# Patient Record
Sex: MALE | Race: WHITE | NOT HISPANIC OR LATINO | Employment: OTHER | URBAN - METROPOLITAN AREA
[De-identification: names, ages, dates, MRNs, and addresses within clinical notes are randomized per-mention and may not be internally consistent; named-entity substitution may affect disease eponyms.]

---

## 2022-12-27 ENCOUNTER — HOSPITAL ENCOUNTER (INPATIENT)
Facility: HOSPITAL | Age: 72
LOS: 4 days | Discharge: HOME HEALTH | DRG: 420 | End: 2022-12-31
Attending: EMERGENCY MEDICINE | Admitting: STUDENT IN AN ORGANIZED HEALTH CARE EDUCATION/TRAINING PROGRAM
Payer: MEDICARE

## 2022-12-27 DIAGNOSIS — K85.20 ALCOHOL-INDUCED ACUTE PANCREATITIS, UNSPECIFIED COMPLICATION STATUS: Primary | ICD-10-CM

## 2022-12-27 DIAGNOSIS — I10 PRIMARY HYPERTENSION: ICD-10-CM

## 2022-12-27 DIAGNOSIS — R07.9 CHEST PAIN: ICD-10-CM

## 2022-12-27 DIAGNOSIS — Z00.8 MEDICAL CLEARANCE FOR PSYCHIATRIC ADMISSION: ICD-10-CM

## 2022-12-27 DIAGNOSIS — R79.89 ELEVATED LFTS: ICD-10-CM

## 2022-12-27 PROBLEM — F10.10 ALCOHOL ABUSE: Status: ACTIVE | Noted: 2022-12-27

## 2022-12-27 PROBLEM — E78.5 HYPERLIPIDEMIA: Status: ACTIVE | Noted: 2022-12-27

## 2022-12-27 PROBLEM — A41.9 SEPSIS: Status: ACTIVE | Noted: 2022-12-27

## 2022-12-27 PROBLEM — K52.9 COLITIS: Status: ACTIVE | Noted: 2022-12-27

## 2022-12-27 LAB
ALBUMIN SERPL BCP-MCNC: 3 G/DL (ref 3.5–5.2)
ALP SERPL-CCNC: 175 U/L (ref 55–135)
ALT SERPL W/O P-5'-P-CCNC: 93 U/L (ref 10–44)
AMPHET+METHAMPHET UR QL: NEGATIVE
ANION GAP SERPL CALC-SCNC: 12 MMOL/L (ref 8–16)
APAP SERPL-MCNC: <3 UG/ML (ref 10–20)
AST SERPL-CCNC: 140 U/L (ref 10–40)
BARBITURATES UR QL SCN>200 NG/ML: NEGATIVE
BASOPHILS # BLD AUTO: 0.17 K/UL (ref 0–0.2)
BASOPHILS NFR BLD: 1.2 % (ref 0–1.9)
BENZODIAZ UR QL SCN>200 NG/ML: NEGATIVE
BILIRUB SERPL-MCNC: 1.6 MG/DL (ref 0.1–1)
BILIRUB UR QL STRIP: NEGATIVE
BUN SERPL-MCNC: 10 MG/DL (ref 8–23)
BZE UR QL SCN: NEGATIVE
CALCIUM SERPL-MCNC: 8.7 MG/DL (ref 8.7–10.5)
CANNABINOIDS UR QL SCN: NEGATIVE
CHLORIDE SERPL-SCNC: 100 MMOL/L (ref 95–110)
CLARITY UR: CLEAR
CO2 SERPL-SCNC: 25 MMOL/L (ref 23–29)
COLOR UR: YELLOW
CREAT SERPL-MCNC: 0.8 MG/DL (ref 0.5–1.4)
CREAT UR-MCNC: 31.7 MG/DL (ref 23–375)
CTP QC/QA: YES
DIFFERENTIAL METHOD: ABNORMAL
EOSINOPHIL # BLD AUTO: 0.4 K/UL (ref 0–0.5)
EOSINOPHIL NFR BLD: 2.5 % (ref 0–8)
ERYTHROCYTE [DISTWIDTH] IN BLOOD BY AUTOMATED COUNT: 13.8 % (ref 11.5–14.5)
EST. GFR  (NO RACE VARIABLE): >60 ML/MIN/1.73 M^2
ETHANOL SERPL-MCNC: <10 MG/DL
GLUCOSE SERPL-MCNC: 132 MG/DL (ref 70–110)
GLUCOSE UR QL STRIP: NEGATIVE
HCT VFR BLD AUTO: 41.5 % (ref 40–54)
HGB BLD-MCNC: 14.8 G/DL (ref 14–18)
HGB UR QL STRIP: NEGATIVE
IMM GRANULOCYTES # BLD AUTO: 0.07 K/UL (ref 0–0.04)
IMM GRANULOCYTES NFR BLD AUTO: 0.5 % (ref 0–0.5)
INR PPP: 1.1 (ref 0.8–1.2)
KETONES UR QL STRIP: NEGATIVE
LEUKOCYTE ESTERASE UR QL STRIP: NEGATIVE
LIPASE SERPL-CCNC: >1000 U/L (ref 4–60)
LYMPHOCYTES # BLD AUTO: 1.5 K/UL (ref 1–4.8)
LYMPHOCYTES NFR BLD: 10.5 % (ref 18–48)
MCH RBC QN AUTO: 33.8 PG (ref 27–31)
MCHC RBC AUTO-ENTMCNC: 35.7 G/DL (ref 32–36)
MCV RBC AUTO: 95 FL (ref 82–98)
METHADONE UR QL SCN>300 NG/ML: NEGATIVE
MONOCYTES # BLD AUTO: 1.3 K/UL (ref 0.3–1)
MONOCYTES NFR BLD: 8.7 % (ref 4–15)
NEUTROPHILS # BLD AUTO: 11.1 K/UL (ref 1.8–7.7)
NEUTROPHILS NFR BLD: 76.6 % (ref 38–73)
NITRITE UR QL STRIP: NEGATIVE
NRBC BLD-RTO: 0 /100 WBC
OPIATES UR QL SCN: NEGATIVE
PCP UR QL SCN>25 NG/ML: NEGATIVE
PH UR STRIP: 7 [PH] (ref 5–8)
PLATELET # BLD AUTO: 375 K/UL (ref 150–450)
PMV BLD AUTO: 9.3 FL (ref 9.2–12.9)
POTASSIUM SERPL-SCNC: 3.3 MMOL/L (ref 3.5–5.1)
PROT SERPL-MCNC: 7.3 G/DL (ref 6–8.4)
PROT UR QL STRIP: NEGATIVE
PROTHROMBIN TIME: 11.6 SEC (ref 9–12.5)
RBC # BLD AUTO: 4.38 M/UL (ref 4.6–6.2)
SARS-COV-2 RDRP RESP QL NAA+PROBE: NEGATIVE
SODIUM SERPL-SCNC: 137 MMOL/L (ref 136–145)
SP GR UR STRIP: 1.03 (ref 1–1.03)
TOXICOLOGY INFORMATION: NORMAL
TSH SERPL DL<=0.005 MIU/L-ACNC: 1.87 UIU/ML (ref 0.4–4)
URN SPEC COLLECT METH UR: ABNORMAL
UROBILINOGEN UR STRIP-ACNC: ABNORMAL EU/DL
WBC # BLD AUTO: 14.53 K/UL (ref 3.9–12.7)

## 2022-12-27 PROCEDURE — 99285 EMERGENCY DEPT VISIT HI MDM: CPT | Mod: 25

## 2022-12-27 PROCEDURE — 80307 DRUG TEST PRSMV CHEM ANLYZR: CPT | Performed by: EMERGENCY MEDICINE

## 2022-12-27 PROCEDURE — 63600175 PHARM REV CODE 636 W HCPCS: Performed by: EMERGENCY MEDICINE

## 2022-12-27 PROCEDURE — 93010 EKG 12-LEAD: ICD-10-PCS | Mod: ,,, | Performed by: INTERNAL MEDICINE

## 2022-12-27 PROCEDURE — 87635 SARS-COV-2 COVID-19 AMP PRB: CPT | Performed by: EMERGENCY MEDICINE

## 2022-12-27 PROCEDURE — 25000003 PHARM REV CODE 250: Performed by: STUDENT IN AN ORGANIZED HEALTH CARE EDUCATION/TRAINING PROGRAM

## 2022-12-27 PROCEDURE — 93005 ELECTROCARDIOGRAM TRACING: CPT

## 2022-12-27 PROCEDURE — 80053 COMPREHEN METABOLIC PANEL: CPT | Performed by: EMERGENCY MEDICINE

## 2022-12-27 PROCEDURE — 25000003 PHARM REV CODE 250: Performed by: EMERGENCY MEDICINE

## 2022-12-27 PROCEDURE — 85025 COMPLETE CBC W/AUTO DIFF WBC: CPT | Performed by: EMERGENCY MEDICINE

## 2022-12-27 PROCEDURE — 81003 URINALYSIS AUTO W/O SCOPE: CPT | Performed by: EMERGENCY MEDICINE

## 2022-12-27 PROCEDURE — 84443 ASSAY THYROID STIM HORMONE: CPT | Performed by: EMERGENCY MEDICINE

## 2022-12-27 PROCEDURE — 27000207 HC ISOLATION

## 2022-12-27 PROCEDURE — 80143 DRUG ASSAY ACETAMINOPHEN: CPT | Performed by: EMERGENCY MEDICINE

## 2022-12-27 PROCEDURE — 93010 ELECTROCARDIOGRAM REPORT: CPT | Mod: ,,, | Performed by: INTERNAL MEDICINE

## 2022-12-27 PROCEDURE — 85610 PROTHROMBIN TIME: CPT | Performed by: EMERGENCY MEDICINE

## 2022-12-27 PROCEDURE — 25500020 PHARM REV CODE 255: Performed by: HOSPITALIST

## 2022-12-27 PROCEDURE — 83690 ASSAY OF LIPASE: CPT | Performed by: EMERGENCY MEDICINE

## 2022-12-27 PROCEDURE — 11000001 HC ACUTE MED/SURG PRIVATE ROOM

## 2022-12-27 PROCEDURE — 82077 ASSAY SPEC XCP UR&BREATH IA: CPT | Performed by: EMERGENCY MEDICINE

## 2022-12-27 RX ORDER — BISACODYL 10 MG
10 SUPPOSITORY, RECTAL RECTAL DAILY PRN
Status: DISCONTINUED | OUTPATIENT
Start: 2022-12-27 | End: 2022-12-31 | Stop reason: HOSPADM

## 2022-12-27 RX ORDER — ACETAMINOPHEN 325 MG/1
650 TABLET ORAL EVERY 4 HOURS PRN
Status: DISCONTINUED | OUTPATIENT
Start: 2022-12-27 | End: 2022-12-31 | Stop reason: HOSPADM

## 2022-12-27 RX ORDER — FOLIC ACID 1 MG/1
1 TABLET ORAL DAILY
Status: DISCONTINUED | OUTPATIENT
Start: 2022-12-28 | End: 2022-12-31 | Stop reason: HOSPADM

## 2022-12-27 RX ORDER — LORAZEPAM 0.5 MG/1
2 TABLET ORAL EVERY 4 HOURS PRN
Status: DISCONTINUED | OUTPATIENT
Start: 2022-12-27 | End: 2022-12-31 | Stop reason: HOSPADM

## 2022-12-27 RX ORDER — SODIUM CHLORIDE, SODIUM LACTATE, POTASSIUM CHLORIDE, CALCIUM CHLORIDE 600; 310; 30; 20 MG/100ML; MG/100ML; MG/100ML; MG/100ML
INJECTION, SOLUTION INTRAVENOUS CONTINUOUS
Status: ACTIVE | OUTPATIENT
Start: 2022-12-28 | End: 2022-12-28

## 2022-12-27 RX ORDER — SODIUM CHLORIDE 0.9 % (FLUSH) 0.9 %
10 SYRINGE (ML) INJECTION EVERY 12 HOURS PRN
Status: DISCONTINUED | OUTPATIENT
Start: 2022-12-27 | End: 2022-12-31 | Stop reason: HOSPADM

## 2022-12-27 RX ORDER — PROCHLORPERAZINE EDISYLATE 5 MG/ML
5 INJECTION INTRAMUSCULAR; INTRAVENOUS EVERY 6 HOURS PRN
Status: DISCONTINUED | OUTPATIENT
Start: 2022-12-27 | End: 2022-12-31 | Stop reason: HOSPADM

## 2022-12-27 RX ORDER — SODIUM,POTASSIUM PHOSPHATES 280-250MG
2 POWDER IN PACKET (EA) ORAL
Status: DISCONTINUED | OUTPATIENT
Start: 2022-12-27 | End: 2022-12-28

## 2022-12-27 RX ORDER — TALC
6 POWDER (GRAM) TOPICAL NIGHTLY PRN
Status: DISCONTINUED | OUTPATIENT
Start: 2022-12-27 | End: 2022-12-31 | Stop reason: HOSPADM

## 2022-12-27 RX ORDER — LANOLIN ALCOHOL/MO/W.PET/CERES
800 CREAM (GRAM) TOPICAL
Status: DISCONTINUED | OUTPATIENT
Start: 2022-12-27 | End: 2022-12-31 | Stop reason: HOSPADM

## 2022-12-27 RX ORDER — GLUCAGON 1 MG
1 KIT INJECTION
Status: DISCONTINUED | OUTPATIENT
Start: 2022-12-27 | End: 2022-12-31 | Stop reason: HOSPADM

## 2022-12-27 RX ORDER — LANOLIN ALCOHOL/MO/W.PET/CERES
100 CREAM (GRAM) TOPICAL DAILY
Status: DISCONTINUED | OUTPATIENT
Start: 2022-12-28 | End: 2022-12-31 | Stop reason: HOSPADM

## 2022-12-27 RX ORDER — SIMETHICONE 80 MG
1 TABLET,CHEWABLE ORAL 4 TIMES DAILY PRN
Status: DISCONTINUED | OUTPATIENT
Start: 2022-12-27 | End: 2022-12-31 | Stop reason: HOSPADM

## 2022-12-27 RX ORDER — ACETAMINOPHEN 325 MG/1
650 TABLET ORAL EVERY 8 HOURS PRN
Status: DISCONTINUED | OUTPATIENT
Start: 2022-12-27 | End: 2022-12-31 | Stop reason: HOSPADM

## 2022-12-27 RX ORDER — IBUPROFEN 200 MG
16 TABLET ORAL
Status: DISCONTINUED | OUTPATIENT
Start: 2022-12-27 | End: 2022-12-31 | Stop reason: HOSPADM

## 2022-12-27 RX ORDER — IPRATROPIUM BROMIDE AND ALBUTEROL SULFATE 2.5; .5 MG/3ML; MG/3ML
3 SOLUTION RESPIRATORY (INHALATION) EVERY 4 HOURS PRN
Status: DISCONTINUED | OUTPATIENT
Start: 2022-12-27 | End: 2022-12-31 | Stop reason: HOSPADM

## 2022-12-27 RX ORDER — NALOXONE HCL 0.4 MG/ML
0.02 VIAL (ML) INJECTION
Status: DISCONTINUED | OUTPATIENT
Start: 2022-12-27 | End: 2022-12-31 | Stop reason: HOSPADM

## 2022-12-27 RX ORDER — POTASSIUM CHLORIDE 750 MG/1
50 TABLET, EXTENDED RELEASE ORAL ONCE
Status: COMPLETED | OUTPATIENT
Start: 2022-12-27 | End: 2022-12-28

## 2022-12-27 RX ORDER — ONDANSETRON 2 MG/ML
4 INJECTION INTRAMUSCULAR; INTRAVENOUS EVERY 8 HOURS PRN
Status: DISCONTINUED | OUTPATIENT
Start: 2022-12-27 | End: 2022-12-31 | Stop reason: HOSPADM

## 2022-12-27 RX ORDER — HEPARIN SODIUM 5000 [USP'U]/ML
5000 INJECTION, SOLUTION INTRAVENOUS; SUBCUTANEOUS EVERY 8 HOURS
Status: DISCONTINUED | OUTPATIENT
Start: 2022-12-27 | End: 2022-12-31 | Stop reason: HOSPADM

## 2022-12-27 RX ORDER — MAG HYDROX/ALUMINUM HYD/SIMETH 200-200-20
30 SUSPENSION, ORAL (FINAL DOSE FORM) ORAL 4 TIMES DAILY PRN
Status: DISCONTINUED | OUTPATIENT
Start: 2022-12-27 | End: 2022-12-31 | Stop reason: HOSPADM

## 2022-12-27 RX ORDER — HYDROCODONE BITARTRATE AND ACETAMINOPHEN 5; 325 MG/1; MG/1
1 TABLET ORAL EVERY 6 HOURS PRN
Status: DISCONTINUED | OUTPATIENT
Start: 2022-12-27 | End: 2022-12-31 | Stop reason: HOSPADM

## 2022-12-27 RX ORDER — IBUPROFEN 200 MG
24 TABLET ORAL
Status: DISCONTINUED | OUTPATIENT
Start: 2022-12-27 | End: 2022-12-31 | Stop reason: HOSPADM

## 2022-12-27 RX ORDER — POLYETHYLENE GLYCOL 3350 17 G/17G
17 POWDER, FOR SOLUTION ORAL DAILY
Status: DISCONTINUED | OUTPATIENT
Start: 2022-12-28 | End: 2022-12-31 | Stop reason: HOSPADM

## 2022-12-27 RX ADMIN — SODIUM CHLORIDE 1000 ML: 0.9 INJECTION, SOLUTION INTRAVENOUS at 09:12

## 2022-12-27 RX ADMIN — PIPERACILLIN AND TAZOBACTAM 4.5 G: 4; .5 INJECTION, POWDER, LYOPHILIZED, FOR SOLUTION INTRAVENOUS; PARENTERAL at 10:12

## 2022-12-27 RX ADMIN — IOHEXOL 85 ML: 350 INJECTION, SOLUTION INTRAVENOUS at 09:12

## 2022-12-28 LAB
ALBUMIN SERPL BCP-MCNC: 2.7 G/DL (ref 3.5–5.2)
ALP SERPL-CCNC: 152 U/L (ref 55–135)
ALT SERPL W/O P-5'-P-CCNC: 78 U/L (ref 10–44)
ANION GAP SERPL CALC-SCNC: 13 MMOL/L (ref 8–16)
AST SERPL-CCNC: 93 U/L (ref 10–40)
BASOPHILS # BLD AUTO: 0.17 K/UL (ref 0–0.2)
BASOPHILS NFR BLD: 1.3 % (ref 0–1.9)
BILIRUB DIRECT SERPL-MCNC: 1 MG/DL (ref 0.1–0.3)
BILIRUB SERPL-MCNC: 1.4 MG/DL (ref 0.1–1)
BUN SERPL-MCNC: 7 MG/DL (ref 8–23)
C DIFF GDH STL QL: NEGATIVE
C DIFF TOX A+B STL QL IA: NEGATIVE
CALCIUM SERPL-MCNC: 7.9 MG/DL (ref 8.7–10.5)
CHLORIDE SERPL-SCNC: 104 MMOL/L (ref 95–110)
CO2 SERPL-SCNC: 21 MMOL/L (ref 23–29)
CREAT SERPL-MCNC: 0.8 MG/DL (ref 0.5–1.4)
DIFFERENTIAL METHOD: ABNORMAL
EOSINOPHIL # BLD AUTO: 0.4 K/UL (ref 0–0.5)
EOSINOPHIL NFR BLD: 3.5 % (ref 0–8)
ERYTHROCYTE [DISTWIDTH] IN BLOOD BY AUTOMATED COUNT: 13.8 % (ref 11.5–14.5)
EST. GFR  (NO RACE VARIABLE): >60 ML/MIN/1.73 M^2
GLUCOSE SERPL-MCNC: 117 MG/DL (ref 70–110)
HAV IGM SERPL QL IA: NORMAL
HBV CORE IGM SERPL QL IA: NORMAL
HBV SURFACE AG SERPL QL IA: NORMAL
HCT VFR BLD AUTO: 39.8 % (ref 40–54)
HCV AB SERPL QL IA: NORMAL
HGB BLD-MCNC: 14.2 G/DL (ref 14–18)
IMM GRANULOCYTES # BLD AUTO: 0.08 K/UL (ref 0–0.04)
IMM GRANULOCYTES NFR BLD AUTO: 0.6 % (ref 0–0.5)
LYMPHOCYTES # BLD AUTO: 1.5 K/UL (ref 1–4.8)
LYMPHOCYTES NFR BLD: 12.1 % (ref 18–48)
MAGNESIUM SERPL-MCNC: 1.3 MG/DL (ref 1.6–2.6)
MCH RBC QN AUTO: 34.5 PG (ref 27–31)
MCHC RBC AUTO-ENTMCNC: 35.7 G/DL (ref 32–36)
MCV RBC AUTO: 97 FL (ref 82–98)
MONOCYTES # BLD AUTO: 1.2 K/UL (ref 0.3–1)
MONOCYTES NFR BLD: 9.1 % (ref 4–15)
NEUTROPHILS # BLD AUTO: 9.2 K/UL (ref 1.8–7.7)
NEUTROPHILS NFR BLD: 73.4 % (ref 38–73)
NRBC BLD-RTO: 0 /100 WBC
OB PNL STL: NEGATIVE
PHOSPHATE SERPL-MCNC: 2.2 MG/DL (ref 2.7–4.5)
PLATELET # BLD AUTO: 382 K/UL (ref 150–450)
PMV BLD AUTO: 9.2 FL (ref 9.2–12.9)
POTASSIUM SERPL-SCNC: 3.2 MMOL/L (ref 3.5–5.1)
PROT SERPL-MCNC: 6.4 G/DL (ref 6–8.4)
RBC # BLD AUTO: 4.12 M/UL (ref 4.6–6.2)
SODIUM SERPL-SCNC: 138 MMOL/L (ref 136–145)
WBC # BLD AUTO: 12.6 K/UL (ref 3.9–12.7)

## 2022-12-28 PROCEDURE — 87177 OVA AND PARASITES SMEARS: CPT | Performed by: EMERGENCY MEDICINE

## 2022-12-28 PROCEDURE — 87209 SMEAR COMPLEX STAIN: CPT | Performed by: EMERGENCY MEDICINE

## 2022-12-28 PROCEDURE — 82653 EL-1 FECAL QUANTITATIVE: CPT | Performed by: EMERGENCY MEDICINE

## 2022-12-28 PROCEDURE — 87045 FECES CULTURE AEROBIC BACT: CPT | Performed by: EMERGENCY MEDICINE

## 2022-12-28 PROCEDURE — 36415 COLL VENOUS BLD VENIPUNCTURE: CPT | Performed by: STUDENT IN AN ORGANIZED HEALTH CARE EDUCATION/TRAINING PROGRAM

## 2022-12-28 PROCEDURE — 80076 HEPATIC FUNCTION PANEL: CPT | Performed by: STUDENT IN AN ORGANIZED HEALTH CARE EDUCATION/TRAINING PROGRAM

## 2022-12-28 PROCEDURE — 87324 CLOSTRIDIUM AG IA: CPT | Performed by: EMERGENCY MEDICINE

## 2022-12-28 PROCEDURE — 85025 COMPLETE CBC W/AUTO DIFF WBC: CPT | Performed by: STUDENT IN AN ORGANIZED HEALTH CARE EDUCATION/TRAINING PROGRAM

## 2022-12-28 PROCEDURE — 11000001 HC ACUTE MED/SURG PRIVATE ROOM

## 2022-12-28 PROCEDURE — 25000003 PHARM REV CODE 250: Performed by: EMERGENCY MEDICINE

## 2022-12-28 PROCEDURE — 25000003 PHARM REV CODE 250: Performed by: STUDENT IN AN ORGANIZED HEALTH CARE EDUCATION/TRAINING PROGRAM

## 2022-12-28 PROCEDURE — 63600175 PHARM REV CODE 636 W HCPCS: Performed by: STUDENT IN AN ORGANIZED HEALTH CARE EDUCATION/TRAINING PROGRAM

## 2022-12-28 PROCEDURE — 84100 ASSAY OF PHOSPHORUS: CPT | Performed by: STUDENT IN AN ORGANIZED HEALTH CARE EDUCATION/TRAINING PROGRAM

## 2022-12-28 PROCEDURE — 80048 BASIC METABOLIC PNL TOTAL CA: CPT | Performed by: STUDENT IN AN ORGANIZED HEALTH CARE EDUCATION/TRAINING PROGRAM

## 2022-12-28 PROCEDURE — 87046 STOOL CULTR AEROBIC BACT EA: CPT | Performed by: EMERGENCY MEDICINE

## 2022-12-28 PROCEDURE — 63600175 PHARM REV CODE 636 W HCPCS: Performed by: EMERGENCY MEDICINE

## 2022-12-28 PROCEDURE — 25000003 PHARM REV CODE 250: Performed by: INTERNAL MEDICINE

## 2022-12-28 PROCEDURE — 80074 ACUTE HEPATITIS PANEL: CPT | Performed by: STUDENT IN AN ORGANIZED HEALTH CARE EDUCATION/TRAINING PROGRAM

## 2022-12-28 PROCEDURE — 82272 OCCULT BLD FECES 1-3 TESTS: CPT | Performed by: EMERGENCY MEDICINE

## 2022-12-28 PROCEDURE — 87427 SHIGA-LIKE TOXIN AG IA: CPT | Mod: 59 | Performed by: EMERGENCY MEDICINE

## 2022-12-28 PROCEDURE — 63600175 PHARM REV CODE 636 W HCPCS: Performed by: INTERNAL MEDICINE

## 2022-12-28 PROCEDURE — 83735 ASSAY OF MAGNESIUM: CPT | Performed by: STUDENT IN AN ORGANIZED HEALTH CARE EDUCATION/TRAINING PROGRAM

## 2022-12-28 RX ORDER — LOPERAMIDE HYDROCHLORIDE 2 MG/1
2 CAPSULE ORAL 4 TIMES DAILY PRN
Status: DISCONTINUED | OUTPATIENT
Start: 2022-12-28 | End: 2022-12-31 | Stop reason: HOSPADM

## 2022-12-28 RX ORDER — POTASSIUM CHLORIDE 7.45 MG/ML
10 INJECTION INTRAVENOUS
Status: COMPLETED | OUTPATIENT
Start: 2022-12-28 | End: 2022-12-28

## 2022-12-28 RX ORDER — MAGNESIUM SULFATE HEPTAHYDRATE 40 MG/ML
2 INJECTION, SOLUTION INTRAVENOUS ONCE
Status: COMPLETED | OUTPATIENT
Start: 2022-12-28 | End: 2022-12-28

## 2022-12-28 RX ADMIN — PIPERACILLIN AND TAZOBACTAM 4.5 G: 4; .5 INJECTION, POWDER, LYOPHILIZED, FOR SOLUTION INTRAVENOUS; PARENTERAL at 06:12

## 2022-12-28 RX ADMIN — POTASSIUM PHOSPHATE, MONOBASIC AND POTASSIUM PHOSPHATE, DIBASIC 20 MMOL: 224; 236 INJECTION, SOLUTION, CONCENTRATE INTRAVENOUS at 11:12

## 2022-12-28 RX ADMIN — SODIUM CHLORIDE, SODIUM LACTATE, POTASSIUM CHLORIDE, AND CALCIUM CHLORIDE: .6; .31; .03; .02 INJECTION, SOLUTION INTRAVENOUS at 12:12

## 2022-12-28 RX ADMIN — MAGNESIUM SULFATE HEPTAHYDRATE 2 G: 40 INJECTION, SOLUTION INTRAVENOUS at 09:12

## 2022-12-28 RX ADMIN — HEPARIN SODIUM 5000 UNITS: 5000 INJECTION INTRAVENOUS; SUBCUTANEOUS at 02:12

## 2022-12-28 RX ADMIN — POTASSIUM CHLORIDE 10 MEQ: 7.46 INJECTION, SOLUTION INTRAVENOUS at 09:12

## 2022-12-28 RX ADMIN — POTASSIUM CHLORIDE 50 MEQ: 750 TABLET, EXTENDED RELEASE ORAL at 12:12

## 2022-12-28 RX ADMIN — POTASSIUM CHLORIDE 10 MEQ: 7.46 INJECTION, SOLUTION INTRAVENOUS at 01:12

## 2022-12-28 RX ADMIN — PIPERACILLIN AND TAZOBACTAM 4.5 G: 4; .5 INJECTION, POWDER, LYOPHILIZED, FOR SOLUTION INTRAVENOUS; PARENTERAL at 11:12

## 2022-12-28 RX ADMIN — THIAMINE HCL TAB 100 MG 100 MG: 100 TAB at 09:12

## 2022-12-28 RX ADMIN — LOPERAMIDE HYDROCHLORIDE 2 MG: 2 CAPSULE ORAL at 08:12

## 2022-12-28 RX ADMIN — FOLIC ACID 1 MG: 1 TABLET ORAL at 09:12

## 2022-12-28 RX ADMIN — POTASSIUM CHLORIDE 10 MEQ: 7.46 INJECTION, SOLUTION INTRAVENOUS at 12:12

## 2022-12-28 RX ADMIN — POTASSIUM CHLORIDE 10 MEQ: 7.46 INJECTION, SOLUTION INTRAVENOUS at 10:12

## 2022-12-28 RX ADMIN — HEPARIN SODIUM 5000 UNITS: 5000 INJECTION INTRAVENOUS; SUBCUTANEOUS at 12:12

## 2022-12-28 RX ADMIN — PIPERACILLIN AND TAZOBACTAM 4.5 G: 4; .5 INJECTION, POWDER, LYOPHILIZED, FOR SOLUTION INTRAVENOUS; PARENTERAL at 02:12

## 2022-12-28 RX ADMIN — POTASSIUM CHLORIDE 10 MEQ: 7.46 INJECTION, SOLUTION INTRAVENOUS at 11:12

## 2022-12-28 RX ADMIN — THERA TABS 1 TABLET: TAB at 09:12

## 2022-12-28 RX ADMIN — HEPARIN SODIUM 5000 UNITS: 5000 INJECTION INTRAVENOUS; SUBCUTANEOUS at 09:12

## 2022-12-28 RX ADMIN — HEPARIN SODIUM 5000 UNITS: 5000 INJECTION INTRAVENOUS; SUBCUTANEOUS at 06:12

## 2022-12-28 NOTE — HOSPITAL COURSE
Patient admitted with pancreatitis likely secondary to ETOH abuse. GI was consulted. C-diff negative. Advanced diet. PT/OT were consulted. GI with plans for EUS and ERCP on 12/30- suspicious for pancreatic CA- stent placed. Patient clinically improved. Lipase now normal. Will follow up with oncology as out patient. Activity as tolerated. Diet- low NA.

## 2022-12-28 NOTE — FIRST PROVIDER EVALUATION
"Medical screening examination initiated.  I have conducted a focused provider triage encounter, findings are as follows:    Brief history of present illness:  Diarrhea x 2 weeks, nausea, vomiting and weakness x few days. Patient also reports depression and SI though excessive drinking. Denies HI or attempted self harm PTA.       Vitals:    12/27/22 1833   BP: (!) 150/86   Pulse: (!) 111   Resp: 18   Temp: 98 °F (36.7 °C)   TempSrc: Oral   SpO2: 98%   Weight: 65.8 kg (145 lb)   Height: 5' 8" (1.727 m)        Pertinent physical exam:  We appearing and in no acute distress     Brief workup plan:  Labs, IVF, antiemetics     Preliminary workup initiated; this workup will be continued and followed by the physician or advanced practice provider that is assigned to the patient when roomed  "

## 2022-12-28 NOTE — H&P
"[facility]  [facility] Medicine  History & Physical    Patient Name: Manuel Tyler  MRN: [id number]  Patient Class: IP- Inpatient  Admission Date: 12/27/2022  Attending Physician: Cholo Vallejo MD   Primary Care Provider: Victor M Stokes MD         Patient information was obtained from patient and ER records.     Subjective:     Principal Problem:Alcohol-induced acute pancreatitis    Chief Complaint:   Chief Complaint   Patient presents with    Suicidal     Pt to ED c/o diarrhea x 2 weeks and vomiting starting today. During triage, pt states "I have been depressed and unmotivated" Pt reports SI and plans to use alcohol to harm himself. Denies HI. Pt reports lack of appetite and weakness. MM pink and moist. VSS         HPI: This is a 72 year old male with a PMHx of HTN, HLD, alcohol use, tobacco use who presents with diarrhea.     Patient presents with diarrhea for 2 weeks duration. He has multiple episodes of diarrhea daily, every 2 hours, mostly nocturnal, without any blood. Associated symptoms include left sided abdominal pain, with occasional radiation to his back when he sits up, one episode of vomiting and decreased po intake. He reports being depressed and screened positive on initial suicide screening, but later reported that he does not have any suicidal behaviors or plan, but reports that he thinks his drinking is killing him. He drinks 2-4 beers daily with occasional liquor as well. He smokes 1 pack daily. No illicit substances. In the ED, he was hypertensive (150/86), tachycardic. Labs showed elevated lipase (>1000), leukocytosis (14.5), elevated LFTs (AST: 140, ALT: 93, Tbili: 1.6, ALP: 175), hypokalemia (3.3). CT A/P showed mild wall thickening involving the sigmoid colon & distended gallbladder. The patient was admitted for further management.         Past Medical History:   Diagnosis Date    Hyperlipidemia     Hypertension        Past Surgical History:   Procedure Laterality Date "    APPENDECTOMY         Review of patient's allergies indicates:  No Known Allergies    No current facility-administered medications on file prior to encounter.     Current Outpatient Medications on File Prior to Encounter   Medication Sig    citalopram (CELEXA) 20 MG tablet Take 20 mg by mouth once daily.    diltiazem (CARDIZEM LA) 180 mg 24 hr tablet Take 240 mg by mouth once daily.    pravastatin (PRAVACHOL) 20 MG tablet Take 20 mg by mouth nightly.    venlafaxine (EFFEXOR-XR) 37.5 MG 24 hr capsule Take 37.5 mg by mouth once daily.     Family History    None       Tobacco Use    Smoking status: Every Day    Smokeless tobacco: Not on file   Substance and Sexual Activity    Alcohol use: Yes    Drug use: Not on file    Sexual activity: Not on file     Review of Systems   Constitutional:  Positive for appetite change.   HENT: Negative.     Eyes: Negative.    Respiratory: Negative.     Cardiovascular: Negative.    Gastrointestinal:  Positive for abdominal pain and diarrhea.   Endocrine: Negative.    Genitourinary: Negative.    Musculoskeletal: Negative.    Skin: Negative.    Allergic/Immunologic: Negative.    Neurological: Negative.    Psychiatric/Behavioral: Negative.     Objective:     Vital Signs (Most Recent):  Temp: 98 °F (36.7 °C) (12/27/22 1833)  Pulse: 87 (12/27/22 2112)  Resp: 18 (12/27/22 2112)  BP: (!) 152/86 (12/27/22 2102)  SpO2: (!) 92 % (12/27/22 2112)   Vital Signs (24h Range):  Temp:  [98 °F (36.7 °C)] 98 °F (36.7 °C)  Pulse:  [] 87  Resp:  [18-22] 18  SpO2:  [91 %-98 %] 92 %  BP: (143-152)/(79-86) 152/86     Weight: 65.8 kg (145 lb)  Body mass index is 22.05 kg/m².    Physical Exam  Vitals and nursing note reviewed.   Constitutional:       General: He is not in acute distress.     Appearance: Normal appearance. He is not ill-appearing.   HENT:      Head: Normocephalic and atraumatic.      Nose: Nose normal.      Mouth/Throat:      Mouth: Mucous membranes are moist.   Eyes:       Extraocular Movements: Extraocular movements intact.   Cardiovascular:      Rate and Rhythm: Normal rate.      Pulses: Normal pulses.      Heart sounds: No murmur heard.  Pulmonary:      Effort: Pulmonary effort is normal. No respiratory distress.   Abdominal:      General: Abdomen is flat.      Palpations: Abdomen is soft.      Tenderness: There is abdominal tenderness.   Musculoskeletal:      Right lower leg: No edema.      Left lower leg: No edema.   Skin:     General: Skin is warm.      Capillary Refill: Capillary refill takes less than 2 seconds.   Neurological:      General: No focal deficit present.      Mental Status: He is alert.   Psychiatric:         Mood and Affect: Mood normal.           Significant Labs: All pertinent labs within the past 24 hours have been reviewed.    Significant Imaging: I have reviewed all pertinent imaging results/findings within the past 24 hours.    Assessment/Plan:     * Alcohol-induced acute pancreatitis  Presented with abdominal pain, diarrhea   Labs noted for leukocytosis, elevated lipase   CT A/P showed mild wall thickening involving the sigmoid colon with abnormal appearance seen within the left lower pelvis along the left lateral aspect of the sigmoid colon, distended gallbladder. Pancreas unremarkable.  Meets 2/3 criteria, although his abdominal pain is related to other etiologies (possibly colitis)     Plan:   - IVF  - Pain control   - NPO   - Consult to GI  - US RUQ    Elevated LFTs  Possibly related to gallstones, biliary sludge or alcoholic hepatitis   RUQ US  Check acute hepatitis panel       Alcohol abuse  Maintain on CIWA      Sepsis  This patient does have evidence of infective focus  My overall impression is sepsis. Vital signs were reviewed and noted in progress note.  Antibiotics given-   Antibiotics (From admission, onward)    Start     Stop Route Frequency Ordered    12/27/22 2300  piperacillin-tazobactam 4.5 g in dextrose 5 % 100 mL IVPB (ready to  mix system)         -- IV Every 8 hours (non-standard times) 12/27/22 2149        Cultures were taken-   Microbiology Results (last 7 days)     Procedure Component Value Units Date/Time    Stool culture [[id number]]     Order Status: No result Specimen: Stool     Clostridium difficile EIA [[id number]]     Order Status: No result Specimen: Stool         Latest lactate reviewed, they are-  No results for input(s): LACTATE in the last 72 hours.    Organ dysfunction indicated by Acute liver injury  Source- Intra-abdominal     Source control Achieved by-   Antibiotics     Colitis  CT A/P showed mild wall thickening involving the sigmoid colon with abnormal appearance seen within the left lower pelvis along the left lateral aspect of the sigmoid colon, distended gallbladder. Pancreas unremarkable.    Plan:   - Continue zosyn     Hyperlipidemia  Outpatient follow up     Primary hypertension  Monitor BP      VTE Risk Mitigation (From admission, onward)         Ordered     heparin (porcine) injection 5,000 Units  Every 8 hours         12/27/22 2220     IP VTE HIGH RISK PATIENT  Once         12/27/22 2220     Place sequential compression device  Until discontinued         12/27/22 2220                   Hilton Griffin MD  Department of Hospital Medicine   [facility] - Emergency Dept

## 2022-12-28 NOTE — ASSESSMENT & PLAN NOTE
Possibly related to gallstones, biliary sludge or alcoholic hepatitis   RUQ US  Check acute hepatitis panel

## 2022-12-28 NOTE — PLAN OF CARE
[facility] [place] Emergency Dept  Initial Discharge Assessment       Primary Care Provider: Victor M Stokes MD    Admission Diagnosis: Alcohol-induced acute pancreatitis, unspecified complication status [K85.20]    Admission Date: 12/27/2022  Expected Discharge Date:     SW completed initial assessment and discussed discharge planning with patient and his wife Vesta at his bedside. Patient lives with his wife who is his main support. Patient's wife will provide transportation for him to get home when discharge from the hospital.    Discharge Barriers Identified: None    Payor: MEDICARE / Plan: MEDICARE PART A & B / Product Type: Government /     Extended Emergency Contact Information  Primary Emergency Contact: Vesta Tyler   [facility]  Home Phone: [phone]  Relation: Spouse    Discharge Plan A: Home with family  Discharge Plan B: Home with family    No Pharmacies Listed    Initial Assessment (most recent)       Adult Discharge Assessment - 12/27/22 2221          Discharge Assessment    Assessment Type Discharge Planning Assessment     Confirmed/corrected address, phone number and insurance Yes     Confirmed Demographics Correct on Facesheet     Source of Information patient     When was your last doctors appointment? 12/21/22     Communicated MIKO with patient/caregiver Date not available/Unable to determine     Reason For Admission Alcohol induced acute pancreatitis     People in Home spouse     Do you expect to return to your current living situation? Yes     Do you have help at home or someone to help you manage your care at home? Yes     Who are your caregiver(s) and their phone number(s)? Vesta Tyler (Spouse)   [phone] (Home Phone)     Prior to hospitilization cognitive status: Alert/Oriented     Current cognitive status: Alert/Oriented     Equipment Currently Used at Home none     Readmission within 30 days? No     Patient currently being followed by outpatient case management? No     Do  you currently have service(s) that help you manage your care at home? No     Do you take prescription medications? No     Do you have prescription coverage? Yes     Coverage Medicare     Do you have any problems affording any of your prescribed medications? No     Is the patient taking medications as prescribed? yes     Who is going to help you get home at discharge? Vesta Tyler (Spouse)   [phone] (Home Phone)     How do you get to doctors appointments? car, drives self     Are you on dialysis? No     Do you take coumadin? No     Discharge Plan A Home with family     Discharge Plan B Home with family     DME Needed Upon Discharge  none     Discharge Plan discussed with: Patient     Discharge Barriers Identified None

## 2022-12-28 NOTE — ED NOTES
Pt reports losing 35lbs over the past 6 months. He has been  from his wife who is at bedside for a year and reports having increased depression about 9 months ago (which he says he has been treating with alcohol). Pt reports decreased alcohol intake recently with last drink Thursday. Reports drinking about 2 beers/day when he does drink but has hx of alcoholism. Pt reports that he has had thoughts of suicide but no immediate plan, as he has been using alcohol to kill himself. Pt has had diarrhea for the past 4 weeks and n/v that developed today. According to wife, pt had elevated white count at his doctor's office.

## 2022-12-28 NOTE — PLAN OF CARE
Problem: Infection  Goal: Absence of Infection Signs and Symptoms  Intervention: Prevent or Manage Infection  Flowsheets (Taken 12/28/2022 7822)  Infection Management: (iv zosyn Q4) --

## 2022-12-28 NOTE — ASSESSMENT & PLAN NOTE
CT A/P showed mild wall thickening involving the sigmoid colon with abnormal appearance seen within the left lower pelvis along the left lateral aspect of the sigmoid colon, distended gallbladder. Pancreas unremarkable.    Plan:   - Continue zosyn

## 2022-12-28 NOTE — SUBJECTIVE & OBJECTIVE
Interval History:  No new issues     Review of Systems   Constitutional:  Negative for activity change and appetite change.   HENT:  Negative for congestion and dental problem.    Eyes:  Negative for discharge and itching.   Respiratory:  Negative for shortness of breath.    Cardiovascular:  Negative for palpitations.   Gastrointestinal:  Positive for abdominal pain.   Genitourinary:  Negative for difficulty urinating and dysuria.   Musculoskeletal:  Negative for arthralgias.   Neurological:  Negative for dizziness.   Psychiatric/Behavioral:  Negative for agitation and behavioral problems.    Objective:     Vital Signs (Most Recent):  Temp: 97.5 °F (36.4 °C) (12/28/22 0510)  Pulse: 77 (12/28/22 0510)  Resp: 18 (12/28/22 0510)  BP: (!) 152/72 (12/28/22 0510)  SpO2: 95 % (12/28/22 0510)   Vital Signs (24h Range):  Temp:  [97.5 °F (36.4 °C)-98 °F (36.7 °C)] 97.5 °F (36.4 °C)  Pulse:  [] 77  Resp:  [14-22] 18  SpO2:  [91 %-98 %] 95 %  BP: (137-158)/(72-86) 152/72     Weight: 67.3 kg (148 lb 5.9 oz)  Body mass index is 22.56 kg/m².    Intake/Output Summary (Last 24 hours) at 12/28/2022 0724  Last data filed at 12/27/2022 2240  Gross per 24 hour   Intake 1000 ml   Output --   Net 1000 ml      Physical Exam  Vitals and nursing note reviewed.   Constitutional:       Appearance: Normal appearance. He is ill-appearing.   HENT:      Head: Normocephalic and atraumatic.      Mouth/Throat:      Pharynx: Oropharynx is clear.   Eyes:      Conjunctiva/sclera: Conjunctivae normal.   Cardiovascular:      Rate and Rhythm: Normal rate and regular rhythm.   Pulmonary:      Effort: Pulmonary effort is normal. No respiratory distress.      Breath sounds: No wheezing.   Neurological:      Mental Status: He is alert and oriented to person, place, and time.   Psychiatric:         Behavior: Behavior normal.       Significant Labs: All pertinent labs within the past 24 hours have been reviewed.  BMP:   Recent Labs   Lab 12/28/22 0516    *      K 3.2*      CO2 21*   BUN 7*   CREATININE 0.8   CALCIUM 7.9*   MG 1.3*     CBC:   Recent Labs   Lab 12/27/22 1915 12/28/22  0516   WBC 14.53* 12.60   HGB 14.8 14.2   HCT 41.5 39.8*    382       Significant Imaging:

## 2022-12-28 NOTE — ED PROVIDER NOTES
"Encounter Date: 12/27/2022    SCRIBE #1 NOTE: I, Carmita Tong, am scribing for, and in the presence of,  Jarod Renee MD. I have scribed the following portions of the note - Other sections scribed: HPI, ROS, PE.     History     Chief Complaint   Patient presents with    Suicidal     Pt to ED c/o diarrhea x 2 weeks and vomiting starting today. During triage, pt states "I have been depressed and unmotivated" Pt reports SI and plans to use alcohol to harm himself. Denies HI. Pt reports lack of appetite and weakness. MM pink and moist. VSS      Manuel Tyler is a 72 y.o. male with a PMHx of HTN, HLD, diverticulitis, who presents to the ED c/o sudden onset of constant diarrhea that began 2 weeks ago. Pt describes the stools as light brown and loose. Pt notes approximately 6 episodes of diarrhea per day. Pt has the associated symptoms of non-radiating abdominal pain, and decreased appetite. Pt also complains of suicidal ideation. Pt endorses alcohol use of 2-3 beer/day, every 4 days. Pt states that he thinks the drinking is killing him. Denies having a suicide plan. Pt is not currently being treated for depression and states that he would like resources to help with depression. Pt's last drink was 5 days ago. Per wife, pt's has not exhibited any suicidal behavior or tendencies. Pt's wife notes that the have been  for almost a year due to the pt's alcoholism. No other exacerbating or alleviating factors. Denies any nausea, fever, urinary problems, blood in stools, or other associated symptoms. Denies any hx of pancreas problems, IBS, or abdominal surgeries.    The history is provided by the patient and the spouse (wife). No  was used.   Review of patient's allergies indicates:  No Known Allergies  Past Medical History:   Diagnosis Date    Hyperlipidemia     Hypertension      Past Surgical History:   Procedure Laterality Date    APPENDECTOMY       No family history on file.  Social " History     Tobacco Use    Smoking status: Every Day   Substance Use Topics    Alcohol use: Yes     Review of Systems   Constitutional:  Positive for appetite change (decreased). Negative for chills and fever.   HENT:  Negative for congestion, rhinorrhea and sore throat.    Eyes:  Negative for visual disturbance.   Respiratory:  Negative for cough and shortness of breath.    Cardiovascular:  Negative for chest pain.   Gastrointestinal:  Positive for abdominal pain (non-radiating) and diarrhea (constant, light brown, loose stools). Negative for blood in stool, nausea and vomiting.   Genitourinary:  Negative for dysuria, frequency and hematuria.   Musculoskeletal:  Negative for back pain.   Skin:  Negative for rash.   Neurological:  Negative for dizziness, weakness and headaches.   Psychiatric/Behavioral:  Positive for suicidal ideas.      Physical Exam     Initial Vitals [12/27/22 1833]   BP Pulse Resp Temp SpO2   (!) 150/86 (!) 111 18 98 °F (36.7 °C) 98 %      MAP       --         Physical Exam    Nursing note and vitals reviewed.  Constitutional: He appears well-developed and well-nourished.   HENT:   Head: Normocephalic and atraumatic.   Eyes: EOM are normal. Pupils are equal, round, and reactive to light.   Neck: Neck supple. No thyromegaly present. No JVD present.   Normal range of motion.  Cardiovascular:  Normal rate and regular rhythm.     Exam reveals no gallop and no friction rub.       No murmur heard.  Pulmonary/Chest: Breath sounds normal. No respiratory distress.   Abdominal: Abdomen is soft. Bowel sounds are normal. There is abdominal tenderness (epigastric and upper abdominal). There is guarding (mild, voluntary). There is no rebound.   Musculoskeletal:         General: No tenderness or edema. Normal range of motion.      Cervical back: Normal range of motion and neck supple.     Neurological: He is alert and oriented to person, place, and time. He has normal strength. GCS score is 15. GCS eye  subscore is 4. GCS verbal subscore is 5. GCS motor subscore is 6.   Skin: Skin is warm. Capillary refill takes less than 2 seconds.   Psychiatric: He has a normal mood and affect. His behavior is normal. Thought content normal.       ED Course   Procedures  Labs Reviewed   CBC W/ AUTO DIFFERENTIAL - Abnormal; Notable for the following components:       Result Value    WBC 14.53 (*)     RBC 4.38 (*)     MCH 33.8 (*)     Gran # (ANC) 11.1 (*)     Immature Grans (Abs) 0.07 (*)     Mono # 1.3 (*)     Gran % 76.6 (*)     Lymph % 10.5 (*)     All other components within normal limits   COMPREHENSIVE METABOLIC PANEL - Abnormal; Notable for the following components:    Potassium 3.3 (*)     Glucose 132 (*)     Albumin 3.0 (*)     Total Bilirubin 1.6 (*)     Alkaline Phosphatase 175 (*)      (*)     ALT 93 (*)     All other components within normal limits   ACETAMINOPHEN LEVEL - Abnormal; Notable for the following components:    Acetaminophen (Tylenol), Serum <3.0 (*)     All other components within normal limits   LIPASE - Abnormal; Notable for the following components:    Lipase >1000 (*)     All other components within normal limits   CLOSTRIDIUM DIFFICILE   CULTURE, STOOL   TSH   ALCOHOL,MEDICAL (ETHANOL)   PROTIME-INR   LIPASE   URINALYSIS, REFLEX TO URINE CULTURE   DRUG SCREEN PANEL, URINE EMERGENCY   PANCREATIC ELASTASE, FECAL   OCCULT BLOOD X 1, STOOL   STOOL EXAM-OVA,CYSTS,PARASITES   SARS-COV-2 RDRP GENE          Imaging Results              CT Abdomen Pelvis With Contrast (In process)                      Medications - No data to display           Scribe Attestation:   Scribe #1: I performed the above scribed service and the documentation accurately describes the services I performed. I attest to the accuracy of the note.                   Clinical Impression:   Final diagnoses:  [Z00.8] Medical clearance for psychiatric admission  [K85.20] Alcohol-induced acute pancreatitis, unspecified complication status  (Primary)        ED Disposition Condition    Admit Stable             I, Jarod Renee, personally performed the services described in this documentation. All medical record entries made by the scribe were at my direction and in my presence. I have reviewed the chart and agree that the record reflects my personal performance and is accurate and complete.       Jarod Renee MD  12/27/22 2129

## 2022-12-28 NOTE — ASSESSMENT & PLAN NOTE
Presented with abdominal pain, diarrhea   Labs noted for leukocytosis, elevated lipase   CT A/P showed mild wall thickening involving the sigmoid colon with abnormal appearance seen within the left lower pelvis along the left lateral aspect of the sigmoid colon, distended gallbladder. Pancreas unremarkable.  Meets 2/3 criteria, although his abdominal pain is related to other etiologies (possibly colitis)     Plan:   - IVF  - Pain control   - NPO   - Consult to GI  - US RUQ

## 2022-12-28 NOTE — ASSESSMENT & PLAN NOTE
This patient does have evidence of infective focus  My overall impression is sepsis. Vital signs were reviewed and noted in progress note.  Antibiotics given-   Antibiotics (From admission, onward)    Start     Stop Route Frequency Ordered    12/27/22 2300  piperacillin-tazobactam 4.5 g in dextrose 5 % 100 mL IVPB (ready to mix system)         -- IV Every 8 hours (non-standard times) 12/27/22 2149        Cultures were taken-   Microbiology Results (last 7 days)     Procedure Component Value Units Date/Time    Stool culture [[id number]]     Order Status: No result Specimen: Stool     Clostridium difficile EIA [[id number]]     Order Status: No result Specimen: Stool         Latest lactate reviewed, they are-  No results for input(s): LACTATE in the last 72 hours.    Organ dysfunction indicated by Acute liver injury  Source- Intra-abdominal     Source control Achieved by-   Antibiotics

## 2022-12-28 NOTE — PROGRESS NOTES
[facility] Medicine  Progress Note    Patient Name: Manuel Tyler  MRN: [id number]  Patient Class: IP- Inpatient   Admission Date: 12/27/2022  Length of Stay: 1 days  Attending Physician: Sanchez Araya MD  Primary Care Provider: Victor M Stokes MD        Subjective:     Principal Problem:Alcohol-induced acute pancreatitis        HPI:  This is a 72 year old male with a PMHx of HTN, HLD, alcohol use, tobacco use who presents with diarrhea.     Patient presents with diarrhea for 2 weeks duration. He has multiple episodes of diarrhea daily, every 2 hours, mostly nocturnal, without any blood. Associated symptoms include left sided abdominal pain, with occasional radiation to his back when he sits up, one episode of vomiting and decreased po intake. He reports being depressed and screened positive on initial suicide screening, but later reported that he does not have any suicidal behaviors or plan, but reports that he thinks his drinking is killing him. He drinks 2-4 beers daily with occasional liquor as well. He smokes 1 pack daily. No illicit substances. In the ED, he was hypertensive (150/86), tachycardic. Labs showed elevated lipase (>1000), leukocytosis (14.5), elevated LFTs (AST: 140, ALT: 93, Tbili: 1.6, ALP: 175), hypokalemia (3.3). CT A/P showed mild wall thickening involving the sigmoid colon & distended gallbladder. The patient was admitted for further management.         Overview/Hospital Course:  Patient admitted with pancreatitis likely secondary to ETOH abuse. GI was consulted.      Interval History:  No new issues     Review of Systems   Constitutional:  Negative for activity change and appetite change.   HENT:  Negative for congestion and dental problem.    Eyes:  Negative for discharge and itching.   Respiratory:  Negative for shortness of breath.    Cardiovascular:  Negative for palpitations.   Gastrointestinal:  Positive for abdominal pain.   Genitourinary:  Negative for  difficulty urinating and dysuria.   Musculoskeletal:  Negative for arthralgias.   Neurological:  Negative for dizziness.   Psychiatric/Behavioral:  Negative for agitation and behavioral problems.    Objective:     Vital Signs (Most Recent):  Temp: 97.5 °F (36.4 °C) (12/28/22 0510)  Pulse: 77 (12/28/22 0510)  Resp: 18 (12/28/22 0510)  BP: (!) 152/72 (12/28/22 0510)  SpO2: 95 % (12/28/22 0510)   Vital Signs (24h Range):  Temp:  [97.5 °F (36.4 °C)-98 °F (36.7 °C)] 97.5 °F (36.4 °C)  Pulse:  [] 77  Resp:  [14-22] 18  SpO2:  [91 %-98 %] 95 %  BP: (137-158)/(72-86) 152/72     Weight: 67.3 kg (148 lb 5.9 oz)  Body mass index is 22.56 kg/m².    Intake/Output Summary (Last 24 hours) at 12/28/2022 0724  Last data filed at 12/27/2022 2240  Gross per 24 hour   Intake 1000 ml   Output --   Net 1000 ml      Physical Exam  Vitals and nursing note reviewed.   Constitutional:       Appearance: Normal appearance. He is ill-appearing.   HENT:      Head: Normocephalic and atraumatic.      Mouth/Throat:      Pharynx: Oropharynx is clear.   Eyes:      Conjunctiva/sclera: Conjunctivae normal.   Cardiovascular:      Rate and Rhythm: Normal rate and regular rhythm.   Pulmonary:      Effort: Pulmonary effort is normal. No respiratory distress.      Breath sounds: No wheezing.   Neurological:      Mental Status: He is alert and oriented to person, place, and time.   Psychiatric:         Behavior: Behavior normal.       Significant Labs: All pertinent labs within the past 24 hours have been reviewed.  BMP:   Recent Labs   Lab 12/28/22  0516   *      K 3.2*      CO2 21*   BUN 7*   CREATININE 0.8   CALCIUM 7.9*   MG 1.3*     CBC:   Recent Labs   Lab 12/27/22  1915 12/28/22  0516   WBC 14.53* 12.60   HGB 14.8 14.2   HCT 41.5 39.8*    382       Significant Imaging:       Assessment/Plan:      * Alcohol-induced acute pancreatitis  Presented with abdominal pain, diarrhea   Labs noted for leukocytosis, elevated lipase    CT A/P showed mild wall thickening involving the sigmoid colon with abnormal appearance seen within the left lower pelvis along the left lateral aspect of the sigmoid colon, distended gallbladder. Pancreas unremarkable.  Meets 2/3 criteria, although his abdominal pain is related to other etiologies (possibly colitis)     Plan:   - IVF  - Pain control   - NPO   - Consult to GI  - US RUQ    Elevated LFTs  Possibly related to gallstones, biliary sludge or alcoholic hepatitis   RUQ US  Check acute hepatitis panel       Alcohol abuse  Maintain on CIWA      Sepsis  This patient does have evidence of infective focus  My overall impression is sepsis. Vital signs were reviewed and noted in progress note.  Antibiotics given-   Antibiotics (From admission, onward)    Start     Stop Route Frequency Ordered    12/27/22 2300  piperacillin-tazobactam 4.5 g in dextrose 5 % 100 mL IVPB (ready to mix system)         -- IV Every 8 hours (non-standard times) 12/27/22 2149        Cultures were taken-   Microbiology Results (last 7 days)     Procedure Component Value Units Date/Time    Stool culture [[id number]]     Order Status: No result Specimen: Stool     Clostridium difficile EIA [[id number]]     Order Status: No result Specimen: Stool         Latest lactate reviewed, they are-  No results for input(s): LACTATE in the last 72 hours.    Organ dysfunction indicated by Acute liver injury  Source- Intra-abdominal     Source control Achieved by-   Antibiotics     Colitis  CT A/P showed mild wall thickening involving the sigmoid colon with abnormal appearance seen within the left lower pelvis along the left lateral aspect of the sigmoid colon, distended gallbladder. Pancreas unremarkable.    Plan:   - Continue zosyn     Hyperlipidemia  Outpatient follow up     Primary hypertension  Monitor BP      Hypokalemia- will replace.     Hypophosphatemia- will replace    Hypomagnesemia- will replace.    VTE Risk Mitigation (From admission,  onward)         Ordered     heparin (porcine) injection 5,000 Units  Every 8 hours         12/27/22 2220     IP VTE HIGH RISK PATIENT  Once         12/27/22 2220     Place sequential compression device  Until discontinued         12/27/22 2220                Discharge Planning   MIKO:      Code Status: Full Code   Is the patient medically ready for discharge?:     Reason for patient still in hospital (select all that apply): Patient unstable  Discharge Plan A: Home with family                  Sanchez Mckeon MD  Department of Hospital Medicine   [facility]

## 2022-12-28 NOTE — PLAN OF CARE
Patient denies suicidal ideations or plan overnight. States last suicidal thoughts was about greater than 6 months to a year ago. Patient very calm and cooperative. Patient having continued diarrhea with stools occurring about 2 hours apart. Stool sample collected overnight and sent to lab. Results pending. Minimal to no pain reported overnight and patient seems comfortable. No signs or reported symptoms of alcohol withdrawal.    Problem: Violence Risk or Actual  Goal: Anger and Impulse Control  Outcome: Ongoing, Progressing     Problem: Adult Inpatient Plan of Care  Goal: Plan of Care Review  Outcome: Ongoing, Progressing  Goal: Patient-Specific Goal (Individualized)  Outcome: Ongoing, Progressing  Goal: Absence of Hospital-Acquired Illness or Injury  Outcome: Ongoing, Progressing  Goal: Optimal Comfort and Wellbeing  Outcome: Ongoing, Progressing  Goal: Readiness for Transition of Care  Outcome: Ongoing, Progressing     Problem: Adjustment to Illness (Sepsis/Septic Shock)  Goal: Optimal Coping  Outcome: Ongoing, Progressing     Problem: Bleeding (Sepsis/Septic Shock)  Goal: Absence of Bleeding  Outcome: Ongoing, Progressing     Problem: Glycemic Control Impaired (Sepsis/Septic Shock)  Goal: Blood Glucose Level Within Desired Range  Outcome: Ongoing, Progressing     Problem: Infection Progression (Sepsis/Septic Shock)  Goal: Absence of Infection Signs and Symptoms  Outcome: Ongoing, Progressing     Problem: Nutrition Impaired (Sepsis/Septic Shock)  Goal: Optimal Nutrition Intake  Outcome: Ongoing, Progressing     Problem: Infection  Goal: Absence of Infection Signs and Symptoms  Outcome: Ongoing, Progressing

## 2022-12-28 NOTE — HPI
This is a 72 year old male with a PMHx of HTN, HLD, alcohol use, tobacco use who presents with diarrhea.     Patient presents with diarrhea for 2 weeks duration. He has multiple episodes of diarrhea daily, every 2 hours, mostly nocturnal, without any blood. Associated symptoms include left sided abdominal pain, with occasional radiation to his back when he sits up, one episode of vomiting and decreased po intake. He reports being depressed and screened positive on initial suicide screening, but later reported that he does not have any suicidal behaviors or plan, but reports that he thinks his drinking is killing him. He drinks 2-4 beers daily with occasional liquor as well. He smokes 1 pack daily. No illicit substances. In the ED, he was hypertensive (150/86), tachycardic. Labs showed elevated lipase (>1000), leukocytosis (14.5), elevated LFTs (AST: 140, ALT: 93, Tbili: 1.6, ALP: 175), hypokalemia (3.3). CT A/P showed mild wall thickening involving the sigmoid colon & distended gallbladder. The patient was admitted for further management.

## 2022-12-28 NOTE — SUBJECTIVE & OBJECTIVE
Past Medical History:   Diagnosis Date    Hyperlipidemia     Hypertension        Past Surgical History:   Procedure Laterality Date    APPENDECTOMY         Review of patient's allergies indicates:  No Known Allergies    No current facility-administered medications on file prior to encounter.     Current Outpatient Medications on File Prior to Encounter   Medication Sig    citalopram (CELEXA) 20 MG tablet Take 20 mg by mouth once daily.    diltiazem (CARDIZEM LA) 180 mg 24 hr tablet Take 240 mg by mouth once daily.    pravastatin (PRAVACHOL) 20 MG tablet Take 20 mg by mouth nightly.    venlafaxine (EFFEXOR-XR) 37.5 MG 24 hr capsule Take 37.5 mg by mouth once daily.     Family History    None       Tobacco Use    Smoking status: Every Day    Smokeless tobacco: Not on file   Substance and Sexual Activity    Alcohol use: Yes    Drug use: Not on file    Sexual activity: Not on file     Review of Systems   Constitutional:  Positive for appetite change.   HENT: Negative.     Eyes: Negative.    Respiratory: Negative.     Cardiovascular: Negative.    Gastrointestinal:  Positive for abdominal pain and diarrhea.   Endocrine: Negative.    Genitourinary: Negative.    Musculoskeletal: Negative.    Skin: Negative.    Allergic/Immunologic: Negative.    Neurological: Negative.    Psychiatric/Behavioral: Negative.     Objective:     Vital Signs (Most Recent):  Temp: 98 °F (36.7 °C) (12/27/22 1833)  Pulse: 87 (12/27/22 2112)  Resp: 18 (12/27/22 2112)  BP: (!) 152/86 (12/27/22 2102)  SpO2: (!) 92 % (12/27/22 2112)   Vital Signs (24h Range):  Temp:  [98 °F (36.7 °C)] 98 °F (36.7 °C)  Pulse:  [] 87  Resp:  [18-22] 18  SpO2:  [91 %-98 %] 92 %  BP: (143-152)/(79-86) 152/86     Weight: 65.8 kg (145 lb)  Body mass index is 22.05 kg/m².    Physical Exam  Vitals and nursing note reviewed.   Constitutional:       General: He is not in acute distress.     Appearance: Normal appearance. He is not ill-appearing.   HENT:      Head:  Normocephalic and atraumatic.      Nose: Nose normal.      Mouth/Throat:      Mouth: Mucous membranes are moist.   Eyes:      Extraocular Movements: Extraocular movements intact.   Cardiovascular:      Rate and Rhythm: Normal rate.      Pulses: Normal pulses.      Heart sounds: No murmur heard.  Pulmonary:      Effort: Pulmonary effort is normal. No respiratory distress.   Abdominal:      General: Abdomen is flat.      Palpations: Abdomen is soft.      Tenderness: There is abdominal tenderness.   Musculoskeletal:      Right lower leg: No edema.      Left lower leg: No edema.   Skin:     General: Skin is warm.      Capillary Refill: Capillary refill takes less than 2 seconds.   Neurological:      General: No focal deficit present.      Mental Status: He is alert.   Psychiatric:         Mood and Affect: Mood normal.           Significant Labs: All pertinent labs within the past 24 hours have been reviewed.    Significant Imaging: I have reviewed all pertinent imaging results/findings within the past 24 hours.

## 2022-12-29 LAB
E COLI SXT1 STL QL IA: NEGATIVE
E COLI SXT2 STL QL IA: NEGATIVE
LIPASE SERPL-CCNC: 907 U/L (ref 4–60)
MAGNESIUM SERPL-MCNC: 1.5 MG/DL (ref 1.6–2.6)
PHOSPHATE SERPL-MCNC: 2.6 MG/DL (ref 2.7–4.5)
POTASSIUM SERPL-SCNC: 3.3 MMOL/L (ref 3.5–5.1)

## 2022-12-29 PROCEDURE — 99223 PR INITIAL HOSPITAL CARE,LEVL III: ICD-10-PCS | Mod: ,,, | Performed by: INTERNAL MEDICINE

## 2022-12-29 PROCEDURE — 84132 ASSAY OF SERUM POTASSIUM: CPT | Performed by: INTERNAL MEDICINE

## 2022-12-29 PROCEDURE — 36415 COLL VENOUS BLD VENIPUNCTURE: CPT | Performed by: INTERNAL MEDICINE

## 2022-12-29 PROCEDURE — 99222 1ST HOSP IP/OBS MODERATE 55: CPT | Mod: ,,, | Performed by: SURGERY

## 2022-12-29 PROCEDURE — 25000003 PHARM REV CODE 250: Performed by: EMERGENCY MEDICINE

## 2022-12-29 PROCEDURE — 63600175 PHARM REV CODE 636 W HCPCS: Performed by: EMERGENCY MEDICINE

## 2022-12-29 PROCEDURE — 99222 PR INITIAL HOSPITAL CARE,LEVL II: ICD-10-PCS | Mod: ,,, | Performed by: SURGERY

## 2022-12-29 PROCEDURE — 11000001 HC ACUTE MED/SURG PRIVATE ROOM

## 2022-12-29 PROCEDURE — 25000003 PHARM REV CODE 250: Performed by: INTERNAL MEDICINE

## 2022-12-29 PROCEDURE — 83735 ASSAY OF MAGNESIUM: CPT | Performed by: INTERNAL MEDICINE

## 2022-12-29 PROCEDURE — 63600175 PHARM REV CODE 636 W HCPCS: Performed by: INTERNAL MEDICINE

## 2022-12-29 PROCEDURE — 63600175 PHARM REV CODE 636 W HCPCS: Performed by: STUDENT IN AN ORGANIZED HEALTH CARE EDUCATION/TRAINING PROGRAM

## 2022-12-29 PROCEDURE — 83690 ASSAY OF LIPASE: CPT | Performed by: INTERNAL MEDICINE

## 2022-12-29 PROCEDURE — 84100 ASSAY OF PHOSPHORUS: CPT | Performed by: INTERNAL MEDICINE

## 2022-12-29 PROCEDURE — 99223 1ST HOSP IP/OBS HIGH 75: CPT | Mod: ,,, | Performed by: INTERNAL MEDICINE

## 2022-12-29 PROCEDURE — 25000003 PHARM REV CODE 250: Performed by: STUDENT IN AN ORGANIZED HEALTH CARE EDUCATION/TRAINING PROGRAM

## 2022-12-29 RX ORDER — POTASSIUM CHLORIDE 20 MEQ/1
60 TABLET, EXTENDED RELEASE ORAL ONCE
Status: COMPLETED | OUTPATIENT
Start: 2022-12-29 | End: 2022-12-29

## 2022-12-29 RX ORDER — MAGNESIUM SULFATE HEPTAHYDRATE 40 MG/ML
2 INJECTION, SOLUTION INTRAVENOUS ONCE
Status: COMPLETED | OUTPATIENT
Start: 2022-12-29 | End: 2022-12-29

## 2022-12-29 RX ORDER — SODIUM,POTASSIUM PHOSPHATES 280-250MG
2 POWDER IN PACKET (EA) ORAL ONCE
Status: COMPLETED | OUTPATIENT
Start: 2022-12-29 | End: 2022-12-29

## 2022-12-29 RX ORDER — SODIUM CHLORIDE, SODIUM LACTATE, POTASSIUM CHLORIDE, CALCIUM CHLORIDE 600; 310; 30; 20 MG/100ML; MG/100ML; MG/100ML; MG/100ML
INJECTION, SOLUTION INTRAVENOUS CONTINUOUS
Status: DISCONTINUED | OUTPATIENT
Start: 2022-12-29 | End: 2022-12-31 | Stop reason: HOSPADM

## 2022-12-29 RX ADMIN — POTASSIUM CHLORIDE 60 MEQ: 1500 TABLET, FILM COATED, EXTENDED RELEASE ORAL at 01:12

## 2022-12-29 RX ADMIN — PIPERACILLIN AND TAZOBACTAM 4.5 G: 4; .5 INJECTION, POWDER, LYOPHILIZED, FOR SOLUTION INTRAVENOUS; PARENTERAL at 04:12

## 2022-12-29 RX ADMIN — THIAMINE HCL TAB 100 MG 100 MG: 100 TAB at 08:12

## 2022-12-29 RX ADMIN — FOLIC ACID 1 MG: 1 TABLET ORAL at 08:12

## 2022-12-29 RX ADMIN — Medication 2 PACKET: at 01:12

## 2022-12-29 RX ADMIN — HEPARIN SODIUM 5000 UNITS: 5000 INJECTION INTRAVENOUS; SUBCUTANEOUS at 10:12

## 2022-12-29 RX ADMIN — THERA TABS 1 TABLET: TAB at 08:12

## 2022-12-29 RX ADMIN — SODIUM CHLORIDE, SODIUM LACTATE, POTASSIUM CHLORIDE, AND CALCIUM CHLORIDE: .6; .31; .03; .02 INJECTION, SOLUTION INTRAVENOUS at 10:12

## 2022-12-29 RX ADMIN — MAGNESIUM SULFATE HEPTAHYDRATE 2 G: 40 INJECTION, SOLUTION INTRAVENOUS at 05:12

## 2022-12-29 RX ADMIN — SODIUM CHLORIDE, SODIUM LACTATE, POTASSIUM CHLORIDE, AND CALCIUM CHLORIDE: .6; .31; .03; .02 INJECTION, SOLUTION INTRAVENOUS at 05:12

## 2022-12-29 RX ADMIN — PIPERACILLIN AND TAZOBACTAM 4.5 G: 4; .5 INJECTION, POWDER, LYOPHILIZED, FOR SOLUTION INTRAVENOUS; PARENTERAL at 10:12

## 2022-12-29 RX ADMIN — HEPARIN SODIUM 5000 UNITS: 5000 INJECTION INTRAVENOUS; SUBCUTANEOUS at 05:12

## 2022-12-29 RX ADMIN — PIPERACILLIN AND TAZOBACTAM 4.5 G: 4; .5 INJECTION, POWDER, LYOPHILIZED, FOR SOLUTION INTRAVENOUS; PARENTERAL at 06:12

## 2022-12-29 RX ADMIN — HEPARIN SODIUM 5000 UNITS: 5000 INJECTION INTRAVENOUS; SUBCUTANEOUS at 06:12

## 2022-12-29 NOTE — ASSESSMENT & PLAN NOTE
Presented with abdominal pain, diarrhea   Labs noted for leukocytosis, elevated lipase   CT A/P showed mild wall thickening involving the sigmoid colon with abnormal appearance seen within the left lower pelvis along the left lateral aspect of the sigmoid colon, distended gallbladder. Pancreas unremarkable.  Meets 2/3 criteria, although his abdominal pain is related to other etiologies (possibly colitis)     Plan:   - IVF  - Pain control   - NPO   - Consult to GI  - US RUQ    Improved. Lipase ordered.  Tolerating diet

## 2022-12-29 NOTE — H&P (VIEW-ONLY)
[facility]  Department of Gastroenterology   Inpatient Consult  Note              Patient Name: Manuel Tyler  Age: 72 y.o.  Sex: male  MRN: [id number]  Admission Date: 12/27/2022  TODAY'S DATE:  12/29/2022    Consult For: Pancreatitis    HPI:  Manuel Tyler is a 72 y.o. male with a history of HTN, HLD, alcohol use, tobacco use admitted with pancreatitis.    Patient presented to the emergency department with epigastric abdominal pain radiating to his back with associated nausea/vomiting.  Serologies notable for WBC 14.5, bilirubin 1.6, AST//90, alk-phos 75, and lipase greater than 1000.  Ultrasound showed biliary sludge with evidence of acute cholecystitis, as well as CBD dilation up to 13 mm and an ill-defined 2.6 cm lesion in the region of the pancreatic head.  Of note, CT done the day prior commented on normal pancreas.    Today, patient confirms the above, noting 2 weeks of abdominal pain, possibly 1 night of chills prior to presenting to the emergency department.  Drinks 2-316 oz beers a day, and smokes a pack of cigarettes a day.  He has never had an episode of pancreatitis before, to his knowledge has no history of pancreatic cancer, pancreatic disease, or colon cancer.  He denies the use of over-the-counter medications, herbal supplements, or recent antibiotics.  No prior abdominal surgeries.    ROS: Negative other than above      Review of patient's allergies indicates:  No Known Allergies    No outpatient medications have been marked as taking for the 12/27/22 encounter (Hospital Encounter).       Past Medical History:   Diagnosis Date    Hyperlipidemia     Hypertension        Past Surgical History:   Procedure Laterality Date    APPENDECTOMY         No family history on file.    Social History     Socioeconomic History    Marital status:    Tobacco Use    Smoking status: Every Day   Substance and Sexual Activity    Alcohol use: Yes       Vital Signs:  /84 (BP Location: Left arm,  "Patient Position: Lying)   Pulse 76   Temp 98.4 °F (36.9 °C) (Oral)   Resp 18   Ht 5' 8" (1.727 m)   Wt 67.3 kg (148 lb 5.9 oz)   SpO2 (!) 93%   BMI 22.56 kg/m²      General: Awoke and orientedx3, in no acute distress  HEENT: Normocephalic, atruamatic, Moist mucous membranes  CV: Regular rate and rhythm, no JVD  Pulm: Normal inspiratory effort, no audible wheezing  Abdomen: Soft, nontender, nondistended abdomen without rebound or guarding  Extremities: No clubbing, cyanosis or edema  Psych: Normal affect. Good eye contact     Labs:   No results found for: CRP, CALPROTECTIN  Lab Results   Component Value Date    HEPBSAG Non-reactive 12/28/2022     No results found for: TBGOLDPLUS  No results found for: RWIBSEEV77NB, EAXCSPOX47  Lab Results   Component Value Date    WBC 12.60 12/28/2022    HGB 14.2 12/28/2022    HCT 39.8 (L) 12/28/2022    MCV 97 12/28/2022     12/28/2022     Lab Results   Component Value Date    CREATININE 0.8 12/28/2022    ALBUMIN 2.7 (L) 12/28/2022    BILITOT 1.4 (H) 12/28/2022    ALKPHOS 152 (H) 12/28/2022    AST 93 (H) 12/28/2022    ALT 78 (H) 12/28/2022       Assessment/Plan:  Manuel Tyler is a 72 y.o. male with a history of HTN, HLD, alcohol use, tobacco use admitted with pancreatitis.     Characteristic abdominal pain as well as lipase greater than 1000.  Ultrasound showed ductal dilation to 13 mm with concern for either head of pancreas mass or choledocholithiasis.  Given our MR machine is broken at the [facility], we will plan for travel case tomorrow for EUS for further evaluation, plus or minus ERCP if obstruction is found.    -- Continue antiemetics/analgesics per primary team  -- NPO midnight for EUS +/- ERCP tomorrow      Thank you for involving us in the care of this patient.        Pablito Morales MD  Department of Gastroenterology   "

## 2022-12-29 NOTE — CONSULTS
[facility]  Department of Gastroenterology   Inpatient Consult  Note              Patient Name: Manuel Tyler  Age: 72 y.o.  Sex: male  MRN: [id number]  Admission Date: 12/27/2022  TODAY'S DATE:  12/29/2022    Consult For: Pancreatitis    HPI:  Manuel Tyler is a 72 y.o. male with a history of HTN, HLD, alcohol use, tobacco use admitted with pancreatitis.    Patient presented to the emergency department with epigastric abdominal pain radiating to his back with associated nausea/vomiting.  Serologies notable for WBC 14.5, bilirubin 1.6, AST//90, alk-phos 75, and lipase greater than 1000.  Ultrasound showed biliary sludge with evidence of acute cholecystitis, as well as CBD dilation up to 13 mm and an ill-defined 2.6 cm lesion in the region of the pancreatic head.  Of note, CT done the day prior commented on normal pancreas.    Today, patient confirms the above, noting 2 weeks of abdominal pain, possibly 1 night of chills prior to presenting to the emergency department.  Drinks 2-316 oz beers a day, and smokes a pack of cigarettes a day.  He has never had an episode of pancreatitis before, to his knowledge has no history of pancreatic cancer, pancreatic disease, or colon cancer.  He denies the use of over-the-counter medications, herbal supplements, or recent antibiotics.  No prior abdominal surgeries.    ROS: Negative other than above      Review of patient's allergies indicates:  No Known Allergies    No outpatient medications have been marked as taking for the 12/27/22 encounter (Hospital Encounter).       Past Medical History:   Diagnosis Date    Hyperlipidemia     Hypertension        Past Surgical History:   Procedure Laterality Date    APPENDECTOMY         No family history on file.    Social History     Socioeconomic History    Marital status:    Tobacco Use    Smoking status: Every Day   Substance and Sexual Activity    Alcohol use: Yes       Vital Signs:  /84 (BP Location: Left arm,  "Patient Position: Lying)   Pulse 76   Temp 98.4 °F (36.9 °C) (Oral)   Resp 18   Ht 5' 8" (1.727 m)   Wt 67.3 kg (148 lb 5.9 oz)   SpO2 (!) 93%   BMI 22.56 kg/m²      General: Awoke and orientedx3, in no acute distress  HEENT: Normocephalic, atruamatic, Moist mucous membranes  CV: Regular rate and rhythm, no JVD  Pulm: Normal inspiratory effort, no audible wheezing  Abdomen: Soft, nontender, nondistended abdomen without rebound or guarding  Extremities: No clubbing, cyanosis or edema  Psych: Normal affect. Good eye contact     Labs:   No results found for: CRP, CALPROTECTIN  Lab Results   Component Value Date    HEPBSAG Non-reactive 12/28/2022     No results found for: TBGOLDPLUS  No results found for: LNAMPZCZ98CV, FCVIVQCW86  Lab Results   Component Value Date    WBC 12.60 12/28/2022    HGB 14.2 12/28/2022    HCT 39.8 (L) 12/28/2022    MCV 97 12/28/2022     12/28/2022     Lab Results   Component Value Date    CREATININE 0.8 12/28/2022    ALBUMIN 2.7 (L) 12/28/2022    BILITOT 1.4 (H) 12/28/2022    ALKPHOS 152 (H) 12/28/2022    AST 93 (H) 12/28/2022    ALT 78 (H) 12/28/2022       Assessment/Plan:  Manuel Tyler is a 72 y.o. male with a history of HTN, HLD, alcohol use, tobacco use admitted with pancreatitis.     Characteristic abdominal pain as well as lipase greater than 1000.  Ultrasound showed ductal dilation to 13 mm with concern for either head of pancreas mass or choledocholithiasis.  Given our MR machine is broken at the [facility], we will plan for travel case tomorrow for EUS for further evaluation, plus or minus ERCP if obstruction is found.    -- Continue antiemetics/analgesics per primary team  -- NPO midnight for EUS +/- ERCP tomorrow      Thank you for involving us in the care of this patient.        Pablito Morales MD  Department of Gastroenterology   "

## 2022-12-29 NOTE — PROGRESS NOTES
[facility] Medicine  Progress Note    Patient Name: Manuel Tyler  MRN: [id number]  Patient Class: IP- Inpatient   Admission Date: 12/27/2022  Length of Stay: 2 days  Attending Physician: Sanchez Araya MD  Primary Care Provider: Victor M Stokes MD        Subjective:     Principal Problem:Alcohol-induced acute pancreatitis        HPI:  This is a 72 year old male with a PMHx of HTN, HLD, alcohol use, tobacco use who presents with diarrhea.     Patient presents with diarrhea for 2 weeks duration. He has multiple episodes of diarrhea daily, every 2 hours, mostly nocturnal, without any blood. Associated symptoms include left sided abdominal pain, with occasional radiation to his back when he sits up, one episode of vomiting and decreased po intake. He reports being depressed and screened positive on initial suicide screening, but later reported that he does not have any suicidal behaviors or plan, but reports that he thinks his drinking is killing him. He drinks 2-4 beers daily with occasional liquor as well. He smokes 1 pack daily. No illicit substances. In the ED, he was hypertensive (150/86), tachycardic. Labs showed elevated lipase (>1000), leukocytosis (14.5), elevated LFTs (AST: 140, ALT: 93, Tbili: 1.6, ALP: 175), hypokalemia (3.3). CT A/P showed mild wall thickening involving the sigmoid colon & distended gallbladder. The patient was admitted for further management.         Overview/Hospital Course:  Patient admitted with pancreatitis likely secondary to ETOH abuse. GI was consulted. C-diff negative. Advanced diet. PT/OT were consulted      Interval History:  Diarrhea continues     Review of Systems   Constitutional:  Negative for activity change and appetite change.   HENT:  Negative for congestion and dental problem.    Eyes:  Negative for discharge and itching.   Respiratory:  Negative for shortness of breath.    Cardiovascular:  Negative for palpitations.   Gastrointestinal:   Positive for abdominal pain.   Genitourinary:  Negative for difficulty urinating and dysuria.   Musculoskeletal:  Negative for arthralgias.   Neurological:  Negative for dizziness.   Psychiatric/Behavioral:  Negative for agitation and behavioral problems.    Objective:     Vital Signs (Most Recent):  Temp: 97.7 °F (36.5 °C) (12/29/22 0711)  Pulse: 89 (12/29/22 0711)  Resp: 16 (12/29/22 0711)  BP: (!) 148/76 (12/29/22 0711)  SpO2: 95 % (12/29/22 0711)   Vital Signs (24h Range):  Temp:  [97.7 °F (36.5 °C)-98.7 °F (37.1 °C)] 97.7 °F (36.5 °C)  Pulse:  [69-92] 89  Resp:  [14-20] 16  SpO2:  [92 %-95 %] 95 %  BP: (129-156)/(73-86) 148/76     Weight: 67.3 kg (148 lb 5.9 oz)  Body mass index is 22.56 kg/m².    Intake/Output Summary (Last 24 hours) at 12/29/2022 0945  Last data filed at 12/29/2022 0830  Gross per 24 hour   Intake 600 ml   Output --   Net 600 ml      Physical Exam  Vitals and nursing note reviewed.   Constitutional:       Appearance: Normal appearance. He is ill-appearing.   HENT:      Head: Normocephalic and atraumatic.      Mouth/Throat:      Pharynx: Oropharynx is clear.   Eyes:      Conjunctiva/sclera: Conjunctivae normal.   Cardiovascular:      Rate and Rhythm: Normal rate and regular rhythm.   Pulmonary:      Effort: Pulmonary effort is normal. No respiratory distress.      Breath sounds: No wheezing.   Neurological:      Mental Status: He is alert and oriented to person, place, and time.   Psychiatric:         Behavior: Behavior normal.       Significant Labs: All pertinent labs within the past 24 hours have been reviewed.  BMP:   Recent Labs   Lab 12/28/22  0516   *      K 3.2*      CO2 21*   BUN 7*   CREATININE 0.8   CALCIUM 7.9*   MG 1.3*     CBC:   Recent Labs   Lab 12/27/22  1915 12/28/22  0516   WBC 14.53* 12.60   HGB 14.8 14.2   HCT 41.5 39.8*    382       Significant Imaging:       Assessment/Plan:      * Alcohol-induced acute pancreatitis  Presented with abdominal  pain, diarrhea   Labs noted for leukocytosis, elevated lipase   CT A/P showed mild wall thickening involving the sigmoid colon with abnormal appearance seen within the left lower pelvis along the left lateral aspect of the sigmoid colon, distended gallbladder. Pancreas unremarkable.  Meets 2/3 criteria, although his abdominal pain is related to other etiologies (possibly colitis)     Plan:   - IVF  - Pain control   - NPO   - Consult to GI  - US RUQ    Improved. Lipase ordered.  Tolerating diet    Elevated LFTs  Possibly related to gallstones, biliary sludge or alcoholic hepatitis   RUQ US  Check acute hepatitis panel       Alcohol abuse  Maintain on CIWA      Sepsis  This patient does have evidence of infective focus  My overall impression is sepsis. Vital signs were reviewed and noted in progress note.  Antibiotics given-   Antibiotics (From admission, onward)    Start     Stop Route Frequency Ordered    12/27/22 2300  piperacillin-tazobactam 4.5 g in dextrose 5 % 100 mL IVPB (ready to mix system)         -- IV Every 8 hours (non-standard times) 12/27/22 2149        Cultures were taken-   Microbiology Results (last 7 days)     Procedure Component Value Units Date/Time    Stool culture [[id number]]     Order Status: No result Specimen: Stool     Clostridium difficile EIA [[id number]]     Order Status: No result Specimen: Stool         Latest lactate reviewed, they are-  No results for input(s): LACTATE in the last 72 hours.    Organ dysfunction indicated by Acute liver injury  Source- Intra-abdominal     Source control Achieved by-   Antibiotics     Colitis  CT A/P showed mild wall thickening involving the sigmoid colon with abnormal appearance seen within the left lower pelvis along the left lateral aspect of the sigmoid colon, distended gallbladder. Pancreas unremarkable.    Plan:   - Continue zosyn     Hyperlipidemia  Outpatient follow up     Primary hypertension  Monitor BP      Hypomagnesemia-  replacing    Hypokalemia- replacing    Hypophosphatemia- replacing    Debility PT/OT consult.       VTE Risk Mitigation (From admission, onward)         Ordered     heparin (porcine) injection 5,000 Units  Every 8 hours         12/27/22 2220     IP VTE HIGH RISK PATIENT  Once         12/27/22 2220     Place sequential compression device  Until discontinued         12/27/22 2220                Discharge Planning   MIKO:      Code Status: Full Code   Is the patient medically ready for discharge?:     Reason for patient still in hospital (select all that apply):  Discharge Plan A: Home with family                  Sanchez Mckeon MD  Department of Hospital Medicine   [facility]

## 2022-12-30 ENCOUNTER — TELEPHONE (OUTPATIENT)
Dept: ENDOSCOPY | Facility: HOSPITAL | Age: 72
End: 2022-12-30
Payer: MEDICARE

## 2022-12-30 ENCOUNTER — ANESTHESIA EVENT (OUTPATIENT)
Dept: ENDOSCOPY | Facility: HOSPITAL | Age: 72
DRG: 420 | End: 2022-12-30
Payer: MEDICARE

## 2022-12-30 ENCOUNTER — ANESTHESIA (OUTPATIENT)
Dept: ENDOSCOPY | Facility: HOSPITAL | Age: 72
DRG: 420 | End: 2022-12-30
Payer: MEDICARE

## 2022-12-30 DIAGNOSIS — R93.89 ABNORMAL FINDING ON IMAGING: Primary | ICD-10-CM

## 2022-12-30 LAB — O+P STL MICRO: NORMAL

## 2022-12-30 PROCEDURE — 27202074 HC NEEDLE KNIFE, BILIARY: Performed by: INTERNAL MEDICINE

## 2022-12-30 PROCEDURE — 27201674 HC SPHINCTERTOME: Performed by: INTERNAL MEDICINE

## 2022-12-30 PROCEDURE — 25000003 PHARM REV CODE 250: Performed by: EMERGENCY MEDICINE

## 2022-12-30 PROCEDURE — C1769 GUIDE WIRE: HCPCS | Performed by: INTERNAL MEDICINE

## 2022-12-30 PROCEDURE — 27202059 HC NEEDLE, FNA (ANY): Performed by: INTERNAL MEDICINE

## 2022-12-30 PROCEDURE — D9220A PRA ANESTHESIA: ICD-10-PCS | Mod: CRNA,,, | Performed by: NURSE ANESTHETIST, CERTIFIED REGISTERED

## 2022-12-30 PROCEDURE — 25000003 PHARM REV CODE 250: Performed by: STUDENT IN AN ORGANIZED HEALTH CARE EDUCATION/TRAINING PROGRAM

## 2022-12-30 PROCEDURE — 74329 X-RAY FOR PANCREAS ENDOSCOPY: CPT | Mod: TC | Performed by: INTERNAL MEDICINE

## 2022-12-30 PROCEDURE — 88342 IMHCHEM/IMCYTCHM 1ST ANTB: CPT | Mod: 26,,, | Performed by: PATHOLOGY

## 2022-12-30 PROCEDURE — 63600175 PHARM REV CODE 636 W HCPCS: Performed by: NURSE ANESTHETIST, CERTIFIED REGISTERED

## 2022-12-30 PROCEDURE — 88342 IMHCHEM/IMCYTCHM 1ST ANTB: CPT | Performed by: PATHOLOGY

## 2022-12-30 PROCEDURE — 27202131 HC NEEDLE, FNB SINGLE (ANY): Performed by: INTERNAL MEDICINE

## 2022-12-30 PROCEDURE — 88342 CHG IMMUNOCYTOCHEMISTRY: ICD-10-PCS | Mod: 26,,, | Performed by: PATHOLOGY

## 2022-12-30 PROCEDURE — 43274 ERCP DUCT STENT PLACEMENT: CPT | Mod: ,,, | Performed by: INTERNAL MEDICINE

## 2022-12-30 PROCEDURE — 25000003 PHARM REV CODE 250: Performed by: NURSE ANESTHETIST, CERTIFIED REGISTERED

## 2022-12-30 PROCEDURE — 88172 PR  EVALUATION OF FNA SMEAR TO DETERMINE ADEQUACY, FIRST EVAL: ICD-10-PCS | Mod: 26,,, | Performed by: PATHOLOGY

## 2022-12-30 PROCEDURE — 25500020 PHARM REV CODE 255: Performed by: INTERNAL MEDICINE

## 2022-12-30 PROCEDURE — D9220A PRA ANESTHESIA: Mod: CRNA,,, | Performed by: NURSE ANESTHETIST, CERTIFIED REGISTERED

## 2022-12-30 PROCEDURE — 88177 CYTP FNA EVAL EA ADDL: CPT | Performed by: PATHOLOGY

## 2022-12-30 PROCEDURE — 88177 CYTP FNA EVAL EA ADDL: CPT | Mod: 26,,, | Performed by: PATHOLOGY

## 2022-12-30 PROCEDURE — 37000009 HC ANESTHESIA EA ADD 15 MINS: Performed by: INTERNAL MEDICINE

## 2022-12-30 PROCEDURE — 37000008 HC ANESTHESIA 1ST 15 MINUTES: Performed by: INTERNAL MEDICINE

## 2022-12-30 PROCEDURE — 63600175 PHARM REV CODE 636 W HCPCS: Performed by: STUDENT IN AN ORGANIZED HEALTH CARE EDUCATION/TRAINING PROGRAM

## 2022-12-30 PROCEDURE — 25000003 PHARM REV CODE 250: Performed by: INTERNAL MEDICINE

## 2022-12-30 PROCEDURE — 43242 EGD US FINE NEEDLE BX/ASPIR: CPT | Performed by: INTERNAL MEDICINE

## 2022-12-30 PROCEDURE — 11000001 HC ACUTE MED/SURG PRIVATE ROOM

## 2022-12-30 PROCEDURE — 88341 IMHCHEM/IMCYTCHM EA ADD ANTB: CPT | Mod: 26,,, | Performed by: PATHOLOGY

## 2022-12-30 PROCEDURE — C1874 STENT, COATED/COV W/DEL SYS: HCPCS | Performed by: INTERNAL MEDICINE

## 2022-12-30 PROCEDURE — D9220A PRA ANESTHESIA: ICD-10-PCS | Mod: ANES,,, | Performed by: ANESTHESIOLOGY

## 2022-12-30 PROCEDURE — 74329 PR  X-RAY FOR PANCREAS ENDOSCOPY: ICD-10-PCS | Mod: 26,,, | Performed by: INTERNAL MEDICINE

## 2022-12-30 PROCEDURE — 43242 EGD US FINE NEEDLE BX/ASPIR: CPT | Mod: ,,, | Performed by: INTERNAL MEDICINE

## 2022-12-30 PROCEDURE — 74329 X-RAY FOR PANCREAS ENDOSCOPY: CPT | Mod: 26,,, | Performed by: INTERNAL MEDICINE

## 2022-12-30 PROCEDURE — 88172 CYTP DX EVAL FNA 1ST EA SITE: CPT | Mod: 26,,, | Performed by: PATHOLOGY

## 2022-12-30 PROCEDURE — 43242 PR UPGI ENDOSCOPY,FN NEEDLE BX,GUIDED: ICD-10-PCS | Mod: ,,, | Performed by: INTERNAL MEDICINE

## 2022-12-30 PROCEDURE — 88173 PR  INTERPRETATION OF FNA SMEAR: ICD-10-PCS | Mod: 26,,, | Performed by: PATHOLOGY

## 2022-12-30 PROCEDURE — C2617 STENT, NON-COR, TEM W/O DEL: HCPCS | Performed by: INTERNAL MEDICINE

## 2022-12-30 PROCEDURE — 63600175 PHARM REV CODE 636 W HCPCS: Performed by: INTERNAL MEDICINE

## 2022-12-30 PROCEDURE — 88341 PR IHC OR ICC EACH ADD'L SINGLE ANTIBODY  STAINPR: ICD-10-PCS | Mod: 26,,, | Performed by: PATHOLOGY

## 2022-12-30 PROCEDURE — 88173 CYTOPATH EVAL FNA REPORT: CPT | Mod: 26,,, | Performed by: PATHOLOGY

## 2022-12-30 PROCEDURE — 88177 PR  EVALUATION OF FNA SMEAR TO DETERMINE ADEQUACY, EA ADD EVAL: ICD-10-PCS | Mod: 26,,, | Performed by: PATHOLOGY

## 2022-12-30 PROCEDURE — 43274 ERCP DUCT STENT PLACEMENT: CPT | Performed by: INTERNAL MEDICINE

## 2022-12-30 PROCEDURE — 43274 PR ERCP W/STENT PLCMNT BILIARY/PANCREATIC DUCT: ICD-10-PCS | Mod: 59,,, | Performed by: INTERNAL MEDICINE

## 2022-12-30 PROCEDURE — 88305 TISSUE EXAM BY PATHOLOGIST: CPT | Performed by: PATHOLOGY

## 2022-12-30 PROCEDURE — 88341 IMHCHEM/IMCYTCHM EA ADD ANTB: CPT | Mod: 59 | Performed by: PATHOLOGY

## 2022-12-30 PROCEDURE — 27202304 HC CANNULA, ERCP: Performed by: INTERNAL MEDICINE

## 2022-12-30 PROCEDURE — 63600175 PHARM REV CODE 636 W HCPCS: Performed by: EMERGENCY MEDICINE

## 2022-12-30 PROCEDURE — 88305 TISSUE EXAM BY PATHOLOGIST: ICD-10-PCS | Mod: 26,,, | Performed by: PATHOLOGY

## 2022-12-30 PROCEDURE — 88172 CYTP DX EVAL FNA 1ST EA SITE: CPT | Mod: 59 | Performed by: PATHOLOGY

## 2022-12-30 PROCEDURE — 88173 CYTOPATH EVAL FNA REPORT: CPT | Mod: 59 | Performed by: PATHOLOGY

## 2022-12-30 PROCEDURE — 88305 TISSUE EXAM BY PATHOLOGIST: CPT | Mod: 26,,, | Performed by: PATHOLOGY

## 2022-12-30 PROCEDURE — D9220A PRA ANESTHESIA: Mod: ANES,,, | Performed by: ANESTHESIOLOGY

## 2022-12-30 RX ORDER — ONDANSETRON 2 MG/ML
4 INJECTION INTRAMUSCULAR; INTRAVENOUS ONCE AS NEEDED
Status: CANCELLED | OUTPATIENT
Start: 2022-12-30 | End: 2034-05-28

## 2022-12-30 RX ORDER — LIDOCAINE HYDROCHLORIDE 20 MG/ML
INJECTION INTRAVENOUS
Status: DISCONTINUED | OUTPATIENT
Start: 2022-12-30 | End: 2022-12-30

## 2022-12-30 RX ORDER — PROPOFOL 10 MG/ML
VIAL (ML) INTRAVENOUS
Status: DISCONTINUED | OUTPATIENT
Start: 2022-12-30 | End: 2022-12-30

## 2022-12-30 RX ORDER — CIPROFLOXACIN 2 MG/ML
INJECTION, SOLUTION INTRAVENOUS
Status: DISCONTINUED | OUTPATIENT
Start: 2022-12-30 | End: 2022-12-30

## 2022-12-30 RX ORDER — PROPOFOL 10 MG/ML
VIAL (ML) INTRAVENOUS CONTINUOUS PRN
Status: DISCONTINUED | OUTPATIENT
Start: 2022-12-30 | End: 2022-12-30

## 2022-12-30 RX ORDER — HYDRALAZINE HYDROCHLORIDE 25 MG/1
50 TABLET, FILM COATED ORAL EVERY 8 HOURS
Status: DISCONTINUED | OUTPATIENT
Start: 2022-12-30 | End: 2022-12-31

## 2022-12-30 RX ORDER — PHENYLEPHRINE HYDROCHLORIDE 10 MG/ML
INJECTION INTRAVENOUS
Status: DISCONTINUED | OUTPATIENT
Start: 2022-12-30 | End: 2022-12-30

## 2022-12-30 RX ORDER — INDOMETHACIN 50 MG/1
SUPPOSITORY RECTAL
Status: DISCONTINUED | OUTPATIENT
Start: 2022-12-30 | End: 2022-12-30 | Stop reason: HOSPADM

## 2022-12-30 RX ADMIN — SODIUM CHLORIDE: 9 INJECTION, SOLUTION INTRAVENOUS at 12:12

## 2022-12-30 RX ADMIN — PROPOFOL 60 MG: 10 INJECTION, EMULSION INTRAVENOUS at 01:12

## 2022-12-30 RX ADMIN — PHENYLEPHRINE HYDROCHLORIDE 50 MCG: 10 INJECTION INTRAVENOUS at 01:12

## 2022-12-30 RX ADMIN — LIDOCAINE HYDROCHLORIDE 100 MG: 20 INJECTION INTRAVENOUS at 01:12

## 2022-12-30 RX ADMIN — SODIUM CHLORIDE, SODIUM LACTATE, POTASSIUM CHLORIDE, AND CALCIUM CHLORIDE: .6; .31; .03; .02 INJECTION, SOLUTION INTRAVENOUS at 09:12

## 2022-12-30 RX ADMIN — PHENYLEPHRINE HYDROCHLORIDE 100 MCG: 10 INJECTION INTRAVENOUS at 01:12

## 2022-12-30 RX ADMIN — HEPARIN SODIUM 5000 UNITS: 5000 INJECTION INTRAVENOUS; SUBCUTANEOUS at 09:12

## 2022-12-30 RX ADMIN — HYDRALAZINE HYDROCHLORIDE 50 MG: 25 TABLET, FILM COATED ORAL at 09:12

## 2022-12-30 RX ADMIN — SODIUM CHLORIDE, SODIUM LACTATE, POTASSIUM CHLORIDE, AND CALCIUM CHLORIDE: .6; .31; .03; .02 INJECTION, SOLUTION INTRAVENOUS at 04:12

## 2022-12-30 RX ADMIN — Medication 175 MCG/KG/MIN: at 01:12

## 2022-12-30 RX ADMIN — SODIUM CHLORIDE, SODIUM LACTATE, POTASSIUM CHLORIDE, AND CALCIUM CHLORIDE: .6; .31; .03; .02 INJECTION, SOLUTION INTRAVENOUS at 05:12

## 2022-12-30 RX ADMIN — PIPERACILLIN AND TAZOBACTAM 4.5 G: 4; .5 INJECTION, POWDER, LYOPHILIZED, FOR SOLUTION INTRAVENOUS; PARENTERAL at 05:12

## 2022-12-30 RX ADMIN — HYDRALAZINE HYDROCHLORIDE 50 MG: 25 TABLET, FILM COATED ORAL at 05:12

## 2022-12-30 RX ADMIN — Medication 6 MG: at 08:12

## 2022-12-30 RX ADMIN — PIPERACILLIN AND TAZOBACTAM 4.5 G: 4; .5 INJECTION, POWDER, LYOPHILIZED, FOR SOLUTION INTRAVENOUS; PARENTERAL at 08:12

## 2022-12-30 RX ADMIN — HEPARIN SODIUM 5000 UNITS: 5000 INJECTION INTRAVENOUS; SUBCUTANEOUS at 06:12

## 2022-12-30 RX ADMIN — LOPERAMIDE HYDROCHLORIDE 2 MG: 2 CAPSULE ORAL at 08:12

## 2022-12-30 NOTE — CONSULTS
General Surgery Consult Note    Patient seen and case discussed with primary team and attending on call. Patient currently admitted to medicine service with acute alcoholic pancreatitis, lipase remains >1000 since admission. RUQ ultrasound showing bilary sludge with gallbladder distention, almost certainly secondary to his ongoing pancreatitis. Unlikely that his pancreatitis is secondary to biliary etiology as patient with noted CBD and PD dilation, in the setting of an ill defined pancreatic head mass/abnormality.  GI was consulted and planning for EUS with possible ERCP tomorrow. Will follow up on there findings, however patient has no current indications for cholecystectomy at this time - especially considering his active pancreatitis.     Please call with questions or concerns.      Pooja Bailey MD  General Surgery, PGY-IV  Pager: [phone]  12/29/2022 6:34 PM

## 2022-12-30 NOTE — PROGRESS NOTES
[facility] Medicine  Progress Note    Patient Name: Manuel Tyler  MRN: [id number]  Patient Class: IP- Inpatient   Admission Date: 12/27/2022  Length of Stay: 3 days  Attending Physician: Sanchez Araya MD  Primary Care Provider: Victor M Stokes MD        Subjective:     Principal Problem:Alcohol-induced acute pancreatitis        HPI:  This is a 72 year old male with a PMHx of HTN, HLD, alcohol use, tobacco use who presents with diarrhea.     Patient presents with diarrhea for 2 weeks duration. He has multiple episodes of diarrhea daily, every 2 hours, mostly nocturnal, without any blood. Associated symptoms include left sided abdominal pain, with occasional radiation to his back when he sits up, one episode of vomiting and decreased po intake. He reports being depressed and screened positive on initial suicide screening, but later reported that he does not have any suicidal behaviors or plan, but reports that he thinks his drinking is killing him. He drinks 2-4 beers daily with occasional liquor as well. He smokes 1 pack daily. No illicit substances. In the ED, he was hypertensive (150/86), tachycardic. Labs showed elevated lipase (>1000), leukocytosis (14.5), elevated LFTs (AST: 140, ALT: 93, Tbili: 1.6, ALP: 175), hypokalemia (3.3). CT A/P showed mild wall thickening involving the sigmoid colon & distended gallbladder. The patient was admitted for further management.         Overview/Hospital Course:  Patient admitted with pancreatitis likely secondary to ETOH abuse. GI was consulted. C-diff negative. Advanced diet. PT/OT were consulted. GI with plans for EUS and ERCP on 12/30.      Interval History:  No new issues     Review of Systems   Constitutional:  Negative for activity change and appetite change.   HENT:  Negative for congestion and dental problem.    Eyes:  Negative for discharge and itching.   Respiratory:  Negative for shortness of breath.    Cardiovascular:  Negative for  palpitations.   Gastrointestinal:  Positive for abdominal pain.   Genitourinary:  Negative for difficulty urinating and dysuria.   Musculoskeletal:  Negative for arthralgias.   Neurological:  Negative for dizziness.   Psychiatric/Behavioral:  Negative for agitation and behavioral problems.    Objective:     Vital Signs (Most Recent):  Temp: 97.9 °F (36.6 °C) (12/30/22 0746)  Pulse: 63 (12/30/22 0746)  Resp: 20 (12/30/22 0746)  BP: (!) 170/81 (12/30/22 0746)  SpO2: 95 % (12/30/22 0746)   Vital Signs (24h Range):  Temp:  [97.8 °F (36.6 °C)-98.4 °F (36.9 °C)] 97.9 °F (36.6 °C)  Pulse:  [63-83] 63  Resp:  [17-20] 20  SpO2:  [91 %-95 %] 95 %  BP: (139-173)/(80-91) 170/81     Weight: 67.3 kg (148 lb 5.9 oz)  Body mass index is 22.56 kg/m².    Intake/Output Summary (Last 24 hours) at 12/30/2022 0952  Last data filed at 12/29/2022 1230  Gross per 24 hour   Intake 240 ml   Output --   Net 240 ml      Physical Exam  Vitals and nursing note reviewed.   Constitutional:       Appearance: Normal appearance. He is ill-appearing.   HENT:      Head: Normocephalic and atraumatic.      Mouth/Throat:      Pharynx: Oropharynx is clear.   Eyes:      Conjunctiva/sclera: Conjunctivae normal.   Cardiovascular:      Rate and Rhythm: Normal rate and regular rhythm.   Pulmonary:      Effort: Pulmonary effort is normal. No respiratory distress.      Breath sounds: No wheezing.   Neurological:      Mental Status: He is alert and oriented to person, place, and time.   Psychiatric:         Behavior: Behavior normal.       Significant Labs: All pertinent labs within the past 24 hours have been reviewed.  BMP:   Recent Labs   Lab 12/29/22  1046   K 3.3*   MG 1.5*     CBC: No results for input(s): WBC, HGB, HCT, PLT in the last 48 hours.    Significant Imaging:       Assessment/Plan:      * Alcohol-induced acute pancreatitis  Presented with abdominal pain, diarrhea   Labs noted for leukocytosis, elevated lipase   CT A/P showed mild wall thickening  involving the sigmoid colon with abnormal appearance seen within the left lower pelvis along the left lateral aspect of the sigmoid colon, distended gallbladder. Pancreas unremarkable.  Meets 2/3 criteria, although his abdominal pain is related to other etiologies (possibly colitis)     Plan:   - IVF  - Pain control   - NPO   - Consult to GI  - US RUQ    Improved. Lipase ordered.  Tolerating diet    Elevated LFTs  Possibly related to gallstones, biliary sludge or alcoholic hepatitis   RUQ US  Check acute hepatitis panel       Alcohol abuse  Maintain on CIWA      Sepsis  This patient does have evidence of infective focus  My overall impression is sepsis. Vital signs were reviewed and noted in progress note.  Antibiotics given-   Antibiotics (From admission, onward)    Start     Stop Route Frequency Ordered    12/27/22 2300  piperacillin-tazobactam 4.5 g in dextrose 5 % 100 mL IVPB (ready to mix system)         -- IV Every 8 hours (non-standard times) 12/27/22 2149        Cultures were taken-   Microbiology Results (last 7 days)     Procedure Component Value Units Date/Time    Stool culture [[id number]]     Order Status: No result Specimen: Stool     Clostridium difficile EIA [[id number]]     Order Status: No result Specimen: Stool         Latest lactate reviewed, they are-  No results for input(s): LACTATE in the last 72 hours.    Organ dysfunction indicated by Acute liver injury  Source- Intra-abdominal     Source control Achieved by-   Antibiotics     Colitis  CT A/P showed mild wall thickening involving the sigmoid colon with abnormal appearance seen within the left lower pelvis along the left lateral aspect of the sigmoid colon, distended gallbladder. Pancreas unremarkable.    Plan:   - Continue zosyn     Hyperlipidemia  Outpatient follow up     Primary hypertension  Monitor BP      Hypomagnesemia- replace    Hypokalemia- replace    Hypophosphatemia- will replace     VTE Risk Mitigation (From admission,  onward)         Ordered     heparin (porcine) injection 5,000 Units  Every 8 hours         12/27/22 2220     IP VTE HIGH RISK PATIENT  Once         12/27/22 2220     Place sequential compression device  Until discontinued         12/27/22 2220                Discharge Planning   MIKO: 1/1/2023     Code Status: Full Code   Is the patient medically ready for discharge?:     Reason for patient still in hospital (select all that apply): Patient unstable  Discharge Plan A: Home with family                  Sanchez Mckeon MD  Department of Hospital Medicine   [facility]

## 2022-12-30 NOTE — PT/OT/SLP PROGRESS
Occupational Therapy      Patient Name:  Manuel Tyler   MRN:  [id number]    Patient not seen today secondary to pt transferred to [facility] this date for medical procedures. Will follow-up as able.    12/30/2022

## 2022-12-30 NOTE — PLAN OF CARE
Patient from home and lives with spouse, who will be his help at home. Patient out of room for procedure at [facility]. PT/OT consulted, pending recommendations. TN to continue to follow for discharge needs.    12/30/22 2628   Discharge Reassessment   Assessment Type Discharge Planning Reassessment   Did the patient's condition or plan change since previous assessment? Yes   Communicated MIKO with patient/caregiver Date not available/Unable to determine   Discharge Plan A Home with family   Discharge Plan B Other  (TBD)   DME Needed Upon Discharge  other (see comments)  (TBD)   Discharge Barriers Identified None   Why the patient remains in the hospital Requires continued medical care   Post-Acute Status   Coverage Medicare   Discharge Delays None known at this time

## 2022-12-30 NOTE — PT/OT/SLP PROGRESS
Physical Therapy      Patient Name:  Manuel Tyler   MRN:  [id number]    Patient not seen at this time for PT eval secondary to (Pt transferred to [facility] for medical procedures.). Will follow-up.

## 2022-12-30 NOTE — NURSING
Pt arrived back to unit following procedure. VSS. Pt resting comfortably in bed. All needs met at this time. Call bell in reach. Will continue with POC.

## 2022-12-30 NOTE — PLAN OF CARE
Patient picked up by [facility]. Patient stable. Vital signs stable. IV intact. No complications. Patient in route to [facility].

## 2022-12-30 NOTE — ANESTHESIA PREPROCEDURE EVALUATION
[facility]  Anesthesia Pre-Operative Evaluation       Patient Name: Manuel Tyler  YOB: 1950  MRN: [id number]  [facility]: [id number]      Code Status: Full Code   Date of Procedure: 12/30/2022  Anesthesia: General/MAC Procedure: Procedure(s) (LRB):  ULTRASOUND, UPPER GI TRACT, ENDOSCOPIC (Left)  ERCP (ENDOSCOPIC RETROGRADE CHOLANGIOPANCREATOGRAPHY) (Left)  Pre-Operative Diagnosis: Elevated LFTs [R79.89]  Proceduralist: Surgeon(s) and Role:  Panel 1:     * Gregory Cristina MD - Primary  Panel 2:     * Gregory Cristina MD - Primary        SUBJECTIVE:   Manuel Tyler is a 72 y.o. male who  has a past medical history of Hyperlipidemia, Hypertension, Other specified noninfective gastroenteritis and colitis, Pancreatitis, and Personal history of colonic polyps. This is a 72 year old male with a PMHx of HTN, HLD, alcohol use, tobacco use who presents with diarrhea.     Patient presents with diarrhea for 2 weeks duration. He has multiple episodes of diarrhea daily, every 2 hours, mostly nocturnal, without any blood. Associated symptoms include left sided abdominal pain, with occasional radiation to his back when he sits up, one episode of vomiting and decreased po intake. He reports being depressed and screened positive on initial suicide screening, but later reported that he does not have any suicidal behaviors or plan, but reports that he thinks his drinking is killing him. He drinks 2-4 beers daily with occasional liquor as well. He smokes 1 pack daily. No illicit substances. In the ED, he was hypertensive (150/86), tachycardic. Labs showed elevated lipase (>1000), leukocytosis (14.5), elevated LFTs (AST: 140, ALT: 93, Tbili: 1.6, ALP: 175), hypokalemia (3.3). CT A/P showed mild wall thickening involving the sigmoid colon & distended gallbladder. The patient was admitted for further management.         Anticoagulants   Medication Route Frequency    heparin (porcine) injection 5,000 Units  Subcutaneous Q8H       he has a current medication list which includes the following long-term medication(s): citalopram, pravastatin, and venlafaxine.   ALLERGIES:   Review of patient's allergies indicates:  No Known Allergies  LDA:      Lines/Drains/Airways     Peripheral Intravenous Line  Duration                Peripheral IV - Single Lumen 12/27/22 1914 20 G Posterior;Right Hand 2 days              MEDICATIONS:     Antibiotics (From admission, onward)    Start     Stop Route Frequency Ordered    12/27/22 2300  piperacillin-tazobactam 4.5 g in dextrose 5 % 100 mL IVPB (ready to mix system)         -- IV Every 8 hours (non-standard times) 12/27/22 2149        VTE Risk Mitigation (From admission, onward)         Ordered     heparin (porcine) injection 5,000 Units  Every 8 hours         12/27/22 2220     IP VTE HIGH RISK PATIENT  Once         12/27/22 2220     Place sequential compression device  Until discontinued         12/27/22 2220              Current Facility-Administered Medications   Medication Dose Route Frequency Provider Last Rate Last Admin    acetaminophen tablet 650 mg  650 mg Oral Q8H PRN Hilton Griffin MD        acetaminophen tablet 650 mg  650 mg Oral Q4H PRN Hilton Griffin MD        albuterol-ipratropium 2.5 mg-0.5 mg/3 mL nebulizer solution 3 mL  3 mL Nebulization Q4H PRN Hilton Griffin MD        aluminum-magnesium hydroxide-simethicone 200-200-20 mg/5 mL suspension 30 mL  30 mL Oral QID PRN Hilton Griffin MD        bisacodyL suppository 10 mg  10 mg Rectal Daily PRN Hilton Griffin MD        dextrose 10% bolus 125 mL 125 mL  12.5 g Intravenous PRN Hilton Griffin MD        dextrose 10% bolus 250 mL 250 mL  25 g Intravenous PRN Hilton Griffin MD        folic acid tablet 1 mg  1 mg Oral Daily Hilton Griffin MD   1 mg at 12/29/22 0813    glucagon (human recombinant) injection 1 mg  1 mg Intramuscular PRN Hilton Griffin MD        glucose chewable tablet 16 g  16 g Oral PRN Hilton Griffin MD        glucose  chewable tablet 24 g  24 g Oral PRN Hilton Griffin MD        heparin (porcine) injection 5,000 Units  5,000 Units Subcutaneous Q8H Hilton Griffin MD   5,000 Units at 12/30/22 0631    hydrALAZINE tablet 50 mg  50 mg Oral Q8H Sanchez Araya MD   50 mg at 12/30/22 0932    HYDROcodone-acetaminophen 5-325 mg per tablet 1 tablet  1 tablet Oral Q6H PRN Hilton Griffin MD        influenza 65up-adj (QUADRIVALENT ADJUVANTED PF) vaccine 0.5 mL  0.5 mL Intramuscular vaccine x 1 dose Cholo Vallejo MD        lactated ringers infusion   Intravenous Continuous Sanchez Araya  mL/hr at 12/30/22 0935 New Bag at 12/30/22 0935    loperamide capsule 2 mg  2 mg Oral QID PRN Sanchez Araya MD   2 mg at 12/28/22 0849    LORazepam tablet 2 mg  2 mg Oral Q4H PRN Hilton Griffin MD        magnesium oxide tablet 800 mg  800 mg Oral PRN Hilton Griffin MD        magnesium oxide tablet 800 mg  800 mg Oral PRN Hilton Griffin MD        melatonin tablet 6 mg  6 mg Oral Nightly PRN Hilton Griffin MD        multivitamin tablet  1 tablet Oral Daily Hilton Griffin MD   1 tablet at 12/29/22 0813    naloxone 0.4 mg/mL injection 0.02 mg  0.02 mg Intravenous PRN Hilton Griffin MD        ondansetron injection 4 mg  4 mg Intravenous Q8H PRN Hilton Griffin MD        piperacillin-tazobactam 4.5 g in dextrose 5 % 100 mL IVPB (ready to mix system)  4.5 g Intravenous Q8H Jarod Renee MD 25 mL/hr at 12/30/22 0836 4.5 g at 12/30/22 0836    polyethylene glycol packet 17 g  17 g Oral Daily Hilton Griffin MD        prochlorperazine injection Soln 5 mg  5 mg Intravenous Q6H PRN Hilton Griffin MD        simethicone chewable tablet 80 mg  1 tablet Oral QID PRN Hilton Griffin MD        sodium chloride 0.9% flush 10 mL  10 mL Intravenous Q12H PRN Hilton Griffin MD        thiamine tablet 100 mg  100 mg Oral Daily Hilton Griffin MD   100 mg at 12/29/22 0811          History:     Active Hospital Problems    Diagnosis  POA    *Alcohol-induced acute  pancreatitis [K85.20]  Yes    Primary hypertension [I10]  Yes    Hyperlipidemia [E78.5]  Yes    Colitis [K52.9]  Yes    Sepsis [A41.9]  Yes    Alcohol abuse [F10.10]  Yes    Elevated LFTs [R79.89]  Yes      Resolved Hospital Problems   No resolved problems to display.     Surgical History:    has a past surgical history that includes Colonoscopy; Foot surgery (Left); and Tonsillectomy.   Social History:    has no history on file for sexual activity.  reports that he has been smoking cigarettes. He has been smoking an average of 1 pack per day. He does not have any smokeless tobacco history on file. He reports current alcohol use. He reports that he does not use drugs.     OBJECTIVE:     Vital Signs (Most Recent):  Temp: 36.8 °C (98.2 °F) (12/30/22 1054)  Pulse: 94 (12/30/22 1054)  Resp: 18 (12/30/22 1054)  BP: (!) 159/83 (12/30/22 1058)  SpO2: (!) 94 % (12/30/22 1054) Vital Signs Range (Last 24H):  Temp:  [36.6 °C (97.8 °F)-36.8 °C (98.2 °F)]   Pulse:  [63-94]   Resp:  [17-20]   BP: (156-173)/(80-91)   SpO2:  [91 %-95 %]        Body mass index is 22.05 kg/m².   Wt Readings from Last 4 Encounters:   12/30/22 65.8 kg (145 lb)   08/19/14 81.9 kg (180 lb 9.6 oz)     Significant Labs:  Lab Results   Component Value Date    WBC 12.60 12/28/2022    HGB 14.2 12/28/2022    HCT 39.8 (L) 12/28/2022     12/28/2022     12/28/2022    K 3.3 (L) 12/29/2022     12/28/2022    CREATININE 0.8 12/28/2022    BUN 7 (L) 12/28/2022    CO2 21 (L) 12/28/2022     (H) 12/28/2022    CALCIUM 7.9 (L) 12/28/2022    MG 1.5 (L) 12/29/2022    PHOS 2.6 (L) 12/29/2022    ALKPHOS 152 (H) 12/28/2022    ALT 78 (H) 12/28/2022    AST 93 (H) 12/28/2022    ALBUMIN 2.7 (L) 12/28/2022    INR 1.1 12/27/2022     No LMP for male patient.  Recent Results (from the past 72 hour(s))   CBC auto differential    Collection Time: 12/27/22  7:15 PM   Result Value Ref Range    WBC 14.53 (H) 3.90 - 12.70 K/uL    RBC 4.38 (L) 4.60 - 6.20 M/uL     Hemoglobin 14.8 14.0 - 18.0 g/dL    Hematocrit 41.5 40.0 - 54.0 %    MCV 95 82 - 98 fL    MCH 33.8 (H) 27.0 - 31.0 pg    MCHC 35.7 32.0 - 36.0 g/dL    RDW 13.8 11.5 - 14.5 %    Platelets 375 150 - 450 K/uL    MPV 9.3 9.2 - 12.9 fL    Immature Granulocytes 0.5 0.0 - 0.5 %    Gran # (ANC) 11.1 (H) 1.8 - 7.7 K/uL    Immature Grans (Abs) 0.07 (H) 0.00 - 0.04 K/uL    Lymph # 1.5 1.0 - 4.8 K/uL    Mono # 1.3 (H) 0.3 - 1.0 K/uL    Eos # 0.4 0.0 - 0.5 K/uL    Baso # 0.17 0.00 - 0.20 K/uL    nRBC 0 0 /100 WBC    Gran % 76.6 (H) 38.0 - 73.0 %    Lymph % 10.5 (L) 18.0 - 48.0 %    Mono % 8.7 4.0 - 15.0 %    Eosinophil % 2.5 0.0 - 8.0 %    Basophil % 1.2 0.0 - 1.9 %    Differential Method Automated    Comprehensive metabolic panel    Collection Time: 12/27/22  7:15 PM   Result Value Ref Range    Sodium 137 136 - 145 mmol/L    Potassium 3.3 (L) 3.5 - 5.1 mmol/L    Chloride 100 95 - 110 mmol/L    CO2 25 23 - 29 mmol/L    Glucose 132 (H) 70 - 110 mg/dL    BUN 10 8 - 23 mg/dL    Creatinine 0.8 0.5 - 1.4 mg/dL    Calcium 8.7 8.7 - 10.5 mg/dL    Total Protein 7.3 6.0 - 8.4 g/dL    Albumin 3.0 (L) 3.5 - 5.2 g/dL    Total Bilirubin 1.6 (H) 0.1 - 1.0 mg/dL    Alkaline Phosphatase 175 (H) 55 - 135 U/L     (H) 10 - 40 U/L    ALT 93 (H) 10 - 44 U/L    Anion Gap 12 8 - 16 mmol/L    eGFR >60 >60 mL/min/1.73 m^2   TSH    Collection Time: 12/27/22  7:15 PM   Result Value Ref Range    TSH 1.868 0.400 - 4.000 uIU/mL   Ethanol    Collection Time: 12/27/22  7:15 PM   Result Value Ref Range    Alcohol, Serum <10 <10 mg/dL   Acetaminophen level    Collection Time: 12/27/22  7:15 PM   Result Value Ref Range    Acetaminophen (Tylenol), Serum <3.0 (L) 10.0 - 20.0 ug/mL   Lipase    Collection Time: 12/27/22  7:15 PM   Result Value Ref Range    Lipase >1000 (H) 4 - 60 U/L   POCT COVID-19 Rapid Screening    Collection Time: 12/27/22  7:34 PM   Result Value Ref Range    POC Rapid COVID Negative Negative     Acceptable Yes     Protime-INR    Collection Time: 12/27/22  7:48 PM   Result Value Ref Range    Prothrombin Time 11.6 9.0 - 12.5 sec    INR 1.1 0.8 - 1.2   Urinalysis, Reflex to Urine Culture Urine, Clean Catch    Collection Time: 12/27/22 10:16 PM    Specimen: Urine   Result Value Ref Range    Specimen UA Urine, Clean Catch     Color, UA Yellow Yellow, Straw, Lennie    Appearance, UA Clear Clear    pH, UA 7.0 5.0 - 8.0    Specific Gravity, UA 1.030 1.005 - 1.030    Protein, UA Negative Negative    Glucose, UA Negative Negative    Ketones, UA Negative Negative    Bilirubin (UA) Negative Negative    Occult Blood UA Negative Negative    Nitrite, UA Negative Negative    Urobilinogen, UA 2.0-3.0 (A) <2.0 EU/dL    Leukocytes, UA Negative Negative   Drug screen panel, emergency    Collection Time: 12/27/22 10:16 PM   Result Value Ref Range    Benzodiazepines Negative Negative    Methadone metabolites Negative Negative    Cocaine (Metab.) Negative Negative    Opiate Scrn, Ur Negative Negative    Barbiturate Screen, Ur Negative Negative    Amphetamine Screen, Ur Negative Negative    THC Negative Negative    Phencyclidine Negative Negative    Creatinine, Urine 31.7 23.0 - 375.0 mg/dL    Toxicology Information SEE COMMENT    Occult blood x 1, stool    Collection Time: 12/28/22 12:26 AM    Specimen: Stool   Result Value Ref Range    Occult Blood Negative Negative   Clostridium difficile EIA    Collection Time: 12/28/22 12:26 AM    Specimen: Stool   Result Value Ref Range    C. diff Antigen Negative Negative    C difficile Toxins A+B, EIA Negative Negative   Stool culture    Collection Time: 12/28/22 12:26 AM    Specimen: Stool   Result Value Ref Range    Stool Culture Nothing significant to date    E. coli 0157 antigen    Collection Time: 12/28/22 12:26 AM    Specimen: Stool   Result Value Ref Range    Shiga Toxin 1 E.coli Negative     Shiga Toxin 2 E.coli Negative    Phosphorus    Collection Time: 12/28/22  5:16 AM   Result Value Ref Range     Phosphorus 2.2 (L) 2.7 - 4.5 mg/dL   Magnesium    Collection Time: 12/28/22  5:16 AM   Result Value Ref Range    Magnesium 1.3 (L) 1.6 - 2.6 mg/dL   Basic Metabolic Panel    Collection Time: 12/28/22  5:16 AM   Result Value Ref Range    Sodium 138 136 - 145 mmol/L    Potassium 3.2 (L) 3.5 - 5.1 mmol/L    Chloride 104 95 - 110 mmol/L    CO2 21 (L) 23 - 29 mmol/L    Glucose 117 (H) 70 - 110 mg/dL    BUN 7 (L) 8 - 23 mg/dL    Creatinine 0.8 0.5 - 1.4 mg/dL    Calcium 7.9 (L) 8.7 - 10.5 mg/dL    Anion Gap 13 8 - 16 mmol/L    eGFR >60 >60 mL/min/1.73 m^2   CBC Auto Differential    Collection Time: 12/28/22  5:16 AM   Result Value Ref Range    WBC 12.60 3.90 - 12.70 K/uL    RBC 4.12 (L) 4.60 - 6.20 M/uL    Hemoglobin 14.2 14.0 - 18.0 g/dL    Hematocrit 39.8 (L) 40.0 - 54.0 %    MCV 97 82 - 98 fL    MCH 34.5 (H) 27.0 - 31.0 pg    MCHC 35.7 32.0 - 36.0 g/dL    RDW 13.8 11.5 - 14.5 %    Platelets 382 150 - 450 K/uL    MPV 9.2 9.2 - 12.9 fL    Immature Granulocytes 0.6 (H) 0.0 - 0.5 %    Gran # (ANC) 9.2 (H) 1.8 - 7.7 K/uL    Immature Grans (Abs) 0.08 (H) 0.00 - 0.04 K/uL    Lymph # 1.5 1.0 - 4.8 K/uL    Mono # 1.2 (H) 0.3 - 1.0 K/uL    Eos # 0.4 0.0 - 0.5 K/uL    Baso # 0.17 0.00 - 0.20 K/uL    nRBC 0 0 /100 WBC    Gran % 73.4 (H) 38.0 - 73.0 %    Lymph % 12.1 (L) 18.0 - 48.0 %    Mono % 9.1 4.0 - 15.0 %    Eosinophil % 3.5 0.0 - 8.0 %    Basophil % 1.3 0.0 - 1.9 %    Differential Method Automated    Hepatic function panel    Collection Time: 12/28/22  5:16 AM   Result Value Ref Range    Total Protein 6.4 6.0 - 8.4 g/dL    Albumin 2.7 (L) 3.5 - 5.2 g/dL    Total Bilirubin 1.4 (H) 0.1 - 1.0 mg/dL    Bilirubin, Direct 1.0 (H) 0.1 - 0.3 mg/dL    AST 93 (H) 10 - 40 U/L    ALT 78 (H) 10 - 44 U/L    Alkaline Phosphatase 152 (H) 55 - 135 U/L   Hepatitis panel, acute    Collection Time: 12/28/22  5:16 AM   Result Value Ref Range    Hepatitis B Surface Ag Non-reactive Non-reactive    Hep B C IgM Non-reactive Non-reactive    Hep A IgM  Non-reactive Non-reactive    Hepatitis C Ab Non-reactive Non-reactive   Magnesium    Collection Time: 12/29/22 10:46 AM   Result Value Ref Range    Magnesium 1.5 (L) 1.6 - 2.6 mg/dL   Phosphorus    Collection Time: 12/29/22 10:46 AM   Result Value Ref Range    Phosphorus 2.6 (L) 2.7 - 4.5 mg/dL   Potassium    Collection Time: 12/29/22 10:46 AM   Result Value Ref Range    Potassium 3.3 (L) 3.5 - 5.1 mmol/L   Lipase    Collection Time: 12/29/22 10:46 AM   Result Value Ref Range    Lipase 907 (H) 4 - 60 U/L       EKG:   Results for orders placed or performed during the hospital encounter of 12/27/22   EKG 12-lead    Collection Time: 12/27/22  7:07 PM    Narrative    Test Reason : Z00.8,    Vent. Rate : 095 BPM     Atrial Rate : 095 BPM     P-R Int : 158 ms          QRS Dur : 068 ms      QT Int : 384 ms       P-R-T Axes : 058 -24 069 degrees     QTc Int : 482 ms    Normal sinus rhythm  Nonspecific ST abnormality  Prolonged QT  Abnormal ECG  When compared with ECG of 19-AUG-2014 09:30,  Significant changes have occurred  Confirmed by Subahsh Cervantes MD ([id number]) on 12/28/2022 4:44:13 PM    Referred By: [email or web address]   SELF           Confirmed By:Subhash Cervantes MD       TTE:  No results found for this or any previous visit.  No results found for: EF   No results found for this or any previous visit.  JUAN:  No results found for this or any previous visit.  Stress Test:  No results found for this or any previous visit.     LHC:  No results found for this or any previous visit.     PFT:  No results found for: FEV1, FVC, GYN4EHR, TLC, DLCO   ASSESSMENT/PLAN:         Pre-op Assessment    I have reviewed the Patient Summary Reports.     I have reviewed the Nursing Notes.    I have reviewed the Medications.     Review of Systems  Anesthesia Hx:  No problems with previous Anesthesia    Hematology/Oncology:  Hematology Normal   Oncology Normal     EENT/Dental:EENT/Dental Normal   Cardiovascular:   Exercise tolerance: good Hypertension     Pulmonary:  Pulmonary Normal    Renal/:  Renal/ Normal     Hepatic/GI:  Hepatic/GI Normal    Musculoskeletal:  Musculoskeletal Normal    Neurological:  Neurology Normal    Endocrine:  Endocrine Normal    Dermatological:  Skin Normal    Psych:  Psychiatric Normal           Physical Exam  General: Well nourished    Airway:  Mallampati: III / II  Mouth Opening: Normal  TM Distance: Normal  Tongue: Normal  Neck ROM: Normal ROM    Dental:  Intact        Anesthesia Plan  Type of Anesthesia, risks & benefits discussed:    Anesthesia Type: Gen ETT, Gen Natural Airway  Intra-op Monitoring Plan: Standard ASA Monitors  Post Op Pain Control Plan: multimodal analgesia and IV/PO Opioids PRN  Induction:  IV  Airway Plan: Direct and Video, Post-Induction  Informed Consent: Informed consent signed with the Patient and all parties understand the risks and agree with anesthesia plan.  All questions answered.   ASA Score: 3  Day of Surgery Review of History & Physical: H&P completed by Anesthesiologist.  Anesthesia Plan Notes: Chart reviewed, patient interviewed and examined.  The anesthetic plan was explained.  Risks, benefits, and alternatives were discussed. Questions were answered and the consent was signed.        DOLORES Hutchinson M.D.         Ready For Surgery From Anesthesia Perspective.     .

## 2022-12-30 NOTE — SUBJECTIVE & OBJECTIVE
Interval History:  No new issues     Review of Systems   Constitutional:  Negative for activity change and appetite change.   HENT:  Negative for congestion and dental problem.    Eyes:  Negative for discharge and itching.   Respiratory:  Negative for shortness of breath.    Cardiovascular:  Negative for palpitations.   Gastrointestinal:  Positive for abdominal pain.   Genitourinary:  Negative for difficulty urinating and dysuria.   Musculoskeletal:  Negative for arthralgias.   Neurological:  Negative for dizziness.   Psychiatric/Behavioral:  Negative for agitation and behavioral problems.    Objective:     Vital Signs (Most Recent):  Temp: 97.9 °F (36.6 °C) (12/30/22 0746)  Pulse: 63 (12/30/22 0746)  Resp: 20 (12/30/22 0746)  BP: (!) 170/81 (12/30/22 0746)  SpO2: 95 % (12/30/22 0746)   Vital Signs (24h Range):  Temp:  [97.8 °F (36.6 °C)-98.4 °F (36.9 °C)] 97.9 °F (36.6 °C)  Pulse:  [63-83] 63  Resp:  [17-20] 20  SpO2:  [91 %-95 %] 95 %  BP: (139-173)/(80-91) 170/81     Weight: 67.3 kg (148 lb 5.9 oz)  Body mass index is 22.56 kg/m².    Intake/Output Summary (Last 24 hours) at 12/30/2022 0952  Last data filed at 12/29/2022 1230  Gross per 24 hour   Intake 240 ml   Output --   Net 240 ml      Physical Exam  Vitals and nursing note reviewed.   Constitutional:       Appearance: Normal appearance. He is ill-appearing.   HENT:      Head: Normocephalic and atraumatic.      Mouth/Throat:      Pharynx: Oropharynx is clear.   Eyes:      Conjunctiva/sclera: Conjunctivae normal.   Cardiovascular:      Rate and Rhythm: Normal rate and regular rhythm.   Pulmonary:      Effort: Pulmonary effort is normal. No respiratory distress.      Breath sounds: No wheezing.   Neurological:      Mental Status: He is alert and oriented to person, place, and time.   Psychiatric:         Behavior: Behavior normal.       Significant Labs: All pertinent labs within the past 24 hours have been reviewed.  BMP:   Recent Labs   Lab 12/29/22  1046   K  3.3*   MG 1.5*     CBC: No results for input(s): WBC, HGB, HCT, PLT in the last 48 hours.    Significant Imaging:

## 2022-12-30 NOTE — ANESTHESIA POSTPROCEDURE EVALUATION
Anesthesia Post Evaluation    Patient: Manuel Tyler    Procedure(s) Performed: Procedure(s) (LRB):  ULTRASOUND, UPPER GI TRACT, ENDOSCOPIC (Left)  ERCP (ENDOSCOPIC RETROGRADE CHOLANGIOPANCREATOGRAPHY) (Left)    Final Anesthesia Type: general      Patient location during evaluation: PACU  Patient participation: Yes- Able to Participate  Level of consciousness: awake and alert  Post-procedure vital signs: reviewed and stable  Pain management: adequate  Airway patency: patent    PONV status at discharge: No PONV  Anesthetic complications: no      Cardiovascular status: blood pressure returned to baseline  Respiratory status: spontaneous ventilation  Hydration status: hypovolemic  Follow-up not needed.          Vitals Value Taken Time   /95 12/30/22 1533   Temp 36.4 °C (97.5 °F) 12/30/22 1455   Pulse 70 12/30/22 1535   Resp 14 12/30/22 1500   SpO2 92 % 12/30/22 1535   Vitals shown include unvalidated device data.      No case tracking events are documented in the log.      Pain/Prem Score: Prem Score: 10 (12/30/2022  3:29 PM)

## 2022-12-30 NOTE — PLAN OF CARE
Patient stable. Vital signs stable. Spoke with Suyapa regarding patient transfer via ambulance back to [facility]. Continuing to monitor.

## 2022-12-30 NOTE — PLAN OF CARE
Problem: Adjustment to Illness (Sepsis/Septic Shock)  Goal: Optimal Coping  Intervention: Optimize Psychosocial Adjustment to Illness  Flowsheets (Taken 12/29/2022 1819)  Supportive Measures:   active listening utilized   counseling provided     Problem: Infection Progression (Sepsis/Septic Shock)  Goal: Absence of Infection Signs and Symptoms  Intervention: Promote Stabilization  Flowsheets (Taken 12/29/2022 1819)  Fluid/Electrolyte Management: electrolyte-binding therapy initiated  Intervention: Promote Recovery  Flowsheets (Taken 12/29/2022 1819)  Activity Management: Ambulated -L4

## 2022-12-30 NOTE — PROVATION PATIENT INSTRUCTIONS
Discharge Summary/Instructions after an Endoscopic Procedure  Patient Name: Manuel Tyler  Patient MRN: [id number]  Patient YOB: 1950 Friday, December 30, 2022  Gregory Cristina MD  Dear patient,  As a result of recent federal legislation (The Federal Cures Act), you may   receive lab or pathology results from your procedure in your [facility]   account before your physician is able to contact you. Your physician or   their representative will relay the results to you with their   recommendations at their soonest availability.  Thank you,  RESTRICTIONS:  During your procedure today, you received medications for sedation.  These   medications may affect your judgment, balance and coordination.  Therefore,   for 24 hours, you have the following restrictions:   - DO NOT drive a car, operate machinery, make legal/financial decisions,   sign important papers or drink alcohol.    ACTIVITY:  Today: no heavy lifting, straining or running due to procedural   sedation/anesthesia.  The following day: return to full activity including work.  DIET:  Eat and drink normally unless instructed otherwise.     TREATMENT FOR COMMON SIDE EFFECTS:  - Mild abdominal pain, nausea, belching, bloating or excessive gas:  rest,   eat lightly and use a heating pad.  - Sore Throat: treat with throat lozenges and/or gargle with warm salt   water.  - Because air was used during the procedure, expelling large amounts of air   from your rectum or belching is normal.  - If a bowel prep was taken, you may not have a bowel movement for 1-3 days.    This is normal.  SYMPTOMS TO WATCH FOR AND REPORT TO YOUR PHYSICIAN:  1. Abdominal pain or bloating, other than gas cramps.  2. Chest pain.  3. Back pain.  4. Signs of infection such as: chills or fever occurring within 24 hours   after the procedure.  5. Rectal bleeding, which would show as bright red, maroon, or black stools.   (A tablespoon of blood from the rectum is not serious, especially if    hemorrhoids are present.)  6. Vomiting.  7. Weakness or dizziness.  GO DIRECTLY TO THE NEAREST EMERGENCY ROOM IF YOU HAVE ANY OF THE FOLLOWING:      Difficulty breathing              Chills and/or fever over 101 F   Persistent vomiting and/or vomiting blood   Severe abdominal pain   Severe chest pain   Black, tarry stools   Bleeding- more than one tablespoon   Any other symptom or condition that you feel may need urgent attention  Your doctor recommends these additional instructions:  If any biopsies were taken, your doctors clinic will contact you in 1 to 2   weeks with any results.  - Return patient to referring hospital for ongoing care.   - Perform an ERCP today.   - Await cytology results.  For questions, problems or results please call your physician - Gregory Cristina MD at Work:  [phone].  [facility], EMERGENCY ROOM PHONE NUMBER: [phone]  IF A COMPLICATION OR EMERGENCY SITUATION ARISES AND YOU ARE UNABLE TO REACH   YOUR PHYSICIAN - GO DIRECTLY TO THE EMERGENCY ROOM.  Gregory Cristina MD  12/30/2022 2:54:57 PM  This report has been verified and signed electronically.  Dear patient,  As a result of recent federal legislation (The Federal Cures Act), you may   receive lab or pathology results from your procedure in your [facility]   account before your physician is able to contact you. Your physician or   their representative will relay the results to you with their   recommendations at their soonest availability.  Thank you,  PROVATION

## 2022-12-30 NOTE — INTERVAL H&P NOTE
The patient has been examined and the H&P has been reviewed:    I concur with the findings and no changes have occurred since H&P was written.    Surgery risks, benefits and alternative options discussed and understood by patient/family.          Active Hospital Problems    Diagnosis  POA    *Alcohol-induced acute pancreatitis [K85.20]  Yes    Primary hypertension [I10]  Yes    Hyperlipidemia [E78.5]  Yes    Colitis [K52.9]  Yes    Sepsis [A41.9]  Yes    Alcohol abuse [F10.10]  Yes    Elevated LFTs [R79.89]  Yes      Resolved Hospital Problems   No resolved problems to display.

## 2022-12-30 NOTE — TRANSFER OF CARE
"Anesthesia Transfer of Care Note    Patient: Manuel Tyler    Procedure(s) Performed: Procedure(s) (LRB):  ULTRASOUND, UPPER GI TRACT, ENDOSCOPIC (Left)  ERCP (ENDOSCOPIC RETROGRADE CHOLANGIOPANCREATOGRAPHY) (Left)    Patient location: PACU    Anesthesia Type: general    Transport from OR: Transported from OR on room air with adequate spontaneous ventilation    Post pain: adequate analgesia    Post assessment: no apparent anesthetic complications and tolerated procedure well    Post vital signs: stable    Level of consciousness: sedated    Nausea/Vomiting: no nausea/vomiting    Complications: none    Transfer of care protocol was followed      Last vitals:   Visit Vitals  BP (!) 133/70   Pulse 85   Temp 98   Resp 18   Ht 5' 8" (1.727 m)   Wt 65.8 kg (145 lb)   SpO2 (!) 94%   BMI 22.05 kg/m²     "

## 2022-12-31 VITALS
SYSTOLIC BLOOD PRESSURE: 174 MMHG | HEART RATE: 89 BPM | BODY MASS INDEX: 21.98 KG/M2 | HEIGHT: 68 IN | WEIGHT: 145 LBS | TEMPERATURE: 98 F | DIASTOLIC BLOOD PRESSURE: 90 MMHG | RESPIRATION RATE: 17 BRPM | OXYGEN SATURATION: 93 %

## 2022-12-31 PROBLEM — A41.9 SEPSIS: Status: RESOLVED | Noted: 2022-12-27 | Resolved: 2022-12-31

## 2022-12-31 PROBLEM — K85.20 ALCOHOL-INDUCED ACUTE PANCREATITIS: Status: RESOLVED | Noted: 2022-12-27 | Resolved: 2022-12-31

## 2022-12-31 PROBLEM — K52.9 COLITIS: Status: RESOLVED | Noted: 2022-12-27 | Resolved: 2022-12-31

## 2022-12-31 PROBLEM — R79.89 ELEVATED LFTS: Status: RESOLVED | Noted: 2022-12-27 | Resolved: 2022-12-31

## 2022-12-31 LAB
BACTERIA STL CULT: NORMAL
ELASTASE 1, FECAL: <40 MCG/G
LIPASE SERPL-CCNC: 57 U/L (ref 4–60)

## 2022-12-31 PROCEDURE — 97165 OT EVAL LOW COMPLEX 30 MIN: CPT

## 2022-12-31 PROCEDURE — 63600175 PHARM REV CODE 636 W HCPCS: Performed by: STUDENT IN AN ORGANIZED HEALTH CARE EDUCATION/TRAINING PROGRAM

## 2022-12-31 PROCEDURE — 97110 THERAPEUTIC EXERCISES: CPT | Performed by: PHYSICAL THERAPIST

## 2022-12-31 PROCEDURE — 25000003 PHARM REV CODE 250: Performed by: STUDENT IN AN ORGANIZED HEALTH CARE EDUCATION/TRAINING PROGRAM

## 2022-12-31 PROCEDURE — 63600175 PHARM REV CODE 636 W HCPCS: Performed by: EMERGENCY MEDICINE

## 2022-12-31 PROCEDURE — 97162 PT EVAL MOD COMPLEX 30 MIN: CPT | Performed by: PHYSICAL THERAPIST

## 2022-12-31 PROCEDURE — 83690 ASSAY OF LIPASE: CPT | Performed by: INTERNAL MEDICINE

## 2022-12-31 PROCEDURE — 97535 SELF CARE MNGMENT TRAINING: CPT

## 2022-12-31 PROCEDURE — 36415 COLL VENOUS BLD VENIPUNCTURE: CPT | Performed by: INTERNAL MEDICINE

## 2022-12-31 PROCEDURE — 25000003 PHARM REV CODE 250: Performed by: EMERGENCY MEDICINE

## 2022-12-31 PROCEDURE — 63600175 PHARM REV CODE 636 W HCPCS: Performed by: INTERNAL MEDICINE

## 2022-12-31 PROCEDURE — 25000003 PHARM REV CODE 250: Performed by: INTERNAL MEDICINE

## 2022-12-31 RX ORDER — HYDRALAZINE HYDROCHLORIDE 25 MG/1
75 TABLET, FILM COATED ORAL EVERY 8 HOURS
Qty: 90 TABLET | Refills: 5 | Status: SHIPPED | OUTPATIENT
Start: 2022-12-31 | End: 2023-06-08

## 2022-12-31 RX ORDER — HYDRALAZINE HYDROCHLORIDE 25 MG/1
75 TABLET, FILM COATED ORAL EVERY 8 HOURS
Status: DISCONTINUED | OUTPATIENT
Start: 2022-12-31 | End: 2022-12-31 | Stop reason: HOSPADM

## 2022-12-31 RX ADMIN — THERA TABS 1 TABLET: TAB at 09:12

## 2022-12-31 RX ADMIN — FOLIC ACID 1 MG: 1 TABLET ORAL at 09:12

## 2022-12-31 RX ADMIN — THIAMINE HCL TAB 100 MG 100 MG: 100 TAB at 09:12

## 2022-12-31 RX ADMIN — LORAZEPAM 2 MG: 0.5 TABLET ORAL at 01:12

## 2022-12-31 RX ADMIN — HEPARIN SODIUM 5000 UNITS: 5000 INJECTION INTRAVENOUS; SUBCUTANEOUS at 05:12

## 2022-12-31 RX ADMIN — PIPERACILLIN AND TAZOBACTAM 4.5 G: 4; .5 INJECTION, POWDER, LYOPHILIZED, FOR SOLUTION INTRAVENOUS; PARENTERAL at 01:12

## 2022-12-31 RX ADMIN — HYDRALAZINE HYDROCHLORIDE 50 MG: 25 TABLET, FILM COATED ORAL at 05:12

## 2022-12-31 RX ADMIN — SODIUM CHLORIDE, SODIUM LACTATE, POTASSIUM CHLORIDE, AND CALCIUM CHLORIDE: .6; .31; .03; .02 INJECTION, SOLUTION INTRAVENOUS at 10:12

## 2022-12-31 NOTE — PT/OT/SLP EVAL
"Occupational Therapy   Evaluation and Discharge Note    Name: Manuel Tyler  MRN: [id number]  Admitting Diagnosis: Alcohol-induced acute pancreatitis  Recent Surgery: Procedure(s) (LRB):  ULTRASOUND, UPPER GI TRACT, ENDOSCOPIC (Left)  ERCP (ENDOSCOPIC RETROGRADE CHOLANGIOPANCREATOGRAPHY) (Left) 1 Day Post-Op    Recommendations:     Discharge Recommendations: home  Discharge Equipment Recommendations: none  Barriers to discharge:  None    Assessment:     Manuel Tyler is a 72 y.o. male with a medical diagnosis of Alcohol-induced acute pancreatitis. At this time, patient is functioning at their prior level of function and does not require further acute OT services.     Plan:     During this hospitalization, patient does not require further acute OT services.  Please re-consult if situation changes.    Plan of Care Reviewed with: patient    Subjective     Chief Complaint: "Ready to go home."  Patient/Family Comments/goals: To go home.     Occupational Profile:  Living Environment: Patient lives with his wife in a [facility] with 1 step to enter.   Previous level of function: independent for ADL's and functional mobility   Roles and Routines:   Equipment Used at home: shower chair  Assistance upon Discharge: wife    Pain/Comfort:  Pain Rating 1: 0/10    Patients cultural, spiritual, Roman Catholic conflicts given the current situation:  n/a    Objective:     Communicated with: RN prior to session.  Patient found  in bathroom with RN and wife present in room with peripheral IV upon OT entry to room. RN and wife stepped out of room and OT took over care of patient.     General Precautions: Standard, fall  Orthopedic Precautions: N/A  Braces: N/A  Respiratory Status: Room air     Occupational Performance:    Bed Mobility:    Patient completed Scooting/Bridging with independence  Patient completed Sit to Supine with independence    Functional Mobility/Transfers:  Patient completed Sit <> Stand Transfer with supervision with no " assistive device x1 from bed   Patient completed Toilet Transfer with modified independence and no assistive device   Functional Mobility: Patient ambulated from toilet to bed and after a seated rest break from bed to sink and back with supervision and no assistive device. He was able to manage his own IV pole throughout ambulation.     Activities of Daily Living:  Grooming: supervision while standing at the sink  Lower Body Dressing: modified independence for socks while seated on edge of bed  Toileting: modified independence to manage brief and perform pericare    Cognitive/Visual Perceptual:  Cognitive/Psychosocial Skills:     -       Oriented to: Person, Place, Time, and Situation   -       Follows Commands/attention:Follows one-step commands  -       Communication: clear/fluent  -       Memory: No Deficits noted  -       Safety awareness/insight to disability: intact   -       Mood/Affect/Coping skills/emotional control: Appropriate to situation    Physical Exam:  Upper Extremity Range of Motion:     -       Right Upper Extremity: WFL  -       Left Upper Extremity: WFL  Upper Extremity Strength:    -       Right Upper Extremity: WFL  -       Left Upper Extremity: WFL   Strength:    -       Right Upper Extremity: WFL  -       Left Upper Extremity: WFL    AMPAC 6 Click ADL:  AMPAC Total Score: 23    Treatment & Education:  OT role, plan of care, and recommendations at discharge. Patient educated to continue to call the nurse for supervision with ambulation to the bathroom.     Patient left supine with all lines intact, call button in reach, and RN notified    GOALS:   Multidisciplinary Problems       Occupational Therapy Goals       Not on file              Multidisciplinary Problems (Resolved)          Problem: Occupational Therapy    Goal Priority Disciplines Outcome Interventions   Occupational Therapy Goal   (Resolved)     OT, PT/OT Met                        History:     Past Medical History:   Diagnosis  Date    Hyperlipidemia     Hypertension     Other specified noninfective gastroenteritis and colitis     Pancreatitis     Personal history of colonic polyps          Past Surgical History:   Procedure Laterality Date    COLONOSCOPY      ENDOSCOPIC ULTRASOUND OF UPPER GASTROINTESTINAL TRACT Left 12/30/2022    Procedure: ULTRASOUND, UPPER GI TRACT, ENDOSCOPIC;  Surgeon: Gregory Cristina MD;  Location: [address]);  Service: Endoscopy;  Laterality: Left;    ERCP Left 12/30/2022    Procedure: ERCP (ENDOSCOPIC RETROGRADE CHOLANGIOPANCREATOGRAPHY);  Surgeon: Gregory Cristina MD;  Location: [address]);  Service: Endoscopy;  Laterality: Left;    FOOT SURGERY Left     TONSILLECTOMY         Time Tracking:     OT Date of Treatment: 12/31/22  OT Start Time: 1000  OT Stop Time: 1016  OT Total Time (min): 16 min    Billable Minutes:Evaluation 8  Self Care/Home Management 8    12/31/2022

## 2022-12-31 NOTE — PLAN OF CARE
OT evaluation completed. He has no further needs for OT while in the hospital.     Problem: Occupational Therapy  Goal: Occupational Therapy Goal  Outcome: Met

## 2022-12-31 NOTE — PT/OT/SLP EVAL
Physical Therapy Evaluation    Patient Name:  Manuel Tyler   MRN:  [id number]    Recommendations:     Discharge Recommendations: home health PT   Discharge Equipment Recommendations: walker, rolling   Barriers to discharge: None    Assessment:     Manuel Tyler is a 72 y.o. male admitted with a medical diagnosis of Alcohol-induced acute pancreatitis.  He presents with the following impairments/functional limitations: impaired endurance, impaired functional mobility, gait instability, impaired balance, decreased coordination, weakness, decreased lower extremity function, decreased safety awareness.    Rehab Prognosis: Good; patient would benefit from acute skilled PT services to address these deficits and reach maximum level of function.    Recent Surgery: Procedure(s) (LRB):  ULTRASOUND, UPPER GI TRACT, ENDOSCOPIC (Left)  ERCP (ENDOSCOPIC RETROGRADE CHOLANGIOPANCREATOGRAPHY) (Left) 1 Day Post-Op    Plan:     During this hospitalization, patient to be seen 5 x/week to address the identified rehab impairments via gait training, therapeutic activities, therapeutic exercises and progress toward the following goals:    Plan of Care Expires:  01/13/23    Subjective     Chief Complaint: I needed to use the restroom  Patient/Family Comments/goals: to return to PLOF  Pain/Comfort:  Pain Rating 1: 0/10    Patients cultural, spiritual, Judaism conflicts given the current situation:      Living Environment:  Pt lives with spouse in a [facility] with 1 RIKI.   Prior to admission, patients level of function was Independent.  Equipment used at home: shower chair.  DME owned (not currently used): none.  Upon discharge, patient will have assistance from Spouse and Self.    Objective:     Communicated with Nurse Peña prior to session.  Patient found  standing @ toilet  with peripheral IV, telemetry  upon PT entry to room.    General Precautions: Standard, fall  Orthopedic Precautions:Full weight bearing   Braces: N/A  Respiratory Status:  Room air    Exams:  Cognitive Exam:  Patient is oriented to Person, Place, and Situation  Gross Motor Coordination:  WFL  Postural Exam:  Patient presented with the following abnormalities:    -       Rounded shoulders  -       Forward head  -       Abnormal trunk flexion  -       Kyphosis  Skin Integrity/Edema:      -       Skin integrity: Visible skin intact  -       Edema: None noted BLE  RLE ROM: WFL  RLE Strength: Deficits: Quad = 4/5  LLE ROM: WFL  LLE Strength: Deficits: Quad  4/5    Functional Mobility:  Bed Mobility:     Supine to Sit: contact guard assistance  Sit to Supine: contact guard assistance  Transfers:     Sit to Stand:  minimum assistance with rolling walker  Gait: 90' with RW and Min A for directing RW.  Pt ambulates with decrease step length and navarro.       AM-PAC 6 CLICK MOBILITY  Total Score:20       Treatment & Education:  Lower Extremity Exercises.   Patient educated on the purpose of therapeutic exercise.    Patient verbalized acceptance/understanding of instructions, expectations, and limitations(for safety).  Patient performed: 2 sets of 10 reps (each) of B LE There Ex: AP, LAQ, Hip abd/add, Hip flexion while sitting up on EOB.       Patient required still requires verbal cues/tactile cues to ensure correct sequence, to maintain proper form, and to allow for self-correction.  Pt reported no complaints or problems with exercise activity.      Patient left up in chair with all lines intact, call button in reach, and Nurse Mariana notified.    GOALS:   Multidisciplinary Problems       Physical Therapy Goals          Problem: Physical Therapy    Goal Priority Disciplines Outcome Goal Variances Interventions   Physical Therapy Goal     PT, PT/OT Ongoing, Progressing     Description: Goals to be met by:  2023    Patient will increase functional independence with mobility by performin. Supine to sit with Modified [place]  2. Sit to supine with Modified [place]  3. Sit to  stand transfer with Modified [place]  4. Gait  x 300 feet with Modified [place] using Rolling Walker.    Recommend: HHPT and Rolling Walker at time of discharge to home with family.                              History:     Past Medical History:   Diagnosis Date    Hyperlipidemia     Hypertension     Other specified noninfective gastroenteritis and colitis     Pancreatitis     Personal history of colonic polyps        Past Surgical History:   Procedure Laterality Date    COLONOSCOPY      ENDOSCOPIC ULTRASOUND OF UPPER GASTROINTESTINAL TRACT Left 12/30/2022    Procedure: ULTRASOUND, UPPER GI TRACT, ENDOSCOPIC;  Surgeon: Gregory Cristina MD;  Location: [address]);  Service: Endoscopy;  Laterality: Left;    ERCP Left 12/30/2022    Procedure: ERCP (ENDOSCOPIC RETROGRADE CHOLANGIOPANCREATOGRAPHY);  Surgeon: Gregory Cristina MD;  Location: [address]);  Service: Endoscopy;  Laterality: Left;    FOOT SURGERY Left     TONSILLECTOMY         Time Tracking:     PT Received On: 12/31/22  PT Start Time: 0900     PT Stop Time: 0930  PT Total Time (min): 30 min     Billable Minutes: Evaluation 15 min and Therapeutic Exercise 15 min      12/31/2022

## 2022-12-31 NOTE — DISCHARGE SUMMARY
[facility] Medicine  Discharge Summary      Patient Name: Manuel Tyler  MRN: [id number]  [facility]: [id number]  Patient Class: IP- Inpatient  Admission Date: 12/27/2022  Hospital Length of Stay: 4 days  Discharge Date and Time:  12/31/2022 10:13 AM  Attending Physician: Sanchez Araya MD   Discharging Provider: Sanchez Araya MD  Primary Care Provider: Victor M Stokes MD    Primary Care Team: Networked reference to record PCT     HPI:   This is a 72 year old male with a PMHx of HTN, HLD, alcohol use, tobacco use who presents with diarrhea.     Patient presents with diarrhea for 2 weeks duration. He has multiple episodes of diarrhea daily, every 2 hours, mostly nocturnal, without any blood. Associated symptoms include left sided abdominal pain, with occasional radiation to his back when he sits up, one episode of vomiting and decreased po intake. He reports being depressed and screened positive on initial suicide screening, but later reported that he does not have any suicidal behaviors or plan, but reports that he thinks his drinking is killing him. He drinks 2-4 beers daily with occasional liquor as well. He smokes 1 pack daily. No illicit substances. In the ED, he was hypertensive (150/86), tachycardic. Labs showed elevated lipase (>1000), leukocytosis (14.5), elevated LFTs (AST: 140, ALT: 93, Tbili: 1.6, ALP: 175), hypokalemia (3.3). CT A/P showed mild wall thickening involving the sigmoid colon & distended gallbladder. The patient was admitted for further management.         Procedure(s) (LRB):  ULTRASOUND, UPPER GI TRACT, ENDOSCOPIC (Left)  ERCP (ENDOSCOPIC RETROGRADE CHOLANGIOPANCREATOGRAPHY) (Left)      Hospital Course:   Patient admitted with pancreatitis likely secondary to ETOH abuse. GI was consulted. C-diff negative. Advanced diet. PT/OT were consulted. GI with plans for EUS and ERCP on 12/30- suspicious for pancreatic CA- stent placed. Patient clinically improved. Lipase now  normal. Will follow up with oncology as out patient. Activity as tolerated. Diet- low NA.       Goals of Care Treatment Preferences:  Code Status: Full Code      Consults:   Consults (From admission, onward)        Status Ordering Provider     Inpatient consult to General Surgery  Once        Provider:  Pooja Bailey MD    Completed MAJO LOGAN     Inpatient consult to Gastroenterology  Once        Provider:  Pablito Morales MD    Completed MAJO LOGAN          No new Assessment & Plan notes have been filed under this hospital service since the last note was generated.  Service: Hospital Medicine    Final Active Diagnoses:    Diagnosis Date Noted POA    Primary hypertension [I10] 12/27/2022 Yes    Hyperlipidemia [E78.5] 12/27/2022 Yes    Alcohol abuse [F10.10] 12/27/2022 Yes      Problems Resolved During this Admission:    Diagnosis Date Noted Date Resolved POA    PRINCIPAL PROBLEM:  Alcohol-induced acute pancreatitis [K85.20] 12/27/2022 12/31/2022 Yes    Colitis [K52.9] 12/27/2022 12/31/2022 Yes    Sepsis [A41.9] 12/27/2022 12/31/2022 Yes    Elevated LFTs [R79.89] 12/27/2022 12/31/2022 Yes       Discharged Condition: good    Disposition: Home or Self Care    Follow Up:   Follow-up Information     Victor M Stokes MD. Schedule an appointment as soon as possible for a visit in 1 week(s).    Specialty: Family Medicine  Contact information:  [address] [facility]  [address]  [place] [id number]  [phone]             Latesha Carver MD Follow up in 1 week(s).    Specialties: Hematology and Oncology, Hematology  Contact information:  [address]  [address]  [place] [id number]  [phone]                       Patient Instructions:   No discharge procedures on file.    Significant Diagnostic Studies:     Pending Diagnostic Studies:     Procedure Component Value Units Date/Time    Cytology- FNA Radiology Guided, Bronch/EBUS, EUS/GI [[id number]] Collected: 12/30/22 1346    Order Status:  Sent Lab Status: In process Updated: 12/30/22 1555    Cytology- FNA Radiology Guided, Bronch/EBUS, EUS/GI [[id number]] Collected: 12/30/22 1342    Order Status: Sent Lab Status: In process Updated: 12/30/22 1343    Pancreatic elastase, fecal [[id number]] Collected: 12/28/22 0026    Order Status: Sent Lab Status: In process Updated: 12/28/22 0104    Specimen: Stool          Medications:  Reconciled Home Medications:      Medication List      START taking these medications    hydrALAZINE 25 MG tablet  Commonly known as: APRESOLINE  Take 3 tablets (75 mg total) by mouth every 8 (eight) hours.        CONTINUE taking these medications    citalopram 20 MG tablet  Commonly known as: CeleXA  Take 20 mg by mouth once daily.     diltiaZEM 180 mg 24 hr tablet  Commonly known as: CARDIZEM LA  Take 240 mg by mouth once daily.     pravastatin 20 MG tablet  Commonly known as: PRAVACHOL  Take 20 mg by mouth nightly.     venlafaxine 37.5 MG 24 hr capsule  Commonly known as: EFFEXOR-XR  Take 37.5 mg by mouth once daily.            Indwelling Lines/Drains at time of discharge:   Lines/Drains/Airways     None                 Time spent on the discharge of patient: > minutes         Sanchez Mckeon MD  Department of Hospital Medicine  [facility]

## 2022-12-31 NOTE — PROGRESS NOTES
[facility] Medicine  Progress Note    Patient Name: Manuel Tyler  MRN: [id number]  Patient Class: IP- Inpatient   Admission Date: 12/27/2022  Length of Stay: 4 days  Attending Physician: Sanchez Araya MD  Primary Care Provider: Victor M Stokes MD        Subjective:     Principal Problem:Alcohol-induced acute pancreatitis        HPI:  This is a 72 year old male with a PMHx of HTN, HLD, alcohol use, tobacco use who presents with diarrhea.     Patient presents with diarrhea for 2 weeks duration. He has multiple episodes of diarrhea daily, every 2 hours, mostly nocturnal, without any blood. Associated symptoms include left sided abdominal pain, with occasional radiation to his back when he sits up, one episode of vomiting and decreased po intake. He reports being depressed and screened positive on initial suicide screening, but later reported that he does not have any suicidal behaviors or plan, but reports that he thinks his drinking is killing him. He drinks 2-4 beers daily with occasional liquor as well. He smokes 1 pack daily. No illicit substances. In the ED, he was hypertensive (150/86), tachycardic. Labs showed elevated lipase (>1000), leukocytosis (14.5), elevated LFTs (AST: 140, ALT: 93, Tbili: 1.6, ALP: 175), hypokalemia (3.3). CT A/P showed mild wall thickening involving the sigmoid colon & distended gallbladder. The patient was admitted for further management.         Overview/Hospital Course:  Patient admitted with pancreatitis likely secondary to ETOH abuse. GI was consulted. C-diff negative. Advanced diet. PT/OT were consulted. GI with plans for EUS and ERCP on 12/30- suspicious for pancreatic CA- stent placed. Patient clinically improved. Lipase now normal. Will follow up with oncology as out patient. Activity as tolerated. Diet- low NA.      Interval History: Pain resolved     Review of Systems   Constitutional:  Negative for activity change and appetite change.   HENT:   Negative for congestion and dental problem.    Eyes:  Negative for discharge and itching.   Respiratory:  Negative for shortness of breath.    Cardiovascular:  Negative for palpitations.   Gastrointestinal:  Negative for abdominal pain.   Genitourinary:  Negative for difficulty urinating and dysuria.   Musculoskeletal:  Negative for arthralgias.   Neurological:  Negative for dizziness.   Psychiatric/Behavioral:  Negative for agitation and behavioral problems.    Objective:     Vital Signs (Most Recent):  Temp: 97.8 °F (36.6 °C) (12/31/22 0739)  Pulse: 89 (12/31/22 0739)  Resp: 17 (12/31/22 0739)  BP: (!) 174/90 (12/31/22 0739)  SpO2: (!) 93 % (12/31/22 0739)   Vital Signs (24h Range):  Temp:  [97.1 °F (36.2 °C)-98.2 °F (36.8 °C)] 97.8 °F (36.6 °C)  Pulse:  [57-94] 89  Resp:  [14-19] 17  SpO2:  [92 %-95 %] 93 %  BP: (133-188)/(70-98) 174/90     Weight: 65.8 kg (145 lb)  Body mass index is 22.05 kg/m².    Intake/Output Summary (Last 24 hours) at 12/31/2022 1010  Last data filed at 12/31/2022 0615  Gross per 24 hour   Intake 3929.48 ml   Output --   Net 3929.48 ml      Physical Exam  Vitals and nursing note reviewed.   Constitutional:       Appearance: Normal appearance. He is not ill-appearing.   HENT:      Head: Normocephalic and atraumatic.      Mouth/Throat:      Pharynx: Oropharynx is clear.   Eyes:      Conjunctiva/sclera: Conjunctivae normal.   Cardiovascular:      Rate and Rhythm: Normal rate and regular rhythm.   Pulmonary:      Effort: Pulmonary effort is normal. No respiratory distress.      Breath sounds: No wheezing.   Neurological:      Mental Status: He is alert and oriented to person, place, and time.   Psychiatric:         Behavior: Behavior normal.       Significant Labs: All pertinent labs within the past 24 hours have been reviewed.  BMP:   Recent Labs   Lab 12/29/22  1046   K 3.3*   MG 1.5*     CBC: No results for input(s): WBC, HGB, HCT, PLT in the last 48 hours.    Significant Imaging:        Assessment/Plan:      * Alcohol-induced acute pancreatitis  Presented with abdominal pain, diarrhea   Labs noted for leukocytosis, elevated lipase   CT A/P showed mild wall thickening involving the sigmoid colon with abnormal appearance seen within the left lower pelvis along the left lateral aspect of the sigmoid colon, distended gallbladder. Pancreas unremarkable.  Meets 2/3 criteria, although his abdominal pain is related to other etiologies (possibly colitis)     Plan:   - IVF  - Pain control   - NPO   - Consult to GI  - US RUQ    Improved. Lipase ordered.  Tolerating diet    Pancreatitis likely from pancreatic CA by EUS.  Will follow up with heme/onc. Lipase normal. No pain    Elevated LFTs  Possibly related to gallstones, biliary sludge or alcoholic hepatitis   RUQ US  Check acute hepatitis panel       Alcohol abuse  Maintain on CIWA      Sepsis  This patient does have evidence of infective focus  My overall impression is sepsis. Vital signs were reviewed and noted in progress note.  Antibiotics given-   Antibiotics (From admission, onward)    Start     Stop Route Frequency Ordered    12/27/22 2300  piperacillin-tazobactam 4.5 g in dextrose 5 % 100 mL IVPB (ready to mix system)         -- IV Every 8 hours (non-standard times) 12/27/22 2149        Cultures were taken-   Microbiology Results (last 7 days)     Procedure Component Value Units Date/Time    Stool culture [[id number]]     Order Status: No result Specimen: Stool     Clostridium difficile EIA [[id number]]     Order Status: No result Specimen: Stool         Latest lactate reviewed, they are-  No results for input(s): LACTATE in the last 72 hours.    Organ dysfunction indicated by Acute liver injury  Source- Intra-abdominal     Source control Achieved by-   Antibiotics     Colitis  CT A/P showed mild wall thickening involving the sigmoid colon with abnormal appearance seen within the left lower pelvis along the left lateral aspect of the  sigmoid colon, distended gallbladder. Pancreas unremarkable.    Plan:   - Continue zosyn     Hyperlipidemia  Outpatient follow up     Primary hypertension  Monitor BP      Likely pancreatic CA- found on EUS. Follow up with oncology  May have liver met.  VTE Risk Mitigation (From admission, onward)         Ordered     heparin (porcine) injection 5,000 Units  Every 8 hours         12/27/22 2220     IP VTE HIGH RISK PATIENT  Once         12/27/22 2220     Place sequential compression device  Until discontinued         12/27/22 2220                Discharge Planning   MIKO: 12/31/2022     Code Status: Full Code   Is the patient medically ready for discharge?:     Reason for patient still in hospital (select all that apply):   Discharge Plan A: Home with family   Discharge Delays: None known at this time              Sanchez Mckeon MD  Department of Hospital Medicine   [facility]

## 2022-12-31 NOTE — ASSESSMENT & PLAN NOTE
Presented with abdominal pain, diarrhea   Labs noted for leukocytosis, elevated lipase   CT A/P showed mild wall thickening involving the sigmoid colon with abnormal appearance seen within the left lower pelvis along the left lateral aspect of the sigmoid colon, distended gallbladder. Pancreas unremarkable.  Meets 2/3 criteria, although his abdominal pain is related to other etiologies (possibly colitis)     Plan:   - IVF  - Pain control   - NPO   - Consult to GI  - US RUQ    Improved. Lipase ordered.  Tolerating diet    Pancreatitis likely from pancreatic CA by EUS.  Will follow up with heme/onc. Lipase normal. No pain

## 2022-12-31 NOTE — PLAN OF CARE
Home health referral sent to [facility] via care port, pt accepted awaiting home health plan of care, physician notified.     Per Susan with [facility], clear to pull rw to be delivered to pt bedside.     Spoke with patient's spouse, insturcted to schedule pcp follow up within 1 week, verbalized understanding. In basket message sent for hem/onc clinic to contact patient to scheduled follow up appointment, listed on avs.

## 2022-12-31 NOTE — PLAN OF CARE
12/31/22 1100   Medicare Message   Important Message from Medicare regarding Discharge Appeal Rights Given to patient/caregiver;Explained to patient/caregiver;Other (comments)  (TN spoke with patient's spouse Vesta Tyler via phone [phone] verbalized understanding. copy mailed certified to address in chart. [phone])   Date IMM was signed 12/31/22   Time IMM was signed 1100

## 2022-12-31 NOTE — PLAN OF CARE
[facility] [place] [facility]      HOME HEALTH ORDERS  FACE TO FACE ENCOUNTER    Patient Name: Manuel Tyler  YOB: 1950    PCP: Victor M Stokes MD   PCP Address: [address] / [place] [address]  PCP Phone Number: [phone]  PCP Fax: [phone]    Encounter Date: 12/27/22    Admit to Home Health    Diagnoses: Weakness    Active Hospital Problems    Diagnosis  POA    Primary hypertension [I10]  Yes    Hyperlipidemia [E78.5]  Yes    Alcohol abuse [F10.10]  Yes      Resolved Hospital Problems    Diagnosis Date Resolved POA    *Alcohol-induced acute pancreatitis [K85.20] 12/31/2022 Yes     Priority: 1 - High    Colitis [K52.9] 12/31/2022 Yes    Sepsis [A41.9] 12/31/2022 Yes    Elevated LFTs [R79.89] 12/31/2022 Yes       Follow Up Appointments:  No future appointments.    Allergies:Review of patient's allergies indicates:  No Known Allergies    Medications: Review discharge medications with patient and family and provide education.    Current Facility-Administered Medications   Medication Dose Route Frequency Provider Last Rate Last Admin    acetaminophen tablet 650 mg  650 mg Oral Q8H PRN Hilton Griffin MD        acetaminophen tablet 650 mg  650 mg Oral Q4H PRN Hilton Griffin MD        albuterol-ipratropium 2.5 mg-0.5 mg/3 mL nebulizer solution 3 mL  3 mL Nebulization Q4H PRN Hilton Griffin MD        aluminum-magnesium hydroxide-simethicone 200-200-20 mg/5 mL suspension 30 mL  30 mL Oral QID PRN Hilton Griffin MD        bisacodyL suppository 10 mg  10 mg Rectal Daily PRN Hilton Griffin MD        dextrose 10% bolus 125 mL 125 mL  12.5 g Intravenous PRN Hilton Griffin MD        dextrose 10% bolus 250 mL 250 mL  25 g Intravenous PRN Hilton Griffin MD        folic acid tablet 1 mg  1 mg Oral Daily Hilton Griffin MD   1 mg at 12/31/22 0925    glucagon (human recombinant) injection 1 mg  1 mg Intramuscular PRN Hilton Griffin MD        glucose chewable tablet 16 g  16 g Oral PRN Hilton Griffin MD        glucose  chewable tablet 24 g  24 g Oral PRN Hilton Griffin MD        heparin (porcine) injection 5,000 Units  5,000 Units Subcutaneous Q8H Hilton Griffin MD   5,000 Units at 12/31/22 0508    hydrALAZINE tablet 75 mg  75 mg Oral Q8H Sanchez Araya MD        HYDROcodone-acetaminophen 5-325 mg per tablet 1 tablet  1 tablet Oral Q6H PRN Hilton Griffin MD        influenza 65up-adj (QUADRIVALENT ADJUVANTED PF) vaccine 0.5 mL  0.5 mL Intramuscular vaccine x 1 dose Cholo Vallejo MD        lactated ringers infusion   Intravenous Continuous Sanchez Araya  mL/hr at 12/31/22 1039 New Bag at 12/31/22 1039    loperamide capsule 2 mg  2 mg Oral QID PRN Sanchez Araya MD   2 mg at 12/30/22 2053    LORazepam tablet 2 mg  2 mg Oral Q4H PRN Hilton Griffin MD   2 mg at 12/31/22 0115    magnesium oxide tablet 800 mg  800 mg Oral PRN Hilton Griffin MD        magnesium oxide tablet 800 mg  800 mg Oral PRN Hilton Griffin MD        melatonin tablet 6 mg  6 mg Oral Nightly PRN Hilton Griffin MD   6 mg at 12/30/22 2053    multivitamin tablet  1 tablet Oral Daily Hilton Griffin MD   1 tablet at 12/31/22 0925    naloxone 0.4 mg/mL injection 0.02 mg  0.02 mg Intravenous PRN Hilton Griffin MD        ondansetron injection 4 mg  4 mg Intravenous Q8H PRN Hilton Griffin MD        piperacillin-tazobactam 4.5 g in dextrose 5 % 100 mL IVPB (ready to mix system)  4.5 g Intravenous Q8H Jarod Renee MD   Stopped at 12/31/22 0515    polyethylene glycol packet 17 g  17 g Oral Daily Hilton Griffin MD        prochlorperazine injection Soln 5 mg  5 mg Intravenous Q6H PRN Hilton Griffin MD        simethicone chewable tablet 80 mg  1 tablet Oral QID PRN Hilton Griffin MD        sodium chloride 0.9% flush 10 mL  10 mL Intravenous Q12H PRN Hilton Griffin MD        thiamine tablet 100 mg  100 mg Oral Daily Hilton Griffin MD   100 mg at 12/31/22 0925     Current Discharge Medication List        START taking these medications    Details   hydrALAZINE (APRESOLINE) 25 MG  tablet Take 3 tablets (75 mg total) by mouth every 8 (eight) hours.  Qty: 90 tablet, Refills: 5    Comments: .           CONTINUE these medications which have NOT CHANGED    Details   citalopram (CELEXA) 20 MG tablet Take 20 mg by mouth once daily.      diltiazem (CARDIZEM LA) 180 mg 24 hr tablet Take 240 mg by mouth once daily.      pravastatin (PRAVACHOL) 20 MG tablet Take 20 mg by mouth nightly.      venlafaxine (EFFEXOR-XR) 37.5 MG 24 hr capsule Take 37.5 mg by mouth once daily.               I have seen and examined this patient within the last 30 days. My clinical findings that support the need for the home health skilled services and home bound status are the following:no   Weakness/numbness causing balance and gait disturbance due to Weakness/Debility making it taxing to leave home.     Diet:   2 gram sodium diet          Referrals/ Consults  Physical Therapy to evaluate and treat. Evaluate for home safety and equipment needs; Establish/upgrade home exercise program. Perform / instruct on therapeutic exercises, gait training, transfer training, and Range of Motion.  Occupational Therapy to evaluate and treat. Evaluate home environment for safety and equipment needs. Perform/Instruct on transfers, ADL training, ROM, and therapeutic exercises.  Aide to provide assistance with personal care, ADLs, and vital signs.    Activities:   activity as tolerated    Nursing:   Agency to admit patient within 24 hours of hospital discharge unless specified on physician order or at patient request    SN to complete comprehensive assessment including routine vital signs. Instruct on disease process and s/s of complications to report to MD. Review/verify medication list sent home with the patient at time of discharge  and instruct patient/caregiver as needed. Frequency may be adjusted depending on start of care date.     Skilled nurse to perform up to 3 visits PRN for symptoms related to diagnosis    Notify MD if SBP > 160 or  < 90; DBP > 90 or < 50; HR > 120 or < 50; Temp > 101; O2 < 88%; Other:    Ok to schedule additional visits based on staff availability and patient request on consecutive days within the home health episode.    When multiple disciplines ordered:    Start of Care occurs on Sunday - Wednesday schedule remaining discipline evaluations as ordered on separate consecutive days following the start of care.    Thursday SOC -schedule subsequent evaluations Friday and Monday the following week.     Friday - Saturday SOC - schedule subsequent discipline evaluations on consecutive days starting Monday of the following week.    For all post-discharge communication and subsequent orders please contact patient's primary care physician.   I certify that this patient is confined to his home and needs intermittent skilled nursing care, physical therapy, and occupational therapy.

## 2022-12-31 NOTE — PLAN OF CARE
[facility] - Med Surg  Discharge Final Note    TN attempted to deliver RW to patient at bedside, pt not in room and had left the hospital, pt's nurse notified. Pt's nurse placed call to pt's spouse, left detailed voice message informing that rw will be at the nurse station. However, if pt does not return for , rw to be returned to depot.     [facility] willing to accept patient, notified plan of care orders placed per physician.     Primary Care Provider: Victor M Stokes MD    Expected Discharge Date: 12/31/2022    Final Discharge Note (most recent)       Final Note - 12/31/22 1311          Final Note    Assessment Type Final Discharge Note     Anticipated Discharge Disposition Home-Health Care Svc [facility]    What phone number can be called within the next 1-3 days to see how you are doing after discharge? [phone]     Hospital Resources/Appts/Education Provided Provided patient/caregiver with written discharge plan information;Appointments scheduled and added to AVS        Post-Acute Status    Post-Acute Authorization Home Health     Home Health Status Set-up Complete/Auth obtained     Coverage Medicare     Discharge Delays None known at this time                     Important Message from Medicare             Contact Info       Victor M Stokes MD   Specialty: Family Medicine   Relationship: PCP - General    [address]  [address]  [place] [address]   Phone: [phone]       Next Steps: Schedule an appointment as soon as possible for a visit in 1 week(s)    Latesha Carver MD   Specialty: Hematology and Oncology, Hematology    [address]  [address]  [place] [address]   Phone: [phone]       Next Steps: Follow up in 1 week(s)

## 2022-12-31 NOTE — SUBJECTIVE & OBJECTIVE
Interval History: Pain resolved     Review of Systems   Constitutional:  Negative for activity change and appetite change.   HENT:  Negative for congestion and dental problem.    Eyes:  Negative for discharge and itching.   Respiratory:  Negative for shortness of breath.    Cardiovascular:  Negative for palpitations.   Gastrointestinal:  Negative for abdominal pain.   Genitourinary:  Negative for difficulty urinating and dysuria.   Musculoskeletal:  Negative for arthralgias.   Neurological:  Negative for dizziness.   Psychiatric/Behavioral:  Negative for agitation and behavioral problems.    Objective:     Vital Signs (Most Recent):  Temp: 97.8 °F (36.6 °C) (12/31/22 0739)  Pulse: 89 (12/31/22 0739)  Resp: 17 (12/31/22 0739)  BP: (!) 174/90 (12/31/22 0739)  SpO2: (!) 93 % (12/31/22 0739)   Vital Signs (24h Range):  Temp:  [97.1 °F (36.2 °C)-98.2 °F (36.8 °C)] 97.8 °F (36.6 °C)  Pulse:  [57-94] 89  Resp:  [14-19] 17  SpO2:  [92 %-95 %] 93 %  BP: (133-188)/(70-98) 174/90     Weight: 65.8 kg (145 lb)  Body mass index is 22.05 kg/m².    Intake/Output Summary (Last 24 hours) at 12/31/2022 1010  Last data filed at 12/31/2022 0615  Gross per 24 hour   Intake 3929.48 ml   Output --   Net 3929.48 ml      Physical Exam  Vitals and nursing note reviewed.   Constitutional:       Appearance: Normal appearance. He is not ill-appearing.   HENT:      Head: Normocephalic and atraumatic.      Mouth/Throat:      Pharynx: Oropharynx is clear.   Eyes:      Conjunctiva/sclera: Conjunctivae normal.   Cardiovascular:      Rate and Rhythm: Normal rate and regular rhythm.   Pulmonary:      Effort: Pulmonary effort is normal. No respiratory distress.      Breath sounds: No wheezing.   Neurological:      Mental Status: He is alert and oriented to person, place, and time.   Psychiatric:         Behavior: Behavior normal.       Significant Labs: All pertinent labs within the past 24 hours have been reviewed.  BMP:   Recent Labs   Lab  12/29/22  1046   K 3.3*   MG 1.5*     CBC: No results for input(s): WBC, HGB, HCT, PLT in the last 48 hours.    Significant Imaging:

## 2022-12-31 NOTE — PLAN OF CARE
Problem: Physical Therapy  Goal: Physical Therapy Goal    Description: Goals to be met by:  2023    Patient will increase functional independence with mobility by performin. Supine to sit with Modified [place]  2. Sit to supine with Modified [place]  3. Sit to stand transfer with Modified [place]  4. Gait  x 300 feet with Modified [place] using Rolling Walker.    Recommend: HHPT and Rolling Walker at time of discharge to home with family.         Outcome: Ongoing, Progressing

## 2023-01-01 ENCOUNTER — NURSE TRIAGE (OUTPATIENT)
Dept: ADMINISTRATIVE | Facility: CLINIC | Age: 73
End: 2023-01-01
Payer: MEDICARE

## 2023-01-01 NOTE — TELEPHONE ENCOUNTER
Called re P/D day 1.     D/C yesterday and was not given an RX for his statin, verified with patient that the statin came from his non [facility] PCP and was listed as not being taken by RN. States that is correct, advised to call Dr. Stokes on Tuesday to obtain refill. He would also like to establish care with a PCP at [facility]. Number given to call for make appointment and advised I would send message to HEM/ONC re need to call for follow up appointment. Verb understanding, states he is doing well.   Reason for Disposition   [1] Prescription refill request for NON-ESSENTIAL medicine (i.e., no harm to patient if med not taken) AND [2] triager unable to refill per department policy    Protocols used: Medication Refill and Renewal Call-A-

## 2023-01-02 ENCOUNTER — TELEPHONE (OUTPATIENT)
Dept: ADMINISTRATIVE | Facility: CLINIC | Age: 73
End: 2023-01-02
Payer: MEDICARE

## 2023-01-02 NOTE — PROGRESS NOTES
Patient reached out to tracker line , had questions about his cholesterol medication. He need a refill and was advised to contact his non [facility] pcp for refills and hospital follow up.

## 2023-01-03 ENCOUNTER — TELEPHONE (OUTPATIENT)
Dept: HEMATOLOGY/ONCOLOGY | Facility: CLINIC | Age: 73
End: 2023-01-03
Payer: MEDICARE

## 2023-01-03 NOTE — TELEPHONE ENCOUNTER
To Dania beltran RN  I received your message but there is no referral placed and we need a diagnosis  What we reviewed in chart was pancreatitis  If that is the diagnosis we do not evaluate patient's for that in the Hem/Onc clinic  If there is another hematological or oncological diagnosis please advise  Thank you Ernesto NARAYANAN  ===View-only below this line===  ----- Message -----  From: Angelika Magallon MA  Sent: 1/3/2023  10:55 AM CST  To: Lee Lee RN  Subject: RE: Follow up appointment                        There is no referral in the system.   ----- Message -----  From: Dania Beltran RN  Sent: 12/31/2022  12:20 PM CST  To: Mehul Hilton Staff  Subject: Follow up appointment                            Please contact patient to schedule Hem/onc follow up appointment. Patient discharge today.     Thank you

## 2023-01-03 NOTE — PROVATION PATIENT INSTRUCTIONS
Discharge Summary/Instructions after an Endoscopic Procedure  Patient Name: Manuel Tyler  Patient MRN: [id number]  Patient YOB: 1950 Friday, December 30, 2022  Gregory Cristina MD  Dear patient,  As a result of recent federal legislation (The Federal Cures Act), you may   receive lab or pathology results from your procedure in your [facility]   account before your physician is able to contact you. Your physician or   their representative will relay the results to you with their   recommendations at their soonest availability.  Thank you,  RESTRICTIONS:  During your procedure today, you received medications for sedation.  These   medications may affect your judgment, balance and coordination.  Therefore,   for 24 hours, you have the following restrictions:   - DO NOT drive a car, operate machinery, make legal/financial decisions,   sign important papers or drink alcohol.    ACTIVITY:  Today: no heavy lifting, straining or running due to procedural   sedation/anesthesia.  The following day: return to full activity including work.  DIET:  Eat and drink normally unless instructed otherwise.     TREATMENT FOR COMMON SIDE EFFECTS:  - Mild abdominal pain, nausea, belching, bloating or excessive gas:  rest,   eat lightly and use a heating pad.  - Sore Throat: treat with throat lozenges and/or gargle with warm salt   water.  - Because air was used during the procedure, expelling large amounts of air   from your rectum or belching is normal.  - If a bowel prep was taken, you may not have a bowel movement for 1-3 days.    This is normal.  SYMPTOMS TO WATCH FOR AND REPORT TO YOUR PHYSICIAN:  1. Abdominal pain or bloating, other than gas cramps.  2. Chest pain.  3. Back pain.  4. Signs of infection such as: chills or fever occurring within 24 hours   after the procedure.  5. Rectal bleeding, which would show as bright red, maroon, or black stools.   (A tablespoon of blood from the rectum is not serious, especially if    hemorrhoids are present.)  6. Vomiting.  7. Weakness or dizziness.  GO DIRECTLY TO THE NEAREST EMERGENCY ROOM IF YOU HAVE ANY OF THE FOLLOWING:      Difficulty breathing              Chills and/or fever over 101 F   Persistent vomiting and/or vomiting blood   Severe abdominal pain   Severe chest pain   Black, tarry stools   Bleeding- more than one tablespoon   Any other symptom or condition that you feel may need urgent attention  Your doctor recommends these additional instructions:  If any biopsies were taken, your doctors clinic will contact you in 1 to 2   weeks with any results.  - [facility] patient to [facility] for ongoing care.  - EUS findings concerning for metastatic pancreatic cancer (see separate EUS   report).  - Recommend oncology consult (inpatient vs. outpatient OK).  - OK to advance diet as tolerated.  - My office will arrange for AXR in 3-4 weeks to assess whether PD stent has   spontaneously passed. If this has not passed on AXR, will then arrange for   EGD and PD stent removal.  For questions, problems or results please call your physician - Gregory Cristina MD at Work:  [phone].  [facility], EMERGENCY ROOM PHONE NUMBER: [phone]  IF A COMPLICATION OR EMERGENCY SITUATION ARISES AND YOU ARE UNABLE TO REACH   YOUR PHYSICIAN - GO DIRECTLY TO THE EMERGENCY ROOM.  Gregory Cristina MD  12/30/2022 3:03:45 PM  This report has been verified and signed electronically.  Dear patient,  As a result of recent federal legislation (The Federal Cures Act), you may   receive lab or pathology results from your procedure in your [facility]   account before your physician is able to contact you. Your physician or   their representative will relay the results to you with their   recommendations at their soonest availability.  Thank you,  PROVATION

## 2023-01-04 ENCOUNTER — TELEPHONE (OUTPATIENT)
Dept: HEMATOLOGY/ONCOLOGY | Facility: CLINIC | Age: 73
End: 2023-01-04
Payer: MEDICARE

## 2023-01-04 PROCEDURE — G0180 MD CERTIFICATION HHA PATIENT: HCPCS | Mod: ,,, | Performed by: INTERNAL MEDICINE

## 2023-01-04 PROCEDURE — G0180 PR HOME HEALTH MD CERTIFICATION: ICD-10-PCS | Mod: ,,, | Performed by: INTERNAL MEDICINE

## 2023-01-04 NOTE — TELEPHONE ENCOUNTER
To Dania Beltran  RN  The above referenced patient contacted our office re: an appointment . I received a notice form you on this patient. I have to have a referral placed in order to schedule him . If it is for pancreatitis then that is seen by GI   If he has a positive confirmed diagnosis for a malignancy then I can schedule him once I receive the referral . The patient just contacted our office and states he is not clear on his discharge instructions  Perhaps you can assist with clarifying them for him . Once the above is completed and our services are indicated I will schedule him asap . Thank you  Jimmy

## 2023-01-05 ENCOUNTER — TELEPHONE (OUTPATIENT)
Dept: HEMATOLOGY/ONCOLOGY | Facility: CLINIC | Age: 73
End: 2023-01-05

## 2023-01-10 ENCOUNTER — TELEPHONE (OUTPATIENT)
Dept: HEMATOLOGY/ONCOLOGY | Facility: CLINIC | Age: 73
End: 2023-01-10
Payer: MEDICARE

## 2023-01-10 LAB
ADEQUACY: ABNORMAL
FINAL PATHOLOGIC DIAGNOSIS: ABNORMAL
Lab: ABNORMAL

## 2023-01-10 NOTE — TELEPHONE ENCOUNTER
Patient son called to talk to lee, he wanted to ask about the biopsy result for his father.told him that Lee is busy at the moment and I will send a message to her regarding his concern. He would like to hear back from Lee.

## 2023-01-11 ENCOUNTER — LAB VISIT (OUTPATIENT)
Dept: LAB | Facility: HOSPITAL | Age: 73
End: 2023-01-11
Attending: INTERNAL MEDICINE
Payer: MEDICARE

## 2023-01-11 ENCOUNTER — OFFICE VISIT (OUTPATIENT)
Dept: HEMATOLOGY/ONCOLOGY | Facility: CLINIC | Age: 73
End: 2023-01-11
Payer: MEDICARE

## 2023-01-11 VITALS
BODY MASS INDEX: 21.55 KG/M2 | DIASTOLIC BLOOD PRESSURE: 72 MMHG | WEIGHT: 142.19 LBS | OXYGEN SATURATION: 96 % | SYSTOLIC BLOOD PRESSURE: 118 MMHG | HEIGHT: 68 IN | HEART RATE: 113 BPM

## 2023-01-11 DIAGNOSIS — C78.7 LIVER METASTASES: ICD-10-CM

## 2023-01-11 DIAGNOSIS — G89.3 NEOPLASM RELATED PAIN: ICD-10-CM

## 2023-01-11 DIAGNOSIS — C25.0 MALIGNANT NEOPLASM OF HEAD OF PANCREAS: ICD-10-CM

## 2023-01-11 DIAGNOSIS — R06.09 DOE (DYSPNEA ON EXERTION): ICD-10-CM

## 2023-01-11 DIAGNOSIS — C25.0 MALIGNANT NEOPLASM OF HEAD OF PANCREAS: Primary | ICD-10-CM

## 2023-01-11 LAB
ALBUMIN SERPL BCP-MCNC: 3.4 G/DL (ref 3.5–5.2)
ALP SERPL-CCNC: 134 U/L (ref 55–135)
ALT SERPL W/O P-5'-P-CCNC: 58 U/L (ref 10–44)
ANION GAP SERPL CALC-SCNC: 9 MMOL/L (ref 8–16)
AST SERPL-CCNC: 96 U/L (ref 10–40)
BASOPHILS # BLD AUTO: 0.17 K/UL (ref 0–0.2)
BASOPHILS NFR BLD: 1.1 % (ref 0–1.9)
BILIRUB SERPL-MCNC: 0.6 MG/DL (ref 0.1–1)
BUN SERPL-MCNC: 13 MG/DL (ref 8–23)
CALCIUM SERPL-MCNC: 9.8 MG/DL (ref 8.7–10.5)
CHLORIDE SERPL-SCNC: 97 MMOL/L (ref 95–110)
CO2 SERPL-SCNC: 24 MMOL/L (ref 23–29)
CREAT SERPL-MCNC: 0.8 MG/DL (ref 0.5–1.4)
DIFFERENTIAL METHOD: ABNORMAL
EOSINOPHIL # BLD AUTO: 0.4 K/UL (ref 0–0.5)
EOSINOPHIL NFR BLD: 2.7 % (ref 0–8)
ERYTHROCYTE [DISTWIDTH] IN BLOOD BY AUTOMATED COUNT: 14.2 % (ref 11.5–14.5)
EST. GFR  (NO RACE VARIABLE): >60 ML/MIN/1.73 M^2
GLUCOSE SERPL-MCNC: 83 MG/DL (ref 70–110)
HCT VFR BLD AUTO: 45.7 % (ref 40–54)
HGB BLD-MCNC: 15.7 G/DL (ref 14–18)
IMM GRANULOCYTES # BLD AUTO: 0.08 K/UL (ref 0–0.04)
IMM GRANULOCYTES NFR BLD AUTO: 0.5 % (ref 0–0.5)
LYMPHOCYTES # BLD AUTO: 1.8 K/UL (ref 1–4.8)
LYMPHOCYTES NFR BLD: 11.5 % (ref 18–48)
MAGNESIUM SERPL-MCNC: 1.9 MG/DL (ref 1.6–2.6)
MCH RBC QN AUTO: 33.6 PG (ref 27–31)
MCHC RBC AUTO-ENTMCNC: 34.4 G/DL (ref 32–36)
MCV RBC AUTO: 98 FL (ref 82–98)
MONOCYTES # BLD AUTO: 1.4 K/UL (ref 0.3–1)
MONOCYTES NFR BLD: 9.4 % (ref 4–15)
NEUTROPHILS # BLD AUTO: 11.5 K/UL (ref 1.8–7.7)
NEUTROPHILS NFR BLD: 74.8 % (ref 38–73)
NRBC BLD-RTO: 0 /100 WBC
PLATELET # BLD AUTO: 614 K/UL (ref 150–450)
PMV BLD AUTO: 8.8 FL (ref 9.2–12.9)
POTASSIUM SERPL-SCNC: 4 MMOL/L (ref 3.5–5.1)
PROT SERPL-MCNC: 7.9 G/DL (ref 6–8.4)
RBC # BLD AUTO: 4.67 M/UL (ref 4.6–6.2)
SODIUM SERPL-SCNC: 130 MMOL/L (ref 136–145)
WBC # BLD AUTO: 15.34 K/UL (ref 3.9–12.7)

## 2023-01-11 PROCEDURE — 99205 OFFICE O/P NEW HI 60 MIN: CPT | Mod: S$PBB,,, | Performed by: INTERNAL MEDICINE

## 2023-01-11 PROCEDURE — 83735 ASSAY OF MAGNESIUM: CPT | Performed by: INTERNAL MEDICINE

## 2023-01-11 PROCEDURE — 80053 COMPREHEN METABOLIC PANEL: CPT | Performed by: INTERNAL MEDICINE

## 2023-01-11 PROCEDURE — 36415 COLL VENOUS BLD VENIPUNCTURE: CPT | Performed by: INTERNAL MEDICINE

## 2023-01-11 PROCEDURE — 99205 PR OFFICE/OUTPT VISIT, NEW, LEVL V, 60-74 MIN: ICD-10-PCS | Mod: S$PBB,,, | Performed by: INTERNAL MEDICINE

## 2023-01-11 PROCEDURE — 86301 IMMUNOASSAY TUMOR CA 19-9: CPT | Performed by: INTERNAL MEDICINE

## 2023-01-11 PROCEDURE — 99999 PR PBB SHADOW E&M-EST. PATIENT-LVL IV: ICD-10-PCS | Mod: PBBFAC,,, | Performed by: INTERNAL MEDICINE

## 2023-01-11 PROCEDURE — 99999 PR PBB SHADOW E&M-EST. PATIENT-LVL IV: CPT | Mod: PBBFAC,,, | Performed by: INTERNAL MEDICINE

## 2023-01-11 PROCEDURE — 99214 OFFICE O/P EST MOD 30 MIN: CPT | Mod: PBBFAC | Performed by: INTERNAL MEDICINE

## 2023-01-11 PROCEDURE — 85025 COMPLETE CBC W/AUTO DIFF WBC: CPT | Performed by: INTERNAL MEDICINE

## 2023-01-11 RX ORDER — ONDANSETRON 4 MG/1
4 TABLET, FILM COATED ORAL EVERY 8 HOURS PRN
Qty: 30 TABLET | Refills: 1 | Status: SHIPPED | OUTPATIENT
Start: 2023-01-11 | End: 2023-10-13

## 2023-01-11 RX ORDER — OXYCODONE HYDROCHLORIDE 5 MG/1
5 TABLET ORAL EVERY 6 HOURS PRN
Qty: 60 TABLET | Refills: 0 | Status: ON HOLD | OUTPATIENT
Start: 2023-01-11 | End: 2023-01-30 | Stop reason: HOSPADM

## 2023-01-11 NOTE — PROGRESS NOTES
Subjective:       Patient ID: Manuel Tyler is a 72 y.o. male.    Chief Complaint: No chief complaint on file.  Reason For Consultation: Stage IV pancreatic CA  HPI 72 y.o male with medical diagnoses listed seen today in consultation for Stage IV pancreatic adenocarcinoma with liver metastases.He was  admitted with pancreatitis likely secondary to ETOH abuse. C-diff negative. CT a/p w/contrast 12/27/22 shows Mild wall thickening involving the sigmoid colon with abnormal appearance seen within the left lower pelvis along the left lateral aspect of the sigmoid colon.  Uncertain if findings may in part relate to sigmoid tortuosity, however potential colocolic fistula not excluded given appearance.  No acute inflammatory changes are seen.  No prior studies are available for comparison.  Colonoscopy follow-up may be warranted if not previously/recently obtained.   Distended gallbladder. US abd limited 12/28/22 shows Biliary sludge with evidence of acute cholecystitis including gallbladder distension and a positive sonographic Hahn sign.CBD dilatation up to 13 mm, which could be secondary to cholecystitis, choledocholithiasis, or other obstruction.Ill-defined 2.6 cm lesion in the region of the pancreatic head, which could represent a lymph node or pancreatic neoplasm.  GI performed EUS and ERCP on 12/30/22 - suspicious for pancreatic CA- stent placed. Upper EUS 12/30/22 shows A single metastatic lesion was found in the left lobe of the liver.   Fine needle aspiration performed. A mass was identified in the pancreatic head. Fine needle biopsy performed  Pancreas, head, mass, EUS guided fine-needle aspiration: Positive for malignancy, adenocarcinoma, see comment.   Liver, EUS guided fine-needle aspiration: - Positive for malignancy, adenocarcinoma,He is taking OTC acetaminophen for abd pain which intermittently radiates to back.  He rates pain 3/10 in intensity. He has intermittent nausea. He is reports changes in bowel  habits He needs assistance with some daily activities.No falls. He reports mild SKAGGS. No CP/hemoptysis. He is accompanied by family members.           Past Medical History:   Diagnosis Date    Hyperlipidemia     Hypertension     Other specified noninfective gastroenteritis and colitis     Pancreatitis     Personal history of colonic polyps        Past Surgical History:   Procedure Laterality Date    COLONOSCOPY      ENDOSCOPIC ULTRASOUND OF UPPER GASTROINTESTINAL TRACT Left 12/30/2022    Procedure: ULTRASOUND, UPPER GI TRACT, ENDOSCOPIC;  Surgeon: Gregory Cristina MD;  Location: [facility] ([address] [place] [address]);  Service: Endoscopy;  Laterality: Left;    ERCP Left 12/30/2022    Procedure: ERCP (ENDOSCOPIC RETROGRADE CHOLANGIOPANCREATOGRAPHY);  Surgeon: Gregory Cristina MD;  [facility]  Service: Endoscopy;  Laterality: Left;    FOOT SURGERY Left     INSERTION OF TUNNELED CENTRAL VENOUS CATHETER (CVC) WITH SUBCUTANEOUS PORT Bilateral 1/19/2023    Procedure: [id number];  Surgeon: Amador Castellon MD;  Location: [facility];  Service: General;  Laterality: Bilateral;  Right v Left PORTACATH. needs ultrasound and fluoro  RN PREOP 01/18/2023    TONSILLECTOMY          Review of Systems   Constitutional:  Positive for appetite change, fatigue and unexpected weight change. Negative for fever.   HENT:  Negative for mouth sores.    Eyes:  Negative for visual disturbance.   Respiratory:  Negative for cough and shortness of breath.    Cardiovascular:  Negative for chest pain.   Gastrointestinal:  Positive for abdominal pain and nausea. Negative for diarrhea.   Genitourinary:  Negative for frequency.   Musculoskeletal:  Positive for back pain.   Integumentary:  Negative for rash.   Neurological:  Negative for headaches.   Hematological:  Negative for adenopathy.   Psychiatric/Behavioral:  The patient is not nervous/anxious.        Objective:       Vitals:    01/11/23 1307   BP: 118/72   BP Location: Right arm   Patient  "Position: Sitting   BP Method: Large (Automatic)   Pulse: (!) 113   SpO2: 96%   Weight: 64.5 kg (142 lb 3.2 oz)   Height: 5' 8" (1.727 m)       Physical Exam  Constitutional:       Appearance: He is well-developed.   HENT:      Head: Normocephalic.      Mouth/Throat:      Pharynx: No oropharyngeal exudate.   Eyes:      General: No scleral icterus.        Right eye: No discharge.         Left eye: No discharge.      Conjunctiva/sclera: Conjunctivae normal.   Neck:      Thyroid: No thyromegaly.   Cardiovascular:      Rate and Rhythm: Normal rate and regular rhythm.      Heart sounds: Normal heart sounds. No murmur heard.  Pulmonary:      Effort: Pulmonary effort is normal.      Breath sounds: Normal breath sounds. No wheezing or rales.   Abdominal:      General: Bowel sounds are normal.      Palpations: Abdomen is soft.      Tenderness: There is no abdominal tenderness. There is no guarding or rebound.   Musculoskeletal:         General: No swelling. Normal range of motion.      Cervical back: Normal range of motion and neck supple.   Lymphadenopathy:      Cervical: No cervical adenopathy.      Upper Body:      Right upper body: No supraclavicular adenopathy.      Left upper body: No supraclavicular adenopathy.   Skin:     Findings: No erythema or rash.   Neurological:      Mental Status: He is alert and oriented to person, place, and time.      Cranial Nerves: No cranial nerve deficit.   Psychiatric:         Mood and Affect: Mood normal.         Behavior: Behavior normal.           CT a/p w/contrast 12/27/22  Impression:     1. Mild wall thickening involving the sigmoid colon with abnormal appearance seen within the left lower pelvis along the left lateral aspect of the sigmoid colon.  Uncertain if findings may in part relate to sigmoid tortuosity, however potential colocolic fistula not excluded given appearance.  No acute inflammatory changes are seen.  No prior studies are available for comparison.  Colonoscopy " follow-up may be warranted if not previously/recently obtained.  Otherwise consider future fluoroscopic Gastrografin enema for further evaluation.  2. Distended gallbladder.  Consider gallbladder ultrasound follow-up if clinically indicated.  3. Additional findings as detailed above.     US abd limited 12/28/22  Impression:     Biliary sludge with evidence of acute cholecystitis including gallbladder distension and a positive sonographic Hahn sign.     CBD dilatation up to 13 mm, which could be secondary to cholecystitis, choledocholithiasis, or other obstruction.     Ill-defined 2.6 cm lesion in the region of the pancreatic head, which could represent a lymph node or pancreatic neoplasm.     RECOMMENDATIONS:  Contrast enhanced MRI/MRCP for further evaluation of the above findings    ERCP 12/30/22  - The major papilla appeared congested.                          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                          - A pancreatic sphincterotomy was performed.                          - One plastic stent was placed into the ventral                          pancreatic duct.                          - A single severe biliary stricture was found in                          the lower third of the main bile duct. The                          stricture was malignant appearing.                          - A biliary sphincterotomy was performed.                          - One covered metal stent was placed into the                          common bile duct.   Recommendation:        - Return patient to [facility] for ongoing care.                          - EUS findings concerning for metastatic                          pancreatic cancer (see separate EUS report  Upper EUS 12/30/22  Impression:            - A single metastatic lesion was found in the left                          lobe of the liver. Cytology results are pending.                          However, the endosonographic appearance is                           suggestive of metastatic pancreatic                          adenocarcinoma. Fine needle aspiration performed.                          - A mass was identified in the pancreatic head.                          Fine needle biopsy performed      1. Pancreas, head, mass, EUS guided fine-needle aspiration:   - Positive for malignancy, adenocarcinoma, see comment.   2. Liver, EUS guided fine-needle aspiration:   - Positive for malignancy, adenocarcinoma, see comment.   COMMENT:  Immunostain for cytokeratin performed on specimen 1 shows an   infiltrative epithelial process.  Parts 1 and 2 of this case are reviewed by   Dr. XIAO Gan who agrees with the above diagnoses.  Appropriate positive   controls are examined.   Immunohistochemistry (IHC) Testing for Mismatch Repair (MMR) Proteins   performed on specimen 1:   MLH1 - Intact nuclear expression   MSH2 - Intact nuclear expression   MSH6 - Intact nuclear expression   PMS2 - Intact nuclear expression   Background nonneoplastic tissue/internal control with intact nuclear   expression   IHC Interpretation   No loss of nuclear expression of MMR proteins: low probability of   microsatellite instability   There are exceptions to the above IHC interpretations. These results should   not be considered in isolation, and clinical correlation with genetic   counseling is recommended to assess the need for germline testing.      Comment: Interp By Colleen Olmos MD, Signed on 01/10/2023 at 09:30         Assessment:       Problem List Items Addressed This Visit          Oncology    Malignant neoplasm of head of pancreas - Primary    Relevant Medications    oxyCODONE (ROXICODONE) 5 MG immediate release tablet    ondansetron (ZOFRAN) 4 MG tablet    Other Relevant Orders    Ambulatory referral/consult to General Surgery    CBC Auto Differential (Completed)    Comprehensive Metabolic Panel (Completed)    Cancer Antigen 19-9 (Completed)    Magnesium (Completed)    [facility] w/  Audie    PHARMACOGENOMICS PANEL (Completed)    Consult to Pharmacogenomics (Completed)    [facility] 360    CT Chest Without Contrast    CBC Auto Differential    Comprehensive Metabolic Panel    CBC Auto Differential    Comprehensive Metabolic Panel    Magnesium    Magnesium    Liver metastases     Other Visit Diagnoses       Neoplasm related pain        Relevant Medications    oxyCODONE (ROXICODONE) 5 MG immediate release tablet    SKAGGS (dyspnea on exertion)        Relevant Orders    CT Chest Without Contrast            Plan:     OP PANC NAB-PACLITAXEL + GEMCITABINE Q4W  Chemotherapy:   gemcitabine (GEMZAR) 1,760 mg in sodium chloride 0.9% SolP 250 mL chemo infusion, 1,000 mg/m2, Intravenous, Clinic/HOD 1 time, 0 of 12 cycles   .    [facility]  [facility]  Labs today   Pharmacogenomics  Referral to surg pac placement   Rx  Scan CONSENT  Zyra- formal chemo teaching for Gemzar and abraxane  Print AVS  Chemo in 2 weeks-cbc,cmp, mag prior in 2wks and prior to f/u  F/u in 4 weeks

## 2023-01-11 NOTE — Clinical Note
[facility] Labs today  Pharmacogenomics Referral to surg pac placement  Rx Scan CONSENT Zyra- formal chemo teaching for Gemzar and abraxane Print AVS Chemo in 2 weeks-cbc,cmp, mag prior in 2wks and prior to f/u F/u in 4 weeks

## 2023-01-12 ENCOUNTER — OFFICE VISIT (OUTPATIENT)
Dept: SURGERY | Facility: CLINIC | Age: 73
End: 2023-01-12
Payer: MEDICARE

## 2023-01-12 VITALS
HEART RATE: 72 BPM | SYSTOLIC BLOOD PRESSURE: 123 MMHG | OXYGEN SATURATION: 96 % | HEIGHT: 68 IN | BODY MASS INDEX: 21.48 KG/M2 | WEIGHT: 141.75 LBS | DIASTOLIC BLOOD PRESSURE: 75 MMHG

## 2023-01-12 DIAGNOSIS — C25.0 MALIGNANT NEOPLASM OF HEAD OF PANCREAS: Primary | ICD-10-CM

## 2023-01-12 LAB — CANCER AG19-9 SERPL-ACNC: 5348.6 U/ML (ref 0–40)

## 2023-01-12 PROCEDURE — 99999 PR PBB SHADOW E&M-EST. PATIENT-LVL V: CPT | Mod: PBBFAC,,, | Performed by: SURGERY

## 2023-01-12 PROCEDURE — 99999 PR PBB SHADOW E&M-EST. PATIENT-LVL V: ICD-10-PCS | Mod: PBBFAC,,, | Performed by: SURGERY

## 2023-01-12 PROCEDURE — 99203 PR OFFICE/OUTPT VISIT, NEW, LEVL III, 30-44 MIN: ICD-10-PCS | Mod: S$PBB,,, | Performed by: SURGERY

## 2023-01-12 PROCEDURE — 99215 OFFICE O/P EST HI 40 MIN: CPT | Mod: PBBFAC | Performed by: SURGERY

## 2023-01-12 PROCEDURE — 99203 OFFICE O/P NEW LOW 30 MIN: CPT | Mod: S$PBB,,, | Performed by: SURGERY

## 2023-01-12 RX ORDER — SODIUM CHLORIDE 9 MG/ML
INJECTION, SOLUTION INTRAVENOUS CONTINUOUS
Status: CANCELLED | OUTPATIENT
Start: 2023-01-12

## 2023-01-12 NOTE — H&P
History & Physical    SUBJECTIVE:     History of Present Illness:  Patient is a 72 y.o. male presents with pancreatic cancer w hepatic involvement, stage 4. Had ERCP recently for stenting, indicated to have chemotherapy.   Previously healthy w only HTN and HLD.  No f/cn/v/sob/cp.  No hx of chest trauma or central lines.    Chief Complaint   Patient presents with    port placement        Review of patient's allergies indicates:  No Known Allergies    Current Outpatient Medications   Medication Sig Dispense Refill    citalopram (CELEXA) 20 MG tablet Take 20 mg by mouth once daily.      diltiazem (CARDIZEM LA) 180 mg 24 hr tablet Take 240 mg by mouth once daily.      hydrALAZINE (APRESOLINE) 25 MG tablet Take 3 tablets (75 mg total) by mouth every 8 (eight) hours. 90 tablet 5    ondansetron (ZOFRAN) 4 MG tablet Take 1 tablet (4 mg total) by mouth every 8 (eight) hours as needed for Nausea. 30 tablet 1    oxyCODONE (ROXICODONE) 5 MG immediate release tablet Take 1 tablet (5 mg total) by mouth every 6 (six) hours as needed for Pain. 60 tablet 0    pravastatin (PRAVACHOL) 20 MG tablet Take 20 mg by mouth nightly.      venlafaxine (EFFEXOR-XR) 37.5 MG 24 hr capsule Take 37.5 mg by mouth once daily.       No current facility-administered medications for this visit.       Past Medical History:   Diagnosis Date    Hyperlipidemia     Hypertension     Other specified noninfective gastroenteritis and colitis     Pancreatitis     Personal history of colonic polyps      Past Surgical History:   Procedure Laterality Date    COLONOSCOPY      ENDOSCOPIC ULTRASOUND OF UPPER GASTROINTESTINAL TRACT Left 12/30/2022    Procedure: ULTRASOUND, UPPER GI TRACT, ENDOSCOPIC;  Surgeon: Gregory Cristina MD;  Location: [facility] ([address] [place] [address]);  Service: Endoscopy;  Laterality: Left;    ERCP Left 12/30/2022    Procedure: ERCP (ENDOSCOPIC RETROGRADE CHOLANGIOPANCREATOGRAPHY);  Surgeon: Gregory Cristina MD;  Location: [facility] ([address] [place] [address]);  Service:  "Endoscopy;  Laterality: Left;    FOOT SURGERY Left     TONSILLECTOMY       Family History   Problem Relation Age of Onset    Cancer Father 69        pancreatic cancer     Social History     Tobacco Use    Smoking status: Every Day     Packs/day: 1.00     Types: Cigarettes   Substance Use Topics    Alcohol use: Yes     Comment: last drink 12/22/22    Drug use: Never        Review of Systems:  Review of Systems   Constitutional:  Negative for chills and fever.   HENT: Negative.     Eyes: Negative.    Respiratory:  Negative for cough, chest tightness and shortness of breath.    Cardiovascular: Negative.    Gastrointestinal:  Positive for abdominal pain. Negative for blood in stool, constipation, diarrhea, nausea and vomiting.   Endocrine: Negative for cold intolerance and heat intolerance.   Genitourinary: Negative.    Musculoskeletal: Negative.    Skin: Negative.  Negative for rash.   Neurological:  Negative for dizziness, syncope and light-headedness.   Psychiatric/Behavioral:  Negative for agitation, confusion and hallucinations.      OBJECTIVE:     Vital Signs (Most Recent)  Pulse: 72 (01/12/23 1034)  BP: 123/75 (01/12/23 1034)  SpO2: 96 % (01/12/23 1034)  5' 8" (1.727 m)  64.3 kg (141 lb 12.1 oz)     Physical Exam:  Physical Exam  Constitutional:       General: He is not in acute distress.     Appearance: He is well-developed. He is not diaphoretic.   HENT:      Head: Normocephalic and atraumatic.   Eyes:      Conjunctiva/sclera: Conjunctivae normal.      Pupils: Pupils are equal, round, and reactive to light.   Cardiovascular:      Rate and Rhythm: Normal rate and regular rhythm.      Pulses: Normal pulses.      Heart sounds: Normal heart sounds.   Pulmonary:      Effort: Pulmonary effort is normal. No respiratory distress.      Breath sounds: Normal breath sounds. No stridor. No wheezing.   Abdominal:      General: Bowel sounds are normal. There is no distension.      Palpations: Abdomen is soft.      " Tenderness: There is no abdominal tenderness.   Musculoskeletal:         General: Normal range of motion.      Cervical back: Normal range of motion and neck supple.   Skin:     General: Skin is warm and dry.      Findings: No rash.   Neurological:      Mental Status: He is alert and oriented to person, place, and time.      Cranial Nerves: No cranial nerve deficit.   Psychiatric:         Behavior: Behavior normal.       Laboratory  CMP: Reviewed  Lft abnormalities.    Diagnostic Results:  US: Reviewed  impression  Impression:     Biliary sludge with evidence of acute cholecystitis including gallbladder distension and a positive sonographic Hahn sign.     CBD dilatation up to 13 mm, which could be secondary to cholecystitis, choledocholithiasis, or other obstruction.     Ill-defined 2.6 cm lesion in the region of the pancreatic head, which could represent a lymph node or pancreatic neoplasm.    CT abd   Impression:     1. Mild wall thickening involving the sigmoid colon with abnormal appearance seen within the left lower pelvis along the left lateral aspect of the sigmoid colon.  Uncertain if findings may in part relate to sigmoid tortuosity, however potential colocolic fistula not excluded given appearance.  No acute inflammatory changes are seen.  No prior studies are available for comparison.  Colonoscopy follow-up may be warranted if not previously/recently obtained.  Otherwise consider future fluoroscopic Gastrografin enema for further evaluation.  2. Distended gallbladder.  Consider gallbladder ultrasound follow-up if clinically indicated.    ERCP EUS:  Impression:  Impression:            - The major papilla appeared congested.                          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                          - A pancreatic sphincterotomy was performed.                          - One plastic stent was placed into the ventral                          pancreatic duct.                          - A  single severe biliary stricture was found in                          the lower third of the main bile duct. The                          stricture was malignant appearing.                          - A biliary sphincterotomy was performed.                          - One covered metal stent was placed into the                          common bile duct.    PATHOLOGY:  1. Pancreas, head, mass, EUS guided fine-needle aspiration:   - Positive for malignancy, adenocarcinoma, see comment.   2. Liver, EUS guided fine-needle aspiration:   - Positive for malignancy, adenocarcinoma, see comment.   COMMENT:  Immunostain for cytokeratin performed on specimen 1 shows an   infiltrative epithelial process.  Parts 1 and 2 of this case are reviewed by   Dr. XIAO Gan who agrees with the above diagnoses.  Appropriate positive   controls are examined.   Immunohistochemistry (IHC) Testing for Mismatch Repair (MMR) Proteins   performed on specimen 1:   MLH1 - Intact nuclear expression   MSH2 - Intact nuclear expression   MSH6 - Intact nuclear expression   PMS2 - Intact nuclear expression   Background nonneoplastic tissue/internal control with intact nuclear   expression   IHC Interpretation   No loss of nuclear expression of MMR proteins: low probability of   microsatellite instability   There are exceptions to the above IHC interpretations. These results should   not be considered in isolation, and clinical correlation with genetic   counseling is recommended to assess the need for germline testing.    ASSESSMENT/PLAN:     Past Medical History:   Diagnosis Date    Hyperlipidemia     Hypertension     Other specified noninfective gastroenteritis and colitis     Pancreatitis     Personal history of colonic polyps        72 yr old WM w HTN HLD w stage 4 pancreatic cancer    PLAN:Plan     To OR for PORTACATH, R v L, 1/20/23  Risks and side effects discussed with the patient. Alterative therapies discussed. Patient agreeable to procedure and  has signed consent which was discussed in detail.

## 2023-01-12 NOTE — H&P (VIEW-ONLY)
History & Physical    SUBJECTIVE:     History of Present Illness:  Patient is a 72 y.o. male presents with pancreatic cancer w hepatic involvement, stage 4. Had ERCP recently for stenting, indicated to have chemotherapy.   Previously healthy w only HTN and HLD.  No f/cn/v/sob/cp.  No hx of chest trauma or central lines.    Chief Complaint   Patient presents with    port placement        Review of patient's allergies indicates:  No Known Allergies    Current Outpatient Medications   Medication Sig Dispense Refill    citalopram (CELEXA) 20 MG tablet Take 20 mg by mouth once daily.      diltiazem (CARDIZEM LA) 180 mg 24 hr tablet Take 240 mg by mouth once daily.      hydrALAZINE (APRESOLINE) 25 MG tablet Take 3 tablets (75 mg total) by mouth every 8 (eight) hours. 90 tablet 5    ondansetron (ZOFRAN) 4 MG tablet Take 1 tablet (4 mg total) by mouth every 8 (eight) hours as needed for Nausea. 30 tablet 1    oxyCODONE (ROXICODONE) 5 MG immediate release tablet Take 1 tablet (5 mg total) by mouth every 6 (six) hours as needed for Pain. 60 tablet 0    pravastatin (PRAVACHOL) 20 MG tablet Take 20 mg by mouth nightly.      venlafaxine (EFFEXOR-XR) 37.5 MG 24 hr capsule Take 37.5 mg by mouth once daily.       No current facility-administered medications for this visit.       Past Medical History:   Diagnosis Date    Hyperlipidemia     Hypertension     Other specified noninfective gastroenteritis and colitis     Pancreatitis     Personal history of colonic polyps      Past Surgical History:   Procedure Laterality Date    COLONOSCOPY      ENDOSCOPIC ULTRASOUND OF UPPER GASTROINTESTINAL TRACT Left 12/30/2022    Procedure: ULTRASOUND, UPPER GI TRACT, ENDOSCOPIC;  Surgeon: Gregory Cristina MD;  Location: [facility] ([address] [place] [address]);  Service: Endoscopy;  Laterality: Left;    ERCP Left 12/30/2022    Procedure: ERCP (ENDOSCOPIC RETROGRADE CHOLANGIOPANCREATOGRAPHY);  Surgeon: Gregory Cristina MD;  Location: [facility] ([address] [place] [address]);  Service:  "Endoscopy;  Laterality: Left;    FOOT SURGERY Left     TONSILLECTOMY       Family History   Problem Relation Age of Onset    Cancer Father 69        pancreatic cancer     Social History     Tobacco Use    Smoking status: Every Day     Packs/day: 1.00     Types: Cigarettes   Substance Use Topics    Alcohol use: Yes     Comment: last drink 12/22/22    Drug use: Never        Review of Systems:  Review of Systems   Constitutional:  Negative for chills and fever.   HENT: Negative.     Eyes: Negative.    Respiratory:  Negative for cough, chest tightness and shortness of breath.    Cardiovascular: Negative.    Gastrointestinal:  Positive for abdominal pain. Negative for blood in stool, constipation, diarrhea, nausea and vomiting.   Endocrine: Negative for cold intolerance and heat intolerance.   Genitourinary: Negative.    Musculoskeletal: Negative.    Skin: Negative.  Negative for rash.   Neurological:  Negative for dizziness, syncope and light-headedness.   Psychiatric/Behavioral:  Negative for agitation, confusion and hallucinations.      OBJECTIVE:     Vital Signs (Most Recent)  Pulse: 72 (01/12/23 1034)  BP: 123/75 (01/12/23 1034)  SpO2: 96 % (01/12/23 1034)  5' 8" (1.727 m)  64.3 kg (141 lb 12.1 oz)     Physical Exam:  Physical Exam  Constitutional:       General: He is not in acute distress.     Appearance: He is well-developed. He is not diaphoretic.   HENT:      Head: Normocephalic and atraumatic.   Eyes:      Conjunctiva/sclera: Conjunctivae normal.      Pupils: Pupils are equal, round, and reactive to light.   Cardiovascular:      Rate and Rhythm: Normal rate and regular rhythm.      Pulses: Normal pulses.      Heart sounds: Normal heart sounds.   Pulmonary:      Effort: Pulmonary effort is normal. No respiratory distress.      Breath sounds: Normal breath sounds. No stridor. No wheezing.   Abdominal:      General: Bowel sounds are normal. There is no distension.      Palpations: Abdomen is soft.      " Tenderness: There is no abdominal tenderness.   Musculoskeletal:         General: Normal range of motion.      Cervical back: Normal range of motion and neck supple.   Skin:     General: Skin is warm and dry.      Findings: No rash.   Neurological:      Mental Status: He is alert and oriented to person, place, and time.      Cranial Nerves: No cranial nerve deficit.   Psychiatric:         Behavior: Behavior normal.       Laboratory  CMP: Reviewed  Lft abnormalities.    Diagnostic Results:  US: Reviewed  impression  Impression:     Biliary sludge with evidence of acute cholecystitis including gallbladder distension and a positive sonographic Hahn sign.     CBD dilatation up to 13 mm, which could be secondary to cholecystitis, choledocholithiasis, or other obstruction.     Ill-defined 2.6 cm lesion in the region of the pancreatic head, which could represent a lymph node or pancreatic neoplasm.    CT abd   Impression:     1. Mild wall thickening involving the sigmoid colon with abnormal appearance seen within the left lower pelvis along the left lateral aspect of the sigmoid colon.  Uncertain if findings may in part relate to sigmoid tortuosity, however potential colocolic fistula not excluded given appearance.  No acute inflammatory changes are seen.  No prior studies are available for comparison.  Colonoscopy follow-up may be warranted if not previously/recently obtained.  Otherwise consider future fluoroscopic Gastrografin enema for further evaluation.  2. Distended gallbladder.  Consider gallbladder ultrasound follow-up if clinically indicated.    ERCP EUS:  Impression:  Impression:            - The major papilla appeared congested.                          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                          - A pancreatic sphincterotomy was performed.                          - One plastic stent was placed into the ventral                          pancreatic duct.                          - A  single severe biliary stricture was found in                          the lower third of the main bile duct. The                          stricture was malignant appearing.                          - A biliary sphincterotomy was performed.                          - One covered metal stent was placed into the                          common bile duct.    PATHOLOGY:  1. Pancreas, head, mass, EUS guided fine-needle aspiration:   - Positive for malignancy, adenocarcinoma, see comment.   2. Liver, EUS guided fine-needle aspiration:   - Positive for malignancy, adenocarcinoma, see comment.   COMMENT:  Immunostain for cytokeratin performed on specimen 1 shows an   infiltrative epithelial process.  Parts 1 and 2 of this case are reviewed by   Dr. XIAO Gan who agrees with the above diagnoses.  Appropriate positive   controls are examined.   Immunohistochemistry (IHC) Testing for Mismatch Repair (MMR) Proteins   performed on specimen 1:   MLH1 - Intact nuclear expression   MSH2 - Intact nuclear expression   MSH6 - Intact nuclear expression   PMS2 - Intact nuclear expression   Background nonneoplastic tissue/internal control with intact nuclear   expression   IHC Interpretation   No loss of nuclear expression of MMR proteins: low probability of   microsatellite instability   There are exceptions to the above IHC interpretations. These results should   not be considered in isolation, and clinical correlation with genetic   counseling is recommended to assess the need for germline testing.    ASSESSMENT/PLAN:     Past Medical History:   Diagnosis Date    Hyperlipidemia     Hypertension     Other specified noninfective gastroenteritis and colitis     Pancreatitis     Personal history of colonic polyps        72 yr old WM w HTN HLD w stage 4 pancreatic cancer    PLAN:Plan     To OR for PORTACATH, R v L, 1/20/23  Risks and side effects discussed with the patient. Alterative therapies discussed. Patient agreeable to procedure and  has signed consent which was discussed in detail.

## 2023-01-13 ENCOUNTER — ANESTHESIA EVENT (OUTPATIENT)
Dept: SURGERY | Facility: HOSPITAL | Age: 73
End: 2023-01-13
Payer: MEDICARE

## 2023-01-13 ENCOUNTER — TELEPHONE (OUTPATIENT)
Dept: SURGERY | Facility: CLINIC | Age: 73
End: 2023-01-13
Payer: MEDICARE

## 2023-01-13 DIAGNOSIS — C25.0 MALIGNANT NEOPLASM OF HEAD OF PANCREAS: Primary | ICD-10-CM

## 2023-01-13 NOTE — TELEPHONE ENCOUNTER
Spoke with  regarding msg and informed him that his surgery is scheduled for 1/20 and that the pre op department will contact him the day before with arrival time. Pt. Verbalized understanding.        ----- Message from Jael Duong sent at 1/13/2023  9:14 AM CST -----  Type: Patient Call Back    Who called:pt's wife igor [phone] (home)       What is the request in detail:calling to speak to nurse in regards to surgery date and time. Call igor    Can the clinic reply by ROHINICHSJESIKA?    Would the patient rather a call back or a response via [facility]? call    Best call back number:[phone] (home)       Additional Information:

## 2023-01-18 ENCOUNTER — HOSPITAL ENCOUNTER (OUTPATIENT)
Dept: PREADMISSION TESTING | Facility: HOSPITAL | Age: 73
Discharge: HOME | End: 2023-01-18
Attending: SURGERY
Payer: MEDICARE

## 2023-01-18 ENCOUNTER — TELEPHONE (OUTPATIENT)
Dept: HEMATOLOGY/ONCOLOGY | Facility: CLINIC | Age: 73
End: 2023-01-18
Payer: MEDICARE

## 2023-01-18 VITALS
DIASTOLIC BLOOD PRESSURE: 76 MMHG | HEART RATE: 122 BPM | OXYGEN SATURATION: 96 % | RESPIRATION RATE: 18 BRPM | SYSTOLIC BLOOD PRESSURE: 120 MMHG | WEIGHT: 139.75 LBS | HEIGHT: 68 IN | BODY MASS INDEX: 21.18 KG/M2 | TEMPERATURE: 97 F

## 2023-01-18 DIAGNOSIS — C25.0 MALIGNANT NEOPLASM OF HEAD OF PANCREAS: ICD-10-CM

## 2023-01-18 PROBLEM — C78.7 LIVER METASTASES: Status: ACTIVE | Noted: 2023-01-18

## 2023-01-18 LAB
ANION GAP SERPL CALC-SCNC: 11 MMOL/L (ref 8–16)
BASOPHILS # BLD AUTO: 0.12 K/UL (ref 0–0.2)
BASOPHILS NFR BLD: 0.9 % (ref 0–1.9)
BUN SERPL-MCNC: 12 MG/DL (ref 8–23)
CALCIUM SERPL-MCNC: 9.6 MG/DL (ref 8.7–10.5)
CHLORIDE SERPL-SCNC: 99 MMOL/L (ref 95–110)
CO2 SERPL-SCNC: 23 MMOL/L (ref 23–29)
CREAT SERPL-MCNC: 0.8 MG/DL (ref 0.5–1.4)
DIFFERENTIAL METHOD: ABNORMAL
EOSINOPHIL # BLD AUTO: 0.9 K/UL (ref 0–0.5)
EOSINOPHIL NFR BLD: 6.6 % (ref 0–8)
ERYTHROCYTE [DISTWIDTH] IN BLOOD BY AUTOMATED COUNT: 13.6 % (ref 11.5–14.5)
EST. GFR  (NO RACE VARIABLE): >60 ML/MIN/1.73 M^2
GLUCOSE SERPL-MCNC: 121 MG/DL (ref 70–110)
HCT VFR BLD AUTO: 45.9 % (ref 40–54)
HGB BLD-MCNC: 16.1 G/DL (ref 14–18)
IMM GRANULOCYTES # BLD AUTO: 0.05 K/UL (ref 0–0.04)
IMM GRANULOCYTES NFR BLD AUTO: 0.4 % (ref 0–0.5)
LYMPHOCYTES # BLD AUTO: 1 K/UL (ref 1–4.8)
LYMPHOCYTES NFR BLD: 6.8 % (ref 18–48)
MCH RBC QN AUTO: 33.6 PG (ref 27–31)
MCHC RBC AUTO-ENTMCNC: 35.1 G/DL (ref 32–36)
MCV RBC AUTO: 96 FL (ref 82–98)
MONOCYTES # BLD AUTO: 1.6 K/UL (ref 0.3–1)
MONOCYTES NFR BLD: 11.4 % (ref 4–15)
NEUTROPHILS # BLD AUTO: 10.3 K/UL (ref 1.8–7.7)
NEUTROPHILS NFR BLD: 73.9 % (ref 38–73)
NRBC BLD-RTO: 0 /100 WBC
PLATELET # BLD AUTO: 548 K/UL (ref 150–450)
PMV BLD AUTO: 9.5 FL (ref 9.2–12.9)
POTASSIUM SERPL-SCNC: 4.2 MMOL/L (ref 3.5–5.1)
RBC # BLD AUTO: 4.79 M/UL (ref 4.6–6.2)
SODIUM SERPL-SCNC: 133 MMOL/L (ref 136–145)
WBC # BLD AUTO: 13.87 K/UL (ref 3.9–12.7)

## 2023-01-18 PROCEDURE — 85025 COMPLETE CBC W/AUTO DIFF WBC: CPT | Performed by: SURGERY

## 2023-01-18 PROCEDURE — 80048 BASIC METABOLIC PNL TOTAL CA: CPT | Performed by: SURGERY

## 2023-01-18 RX ORDER — ERGOCALCIFEROL 1.25 MG/1
50000 CAPSULE ORAL
COMMUNITY
Start: 2023-01-03 | End: 2023-06-08

## 2023-01-18 RX ORDER — VENLAFAXINE HYDROCHLORIDE 75 MG/1
CAPSULE, EXTENDED RELEASE ORAL
Status: ON HOLD | COMMUNITY
Start: 2022-03-04 | End: 2023-01-30 | Stop reason: HOSPADM

## 2023-01-18 NOTE — PLAN OF CARE
START ON PATHWAY REGIMEN - Pancreatic Adenocarcinoma    PANOS51        Nab-paclitaxel (protein bound) (Abraxane)       Gemcitabine     **Always confirm dose/schedule in your pharmacy ordering system**    Patient Characteristics:  Metastatic Disease, First Line, PS  ?  2, BRCA1/2 and PALB2 Mutation   Absent/Unknown  Therapeutic Status: Metastatic Disease  Line of Therapy: First Line  ECOG Performance Status: 2  BRCA1/2 Mutation Status: Awaiting Test Results  PALB2 Mutation Status: Awaiting Test Results  Intent of Therapy:  Curative Intent, Discussed with Patient

## 2023-01-18 NOTE — TELEPHONE ENCOUNTER
Patient ,spouse and dgt in law Mai presented for chemo class.. Nurse reviewed in detail the following: benefits and side effects of chemotherapy.  The following was discussed : the necessity of limiting exposure to anyone with an active illness, such as flu,virus,temperature or infectious disease such as pneumonia,meningitis ,TBC, hepatitis  & Covidetc.   Explained that their immune system is compromised and it is important they avoid  contact with infectious illnesses due to the potential harm to themselves .    Proper hand washing hygiene reviewed with patient and requested they review this procedure with all coming in contact with them especially in immediate household. Reviewed neutropenic precautions with patient informing patient that their white count may decrease due to his chemotherapy and this will put them at a higher risk of infection .   Instructed patient to contact physician if they develop a temperature, uncontrolled N,V,or D  that last 24 hours or greater. Nausea medication instructions reviewed in detail with pt and family All questions answered    If a new pain develops or existing pain is not controlled by current  prescribed medication to contact physician for recommendation. Advised patient that all fruits and vegetables are to be washed thoroughly prior to eating. It is recommended that they not eat at buffet bars ,eat sushi,or consume oysters in any form cooked or not due to potential bacteria. Pt informed they  are allowed to eat salad at home after it is washed thoroughly.   Explained that it is important to use sunscreen as chemo causes individuals to be sun sensitive and  we want to avoid sunburns which has the potential for infection.   Instructed patient to use gloves if working in a garden due to bacteria in potting soil and dirt. Advised when participating in outdoor activities such as hunting and fishing to use precautions not to be exposed to marine bacteria such as let someone  else bait fishing hook,clean fish and take fish off line.  During hunting let someone else deal with cleaning animals.    Sexual activity information sheets given to  and reviewed with patient.   Explained that prior to any dental care physician should be notified as to what is to be performed so the physician may make recommendations .   Instructed  pt to contact  physician if mouth sores develop for advise. Recipe for baking soda and saline mouthwash reviewed with patient   Alopecia discussed with pt.  Advised patient that if their newly diagnosed disease is creating undue stress,anxiety or fears, the  nurse can facilitate locating a psychiatrist or psychologist for them to speak with.   Importance of keeping all scheduled appointments with physician and ancillary testing.    Patient presented opportunity to ask questions . All questions answered in detail and pt acknowledged  understanding of information given during presentation.  Nurse instructed pt how to contact her with any concerns, questions or needs that may arise.     Chemo schedule reviewed w/ pt  Total time 1 hr 15 minutes

## 2023-01-18 NOTE — ANESTHESIA PREPROCEDURE EVALUATION
01/18/2023  Manuel Tyler is a 72 y.o., male scheduled for [id number] (Bilateral: Chest) - on 1/19/2023.    Past Medical History:   Diagnosis Date    Hyperlipidemia     Hypertension     Other specified noninfective gastroenteritis and colitis     Pancreatitis     Personal history of colonic polyps        Past Surgical History:   Procedure Laterality Date    COLONOSCOPY      ENDOSCOPIC ULTRASOUND OF UPPER GASTROINTESTINAL TRACT Left 12/30/2022    Procedure: ULTRASOUND, UPPER GI TRACT, ENDOSCOPIC;  Surgeon: Gregory Cristina MD;  Location: [address]);  Service: Endoscopy;  Laterality: Left;    ERCP Left 12/30/2022    Procedure: ERCP (ENDOSCOPIC RETROGRADE CHOLANGIOPANCREATOGRAPHY);  Surgeon: Gregory Cristina MD;  Location: [address]);  Service: Endoscopy;  Laterality: Left;    FOOT SURGERY Left     TONSILLECTOMY             Pre-op Assessment    I have reviewed the Patient Summary Reports.     I have reviewed the Nursing Notes. I have reviewed the NPO Status.   I have reviewed the Medications.     Review of Systems  Anesthesia Hx:  No problems with previous Anesthesia  Denies Family Hx of Anesthesia complications.   Denies Personal Hx of Anesthesia complications.   Social:  No Alcohol Use, Smoker    Hematology/Oncology:  Hematology Normal      Current/Recent Cancer. Oncology Comments: pancreatic cancer w hepatic involvement, stage 4    EENT/Dental:EENT/Dental Normal   Cardiovascular:   Exercise tolerance: good Hypertension Denies MI.    Denies CHF. hyperlipidemia    Pulmonary:  Pulmonary Normal    Renal/:  Renal/ Normal     Hepatic/GI:  Hepatic/GI Normal    Musculoskeletal:  Musculoskeletal Normal    Neurological:  Neurology Normal    Endocrine:  Endocrine Normal    Dermatological:  Skin Normal    Psych:  Psychiatric Normal           Physical Exam  General: Well  nourished    Airway:  Mallampati: II   Mouth Opening: Normal  Tongue: Normal  Neck ROM: Normal ROM    Dental:  Intact    Chest/Lungs:  Clear to auscultation    Heart:  Rate: Normal  Rhythm: Regular Rhythm  Sounds: Normal        Anesthesia Plan  Type of Anesthesia, risks & benefits discussed:    Anesthesia Type: MAC  Intra-op Monitoring Plan: Standard ASA Monitors  Informed Consent: Informed consent signed with the Patient and all parties understand the risks and agree with anesthesia plan.  All questions answered.   ASA Score: 2    Ready For Surgery From Anesthesia Perspective.     .

## 2023-01-18 NOTE — DISCHARGE INSTRUCTIONS
Before 7 AM, enter through the Emergency Entrance..   After 7 AM enter through the Main Entrance.      Your procedure  is scheduled for ___01/19/2023_______./    Call [phone] between 2pm and 5pm on __01/18/2023_____to find out your arrival time for the day of surgery.    You may use the main entrance to the hospital on the [facility] side, or the entrance that is next to the [facility].    You may have one visitor.  No children allowed.     You will be going to the Same Day Surgery Unit on the 2nd floor of the hospital.    Important instructions:  Do not eat anything after midnight.  You may have plain water, non carbonated.  You may also have Gatorade or Powerade after midnight.    Stop all fluids 2 hours before your surgery.    It is okay to brush your teeth.  Do not have gum, candy or mints.    SEE MEDICATION SHEET.   TAKE MEDICATIONS AS DIRECTED WITH SIPS OF WATER.      Do not take any diabetic medication on the morning of surgery unless instructed to do so by your doctor or pre op nurse.    STOP taking Aspirin, Ibuprofen,  Advil, Motrin, Mobic(meloxicam), Aleve (naproxen), Fish oil, and Vitamin E for at least 7 days before your surgery.     You may take Tylenol if needed which is not a blood thinner.    Please shower the night before and the morning of your surgery.      Follow any Prep Instructions given by your surgeon.    Use Chlorhexidine soap as instructed by your pre op nurse.   Please place clean linens on your bed the night before surgery. Please wear fresh clean clothing after each shower.    No shaving of procedural area at least 4-5 days before surgery due to increased risk of skin irritation and/or possible infection.    Female patients may be asked for a urine specimen on the morning of the surgery.  Please check with your nurse before using the restroom.    Contact lenses and removable denture work may not be worn during your procedure.    You may wear deodorant only. If you are  having breast surgery, do not wear deodorant on the operative side.    Do not wear powder, body lotion, perfume/cologne or make-up.    Do not wear any jewelry or have any metal on your body.    You will be asked to remove any dentures or partials for the procedure.    If you are going home on the same day of surgery, you must arrange for a family member or a friend to drive you home.  Public transportation is prohibited.  You will not be able to drive home if you were given anesthesia or sedation.    Patients who want to have their Post-op prescriptions filled from our in-house [facility], bring a Credit/Debit Card or cash with you. A co-pay may be required.  The pharmacy closes at 5:30 pm.    Wear loose fitting clothes allowing for bandages.    Please leave money and valuables home.      You may bring your cell phone.    Call the doctor if fever or illness should occur before your surgery.    Call [phone] to contact us here if needed.                            CLOTHES ON DAY OF SURGERY    SHOULDER surgery:  you must have a very oversized shirt.  Very, Very large.  You will probably have a large sling on with your arm strapped to your chest.  You will not be able to put the arm of the operated shoulder into a sleeve.  You can put the arm of the un-operated shoulder into the sleeve, but the shirt will need to be draped over the operated shoulder.       ARM or HAND surgery:  make sure that your sleeves are large and loose enough to pass over large dressings or cast.      BREAST or UNDERARM surgery:  wear a loose, button down shirt so that you can dress without raising your arms over your head.    ABDOMINAL surgery:  wear loose, comfortable clothing.  Nothing tight around the abdomen.  NO JEANS    PENIS or SCROTAL surgery:  loose comfortable clothing.  Large sweat pants, pajama pants or a robe.  ABSOLUTELY NO JEANS      LEG or FOOT surgery:  wear large loose pants that are able to pass over any large dressings  or casts.  You could also wear loose shorts or a skirt.

## 2023-01-19 ENCOUNTER — HOSPITAL ENCOUNTER (OUTPATIENT)
Facility: HOSPITAL | Age: 73
Discharge: HOME | End: 2023-01-19
Attending: SURGERY | Admitting: SURGERY
Payer: MEDICARE

## 2023-01-19 ENCOUNTER — DOCUMENTATION ONLY (OUTPATIENT)
Dept: HEMATOLOGY/ONCOLOGY | Facility: CLINIC | Age: 73
End: 2023-01-19
Payer: MEDICARE

## 2023-01-19 VITALS
OXYGEN SATURATION: 95 % | TEMPERATURE: 99 F | SYSTOLIC BLOOD PRESSURE: 117 MMHG | RESPIRATION RATE: 16 BRPM | DIASTOLIC BLOOD PRESSURE: 61 MMHG | BODY MASS INDEX: 21.25 KG/M2 | HEART RATE: 90 BPM | WEIGHT: 139.75 LBS

## 2023-01-19 DIAGNOSIS — C25.0 MALIGNANT NEOPLASM OF HEAD OF PANCREAS: Primary | ICD-10-CM

## 2023-01-19 LAB
ONEOME COMMENT: NORMAL
ONEOME METHOD: NORMAL

## 2023-01-19 PROCEDURE — 36000706: Performed by: SURGERY

## 2023-01-19 PROCEDURE — 36561 PR INSERT TUNNELED CV CATH WITH PORT: ICD-10-PCS | Mod: RT,,, | Performed by: SURGERY

## 2023-01-19 PROCEDURE — 37000009 HC ANESTHESIA EA ADD 15 MINS: Performed by: SURGERY

## 2023-01-19 PROCEDURE — 71000016 HC POSTOP RECOV ADDL HR: Performed by: SURGERY

## 2023-01-19 PROCEDURE — 63600175 PHARM REV CODE 636 W HCPCS: Performed by: NURSE ANESTHETIST, CERTIFIED REGISTERED

## 2023-01-19 PROCEDURE — D9220A PRA ANESTHESIA: Mod: ANES,,, | Performed by: ANESTHESIOLOGY

## 2023-01-19 PROCEDURE — D9220A PRA ANESTHESIA: Mod: CRNA,,, | Performed by: NURSE ANESTHETIST, CERTIFIED REGISTERED

## 2023-01-19 PROCEDURE — 63600175 PHARM REV CODE 636 W HCPCS: Performed by: SURGERY

## 2023-01-19 PROCEDURE — 36000707: Performed by: SURGERY

## 2023-01-19 PROCEDURE — C1788 PORT, INDWELLING, IMP: HCPCS | Performed by: SURGERY

## 2023-01-19 PROCEDURE — D9220A PRA ANESTHESIA: ICD-10-PCS | Mod: CRNA,,, | Performed by: NURSE ANESTHETIST, CERTIFIED REGISTERED

## 2023-01-19 PROCEDURE — 77001 CHG FLUOROGUIDE CNTRL VEN ACCESS,PLACE,REPLACE,REMOVE: ICD-10-PCS | Mod: 26,,, | Performed by: SURGERY

## 2023-01-19 PROCEDURE — 25000003 PHARM REV CODE 250: Performed by: SURGERY

## 2023-01-19 PROCEDURE — 37000008 HC ANESTHESIA 1ST 15 MINUTES: Performed by: SURGERY

## 2023-01-19 PROCEDURE — 36561 INSERT TUNNELED CV CATH: CPT | Mod: RT,,, | Performed by: SURGERY

## 2023-01-19 PROCEDURE — 77001 FLUOROGUIDE FOR VEIN DEVICE: CPT | Mod: 26,,, | Performed by: SURGERY

## 2023-01-19 PROCEDURE — 71000015 HC POSTOP RECOV 1ST HR: Performed by: SURGERY

## 2023-01-19 PROCEDURE — 25000003 PHARM REV CODE 250: Performed by: NURSE ANESTHETIST, CERTIFIED REGISTERED

## 2023-01-19 PROCEDURE — 71000033 HC RECOVERY, INTIAL HOUR: Performed by: SURGERY

## 2023-01-19 PROCEDURE — D9220A PRA ANESTHESIA: ICD-10-PCS | Mod: ANES,,, | Performed by: ANESTHESIOLOGY

## 2023-01-19 DEVICE — PORT POWER 8FR NO SUT PLUG: Type: IMPLANTABLE DEVICE | Site: CHEST  WALL | Status: FUNCTIONAL

## 2023-01-19 RX ORDER — CEFAZOLIN SODIUM 2 G/50ML
2 SOLUTION INTRAVENOUS
Status: COMPLETED | OUTPATIENT
Start: 2023-01-19 | End: 2023-01-19

## 2023-01-19 RX ORDER — SODIUM CHLORIDE 0.9 % (FLUSH) 0.9 %
10 SYRINGE (ML) INJECTION
Status: DISCONTINUED | OUTPATIENT
Start: 2023-01-19 | End: 2023-01-19 | Stop reason: HOSPADM

## 2023-01-19 RX ORDER — SODIUM CHLORIDE 9 MG/ML
INJECTION, SOLUTION INTRAVENOUS CONTINUOUS
Status: DISCONTINUED | OUTPATIENT
Start: 2023-01-19 | End: 2023-01-19 | Stop reason: HOSPADM

## 2023-01-19 RX ORDER — PHENYLEPHRINE HYDROCHLORIDE 10 MG/ML
INJECTION INTRAVENOUS
Status: DISCONTINUED | OUTPATIENT
Start: 2023-01-19 | End: 2023-01-19

## 2023-01-19 RX ORDER — MIDAZOLAM HYDROCHLORIDE 1 MG/ML
INJECTION, SOLUTION INTRAMUSCULAR; INTRAVENOUS
Status: DISCONTINUED | OUTPATIENT
Start: 2023-01-19 | End: 2023-01-19

## 2023-01-19 RX ORDER — FENTANYL CITRATE 50 UG/ML
INJECTION, SOLUTION INTRAMUSCULAR; INTRAVENOUS
Status: DISCONTINUED | OUTPATIENT
Start: 2023-01-19 | End: 2023-01-19

## 2023-01-19 RX ORDER — ONDANSETRON 2 MG/ML
INJECTION INTRAMUSCULAR; INTRAVENOUS
Status: DISCONTINUED | OUTPATIENT
Start: 2023-01-19 | End: 2023-01-19

## 2023-01-19 RX ORDER — LIDOCAINE HYDROCHLORIDE 20 MG/ML
INJECTION INTRAVENOUS
Status: DISCONTINUED | OUTPATIENT
Start: 2023-01-19 | End: 2023-01-19

## 2023-01-19 RX ORDER — HEPARIN SODIUM 1000 [USP'U]/ML
INJECTION, SOLUTION INTRAVENOUS; SUBCUTANEOUS
Status: DISCONTINUED | OUTPATIENT
Start: 2023-01-19 | End: 2023-01-19 | Stop reason: HOSPADM

## 2023-01-19 RX ORDER — HYDROCODONE BITARTRATE AND ACETAMINOPHEN 5; 325 MG/1; MG/1
1 TABLET ORAL EVERY 6 HOURS PRN
Status: DISCONTINUED | OUTPATIENT
Start: 2023-01-19 | End: 2023-01-19 | Stop reason: HOSPADM

## 2023-01-19 RX ORDER — HYDROCODONE BITARTRATE AND ACETAMINOPHEN 5; 325 MG/1; MG/1
1 TABLET ORAL EVERY 6 HOURS PRN
Qty: 15 TABLET | Refills: 0 | Status: SHIPPED | OUTPATIENT
Start: 2023-01-19 | End: 2023-08-09

## 2023-01-19 RX ORDER — PROPOFOL 10 MG/ML
VIAL (ML) INTRAVENOUS CONTINUOUS PRN
Status: DISCONTINUED | OUTPATIENT
Start: 2023-01-19 | End: 2023-01-19

## 2023-01-19 RX ORDER — BUPIVACAINE HYDROCHLORIDE 2.5 MG/ML
INJECTION, SOLUTION INFILTRATION; PERINEURAL
Status: DISCONTINUED | OUTPATIENT
Start: 2023-01-19 | End: 2023-01-19 | Stop reason: HOSPADM

## 2023-01-19 RX ORDER — ONDANSETRON 2 MG/ML
4 INJECTION INTRAMUSCULAR; INTRAVENOUS DAILY PRN
Status: DISCONTINUED | OUTPATIENT
Start: 2023-01-19 | End: 2023-01-19 | Stop reason: HOSPADM

## 2023-01-19 RX ADMIN — FENTANYL CITRATE 50 MCG: 50 INJECTION, SOLUTION INTRAMUSCULAR; INTRAVENOUS at 07:01

## 2023-01-19 RX ADMIN — LIDOCAINE HYDROCHLORIDE 75 MG: 20 INJECTION, SOLUTION INTRAVENOUS at 07:01

## 2023-01-19 RX ADMIN — SODIUM CHLORIDE, SODIUM LACTATE, POTASSIUM CHLORIDE, AND CALCIUM CHLORIDE: .6; .31; .03; .02 INJECTION, SOLUTION INTRAVENOUS at 07:01

## 2023-01-19 RX ADMIN — PHENYLEPHRINE HYDROCHLORIDE 200 MCG: 10 INJECTION INTRAVENOUS at 08:01

## 2023-01-19 RX ADMIN — CEFAZOLIN SODIUM 2 G: 2 SOLUTION INTRAVENOUS at 07:01

## 2023-01-19 RX ADMIN — HYDROCODONE BITARTRATE AND ACETAMINOPHEN 1 TABLET: 5; 325 TABLET ORAL at 09:01

## 2023-01-19 RX ADMIN — MIDAZOLAM HYDROCHLORIDE 1 MG: 1 INJECTION, SOLUTION INTRAMUSCULAR; INTRAVENOUS at 07:01

## 2023-01-19 RX ADMIN — ONDANSETRON 4 MG: 2 INJECTION, SOLUTION INTRAMUSCULAR; INTRAVENOUS at 07:01

## 2023-01-19 RX ADMIN — PHENYLEPHRINE HYDROCHLORIDE 100 MCG: 10 INJECTION INTRAVENOUS at 07:01

## 2023-01-19 RX ADMIN — PROPOFOL 125 MCG/KG/MIN: 10 INJECTION, EMULSION INTRAVENOUS at 07:01

## 2023-01-19 NOTE — BRIEF OP NOTE
[facility] [place] - Surgery  Brief Operative Note    Surgery Date: 1/19/2023     Surgeon(s) and Role:     * Amador Castellon MD - Primary    Assisting Surgeon: None    Pre-op Diagnosis:  Malignant neoplasm of head of pancreas [C25.0]    Post-op Diagnosis:  Post-Op Diagnosis Codes:     * Malignant neoplasm of head of pancreas [C25.0]    Procedure(s) (LRB):  [id number] (Bilateral)    Anesthesia: Local MAC    Operative Findings: Right chest portacath placed via right IJ access site. Catheter tip confirmed with intraoperative fluoroscopy.    Estimated Blood Loss: 3 mL         Specimens:   Specimen (24h ago, onward)      None              Discharge Note    OUTCOME: Patient tolerated treatment/procedure well without complication and is now ready for discharge.    DISPOSITION: Home or Self Care    FINAL DIAGNOSIS:  <principal problem not specified>    FOLLOWUP: In clinic    DISCHARGE INSTRUCTIONS:    Discharge Procedure Orders   Diet Adult Regular     Notify your health care provider if you experience any of the following:  temperature >100.4     Notify your health care provider if you experience any of the following:  persistent nausea and vomiting or diarrhea     Notify your health care provider if you experience any of the following:  severe uncontrolled pain     Notify your health care provider if you experience any of the following:  redness, tenderness, or signs of infection (pain, swelling, redness, odor or green/yellow discharge around incision site)     Notify your health care provider if you experience any of the following:  difficulty breathing or increased cough     No dressing needed     Activity as tolerated

## 2023-01-19 NOTE — PROGRESS NOTES
PGx Consult note   REPORT DATE: 1/19/2023 This 72 y.o. patient has a recorded medication list that includes 1 medication that is potentially influenced by the patients genetics.  Specific recommendations are noted below along with future medications to avoid, use with caution, or use with dose modification.   PATIENT NAME: Manuel Tyler    MRN: [id number]    YOB: 1950    SEX: male    MEDICATION ALLERGIES: Patient has no known allergies.    These results should always be considered relative to other clinical indicators and the patients full medication profile. These results comprise only one aspect of the many components used in making clinical treatment decisions. Non-genetic factors can influence patient response to medication and dosage needs. Drug-drug and drug-gene interactions that lead to enzyme inhibition or induction may lead to altered metabolism.  Patients should not make changes to their medications without discussing test results with their healthcare provider.     This patient has 3 pharmacogenes that are considered actionable depending on certain medication use. Based on the patient's recorded medication list, 1 current medication(s) are potentially influenced by assessed patient genetics.     Pharmacogenomics Considerations Based on Current Medication Use    Current medication: Pravastatin 20 mg nightly  Related result: [id number] Decreased Function  Recommendation: Prescribe desired starting dose and adjust doses of pravastatin based on disease-specific guidelines. Prescriber should be aware of possible increased risk for myopathy with pravastatin especially with doses >40 mg.        Pertinent Findings    Mr. Tyler is a 73 yo male with recent PGx results. He only has 3 actionable genes for future dose adjustment, and no current medications need to be altered. Pravastatin is affected by the JLEO0S7, although he is under the recommended dose for increased myopathy risk. Please see below  for more statin recommendations if he is changing.    MTHFR Decreased Activity status affects serotonin levels via dietary folate. Although we have no current guidelines for MTHFR and replacement, L-methylfolate (active folate) may be given as a medical food to help supplement these levels. The data is currently conflicting, and we are not able to make a medication recommendation based on this at the moment.    Medications to Use With Caution    CYP2C9 Intermediate (AS = 1.0)    Phenytoin: For first dose, use typical initial loading dose; for subsequent doses, use 25% less than normal due to increased probability of toxicities.    Meloxicam: Initiate therapy with 50% of the lowest recommended starting dose. Titrate dose upward to clinical effect or 50% of the maximum recommended dose with caution.    Celecoxib/Ibuprofen: Initiate therapy with lowest recommended starting dose. Titrate dose upward to clinical effect or maximum recommended dose with caution.      OIUH5O3 Decreased Function    Atorvastatin: Prescribe starting dose less than or equal to 40 mg; increased risk of myopathy for 40 mg due to increased exposure. If dose >40 mg needed for desired efficacy, consider combination therapy.    Rosuvastatin: Prescribe desired starting dose, but providers should be aware of possible increased risk for myopathy especially for doses >20mg. Typical risk for doses less than or equal to 20 mg.    Medications to Avoid    ELNR7F8 Decreased Function    Simvastatin/Lovastatin: Prescribe an alternative statin due to increased exposure and high risk for myalgias.        Genomic Indicators        COMT Intermediate Activity [id number] GA YASIR/MET  Updated 1/19/2023 by Security, Standard Interface User      COMT is an enzyme that degrades dopamine and norepinephrine, primarily in the prefrontal cortex. 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 The G allele is associated with higher enzyme  activity, compared with the A allele. Thus, patients with GA genotype are predicted to have intermediate COMT activity, ie, intermediate dopamine degradation and concentrations, compared to patients with GG genotype. Patients with GA genotype are more likely to respond to psychotropic medications than those with GG genotype.      Reference Links    Very Important Pharmacogene: COMT                      CY Rapid Metabolizer  Updated 2023 by Security, Standard Interface User      Genomic results predict that patient may metabolize CY substrates at a rate that exceeds average metabolic capacity. Patient may be more susceptible to the effects of enzyme inducers (such as tobacco smoke). The clinical significance of this phenotype is not fully known, and current guidelines provide no clinical recommendations related to CY variation. Commonly used medications metabolized by CY include clozapine and olanzapine. For questions, call [phone] or order PGx Consult [[id number]].               CYP2B6 Normal Metabolizer  Updated 2023 by Security, Standard Interface User      Genomic results predict that this patient may metabolize CYP2B6 substrates at a rate consistent with average metabolic capacity. Thus, there is no clear indication for selective dose adjustment for most medications metabolized by CYP2B6. A commonly used medication metabolized by CYP2B6 is efavirenz. For questions, call [phone] Physician PartnersGENE ([phone]) or order PGx Consult [[id number]].      Reference Links    CPIC® Guideline for Efavirenz based on CYP2B6 genotype                      [id number] Normal Metabolizer  Updated 2023 by Security, Standard Interface User      Genomic results predict that this patient may metabolize ZWR8Q15 substrates at a rate consistent with average metabolic capacity. Thus, there is no clear indication for selective dose adjustment for most medications metabolized by JML5N04. For questions, call [phone]  or order PGx Consult [[id number]].      Reference Links    CPIC® Guideline for Clopidogrel and [id number]    CPIC® Guideline for Proton Pump Inhibitors and YYF1U38    CPIC® Guideline for Voriconazole and YYR2Q30    CPIC® Guideline for Selective Serotonin Reuptake Inhibitors and CYP2D6 and TXP7W87    CPIC® Guideline for Tricyclic Antidepressants and CYP2D6 and MPF4F23                      CYP2C9 Intermediate Metabolizer  Updated 1/19/2023 by Security, Standard Interface User      Genomic results predict that this patient may metabolize CYP2C9 substrates at a rate that falls below average metabolic capacity. Thus, this patient may have an increased risk of adverse response to medications metabolized by CYP2C9. To avoid this type of response, dose adjustments or alternative therapeutic agents may be necessary when medications metabolized by CYP2C9 are being considered. Commonly used medications metabolized by CYP2C9 include the following: celecoxib, flurbiprofen, fosphenytoin, ibuprofen, lornoxicam, meloxicam, phenytoin, piroxicam, and warfarin. When considering warfarin initiation, see also VKORC1 genetic results. For questions, call [phone] ([phone] or order PGx Consult [[id number]].      Reference Links    CPIC® Guideline for NSAIDs based on CYP2C9 genotype    CPIC® Guideline for Phenytoin and CYP2C9 and HLA-B    CPIC® Guideline for Pharmacogenetics-Guided Warfarin Dosing                      CYP2D6 Normal Metabolizer  Updated 1/19/2023 by Security, Standard Interface User      Genomic results predict that this patient may metabolize CYP2D6 substrates at a rate consistent with average metabolic capacity. Thus, there is no clear indication for selective dose adjustment for most medications metabolized by CYP2D6. For questions, call [phone] ([phone]) or order PGx Consult [[id number]].      Reference Links    CPIC® Guideline for Ondansetron and Tropisetron based on CYP2D6 genotype    CPIC® Guideline for Atomoxetine based  on CYP2D6 genotype    CPIC® Guideline for Selective Serotonin Reuptake Inhibitors and CYP2D6 and EXD4Q00    CPIC® Guideline for Tricyclic Antidepressants and CYP2D6 and FYW0V39    CPIC® Guideline for Opioids and CYP2D6, OPRM1, and COMT    CPIC® Guideline for Tamoxifen based on CYP2D6 genotype                      CY Normal Metabolizer  Updated 2023 by Security, Standard Interface User      CY is involved in the metabolism of nearly 50% of all prescribed medications. Genomic results predict that patient may metabolize CY substrates at rates consistent with average metabolic capacity. Current guidelines provide no clinical recommendations related to CY variations or reasons to selectively adjust dose of medications metabolized (inactivated or activated) by CY. Related literature is evolving. Commonly used medication metabolized by CY include atorvastatin, lovastatin, simvastatin and tacrolimus. For questions, call [phone] ([phone]) or order PGx Consult [[id number]].      Reference Links    Very Important Pharmacogene: CY                      CY Poor Metabolizer  Updated 2023 by Security, Standard Interface User      Genomic results predict that this patient may metabolize CY substrates at a rate that falls greatly below metabolic capacity or approaches zero. For some populations, absence of functional CY is the normal state. Patients with this genotype are likely to require standard doses of certain medications metabolized by CY, including tacrolimus. For questions, call [phone] PointsGENE ([phone]) or order PGx Consult [[id number]].      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 This information may be used in  assessing patient sensitivity to warfarin; however, warfarin effect is also influenced by decreases in CYP2C9 function and VKORC1 status. To avoid risk of adverse or poor response to warfarin, genomic results for CYP2C9, VKORC1 function, and CYP2C cluster should be considered before initial dose determination or adjustment. Warfarin dosing algorithms are now available that incorporate CYP2C9, VKORC1, and CYP4F2 function ([email or web address]). For questions, call [phone] ([phone] or order PGx Consult [[id number]].      Reference Links    CPIC® Guideline for Pharmacogenetics-Guided Warfarin Dosing                      DPYD Normal Metabolizer  Updated 1/19/2023 by Security, Standard Interface User      Genomic results predict that this patient may metabolize DPYD substrates at a rate consistent with average metabolic capacity (activity score = 2). Thus, there is no clear indication for selective dose adjustment for most medications metabolized by DPYD. Commonly used medications metabolized by DPYD include 5-fluorouracil and capecitabine. For questions, call [phone] ([phone]) or order PGx Consult [[id number]].      Reference Links    CPIC® Guideline for Fluoropyrimidines and DPYD                      DRD2 Reduced Expression [id number] GA  Updated 1/19/2023 by Security, Standard Interface User      Genomic results indicate that patient is heterozygous for the G allele (GA) and may have reduced expression of the dopamine receptor D2. DRD2 polymorphisms are only one of many factors that contribute to a patient's clinical picture and response to medication. The utility of DRD2 genotyping is currently limited in terms of risk prediction and medication selection. For questions, call [phone] ([phone] or order PGx Consult [[id number]].      Reference Links    SNPedia: rs [id number]                      F2 Normal Thrombosis Risk (GG)  Updated 1/19/2023 by Security, Standard Interface User      F2 genotype may be useful in identifying  increased risk of thrombosis due to prothrombin thrombophilia. Genomic results indicate that patient does not have variant c.*97G>A (formerly known as [id number]). This finding suggests no increase in genetic risk for thromboembolic events in patients taking oral contraceptives. For questions, call [phone] ([phone]) or order PGx Consult [[id number]].               F5 Normal Thrombosis Risk (GG)  Updated 1/19/2023 by Security, Standard Interface User      F5 genotype may be useful in identifying increased risk of thrombosis due to Factor 5 Leiden thrombophilia. Genomic results indicate that patient does not have variant c.1601G>A (formerly known as Leiden, A6220L or R506Q). This finding suggests no increase in genetic risk for thromboembolic events in patients taking oral contraceptives. For questions, call [phone] ([phone]) or order PGx Consult [[id number]].               GRIK4 Altered Receptor Function [id number] TT  Updated 1/19/2023 by Security, Standard Interface User      GRIK4 is a protein involved in the transmission of certain signals in the brain. Genomic results indicate that patient is homozygous for the T allele (TT). GRIK4 polymorphisms are only one of many factors that contribute to a patient's clinical picture and response to medication; thus, the utility of GRIK4 genotyping is currently limited in terms of risk prediction and medication selection. Some evidence suggests that patients with genotype TT have a decreased likelihood of responding to certain antidepressants, compared to patients with genotype CC or CT. For questions, call [phone] ([phone] or order PGx Consult [[id number]].      Reference Links    SNPedia: [id number]    [employer] Clinical Annotation for [id number] (GRIK4)                      HLA-A*31:01 Not Detected  Updated 1/19/2023 by Security, Standard Interface User      Genomic results indicate that this patient is negative for HLA-A*31:01. When present, the HLA-A*31:01 allele is associated  with severe cutaneous adverse reactions among patients taking certain aromatic anticonvulsants, including carbamazepine. No change in therapy is warranted based on these results. Note, however, that a negative HLA-A*31:01 result does not absolutely rule out the possibility of an adverse drug reaction. Results for HLA-B*15:02 should be considered. For questions, call [phone] FuturesGENE ([phone]) or order PGx Consult [[id number]].      Reference Links    CPIC® Guideline for HLA genotype and Use of Carbamazepine and Oxcarbazepine                      HLA-B*15:02 Not Detected  Updated 1/19/2023 by Security, Standard Interface User      Genomic results indicate that this patient is negative for HLA-B*15:02. The HLA-B*15:02 allele is associated with severe cutaneous adverse reactions among patients taking certain aromatic anticonvulsants, including carbamazepine, oxcarbazepine, and phenytoin or fosphenytoin. No change in therapy is warranted based on these results. Note, however, that a negative HLA-B*15:02 result does not absolutely rule out the possibility of an adverse drug reaction. Results for HLA-A*31:01 should also be considered. For questions, call [phone] FuturesGENE ([phone]) or order PGx Consult [[id number]].      Reference Links    CPIC® Guideline for Phenytoin and CYP2C9 and HLA-B    CPIC® Guideline for HLA genotype and Use of Carbamazepine and Oxcarbazepine    CPIC® Guideline for Abacavir and HLA-B    CPIC® Guideline for Allopurinol and HLA-B                      HLA-B*57:01 Not Detected  Updated 1/19/2023 by Security, Standard Interface User      Genomic results indicate that this patient is negative for HLA-B*57:01. Based on this result, abacavir may be prescribed. Refer to formulary dosing guidelines. A negative HLA-B*57:01 result does not absolutely rule out the possibility of some form of abacavir hypersensitivity. Administration of abacavir requires close observation including immediate discontinuation of therapy should any  signs or symptoms of hypersensitivity develop. For questions, call [phone] ([phone] or order PGx Consult [[id number]].      Reference Links    CPIC® Guideline for Abacavir and HLA-B                      HLA-B*58:01 Not Detected  Updated 1/19/2023 by Security, Standard Interface User      Genomic results indicate that this patient is negative for HLA-B*58:01. Based on this result, allopurinol may be prescribed or continued. A negative HLA-B*58:01 result does not rule out the possibility of some form of allopurinol hypersensitivity. For questions, call [phone] ([phone] or order PGx Consult [[id number]].      Reference Links    CPIC® Guideline for Allopurinol and HLA-B                      HTR2A Intron 2 Genotype GA ([id number])  Updated 1/19/2023 by Security, Standard Interface User      Genomic results indicate that patient is heterozygous for the G allele (GA). This genotype may predict normal HTR2A (serotonin or 5-hydroxytryptamine receptor 2A) receptor function. HTR2A polymorphisms are only one of many factors that contribute to a patient's clinical picture and response to medication. The usefulness of this result is currently limited in terms of predicting risk and guiding medication selection; however, some evidence suggests that GA and GG variations may be associated with increase response to certain antidepressants, compared with AA genotype. For questions, call [phone] ([phone]) or order PGx Consult [[id number]].               HTR2C Normal Influence [id number] CC  Updated 1/19/2023 by Security, Standard Interface User      HTR2C (serotonin or 5-hydroxytryptamine receptor 2C) plays a role in the satiety pathway. 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 The usefulness of this result is currently limited in terms of predicting risk and guiding medication selection. Some evidence suggests that CC genotype is associated with risk of weight gain in patients taking certain  antipsychotics, but contradictory evidence exists. Other genetic and clinical factors may also influence risk of weight gain. For questions, call [phone] ([phone]) or order PGx Consult [[id number]].               IFNL3 (IL28B) [id number] C/T  Updated 1/19/2023 by Security, Standard Interface User      Genomic results predict that this patient has a decreased likelihood of response (lower sustained virologic response [SVR] rate) to PEG-IFN-a and ribavirin (RBV) combination therapy when used to treat hepatitis C virus (HCV) genotype 1 infections (compared to patients with favorable-response genotype). IFNL3 genotype is only one factor that can influence response rates to PEG-IFN-a and RBV therapy in HCV genotype 1 infection and should be interpreted in the context of other clinical and genetic factors. PEG-IFN-a, pegylated interferon-a 2a or 2b. For questions, call [phone] ([phone] or order PGx Consult [[id number]].      Reference Links    CPIC® Guideline for PEG Interferon-Alpha-Based Regimens and IFNL3                      MTHFR Decreased Activity  Updated 1/19/2023 by Security, Standard Interface User      Genomic results predict that patient has decreased MTHFR activity; thus, reduced conversion of folic acid to methylated folate (the active form of folate) is expected. Other genetic and/or clinical factors may influence the folate cycle. The usefulness of this result is currently limited in terms of predicting risk and guiding medication selection. For questions, call [phone] ([phone]) or order PGx Consult [[id number]].      Reference Links    Very Important Pharmacogene: MTHFR                      NUDT15 Normal Metabolizer  Updated 1/19/2023 by Security, Standard Interface User      Genomic results predict that this patient may metabolize NUDT15 substrates, including thiopurines, at a rate consistent with average metabolic capacity. A normal risk of drug-related leukopenia, neutropenia, and/or myelosuppression is  predicted. While there is no clear indication for thiopurine dose adjustment based on NUDT15 function, dose adjustment may be recommended based on TPMT function. A TPMT metabolizer status may be available for this patient. For questions, call [phone] ([phone] or order PGx Consult [[id number]].      Reference Links    CPIC® Guideline for Thiopurines and TPMT and NUDT15                      OPRM1 Normal Opioid Responder ([id number]) AA  Updated 1/19/2023 by Security, Standard Interface User      The opioid receptor mu 1 (OPRM1) gene has more than 200 known variations; the [id number] variant has been studied for its role in opioid response. 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 This genotype is consistent with a need for average doses of opioids to achieve the desired therapeutic effect at opioid initiation; however, results for CYP2D6 should also be considered. For questions, call [phone] ([phone]) or order PGx Consult [[id number]].      Reference Links    Clinical Pharmacogenetics Implementation Consortium (CPIC) guideline for CYP2D6, OPRM1, and COMT genotype and select opioid therapy (December 2020)                      SLC6A4 Normal Responder  Updated 1/19/2023 by Security, Standard Interface User      SLC6A4 plays a role in the synaptic reuptake of serotonin. Genomic results indicate that patient has 2 copies of the long-form (LA or LG) promoter polymorphism. Patients with this genotype are more likely to respond to selective serotonin reuptake inhibitor (SSRI) therapy within the first 4 weeks of treatment. Contradictory evidence exists and other genetic and clinical factors may also influence medication response. For questions, call [phone] ([phone]) or order PGx Consult [[id number]].      Reference Links    [employer] SLC6A4 Clinical Annotations                      OAAA3C1 Decreased Function  Updated 1/19/2023 by Security, Standard Interface User       Genomic results predict that this patient likely has decreased RRLT7S6 function. Thus, the patient may be at increased risk for adverse response to medications that are transported by GWOI0H5. To avoid this type of response, dose adjustments or alternative therapeutic agents may be necessary for medications affected by ACQR8W6, including simvastatin. If simvastatin is prescribed for a patient with decreased LSCW6Z9 function, there is an increased risk of simvastatin-associated myopathy; such patients may need a lower starting dose or an alternative statin agent. For questions, call [phone] ([phone]) or order PGx Consult [[id number]].      Reference Links    CPIC® Guideline for Simvastatin and RQVD2R5                      TPMT Normal Metabolizer  Updated 1/19/2023 by Security, Standard Interface User      Genomic results predict that this patient may metabolize TPMT substrates, including thiopurines, at a rate consistent with average metabolic capacity. A normal risk of drug-related leukopenia, neutropenia, and/or myelosuppression is predicted. While there is no clear indication for thiopurine dose adjustment based on TPMT function, dose adjustment may be recommended based on NUDT15 function. A NUDT15 metabolizer status may be available for this patient. For questions, call [phone] ([phone] or order PGx Consult [[id number]].      Reference Links    CPIC® Guideline for Thiopurines and TPMT and NUDT15                      UGT1A1 Intermediate Metabolizer  Updated 1/19/2023 by Security, Standard Interface User      Genomic results predict that this patient may metabolize UGT1A1 substrates at a rate that falls below average metabolic capacity. Current guidance provides no recommendations to selectively adjust the dose of specific medications metabolized by UGT1A1. UGT1A1 intermediate metabolizers may experience increased bilirubin levels and related jaundice when taking medications that inhibit UGT1A1, including atazanavir  and nilotinib. For questions, call [phone] ([phone] or order PGx Consult [[id number]].      Reference Links    CPIC® Guideline for Atazanavir and UGT1A1                      VKORC1 [id number] G/G  Updated 1/19/2023 by Security, Standard Interface User      Genomic results predict that this patient may have normal sensitivity to warfarin based on VKORC1 results. However, warfarin effect is also influenced by decreases in CYP2C9 function; thus, an increased risk of adverse or poor response upon initiation of therapy is not ruled out with this result. To avoid risk of adverse or poor response to warfarin, CYP2C9 functionality should be considered before initial dose determination or adjustment. Note: Some dosing algorithms for warfarin initiation also incorporate the CYP4F2 gene and CYP2C cluster variant. For questions, call [phone] ([phone]) or order PGx Consult [[id number]].      Reference Links    CPIC® Guideline for Pharmacogenetics-Guided Warfarin Dosing                           This summary is based on genetic results provided by [employer] and its affiliated laboratories. For questions related to testing methods, please request a PGx consult or email your questions to [email or web address].

## 2023-01-19 NOTE — ANESTHESIA POSTPROCEDURE EVALUATION
Anesthesia Post Evaluation    Patient: Manuel Tyler    Procedure(s) Performed: Procedure(s) (LRB):  [id number]-N-CATH (Bilateral)    Final Anesthesia Type: MAC      Patient location during evaluation: PACU  Patient participation: Yes- Able to Participate  Level of consciousness: awake and alert, oriented and awake  Post-procedure vital signs: reviewed and stable  Airway patency: patent    PONV status at discharge: No PONV  Anesthetic complications: no      Cardiovascular status: blood pressure returned to baseline  Respiratory status: unassisted, spontaneous ventilation and room air  Hydration status: euvolemic  Follow-up not needed.          Vitals Value Taken Time   /61 01/19/23 1017   Temp 37 °C (98.6 °F) 01/19/23 1017   Pulse 90 01/19/23 1017   Resp 16 01/19/23 1017   SpO2 95 % 01/19/23 1017         Event Time   Out of Recovery 09:20:34         Pain/Prem Score: Pain Rating Prior to Med Admin: 1 (1/19/2023  9:15 AM)  Prem Score: 10 (1/19/2023  9:24 AM)  Modified Prem Score: 20 (1/19/2023 10:23 AM)

## 2023-01-19 NOTE — DISCHARGE SUMMARY
[facility] - Surgery  Discharge Note  Short Stay    Procedure(s) (LRB):  [id number]-Y-CATH (Bilateral)      OUTCOME: Patient tolerated treatment/procedure well without complication and is now ready for discharge.    DISPOSITION: Home or Self Care    FINAL DIAGNOSIS: right internal jugular vein Portacath placement    FOLLOWUP: In clinic    DISCHARGE INSTRUCTIONS:    Discharge Procedure Orders   Diet Adult Regular     Notify your health care provider if you experience any of the following:  temperature >100.4     Notify your health care provider if you experience any of the following:  persistent nausea and vomiting or diarrhea     Notify your health care provider if you experience any of the following:  severe uncontrolled pain     Notify your health care provider if you experience any of the following:  redness, tenderness, or signs of infection (pain, swelling, redness, odor or green/yellow discharge around incision site)     Notify your health care provider if you experience any of the following:  difficulty breathing or increased cough     No dressing needed     Activity as tolerated        TIME SPENT ON DISCHARGE: 12 minutes

## 2023-01-19 NOTE — DISCHARGE INSTRUCTIONS
[facility]  Office # [phone]    Discharge Instructions for Same Day Surgery     Call the office for and appointment if one has not already been made.     Diet: Drink plenty of fluids the first 48 hours and you may resume your   usual diet.     Activity: No heavy lifting (over 10 pounds), pushing or pulling until your   post op visit. Your doctor's office may have told you to limit your lifting to less weight, or even no weight.  Be sure to follow those instructions.    Note: You may ride in a car and you may drive when comfortable.     Do not drive, drink alcohol, or sign legal documents for 24 hours, or if taking narcotic pain medication.    You may shower 24 hours after surgery and you may wash your hair.     Dermabond / Prineotape    or a material like it was used on your incision.   It is like a liquid glue.   Do not peel or try to remove it it will start to fall off in 7-10 days on its' own.   It is OK to shower, pat dry, do not apply any creams or lotions.    No tub baths, swimming pools, hot tubs or submersion of the incision until your surgeon says it's ok.        Medical: Call the doctor for any of the following problems: fever above 101,   severe pain, bleeding, or abdominal distention (swelling).   If constipated you may take any stool softener you choose.     Occasionally small areas of skin numbness or an unpleasant skin sensation can result. Also, you may find that your incision is swollen and tender for a few days.  Some redness around sutures and staples is a normal reaction, but if the discomfort persists or worsens, call you doctor.             Fall Prevention  Millions of people fall every year and injure themselves. You may have had anesthesia or sedation which may increase your risk of falling. You may have health issues that put you at an increased risk of falling.     Here are ways to reduce your risk of falling.    Make your home safe by keeping walkways clear of  objects you may trip over.  Use non-slip pads under rugs. Do not use area rugs or small throw rugs.  Use non-slip mats in bathtubs and showers.  Install handrails and lights on staircases.  Do not walk in poorly lit areas.  Do not stand on chairs or wobbly ladders.  Use caution when reaching overhead or looking upward. This position can cause a loss of balance.  Be sure your shoes fit properly, have non-slip bottoms and are in good condition.   Wear shoes both inside and out. Avoid going barefoot or wearing slippers.  Be cautious when going up and down stairs, curbs, and when walking on uneven sidewalks.  If your balance is poor, consider using a cane or walker.  If your fall was related to alcohol use, stop or limit alcohol intake.   If your fall was related to use of sleeping medicines, talk to your doctor about this. You may need to reduce your dosage at bedtime if you awaken during the night to go to the bathroom.    To reduce the need for nighttime bathroom trips:  Avoid drinking fluids for several hours before going to bed  Empty your bladder before going to bed  Men can keep a urinal at the bedside  Stay as active as you can. Balance, flexibility, strength, and endurance all come from exercise. They all play a role in preventing falls. Ask your healthcare provider which types of activity are right for you.  Get your vision checked on a regular basis.  If you have pets, know where they are before you stand up or walk so you don't trip over them.  Use night lights.

## 2023-01-19 NOTE — OP NOTE
[facility] Surgery  Surgery Department  Operative Note    SUMMARY     Date of Procedure: 1/19/2023     Procedure: Procedure(s) (LRB):  [id number]-X-CATH (Bilateral)  Right Internal Jugular vein    Surgeon(s) and Role:     * Amador Castellon MD - Primary    Assisting Surgeon: Chucky Manning MD    Pre-Operative Diagnosis: Malignant neoplasm of head of pancreas [C25.0]    Post-Operative Diagnosis: Post-Op Diagnosis Codes:     * Malignant neoplasm of head of pancreas [C25.0]    Anesthesia: Local MAC    Operative Findings (including complications, if any): Right chest portacath placed via right IJ access site. Catheter tip confirmed with intraoperative fluoroscopy.    Description of Technical Procedures: Our attention was first turned to the right chest. Ultrasound was used to ensure the right internal jugular vein was patent. Ultrasound was used for guidance and an 18-gauge hollow needle was used to access the vessel. Satisfactory blood return was noted. A guide wire was inserted through the access needle and fluoroscopy and ultrasound were used to confirm the wire was in the correct position. Following this, the anterior right chest was inspected for an adequate port site. Local anesthetic was injected into the chosen site. A 3cm incision was made with a 15-blade scalpel and the port pocket was created using both electrocautery and blunt dissection. A skin nick was made along the wire in the neck using an 11-blade scalpel. The catheter was then connected to the port and tunneled from the port pocket to the left neck incision. The port was then sewn into place using two interrupted 3-0 Vicryl sutures. The catheter was measured and cut to length using an over the chest method and fluoroscopy. A dilator and sheath were inserted into the vein over the existing guidewire under direct fluoroscopy. The catheter was passed into the vessel through the introducer sheath. The catheter was confirmed to be in proper position  in the SVC using fluoroscopy. The introducer sheath was then peeled away and the catheter insured to be in good position. The catheter was aspirated and blood return was good. Injectable saline was used to flush the catheter, followed by 5cc of heparinized saline. The port incision was closed using interrupted 3-0 vicryl sutures followed by a running 4-0 monocryl subcuticular suture. The neck incision was closed with an interrupted 4-0 monocryl. Dermabond glue was applied to both incisions. All counts were correct at the end of the procedure. Patient tolerated the procedure well. An upright chest xray was performed in the operating room which confirmed adequate catheter tip placement and no evidence of pneumothorax.     Significant Surgical Tasks Conducted by the Assistant(s), if Applicable: None    Estimated Blood Loss (EBL): 3 mL           Implants:   Implant Name Type Inv. Item Serial No.  Lot No. LRB No. Used Action   PORT POWER 8FR NO SUT PLUG - [id number]  PORT POWER 8FR NO SUT PLUG  [employer] [id number] Right 1 Implanted       Specimens:   Specimen (24h ago, onward)      None                    Condition: Good    Disposition: PACU - hemodynamically stable.

## 2023-01-19 NOTE — TRANSFER OF CARE
Anesthesia Transfer of Care Note    Patient: Manuel Tyler    Procedure(s) Performed: Procedure(s) (LRB):  [id number]-[id number]-L-CATH (Bilateral)    Patient location: PACU    Anesthesia Type: MAC    Transport from OR: Transported from OR on room air with adequate spontaneous ventilation    Post pain: adequate analgesia    Post assessment: no apparent anesthetic complications and tolerated procedure well    Post vital signs: stable    Level of consciousness: awake and alert    Nausea/Vomiting: no nausea/vomiting    Complications: none    Transfer of care protocol was followed      Last vitals:   Visit Vitals  /69 (BP Location: Left arm, Patient Position: Lying)   Pulse 76   Temp 36.4 °C (97.5 °F) (Temporal)   Resp (!) 21   Wt 63.4 kg (139 lb 12 oz)   SpO2 100%   BMI 21.25 kg/m²

## 2023-01-19 NOTE — PROGRESS NOTES
Recovery care complete 0845.   AAOx4. VSS per flow sheet. Denies pain.  Incision to right upper chest wall closed with dermabond; no drainage/swelling present.   Family updated. Patient will transfer to [facility] when room available.

## 2023-01-19 NOTE — PHYSICIAN QUERY
PT Name: Manuel Tyler  MR #: [id number]     DOCUMENTATION CLARIFICATION      CDS/: Miesha Quintero RN, CDI            Contact information:[email or web address]   This form is a permanent document in the medical record.     Query Date: January 19, 2023    By submitting this query, we are merely seeking further clarification of documentation.  Please utilize your independent clinical judgment when addressing the question(s) below.     The Medical Record contains the following:    Clinical Information Location in Medical Record   72 year old male with a PMHx of HTN, HLD, alcohol use, tobacco use who presents with diarrhea.  He drinks 2-4 beers daily with occasional liquor as well.    Hospital Course:   Patient admitted with pancreatitis likely secondary to ETOH abuse. GI was consulted...GI with plans for EUS and ERCP on 12/30- suspicious for pancreatic CA- stent placed. Patient clinically improved. Lipase now normal. Will follow up with oncology as out patient     Discharge summary 12/21   Pancreatitis likely from pancreatic CA by EUS.  Will follow up with heme/onc HM PN 12/31   1. Pancreas, head, mass, EUS guided fine-needle aspiration:   - Positive for malignancy, adenocarcinoma, see comment.   2. Liver, EUS guided fine-needle aspiration:   - Positive for malignancy, adenocarcinoma, see comment.  Path 12/30     Please document your best medical opinion regarding the etiology of _pancreatitis__?       [ x  ] Likely from pancreatic cancer   [   ] Likely from alcohol abuse   [   ] Other etiology (please specify):___________________   [  ] Clinically Undetermined             Please document in your progress notes daily for the duration of treatment, until resolved, and include in your discharge summary.

## 2023-01-20 ENCOUNTER — LAB VISIT (OUTPATIENT)
Dept: LAB | Facility: HOSPITAL | Age: 73
End: 2023-01-20
Payer: MEDICARE

## 2023-01-20 ENCOUNTER — ANESTHESIA (OUTPATIENT)
Dept: SURGERY | Facility: HOSPITAL | Age: 73
End: 2023-01-20
Payer: MEDICARE

## 2023-01-20 DIAGNOSIS — C25.0 MALIGNANT NEOPLASM OF HEAD OF PANCREAS: ICD-10-CM

## 2023-01-20 LAB
ALBUMIN SERPL BCP-MCNC: 3.1 G/DL (ref 3.5–5.2)
ALP SERPL-CCNC: 131 U/L (ref 55–135)
ALT SERPL W/O P-5'-P-CCNC: 41 U/L (ref 10–44)
ANION GAP SERPL CALC-SCNC: 11 MMOL/L (ref 8–16)
AST SERPL-CCNC: 39 U/L (ref 10–40)
BASOPHILS # BLD AUTO: 0.14 K/UL (ref 0–0.2)
BASOPHILS NFR BLD: 0.9 % (ref 0–1.9)
BILIRUB SERPL-MCNC: 0.7 MG/DL (ref 0.1–1)
BUN SERPL-MCNC: 10 MG/DL (ref 8–23)
CALCIUM SERPL-MCNC: 9.5 MG/DL (ref 8.7–10.5)
CHLORIDE SERPL-SCNC: 98 MMOL/L (ref 95–110)
CO2 SERPL-SCNC: 24 MMOL/L (ref 23–29)
CREAT SERPL-MCNC: 0.8 MG/DL (ref 0.5–1.4)
DIFFERENTIAL METHOD: ABNORMAL
EOSINOPHIL # BLD AUTO: 0.8 K/UL (ref 0–0.5)
EOSINOPHIL NFR BLD: 5.2 % (ref 0–8)
ERYTHROCYTE [DISTWIDTH] IN BLOOD BY AUTOMATED COUNT: 13.5 % (ref 11.5–14.5)
EST. GFR  (NO RACE VARIABLE): >60 ML/MIN/1.73 M^2
GLUCOSE SERPL-MCNC: 116 MG/DL (ref 70–110)
HCT VFR BLD AUTO: 46.6 % (ref 40–54)
HGB BLD-MCNC: 15.6 G/DL (ref 14–18)
IMM GRANULOCYTES # BLD AUTO: 0.1 K/UL (ref 0–0.04)
IMM GRANULOCYTES NFR BLD AUTO: 0.7 % (ref 0–0.5)
LYMPHOCYTES # BLD AUTO: 1.3 K/UL (ref 1–4.8)
LYMPHOCYTES NFR BLD: 8.8 % (ref 18–48)
MAGNESIUM SERPL-MCNC: 1.8 MG/DL (ref 1.6–2.6)
MCH RBC QN AUTO: 33.1 PG (ref 27–31)
MCHC RBC AUTO-ENTMCNC: 33.5 G/DL (ref 32–36)
MCV RBC AUTO: 99 FL (ref 82–98)
MONOCYTES # BLD AUTO: 1.5 K/UL (ref 0.3–1)
MONOCYTES NFR BLD: 9.6 % (ref 4–15)
NEUTROPHILS # BLD AUTO: 11.3 K/UL (ref 1.8–7.7)
NEUTROPHILS NFR BLD: 74.8 % (ref 38–73)
NRBC BLD-RTO: 0 /100 WBC
PLATELET # BLD AUTO: 464 K/UL (ref 150–450)
PMV BLD AUTO: 9.7 FL (ref 9.2–12.9)
POTASSIUM SERPL-SCNC: 4.4 MMOL/L (ref 3.5–5.1)
PROT SERPL-MCNC: 7.3 G/DL (ref 6–8.4)
RBC # BLD AUTO: 4.72 M/UL (ref 4.6–6.2)
SODIUM SERPL-SCNC: 133 MMOL/L (ref 136–145)
WBC # BLD AUTO: 15.12 K/UL (ref 3.9–12.7)

## 2023-01-20 PROCEDURE — 83735 ASSAY OF MAGNESIUM: CPT | Performed by: INTERNAL MEDICINE

## 2023-01-20 PROCEDURE — 36415 COLL VENOUS BLD VENIPUNCTURE: CPT | Mod: PO | Performed by: INTERNAL MEDICINE

## 2023-01-20 PROCEDURE — 80053 COMPREHEN METABOLIC PANEL: CPT | Performed by: INTERNAL MEDICINE

## 2023-01-20 PROCEDURE — 85025 COMPLETE CBC W/AUTO DIFF WBC: CPT | Performed by: INTERNAL MEDICINE

## 2023-01-23 ENCOUNTER — TELEPHONE (OUTPATIENT)
Dept: HEMATOLOGY/ONCOLOGY | Facility: CLINIC | Age: 73
End: 2023-01-23
Payer: MEDICARE

## 2023-01-23 ENCOUNTER — HOSPITAL ENCOUNTER (OUTPATIENT)
Dept: RADIOLOGY | Facility: HOSPITAL | Age: 73
Discharge: HOME | DRG: 871 | End: 2023-01-23
Attending: INTERNAL MEDICINE
Payer: MEDICARE

## 2023-01-23 DIAGNOSIS — C25.0 MALIGNANT NEOPLASM OF HEAD OF PANCREAS: ICD-10-CM

## 2023-01-23 DIAGNOSIS — R06.09 DOE (DYSPNEA ON EXERTION): ICD-10-CM

## 2023-01-23 PROCEDURE — 71250 CT CHEST WITHOUT CONTRAST: ICD-10-PCS | Mod: 26,,, | Performed by: RADIOLOGY

## 2023-01-23 PROCEDURE — 71250 CT THORAX DX C-: CPT | Mod: 26,,, | Performed by: RADIOLOGY

## 2023-01-23 PROCEDURE — 71250 CT THORAX DX C-: CPT | Mod: TC

## 2023-01-24 ENCOUNTER — INFUSION (OUTPATIENT)
Dept: INFUSION THERAPY | Facility: HOSPITAL | Age: 73
DRG: 871 | End: 2023-01-24
Attending: INTERNAL MEDICINE
Payer: MEDICARE

## 2023-01-24 VITALS
BODY MASS INDEX: 21.04 KG/M2 | SYSTOLIC BLOOD PRESSURE: 123 MMHG | HEART RATE: 85 BPM | RESPIRATION RATE: 18 BRPM | DIASTOLIC BLOOD PRESSURE: 59 MMHG | TEMPERATURE: 98 F | WEIGHT: 138.81 LBS | OXYGEN SATURATION: 94 % | HEIGHT: 68 IN

## 2023-01-24 DIAGNOSIS — C78.7 LIVER METASTASES: ICD-10-CM

## 2023-01-24 DIAGNOSIS — C25.0 MALIGNANT NEOPLASM OF HEAD OF PANCREAS: Primary | ICD-10-CM

## 2023-01-24 PROCEDURE — A4216 STERILE WATER/SALINE, 10 ML: HCPCS | Performed by: INTERNAL MEDICINE

## 2023-01-24 PROCEDURE — 96417 CHEMO IV INFUS EACH ADDL SEQ: CPT

## 2023-01-24 PROCEDURE — 25000003 PHARM REV CODE 250: Performed by: INTERNAL MEDICINE

## 2023-01-24 PROCEDURE — 96375 TX/PRO/DX INJ NEW DRUG ADDON: CPT

## 2023-01-24 PROCEDURE — 63600175 PHARM REV CODE 636 W HCPCS: Mod: JG | Performed by: INTERNAL MEDICINE

## 2023-01-24 PROCEDURE — 96413 CHEMO IV INFUSION 1 HR: CPT

## 2023-01-24 RX ORDER — HEPARIN 100 UNIT/ML
500 SYRINGE INTRAVENOUS
Status: CANCELLED | OUTPATIENT
Start: 2023-02-07

## 2023-01-24 RX ORDER — ONDANSETRON 2 MG/ML
8 INJECTION INTRAMUSCULAR; INTRAVENOUS
Status: COMPLETED | OUTPATIENT
Start: 2023-01-24 | End: 2023-01-24

## 2023-01-24 RX ORDER — SODIUM CHLORIDE 0.9 % (FLUSH) 0.9 %
10 SYRINGE (ML) INJECTION
Status: CANCELLED | OUTPATIENT
Start: 2023-02-14

## 2023-01-24 RX ORDER — HEPARIN 100 UNIT/ML
500 SYRINGE INTRAVENOUS
Status: CANCELLED | OUTPATIENT
Start: 2023-02-14

## 2023-01-24 RX ORDER — SODIUM CHLORIDE 0.9 % (FLUSH) 0.9 %
10 SYRINGE (ML) INJECTION
Status: DISCONTINUED | OUTPATIENT
Start: 2023-01-24 | End: 2023-01-26 | Stop reason: HOSPADM

## 2023-01-24 RX ORDER — SODIUM CHLORIDE 0.9 % (FLUSH) 0.9 %
10 SYRINGE (ML) INJECTION
Status: CANCELLED | OUTPATIENT
Start: 2023-02-07

## 2023-01-24 RX ORDER — SODIUM CHLORIDE 0.9 % (FLUSH) 0.9 %
10 SYRINGE (ML) INJECTION
Status: CANCELLED | OUTPATIENT
Start: 2023-01-24

## 2023-01-24 RX ORDER — ONDANSETRON 2 MG/ML
8 INJECTION INTRAMUSCULAR; INTRAVENOUS
Status: CANCELLED | OUTPATIENT
Start: 2023-02-07

## 2023-01-24 RX ORDER — ONDANSETRON 2 MG/ML
8 INJECTION INTRAMUSCULAR; INTRAVENOUS
Status: CANCELLED | OUTPATIENT
Start: 2023-02-14

## 2023-01-24 RX ORDER — HEPARIN 100 UNIT/ML
500 SYRINGE INTRAVENOUS
Status: CANCELLED | OUTPATIENT
Start: 2023-01-24

## 2023-01-24 RX ORDER — ONDANSETRON 2 MG/ML
8 INJECTION INTRAMUSCULAR; INTRAVENOUS
Status: CANCELLED | OUTPATIENT
Start: 2023-01-24

## 2023-01-24 RX ORDER — HEPARIN 100 UNIT/ML
500 SYRINGE INTRAVENOUS
Status: DISCONTINUED | OUTPATIENT
Start: 2023-01-24 | End: 2023-01-26 | Stop reason: HOSPADM

## 2023-01-24 RX ADMIN — PACLITAXEL 200 MG: 100 INJECTION, POWDER, LYOPHILIZED, FOR SUSPENSION INTRAVENOUS at 02:01

## 2023-01-24 RX ADMIN — ONDANSETRON 8 MG: 2 INJECTION INTRAMUSCULAR; INTRAVENOUS at 01:01

## 2023-01-24 RX ADMIN — HEPARIN 500 UNITS: 100 SYRINGE at 03:01

## 2023-01-24 RX ADMIN — Medication 10 ML: at 03:01

## 2023-01-24 RX ADMIN — GEMCITABINE 1585 MG: 38 INJECTION, SOLUTION INTRAVENOUS at 02:01

## 2023-01-25 ENCOUNTER — HOSPITAL ENCOUNTER (INPATIENT)
Facility: HOSPITAL | Age: 73
LOS: 5 days | Discharge: HOME HEALTH | DRG: 871 | End: 2023-01-30
Attending: STUDENT IN AN ORGANIZED HEALTH CARE EDUCATION/TRAINING PROGRAM | Admitting: STUDENT IN AN ORGANIZED HEALTH CARE EDUCATION/TRAINING PROGRAM
Payer: MEDICARE

## 2023-01-25 ENCOUNTER — EXTERNAL HOME HEALTH (OUTPATIENT)
Dept: HOME HEALTH SERVICES | Facility: HOSPITAL | Age: 73
End: 2023-01-25
Payer: MEDICARE

## 2023-01-25 ENCOUNTER — NURSE TRIAGE (OUTPATIENT)
Dept: ADMINISTRATIVE | Facility: CLINIC | Age: 73
End: 2023-01-25
Payer: MEDICARE

## 2023-01-25 DIAGNOSIS — G92.9 ENCEPHALOPATHY, TOXIC: Primary | ICD-10-CM

## 2023-01-25 DIAGNOSIS — R09.02 HYPOXIA: ICD-10-CM

## 2023-01-25 DIAGNOSIS — Z46.89 ENCOUNTER FOR REMOVAL OF PANCREATIC STENT: Primary | ICD-10-CM

## 2023-01-25 DIAGNOSIS — A41.9 SEPSIS: ICD-10-CM

## 2023-01-25 DIAGNOSIS — R07.9 CHEST PAIN: ICD-10-CM

## 2023-01-25 DIAGNOSIS — R79.89 ELEVATED BRAIN NATRIURETIC PEPTIDE (BNP) LEVEL: ICD-10-CM

## 2023-01-25 PROBLEM — I50.30 (HFPEF) HEART FAILURE WITH PRESERVED EJECTION FRACTION: Status: ACTIVE | Noted: 2023-01-25

## 2023-01-25 PROBLEM — R65.20 SEVERE SEPSIS: Status: ACTIVE | Noted: 2023-01-25

## 2023-01-25 PROBLEM — R41.82 AMS (ALTERED MENTAL STATUS): Status: ACTIVE | Noted: 2023-01-25

## 2023-01-25 LAB
ALBUMIN SERPL BCP-MCNC: 2.9 G/DL (ref 3.5–5.2)
ALLENS TEST: ABNORMAL
ALLENS TEST: NORMAL
ALP SERPL-CCNC: 149 U/L (ref 55–135)
ALT SERPL W/O P-5'-P-CCNC: 34 U/L (ref 10–44)
AMPHET+METHAMPHET UR QL: NEGATIVE
ANION GAP SERPL CALC-SCNC: 12 MMOL/L (ref 8–16)
APAP SERPL-MCNC: <3 UG/ML (ref 10–20)
ASCENDING AORTA: 3.36 CM
AST SERPL-CCNC: 51 U/L (ref 10–40)
AV INDEX (PROSTH): 0.95
AV MEAN GRADIENT: 5 MMHG
AV PEAK GRADIENT: 8 MMHG
AV VALVE AREA: 3.9 CM2
AV VELOCITY RATIO: 0.82
BARBITURATES UR QL SCN>200 NG/ML: NEGATIVE
BASOPHILS # BLD AUTO: 0.1 K/UL (ref 0–0.2)
BASOPHILS NFR BLD: 0.4 % (ref 0–1.9)
BENZODIAZ UR QL SCN>200 NG/ML: NEGATIVE
BILIRUB SERPL-MCNC: 0.8 MG/DL (ref 0.1–1)
BILIRUB UR QL STRIP: NEGATIVE
BNP SERPL-MCNC: 642 PG/ML (ref 0–99)
BSA FOR ECHO PROCEDURE: 1.74 M2
BUN SERPL-MCNC: 18 MG/DL (ref 8–23)
BZE UR QL SCN: NEGATIVE
CALCIUM SERPL-MCNC: 9.6 MG/DL (ref 8.7–10.5)
CANNABINOIDS UR QL SCN: NEGATIVE
CHLORIDE SERPL-SCNC: 100 MMOL/L (ref 95–110)
CLARITY UR: CLEAR
CO2 SERPL-SCNC: 20 MMOL/L (ref 23–29)
COLOR UR: YELLOW
CREAT SERPL-MCNC: 0.9 MG/DL (ref 0.5–1.4)
CREAT UR-MCNC: 29.2 MG/DL (ref 23–375)
CTP QC/QA: YES
CTP QC/QA: YES
CV ECHO LV RWT: 0.57 CM
D DIMER PPP IA.FEU-MCNC: 5.39 MG/L FEU
DELSYS: ABNORMAL
DELSYS: NORMAL
DIFFERENTIAL METHOD: ABNORMAL
DOP CALC AO PEAK VEL: 1.42 M/S
DOP CALC AO VTI: 25.1 CM
DOP CALC LVOT AREA: 4.1 CM2
DOP CALC LVOT DIAMETER: 2.29 CM
DOP CALC LVOT PEAK VEL: 1.17 M/S
DOP CALC LVOT STROKE VOLUME: 97.98 CM3
DOP CALCLVOT PEAK VEL VTI: 23.8 CM
E WAVE DECELERATION TIME: 178.44 MSEC
E/A RATIO: 0.97
E/E' RATIO: 12.25 M/S
ECHO LV POSTERIOR WALL: 1.22 CM (ref 0.6–1.1)
EJECTION FRACTION: 65 %
EOSINOPHIL # BLD AUTO: 0.2 K/UL (ref 0–0.5)
EOSINOPHIL NFR BLD: 0.6 % (ref 0–8)
ERYTHROCYTE [DISTWIDTH] IN BLOOD BY AUTOMATED COUNT: 13.4 % (ref 11.5–14.5)
EST. GFR  (NO RACE VARIABLE): >60 ML/MIN/1.73 M^2
ETHANOL SERPL-MCNC: <10 MG/DL
FLOW: 2
FLOW: 2
FRACTIONAL SHORTENING: 27 % (ref 28–44)
GLUCOSE SERPL-MCNC: 109 MG/DL (ref 70–110)
GLUCOSE UR QL STRIP: NEGATIVE
HCO3 UR-SCNC: 23.3 MMOL/L (ref 24–28)
HCT VFR BLD AUTO: 42.2 % (ref 40–54)
HGB BLD-MCNC: 14.8 G/DL (ref 14–18)
HGB UR QL STRIP: NEGATIVE
IMM GRANULOCYTES # BLD AUTO: 0.18 K/UL (ref 0–0.04)
IMM GRANULOCYTES NFR BLD AUTO: 0.6 % (ref 0–0.5)
INTERVENTRICULAR SEPTUM: 1.17 CM (ref 0.6–1.1)
IVC DIAMETER: 1.71 CM
IVRT: 74.22 MSEC
KETONES UR QL STRIP: NEGATIVE
LA MAJOR: 5.27 CM
LA MINOR: 5.5 CM
LA WIDTH: 4.7 CM
LACTATE SERPL-SCNC: 2 MMOL/L (ref 0.5–2.2)
LACTATE SERPL-SCNC: 2.2 MMOL/L (ref 0.5–2.2)
LDH SERPL L TO P-CCNC: 1.46 MMOL/L (ref 0.5–2.2)
LEFT ATRIUM SIZE: 3.93 CM
LEFT ATRIUM VOLUME INDEX: 48.3 ML/M2
LEFT ATRIUM VOLUME: 84.51 CM3
LEFT INTERNAL DIMENSION IN SYSTOLE: 3.11 CM (ref 2.1–4)
LEFT VENTRICLE DIASTOLIC VOLUME INDEX: 46.18 ML/M2
LEFT VENTRICLE DIASTOLIC VOLUME: 80.81 ML
LEFT VENTRICLE MASS INDEX: 103 G/M2
LEFT VENTRICLE SYSTOLIC VOLUME INDEX: 21.9 ML/M2
LEFT VENTRICLE SYSTOLIC VOLUME: 38.34 ML
LEFT VENTRICULAR INTERNAL DIMENSION IN DIASTOLE: 4.25 CM (ref 3.5–6)
LEFT VENTRICULAR MASS: 180.3 G
LEUKOCYTE ESTERASE UR QL STRIP: NEGATIVE
LIPASE SERPL-CCNC: 29 U/L (ref 4–60)
LV LATERAL E/E' RATIO: 9.8 M/S
LV SEPTAL E/E' RATIO: 16.33 M/S
LVOT MG: 3.31 MMHG
LVOT MV: 0.88 CM/S
LYMPHOCYTES # BLD AUTO: 0.6 K/UL (ref 1–4.8)
LYMPHOCYTES NFR BLD: 2.2 % (ref 18–48)
MAGNESIUM SERPL-MCNC: 1.7 MG/DL (ref 1.6–2.6)
MCH RBC QN AUTO: 32.7 PG (ref 27–31)
MCHC RBC AUTO-ENTMCNC: 35.1 G/DL (ref 32–36)
MCV RBC AUTO: 93 FL (ref 82–98)
METHADONE UR QL SCN>300 NG/ML: NEGATIVE
MODE: ABNORMAL
MODE: NORMAL
MONOCYTES # BLD AUTO: 1.6 K/UL (ref 0.3–1)
MONOCYTES NFR BLD: 5.6 % (ref 4–15)
MV PEAK A VEL: 1.01 M/S
MV PEAK E VEL: 0.98 M/S
MV STENOSIS PRESSURE HALF TIME: 51.75 MS
MV VALVE AREA P 1/2 METHOD: 4.25 CM2
NEUTROPHILS # BLD AUTO: 25.2 K/UL (ref 1.8–7.7)
NEUTROPHILS NFR BLD: 90.6 % (ref 38–73)
NITRITE UR QL STRIP: NEGATIVE
NRBC BLD-RTO: 0 /100 WBC
OPIATES UR QL SCN: NEGATIVE
PCO2 BLDA: 36.2 MMHG (ref 35–45)
PCP UR QL SCN>25 NG/ML: NEGATIVE
PH SMN: 7.42 [PH] (ref 7.35–7.45)
PH UR STRIP: 6 [PH] (ref 5–8)
PHOSPHATE SERPL-MCNC: 3 MG/DL (ref 2.7–4.5)
PISA TR MAX VEL: 2.75 M/S
PLATELET # BLD AUTO: 385 K/UL (ref 150–450)
PMV BLD AUTO: 9.8 FL (ref 9.2–12.9)
PO2 BLDA: 51 MMHG (ref 40–60)
POC BE: -1 MMOL/L
POC MOLECULAR INFLUENZA A AGN: NEGATIVE
POC MOLECULAR INFLUENZA B AGN: NEGATIVE
POC SATURATED O2: 87 % (ref 95–100)
POC TCO2: 24 MMOL/L (ref 24–29)
POTASSIUM SERPL-SCNC: 4.5 MMOL/L (ref 3.5–5.1)
PROCALCITONIN SERPL IA-MCNC: 1.26 NG/ML
PROT SERPL-MCNC: 6.9 G/DL (ref 6–8.4)
PROT UR QL STRIP: NEGATIVE
PV PEAK VELOCITY: 0.74 CM/S
RA MAJOR: 5.14 CM
RA PRESSURE: 3 MMHG
RA WIDTH: 3.5 CM
RBC # BLD AUTO: 4.53 M/UL (ref 4.6–6.2)
RIGHT VENTRICULAR END-DIASTOLIC DIMENSION: 3.46 CM
RV TISSUE DOPPLER FREE WALL SYSTOLIC VELOCITY 1 (APICAL 4 CHAMBER VIEW): 0.01 CM/S
SALICYLATES SERPL-MCNC: <5 MG/DL (ref 15–30)
SAMPLE: ABNORMAL
SAMPLE: NORMAL
SARS-COV-2 RDRP RESP QL NAA+PROBE: NEGATIVE
SINUS: 3.86 CM
SITE: ABNORMAL
SITE: NORMAL
SODIUM SERPL-SCNC: 132 MMOL/L (ref 136–145)
SP GR UR STRIP: >1.03 (ref 1–1.03)
STJ: 2.73 CM
TDI LATERAL: 0.1 M/S
TDI SEPTAL: 0.06 M/S
TDI: 0.08 M/S
TOXICOLOGY INFORMATION: NORMAL
TR MAX PG: 30 MMHG
TRICUSPID ANNULAR PLANE SYSTOLIC EXCURSION: 1.62 CM
TROPONIN I SERPL DL<=0.01 NG/ML-MCNC: 0.02 NG/ML (ref 0–0.03)
TV REST PULMONARY ARTERY PRESSURE: 33 MMHG
URN SPEC COLLECT METH UR: ABNORMAL
UROBILINOGEN UR STRIP-ACNC: NEGATIVE EU/DL
WBC # BLD AUTO: 27.81 K/UL (ref 3.9–12.7)

## 2023-01-25 PROCEDURE — 94761 N-INVAS EAR/PLS OXIMETRY MLT: CPT

## 2023-01-25 PROCEDURE — 82077 ASSAY SPEC XCP UR&BREATH IA: CPT | Performed by: STUDENT IN AN ORGANIZED HEALTH CARE EDUCATION/TRAINING PROGRAM

## 2023-01-25 PROCEDURE — 25000003 PHARM REV CODE 250: Performed by: STUDENT IN AN ORGANIZED HEALTH CARE EDUCATION/TRAINING PROGRAM

## 2023-01-25 PROCEDURE — 93010 ELECTROCARDIOGRAM REPORT: CPT | Mod: ,,, | Performed by: INTERNAL MEDICINE

## 2023-01-25 PROCEDURE — 83605 ASSAY OF LACTIC ACID: CPT

## 2023-01-25 PROCEDURE — 99900035 HC TECH TIME PER 15 MIN (STAT)

## 2023-01-25 PROCEDURE — 99223 1ST HOSP IP/OBS HIGH 75: CPT | Mod: ,,, | Performed by: STUDENT IN AN ORGANIZED HEALTH CARE EDUCATION/TRAINING PROGRAM

## 2023-01-25 PROCEDURE — 84484 ASSAY OF TROPONIN QUANT: CPT | Performed by: STUDENT IN AN ORGANIZED HEALTH CARE EDUCATION/TRAINING PROGRAM

## 2023-01-25 PROCEDURE — 25500020 PHARM REV CODE 255: Performed by: STUDENT IN AN ORGANIZED HEALTH CARE EDUCATION/TRAINING PROGRAM

## 2023-01-25 PROCEDURE — 80307 DRUG TEST PRSMV CHEM ANLYZR: CPT | Performed by: STUDENT IN AN ORGANIZED HEALTH CARE EDUCATION/TRAINING PROGRAM

## 2023-01-25 PROCEDURE — 93005 ELECTROCARDIOGRAM TRACING: CPT

## 2023-01-25 PROCEDURE — 85379 FIBRIN DEGRADATION QUANT: CPT | Performed by: STUDENT IN AN ORGANIZED HEALTH CARE EDUCATION/TRAINING PROGRAM

## 2023-01-25 PROCEDURE — 63600175 PHARM REV CODE 636 W HCPCS: Performed by: INTERNAL MEDICINE

## 2023-01-25 PROCEDURE — 99285 EMERGENCY DEPT VISIT HI MDM: CPT | Mod: 25

## 2023-01-25 PROCEDURE — 80053 COMPREHEN METABOLIC PANEL: CPT | Performed by: STUDENT IN AN ORGANIZED HEALTH CARE EDUCATION/TRAINING PROGRAM

## 2023-01-25 PROCEDURE — 80143 DRUG ASSAY ACETAMINOPHEN: CPT | Performed by: STUDENT IN AN ORGANIZED HEALTH CARE EDUCATION/TRAINING PROGRAM

## 2023-01-25 PROCEDURE — 96367 TX/PROPH/DG ADDL SEQ IV INF: CPT

## 2023-01-25 PROCEDURE — 87502 INFLUENZA DNA AMP PROBE: CPT

## 2023-01-25 PROCEDURE — 83690 ASSAY OF LIPASE: CPT | Performed by: STUDENT IN AN ORGANIZED HEALTH CARE EDUCATION/TRAINING PROGRAM

## 2023-01-25 PROCEDURE — 82800 BLOOD PH: CPT

## 2023-01-25 PROCEDURE — 96361 HYDRATE IV INFUSION ADD-ON: CPT

## 2023-01-25 PROCEDURE — 96365 THER/PROPH/DIAG IV INF INIT: CPT

## 2023-01-25 PROCEDURE — 84100 ASSAY OF PHOSPHORUS: CPT | Performed by: STUDENT IN AN ORGANIZED HEALTH CARE EDUCATION/TRAINING PROGRAM

## 2023-01-25 PROCEDURE — 85025 COMPLETE CBC W/AUTO DIFF WBC: CPT | Performed by: STUDENT IN AN ORGANIZED HEALTH CARE EDUCATION/TRAINING PROGRAM

## 2023-01-25 PROCEDURE — 83735 ASSAY OF MAGNESIUM: CPT | Performed by: STUDENT IN AN ORGANIZED HEALTH CARE EDUCATION/TRAINING PROGRAM

## 2023-01-25 PROCEDURE — 87635 SARS-COV-2 COVID-19 AMP PRB: CPT | Performed by: STUDENT IN AN ORGANIZED HEALTH CARE EDUCATION/TRAINING PROGRAM

## 2023-01-25 PROCEDURE — 99223 PR INITIAL HOSPITAL CARE,LEVL III: ICD-10-PCS | Mod: ,,, | Performed by: STUDENT IN AN ORGANIZED HEALTH CARE EDUCATION/TRAINING PROGRAM

## 2023-01-25 PROCEDURE — 63600175 PHARM REV CODE 636 W HCPCS: Performed by: STUDENT IN AN ORGANIZED HEALTH CARE EDUCATION/TRAINING PROGRAM

## 2023-01-25 PROCEDURE — 87040 BLOOD CULTURE FOR BACTERIA: CPT | Mod: 59 | Performed by: STUDENT IN AN ORGANIZED HEALTH CARE EDUCATION/TRAINING PROGRAM

## 2023-01-25 PROCEDURE — 25000003 PHARM REV CODE 250: Performed by: INTERNAL MEDICINE

## 2023-01-25 PROCEDURE — S4991 NICOTINE PATCH NONLEGEND: HCPCS | Performed by: STUDENT IN AN ORGANIZED HEALTH CARE EDUCATION/TRAINING PROGRAM

## 2023-01-25 PROCEDURE — 80179 DRUG ASSAY SALICYLATE: CPT | Performed by: STUDENT IN AN ORGANIZED HEALTH CARE EDUCATION/TRAINING PROGRAM

## 2023-01-25 PROCEDURE — 83880 ASSAY OF NATRIURETIC PEPTIDE: CPT | Performed by: STUDENT IN AN ORGANIZED HEALTH CARE EDUCATION/TRAINING PROGRAM

## 2023-01-25 PROCEDURE — 93010 EKG 12-LEAD: ICD-10-PCS | Mod: ,,, | Performed by: INTERNAL MEDICINE

## 2023-01-25 PROCEDURE — 20000000 HC ICU ROOM

## 2023-01-25 PROCEDURE — 83605 ASSAY OF LACTIC ACID: CPT | Performed by: STUDENT IN AN ORGANIZED HEALTH CARE EDUCATION/TRAINING PROGRAM

## 2023-01-25 PROCEDURE — 81003 URINALYSIS AUTO W/O SCOPE: CPT | Mod: 59 | Performed by: STUDENT IN AN ORGANIZED HEALTH CARE EDUCATION/TRAINING PROGRAM

## 2023-01-25 PROCEDURE — 84145 PROCALCITONIN (PCT): CPT | Performed by: STUDENT IN AN ORGANIZED HEALTH CARE EDUCATION/TRAINING PROGRAM

## 2023-01-25 RX ORDER — GLUCAGON 1 MG
1 KIT INJECTION
Status: DISCONTINUED | OUTPATIENT
Start: 2023-01-25 | End: 2023-01-30 | Stop reason: HOSPADM

## 2023-01-25 RX ORDER — MORPHINE SULFATE 4 MG/ML
2 INJECTION, SOLUTION INTRAMUSCULAR; INTRAVENOUS EVERY 4 HOURS PRN
Status: DISCONTINUED | OUTPATIENT
Start: 2023-01-25 | End: 2023-01-30 | Stop reason: HOSPADM

## 2023-01-25 RX ORDER — SODIUM CHLORIDE 9 MG/ML
INJECTION, SOLUTION INTRAVENOUS CONTINUOUS
Status: DISCONTINUED | OUTPATIENT
Start: 2023-01-25 | End: 2023-01-25

## 2023-01-25 RX ORDER — IBUPROFEN 200 MG
16 TABLET ORAL
Status: DISCONTINUED | OUTPATIENT
Start: 2023-01-25 | End: 2023-01-30 | Stop reason: HOSPADM

## 2023-01-25 RX ORDER — ENOXAPARIN SODIUM 100 MG/ML
40 INJECTION SUBCUTANEOUS EVERY 24 HOURS
Status: DISCONTINUED | OUTPATIENT
Start: 2023-01-25 | End: 2023-01-30 | Stop reason: HOSPADM

## 2023-01-25 RX ORDER — FUROSEMIDE 20 MG/1
20 TABLET ORAL ONCE
Status: COMPLETED | OUTPATIENT
Start: 2023-01-25 | End: 2023-01-25

## 2023-01-25 RX ORDER — AMOXICILLIN 250 MG
1 CAPSULE ORAL 2 TIMES DAILY PRN
Status: DISCONTINUED | OUTPATIENT
Start: 2023-01-25 | End: 2023-01-30 | Stop reason: HOSPADM

## 2023-01-25 RX ORDER — MORPHINE SULFATE 4 MG/ML
4 INJECTION, SOLUTION INTRAMUSCULAR; INTRAVENOUS EVERY 4 HOURS PRN
Status: DISCONTINUED | OUTPATIENT
Start: 2023-01-25 | End: 2023-01-30 | Stop reason: HOSPADM

## 2023-01-25 RX ORDER — ACETAMINOPHEN 325 MG/1
650 TABLET ORAL EVERY 4 HOURS PRN
Status: DISCONTINUED | OUTPATIENT
Start: 2023-01-25 | End: 2023-01-30 | Stop reason: HOSPADM

## 2023-01-25 RX ORDER — LOPERAMIDE HYDROCHLORIDE 2 MG/1
2 CAPSULE ORAL 4 TIMES DAILY PRN
Status: DISCONTINUED | OUTPATIENT
Start: 2023-01-25 | End: 2023-01-30 | Stop reason: HOSPADM

## 2023-01-25 RX ORDER — NALOXONE HCL 0.4 MG/ML
0.02 VIAL (ML) INJECTION
Status: DISCONTINUED | OUTPATIENT
Start: 2023-01-25 | End: 2023-01-30 | Stop reason: HOSPADM

## 2023-01-25 RX ORDER — ERGOCALCIFEROL 1.25 MG/1
50000 CAPSULE ORAL
Status: DISCONTINUED | OUTPATIENT
Start: 2023-01-25 | End: 2023-01-25

## 2023-01-25 RX ORDER — MUPIROCIN 20 MG/G
OINTMENT TOPICAL 2 TIMES DAILY
Status: DISPENSED | OUTPATIENT
Start: 2023-01-25 | End: 2023-01-30

## 2023-01-25 RX ORDER — SODIUM CHLORIDE 0.9 % (FLUSH) 0.9 %
10 SYRINGE (ML) INJECTION EVERY 12 HOURS PRN
Status: DISCONTINUED | OUTPATIENT
Start: 2023-01-25 | End: 2023-01-30 | Stop reason: HOSPADM

## 2023-01-25 RX ORDER — SIMETHICONE 80 MG
1 TABLET,CHEWABLE ORAL 4 TIMES DAILY PRN
Status: DISCONTINUED | OUTPATIENT
Start: 2023-01-25 | End: 2023-01-30 | Stop reason: HOSPADM

## 2023-01-25 RX ORDER — IBUPROFEN 200 MG
1 TABLET ORAL DAILY
Status: DISCONTINUED | OUTPATIENT
Start: 2023-01-25 | End: 2023-01-30 | Stop reason: HOSPADM

## 2023-01-25 RX ORDER — MAG HYDROX/ALUMINUM HYD/SIMETH 200-200-20
30 SUSPENSION, ORAL (FINAL DOSE FORM) ORAL 4 TIMES DAILY PRN
Status: DISCONTINUED | OUTPATIENT
Start: 2023-01-25 | End: 2023-01-30 | Stop reason: HOSPADM

## 2023-01-25 RX ORDER — IBUPROFEN 200 MG
24 TABLET ORAL
Status: DISCONTINUED | OUTPATIENT
Start: 2023-01-25 | End: 2023-01-30 | Stop reason: HOSPADM

## 2023-01-25 RX ORDER — ONDANSETRON 2 MG/ML
4 INJECTION INTRAMUSCULAR; INTRAVENOUS EVERY 6 HOURS PRN
Status: DISCONTINUED | OUTPATIENT
Start: 2023-01-25 | End: 2023-01-30 | Stop reason: HOSPADM

## 2023-01-25 RX ADMIN — ONDANSETRON 4 MG: 2 INJECTION INTRAMUSCULAR; INTRAVENOUS at 09:01

## 2023-01-25 RX ADMIN — PIPERACILLIN AND TAZOBACTAM 4.5 G: 4; .5 INJECTION, POWDER, LYOPHILIZED, FOR SOLUTION INTRAVENOUS; PARENTERAL at 11:01

## 2023-01-25 RX ADMIN — LOPERAMIDE HYDROCHLORIDE 2 MG: 2 CAPSULE ORAL at 09:01

## 2023-01-25 RX ADMIN — SODIUM CHLORIDE, POTASSIUM CHLORIDE, SODIUM LACTATE AND CALCIUM CHLORIDE 500 ML: 600; 310; 30; 20 INJECTION, SOLUTION INTRAVENOUS at 03:01

## 2023-01-25 RX ADMIN — PIPERACILLIN AND TAZOBACTAM 4.5 G: 4; .5 INJECTION, POWDER, LYOPHILIZED, FOR SOLUTION INTRAVENOUS; PARENTERAL at 06:01

## 2023-01-25 RX ADMIN — SODIUM CHLORIDE: 9 INJECTION, SOLUTION INTRAVENOUS at 07:01

## 2023-01-25 RX ADMIN — FUROSEMIDE 20 MG: 20 TABLET ORAL at 04:01

## 2023-01-25 RX ADMIN — IOHEXOL 75 ML: 350 INJECTION, SOLUTION INTRAVENOUS at 03:01

## 2023-01-25 RX ADMIN — MUPIROCIN: 20 OINTMENT TOPICAL at 09:01

## 2023-01-25 RX ADMIN — MUPIROCIN: 20 OINTMENT TOPICAL at 08:01

## 2023-01-25 RX ADMIN — LOPERAMIDE HYDROCHLORIDE 2 MG: 2 CAPSULE ORAL at 11:01

## 2023-01-25 RX ADMIN — Medication 1 PATCH: at 02:01

## 2023-01-25 RX ADMIN — ENOXAPARIN SODIUM 40 MG: 40 INJECTION SUBCUTANEOUS at 04:01

## 2023-01-25 RX ADMIN — VANCOMYCIN HYDROCHLORIDE 1500 MG: 1.5 INJECTION, POWDER, LYOPHILIZED, FOR SOLUTION INTRAVENOUS at 03:01

## 2023-01-25 RX ADMIN — PIPERACILLIN AND TAZOBACTAM 4.5 G: 4; .5 INJECTION, POWDER, LYOPHILIZED, FOR SOLUTION INTRAVENOUS; PARENTERAL at 02:01

## 2023-01-25 RX ADMIN — SODIUM CHLORIDE 1878 ML: 9 INJECTION, SOLUTION INTRAVENOUS at 01:01

## 2023-01-25 NOTE — CONSULTS
[facility]  Gastroenterology  Consult Note    Patient Name: Manuel Tyler  MRN: [id number]  Admission Date: 1/25/2023  Hospital Length of Stay: 0 days  Code Status: Full Code   Attending Provider: Vini Hayes MD   Consulting Provider: Brit Melendez MD  Primary Care Physician: Victor M Stokes MD  Principal Problem:<principal problem not specified>    Inpatient consult to Gastroenterology  Consult performed by: Brit Melendez MD  Consult ordered by: FRED Jones MD      Subjective:     HPI: Manuel Tyler is a 72 y.o. male with history of Pancreatic cancer (stage 4), HTN who is admitted with AMS and concern for sepsis. He had his first round of chemotherapy yesterday as well. Vitals on admission showed tachycardia and BP in 80-90s systolic. Afebrile. Lipase and lactate wnl. CMP showed T bili of 0.8 and AST/ALT 51/34, . He was noted to have Na of 130. WBC 27K. CXR, CTH and CTA unremarkable. GI was consulted due to concern for biliary source of sepsis. He is improved today from mentation standpoint. He is no longer confused. Has some lower abdominal pain but no pain in epigastric or RUQ areas. No subjective fever or chills. He had ERCP in 12/30 with covered metal stent placed in CBD and plastic PD stent. Follow up plan was for KUB in 3-4 weeks to see if PD stent spontaneously passed or if follow up EGD with PD stent removal was needed.       Past Medical History:   Diagnosis Date    Hyperlipidemia     Hypertension     Other specified noninfective gastroenteritis and colitis     Pancreatitis     Personal history of colonic polyps        Past Surgical History:   Procedure Laterality Date    COLONOSCOPY      ENDOSCOPIC ULTRASOUND OF UPPER GASTROINTESTINAL TRACT Left 12/30/2022    Procedure: ULTRASOUND, UPPER GI TRACT, ENDOSCOPIC;  Surgeon: Gregory Cristina MD;  Location: [facility] ([address] [place] [address]);  Service: Endoscopy;  Laterality: Left;    ERCP Left 12/30/2022    Procedure: ERCP (ENDOSCOPIC  RETROGRADE CHOLANGIOPANCREATOGRAPHY);  Surgeon: Gregory Cristina MD;  Location: [facility] ENDO (2ND FLR);  Service: Endoscopy;  Laterality: Left;    FOOT SURGERY Left     INSERTION OF TUNNELED CENTRAL VENOUS CATHETER (CVC) WITH SUBCUTANEOUS PORT Bilateral 1/19/2023    Procedure: PQNSMEGSA-OIBW-C-CATH;  Surgeon: Amador Castellon MD;  Location: [facility] OR;  Service: General;  Laterality: Bilateral;  Right v Left PORTACATH. needs ultrasound and fluoro  RN PREOP 01/18/2023    TONSILLECTOMY         Family History   Problem Relation Age of Onset    Cancer Father 69        pancreatic cancer       Social History     Socioeconomic History    Marital status:    Tobacco Use    Smoking status: Every Day     Packs/day: 1.00     Types: Cigarettes   Substance and Sexual Activity    Alcohol use: Yes     Comment: last drink 12/22/22    Drug use: Never       Current Facility-Administered Medications on File Prior to Encounter   Medication Dose Route Frequency Provider Last Rate Last Admin    [COMPLETED] gemcitabine 1,585 mg in sodium chloride 0.9% SolP 326.6855 mL chemo infusion  900 mg/m2 (Treatment Plan Recorded) Intravenous 1 time in Clinic/HOD Latesha Carver MD   Stopped at 01/24/23 1518    heparin, porcine (PF) 100 unit/mL injection flush 500 Units  500 Units Intravenous PRN Latesha Carver MD   500 Units at 01/24/23 1520    [COMPLETED] ondansetron injection 8 mg  8 mg Intravenous 1 time in Clinic/HOD Latesha Carver MD   8 mg at 01/24/23 1334    [COMPLETED] PACLitaxeL protein-bound (ABRAXANE) 200 mg in 40 mL infusion  200 mg Intravenous 1 time in Clinic/HOD Latesha Carver MD   Stopped at 01/24/23 1436    sodium chloride 0.9% flush 10 mL  10 mL Intravenous PRN Latesha Carver MD   10 mL at 01/24/23 1520     Current Outpatient Medications on File Prior to Encounter   Medication Sig Dispense Refill    ergocalciferol (ERGOCALCIFEROL) 50,000 unit Cap Take 50,000 Units by mouth every 7 days.      hydrALAZINE (APRESOLINE)  25 MG tablet Take 3 tablets (75 mg total) by mouth every 8 (eight) hours. 90 tablet 5    citalopram (CELEXA) 20 MG tablet Take 20 mg by mouth once daily.      diltiazem (CARDIZEM LA) 180 mg 24 hr tablet Take 240 mg by mouth once daily.      HYDROcodone-acetaminophen (NORCO) 5-325 mg per tablet Take 1 tablet by mouth every 6 (six) hours as needed for Pain. 15 tablet 0    ondansetron (ZOFRAN) 4 MG tablet Take 1 tablet (4 mg total) by mouth every 8 (eight) hours as needed for Nausea. 30 tablet 1    oxyCODONE (ROXICODONE) 5 MG immediate release tablet Take 1 tablet (5 mg total) by mouth every 6 (six) hours as needed for Pain. 60 tablet 0    pravastatin (PRAVACHOL) 20 MG tablet Take 20 mg by mouth nightly.      venlafaxine (EFFEXOR-XR) 37.5 MG 24 hr capsule Take 37.5 mg by mouth once daily.      venlafaxine (EFFEXOR-XR) 75 MG 24 hr capsule          Review of patient's allergies indicates:  No Known Allergies    ROS     Objective:     Vitals:    01/25/23 0615   BP: 111/61   Pulse: 93   Resp: (!) 30   Temp: 98.1 °F (36.7 °C)         Constitutional:  not in acute distress and well developed  HENT: Head: Normal, normocephalic, atraumatic.  Eyes: conjunctiva clear and sclera nonicteric  Cardiovascular: regular rate and rhythm and no murmur  Respiratory: normal chest expansion & respiratory effort   and no accessory muscle use  GI: soft, non-tender, without masses or organomegaly  Musculoskeletal: no muscular tenderness noted  Skin: normal color  Neurological: alert, oriented x3  Psychiatric: mood and affect are within normal limits       Significant Labs:  Recent Labs   Lab 01/18/23  1000 01/20/23  0848 01/25/23  0129   HGB 16.1 15.6 14.8       Lab Results   Component Value Date    WBC 27.81 (H) 01/25/2023    HGB 14.8 01/25/2023    HCT 42.2 01/25/2023    MCV 93 01/25/2023     01/25/2023       Lab Results   Component Value Date     (L) 01/25/2023    K 4.5 01/25/2023     01/25/2023    CO2 20 (L) 01/25/2023     BUN 18 01/25/2023    CREATININE 0.9 01/25/2023    CALCIUM 9.6 01/25/2023    ANIONGAP 12 01/25/2023    ESTGFRAFRICA >60 08/19/2014    EGFRNONAA >60 08/19/2014       Lab Results   Component Value Date    ALT 34 01/25/2023    AST 51 (H) 01/25/2023    ALKPHOS 149 (H) 01/25/2023    BILITOT 0.8 01/25/2023       Lab Results   Component Value Date    INR 1.1 12/27/2022       Significant Imaging:  Reviewed pertinent radiology findings.     ERCP 12/30/22  Impression:            - The major papilla appeared congested.                          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                          - A pancreatic sphincterotomy was performed.                          - One plastic stent was placed into the ventral                          pancreatic duct.                          - A single severe biliary stricture was found in                          the lower third of the main bile duct. The                          stricture was malignant appearing.                          - A biliary sphincterotomy was performed.                          - One covered metal stent was placed into the                          common bile duct.     Assessment/Plan:     Manuel Tyler is a 72 y.o. male with history of pancreatic cancer who is admitted with AMS and concern for sepsis. Patient improved overnight significantly and is not altered this AM. He has lower abdominal pain but not RUQ pain. Bilirubin is normal. Low concern for biliary source of sepsis.     Recommendations:    - Low concern for biliary source of sepsis at this time   - KUB showed PD stent still in place, will coordinate outpatient EGD for stent removal   - Diet as tolerated   - No other GI recs at this time, if bilirubin and enzymes increase would consider MRCP      Thank you for involving us in the care of Manuel Tyler. Please call with any additional questions, concerns or changes in the patient's clinical status.      Brit Melendez   Gastroenterology      [facility]

## 2023-01-25 NOTE — ASSESSMENT & PLAN NOTE
· Initiated Gemcitabine and Paclitaxel on 1/23  · Consider Oncology consult, may need 2nd tx pushed back

## 2023-01-25 NOTE — PROGRESS NOTES
Pharmacokinetic Initial Assessment: IV Vancomycin    Assessment/Plan:    Initiate intravenous vancomycin with loading dose of 1500 mg once followed by a maintenance dose of vancomycin 1750 mg IV every 24 hours  Desired empiric serum trough concentration is 10 to 20 mcg/mL  Draw vancomycin trough level 60 min prior to third dose on 1/27/2023 at approximately 02:00  Pharmacy will continue to follow and monitor vancomycin.      Please contact pharmacy at extension [phone] with any questions regarding this assessment.     Thank you for the consult,   Lida Tarango       Patient brief summary:  Manuel Tyler is a 72 y.o. male initiated on antimicrobial therapy with IV Vancomycin for treatment of suspected bacteremia    Drug Allergies:   Review of patient's allergies indicates:  No Known Allergies    Actual Body Weight:   62.6 kg    Renal Function:   Estimated Creatinine Clearance: 66.7 mL/min (based on SCr of 0.9 mg/dL).,     Dialysis Method (if applicable):  N/A    CBC (last 72 hours):  Recent Labs   Lab Result Units 01/25/23  0129   WBC K/uL 27.81*   Hemoglobin g/dL 14.8   Hematocrit % 42.2   Platelets K/uL 385   Gran % % 90.6*   Lymph % % 2.2*   Mono % % 5.6   Eosinophil % % 0.6   Basophil % % 0.4   Differential Method  Automated       Metabolic Panel (last 72 hours):  Recent Labs   Lab Result Units 01/25/23  0129   Sodium mmol/L 132*   Potassium mmol/L 4.5   Chloride mmol/L 100   CO2 mmol/L 20*   Glucose mg/dL 109   BUN mg/dL 18   Creatinine mg/dL 0.9   Albumin g/dL 2.9*   Total Bilirubin mg/dL 0.8   Alkaline Phosphatase U/L 149*   AST U/L 51*   ALT U/L 34   Magnesium mg/dL 1.7   Phosphorus mg/dL 3.0       Drug levels (last 3 results):  No results for input(s): VANCOMYCINRA, VANCORANDOM, VANCOMYCINPE, VANCOPEAK, VANCOMYCINTR, VANCOTROUGH in the last 72 hours.    Microbiologic Results:  Microbiology Results (last 7 days)       Procedure Component Value Units Date/Time    Culture, Respiratory with Gram Stain [[id number]]      Order Status: No result Specimen: Sputum, Expectorated     Blood culture x two cultures. Draw prior to antibiotics. [[id number]] Collected: 01/25/23 0150    Order Status: Sent Specimen: Blood from Peripheral, Upper Arm, Right Updated: 01/25/23 0201    Blood culture x two cultures. Draw prior to antibiotics. [[id number]] Collected: 01/25/23 0129    Order Status: Sent Specimen: Blood from Peripheral, Antecubital, Left Updated: 01/25/23 0137

## 2023-01-25 NOTE — ASSESSMENT & PLAN NOTE
· No previous ECHO to compare  · Indeterminant diastolic dysfunction with elevated BNP of 642  · Will give one dose of IV lasix after sepsis fliuds, single dose as needed

## 2023-01-25 NOTE — SUBJECTIVE & OBJECTIVE
Past Medical History:   Diagnosis Date    Hyperlipidemia     Hypertension     Other specified noninfective gastroenteritis and colitis     Pancreatitis     Personal history of colonic polyps        Past Surgical History:   Procedure Laterality Date    COLONOSCOPY      ENDOSCOPIC ULTRASOUND OF UPPER GASTROINTESTINAL TRACT Left 12/30/2022    Procedure: ULTRASOUND, UPPER GI TRACT, ENDOSCOPIC;  Surgeon: Gregory Cristina MD;  Location: [facility] (2ND FLR);  Service: Endoscopy;  Laterality: Left;    ERCP Left 12/30/2022    Procedure: ERCP (ENDOSCOPIC RETROGRADE CHOLANGIOPANCREATOGRAPHY);  Surgeon: Gregory Cristina MD;  Location: [facility] (2ND FLR);  Service: Endoscopy;  Laterality: Left;    FOOT SURGERY Left     INSERTION OF TUNNELED CENTRAL VENOUS CATHETER (CVC) WITH SUBCUTANEOUS PORT Bilateral 1/19/2023    Procedure: XVSOHOVEW-RASE-I-CATH;  Surgeon: Amador Castellon MD;  Location: [facility];  Service: General;  Laterality: Bilateral;  Right v Left PORTACATH. needs ultrasound and fluoro  RN PREOP 01/18/2023    TONSILLECTOMY         Review of patient's allergies indicates:  No Known Allergies    Current Facility-Administered Medications on File Prior to Encounter   Medication    [COMPLETED] gemcitabine 1,585 mg in sodium chloride 0.9% SolP 326.6855 mL chemo infusion    heparin, porcine (PF) 100 unit/mL injection flush 500 Units    [COMPLETED] PACLitaxeL protein-bound (ABRAXANE) 200 mg in 40 mL infusion    sodium chloride 0.9% flush 10 mL     Current Outpatient Medications on File Prior to Encounter   Medication Sig    ergocalciferol (ERGOCALCIFEROL) 50,000 unit Cap Take 50,000 Units by mouth every 7 days.    hydrALAZINE (APRESOLINE) 25 MG tablet Take 3 tablets (75 mg total) by mouth every 8 (eight) hours.    citalopram (CELEXA) 20 MG tablet Take 20 mg by mouth once daily.    diltiazem (CARDIZEM LA) 180 mg 24 hr tablet Take 240 mg by mouth once daily.    HYDROcodone-acetaminophen (NORCO) 5-325 mg per tablet Take 1 tablet by  mouth every 6 (six) hours as needed for Pain.    ondansetron (ZOFRAN) 4 MG tablet Take 1 tablet (4 mg total) by mouth every 8 (eight) hours as needed for Nausea.    oxyCODONE (ROXICODONE) 5 MG immediate release tablet Take 1 tablet (5 mg total) by mouth every 6 (six) hours as needed for Pain.    pravastatin (PRAVACHOL) 20 MG tablet Take 20 mg by mouth nightly.    venlafaxine (EFFEXOR-XR) 37.5 MG 24 hr capsule Take 37.5 mg by mouth once daily.    venlafaxine (EFFEXOR-XR) 75 MG 24 hr capsule      Family History       Problem Relation (Age of Onset)    Cancer Father (69)          Tobacco Use    Smoking status: Every Day     Packs/day: 1.00     Types: Cigarettes    Smokeless tobacco: Not on file   Substance and Sexual Activity    Alcohol use: Yes     Comment: last drink 12/22/22    Drug use: Never    Sexual activity: Not on file     Review of Systems   Constitutional:  Positive for activity change and fatigue. Negative for fever and unexpected weight change.   HENT:  Negative for trouble swallowing and voice change.    Eyes:  Negative for photophobia and visual disturbance.   Respiratory:  Negative for cough and shortness of breath.    Cardiovascular:  Negative for chest pain, palpitations and leg swelling.   Gastrointestinal:  Negative for abdominal distention, abdominal pain, blood in stool, constipation, diarrhea, nausea and vomiting.   Endocrine: Negative for polydipsia and polyuria.   Genitourinary:  Negative for difficulty urinating, dysuria and hematuria.   Musculoskeletal:  Negative for neck pain and neck stiffness.   Skin:  Negative for pallor and rash.   Allergic/Immunologic: Positive for immunocompromised state.   Neurological:  Positive for weakness. Negative for seizures, syncope and facial asymmetry.   Psychiatric/Behavioral:  Positive for confusion. Negative for agitation and behavioral problems.    Objective:     Vital Signs (Most Recent):  Temp: 98.3 °F (36.8 °C) (01/25/23 1200)  Pulse: 98 (01/25/23  1300)  Resp: 20 (01/25/23 1300)  BP: 123/65 (01/25/23 1300)  SpO2: (!) 94 % (01/25/23 1300)   Vital Signs (24h Range):  Temp:  [98 °F (36.7 °C)-98.4 °F (36.9 °C)] 98.3 °F (36.8 °C)  Pulse:  [] 98  Resp:  [16-30] 20  SpO2:  [89 %-95 %] 94 %  BP: ()/(50-72) 123/65     Weight: 63.3 kg (139 lb 8.8 oz)  Body mass index is 21.22 kg/m².    Physical Exam  Vitals reviewed.   Constitutional:       General: He is not in acute distress.     Appearance: He is well-developed and normal weight. He is ill-appearing. He is not toxic-appearing or diaphoretic.   HENT:      Head: Normocephalic and atraumatic.   Eyes:      General: No scleral icterus.     Pupils: Pupils are equal, round, and reactive to light.   Neck:      Thyroid: No thyromegaly.   Cardiovascular:      Rate and Rhythm: Normal rate and regular rhythm.      Heart sounds: No murmur heard.    No gallop.   Pulmonary:      Effort: Pulmonary effort is normal.      Breath sounds: Normal breath sounds. No stridor. No wheezing or rales.   Abdominal:      General: There is no distension.      Palpations: Abdomen is soft.      Tenderness: There is no abdominal tenderness.   Musculoskeletal:         General: No swelling or deformity. Normal range of motion.      Cervical back: Normal range of motion. No rigidity.      Right lower leg: No edema.      Left lower leg: No edema.   Lymphadenopathy:      Cervical: No cervical adenopathy.   Skin:     General: Skin is warm.      Capillary Refill: Capillary refill takes less than 2 seconds.      Coloration: Skin is not jaundiced.      Findings: No bruising.   Neurological:      Mental Status: He is alert and oriented to person, place, and time.      Motor: Weakness present.      Gait: Gait abnormal.   Psychiatric:         Mood and Affect: Mood normal.         Behavior: Behavior normal.         CRANIAL NERVES     CN III, IV, VI   Pupils are equal, round, and reactive to light.         Recent Results (from the past 24 hour(s))    Blood culture x two cultures. Draw prior to antibiotics.    Collection Time: 01/25/23  1:29 AM    Specimen: Peripheral, Antecubital, Left; Blood   Result Value Ref Range    Blood Culture, Routine No Growth to date    CBC auto differential    Collection Time: 01/25/23  1:29 AM   Result Value Ref Range    WBC 27.81 (H) 3.90 - 12.70 K/uL    RBC 4.53 (L) 4.60 - 6.20 M/uL    Hemoglobin 14.8 14.0 - 18.0 g/dL    Hematocrit 42.2 40.0 - 54.0 %    MCV 93 82 - 98 fL    MCH 32.7 (H) 27.0 - 31.0 pg    MCHC 35.1 32.0 - 36.0 g/dL    RDW 13.4 11.5 - 14.5 %    Platelets 385 150 - 450 K/uL    MPV 9.8 9.2 - 12.9 fL    Immature Granulocytes 0.6 (H) 0.0 - 0.5 %    Gran # (ANC) 25.2 (H) 1.8 - 7.7 K/uL    Immature Grans (Abs) 0.18 (H) 0.00 - 0.04 K/uL    Lymph # 0.6 (L) 1.0 - 4.8 K/uL    Mono # 1.6 (H) 0.3 - 1.0 K/uL    Eos # 0.2 0.0 - 0.5 K/uL    Baso # 0.10 0.00 - 0.20 K/uL    nRBC 0 0 /100 WBC    Gran % 90.6 (H) 38.0 - 73.0 %    Lymph % 2.2 (L) 18.0 - 48.0 %    Mono % 5.6 4.0 - 15.0 %    Eosinophil % 0.6 0.0 - 8.0 %    Basophil % 0.4 0.0 - 1.9 %    Differential Method Automated    Comprehensive metabolic panel    Collection Time: 01/25/23  1:29 AM   Result Value Ref Range    Sodium 132 (L) 136 - 145 mmol/L    Potassium 4.5 3.5 - 5.1 mmol/L    Chloride 100 95 - 110 mmol/L    CO2 20 (L) 23 - 29 mmol/L    Glucose 109 70 - 110 mg/dL    BUN 18 8 - 23 mg/dL    Creatinine 0.9 0.5 - 1.4 mg/dL    Calcium 9.6 8.7 - 10.5 mg/dL    Total Protein 6.9 6.0 - 8.4 g/dL    Albumin 2.9 (L) 3.5 - 5.2 g/dL    Total Bilirubin 0.8 0.1 - 1.0 mg/dL    Alkaline Phosphatase 149 (H) 55 - 135 U/L    AST 51 (H) 10 - 40 U/L    ALT 34 10 - 44 U/L    Anion Gap 12 8 - 16 mmol/L    eGFR >60 >60 mL/min/1.73 m^2   Lactic acid, plasma #1    Collection Time: 01/25/23  1:29 AM   Result Value Ref Range    Lactate (Lactic Acid) 2.0 0.5 - 2.2 mmol/L   Magnesium    Collection Time: 01/25/23  1:29 AM   Result Value Ref Range    Magnesium 1.7 1.6 - 2.6 mg/dL   Phosphorus     Collection Time: 01/25/23  1:29 AM   Result Value Ref Range    Phosphorus 3.0 2.7 - 4.5 mg/dL   Brain natriuretic peptide    Collection Time: 01/25/23  1:29 AM   Result Value Ref Range     (H) 0 - 99 pg/mL   Troponin I    Collection Time: 01/25/23  1:29 AM   Result Value Ref Range    Troponin I 0.018 0.000 - 0.026 ng/mL   Lipase    Collection Time: 01/25/23  1:29 AM   Result Value Ref Range    Lipase 29 4 - 60 U/L   Procalcitonin    Collection Time: 01/25/23  1:29 AM   Result Value Ref Range    Procalcitonin 1.26 (H) <0.25 ng/mL   Acetaminophen level    Collection Time: 01/25/23  1:29 AM   Result Value Ref Range    Acetaminophen (Tylenol), Serum <3.0 (L) 10.0 - 20.0 ug/mL   Ethanol    Collection Time: 01/25/23  1:29 AM   Result Value Ref Range    Alcohol, Serum <10 <10 mg/dL   Salicylate Level    Collection Time: 01/25/23  1:29 AM   Result Value Ref Range    Salicylate Lvl <5.0 (L) 15.0 - 30.0 mg/dL   Blood culture x two cultures. Draw prior to antibiotics.    Collection Time: 01/25/23  1:50 AM    Specimen: Peripheral, Upper Arm, Right; Blood   Result Value Ref Range    Blood Culture, Routine No Growth to date    D dimer, quantitative    Collection Time: 01/25/23  1:50 AM   Result Value Ref Range    D-Dimer 5.39 (H) <0.50 mg/L FEU   ISTAT PROCEDURE    Collection Time: 01/25/23  1:53 AM   Result Value Ref Range    POC PH 7.417 7.35 - 7.45    POC PCO2 36.2 35 - 45 mmHg    POC PO2 51 40 - 60 mmHg    POC HCO3 23.3 (L) 24 - 28 mmol/L    POC BE -1 -2 to 2 mmol/L    POC SATURATED O2 87 (L) 95 - 100 %    POC TCO2 24 24 - 29 mmol/L    Sample VENOUS     Site Other     Allens Test N/A     DelSys Nasal Can     Mode SPONT     Flow 2    ISTAT Lactate    Collection Time: 01/25/23  1:54 AM   Result Value Ref Range    POC Lactate 1.46 0.5 - 2.2 mmol/L    Sample VENOUS     Site Other     Allens Test N/A     DelSys Nasal Can     Mode SPONT     Flow 2    POCT COVID-19 Rapid Screening    Collection Time: 01/25/23  2:29 AM    Result Value Ref Range    POC Rapid COVID Negative Negative     Acceptable Yes    POCT Influenza A/B Molecular    Collection Time: 01/25/23  2:29 AM   Result Value Ref Range    POC Molecular Influenza A Ag Negative Negative, Not Reported    POC Molecular Influenza B Ag Negative Negative, Not Reported     Acceptable Yes    Lactic acid, plasma #2    Collection Time: 01/25/23  5:08 AM   Result Value Ref Range    Lactate (Lactic Acid) 2.2 0.5 - 2.2 mmol/L   Urinalysis, Reflex to Urine Culture Urine, Clean Catch    Collection Time: 01/25/23  5:08 AM    Specimen: Urine   Result Value Ref Range    Specimen UA Urine, Clean Catch     Color, UA Yellow Yellow, Straw, Lennie    Appearance, UA Clear Clear    pH, UA 6.0 5.0 - 8.0    Specific Gravity, UA >1.030 (A) 1.005 - 1.030    Protein, UA Negative Negative    Glucose, UA Negative Negative    Ketones, UA Negative Negative    Bilirubin (UA) Negative Negative    Occult Blood UA Negative Negative    Nitrite, UA Negative Negative    Urobilinogen, UA Negative <2.0 EU/dL    Leukocytes, UA Negative Negative   Drug screen panel, emergency    Collection Time: 01/25/23  5:08 AM   Result Value Ref Range    Benzodiazepines Negative Negative    Methadone metabolites Negative Negative    Cocaine (Metab.) Negative Negative    Opiate Scrn, Ur Negative Negative    Barbiturate Screen, Ur Negative Negative    Amphetamine Screen, Ur Negative Negative    THC Negative Negative    Phencyclidine Negative Negative    Creatinine, Urine 29.2 23.0 - 375.0 mg/dL    Toxicology Information SEE COMMENT    Echo Saline Bubble? No    Collection Time: 01/25/23  1:12 PM   Result Value Ref Range    BSA 1.74 m2    TDI SEPTAL 0.06 m/s    LV LATERAL E/E' RATIO 9.80 m/s    LV SEPTAL E/E' RATIO 16.33 m/s    IVC diameter 1.71 cm    Left Ventricular Outflow Tract Mean Velocity 0.88 cm/s    Left Ventricular Outflow Tract Mean Gradient 3.31 mmHg    TDI LATERAL 0.10 m/s    PV PEAK VELOCITY  0.74 cm/s    LVIDd 4.25 3.5 - 6.0 cm    IVS 1.17 (A) 0.6 - 1.1 cm    Posterior Wall 1.22 (A) 0.6 - 1.1 cm    LVIDs 3.11 2.1 - 4.0 cm    FS 27 28 - 44 %    Sinus 3.86 cm    STJ 2.73 cm    Ascending aorta 3.36 cm    LV mass 180.30 g    LA size 3.93 cm    RVDD 3.46 cm    TAPSE 1.62 cm    RV S' 0.01 cm/s    Left Ventricle Relative Wall Thickness 0.57 cm    AV mean gradient 5 mmHg    AV valve area 3.90 cm2    AV Velocity Ratio 0.82     AV index (prosthetic) 0.95     MV valve area p 1/2 method 4.25 cm2    E/A ratio 0.97     Mean e' 0.08 m/s    E wave deceleration time 178.44 msec    IVRT 74.22 msec    LVOT diameter 2.29 cm    LVOT area 4.1 cm2    LVOT peak jose de jesus 1.17 m/s    LVOT peak VTI 23.80 cm    Ao peak jose de jesus 1.42 m/s    Ao VTI 25.1 cm    LVOT stroke volume 97.98 cm3    AV peak gradient 8 mmHg    E/E' ratio 12.25 m/s    MV Peak E Jose De Jesus 0.98 m/s    TR Max Jose De Jesus 2.75 m/s    MV stenosis pressure 1/2 time 51.75 ms    MV Peak A Jose De Jesus 1.01 m/s    LV Systolic Volume 38.34 mL    LV Systolic Volume Index 21.9 mL/m2    LV Diastolic Volume 80.81 mL    LV Diastolic Volume Index 46.18 mL/m2    LV Mass Index 103 g/m2    RA Major Axis 5.14 cm    Left Atrium Minor Axis 5.50 cm    Left Atrium Major Axis 5.27 cm    Triscuspid Valve Regurgitation Peak Gradient 30 mmHg    LA WIDTH 4.70 cm    LA volume 84.51 cm3    LA Volume Index 48.3 mL/m2    RA Width 3.50 cm       Microbiology Results (last 7 days)       Procedure Component Value Units Date/Time    Blood culture x two cultures. Draw prior to antibiotics. [[id number]] Collected: 01/25/23 0150    Order Status: Completed Specimen: Blood from Peripheral, Upper Arm, Right Updated: 01/25/23 0912     Blood Culture, Routine No Growth to date    Narrative:      Aerobic and anaerobic    Blood culture x two cultures. Draw prior to antibiotics. [[id number]] Collected: 01/25/23 0129    Order Status: Completed Specimen: Blood from Peripheral, Antecubital, Left Updated: 01/25/23 0912     Blood Culture, Routine  No Growth to date    Narrative:      Aerobic and anaerobic    Culture, Respiratory with Gram Stain [[id number]]     Order Status: No result Specimen: Sputum, Expectorated              Imaging Results              CTA Chest Non-Coronary (PE Studies) (Final result)  Result time 01/25/23 04:05:46      Final result by Servando Christianson MD (01/25/23 04:05:46)                   Impression:      There is no large central saddle type pulmonary embolus, there is no additional evidence for pulmonary embolus on this exam.    Pulmonary nodules are noted.  For multiple solid nodules with any 6 mm or greater, Fleischner Society guidelines recommend follow up with non-contrast chest CT at 3-6 months and 18-24 months after discovery.    Additional findings as above.      Electronically signed by: Servando Christianson  Date:    01/25/2023  Time:    04:05               Narrative:    EXAMINATION:  CTA CHEST NON CORONARY (PE STUDIES)    CLINICAL HISTORY:  Pulmonary embolism (PE) suspected, positive D-dimer;    TECHNIQUE:  Low dose axial images, sagittal and coronal reformations were obtained from the thoracic inlet to the lung bases following the IV administration of 75 mL of Omnipaque 350.  Contrast timing was optimized to evaluate the pulmonary arteries.  MIP images were performed.    COMPARISON:  Chest radiograph January 25, 2023, CT examination of the chest January 23, 2023    FINDINGS:  There is no large central saddle type pulmonary embolus.  The pulmonary arterial vascular demonstrate opacification without evidence for abnormal pattern of pulmonary arterial filling defects specific for pulmonary emboli.    There is a right-sided central venous catheter again noted.  There is a configuration of the trachea that may relate to saber sheath configuration, stable appearance.  The thoracic aorta demonstrates atherosclerotic change.  Coronary arterial atherosclerotic change noted as well.  Minimal pericardial thickening or fluid noted.   There are multiple small mediastinal lymph nodes.    Emphysematous change of the lungs again noted.  There is a bandlike area of opacity involving the left upper lobe and extending to the lingular segment likely relating to component of scar and atelectasis.  Additional dependent atelectatic changes are noted.  There are pulmonary nodules again noted.  The most prominent is seen at the right lung base, right middle lobe anteriorly, measuring approximately 6.9 mm in size.  There is no evidence for pneumothorax.  There is no evidence for pleural effusion.    Limited imaging of the upper abdomen demonstrates appearance of pneumobilia.  There is nonspecific perinephric stranding, left greater than right for which clinical and historical correlation is needed.    The osseous structures demonstrate chronic change, vertebral body height loss with superior endplate concavity at T12 again noted.                                       CT Head Without Contrast (Final result)  Result time 01/25/23 08:09:37      Final result by RADIOLOGIST, VIRTUAL (01/25/23 08:09:37)                   Impression:      No acute intracranial abnormality.      Electronically signed by: Ramon Radiologist  Date:    01/25/2023  Time:    08:09               Narrative:    EXAMINATION:  CT Head Without Contrast    CLINICAL HISTORY:  Pain; Headache    TECHNIQUE:  Imaging protocol: Computed tomography of the head without contrast. Radiation optimization: All CT scans at this facility use at least one of these dose optimization techniques: automated exposure control; mA and/or kV adjustment per patient size (includes targeted exams where dose is matched to clinical indication); or iterative reconstruction. Other protocol: This patient has received 0 known CTs and 0 known cardiac nuclear medicine studies in the 12 months prior to the current study.    COMPARISON:  No relevant prior studies available.    FINDINGS:  Brain: Anteromedial left frontal lobe  chronic infarction is present. Mild atrophy and mild white matter chronic microvascular changes are noted. No hemorrhage or evidence of acute infarction. Cerebral ventricles: No ventriculomegaly. Paranasal sinuses: Visualized sinuses are unremarkable. No fluid levels. Mastoid air cells: Visualized mastoid air cells are well aerated. Bones/joints: Unremarkable. No acute fracture. Soft tissues: Unremarkable.                                       X-Ray Chest AP Portable (Final result)  Result time 01/25/23 01:54:53      Final result by Chaim Blum MD (01/25/23 01:54:53)                   Impression:      Mild plate atelectasis or scarring within the lingula, otherwise no acute cardiopulmonary process identified.  No significant change compared to recent CT chest from 01/23/2023.      Electronically signed by: Chaim Blum MD  Date:    01/25/2023  Time:    01:54               Narrative:    EXAMINATION:  XR CHEST AP PORTABLE    CLINICAL HISTORY:  Sepsis;    TECHNIQUE:  Single frontal view of the chest was performed.    COMPARISON:  Recent CT chest 01/23/2023.    FINDINGS:  Cardiac silhouette is normal in size.  Right-sided chest port is visualized with distal tip over the SVC.  Lungs are symmetrically expanded.  Mild coarse interstitial lung changes are seen.  Mild plate atelectasis or scarring is seen within the left mid lung zone which corresponded with the lingula on recent CT.  Otherwise no evidence of new focal consolidative process, pneumothorax, or large pleural effusion.  No acute osseous abnormality identified.

## 2023-01-25 NOTE — H&P
[facility] Medicine  History & Physical    Patient Name: Manuel Tyler  MRN: [id number]  Patient Class: IP- Inpatient  Admission Date: 1/25/2023  Attending Physician: Vini Hayes MD   Primary Care Provider: Victor M Stokes MD         Patient information was obtained from patient, relative(s), past medical records and ER records.     Subjective:     Principal Problem:Severe sepsis    Chief Complaint:   Chief Complaint   Patient presents with    Altered Mental Status        HPI:   Manuel Tyler is a 72 y.o. 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  Patient received his 1st round of chemotherapy two days ago on 01/23, the next day the patient became altered around 10 30 at nighttime.  Patient was walking with his walker and dragging his feet, unable to answer basic questions; he was alert but he was confused.  He reports 1 episode of blood in his stool, hemoglobin 14.8 on arrival.  WBC count elevated at 28 K, with mildly elevated procalcitonin of 1.26 with normal renal function.  Patient had dips of SBP to 84 while in the ED, so was placed in the ICU for close monitoring with concerns of sepsis.  Workup unremarkable.  His kidneys had nonspecific perinephritic stranding, however his urine was pristine with no sign of infection.  Renal ultrasound unremarkable.  He was afebrile upon arrival, with no subjective fevers or chills either.  Denies chest pain or shortness of breath. follow up EGD with PD stent removal was needed.       Past Medical History:   Diagnosis Date    Hyperlipidemia     Hypertension     Other specified noninfective gastroenteritis and colitis     Pancreatitis     Personal history of colonic polyps        Past Surgical History:   Procedure Laterality Date    COLONOSCOPY      ENDOSCOPIC ULTRASOUND OF  UPPER GASTROINTESTINAL TRACT Left 12/30/2022    Procedure: ULTRASOUND, UPPER GI TRACT, ENDOSCOPIC;  Surgeon: Gregory Cristina MD;  Location: [facility] ENDO (2ND FLR);  Service: Endoscopy;  Laterality: Left;    ERCP Left 12/30/2022    Procedure: ERCP (ENDOSCOPIC RETROGRADE CHOLANGIOPANCREATOGRAPHY);  Surgeon: Gregory Cristina MD;  Location: [facility] ENDO (2ND FLR);  Service: Endoscopy;  Laterality: Left;    FOOT SURGERY Left     INSERTION OF TUNNELED CENTRAL VENOUS CATHETER (CVC) WITH SUBCUTANEOUS PORT Bilateral 1/19/2023    Procedure: [id number];  Surgeon: Amador Castellon MD;  Location: [facility] OR;  Service: General;  Laterality: Bilateral;  Right v Left PORTACATH. needs ultrasound and fluoro  RN PREOP 01/18/2023    TONSILLECTOMY         Review of patient's allergies indicates:  No Known Allergies    Current Facility-Administered Medications on File Prior to Encounter   Medication    [COMPLETED] gemcitabine 1,585 mg in sodium chloride 0.9% SolP 326.6855 mL chemo infusion    heparin, porcine (PF) 100 unit/mL injection flush 500 Units    [COMPLETED] PACLitaxeL protein-bound (ABRAXANE) 200 mg in 40 mL infusion    sodium chloride 0.9% flush 10 mL     Current Outpatient Medications on File Prior to Encounter   Medication Sig    ergocalciferol (ERGOCALCIFEROL) 50,000 unit Cap Take 50,000 Units by mouth every 7 days.    hydrALAZINE (APRESOLINE) 25 MG tablet Take 3 tablets (75 mg total) by mouth every 8 (eight) hours.    citalopram (CELEXA) 20 MG tablet Take 20 mg by mouth once daily.    diltiazem (CARDIZEM LA) 180 mg 24 hr tablet Take 240 mg by mouth once daily.    HYDROcodone-acetaminophen (NORCO) 5-325 mg per tablet Take 1 tablet by mouth every 6 (six) hours as needed for Pain.    ondansetron (ZOFRAN) 4 MG tablet Take 1 tablet (4 mg total) by mouth every 8 (eight) hours as needed for Nausea.    oxyCODONE (ROXICODONE) 5 MG immediate release tablet Take 1 tablet (5 mg total) by mouth every 6 (six) hours as needed  for Pain.    pravastatin (PRAVACHOL) 20 MG tablet Take 20 mg by mouth nightly.    venlafaxine (EFFEXOR-XR) 37.5 MG 24 hr capsule Take 37.5 mg by mouth once daily.    venlafaxine (EFFEXOR-XR) 75 MG 24 hr capsule      Family History       Problem Relation (Age of Onset)    Cancer Father (69)          Tobacco Use    Smoking status: Every Day     Packs/day: 1.00     Types: Cigarettes    Smokeless tobacco: Not on file   Substance and Sexual Activity    Alcohol use: Yes     Comment: last drink 12/22/22    Drug use: Never    Sexual activity: Not on file     Review of Systems   Constitutional:  Positive for activity change and fatigue. Negative for fever and unexpected weight change.   HENT:  Negative for trouble swallowing and voice change.    Eyes:  Negative for photophobia and visual disturbance.   Respiratory:  Negative for cough and shortness of breath.    Cardiovascular:  Negative for chest pain, palpitations and leg swelling.   Gastrointestinal:  Negative for abdominal distention, abdominal pain, blood in stool, constipation, diarrhea, nausea and vomiting.   Endocrine: Negative for polydipsia and polyuria.   Genitourinary:  Negative for difficulty urinating, dysuria and hematuria.   Musculoskeletal:  Negative for neck pain and neck stiffness.   Skin:  Negative for pallor and rash.   Allergic/Immunologic: Positive for immunocompromised state.   Neurological:  Positive for weakness. Negative for seizures, syncope and facial asymmetry.   Psychiatric/Behavioral:  Positive for confusion. Negative for agitation and behavioral problems.    Objective:     Vital Signs (Most Recent):  Temp: 98.3 °F (36.8 °C) (01/25/23 1200)  Pulse: 98 (01/25/23 1300)  Resp: 20 (01/25/23 1300)  BP: 123/65 (01/25/23 1300)  SpO2: (!) 94 % (01/25/23 1300)   Vital Signs (24h Range):  Temp:  [98 °F (36.7 °C)-98.4 °F (36.9 °C)] 98.3 °F (36.8 °C)  Pulse:  [] 98  Resp:  [16-30] 20  SpO2:  [89 %-95 %] 94 %  BP: ()/(50-72) 123/65      Weight: 63.3 kg (139 lb 8.8 oz)  Body mass index is 21.22 kg/m².    Physical Exam  Vitals reviewed.   Constitutional:       General: He is not in acute distress.     Appearance: He is well-developed and normal weight. He is ill-appearing. He is not toxic-appearing or diaphoretic.   HENT:      Head: Normocephalic and atraumatic.   Eyes:      General: No scleral icterus.     Pupils: Pupils are equal, round, and reactive to light.   Neck:      Thyroid: No thyromegaly.   Cardiovascular:      Rate and Rhythm: Normal rate and regular rhythm.      Heart sounds: No murmur heard.    No gallop.   Pulmonary:      Effort: Pulmonary effort is normal.      Breath sounds: Normal breath sounds. No stridor. No wheezing or rales.   Abdominal:      General: There is no distension.      Palpations: Abdomen is soft.      Tenderness: There is no abdominal tenderness.   Musculoskeletal:         General: No swelling or deformity. Normal range of motion.      Cervical back: Normal range of motion. No rigidity.      Right lower leg: No edema.      Left lower leg: No edema.   Lymphadenopathy:      Cervical: No cervical adenopathy.   Skin:     General: Skin is warm.      Capillary Refill: Capillary refill takes less than 2 seconds.      Coloration: Skin is not jaundiced.      Findings: No bruising.   Neurological:      Mental Status: He is alert and oriented to person, place, and time.      Motor: Weakness present.      Gait: Gait abnormal.   Psychiatric:         Mood and Affect: Mood normal.         Behavior: Behavior normal.         CRANIAL NERVES     CN III, IV, VI   Pupils are equal, round, and reactive to light.         Recent Results (from the past 24 hour(s))   Blood culture x two cultures. Draw prior to antibiotics.    Collection Time: 01/25/23  1:29 AM    Specimen: Peripheral, Antecubital, Left; Blood   Result Value Ref Range    Blood Culture, Routine No Growth to date    CBC auto differential    Collection Time: 01/25/23  1:29 AM    Result Value Ref Range    WBC 27.81 (H) 3.90 - 12.70 K/uL    RBC 4.53 (L) 4.60 - 6.20 M/uL    Hemoglobin 14.8 14.0 - 18.0 g/dL    Hematocrit 42.2 40.0 - 54.0 %    MCV 93 82 - 98 fL    MCH 32.7 (H) 27.0 - 31.0 pg    MCHC 35.1 32.0 - 36.0 g/dL    RDW 13.4 11.5 - 14.5 %    Platelets 385 150 - 450 K/uL    MPV 9.8 9.2 - 12.9 fL    Immature Granulocytes 0.6 (H) 0.0 - 0.5 %    Gran # (ANC) 25.2 (H) 1.8 - 7.7 K/uL    Immature Grans (Abs) 0.18 (H) 0.00 - 0.04 K/uL    Lymph # 0.6 (L) 1.0 - 4.8 K/uL    Mono # 1.6 (H) 0.3 - 1.0 K/uL    Eos # 0.2 0.0 - 0.5 K/uL    Baso # 0.10 0.00 - 0.20 K/uL    nRBC 0 0 /100 WBC    Gran % 90.6 (H) 38.0 - 73.0 %    Lymph % 2.2 (L) 18.0 - 48.0 %    Mono % 5.6 4.0 - 15.0 %    Eosinophil % 0.6 0.0 - 8.0 %    Basophil % 0.4 0.0 - 1.9 %    Differential Method Automated    Comprehensive metabolic panel    Collection Time: 01/25/23  1:29 AM   Result Value Ref Range    Sodium 132 (L) 136 - 145 mmol/L    Potassium 4.5 3.5 - 5.1 mmol/L    Chloride 100 95 - 110 mmol/L    CO2 20 (L) 23 - 29 mmol/L    Glucose 109 70 - 110 mg/dL    BUN 18 8 - 23 mg/dL    Creatinine 0.9 0.5 - 1.4 mg/dL    Calcium 9.6 8.7 - 10.5 mg/dL    Total Protein 6.9 6.0 - 8.4 g/dL    Albumin 2.9 (L) 3.5 - 5.2 g/dL    Total Bilirubin 0.8 0.1 - 1.0 mg/dL    Alkaline Phosphatase 149 (H) 55 - 135 U/L    AST 51 (H) 10 - 40 U/L    ALT 34 10 - 44 U/L    Anion Gap 12 8 - 16 mmol/L    eGFR >60 >60 mL/min/1.73 m^2   Lactic acid, plasma #1    Collection Time: 01/25/23  1:29 AM   Result Value Ref Range    Lactate (Lactic Acid) 2.0 0.5 - 2.2 mmol/L   Magnesium    Collection Time: 01/25/23  1:29 AM   Result Value Ref Range    Magnesium 1.7 1.6 - 2.6 mg/dL   Phosphorus    Collection Time: 01/25/23  1:29 AM   Result Value Ref Range    Phosphorus 3.0 2.7 - 4.5 mg/dL   Brain natriuretic peptide    Collection Time: 01/25/23  1:29 AM   Result Value Ref Range     (H) 0 - 99 pg/mL   Troponin I    Collection Time: 01/25/23  1:29 AM   Result Value Ref  Range    Troponin I 0.018 0.000 - 0.026 ng/mL   Lipase    Collection Time: 01/25/23  1:29 AM   Result Value Ref Range    Lipase 29 4 - 60 U/L   Procalcitonin    Collection Time: 01/25/23  1:29 AM   Result Value Ref Range    Procalcitonin 1.26 (H) <0.25 ng/mL   Acetaminophen level    Collection Time: 01/25/23  1:29 AM   Result Value Ref Range    Acetaminophen (Tylenol), Serum <3.0 (L) 10.0 - 20.0 ug/mL   Ethanol    Collection Time: 01/25/23  1:29 AM   Result Value Ref Range    Alcohol, Serum <10 <10 mg/dL   Salicylate Level    Collection Time: 01/25/23  1:29 AM   Result Value Ref Range    Salicylate Lvl <5.0 (L) 15.0 - 30.0 mg/dL   Blood culture x two cultures. Draw prior to antibiotics.    Collection Time: 01/25/23  1:50 AM    Specimen: Peripheral, Upper Arm, Right; Blood   Result Value Ref Range    Blood Culture, Routine No Growth to date    D dimer, quantitative    Collection Time: 01/25/23  1:50 AM   Result Value Ref Range    D-Dimer 5.39 (H) <0.50 mg/L FEU   ISTAT PROCEDURE    Collection Time: 01/25/23  1:53 AM   Result Value Ref Range    POC PH 7.417 7.35 - 7.45    POC PCO2 36.2 35 - 45 mmHg    POC PO2 51 40 - 60 mmHg    POC HCO3 23.3 (L) 24 - 28 mmol/L    POC BE -1 -2 to 2 mmol/L    POC SATURATED O2 87 (L) 95 - 100 %    POC TCO2 24 24 - 29 mmol/L    Sample VENOUS     Site Other     Allens Test N/A     DelSys Nasal Can     Mode SPONT     Flow 2    ISTAT Lactate    Collection Time: 01/25/23  1:54 AM   Result Value Ref Range    POC Lactate 1.46 0.5 - 2.2 mmol/L    Sample VENOUS     Site Other     Allens Test N/A     DelSys Nasal Can     Mode SPONT     Flow 2    POCT COVID-19 Rapid Screening    Collection Time: 01/25/23  2:29 AM   Result Value Ref Range    POC Rapid COVID Negative Negative     Acceptable Yes    POCT Influenza A/B Molecular    Collection Time: 01/25/23  2:29 AM   Result Value Ref Range    POC Molecular Influenza A Ag Negative Negative, Not Reported    POC Molecular Influenza B Ag  Negative Negative, Not Reported     Acceptable Yes    Lactic acid, plasma #2    Collection Time: 01/25/23  5:08 AM   Result Value Ref Range    Lactate (Lactic Acid) 2.2 0.5 - 2.2 mmol/L   Urinalysis, Reflex to Urine Culture Urine, Clean Catch    Collection Time: 01/25/23  5:08 AM    Specimen: Urine   Result Value Ref Range    Specimen UA Urine, Clean Catch     Color, UA Yellow Yellow, Straw, Lennie    Appearance, UA Clear Clear    pH, UA 6.0 5.0 - 8.0    Specific Gravity, UA >1.030 (A) 1.005 - 1.030    Protein, UA Negative Negative    Glucose, UA Negative Negative    Ketones, UA Negative Negative    Bilirubin (UA) Negative Negative    Occult Blood UA Negative Negative    Nitrite, UA Negative Negative    Urobilinogen, UA Negative <2.0 EU/dL    Leukocytes, UA Negative Negative   Drug screen panel, emergency    Collection Time: 01/25/23  5:08 AM   Result Value Ref Range    Benzodiazepines Negative Negative    Methadone metabolites Negative Negative    Cocaine (Metab.) Negative Negative    Opiate Scrn, Ur Negative Negative    Barbiturate Screen, Ur Negative Negative    Amphetamine Screen, Ur Negative Negative    THC Negative Negative    Phencyclidine Negative Negative    Creatinine, Urine 29.2 23.0 - 375.0 mg/dL    Toxicology Information SEE COMMENT    Echo Saline Bubble? No    Collection Time: 01/25/23  1:12 PM   Result Value Ref Range    BSA 1.74 m2    TDI SEPTAL 0.06 m/s    LV LATERAL E/E' RATIO 9.80 m/s    LV SEPTAL E/E' RATIO 16.33 m/s    IVC diameter 1.71 cm    Left Ventricular Outflow Tract Mean Velocity 0.88 cm/s    Left Ventricular Outflow Tract Mean Gradient 3.31 mmHg    TDI LATERAL 0.10 m/s    PV PEAK VELOCITY 0.74 cm/s    LVIDd 4.25 3.5 - 6.0 cm    IVS 1.17 (A) 0.6 - 1.1 cm    Posterior Wall 1.22 (A) 0.6 - 1.1 cm    LVIDs 3.11 2.1 - 4.0 cm    FS 27 28 - 44 %    Sinus 3.86 cm    STJ 2.73 cm    Ascending aorta 3.36 cm    LV mass 180.30 g    LA size 3.93 cm    RVDD 3.46 cm    TAPSE 1.62 cm    RV  S' 0.01 cm/s    Left Ventricle Relative Wall Thickness 0.57 cm    AV mean gradient 5 mmHg    AV valve area 3.90 cm2    AV Velocity Ratio 0.82     AV index (prosthetic) 0.95     MV valve area p 1/2 method 4.25 cm2    E/A ratio 0.97     Mean e' 0.08 m/s    E wave deceleration time 178.44 msec    IVRT 74.22 msec    LVOT diameter 2.29 cm    LVOT area 4.1 cm2    LVOT peak jose de jesus 1.17 m/s    LVOT peak VTI 23.80 cm    Ao peak jose de jesus 1.42 m/s    Ao VTI 25.1 cm    LVOT stroke volume 97.98 cm3    AV peak gradient 8 mmHg    E/E' ratio 12.25 m/s    MV Peak E Jose De Jesus 0.98 m/s    TR Max Jose De Jesus 2.75 m/s    MV stenosis pressure 1/2 time 51.75 ms    MV Peak A Jose De Jesus 1.01 m/s    LV Systolic Volume 38.34 mL    LV Systolic Volume Index 21.9 mL/m2    LV Diastolic Volume 80.81 mL    LV Diastolic Volume Index 46.18 mL/m2    LV Mass Index 103 g/m2    RA Major Axis 5.14 cm    Left Atrium Minor Axis 5.50 cm    Left Atrium Major Axis 5.27 cm    Triscuspid Valve Regurgitation Peak Gradient 30 mmHg    LA WIDTH 4.70 cm    LA volume 84.51 cm3    LA Volume Index 48.3 mL/m2    RA Width 3.50 cm       Microbiology Results (last 7 days)       Procedure Component Value Units Date/Time    Blood culture x two cultures. Draw prior to antibiotics. [[id number]] Collected: 01/25/23 0150    Order Status: Completed Specimen: Blood from Peripheral, Upper Arm, Right Updated: 01/25/23 0912     Blood Culture, Routine No Growth to date    Narrative:      Aerobic and anaerobic    Blood culture x two cultures. Draw prior to antibiotics. [[id number]] Collected: 01/25/23 0129    Order Status: Completed Specimen: Blood from Peripheral, Antecubital, Left Updated: 01/25/23 0912     Blood Culture, Routine No Growth to date    Narrative:      Aerobic and anaerobic    Culture, Respiratory with Gram Stain [[id number]]     Order Status: No result Specimen: Sputum, Expectorated              Imaging Results              CTA Chest Non-Coronary (PE Studies) (Final result)  Result time 01/25/23  04:05:46      Final result by Servando Christianson MD (01/25/23 04:05:46)                   Impression:      There is no large central saddle type pulmonary embolus, there is no additional evidence for pulmonary embolus on this exam.    Pulmonary nodules are noted.  For multiple solid nodules with any 6 mm or greater, Fleischner Society guidelines recommend follow up with non-contrast chest CT at 3-6 months and 18-24 months after discovery.    Additional findings as above.      Electronically signed by: Servando Christianson  Date:    01/25/2023  Time:    04:05               Narrative:    EXAMINATION:  CTA CHEST NON CORONARY (PE STUDIES)    CLINICAL HISTORY:  Pulmonary embolism (PE) suspected, positive D-dimer;    TECHNIQUE:  Low dose axial images, sagittal and coronal reformations were obtained from the thoracic inlet to the lung bases following the IV administration of 75 mL of Omnipaque 350.  Contrast timing was optimized to evaluate the pulmonary arteries.  MIP images were performed.    COMPARISON:  Chest radiograph January 25, 2023, CT examination of the chest January 23, 2023    FINDINGS:  There is no large central saddle type pulmonary embolus.  The pulmonary arterial vascular demonstrate opacification without evidence for abnormal pattern of pulmonary arterial filling defects specific for pulmonary emboli.    There is a right-sided central venous catheter again noted.  There is a configuration of the trachea that may relate to saber sheath configuration, stable appearance.  The thoracic aorta demonstrates atherosclerotic change.  Coronary arterial atherosclerotic change noted as well.  Minimal pericardial thickening or fluid noted.  There are multiple small mediastinal lymph nodes.    Emphysematous change of the lungs again noted.  There is a bandlike area of opacity involving the left upper lobe and extending to the lingular segment likely relating to component of scar and atelectasis.  Additional dependent  atelectatic changes are noted.  There are pulmonary nodules again noted.  The most prominent is seen at the right lung base, right middle lobe anteriorly, measuring approximately 6.9 mm in size.  There is no evidence for pneumothorax.  There is no evidence for pleural effusion.    Limited imaging of the upper abdomen demonstrates appearance of pneumobilia.  There is nonspecific perinephric stranding, left greater than right for which clinical and historical correlation is needed.    The osseous structures demonstrate chronic change, vertebral body height loss with superior endplate concavity at T12 again noted.                                       CT Head Without Contrast (Final result)  Result time 01/25/23 08:09:37      Final result by RADIOLOGISTANTOINETTE (01/25/23 08:09:37)                   Impression:      No acute intracranial abnormality.      Electronically signed by: Antoinette Radiologist  Date:    01/25/2023  Time:    08:09               Narrative:    EXAMINATION:  CT Head Without Contrast    CLINICAL HISTORY:  Pain; Headache    TECHNIQUE:  Imaging protocol: Computed tomography of the head without contrast. Radiation optimization: All CT scans at this facility use at least one of these dose optimization techniques: automated exposure control; mA and/or kV adjustment per patient size (includes targeted exams where dose is matched to clinical indication); or iterative reconstruction. Other protocol: This patient has received 0 known CTs and 0 known cardiac nuclear medicine studies in the 12 months prior to the current study.    COMPARISON:  No relevant prior studies available.    FINDINGS:  Brain: Anteromedial left frontal lobe chronic infarction is present. Mild atrophy and mild white matter chronic microvascular changes are noted. No hemorrhage or evidence of acute infarction. Cerebral ventricles: No ventriculomegaly. Paranasal sinuses: Visualized sinuses are unremarkable. No fluid levels. Mastoid air  cells: Visualized mastoid air cells are well aerated. Bones/joints: Unremarkable. No acute fracture. Soft tissues: Unremarkable.                                       X-Ray Chest AP Portable (Final result)  Result time 01/25/23 01:54:53      Final result by Chaim Blum MD (01/25/23 01:54:53)                   Impression:      Mild plate atelectasis or scarring within the lingula, otherwise no acute cardiopulmonary process identified.  No significant change compared to recent CT chest from 01/23/2023.      Electronically signed by: Chaim Blum MD  Date:    01/25/2023  Time:    01:54               Narrative:    EXAMINATION:  XR CHEST AP PORTABLE    CLINICAL HISTORY:  Sepsis;    TECHNIQUE:  Single frontal view of the chest was performed.    COMPARISON:  Recent CT chest 01/23/2023.    FINDINGS:  Cardiac silhouette is normal in size.  Right-sided chest port is visualized with distal tip over the SVC.  Lungs are symmetrically expanded.  Mild coarse interstitial lung changes are seen.  Mild plate atelectasis or scarring is seen within the left mid lung zone which corresponded with the lingula on recent CT.  Otherwise no evidence of new focal consolidative process, pneumothorax, or large pleural effusion.  No acute osseous abnormality identified.                                          Assessment/Plan:     * Severe sepsis  This patient does have evidence of infective focus  My overall impression is septic shock. Vital signs were reviewed and noted in progress note.  Antibiotics given-   Antibiotics (From admission, onward)    Start     Stop Route Frequency Ordered    01/26/23 0300  vancomycin (VANCOCIN) 1,750 mg in dextrose 5 % (D5W) 500 mL IVPB         -- IV Every 24 hours (non-standard times) 01/25/23 0521    01/25/23 0900  mupirocin 2 % ointment         01/30 0859 Nasl 2 times daily 01/25/23 0624    01/25/23 0618  vancomycin - pharmacy to dose  (vancomycin IVPB)        See Hyperspace for full Linked Orders  Report.    -- IV pharmacy to manage frequency 01/25/23 0518    01/25/23 0215  piperacillin-tazobactam (ZOSYN) 4.5 g in dextrose 5 % in water (D5W) 5 % 100 mL IVPB (MB+)  (ED Adult Sepsis Treatment)         01/26 0214 IV Every 8 hours (non-standard times) 01/25/23 0200        Cultures were taken-   Microbiology Results (last 7 days)     Procedure Component Value Units Date/Time    Blood culture x two cultures. Draw prior to antibiotics. [[id number]] Collected: 01/25/23 0150    Order Status: Completed Specimen: Blood from Peripheral, Upper Arm, Right Updated: 01/25/23 0912     Blood Culture, Routine No Growth to date    Narrative:      Aerobic and anaerobic    Blood culture x two cultures. Draw prior to antibiotics. [[id number]] Collected: 01/25/23 0129    Order Status: Completed Specimen: Blood from Peripheral, Antecubital, Left Updated: 01/25/23 0912     Blood Culture, Routine No Growth to date    Narrative:      Aerobic and anaerobic    Culture, Respiratory with Gram Stain [[id number]]     Order Status: No result Specimen: Sputum, Expectorated         Latest lactate reviewed, they are-  Recent Labs   Lab 01/25/23  0129 01/25/23  0508   LACTATE 2.0 2.2       Organ dysfunction indicated by Encephalopathy   Source- ?kidney, ?bacteremia    · 3/4 SIRS: , RR 24, and WBC 28 K. Afebrile.  BP drop to map 61.  · Source control Achieved by- Abx and fluids  · Kidneys with perinephritic stranding, however UA was clean  · Continuing V+Z for now, wean as able, f/u blcx      AMS (altered mental status)  · CTH w/o acute abnormality   · Likely from Sepsis  · Potentially direct toxicity of Chemotherapy  · Improved with fluids and ABx      (HFpEF) heart failure with preserved ejection fraction  · No previous ECHO to compare  · Indeterminant diastolic dysfunction with elevated BNP of 642  · Will give one dose of IV lasix after sepsis fliuds, single dose as needed    Liver metastases  · Noted Mets      Malignant neoplasm of head of  pancreas  · Initiated Gemcitabine and Paclitaxel on 1/23  · Consider Oncology consult, may need 2nd tx pushed back      Alcohol abuse  Stable, no signs of w/d      Hyperlipidemia  · Not taking statin      Primary hypertension  · Not currently on BP meds  · Currently hypotensive, monitor         VTE Risk Mitigation (From admission, onward)         Ordered     enoxaparin injection 40 mg  Daily         01/25/23 0522     IP VTE HIGH RISK PATIENT  Once         01/25/23 0522     Place sequential compression device  Until discontinued         01/25/23 0522     Place FRANCES hose  Until discontinued         01/25/23 0522              Critical care time spent on the evaluation and treatment of severe organ dysfunction, review of pertinent labs and imaging studies, discussions with consulting providers and discussions with patient/family: 35 minutes.     Vini Hayes MD  Department of Hospital Medicine   [facility] - Intensive Care

## 2023-01-25 NOTE — ASSESSMENT & PLAN NOTE
This patient does have evidence of infective focus  My overall impression is septic shock. Vital signs were reviewed and noted in progress note.  Antibiotics given-   Antibiotics (From admission, onward)    Start     Stop Route Frequency Ordered    01/26/23 0300  vancomycin (VANCOCIN) 1,750 mg in dextrose 5 % (D5W) 500 mL IVPB         -- IV Every 24 hours (non-standard times) 01/25/23 0521    01/25/23 0900  mupirocin 2 % ointment         01/30 0859 Nasl 2 times daily 01/25/23 0624    01/25/23 0618  vancomycin - pharmacy to dose  (vancomycin IVPB)        See Hyperspace for full Linked Orders Report.    -- IV pharmacy to manage frequency 01/25/23 0518    01/25/23 0215  piperacillin-tazobactam (ZOSYN) 4.5 g in dextrose 5 % in water (D5W) 5 % 100 mL IVPB (MB+)  (ED Adult Sepsis Treatment)         01/26 0214 IV Every 8 hours (non-standard times) 01/25/23 0200        Cultures were taken-   Microbiology Results (last 7 days)     Procedure Component Value Units Date/Time    Blood culture x two cultures. Draw prior to antibiotics. [[id number]] Collected: 01/25/23 0150    Order Status: Completed Specimen: Blood from Peripheral, Upper Arm, Right Updated: 01/25/23 0912     Blood Culture, Routine No Growth to date    Narrative:      Aerobic and anaerobic    Blood culture x two cultures. Draw prior to antibiotics. [[id number]] Collected: 01/25/23 0129    Order Status: Completed Specimen: Blood from Peripheral, Antecubital, Left Updated: 01/25/23 0912     Blood Culture, Routine No Growth to date    Narrative:      Aerobic and anaerobic    Culture, Respiratory with Gram Stain [[id number]]     Order Status: No result Specimen: Sputum, Expectorated         Latest lactate reviewed, they are-  Recent Labs   Lab 01/25/23  0129 01/25/23  0508   LACTATE 2.0 2.2       Organ dysfunction indicated by Encephalopathy   Source- ?kidney, ?bacteremia    · 3/4 SIRS: , RR 24, and WBC 28 K. Afebrile.  BP drop to map 61.  · Source control Achieved  by- Abx and fluids  · Kidneys with perinephritic stranding, however UA was clean  · Continuing V+Z for now, wean as able, f/u blcx

## 2023-01-25 NOTE — ED TRIAGE NOTES
Arrives w family complaints of AMS, reports pt. Last known well time was this evening around 5pm after chemo. Per family at bedside, pt. Woke up confused and difficult to understand. Pt. Has hx of pancreatic CA and had first round of chemo yesterday. Tachycardic on arrival, oxygen saturation 88% on RA, pt. Awake and alert able to answer yes and no questions. Placed on 2L n/c improvement of oxygen to 93%.

## 2023-01-25 NOTE — PROVIDER TRANSFER
Patient just started on chemotherapy 2 days ago for pancreatic cancer with Mets to the liver.  Admitted with concerns of sepsis, although no definitive source identified.  On vanc and Zosyn.    3/4 SIRS: , RR 24, and WBC 28 K. Afebrile.    BP drop to map 61, watched in ICU, improvement of BP and MS    Follow-up blood cultures, reduce antibiotics as able  Follow-up oncology recommendations  Single dose Lasix, if needed          Vini Hayes MD  Internal Medicine Staff

## 2023-01-25 NOTE — PLAN OF CARE
[facility] - Intensive Care  Initial Discharge Assessment    Patient from home and lives with spouse, who will be his help at home. Pt current with [facility]. Pt has rw. [place] to continue to follow.        Primary Care Provider: Victor M Stokes MD    Admission Diagnosis: Hypoxia [R09.02]  Elevated brain natriuretic peptide (BNP) level [R79.89]  Chest pain [R07.9]  Sepsis [A41.9]    Admission Date: 1/25/2023  Expected Discharge Date:     Discharge Barriers Identified: None    Payor: MEDICARE / Plan: MEDICARE PART A & B / Product Type: Government /     Extended Emergency Contact Information  Primary Emergency Contact: Vesta Tyler   [facility]  Home Phone: [phone]  Relation: Spouse  Secondary Emergency Contact: Taz Tyler  Mobile Phone: [phone]  Relation: Son  Preferred language: English   needed? No    Discharge Plan A: Home Health  Discharge Plan B: Home with family      [id number] - [place], [place] - [facility] [address] AT [facility] [address]  [address]  [place] [address]  Phone: [phone] Fax: [phone]      Initial Assessment (most recent)       Adult Discharge Assessment - 01/25/23 1449          Discharge Assessment    Assessment Type Discharge Planning Assessment     Confirmed/corrected address, phone number and insurance Yes     Confirmed Demographics Correct on Facesheet     Source of Information patient;family     Does patient/caregiver understand observation status No     Was observation education provided? No     Communicated MIKO with patient/caregiver Date not available/Unable to determine     Reason For Admission Severe sepsis     People in Home spouse     Facility Arrived From: Home     Do you expect to return to your current living situation? Yes     Do you have help at home or someone to help you manage your care at home? Yes     Who are your caregiver(s) and their phone number(s)? Vesta Tyler (Spouse)   [phone]     Prior to  hospitilization cognitive status: Alert/Oriented     Current cognitive status: Alert/Oriented     Walking or Climbing Stairs ambulation difficulty, requires equipment     Equipment Currently Used at Home walker, rolling     Readmission within 30 days? Yes     Patient currently being followed by outpatient case management? No     Do you currently have service(s) that help you manage your care at home? Yes     Name and Contact number of agency [facility]     Is the pt/caregiver preference to resume services with current agency Yes     Do you take prescription medications? Yes     Do you have prescription coverage? Yes     Coverage Medicare     Do you have any problems affording any of your prescribed medications? No     Is the patient taking medications as prescribed? yes     Who is going to help you get home at discharge? Vesta Tyler (Spouse)   [phone]     How do you get to doctors appointments? family or friend will provide     Are you on dialysis? No     Do you take coumadin? No     Discharge Plan A Home Health     Discharge Plan B Home with family     DME Needed Upon Discharge  other (see comments)   TBD    Discharge Plan discussed with: Patient;Spouse/sig other     Name(s) and Number(s) Vesta Tyler (Spouse)   [phone]     Discharge Barriers Identified None                     Readmission Assessment (most recent)       Readmission Assessment - 01/25/23 1451          Readmission    Why were you hospitalized in the last 30 days? acute pancreatitis, (P)      Why were you readmitted? Alarmed about signs/symptoms (P)      When you left the hospital where did you go? Home with Home Health (P)      Did patient/caregiver refused recommended DC plan? No (P)      Tell me about what happened between when you left the hospital and the day you returned. Began feeling confused and weakness (P)      When did you start not feeling well? about a week ago. (P)      Did you try to manage your symptoms your self? No  (P)      Did you call anyone? Yes (P)      Who did you call? On Call Nurse (P)      Did you try to see or did see a doctor or nurse before you came? No (P)      Why? Nurse care line instructed pt to present to ER (P)      Did you have  a follow-up appointment on discharge? Yes (P)      Did you go? Yes (P)      Was this a planned readmission? No (P)                              01/25/23 1451   Readmission   Why were you hospitalized in the last 30 days? acute pancreatitis,   Why were you readmitted? Alarmed about signs/symptoms   When you left the hospital where did you go? Home with Home Health   Did patient/caregiver refused recommended DC plan? No   Tell me about what happened between when you left the hospital and the day you returned. Began feeling confused and weakness   When did you start not feeling well? about a week ago.   Did you try to manage your symptoms your self? No   Did you call anyone? Yes   Who did you call? On Call Nurse   Did you try to see or did see a doctor or nurse before you came? No   Why? Nurse care line instructed pt to present to ER   Did you have  a follow-up appointment on discharge? Yes   Did you go? Yes   Was this a planned readmission? No

## 2023-01-25 NOTE — ED PROVIDER NOTES
"Encounter Date: 1/25/2023    SCRIBE #1 NOTE: I, Keith Horn, am scribing for, and in the presence of,  Bradnyn Felipe MD. I have scribed the following portions of the note - Other sections scribed: HPI, ROS, PE.     History     Chief Complaint   Patient presents with    Altered Mental Status     Pt's daughter in law reports pt became altered around 10:30pm tonight when waking up to use the restroom; pt had first round of chemo yesterday for stage 4 pancreatic cancer; pt's daughter in law reports pt usually is completely oriented and can ambulate independently with walker but tonight he is very stiff, dragging his feet, and having difficult answering simple questions; pt awake, alert, but appears confused     Manuel Tyler is a 72 y.o male with a PMHx, of HTN, HLD, pancreatitis, and pancreatic cancer, that comes to the ED for evaluation of altered mental status beginning yesterday around 2230. Patient's daughter in law reports the patient started chemotherapy for his pancreatic cancer yesterday, endorsing his first treatment ever was yesterday around 1410, after which she endorses the patient went home and fell asleep at around 16. Daughter in law reports the patient woke up around 2230 to use the restroom, with is wife assisting him and endorsing "he was stiff and wasn't able to walk properly without assistance." Additionally, daughter in law reports the patient was only able to answer yes or no questions, "but couldn't say anything else. Daughter in law also reports they checked the patient's temperature and note it was 99 degrees, as well as endorsing he has a cough "he has always had," and that is unchanged. Patient currently endorses no pain or visual disturbances, but states "his throat is dry" and reports he had an episode of blood in stool, but is unable to properly answer its onset. Daughter in law states the patient took his antihypertensives, antidepressants, and vitamin D prior to his chemotherapy, " "and zofran earlier tonight. She further notes he has been prescribed oxycodone, but did not take any tonight. No other medications taken PTA. No alleviating or exacerbating factors noted. Denies nausea, vomiting, diarrhea, black stool, fever, chest pain, or other associated symptoms. Denies history of COPD. Daughter in law endorses the patient had a CT scan on Monday, "but we don't know the results." Patient recently had an access port placed in his right chest, which daughter in law states was used yesterday for his chemotherapy. No known allergies.    The history is provided by the patient and a relative. No  was used.   Review of patient's allergies indicates:  No Known Allergies  Past Medical History:   Diagnosis Date    Hyperlipidemia     Hypertension     Other specified noninfective gastroenteritis and colitis     Pancreatitis     Personal history of colonic polyps      Past Surgical History:   Procedure Laterality Date    COLONOSCOPY      ENDOSCOPIC ULTRASOUND OF UPPER GASTROINTESTINAL TRACT Left 12/30/2022    Procedure: ULTRASOUND, UPPER GI TRACT, ENDOSCOPIC;  Surgeon: Gregory Cristina MD;  Location: [address]);  Service: Endoscopy;  Laterality: Left;    ERCP Left 12/30/2022    Procedure: ERCP (ENDOSCOPIC RETROGRADE CHOLANGIOPANCREATOGRAPHY);  Surgeon: Gregory Cristina MD;  Location: [address]);  Service: Endoscopy;  Laterality: Left;    FOOT SURGERY Left     INSERTION OF TUNNELED CENTRAL VENOUS CATHETER (CVC) WITH SUBCUTANEOUS PORT Bilateral 1/19/2023    Procedure: [id number];  Surgeon: Amador Castellon MD;  Location: [facility];  Service: General;  Laterality: Bilateral;  Right v Left PORTACATH. needs ultrasound and fluoro  RN PREOP 01/18/2023    TONSILLECTOMY       Family History   Problem Relation Age of Onset    Cancer Father 69        pancreatic cancer     Social History     Tobacco Use    Smoking status: Every Day     Packs/day: 1.00     Types: Cigarettes "   Substance Use Topics    Alcohol use: Yes     Comment: last drink 12/22/22    Drug use: Never     Review of Systems   Constitutional:  Negative for chills and fever.   HENT:  Negative for facial swelling and sore throat.    Eyes:  Negative for visual disturbance.   Respiratory:  Positive for cough (chronic, unchanged). Negative for shortness of breath.    Cardiovascular:  Negative for chest pain and palpitations.   Gastrointestinal:  Positive for blood in stool. Negative for abdominal pain, diarrhea, nausea and vomiting.   Genitourinary:  Negative for dysuria and hematuria.   Musculoskeletal:  Negative for back pain.   Skin:  Negative for rash.   Neurological:  Negative for weakness and headaches.   Hematological:  Does not bruise/bleed easily.   Psychiatric/Behavioral:  Positive for confusion.      Physical Exam     Initial Vitals [01/25/23 0105]   BP Pulse Resp Temp SpO2   (!) 101/58 (!) 135 16 98.4 °F (36.9 °C) (!) 89 %      MAP       --         Physical Exam    Nursing note and vitals reviewed.  Constitutional: He is not diaphoretic. No distress.   Frail appearing.   HENT:   Head: Normocephalic and atraumatic.   Right Ear: External ear normal.   Left Ear: External ear normal.   Nose: Nose normal.   Mouth/Throat: Mucous membranes are dry.   Eyes: Conjunctivae and EOM are normal. Pupils are equal, round, and reactive to light. No scleral icterus.   Neck: Neck supple. No tracheal deviation present.   No meningismus.    Cardiovascular:  Regular rhythm and normal heart sounds.   Tachycardia present.   Exam reveals no gallop and no friction rub.       No murmur heard.  Pulses:       Radial pulses are 2+ on the right side and 2+ on the left side.        Dorsalis pedis pulses are 2+ on the right side and 2+ on the left side.   Access port on right anterior chest wall that appears well healed, clean, dry, and intact with no surrounding erythema or induration noted.   Pulmonary/Chest: Breath sounds normal. No respiratory  distress.   Hypoxic at 88% on RA.   Abdominal: Abdomen is soft. Bowel sounds are normal. There is no abdominal tenderness.   Musculoskeletal:      Cervical back: Neck supple.      Comments: No pain with palpation of bilateral calves.     Neurological: He is alert. No cranial nerve deficit or sensory deficit. GCS eye subscore is 4. GCS verbal subscore is 5. GCS motor subscore is 6.   4/5 strength in bilateral lower extremities with drift to bed but equal.   GCS 14: slight confusion. Unable to correctly answer year and month, but able to answer name and birth date.  Slow to response.   Following commands.  No facial droop.  5/5  strength.  5/5 strength in bilateral upper extremities.     Skin: Skin is warm and dry.   Psychiatric: He has a normal mood and affect. Thought content normal. His speech is not slurred.       ED Course   Critical Care    Date/Time: 1/25/2023 5:09 AM  Performed by: Brandyn Felipe MD  Authorized by: Brandyn Felipe MD   Direct patient critical care time: 35 minutes  Additional history critical care time: 5 minutes  Ordering / reviewing critical care time: 5 minutes  Documentation critical care time: 5 minutes  Consulting other physicians critical care time: 5 minutes  Total critical care time (exclusive of procedural time) : 55 minutes  Critical care was necessary to treat or prevent imminent or life-threatening deterioration of the following conditions: sepsis.  Critical care was time spent personally by me on the following activities: review of old charts, re-evaluation of patient's condition, pulse oximetry, ordering and review of radiographic studies, ordering and review of laboratory studies, ordering and performing treatments and interventions, obtaining history from patient or surrogate, examination of patient, evaluation of patient's response to treatment, discussions with primary provider, discussions with consultants and development of treatment plan with patient or  surrogate.      Labs Reviewed   CBC W/ AUTO DIFFERENTIAL - Abnormal; Notable for the following components:       Result Value    WBC 27.81 (*)     RBC 4.53 (*)     MCH 32.7 (*)     Immature Granulocytes 0.6 (*)     Gran # (ANC) 25.2 (*)     Immature Grans (Abs) 0.18 (*)     Lymph # 0.6 (*)     Mono # 1.6 (*)     Gran % 90.6 (*)     Lymph % 2.2 (*)     All other components within normal limits   COMPREHENSIVE METABOLIC PANEL - Abnormal; Notable for the following components:    Sodium 132 (*)     CO2 20 (*)     Albumin 2.9 (*)     Alkaline Phosphatase 149 (*)     AST 51 (*)     All other components within normal limits   B-TYPE NATRIURETIC PEPTIDE - Abnormal; Notable for the following components:     (*)     All other components within normal limits   PROCALCITONIN - Abnormal; Notable for the following components:    Procalcitonin 1.26 (*)     All other components within normal limits   ACETAMINOPHEN LEVEL - Abnormal; Notable for the following components:    Acetaminophen (Tylenol), Serum <3.0 (*)     All other components within normal limits   SALICYLATE LEVEL - Abnormal; Notable for the following components:    Salicylate Lvl <5.0 (*)     All other components within normal limits   D DIMER, QUANTITATIVE - Abnormal; Notable for the following components:    D-Dimer 5.39 (*)     All other components within normal limits   ISTAT PROCEDURE - Abnormal; Notable for the following components:    POC HCO3 23.3 (*)     POC SATURATED O2 87 (*)     All other components within normal limits   CULTURE, BLOOD   CULTURE, BLOOD   CULTURE, RESPIRATORY   LACTIC ACID, PLASMA   MAGNESIUM   PHOSPHORUS   TROPONIN I   LIPASE   ALCOHOL,MEDICAL (ETHANOL)   LACTIC ACID, PLASMA   URINALYSIS, REFLEX TO URINE CULTURE   DRUG SCREEN PANEL, URINE EMERGENCY   SARS-COV-2 RDRP GENE   POCT INFLUENZA A/B MOLECULAR   ISTAT LACTATE          Imaging Results              CTA Chest Non-Coronary (PE Studies) (Final result)  Result time 01/25/23 04:05:46       Final result by Servando Christianson MD (01/25/23 04:05:46)                   Impression:      There is no large central saddle type pulmonary embolus, there is no additional evidence for pulmonary embolus on this exam.    Pulmonary nodules are noted.  For multiple solid nodules with any 6 mm or greater, Fleischner Society guidelines recommend follow up with non-contrast chest CT at 3-6 months and 18-24 months after discovery.    Additional findings as above.      Electronically signed by: Servando Christianson  Date:    01/25/2023  Time:    04:05               Narrative:    EXAMINATION:  CTA CHEST NON CORONARY (PE STUDIES)    CLINICAL HISTORY:  Pulmonary embolism (PE) suspected, positive D-dimer;    TECHNIQUE:  Low dose axial images, sagittal and coronal reformations were obtained from the thoracic inlet to the lung bases following the IV administration of 75 mL of Omnipaque 350.  Contrast timing was optimized to evaluate the pulmonary arteries.  MIP images were performed.    COMPARISON:  Chest radiograph January 25, 2023, CT examination of the chest January 23, 2023    FINDINGS:  There is no large central saddle type pulmonary embolus.  The pulmonary arterial vascular demonstrate opacification without evidence for abnormal pattern of pulmonary arterial filling defects specific for pulmonary emboli.    There is a right-sided central venous catheter again noted.  There is a configuration of the trachea that may relate to saber sheath configuration, stable appearance.  The thoracic aorta demonstrates atherosclerotic change.  Coronary arterial atherosclerotic change noted as well.  Minimal pericardial thickening or fluid noted.  There are multiple small mediastinal lymph nodes.    Emphysematous change of the lungs again noted.  There is a bandlike area of opacity involving the left upper lobe and extending to the lingular segment likely relating to component of scar and atelectasis.  Additional dependent atelectatic  changes are noted.  There are pulmonary nodules again noted.  The most prominent is seen at the right lung base, right middle lobe anteriorly, measuring approximately 6.9 mm in size.  There is no evidence for pneumothorax.  There is no evidence for pleural effusion.    Limited imaging of the upper abdomen demonstrates appearance of pneumobilia.  There is nonspecific perinephric stranding, left greater than right for which clinical and historical correlation is needed.    The osseous structures demonstrate chronic change, vertebral body height loss with superior endplate concavity at T12 again noted.                                       CT Head Without Contrast (In process)                      X-Ray Chest AP Portable (Final result)  Result time 01/25/23 01:54:53      Final result by Chaim Blum MD (01/25/23 01:54:53)                   Impression:      Mild plate atelectasis or scarring within the lingula, otherwise no acute cardiopulmonary process identified.  No significant change compared to recent CT chest from 01/23/2023.      Electronically signed by: Chaim Blum MD  Date:    01/25/2023  Time:    01:54               Narrative:    EXAMINATION:  XR CHEST AP PORTABLE    CLINICAL HISTORY:  Sepsis;    TECHNIQUE:  Single frontal view of the chest was performed.    COMPARISON:  Recent CT chest 01/23/2023.    FINDINGS:  Cardiac silhouette is normal in size.  Right-sided chest port is visualized with distal tip over the SVC.  Lungs are symmetrically expanded.  Mild coarse interstitial lung changes are seen.  Mild plate atelectasis or scarring is seen within the left mid lung zone which corresponded with the lingula on recent CT.  Otherwise no evidence of new focal consolidative process, pneumothorax, or large pleural effusion.  No acute osseous abnormality identified.                                       Medications   piperacillin-tazobactam (ZOSYN) 4.5 g in dextrose 5 % in water (D5W) 5 % 100 mL IVPB (MB+)  (0 g Intravenous Stopped 1/25/23 0256)   0.9%  NaCl infusion (has no administration in time range)   vancomycin - pharmacy to dose (has no administration in time range)   ergocalciferol capsule 50,000 Units (has no administration in time range)   vancomycin (VANCOCIN) 1,750 mg in dextrose 5 % (D5W) 500 mL IVPB (1,750 mg Intravenous Trough Due As Scheduled Before Dose 1/27/23 0200)   sodium chloride 0.9% flush 10 mL (has no administration in time range)   naloxone 0.4 mg/mL injection 0.02 mg (has no administration in time range)   glucose chewable tablet 16 g (has no administration in time range)   glucose chewable tablet 24 g (has no administration in time range)   glucagon (human recombinant) injection 1 mg (has no administration in time range)   dextrose 10% bolus 125 mL 125 mL (has no administration in time range)   dextrose 10% bolus 250 mL 250 mL (has no administration in time range)   enoxaparin injection 40 mg (has no administration in time range)   acetaminophen tablet 650 mg (has no administration in time range)   morphine injection 2 mg (has no administration in time range)   morphine injection 4 mg (has no administration in time range)   senna-docusate 8.6-50 mg per tablet 1 tablet (has no administration in time range)   ondansetron injection 4 mg (has no administration in time range)   promethazine (PHENERGAN) 12.5 mg in dextrose 5 % (D5W) 50 mL IVPB (has no administration in time range)   simethicone chewable tablet 80 mg (has no administration in time range)   aluminum-magnesium hydroxide-simethicone 200-200-20 mg/5 mL suspension 30 mL (has no administration in time range)   sodium chloride 0.9% bolus 1,878 mL 1,878 mL (0 mLs Intravenous Stopped 1/25/23 0234)   vancomycin 1.5 g in dextrose 5 % 250 mL IVPB (ready to mix) (0 mg Intravenous Stopped 1/25/23 0430)   lactated ringers bolus 500 mL (0 mLs Intravenous Stopped 1/25/23 0419)   iohexoL (OMNIPAQUE 350) injection 75 mL (75 mLs Intravenous Given  1/25/23 0343)     Medical Decision Making:   History:   Old Medical Records: I decided to obtain old medical records.  Independently Interpreted Test(s):   I have ordered and independently interpreted EKG Reading(s) - see summary below  Clinical Tests:   Lab Tests: Ordered and Reviewed  Radiological Study: Ordered and Reviewed  Medical Tests: Ordered and Reviewed        Scribe Attestation:   Scribe #1: I performed the above scribed service and the documentation accurately describes the services I performed. I attest to the accuracy of the note.      ED Course as of 01/25/23 0525   Wed Jan 25, 2023   0132 Patient presents with altered mental status.  Patient's mental status improved from home per family at bedside.  Woke up at 10:30 p.m. with altered mental status.  Went to bed around 4:25 p.m..  Patient with bilateral equal lower extremity weakness about 4/5.  No obvious lateralizing symptoms.  Less suspicion for CVA at this time.  Will proceed with CT of the head, non-con.  Patient satting 88% on room air during my evaluation.  Placed on 2 L.  Oxygenation improved.  Plan to evaluate further for PE, pneumonia, sepsis.  30 cc/kg ordered at this time.  Patient did have 2+ DP and radial pulses on initial evaluation.  No obvious evidence for DVT on exam. [CC]   0138 EKG: Interpreted by me.  Rate 127, regular rhythm, sinus rhythm, no ST elevations or depressions noted, intervals within normal limits, similar to previous.   [CC]   0341 Patient's mental status has improved significantly. [CC]   0419 CTA Chest Non-Coronary (PE Studies)  Impression:     There is no large central saddle type pulmonary embolus, there is no additional evidence for pulmonary embolus on this exam.     Pulmonary nodules are noted.  For multiple solid nodules with any 6 mm or greater, Fleischner Society guidelines recommend follow up with non-contrast chest CT at 3-6 months and 18-24 months after discovery.     Additional findings as above.    [CC]    0420 CTA Chest Non-Coronary (PE Studies) [CC]   0430 Re-evaluation after fluids.  Repeat blood pressures improved, 120s over 60s.  Patient's mental status has improved significantly.  Good peripheral perfusion, 2+ DP pulses.  CT with evidence of scarring in the lung concern for possible pneumonia given hypoxia.  No PE noted.  No significant evidence of pulmonary edema noted but patient does have a BNP of  642.  Possibly CHF.  Plan to admit. [CC]   0458 CT Head Without Contrast  Impression no acute intracranial abnormality.  Read by Dr. Camarena, [facility]. [CC]   6274 MDM: I have independently evaluated and interpreted all available labs and imaging.  The suspected diagnosis, treatment and plan were discussed with the patient. All questions or concerns have been addressed.      After review of the patient's physical exam, ED testing, and history/symptoms, relevant labs, imaging, available outside records  a wide differential was considered including but not limited to: infectious, traumatic, vascular, toxicological , malignant, ischemic, embolic, psychological, genetic, iatrogenic, idiopathic, substance dependence/intoxication/withdrawal, electrolyte or blood dyscrasia, and other etiologies.    72-year-old presenting to the emergency department for evaluation of altered mental status.  Found to be hypotensive.  Fluid resuscitated.  Significant leukocytosis with left shift.  Concern for sepsis.  Patient's blood pressure improved.  Do not believe he needs pressors in the emergency department.  Given patient's significant history and risk factors he would benefit from ICU observation.  Concern for possible pyelonephritis given perinephric stranding noted on CT.  Patient did have pneumobilia noted with recent ERCP.  Pro count is elevated indicating likely bacterial infection.  BNP elevated without evidence of CHF exacerbation.  Troponin is normal.  Lipase normal.  Doubt pancreatitis, doubt ACS.  Denies chest pain, denies  abdominal pain.  D-dimer was elevated but no evidence of PE on CT scan.  D-dimer likely elevated in the setting of active cancer.  Patient covered empirically.  Patient was hypoxic concerning for possible pneumonia.  No evidence of pneumonia on CTA though.  Multiple nodules noted though.  COVID and flu were negative.  Vitals have improved.  Plan to place patient in ICU for further workup, treatment, observation. [CC]   8119 After review of the patient's physical exam, ED testing, and history/symptoms, the patient requires additional care in the hospital overnight. Dr. Jones with  will accept the patient and any labs, imaging, or procedures were discussed. Pending labs/imaging/interventions include UA. The diagnosis, treatment and plan were discussed with the patient. All questions or concerns have been addressed.  [CC]      ED Course User Index  [CC] Brandyn Felipe MD                   Clinical Impression:   Final diagnoses:  [R09.02] Hypoxia  [A41.9] Sepsis        ED Disposition Condition    Admit                I, Brandyn Felipe MD, personally performed the services described in this documentation. All medical record entries made by the scribe were at my direction and in my presence. I have reviewed the chart and agree that the record reflects my personal performance and is accurate and complete.       Brandyn Felipe MD  01/25/23 9402

## 2023-01-25 NOTE — TELEPHONE ENCOUNTER
Caller, pt daughter in law, states pt was altered, not following directive, unable to answer questions appropriately and confused at this time.  Pt advised per protocol and verbalized understanding.     Reason for Disposition   [1] Difficult to awaken or acting confused (e.g., disoriented, slurred speech) AND [2] present now AND [3] new-onset    Additional Information   Negative: [1] Difficult to awaken or acting confused (e.g., disoriented, slurred speech) AND [2] present now AND [3] diabetes mellitus    Protocols used: Confusion - Delirium-A-AH

## 2023-01-25 NOTE — PLAN OF CARE
Remains in ICU, transfer orders placed. Doing well. Multiple BMs today, imodium given, relief noted. Free of falls, injury, or breakdown. Plan of care reviewed.

## 2023-01-25 NOTE — HPI
Manuel Tyler is a 72 y.o. 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  Patient received his 1st round of chemotherapy two days ago on 01/23, the next day the patient became altered around 10 30 at nighttime.  Patient was walking with his walker and dragging his feet, unable to answer basic questions; he was alert but he was confused.  He reports 1 episode of blood in his stool, hemoglobin 14.8 on arrival.  WBC count elevated at 28 K, with mildly elevated procalcitonin of 1.26 with normal renal function.  Patient had dips of SBP to 84 while in the ED, so was placed in the ICU for close monitoring with concerns of sepsis.  Workup unremarkable.  His kidneys had nonspecific perinephritic stranding, however his urine was pristine with no sign of infection.  Renal ultrasound unremarkable.  He was afebrile upon arrival, with no subjective fevers or chills either.  Denies chest pain or shortness of breath. follow up EGD with PD stent removal was needed.

## 2023-01-25 NOTE — ASSESSMENT & PLAN NOTE
· CTH w/o acute abnormality   · Likely from Sepsis  · Potentially direct toxicity of Chemotherapy  · Improved with fluids and ABx

## 2023-01-25 NOTE — CLINICAL REVIEW
IP Sepsis Screen (most recent)       Sepsis Screen (IP) - 01/25/23 0813       Is the patient's history or complaint suggestive of a possible infection? Yes  -LW    Are there at least two of the following signs and symptoms present? Yes  -LW    Sepsis signs/symptoms - Tachycardia Tachycardia     >90  -LW    Sepsis signs/symptoms - Tachypnea Tachypnea     >20  -LW    Sepsis signs/symptoms - WBC WBC < 4,000 or WBC > 12,000  -LW    Are any of the following organ dysfunction criteria present and not considered to be due to a chronic condition? Yes  -LW    Organ Dysfunction Criteria Lactate > 2.0  -LW    Initiate Sepsis Protocol No  -LW    Reason sepsis not considered Pt. receiving appropriate management  -LW              User Key  (r) = Recorded By, (t) = Taken By, (c) = Cosigned By      Initials Name    PILAR Whitt RN

## 2023-01-26 LAB
ALBUMIN SERPL BCP-MCNC: 2.4 G/DL (ref 3.5–5.2)
ALP SERPL-CCNC: 98 U/L (ref 55–135)
ALT SERPL W/O P-5'-P-CCNC: 32 U/L (ref 10–44)
AMMONIA PLAS-SCNC: 32 UMOL/L (ref 10–50)
ANION GAP SERPL CALC-SCNC: 7 MMOL/L (ref 8–16)
AST SERPL-CCNC: 40 U/L (ref 10–40)
BASOPHILS # BLD AUTO: 0.1 K/UL (ref 0–0.2)
BASOPHILS NFR BLD: 0.7 % (ref 0–1.9)
BILIRUB SERPL-MCNC: 0.7 MG/DL (ref 0.1–1)
BUN SERPL-MCNC: 10 MG/DL (ref 8–23)
CALCIUM SERPL-MCNC: 7.9 MG/DL (ref 8.7–10.5)
CHLORIDE SERPL-SCNC: 101 MMOL/L (ref 95–110)
CO2 SERPL-SCNC: 24 MMOL/L (ref 23–29)
CREAT SERPL-MCNC: 0.7 MG/DL (ref 0.5–1.4)
DIFFERENTIAL METHOD: ABNORMAL
EOSINOPHIL # BLD AUTO: 0.3 K/UL (ref 0–0.5)
EOSINOPHIL NFR BLD: 2.2 % (ref 0–8)
ERYTHROCYTE [DISTWIDTH] IN BLOOD BY AUTOMATED COUNT: 13.2 % (ref 11.5–14.5)
EST. GFR  (NO RACE VARIABLE): >60 ML/MIN/1.73 M^2
GLUCOSE SERPL-MCNC: 122 MG/DL (ref 70–110)
HCT VFR BLD AUTO: 38.4 % (ref 40–54)
HGB BLD-MCNC: 13.1 G/DL (ref 14–18)
IMM GRANULOCYTES # BLD AUTO: 0.07 K/UL (ref 0–0.04)
IMM GRANULOCYTES NFR BLD AUTO: 0.5 % (ref 0–0.5)
LYMPHOCYTES # BLD AUTO: 0.8 K/UL (ref 1–4.8)
LYMPHOCYTES NFR BLD: 6.3 % (ref 18–48)
MAGNESIUM SERPL-MCNC: 1.5 MG/DL (ref 1.6–2.6)
MCH RBC QN AUTO: 32.6 PG (ref 27–31)
MCHC RBC AUTO-ENTMCNC: 34.1 G/DL (ref 32–36)
MCV RBC AUTO: 96 FL (ref 82–98)
MONOCYTES # BLD AUTO: 0.3 K/UL (ref 0.3–1)
MONOCYTES NFR BLD: 2.5 % (ref 4–15)
NEUTROPHILS # BLD AUTO: 11.8 K/UL (ref 1.8–7.7)
NEUTROPHILS NFR BLD: 87.8 % (ref 38–73)
NRBC BLD-RTO: 0 /100 WBC
PHOSPHATE SERPL-MCNC: 2.4 MG/DL (ref 2.7–4.5)
PLATELET # BLD AUTO: 301 K/UL (ref 150–450)
PMV BLD AUTO: 10.2 FL (ref 9.2–12.9)
POTASSIUM SERPL-SCNC: 3.4 MMOL/L (ref 3.5–5.1)
PROT SERPL-MCNC: 5.8 G/DL (ref 6–8.4)
RBC # BLD AUTO: 4.02 M/UL (ref 4.6–6.2)
SODIUM SERPL-SCNC: 132 MMOL/L (ref 136–145)
WBC # BLD AUTO: 13.43 K/UL (ref 3.9–12.7)

## 2023-01-26 PROCEDURE — 36415 COLL VENOUS BLD VENIPUNCTURE: CPT | Performed by: INTERNAL MEDICINE

## 2023-01-26 PROCEDURE — 99223 1ST HOSP IP/OBS HIGH 75: CPT | Mod: ,,, | Performed by: INTERNAL MEDICINE

## 2023-01-26 PROCEDURE — 84100 ASSAY OF PHOSPHORUS: CPT | Performed by: INTERNAL MEDICINE

## 2023-01-26 PROCEDURE — 85025 COMPLETE CBC W/AUTO DIFF WBC: CPT | Performed by: INTERNAL MEDICINE

## 2023-01-26 PROCEDURE — 27000221 HC OXYGEN, UP TO 24 HOURS

## 2023-01-26 PROCEDURE — 82140 ASSAY OF AMMONIA: CPT | Performed by: STUDENT IN AN ORGANIZED HEALTH CARE EDUCATION/TRAINING PROGRAM

## 2023-01-26 PROCEDURE — 80053 COMPREHEN METABOLIC PANEL: CPT | Performed by: INTERNAL MEDICINE

## 2023-01-26 PROCEDURE — 94761 N-INVAS EAR/PLS OXIMETRY MLT: CPT

## 2023-01-26 PROCEDURE — 25000003 PHARM REV CODE 250: Performed by: STUDENT IN AN ORGANIZED HEALTH CARE EDUCATION/TRAINING PROGRAM

## 2023-01-26 PROCEDURE — 36415 COLL VENOUS BLD VENIPUNCTURE: CPT | Performed by: STUDENT IN AN ORGANIZED HEALTH CARE EDUCATION/TRAINING PROGRAM

## 2023-01-26 PROCEDURE — 83735 ASSAY OF MAGNESIUM: CPT | Performed by: INTERNAL MEDICINE

## 2023-01-26 PROCEDURE — 63600175 PHARM REV CODE 636 W HCPCS: Performed by: INTERNAL MEDICINE

## 2023-01-26 PROCEDURE — 63600175 PHARM REV CODE 636 W HCPCS: Performed by: STUDENT IN AN ORGANIZED HEALTH CARE EDUCATION/TRAINING PROGRAM

## 2023-01-26 PROCEDURE — 99223 PR INITIAL HOSPITAL CARE,LEVL III: ICD-10-PCS | Mod: ,,, | Performed by: INTERNAL MEDICINE

## 2023-01-26 PROCEDURE — S4991 NICOTINE PATCH NONLEGEND: HCPCS | Performed by: STUDENT IN AN ORGANIZED HEALTH CARE EDUCATION/TRAINING PROGRAM

## 2023-01-26 PROCEDURE — 20000000 HC ICU ROOM

## 2023-01-26 RX ADMIN — MUPIROCIN: 20 OINTMENT TOPICAL at 08:01

## 2023-01-26 RX ADMIN — ENOXAPARIN SODIUM 40 MG: 40 INJECTION SUBCUTANEOUS at 04:01

## 2023-01-26 RX ADMIN — Medication 1 PATCH: at 08:01

## 2023-01-26 RX ADMIN — VANCOMYCIN HYDROCHLORIDE 1750 MG: 1 INJECTION, POWDER, LYOPHILIZED, FOR SOLUTION INTRAVENOUS at 03:01

## 2023-01-26 NOTE — ASSESSMENT & PLAN NOTE
This patient does have evidence of infective focus  My overall impression is septic shock. Vital signs were reviewed and noted in progress note.  Antibiotics given-   Antibiotics (From admission, onward)    Start     Stop Route Frequency Ordered    01/26/23 0300  vancomycin (VANCOCIN) 1,750 mg in dextrose 5 % (D5W) 500 mL IVPB         -- IV Every 24 hours (non-standard times) 01/25/23 0521    01/25/23 0900  mupirocin 2 % ointment         01/30 0859 Nasl 2 times daily 01/25/23 0624    01/25/23 0618  vancomycin - pharmacy to dose  (vancomycin IVPB)        See [facility]ers[facility] for full Linked Orders Report.    -- IV pharmacy to manage frequency 01/25/23 0518        Cultures were taken-   Microbiology Results (last 7 days)     Procedure Component Value Units Date/Time    Blood culture x two cultures. Draw prior to antibiotics. [[id number]] Collected: 01/25/23 0129    Order Status: Completed Specimen: Blood from Peripheral, Antecubital, Left Updated: 01/26/23 0303     Blood Culture, Routine No Growth to date      No Growth to date    Narrative:      Aerobic and anaerobic    Blood culture x two cultures. Draw prior to antibiotics. [[id number]] Collected: 01/25/23 0150    Order Status: Completed Specimen: Blood from Peripheral, Upper Arm, Right Updated: 01/26/23 0303     Blood Culture, Routine No Growth to date      No Growth to date    Narrative:      Aerobic and anaerobic    Culture, Respiratory with Gram Stain [[id number]]     Order Status: No result Specimen: Sputum, Expectorated         Latest lactate reviewed, they are-  Recent Labs   Lab 01/25/23 0129 01/25/23  0508   LACTATE 2.0 2.2       Organ dysfunction indicated by Encephalopathy   Source- ?kidney, ?bacteremia    · 3/4 SIRS: , RR 24, and WBC 28 K. Afebrile.  BP drop to map 61.  · Source control Achieved by- Abx and fluids  · Kidneys with perinephritic stranding, however UA was clean  · Continuing V+Z for now, wean as able, f/u blcx    White blood cell count  improving however will continue with antibiotics for now.

## 2023-01-26 NOTE — CONSULTS
[facility] - Intensive Care  Hematology/Oncology  Consult Note    Patient Name: Manuel Tyler  MRN: [id number]  Admission Date: 1/25/2023  Hospital Length of Stay: 1 days  Code Status: Full Code   Attending Provider: Jonas Vera MD  Consulting Provider: Latesha Carver MD  Primary Care Physician: Victor M Stokes MD  Principal Problem:Severe sepsis    Inpatient consult to Telemedicine - Oncology  Consult performed by: Latesha Carver MD  Consult ordered by: Latesha Carver MD        Subjective:     HPI:  Manuel Tyler is a 72 y.o. male who has a past medical history of Alcohol Abuse, Hyperlipidemia, Hypertension, Pancreatitis, and Metastatic Pancreatic CA  with liver mets  presented to the ED with CC of AMS. He completed C1D1 of chemo with gem/abraxane on 1/23/23. He is admitted with concern for severe sepsis. Consulted for pancreatic CA .         VIRTUAL TELENOTE    Start time:4pm  Chief complaint: weakness  The patient location is: [facility]  The patient arrived at: 4pm  Present with the patient at the time of the telemed/virtual assessment:nurse      End time:  4:15pm    Total time spent with patient: 35min  I spent a total of  35   during visit. This includes face to face time and non-face to face time preparing to see the patient (eg, review of tests), obtaining and/or reviewing separately obtained history, documenting clinical information in the electronic or other health record, independently interpreting results and communicating results to the patient/family/caregiver, and coordinating care   The attending portion of this evaluation, treatment, and documentation was performed per Latesha Carver MD via Telemedicine AudioVisual using the secure DocASAP software platform with 2 way audio/video. The provider was located off-site and the patient is located in the hospital. The aforementioned video software was utilized to document the relevant history and physical exam.       Oncology Treatment Plan:    OP PANC NAB-PACLITAXEL + GEMCITABINE Q4W    Medications:  Continuous Infusions:  Scheduled Meds:   enoxaparin  40 mg Subcutaneous Daily    mupirocin   Nasal BID    nicotine  1 patch Transdermal Daily    vancomycin (VANCOCIN) IVPB  1,750 mg Intravenous Q24H     PRN Meds:acetaminophen, aluminum-magnesium hydroxide-simethicone, dextrose 10%, dextrose 10%, glucagon (human recombinant), glucose, glucose, loperamide, morphine, morphine, naloxone, ondansetron, promethazine (PHENERGAN) IVPB, senna-docusate 8.6-50 mg, simethicone, sodium chloride 0.9%, Pharmacy to dose Vancomycin consult **AND** vancomycin - pharmacy to dose     Review of patient's allergies indicates:  No Known Allergies     Past Medical History:   Diagnosis Date    (HFpEF) heart failure with preserved ejection fraction 1/25/2023    Alcohol abuse 12/27/2022    Hyperlipidemia     Hypertension     Liver metastases 1/18/2023    Malignant neoplasm of head of pancreas 1/18/2023    Other specified noninfective gastroenteritis and colitis     Pancreatitis     Personal history of colonic polyps      Past Surgical History:   Procedure Laterality Date    COLONOSCOPY      ENDOSCOPIC ULTRASOUND OF UPPER GASTROINTESTINAL TRACT Left 12/30/2022    Procedure: ULTRASOUND, UPPER GI TRACT, ENDOSCOPIC;  Surgeon: Gregory Cristina MD;  Location: [address]);  Service: Endoscopy;  Laterality: Left;    ERCP Left 12/30/2022    Procedure: ERCP (ENDOSCOPIC RETROGRADE CHOLANGIOPANCREATOGRAPHY);  Surgeon: Gregory Cristina MD;  Location: [address]);  Service: Endoscopy;  Laterality: Left;    FOOT SURGERY Left     INSERTION OF TUNNELED CENTRAL VENOUS CATHETER (CVC) WITH SUBCUTANEOUS PORT Bilateral 1/19/2023    Procedure: [id number]-[id number];  Surgeon: Amador Castellon MD;  Location: [facility] OR;  Service: General;  Laterality: Bilateral;  Right v Left PORTACATH. needs ultrasound and fluoro  RN PREOP 01/18/2023    TONSILLECTOMY       Family History       Problem Relation (Age  of Onset)    Cancer Father (69)          Tobacco Use    Smoking status: Every Day     Packs/day: 1.00     Types: Cigarettes    Smokeless tobacco: Not on file   Substance and Sexual Activity    Alcohol use: Yes     Comment: last drink 12/22/22    Drug use: Never    Sexual activity: Not on file       Review of Systems   Constitutional:  Positive for activity change and fatigue. Negative for fever and unexpected weight change.   HENT:  Negative for mouth sores and trouble swallowing.    Eyes:  Negative for photophobia and visual disturbance.   Respiratory:  Negative for cough and shortness of breath.    Cardiovascular:  Negative for chest pain and leg swelling.   Gastrointestinal:  Negative for abdominal pain, blood in stool, nausea and vomiting.   Endocrine: Negative for polydipsia and polyuria.   Genitourinary:  Negative for difficulty urinating, dysuria and hematuria.   Musculoskeletal:  Negative for neck pain and neck stiffness.   Skin:  Negative for pallor and rash.   Allergic/Immunologic: Positive for immunocompromised state.   Neurological:  Positive for weakness. Negative for seizures and facial asymmetry.   Psychiatric/Behavioral:  Negative for behavioral problems and confusion.    Objective:     Vital Signs (Most Recent):  Temp: 98.6 °F (37 °C) (01/26/23 1200)  Pulse: (!) 126 (01/26/23 1500)  Resp: (!) 9 (01/26/23 1300)  BP: 114/63 (01/26/23 1500)  SpO2: (!) 94 % (01/26/23 1500)   Vital Signs (24h Range):  Temp:  [98.1 °F (36.7 °C)-98.8 °F (37.1 °C)] 98.6 °F (37 °C)  Pulse:  [] 126  Resp:  [9-43] 9  SpO2:  [90 %-95 %] 94 %  BP: (114-162)/(63-87) 114/63     Weight: 65 kg (143 lb 4.8 oz)  Body mass index is 21.79 kg/m².  Body surface area is 1.77 meters squared.      Intake/Output Summary (Last 24 hours) at 1/26/2023 1645  Last data filed at 1/26/2023 0545  Gross per 24 hour   Intake 595.26 ml   Output 950 ml   Net -354.74 ml       Physical Exam    Significant Labs:   All pertinent labs from the last 24  hours have been reviewed.    Diagnostic Results:  I have reviewed and interpreted all pertinent imaging results/findings within the past 24 hours.    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  Cycle 1 of chemotherapy was dose reduced secondary to concern for potential toxicity.  Patient will not receive next cycle on planned date. Pt improved this am.   We will need to discussed risk versus benefit of treatment moving forward and may need to further adjust planned chemotherapy.  Consider palliative  care consultation during inpatient stay        Thank you for your consult.     Latesha Carver MD  Hematology/Oncology  [facility] [place] - Intensive Care

## 2023-01-26 NOTE — ASSESSMENT & PLAN NOTE
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  Cycle 1 of chemotherapy was dose reduced secondary to concern for potential toxicity.  Patient will not receive next cycle on planned date. Pt improved this am.   We will need to discussed risk versus benefit of treatment moving forward and may need to further adjust planned chemotherapy.  Consider palliative  care consultation during inpatient stay

## 2023-01-26 NOTE — PROGRESS NOTES
Vancomycin consult follow-up:    Patient reviewed, renal function stable, no new levels, continue current therapy; Next levels due: trough due 1/27/2023 at 0200

## 2023-01-26 NOTE — HPI
Manuel Tyler is a 72 y.o. male who has a past medical history of Alcohol Abuse, Hyperlipidemia, Hypertension, Pancreatitis, and Metastatic Pancreatic CA  with liver mets  presented to the ED with CC of AMS. He completed C1D1 of chemo with gem/abraxane on 1/23/23. He is admitted with concern for severe sepsis. Consulted for pancreatic CA .

## 2023-01-26 NOTE — ASSESSMENT & PLAN NOTE
· CTH w/o acute abnormality   · Likely from Sepsis  · Potentially direct toxicity of Chemotherapy  · Improved with fluids and ABx  · Improved today however seems to still have intermittent mental status changes.  Check ammonia level

## 2023-01-26 NOTE — PROGRESS NOTES
[facility]  Progress Note    Patient Name: Manuel Tyler  MRN: [id number]  Patient Class: IP- Inpatient   Admission Date: 1/25/2023  Length of Stay: 1 days  Attending Physician: Jonas Vera MD  Primary Care Provider: Victor M Stokes MD        Subjective:     Principal Problem:Severe sepsis        HPI:    Manuel Tyler is a 72 y.o. 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  Patient received his 1st round of chemotherapy two days ago on 01/23, the next day the patient became altered around 10 30 at nighttime.  Patient was walking with his walker and dragging his feet, unable to answer basic questions; he was alert but he was confused.  He reports 1 episode of blood in his stool, hemoglobin 14.8 on arrival.  WBC count elevated at 28 K, with mildly elevated procalcitonin of 1.26 with normal renal function.  Patient had dips of SBP to 84 while in the ED, so was placed in the ICU for close monitoring with concerns of sepsis.  Workup unremarkable.  His kidneys had nonspecific perinephritic stranding, however his urine was pristine with no sign of infection.  Renal ultrasound unremarkable.  He was afebrile upon arrival, with no subjective fevers or chills either.  Denies chest pain or shortness of breath. follow up EGD with PD stent removal was needed.       Overview/Hospital Course:  No notes on file    Interval History: no acute  =issues, he is feeling well this morning.  Denies any pain and breathing seems to be better.    Review of Systems  Objective:     Vital Signs (Most Recent):  Temp: 98.3 °F (36.8 °C) (01/26/23 0800)  Pulse: (!) 122 (01/26/23 1300)  Resp: (!) 9 (01/26/23 1300)  BP: 133/69 (01/26/23 1300)  SpO2: (!) 92 % (01/26/23 1300)   Vital Signs (24h Range):  Temp:  [98.1 °F (36.7 °C)-98.8 °F (37.1 °C)]  98.3 °F (36.8 °C)  Pulse:  [] 122  Resp:  [9-43] 9  SpO2:  [90 %-95 %] 92 %  BP: (118-162)/(63-87) 133/69     Weight: 65 kg (143 lb 4.8 oz)  Body mass index is 21.79 kg/m².    Intake/Output Summary (Last 24 hours) at 1/26/2023 1352  Last data filed at 1/26/2023 0545  Gross per 24 hour   Intake 805.56 ml   Output 1125 ml   Net -319.44 ml      Physical Exam  Vitals reviewed.   Constitutional:       General: He is not in acute distress.     Appearance: He is well-developed. He is ill-appearing. He is not toxic-appearing or diaphoretic.   Eyes:      General: No scleral icterus.  Neck:      Thyroid: No thyromegaly.   Cardiovascular:      Rate and Rhythm: Normal rate and regular rhythm.      Heart sounds: No murmur heard.    No gallop.   Pulmonary:      Effort: Pulmonary effort is normal.      Breath sounds: Normal breath sounds. No wheezing or rales.   Abdominal:      General: There is no distension.      Palpations: Abdomen is soft.      Tenderness: There is no abdominal tenderness.   Musculoskeletal:         General: No swelling. Normal range of motion.   Skin:     General: Skin is warm.      Capillary Refill: Capillary refill takes less than 2 seconds.   Neurological:      Mental Status: He is alert and oriented to person, place, and time.      Motor: Weakness present.      Gait: Gait abnormal.   Psychiatric:         Mood and Affect: Mood normal.         Behavior: Behavior normal.       Significant Labs: All pertinent labs within the past 24 hours have been reviewed.    Significant Imaging: I have reviewed all pertinent imaging results/findings within the past 24 hours.      Assessment/Plan:      * Severe sepsis  This patient does have evidence of infective focus  My overall impression is septic shock. Vital signs were reviewed and noted in progress note.  Antibiotics given-   Antibiotics (From admission, onward)    Start     Stop Route Frequency Ordered    01/26/23 0300  vancomycin (VANCOCIN) 1,750 mg in  dextrose 5 % (D5W) 500 mL IVPB         -- IV Every 24 hours (non-standard times) 01/25/23 0521    01/25/23 0900  mupirocin 2 % ointment         01/30 0859 Nasl 2 times daily 01/25/23 0624    01/25/23 0618  vancomycin - pharmacy to dose  (vancomycin IVPB)        See [facility] for full Linked Orders Report.    -- IV pharmacy to manage frequency 01/25/23 0518        Cultures were taken-   Microbiology Results (last 7 days)     Procedure Component Value Units Date/Time    Blood culture x two cultures. Draw prior to antibiotics. [[id number]] Collected: 01/25/23 0129    Order Status: Completed Specimen: Blood from Peripheral, Antecubital, Left Updated: 01/26/23 0303     Blood Culture, Routine No Growth to date      No Growth to date    Narrative:      Aerobic and anaerobic    Blood culture x two cultures. Draw prior to antibiotics. [[id number]] Collected: 01/25/23 0150    Order Status: Completed Specimen: Blood from Peripheral, Upper Arm, Right Updated: 01/26/23 0303     Blood Culture, Routine No Growth to date      No Growth to date    Narrative:      Aerobic and anaerobic    Culture, Respiratory with Gram Stain [[id number]]     Order Status: No result Specimen: Sputum, Expectorated         Latest lactate reviewed, they are-  Recent Labs   Lab 01/25/23 0129 01/25/23  0508   LACTATE 2.0 2.2       Organ dysfunction indicated by Encephalopathy   Source- ?kidney, ?bacteremia    · 3/4 SIRS: , RR 24, and WBC 28 K. Afebrile.  BP drop to map 61.  · Source control Achieved by- Abx and fluids  · Kidneys with perinephritic stranding, however UA was clean  · Continuing V+Z for now, wean as able, f/u blcx    White blood cell count improving however will continue with antibiotics for now.      AMS (altered mental status)  · CTH w/o acute abnormality   · Likely from Sepsis  · Potentially direct toxicity of Chemotherapy  · Improved with fluids and ABx  · Improved today however seems to still have intermittent mental status changes.   Check ammonia level      (HFpEF) heart failure with preserved ejection fraction  · No previous ECHO to compare  · Indeterminant diastolic dysfunction with elevated BNP of 642  · Will give one dose of IV lasix after sepsis fliuds, single dose as needed    Liver metastases  · Noted Mets      Malignant neoplasm of head of pancreas  · Initiated Gemcitabine and Paclitaxel on 1/23  · Consider Oncology consult, may need 2nd tx pushed back      Alcohol abuse  Stable, no signs of w/d      Hyperlipidemia  · Not taking statin      Primary hypertension  · Not currently on BP meds  · Currently hypotensive, monitor   · This has now resolved        VTE Risk Mitigation (From admission, onward)         Ordered     enoxaparin injection 40 mg  Daily         01/25/23 0522     IP VTE HIGH RISK PATIENT  Once         01/25/23 0522     Place sequential compression device  Until discontinued         01/25/23 0522     Place FRANCES hose  Until discontinued         01/25/23 0522                Discharge Planning   MIKO:      Code Status: Full Code   Is the patient medically ready for discharge?:     Reason for patient still in hospital (select all that apply): Treatment  Discharge Plan A: Home Health            Critical care time spent on the evaluation and treatment of severe organ dysfunction, review of pertinent labs and imaging studies, discussions with consulting providers and discussions with patient/family: 20 minutes.      Jonas Vera MD  Department of Hospital Medicine   [facility] - Intensive Care

## 2023-01-26 NOTE — SUBJECTIVE & OBJECTIVE
Interval History: no acute  =issues, he is feeling well this morning.  Denies any pain and breathing seems to be better.    Review of Systems  Objective:     Vital Signs (Most Recent):  Temp: 98.3 °F (36.8 °C) (01/26/23 0800)  Pulse: (!) 122 (01/26/23 1300)  Resp: (!) 9 (01/26/23 1300)  BP: 133/69 (01/26/23 1300)  SpO2: (!) 92 % (01/26/23 1300)   Vital Signs (24h Range):  Temp:  [98.1 °F (36.7 °C)-98.8 °F (37.1 °C)] 98.3 °F (36.8 °C)  Pulse:  [] 122  Resp:  [9-43] 9  SpO2:  [90 %-95 %] 92 %  BP: (118-162)/(63-87) 133/69     Weight: 65 kg (143 lb 4.8 oz)  Body mass index is 21.79 kg/m².    Intake/Output Summary (Last 24 hours) at 1/26/2023 1352  Last data filed at 1/26/2023 0545  Gross per 24 hour   Intake 805.56 ml   Output 1125 ml   Net -319.44 ml      Physical Exam  Vitals reviewed.   Constitutional:       General: He is not in acute distress.     Appearance: He is well-developed. He is ill-appearing. He is not toxic-appearing or diaphoretic.   Eyes:      General: No scleral icterus.  Neck:      Thyroid: No thyromegaly.   Cardiovascular:      Rate and Rhythm: Normal rate and regular rhythm.      Heart sounds: No murmur heard.    No gallop.   Pulmonary:      Effort: Pulmonary effort is normal.      Breath sounds: Normal breath sounds. No wheezing or rales.   Abdominal:      General: There is no distension.      Palpations: Abdomen is soft.      Tenderness: There is no abdominal tenderness.   Musculoskeletal:         General: No swelling. Normal range of motion.   Skin:     General: Skin is warm.      Capillary Refill: Capillary refill takes less than 2 seconds.   Neurological:      Mental Status: He is alert and oriented to person, place, and time.      Motor: Weakness present.      Gait: Gait abnormal.   Psychiatric:         Mood and Affect: Mood normal.         Behavior: Behavior normal.       Significant Labs: All pertinent labs within the past 24 hours have been reviewed.    Significant Imaging: I have  reviewed all pertinent imaging results/findings within the past 24 hours.

## 2023-01-26 NOTE — PLAN OF CARE
Patient arrived to unit for Abraxane and Gemzar chemotherapy infusion accompanied by his daughter law. Patient and caregiver reported going to chemo school and being aware of the possible side effects of the medications. Patient and caregiver agreeable to treatment plan today. Education regarding medication re-inforced during visit by Rns. Education handout provided to patient and caregiver during visit. Both verbalized understanding. Port site clean, dry with liquid adhesive from recent surgery for port site. Port accessed, flushed w/o difficulty, blood return noted, and IVFs infusing. Zofran administered IVP prior to chemotherapy. Abraxane infused over 30 mins. VSS at end of treatment. Gemzar infused over 30 mins. IVFs infusing to gravity. Patient and caregiver educated to report any new or worsening symptoms and to go to the ER if any new symptoms were to arise following treatment. Both verbalized understanding.  Port flushed w/o difficulty, blood return noted, heparin locked, and de-accessed. Needle intact. Discharged off unit in no acute signs of distress.

## 2023-01-26 NOTE — SUBJECTIVE & OBJECTIVE
VIRTUAL TELENOTE    Start time:4pm  Chief complaint: weakness  The patient location is: 68  The patient arrived at: 4pm  Present with the patient at the time of the telemed/virtual assessment:nurse      End time:  4:15pm    Total time spent with patient: 35min  I spent a total of  35   during visit. This includes face to face time and non-face to face time preparing to see the patient (eg, review of tests), obtaining and/or reviewing separately obtained history, documenting clinical information in the electronic or other health record, independently interpreting results and communicating results to the patient/family/caregiver, and coordinating care   The attending portion of this evaluation, treatment, and documentation was performed per Latesha Carver MD via Telemedicine AudioVisual using the secure [employer] software platform with 2 way audio/video. The provider was located off-site and the patient is located in the hospital. The aforementioned video software was utilized to document the relevant history and physical exam.       Oncology Treatment Plan:   OP PANC NAB-PACLITAXEL + GEMCITABINE Q4W    Medications:  Continuous Infusions:  Scheduled Meds:   enoxaparin  40 mg Subcutaneous Daily    mupirocin   Nasal BID    nicotine  1 patch Transdermal Daily    vancomycin (VANCOCIN) IVPB  1,750 mg Intravenous Q24H     PRN Meds:acetaminophen, aluminum-magnesium hydroxide-simethicone, dextrose 10%, dextrose 10%, glucagon (human recombinant), glucose, glucose, loperamide, morphine, morphine, naloxone, ondansetron, promethazine (PHENERGAN) IVPB, senna-docusate 8.6-50 mg, simethicone, sodium chloride 0.9%, Pharmacy to dose Vancomycin consult **AND** vancomycin - pharmacy to dose     Review of patient's allergies indicates:  No Known Allergies     Past Medical History:   Diagnosis Date    (HFpEF) heart failure with preserved ejection fraction 1/25/2023    Alcohol abuse 12/27/2022    Hyperlipidemia     Hypertension      Liver metastases 1/18/2023    Malignant neoplasm of head of pancreas 1/18/2023    Other specified noninfective gastroenteritis and colitis     Pancreatitis     Personal history of colonic polyps      Past Surgical History:   Procedure Laterality Date    COLONOSCOPY      ENDOSCOPIC ULTRASOUND OF UPPER GASTROINTESTINAL TRACT Left 12/30/2022    Procedure: ULTRASOUND, UPPER GI TRACT, ENDOSCOPIC;  Surgeon: Gregory Cristina MD;  Location: [facility]2ND FLR);  Service: Endoscopy;  Laterality: Left;    ERCP Left 12/30/2022    Procedure: ERCP (ENDOSCOPIC RETROGRADE CHOLANGIOPANCREATOGRAPHY);  Surgeon: Gregory Cristina MD;  Location: [facility] ENDO (2ND FLR);  Service: Endoscopy;  Laterality: Left;    FOOT SURGERY Left     INSERTION OF TUNNELED CENTRAL VENOUS CATHETER (CVC) WITH SUBCUTANEOUS PORT Bilateral 1/19/2023    Procedure: GJARALLEZ-KRRM-A-CATH;  Surgeon: Amador Castellon MD;  Location: [facility];  Service: General;  Laterality: Bilateral;  Right v Left PORTACATH. needs ultrasound and fluoro  RN PREOP 01/18/2023    TONSILLECTOMY       Family History       Problem Relation (Age of Onset)    Cancer Father (69)          Tobacco Use    Smoking status: Every Day     Packs/day: 1.00     Types: Cigarettes    Smokeless tobacco: Not on file   Substance and Sexual Activity    Alcohol use: Yes     Comment: last drink 12/22/22    Drug use: Never    Sexual activity: Not on file       Review of Systems   Constitutional:  Positive for activity change and fatigue. Negative for fever and unexpected weight change.   HENT:  Negative for mouth sores and trouble swallowing.    Eyes:  Negative for photophobia and visual disturbance.   Respiratory:  Negative for cough and shortness of breath.    Cardiovascular:  Negative for chest pain and leg swelling.   Gastrointestinal:  Negative for abdominal pain, blood in stool, nausea and vomiting.   Endocrine: Negative for polydipsia and polyuria.   Genitourinary:  Negative for difficulty urinating, dysuria and  hematuria.   Musculoskeletal:  Negative for neck pain and neck stiffness.   Skin:  Negative for pallor and rash.   Allergic/Immunologic: Positive for immunocompromised state.   Neurological:  Positive for weakness. Negative for seizures and facial asymmetry.   Psychiatric/Behavioral:  Negative for behavioral problems and confusion.    Objective:     Vital Signs (Most Recent):  Temp: 98.6 °F (37 °C) (01/26/23 1200)  Pulse: (!) 126 (01/26/23 1500)  Resp: (!) 9 (01/26/23 1300)  BP: 114/63 (01/26/23 1500)  SpO2: (!) 94 % (01/26/23 1500)   Vital Signs (24h Range):  Temp:  [98.1 °F (36.7 °C)-98.8 °F (37.1 °C)] 98.6 °F (37 °C)  Pulse:  [] 126  Resp:  [9-43] 9  SpO2:  [90 %-95 %] 94 %  BP: (114-162)/(63-87) 114/63     Weight: 65 kg (143 lb 4.8 oz)  Body mass index is 21.79 kg/m².  Body surface area is 1.77 meters squared.      Intake/Output Summary (Last 24 hours) at 1/26/2023 1645  Last data filed at 1/26/2023 0545  Gross per 24 hour   Intake 595.26 ml   Output 950 ml   Net -354.74 ml       Physical Exam    Significant Labs:   All pertinent labs from the last 24 hours have been reviewed.    Diagnostic Results:  I have reviewed and interpreted all pertinent imaging results/findings within the past 24 hours.

## 2023-01-26 NOTE — NURSING
Nurses Note -- 4 Eyes      1/25/2023   11:43 PM      Skin assessed during: Q shift      [] No Pressure Injuries Present    []Prevention Measures Documented      [x] Yes- Altered Skin Integrity Present or Discovered   [x] LDA Added if Not in Epic (Describe Wound)   [] New Altered Skin Integrity was Present on Admit and Documented in LDA   [] Wound Image Taken    Attending Nurse:  Concepción NARAYANAN     Second RN/Staff Member:  Beatriz CASTELLANOS

## 2023-01-26 NOTE — CLINICAL REVIEW
IP Sepsis Screen (most recent)       Sepsis Screen (IP) - 01/26/23 0833       Is the patient's history or complaint suggestive of a possible infection? Yes  -CB    Are there at least two of the following signs and symptoms present? Yes  -CB    Sepsis signs/symptoms - Tachycardia Tachycardia     >90  -CB    Sepsis signs/symptoms - Tachypnea Tachypnea     >20  -CB    Sepsis signs/symptoms - WBC WBC < 4,000 or WBC > 12,000  -CB    Are any of the following organ dysfunction criteria present and not considered to be due to a chronic condition? Yes  -CB    Organ Dysfunction Criteria Lactate > 2.0  -CB    Initiate Sepsis Protocol No  -CB    Reason sepsis not considered Pt. receiving appropriate management  -CB              User Key  (r) = Recorded By, (t) = Taken By, (c) = Cosigned By      Initials Name    CB Shweta Marin RN

## 2023-01-26 NOTE — PLAN OF CARE
Remains in ICU with transfer orders placed. Weaned off of O2, maintaining sats >90 on room air. Much more weak compared to yesterday. Difficult time transferring from bed to chair and back to bed. Answers questions appropriately, but appears to be experiencing delirium. Free of falls, injury, or breakdown.

## 2023-01-26 NOTE — PLAN OF CARE
Problem: Adult Inpatient Plan of Care  Goal: Plan of Care Review  Outcome: Ongoing, Progressing  Goal: Optimal Comfort and Wellbeing  Outcome: Ongoing, Progressing  Goal: Readiness for Transition of Care  Outcome: Ongoing, Progressing     Problem: Bleeding (Sepsis/Septic Shock)  Goal: Absence of Bleeding  Outcome: Ongoing, Progressing  Intervention: Monitor and Manage Bleeding  Flowsheets (Taken 1/26/2023 0253)  Bleeding Precautions: blood pressure closely monitored     Problem: Impaired Wound Healing  Goal: Optimal Wound Healing  Outcome: Ongoing, Progressing  Intervention: Promote Wound Healing  Flowsheets (Taken 1/26/2023 0253)  Sleep/Rest Enhancement:   awakenings minimized   room darkened   regular sleep/rest pattern promoted   relaxation techniques promoted   noise level reduced   consistent schedule promoted  Activity Management: Rolling - L1  Pain Management Interventions:   quiet environment facilitated   position adjusted   pillow support provided   pain management plan reviewed with patient/caregiver   diversional activity provided   care clustered

## 2023-01-27 ENCOUNTER — TELEPHONE (OUTPATIENT)
Dept: HEMATOLOGY/ONCOLOGY | Facility: CLINIC | Age: 73
End: 2023-01-27
Payer: MEDICARE

## 2023-01-27 ENCOUNTER — TELEPHONE (OUTPATIENT)
Dept: SURGERY | Facility: CLINIC | Age: 73
End: 2023-01-27
Payer: MEDICARE

## 2023-01-27 PROBLEM — G92.9 ENCEPHALOPATHY, TOXIC: Status: ACTIVE | Noted: 2023-01-25

## 2023-01-27 LAB
ALBUMIN SERPL BCP-MCNC: 2.3 G/DL (ref 3.5–5.2)
ALP SERPL-CCNC: 99 U/L (ref 55–135)
ALT SERPL W/O P-5'-P-CCNC: 42 U/L (ref 10–44)
ANION GAP SERPL CALC-SCNC: 11 MMOL/L (ref 8–16)
AST SERPL-CCNC: 51 U/L (ref 10–40)
BASOPHILS # BLD AUTO: 0.07 K/UL (ref 0–0.2)
BASOPHILS NFR BLD: 0.2 % (ref 0–1.9)
BILIRUB SERPL-MCNC: 1.2 MG/DL (ref 0.1–1)
BUN SERPL-MCNC: 8 MG/DL (ref 8–23)
CALCIUM SERPL-MCNC: 8.3 MG/DL (ref 8.7–10.5)
CHLORIDE SERPL-SCNC: 97 MMOL/L (ref 95–110)
CO2 SERPL-SCNC: 25 MMOL/L (ref 23–29)
CREAT SERPL-MCNC: 0.7 MG/DL (ref 0.5–1.4)
DIFFERENTIAL METHOD: ABNORMAL
EOSINOPHIL # BLD AUTO: 0 K/UL (ref 0–0.5)
EOSINOPHIL NFR BLD: 0.1 % (ref 0–8)
ERYTHROCYTE [DISTWIDTH] IN BLOOD BY AUTOMATED COUNT: 13.1 % (ref 11.5–14.5)
EST. GFR  (NO RACE VARIABLE): >60 ML/MIN/1.73 M^2
GLUCOSE SERPL-MCNC: 117 MG/DL (ref 70–110)
HCT VFR BLD AUTO: 38.6 % (ref 40–54)
HGB BLD-MCNC: 13.7 G/DL (ref 14–18)
IMM GRANULOCYTES # BLD AUTO: 0.28 K/UL (ref 0–0.04)
IMM GRANULOCYTES NFR BLD AUTO: 1 % (ref 0–0.5)
LDH SERPL L TO P-CCNC: 199 U/L (ref 110–260)
LYMPHOCYTES # BLD AUTO: 0.6 K/UL (ref 1–4.8)
LYMPHOCYTES NFR BLD: 2 % (ref 18–48)
MAGNESIUM SERPL-MCNC: 1.4 MG/DL (ref 1.6–2.6)
MCH RBC QN AUTO: 32.5 PG (ref 27–31)
MCHC RBC AUTO-ENTMCNC: 35.5 G/DL (ref 32–36)
MCV RBC AUTO: 92 FL (ref 82–98)
MONOCYTES # BLD AUTO: 0.2 K/UL (ref 0.3–1)
MONOCYTES NFR BLD: 0.5 % (ref 4–15)
NEUTROPHILS # BLD AUTO: 28 K/UL (ref 1.8–7.7)
NEUTROPHILS NFR BLD: 96.2 % (ref 38–73)
NRBC BLD-RTO: 0 /100 WBC
PHOSPHATE SERPL-MCNC: 2.1 MG/DL (ref 2.7–4.5)
PLATELET # BLD AUTO: 283 K/UL (ref 150–450)
PMV BLD AUTO: 10 FL (ref 9.2–12.9)
POTASSIUM SERPL-SCNC: 3.7 MMOL/L (ref 3.5–5.1)
PROT SERPL-MCNC: 6 G/DL (ref 6–8.4)
RBC # BLD AUTO: 4.21 M/UL (ref 4.6–6.2)
SODIUM SERPL-SCNC: 133 MMOL/L (ref 136–145)
URATE SERPL-MCNC: 2.7 MG/DL (ref 3.4–7)
VANCOMYCIN TROUGH SERPL-MCNC: 6.8 UG/ML (ref 10–22)
WBC # BLD AUTO: 29.1 K/UL (ref 3.9–12.7)

## 2023-01-27 PROCEDURE — 36415 COLL VENOUS BLD VENIPUNCTURE: CPT | Performed by: STUDENT IN AN ORGANIZED HEALTH CARE EDUCATION/TRAINING PROGRAM

## 2023-01-27 PROCEDURE — 25000003 PHARM REV CODE 250: Performed by: STUDENT IN AN ORGANIZED HEALTH CARE EDUCATION/TRAINING PROGRAM

## 2023-01-27 PROCEDURE — 63600175 PHARM REV CODE 636 W HCPCS: Performed by: STUDENT IN AN ORGANIZED HEALTH CARE EDUCATION/TRAINING PROGRAM

## 2023-01-27 PROCEDURE — 11000001 HC ACUTE MED/SURG PRIVATE ROOM

## 2023-01-27 PROCEDURE — 80202 ASSAY OF VANCOMYCIN: CPT | Performed by: STUDENT IN AN ORGANIZED HEALTH CARE EDUCATION/TRAINING PROGRAM

## 2023-01-27 PROCEDURE — 63600175 PHARM REV CODE 636 W HCPCS: Performed by: INTERNAL MEDICINE

## 2023-01-27 PROCEDURE — S4991 NICOTINE PATCH NONLEGEND: HCPCS | Performed by: STUDENT IN AN ORGANIZED HEALTH CARE EDUCATION/TRAINING PROGRAM

## 2023-01-27 PROCEDURE — 84550 ASSAY OF BLOOD/URIC ACID: CPT | Performed by: STUDENT IN AN ORGANIZED HEALTH CARE EDUCATION/TRAINING PROGRAM

## 2023-01-27 PROCEDURE — 85025 COMPLETE CBC W/AUTO DIFF WBC: CPT | Performed by: INTERNAL MEDICINE

## 2023-01-27 PROCEDURE — 84100 ASSAY OF PHOSPHORUS: CPT | Performed by: INTERNAL MEDICINE

## 2023-01-27 PROCEDURE — 80053 COMPREHEN METABOLIC PANEL: CPT | Performed by: INTERNAL MEDICINE

## 2023-01-27 PROCEDURE — 25000003 PHARM REV CODE 250: Performed by: INTERNAL MEDICINE

## 2023-01-27 PROCEDURE — 83735 ASSAY OF MAGNESIUM: CPT | Performed by: INTERNAL MEDICINE

## 2023-01-27 PROCEDURE — 83615 LACTATE (LD) (LDH) ENZYME: CPT | Performed by: STUDENT IN AN ORGANIZED HEALTH CARE EDUCATION/TRAINING PROGRAM

## 2023-01-27 RX ORDER — MAGNESIUM SULFATE HEPTAHYDRATE 40 MG/ML
2 INJECTION, SOLUTION INTRAVENOUS ONCE
Status: COMPLETED | OUTPATIENT
Start: 2023-01-27 | End: 2023-01-27

## 2023-01-27 RX ADMIN — PIPERACILLIN AND TAZOBACTAM 4.5 G: 4; .5 INJECTION, POWDER, LYOPHILIZED, FOR SOLUTION INTRAVENOUS; PARENTERAL at 04:01

## 2023-01-27 RX ADMIN — ENOXAPARIN SODIUM 40 MG: 40 INJECTION SUBCUTANEOUS at 04:01

## 2023-01-27 RX ADMIN — ACETAMINOPHEN 650 MG: 325 TABLET ORAL at 10:01

## 2023-01-27 RX ADMIN — VANCOMYCIN HYDROCHLORIDE 1000 MG: 1 INJECTION, POWDER, LYOPHILIZED, FOR SOLUTION INTRAVENOUS at 02:01

## 2023-01-27 RX ADMIN — PIPERACILLIN AND TAZOBACTAM 4.5 G: 4; .5 INJECTION, POWDER, LYOPHILIZED, FOR SOLUTION INTRAVENOUS; PARENTERAL at 09:01

## 2023-01-27 RX ADMIN — VANCOMYCIN HYDROCHLORIDE 1750 MG: 1 INJECTION, POWDER, LYOPHILIZED, FOR SOLUTION INTRAVENOUS at 03:01

## 2023-01-27 RX ADMIN — MUPIROCIN: 20 OINTMENT TOPICAL at 09:01

## 2023-01-27 RX ADMIN — Medication 1 PATCH: at 09:01

## 2023-01-27 RX ADMIN — SODIUM PHOSPHATE, MONOBASIC, MONOHYDRATE AND SODIUM PHOSPHATE, DIBASIC, ANHYDROUS 15 MMOL: 142; 276 INJECTION, SOLUTION INTRAVENOUS at 06:01

## 2023-01-27 RX ADMIN — MAGNESIUM SULFATE HEPTAHYDRATE 2 G: 40 INJECTION, SOLUTION INTRAVENOUS at 04:01

## 2023-01-27 NOTE — ASSESSMENT & PLAN NOTE
- No previous ECHO to compare  - Indeterminant diastolic dysfunction with elevated BNP of 642  - Lasix PRN, does not appear significantly volume overloaded at this time.

## 2023-01-27 NOTE — SUBJECTIVE & OBJECTIVE
Interval History: had episode of delirium while still in ICU yesterday, seems to be more himself today. He has some abdominal pain currently but overall is feeling okay.    Review of Systems  Objective:     Vital Signs (Most Recent):  Temp: 98.2 °F (36.8 °C) (01/27/23 1122)  Pulse: 109 (01/27/23 1122)  Resp: 18 (01/27/23 1122)  BP: 133/65 (01/27/23 1122)  SpO2: (!) 89 % (01/27/23 1122)   Vital Signs (24h Range):  Temp:  [98.2 °F (36.8 °C)-99.1 °F (37.3 °C)] 98.2 °F (36.8 °C)  Pulse:  [107-126] 109  Resp:  [16-33] 18  SpO2:  [89 %-94 %] 89 %  BP: (111-139)/(58-73) 133/65     Weight: 65 kg (143 lb 4.8 oz)  Body mass index is 21.79 kg/m².    Intake/Output Summary (Last 24 hours) at 1/27/2023 1459  Last data filed at 1/27/2023 1253  Gross per 24 hour   Intake 647.33 ml   Output 1850 ml   Net -1202.67 ml        Physical Exam  Vitals reviewed.   Constitutional:       General: He is not in acute distress.     Appearance: He is well-developed. He is ill-appearing. He is not toxic-appearing or diaphoretic.   Eyes:      General: No scleral icterus.  Neck:      Thyroid: No thyromegaly.   Cardiovascular:      Rate and Rhythm: Normal rate and regular rhythm.      Heart sounds: No murmur heard.    No gallop.   Pulmonary:      Effort: Pulmonary effort is normal.      Breath sounds: Normal breath sounds. No wheezing or rales.   Abdominal:      General: There is no distension.      Palpations: Abdomen is soft.      Tenderness: There is no abdominal tenderness.   Musculoskeletal:         General: No swelling. Normal range of motion.   Skin:     General: Skin is warm.      Capillary Refill: Capillary refill takes less than 2 seconds.   Neurological:      Mental Status: He is alert and oriented to person, place, and time.      Motor: Weakness present.      Gait: Gait abnormal.   Psychiatric:         Mood and Affect: Mood normal.         Behavior: Behavior normal.       Significant Labs: All pertinent labs within the past 24 hours have  been reviewed.    Significant Imaging: I have reviewed all pertinent imaging results/findings within the past 24 hours.

## 2023-01-27 NOTE — PROGRESS NOTES
Pharmacokinetic Assessment Follow Up: IV Vancomycin    Vancomycin serum concentration assessment(s):    The trough level was drawn correctly and can be used to guide therapy at this time. The measurement is below the desired definitive target range of 10 to 20 mcg/mL.    Vancomycin Regimen Plan:    Change regimen to Vancomycin 1000 mg IV every 12 hours with next serum trough concentration measured at 1430 prior to 3rd dose on 1/28/2023    Drug levels (last 3 results):  Recent Labs   Lab Result Units 01/27/23 0217   Vancomycin-Trough ug/mL 6.8*       Pharmacy will continue to follow and monitor vancomycin.    Please contact pharmacy at extension [phone] for questions regarding this assessment.    Thank you for the consult,   Humberto Tierney Jr       Patient brief summary:  Mnauel Tyler is a 72 y.o. male initiated on antimicrobial therapy with IV Vancomycin for treatment of bacteremia    Drug Allergies:   Review of patient's allergies indicates:  No Known Allergies    Actual Body Weight:   65 kg    Renal Function:   Estimated Creatinine Clearance: 89 mL/min (based on SCr of 0.7 mg/dL).,     Dialysis Method (if applicable):  N/A    CBC (last 72 hours):  Recent Labs   Lab Result Units 01/25/23  0129 01/26/23 0338 01/27/23 0217   WBC K/uL 27.81* 13.43* 29.10*   Hemoglobin g/dL 14.8 13.1* 13.7*   Hematocrit % 42.2 38.4* 38.6*   Platelets K/uL 385 301 283   Gran % % 90.6* 87.8* 96.2*   Lymph % % 2.2* 6.3* 2.0*   Mono % % 5.6 2.5* 0.5*   Eosinophil % % 0.6 2.2 0.1   Basophil % % 0.4 0.7 0.2   Differential Method  Automated Automated Automated       Metabolic Panel (last 72 hours):  Recent Labs   Lab Result Units 01/25/23  0129 01/25/23  0508 01/26/23  0338 01/27/23 0217   Sodium mmol/L 132*  --  132* 133*   Potassium mmol/L 4.5  --  3.4* 3.7   Chloride mmol/L 100  --  101 97   CO2 mmol/L 20*  --  24 25   Glucose mg/dL 109  --  122* 117*   Glucose, UA   --  Negative  --   --    BUN mg/dL 18  --  10 8   Creatinine mg/dL  0.9  --  0.7 0.7   Creatinine, Urine mg/dL  --  29.2  --   --    Albumin g/dL 2.9*  --  2.4* 2.3*   Total Bilirubin mg/dL 0.8  --  0.7 1.2*   Alkaline Phosphatase U/L 149*  --  98 99   AST U/L 51*  --  40 51*   ALT U/L 34  --  32 42   Magnesium mg/dL 1.7  --  1.5* 1.4*   Phosphorus mg/dL 3.0  --  2.4* 2.1*       Vancomycin Administrations:  vancomycin given in the last 96 hours                     vancomycin (VANCOCIN) 1,750 mg in dextrose 5 % (D5W) 500 mL IVPB (mg) 1,750 mg New Bag 01/27/23 0321     1,750 mg New Bag 01/26/23 0308    vancomycin 1.5 g in dextrose 5 % 250 mL IVPB (ready to mix) (mg) 1,500 mg New Bag 01/25/23 0300                    Microbiologic Results:  Microbiology Results (last 7 days)       Procedure Component Value Units Date/Time    Blood culture x two cultures. Draw prior to antibiotics. [[id number]] Collected: 01/25/23 0129    Order Status: Completed Specimen: Blood from Peripheral, Antecubital, Left Updated: 01/27/23 0303     Blood Culture, Routine No Growth to date      No Growth to date      No Growth to date    Narrative:      Aerobic and anaerobic    Blood culture x two cultures. Draw prior to antibiotics. [[id number]] Collected: 01/25/23 0150    Order Status: Completed Specimen: Blood from Peripheral, Upper Arm, Right Updated: 01/27/23 0303     Blood Culture, Routine No Growth to date      No Growth to date      No Growth to date    Narrative:      Aerobic and anaerobic    Culture, Respiratory with Gram Stain [[id number]]     Order Status: No result Specimen: Sputum, Expectorated

## 2023-01-27 NOTE — ASSESSMENT & PLAN NOTE
Presented with acute encephalopathy. CTH w/o acute abnormality. Ammonia wnl. Suspect sepsis/chemo/dehydration.  - Improved with fluids and antibiotics. Still having some delirium changes.   - monitor

## 2023-01-27 NOTE — ASSESSMENT & PLAN NOTE
- Initiated Gemcitabine and Paclitaxel on 1/23. This was dose reduced for concerns for toxicity. Did not tolerate well.   - Palliative Care consulted  - Appreciate Oncology recommendations       Assessment and Plan:    Problem/Plan - 1:  ·  Problem: COVID-19.  Plan: remdesvir X 5 days  steroids X 10 days  supportive treatment with o2, ID eval with Dr. CAMPOS.      Problem/Plan - 2:  ·  Problem: Hypokalemia.  Plan: supplement potassium  serial BMP  improved / resolved.      Problem/Plan - 3:  ·  Problem: Colitis.  Plan: GI eval with Dr. BRADLEY appreciated  doubt bacterial  h/o cdad.      Problem/Plan - 4:  ·  Problem: Preventive measure.  Plan: lovenox for DVT prophylaxis.      Problem/Plan - 5:  ·  Problem: Fall.  Plan: fall precautions  PT for ambulation  may need SUSANNA.      Problem/Plan - 6:  Problem: Diarrhea. Plan: add questran  ? etiology  GI eval with Dr. BRADLEY.       1- COVID-19.  Plan:   completed remdesvir X 5 days  completed steroids X 10 days  supportive treatment with o2,  ID eval with Dr. CAMPOS.      Problem/Plan - 2:  ·  Problem: Hypokalemia.  Plan: supplement potassium  serial BMP  improved / resolved.      Problem/Plan - 3:  ·  Problem: Colitis.  Plan: GI eval with Dr. BRADLEY appreciated  doubt bacterial  h/o cdad.      Problem/Plan - 4:  ·  Problem: Preventive measure.  Plan: lovenox for DVT prophylaxis.      Problem/Plan - 5:  ·  Problem: Fall.  Plan: fall precautions  PT for ambulation  may need SUSANNA.      Problem/Plan - 6:  Problem: Diarrhea. Plan: add questran  ? etiology  GI eval with Dr. BRADLEY.     1- COVID-19.  Plan:   completed remdesvir X 5 days  continue steroids to complete X 10 days  continue supportive treatment with o2  ID consulted   Pulmonary following, ok to discharge once negative          2 -Falls associated with syncopal events  Multifactorial: questionable orthostatic changes patient deconditioned, poor oral intake, poor hydration, some episodes have been while standing and patient has h/o lymphedema and b/l knee replacement. The episodes have been unwitnessed.  Patient denies any fecal or urinary incontinence.    Patient has had extensive work/up in another facilities: Echo 12/31/20 normal and repeat 1/31/21 normal as well, duplex LE 12/31/20 normal, carotid US1/3/21: normal, CT head 12/30/20 negative.  A cardiac monitor was done as OP for 2 weeks as per daughter but we don't have the results  while on telemetry no evidence of arrhythmias  negative orthostatics  EEG: negative for seizures  cardiology following  neurology following  Further workup recommended as OP per cardiology: ILR, stress test    as per daughter patient had an appt for sleep study that had to reschedule because patient was admitted, advice to update appt  Patient afraid of MR due to painful neck, open MRI of head w/ w/o could be an option for her  TILT test as OP as well  fall precautions in place  PT for ambulation,  needs SUSANNA.      3-Hypokalemia.    -supplement potassium provided  - resolved.   -continue monitoring     4- Colitis.    GI Dr. BRADLEY following  occult blood neg  less likely bacterial, no further diarrhea  wbc wnl  Ct abdomen 1/28/21 circumferential colonic wall thickening/submucosal edema with mild fat stranding, mild vascular engorgement  consistent with pancolitis.   to continue diet as tolerated  continue with pepcid  h/o cdad.       5- Diarrhea.  h/o Cdiff   on questran  probiotics  improving  GI following    6- RLS  -continue with Mirapex    7-Anemia  hgb stable  OB was negative  iron panel abnormal, B12 wnl, LDH wnl, low ret count, perif smear unremarkable   received 3 doses of venofer  continue monitoring h/h  Heme-onc following  F/up as OP with GI for further workup, possible colonoscopy     lovenox for DVT prophylaxis.    Spoke with daughter Edelmira  extensively for almost an hour, @ [phone] and gave her an update about her mother's condition and latest results

## 2023-01-27 NOTE — TELEPHONE ENCOUNTER
Manuel Tyler    [id number]    [birth year]    [address]    Jonas Vera    Just started chemo 2days ago, concerned of infection although no sore        Given to dr Tobar

## 2023-01-27 NOTE — TELEPHONE ENCOUNTER
Spoke with Ms.Jayson regarding msg and informed her I will forward the msg to the provider, she verbalized understanding.       ----- Message from Sonal Patterson sent at 1/27/2023  8:06 AM CST -----  Regarding: wife  .Type: Patient Call Back    Who called: Vesta Wife  What is the request in detail: PT's wife called to inform Dr Castellon that the pt is in the hosp for having severe confusion and unable to walk due to poss chemo or dehydration reaction and in room 428     Can the clinic reply by [facility]? Call back    Would the patient rather a call back or a response via [facility]?     Best call back number: .[phone]

## 2023-01-27 NOTE — PLAN OF CARE
Problem: Adult Inpatient Plan of Care  Goal: Plan of Care Review  Outcome: Ongoing, Progressing  Goal: Optimal Comfort and Wellbeing  Outcome: Ongoing, Progressing     Problem: Impaired Wound Healing  Goal: Optimal Wound Healing  Outcome: Ongoing, Progressing  Intervention: Promote Wound Healing  Flowsheets (Taken 1/27/2023 0405)  Sleep/Rest Enhancement:   awakenings minimized   relaxation techniques promoted   regular sleep/rest pattern promoted   room darkened  Activity Management: Rolling - L1  Pain Management Interventions:   pillow support provided   position adjusted   care clustered   relaxation techniques promoted   quiet environment facilitated     Problem: Bleeding (Sepsis/Septic Shock)  Goal: Absence of Bleeding  Intervention: Monitor and Manage Bleeding  Flowsheets (Taken 1/27/2023 0405)  Bleeding Precautions: blood pressure closely monitored

## 2023-01-27 NOTE — PLAN OF CARE
Problem: Anemia (Chemotherapy Effects)  Goal: Anemia Symptom Improvement  Outcome: Ongoing, Progressing     Problem: Urinary Bleeding Risk or Actual (Chemotherapy Effects)  Goal: Absence of Hematuria  Outcome: Ongoing, Progressing     Problem: Nausea and Vomiting (Chemotherapy Effects)  Goal: Fluid and Electrolyte Balance  Outcome: Ongoing, Progressing     Problem: Neurotoxicity (Chemotherapy Effects)  Goal: Neurotoxicity Symptom Control  Outcome: Ongoing, Progressing     Problem: Neutropenia (Chemotherapy Effects)  Goal: Absence of Infection  Outcome: Ongoing, Progressing     Problem: Thrombocytopenia Bleeding Risk (Chemotherapy Effects)  Goal: Absence of Bleeding  Outcome: Ongoing, Progressing

## 2023-01-27 NOTE — CLINICAL REVIEW
IP Sepsis Screen (most recent)       Sepsis Screen (IP) - 01/27/23 0902       Is the patient's history or complaint suggestive of a possible infection? Yes  -CB    Are there at least two of the following signs and symptoms present? Yes  -CB    Sepsis signs/symptoms - Tachycardia Tachycardia     >90  -CB    Sepsis signs/symptoms - WBC WBC < 4,000 or WBC > 12,000  -CB    Are any of the following organ dysfunction criteria present and not considered to be due to a chronic condition? Yes  -CB    Organ Dysfunction Criteria Lactate > 2.0  -CB    Organ Dysfunction Criteria - O2 O2 Saturation < 95% on room air  -CB    Initiate Sepsis Protocol No  -CB    Reason sepsis not considered Pt. receiving appropriate management  -CB              User Key  (r) = Recorded By, (t) = Taken By, (c) = Cosigned By      Initials Name    CB Shweta Marin RN

## 2023-01-27 NOTE — NURSING TRANSFER
Nursing Transfer Note      1/27/2023     Reason patient is being transferred: No longer requires ICU care    Transfer To: tele [facility] floor     Transfer via stretcher    Transfer with cardiac monitoring    Transported by Concepción RN and transport    Medicines sent: yes, Magnesium, Sodium phosphate, mupirocin    Any special needs or follow-up needed: n/a    Chart send with patient: Yes    Notified: spouse    Patient reassessed at: 1/27/23 at 0500     Upon arrival to floor: cardiac monitor applied, patient oriented to room, call bell in reach, and bed in lowest position

## 2023-01-27 NOTE — PLAN OF CARE
[facility] Surg  Discharge Reassessment    Multidisciplinary team rounds with patient at bedside. Reviewed plan of care and goals for discharge. Address pt and spouse's questions and concerns.     Pt current with [facility], plan to resume services at dc.     Per physician, continue to monitor wbc and continue abx. Hematology consulted. [place] to continue to follow for dc needs.     Primary Care Provider: Victor M Stokes MD    Expected Discharge Date:     Reassessment (most recent)       Discharge Reassessment - 01/27/23 1130          Discharge Reassessment    Assessment Type Discharge Planning Reassessment     Did the patient's condition or plan change since previous assessment? Yes     Discharge Plan discussed with: Spouse/sig other;Patient     Name(s) and Number(s) JaysonVesta (Spouse)   [phone]     Communicated MIKO with patient/caregiver Date not available/Unable to determine     Discharge Plan A Home Health     Discharge Plan B Home with family     DME Needed Upon Discharge  other (see comments)   TBD    Discharge Barriers Identified None     Why the patient remains in the hospital Requires continued medical care        Post-Acute Status    Post-Acute Authorization Home Health     Home Health Status Pending medical clearance/testing     Coverage Medicare     Discharge Delays None known at this time

## 2023-01-27 NOTE — PROGRESS NOTES
[facility] Medicine  Progress Note    Patient Name: Manuel Tyler  MRN: [id number]  Patient Class: IP- Inpatient   Admission Date: 1/25/2023  Length of Stay: 2 days  Attending Physician: Jonas Vera MD  Primary Care Provider: Victor M Stokes MD        Subjective:     Principal Problem:Severe sepsis        HPI:    Manuel Tyler is a 72 y.o. 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  Patient received his 1st round of chemotherapy two days ago on 01/23, the next day the patient became altered around 10 30 at nighttime.  Patient was walking with his walker and dragging his feet, unable to answer basic questions; he was alert but he was confused.  He reports 1 episode of blood in his stool, hemoglobin 14.8 on arrival.  WBC count elevated at 28 K, with mildly elevated procalcitonin of 1.26 with normal renal function.  Patient had dips of SBP to 84 while in the ED, so was placed in the ICU for close monitoring with concerns of sepsis.  Workup unremarkable.  His kidneys had nonspecific perinephritic stranding, however his urine was pristine with no sign of infection.  Renal ultrasound unremarkable.  He was afebrile upon arrival, with no subjective fevers or chills either.  Denies chest pain or shortness of breath. follow up EGD with PD stent removal was needed.       Overview/Hospital Course:  No notes on file    Interval History: had episode of delirium while still in ICU yesterday, seems to be more himself today. He has some abdominal pain currently but overall is feeling okay.    Review of Systems  Objective:     Vital Signs (Most Recent):  Temp: 98.2 °F (36.8 °C) (01/27/23 1122)  Pulse: 109 (01/27/23 1122)  Resp: 18 (01/27/23 1122)  BP: 133/65 (01/27/23 1122)  SpO2: (!) 89 % (01/27/23 1122)   Vital Signs (24h Range):  Temp:   [98.2 °F (36.8 °C)-99.1 °F (37.3 °C)] 98.2 °F (36.8 °C)  Pulse:  [107-126] 109  Resp:  [16-33] 18  SpO2:  [89 %-94 %] 89 %  BP: (111-139)/(58-73) 133/65     Weight: 65 kg (143 lb 4.8 oz)  Body mass index is 21.79 kg/m².    Intake/Output Summary (Last 24 hours) at 1/27/2023 1459  Last data filed at 1/27/2023 1253  Gross per 24 hour   Intake 647.33 ml   Output 1850 ml   Net -1202.67 ml        Physical Exam  Vitals reviewed.   Constitutional:       General: He is not in acute distress.     Appearance: He is well-developed. He is ill-appearing. He is not toxic-appearing or diaphoretic.   Eyes:      General: No scleral icterus.  Neck:      Thyroid: No thyromegaly.   Cardiovascular:      Rate and Rhythm: Normal rate and regular rhythm.      Heart sounds: No murmur heard.    No gallop.   Pulmonary:      Effort: Pulmonary effort is normal.      Breath sounds: Normal breath sounds. No wheezing or rales.   Abdominal:      General: There is no distension.      Palpations: Abdomen is soft.      Tenderness: There is no abdominal tenderness.   Musculoskeletal:         General: No swelling. Normal range of motion.   Skin:     General: Skin is warm.      Capillary Refill: Capillary refill takes less than 2 seconds.   Neurological:      Mental Status: He is alert and oriented to person, place, and time.      Motor: Weakness present.      Gait: Gait abnormal.   Psychiatric:         Mood and Affect: Mood normal.         Behavior: Behavior normal.       Significant Labs: All pertinent labs within the past 24 hours have been reviewed.    Significant Imaging: I have reviewed all pertinent imaging results/findings within the past 24 hours.      Assessment/Plan:      * Severe sepsis  This patient does have evidence of infective focus  My overall impression is septic shock. Vital signs were reviewed and noted in progress note.  Antibiotics given-   Antibiotics (From admission, onward)    Start     Stop Route Frequency Ordered    01/27/23  1521  vancomycin (VANCOCIN) 1,000 mg in dextrose 5 % (D5W) 250 mL IVPB (Vial-Mate)         -- IV Every 12 hours (non-standard times) 01/27/23 0734    01/27/23 0930  piperacillin-tazobactam (ZOSYN) 4.5 g in dextrose 5 % in water (D5W) 5 % 100 mL IVPB (MB+)         -- IV Every 8 hours (non-standard times) 01/27/23 0826    01/25/23 0900  mupirocin 2 % ointment         01/30 0859 Nasl 2 times daily 01/25/23 0624    01/25/23 0618  vancomycin - pharmacy to dose  (vancomycin IVPB)        See Hyperspace for full Linked Orders Report.    -- IV pharmacy to manage frequency 01/25/23 0518        Cultures were taken-   Microbiology Results (last 7 days)     Procedure Component Value Units Date/Time    Blood culture x two cultures. Draw prior to antibiotics. [[id number]] Collected: 01/25/23 0129    Order Status: Completed Specimen: Blood from Peripheral, Antecubital, Left Updated: 01/27/23 0303     Blood Culture, Routine No Growth to date      No Growth to date      No Growth to date    Narrative:      Aerobic and anaerobic    Blood culture x two cultures. Draw prior to antibiotics. [[id number]] Collected: 01/25/23 0150    Order Status: Completed Specimen: Blood from Peripheral, Upper Arm, Right Updated: 01/27/23 0303     Blood Culture, Routine No Growth to date      No Growth to date      No Growth to date    Narrative:      Aerobic and anaerobic    Culture, Respiratory with Gram Stain [[id number]]     Order Status: No result Specimen: Sputum, Expectorated         Latest lactate reviewed, they are-  Recent Labs   Lab 01/25/23  0129 01/25/23  0508   LACTATE 2.0 2.2       Organ dysfunction indicated by Encephalopathy   Source- ?kidney, ?bacteremia    - 3/4 SIRS: , RR 24, and WBC 28 K. Afebrile.  BP drop to map 61.  - Source control Achieved by- Abx and fluids  - Kidneys with perinephritic stranding, however UA does not appear to have infection.  - Vancomycin and zosyn  - Check LDH, uric acid      Encephalopathy, toxic    Presented with acute encephalopathy. CTH w/o acute abnormality. Ammonia wnl. Suspect sepsis/chemo/dehydration.  - Improved with fluids and antibiotics. Still having some delirium changes.   - monitor    (HFpEF) heart failure with preserved ejection fraction  - No previous ECHO to compare  - Indeterminant diastolic dysfunction with elevated BNP of 642  - Lasix PRN, does not appear significantly volume overloaded at this time.    Liver metastases  Noted metastasis. Ammonia wnl.      Malignant neoplasm of head of pancreas  - Initiated Gemcitabine and Paclitaxel on 1/23. This was dose reduced for concerns for toxicity. Did not tolerate well.   - Palliative Care consulted  - Appreciate Oncology recommendations      Alcohol abuse  Stable, no signs of w/d      Hyperlipidemia  Not taking statin      Primary hypertension  · Not currently on BP meds  · Currently hypotensive, monitor   · This has now resolved        VTE Risk Mitigation (From admission, onward)         Ordered     enoxaparin injection 40 mg  Daily         01/25/23 0522     IP VTE HIGH RISK PATIENT  Once         01/25/23 0522     Place sequential compression device  Until discontinued         01/25/23 0522     Place FRANCES hose  Until discontinued         01/25/23 0522                Discharge Planning   MIKO:      Code Status: Full Code   Is the patient medically ready for discharge?:     Reason for patient still in hospital (select all that apply): Treatment  Discharge Plan A: Home Health   Discharge Delays: None known at this time              Jonas Vera MD  Department of Hospital Medicine   [facility]

## 2023-01-27 NOTE — ASSESSMENT & PLAN NOTE
This patient does have evidence of infective focus  My overall impression is septic shock. Vital signs were reviewed and noted in progress note.  Antibiotics given-   Antibiotics (From admission, onward)    Start     Stop Route Frequency Ordered    01/27/23 1521  vancomycin (VANCOCIN) 1,000 mg in dextrose 5 % (D5W) 250 mL IVPB (Vial-Mate)         -- IV Every 12 hours (non-standard times) 01/27/23 0734    01/27/23 0930  piperacillin-tazobactam (ZOSYN) 4.5 g in dextrose 5 % in water (D5W) 5 % 100 mL IVPB (MB+)         -- IV Every 8 hours (non-standard times) 01/27/23 0826    01/25/23 0900  mupirocin 2 % ointment         01/30 0859 Nasl 2 times daily 01/25/23 0624    01/25/23 0618  vancomycin - pharmacy to dose  (vancomycin IVPB)        See Hyperspace for full Linked Orders Report.    -- IV pharmacy to manage frequency 01/25/23 0518        Cultures were taken-   Microbiology Results (last 7 days)     Procedure Component Value Units Date/Time    Blood culture x two cultures. Draw prior to antibiotics. [[id number]] Collected: 01/25/23 0129    Order Status: Completed Specimen: Blood from Peripheral, Antecubital, Left Updated: 01/27/23 0303     Blood Culture, Routine No Growth to date      No Growth to date      No Growth to date    Narrative:      Aerobic and anaerobic    Blood culture x two cultures. Draw prior to antibiotics. [[id number]] Collected: 01/25/23 0150    Order Status: Completed Specimen: Blood from Peripheral, Upper Arm, Right Updated: 01/27/23 0303     Blood Culture, Routine No Growth to date      No Growth to date      No Growth to date    Narrative:      Aerobic and anaerobic    Culture, Respiratory with Gram Stain [[id number]]     Order Status: No result Specimen: Sputum, Expectorated         Latest lactate reviewed, they are-  Recent Labs   Lab 01/25/23  0129 01/25/23  0508   LACTATE 2.0 2.2       Organ dysfunction indicated by Encephalopathy   Source- ?kidney, ?bacteremia    - 3/4 SIRS: , RR 24, and  WBC 28 K. Afebrile.  BP drop to map 61.  - Source control Achieved by- Abx and fluids  - Kidneys with perinephritic stranding, however UA does not appear to have infection.  - Vancomycin and zosyn  - Check LDH, uric acid

## 2023-01-27 NOTE — PLAN OF CARE
Problem: Nausea and Vomiting (Chemotherapy Effects)  Goal: Fluid and Electrolyte Balance  Outcome: Ongoing, Progressing     Problem: Neurotoxicity (Chemotherapy Effects)  Goal: Neurotoxicity Symptom Control  Outcome: Ongoing, Progressing     Problem: Oral Mucositis (Chemotherapy Effects)  Goal: Improved Oral Mucous Membrane Integrity  Outcome: Ongoing, Progressing     Problem: Thrombocytopenia Bleeding Risk (Chemotherapy Effects)  Goal: Absence of Bleeding  Outcome: Ongoing, Progressing

## 2023-01-28 LAB
ALBUMIN SERPL BCP-MCNC: 2 G/DL (ref 3.5–5.2)
ALP SERPL-CCNC: 94 U/L (ref 55–135)
ALT SERPL W/O P-5'-P-CCNC: 56 U/L (ref 10–44)
ANION GAP SERPL CALC-SCNC: 8 MMOL/L (ref 8–16)
AST SERPL-CCNC: 63 U/L (ref 10–40)
BASOPHILS # BLD AUTO: 0.08 K/UL (ref 0–0.2)
BASOPHILS NFR BLD: 0.4 % (ref 0–1.9)
BILIRUB SERPL-MCNC: 1.1 MG/DL (ref 0.1–1)
BUN SERPL-MCNC: 9 MG/DL (ref 8–23)
CALCIUM SERPL-MCNC: 7.9 MG/DL (ref 8.7–10.5)
CHLORIDE SERPL-SCNC: 97 MMOL/L (ref 95–110)
CO2 SERPL-SCNC: 24 MMOL/L (ref 23–29)
CREAT SERPL-MCNC: 0.7 MG/DL (ref 0.5–1.4)
DIFFERENTIAL METHOD: ABNORMAL
EOSINOPHIL # BLD AUTO: 0.1 K/UL (ref 0–0.5)
EOSINOPHIL NFR BLD: 0.5 % (ref 0–8)
ERYTHROCYTE [DISTWIDTH] IN BLOOD BY AUTOMATED COUNT: 12.8 % (ref 11.5–14.5)
EST. GFR  (NO RACE VARIABLE): >60 ML/MIN/1.73 M^2
GLUCOSE SERPL-MCNC: 133 MG/DL (ref 70–110)
HCT VFR BLD AUTO: 34.9 % (ref 40–54)
HGB BLD-MCNC: 12.1 G/DL (ref 14–18)
IMM GRANULOCYTES # BLD AUTO: 0.26 K/UL (ref 0–0.04)
IMM GRANULOCYTES NFR BLD AUTO: 1.3 % (ref 0–0.5)
LYMPHOCYTES # BLD AUTO: 0.5 K/UL (ref 1–4.8)
LYMPHOCYTES NFR BLD: 2.5 % (ref 18–48)
MAGNESIUM SERPL-MCNC: 1.7 MG/DL (ref 1.6–2.6)
MCH RBC QN AUTO: 31.9 PG (ref 27–31)
MCHC RBC AUTO-ENTMCNC: 34.7 G/DL (ref 32–36)
MCV RBC AUTO: 92 FL (ref 82–98)
MONOCYTES # BLD AUTO: 0.2 K/UL (ref 0.3–1)
MONOCYTES NFR BLD: 0.9 % (ref 4–15)
NEUTROPHILS # BLD AUTO: 19.6 K/UL (ref 1.8–7.7)
NEUTROPHILS NFR BLD: 94.4 % (ref 38–73)
NRBC BLD-RTO: 0 /100 WBC
PHOSPHATE SERPL-MCNC: 1.8 MG/DL (ref 2.7–4.5)
PLATELET # BLD AUTO: 268 K/UL (ref 150–450)
PMV BLD AUTO: 10.3 FL (ref 9.2–12.9)
POTASSIUM SERPL-SCNC: 2.9 MMOL/L (ref 3.5–5.1)
PROT SERPL-MCNC: 5.6 G/DL (ref 6–8.4)
RBC # BLD AUTO: 3.79 M/UL (ref 4.6–6.2)
SODIUM SERPL-SCNC: 129 MMOL/L (ref 136–145)
WBC # BLD AUTO: 20.73 K/UL (ref 3.9–12.7)

## 2023-01-28 PROCEDURE — 63600175 PHARM REV CODE 636 W HCPCS: Performed by: STUDENT IN AN ORGANIZED HEALTH CARE EDUCATION/TRAINING PROGRAM

## 2023-01-28 PROCEDURE — S4991 NICOTINE PATCH NONLEGEND: HCPCS | Performed by: STUDENT IN AN ORGANIZED HEALTH CARE EDUCATION/TRAINING PROGRAM

## 2023-01-28 PROCEDURE — 25000003 PHARM REV CODE 250: Performed by: INTERNAL MEDICINE

## 2023-01-28 PROCEDURE — 11000001 HC ACUTE MED/SURG PRIVATE ROOM

## 2023-01-28 PROCEDURE — 84100 ASSAY OF PHOSPHORUS: CPT | Performed by: INTERNAL MEDICINE

## 2023-01-28 PROCEDURE — 80053 COMPREHEN METABOLIC PANEL: CPT | Performed by: INTERNAL MEDICINE

## 2023-01-28 PROCEDURE — 36415 COLL VENOUS BLD VENIPUNCTURE: CPT | Performed by: INTERNAL MEDICINE

## 2023-01-28 PROCEDURE — 85025 COMPLETE CBC W/AUTO DIFF WBC: CPT | Performed by: INTERNAL MEDICINE

## 2023-01-28 PROCEDURE — 83735 ASSAY OF MAGNESIUM: CPT | Performed by: INTERNAL MEDICINE

## 2023-01-28 PROCEDURE — 63600175 PHARM REV CODE 636 W HCPCS: Performed by: INTERNAL MEDICINE

## 2023-01-28 PROCEDURE — 25000003 PHARM REV CODE 250: Performed by: STUDENT IN AN ORGANIZED HEALTH CARE EDUCATION/TRAINING PROGRAM

## 2023-01-28 RX ORDER — POTASSIUM CHLORIDE 20 MEQ/1
40 TABLET, EXTENDED RELEASE ORAL DAILY
Status: DISCONTINUED | OUTPATIENT
Start: 2023-01-28 | End: 2023-01-30 | Stop reason: HOSPADM

## 2023-01-28 RX ADMIN — PIPERACILLIN AND TAZOBACTAM 4.5 G: 4; .5 INJECTION, POWDER, LYOPHILIZED, FOR SOLUTION INTRAVENOUS; PARENTERAL at 09:01

## 2023-01-28 RX ADMIN — POTASSIUM CHLORIDE 40 MEQ: 1500 TABLET, EXTENDED RELEASE ORAL at 09:01

## 2023-01-28 RX ADMIN — MUPIROCIN: 20 OINTMENT TOPICAL at 08:01

## 2023-01-28 RX ADMIN — Medication 1 PATCH: at 09:01

## 2023-01-28 RX ADMIN — PIPERACILLIN AND TAZOBACTAM 4.5 G: 4; .5 INJECTION, POWDER, LYOPHILIZED, FOR SOLUTION INTRAVENOUS; PARENTERAL at 04:01

## 2023-01-28 RX ADMIN — ENOXAPARIN SODIUM 40 MG: 40 INJECTION SUBCUTANEOUS at 04:01

## 2023-01-28 RX ADMIN — SODIUM PHOSPHATE, MONOBASIC, MONOHYDRATE AND SODIUM PHOSPHATE, DIBASIC, ANHYDROUS 30 MMOL: 142; 276 INJECTION, SOLUTION INTRAVENOUS at 09:01

## 2023-01-28 RX ADMIN — MUPIROCIN: 20 OINTMENT TOPICAL at 09:01

## 2023-01-28 RX ADMIN — VANCOMYCIN HYDROCHLORIDE 1000 MG: 1 INJECTION, POWDER, LYOPHILIZED, FOR SOLUTION INTRAVENOUS at 04:01

## 2023-01-28 RX ADMIN — ACETAMINOPHEN 650 MG: 325 TABLET ORAL at 07:01

## 2023-01-28 RX ADMIN — PIPERACILLIN AND TAZOBACTAM 4.5 G: 4; .5 INJECTION, POWDER, LYOPHILIZED, FOR SOLUTION INTRAVENOUS; PARENTERAL at 12:01

## 2023-01-28 RX ADMIN — ACETAMINOPHEN 650 MG: 325 TABLET ORAL at 04:01

## 2023-01-28 NOTE — PROGRESS NOTES
[facility] Medicine  Progress Note    Patient Name: Manuel Tyler  MRN: [id number]  Patient Class: IP- Inpatient   Admission Date: 1/25/2023  Length of Stay: 3 days  Attending Physician: Jonas Vera MD  Primary Care Provider: Victor M Stokes MD        Subjective:     Principal Problem:Severe sepsis        HPI:    Manuel Tyler is a 72 y.o. 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  Patient received his 1st round of chemotherapy two days ago on 01/23, the next day the patient became altered around 10 30 at nighttime.  Patient was walking with his walker and dragging his feet, unable to answer basic questions; he was alert but he was confused.  He reports 1 episode of blood in his stool, hemoglobin 14.8 on arrival.  WBC count elevated at 28 K, with mildly elevated procalcitonin of 1.26 with normal renal function.  Patient had dips of SBP to 84 while in the ED, so was placed in the ICU for close monitoring with concerns of sepsis.  Workup unremarkable.  His kidneys had nonspecific perinephritic stranding, however his urine was pristine with no sign of infection.  Renal ultrasound unremarkable.  He was afebrile upon arrival, with no subjective fevers or chills either.  Denies chest pain or shortness of breath. follow up EGD with PD stent removal was needed.       Overview/Hospital Course:  No notes on file    Interval History: no new issues, he is feeling well today. Asking to walk around. Still on oxygen. No longer having abdominal pain.    Review of Systems  Objective:     Vital Signs (Most Recent):  Temp: 98.3 °F (36.8 °C) (01/28/23 1056)  Pulse: 99 (01/28/23 1056)  Resp: 20 (01/28/23 1056)  BP: 128/72 (01/28/23 1056)  SpO2: (!) 93 % (01/28/23 1056)   Vital Signs (24h Range):  Temp:  [97.5 °F (36.4 °C)-98.5 °F (36.9 °C)]  98.3 °F (36.8 °C)  Pulse:  [] 99  Resp:  [17-20] 20  SpO2:  [88 %-93 %] 93 %  BP: (120-137)/(59-72) 128/72     Weight: 65 kg (143 lb 4.8 oz)  Body mass index is 21.79 kg/m².    Intake/Output Summary (Last 24 hours) at 1/28/2023 1414  Last data filed at 1/28/2023 0944  Gross per 24 hour   Intake 1043.96 ml   Output 1200 ml   Net -156.04 ml        Physical Exam  Vitals reviewed.   Constitutional:       General: He is not in acute distress.     Appearance: He is well-developed. He is ill-appearing. He is not toxic-appearing or diaphoretic.   Eyes:      General: No scleral icterus.  Neck:      Thyroid: No thyromegaly.   Cardiovascular:      Rate and Rhythm: Normal rate and regular rhythm.      Heart sounds: No murmur heard.    No gallop.   Pulmonary:      Effort: Pulmonary effort is normal.      Breath sounds: Normal breath sounds. No wheezing or rales.   Abdominal:      General: There is no distension.      Palpations: Abdomen is soft.      Tenderness: There is no abdominal tenderness.   Musculoskeletal:         General: No swelling. Normal range of motion.   Skin:     General: Skin is warm.      Capillary Refill: Capillary refill takes less than 2 seconds.   Neurological:      Mental Status: He is alert and oriented to person, place, and time.      Motor: Weakness present.      Gait: Gait abnormal.   Psychiatric:         Mood and Affect: Mood normal.         Behavior: Behavior normal.       Significant Labs: All pertinent labs within the past 24 hours have been reviewed.    Significant Imaging: I have reviewed all pertinent imaging results/findings within the past 24 hours.      Assessment/Plan:      * Severe sepsis  This patient does have evidence of infective focus  My overall impression is septic shock. Vital signs were reviewed and noted in progress note.  Antibiotics given-   Antibiotics (From admission, onward)    Start     Stop Route Frequency Ordered    01/27/23 0930  piperacillin-tazobactam (ZOSYN) 4.5  g in dextrose 5 % in water (D5W) 5 % 100 mL IVPB (MB+)         -- IV Every 8 hours (non-standard times) 01/27/23 0826    01/25/23 0900  mupirocin 2 % ointment         01/30 0859 Nasl 2 times daily 01/25/23 0624        Cultures were taken-   Microbiology Results (last 7 days)     Procedure Component Value Units Date/Time    Blood culture x two cultures. Draw prior to antibiotics. [[id number]] Collected: 01/25/23 0150    Order Status: Completed Specimen: Blood from Peripheral, Upper Arm, Right Updated: 01/28/23 0303     Blood Culture, Routine No Growth to date      No Growth to date      No Growth to date      No Growth to date    Narrative:      Aerobic and anaerobic    Blood culture x two cultures. Draw prior to antibiotics. [[id number]] Collected: 01/25/23 0129    Order Status: Completed Specimen: Blood from Peripheral, Antecubital, Left Updated: 01/28/23 0303     Blood Culture, Routine No Growth to date      No Growth to date      No Growth to date      No Growth to date    Narrative:      Aerobic and anaerobic    Culture, Respiratory with Gram Stain [[id number]]     Order Status: No result Specimen: Sputum, Expectorated         Latest lactate reviewed, they are-  No results for input(s): LACTATE in the last 72 hours.    Organ dysfunction indicated by Encephalopathy   Source- ?kidney, ?bacteremia    - 3/4 SIRS: , RR 24, and WBC 28 K. Afebrile.  BP drop to map 61.  - Source control Achieved by- Abx and fluids  - Kidneys with perinephritic stranding, however UA does not appear to have infection.  - Vancomycin and zosyn  - Check LDH, uric acid - within normal limitis  - stop vanc on 1/28 - monitor off vanc      Encephalopathy, toxic   Presented with acute encephalopathy. CTH w/o acute abnormality. Ammonia wnl. Suspect sepsis/chemo/dehydration.  - Improved with fluids and antibiotics. Still having some delirium changes.   - monitor    (HFpEF) heart failure with preserved ejection fraction  - No previous ECHO to  compare  - Indeterminant diastolic dysfunction with elevated BNP of 642  - Lasix PRN, does not appear significantly volume overloaded at this time.    Liver metastases  Noted metastasis. Ammonia wnl.      Malignant neoplasm of head of pancreas  - Initiated Gemcitabine and Paclitaxel on 1/23. This was dose reduced for concerns for toxicity. Did not tolerate well.   - Palliative Care consulted  - Appreciate Oncology recommendations      Alcohol abuse  Stable, no signs of w/d      Hyperlipidemia  Not taking statin      Primary hypertension  · Not currently on BP meds  · Currently hypotensive, monitor   · This has now resolved        VTE Risk Mitigation (From admission, onward)         Ordered     enoxaparin injection 40 mg  Daily         01/25/23 0522     IP VTE HIGH RISK PATIENT  Once         01/25/23 0522     Place sequential compression device  Until discontinued         01/25/23 0522     Place FRANCES hose  Until discontinued         01/25/23 0522                Discharge Planning   MIKO:      Code Status: Full Code   Is the patient medically ready for discharge?:     Reason for patient still in hospital (select all that apply): Treatment  Discharge Plan A: Home Health   Discharge Delays: None known at this time              Jonas Vera MD  Department of Hospital Medicine   [facility]

## 2023-01-28 NOTE — NURSING
Vitals assessed; SpO2 88% on room air, no sob, no distress. Pt repositioned, 1L O2 NC placed, SpO2 92%. Will cont to monitor

## 2023-01-28 NOTE — NURSING
Pt resting in bed. Alert and oriented. No complaints. IV saline lock. Tele #[id number]. Mepilex placed to sacral area. Safety measures maintained. Will cont to monitor

## 2023-01-28 NOTE — PROGRESS NOTES
Therapy with vancomycin complete and/or consult discontinued by provider.  Pharmacy will sign off, please re-consult as needed.    Thank you for the consult,   Lida Tarango  01/28/2023

## 2023-01-28 NOTE — SUBJECTIVE & OBJECTIVE
Interval History: no new issues, he is feeling well today. Asking to walk around. Still on oxygen. No longer having abdominal pain.    Review of Systems  Objective:     Vital Signs (Most Recent):  Temp: 98.3 °F (36.8 °C) (01/28/23 1056)  Pulse: 99 (01/28/23 1056)  Resp: 20 (01/28/23 1056)  BP: 128/72 (01/28/23 1056)  SpO2: (!) 93 % (01/28/23 1056)   Vital Signs (24h Range):  Temp:  [97.5 °F (36.4 °C)-98.5 °F (36.9 °C)] 98.3 °F (36.8 °C)  Pulse:  [] 99  Resp:  [17-20] 20  SpO2:  [88 %-93 %] 93 %  BP: (120-137)/(59-72) 128/72     Weight: 65 kg (143 lb 4.8 oz)  Body mass index is 21.79 kg/m².    Intake/Output Summary (Last 24 hours) at 1/28/2023 1414  Last data filed at 1/28/2023 0944  Gross per 24 hour   Intake 1043.96 ml   Output 1200 ml   Net -156.04 ml        Physical Exam  Vitals reviewed.   Constitutional:       General: He is not in acute distress.     Appearance: He is well-developed. He is ill-appearing. He is not toxic-appearing or diaphoretic.   Eyes:      General: No scleral icterus.  Neck:      Thyroid: No thyromegaly.   Cardiovascular:      Rate and Rhythm: Normal rate and regular rhythm.      Heart sounds: No murmur heard.    No gallop.   Pulmonary:      Effort: Pulmonary effort is normal.      Breath sounds: Normal breath sounds. No wheezing or rales.   Abdominal:      General: There is no distension.      Palpations: Abdomen is soft.      Tenderness: There is no abdominal tenderness.   Musculoskeletal:         General: No swelling. Normal range of motion.   Skin:     General: Skin is warm.      Capillary Refill: Capillary refill takes less than 2 seconds.   Neurological:      Mental Status: He is alert and oriented to person, place, and time.      Motor: Weakness present.      Gait: Gait abnormal.   Psychiatric:         Mood and Affect: Mood normal.         Behavior: Behavior normal.       Significant Labs: All pertinent labs within the past 24 hours have been reviewed.    Significant Imaging: I  have reviewed all pertinent imaging results/findings within the past 24 hours.

## 2023-01-28 NOTE — CLINICAL REVIEW
IP Sepsis Screen (most recent)       Sepsis Screen (IP) - 01/28/23 0907       Is the patient's history or complaint suggestive of a possible infection? Yes  -    Are there at least two of the following signs and symptoms present? Yes  -    Sepsis signs/symptoms - Tachycardia Tachycardia     >90  -    Sepsis signs/symptoms - WBC WBC < 4,000 or WBC > 12,000  -    Are any of the following organ dysfunction criteria present and not considered to be due to a chronic condition? Yes  -    Organ Dysfunction Criteria - O2 O2 Saturation < 95% on room air  -    Initiate Sepsis Protocol No  -JM    Reason sepsis not considered Pt. receiving appropriate management  -              User Key  (r) = Recorded By, (t) = Taken By, (c) = Cosigned By      Initials Name     Gisel Nugent RN

## 2023-01-28 NOTE — AI DETERIORATION ALERT
Artificial Intelligence [place]  [facility] [place]  [address] [place] [address]  Phone: [phone]     This documentation was triggered by an Artificial Intelligence Notification.     Admit Date: 2023   LOS: 3  Code Status: Full Code  : 1950  Age: 72 y.o.  Weight:   Wt Readings from Last 1 Encounters:   23 65 kg (143 lb 4.8 oz)        Sex: male  Bed: [address]  MRN: [id number]  Attending Physician: Jonas Vera MD     Date of Alert: 2023  Time AI Alert Received: 12:16 AM               Vitals:    23 0008   BP:    Pulse: 110   Resp:    Temp:        Artificial Intelligence alert discussed with Provider:     Name: Gaby   Date/Time of Provider Notification: 12:16 AM        Patient Condition: Chart reviewed. Patient is hypoxic to 88% and was placed on 1L O2. His vitals are at baseline and his oxygenation has been fluctuating. TM.

## 2023-01-28 NOTE — ASSESSMENT & PLAN NOTE
This patient does have evidence of infective focus  My overall impression is septic shock. Vital signs were reviewed and noted in progress note.  Antibiotics given-   Antibiotics (From admission, onward)    Start     Stop Route Frequency Ordered    01/27/23 0930  piperacillin-tazobactam (ZOSYN) 4.5 g in dextrose 5 % in water (D5W) 5 % 100 mL IVPB (MB+)         -- IV Every 8 hours (non-standard times) 01/27/23 0826    01/25/23 0900  mupirocin 2 % ointment         01/30 0859 Nasl 2 times daily 01/25/23 0624        Cultures were taken-   Microbiology Results (last 7 days)     Procedure Component Value Units Date/Time    Blood culture x two cultures. Draw prior to antibiotics. [[id number]] Collected: 01/25/23 0150    Order Status: Completed Specimen: Blood from Peripheral, Upper Arm, Right Updated: 01/28/23 0303     Blood Culture, Routine No Growth to date      No Growth to date      No Growth to date      No Growth to date    Narrative:      Aerobic and anaerobic    Blood culture x two cultures. Draw prior to antibiotics. [[id number]] Collected: 01/25/23 0129    Order Status: Completed Specimen: Blood from Peripheral, Antecubital, Left Updated: 01/28/23 0303     Blood Culture, Routine No Growth to date      No Growth to date      No Growth to date      No Growth to date    Narrative:      Aerobic and anaerobic    Culture, Respiratory with Gram Stain [[id number]]     Order Status: No result Specimen: Sputum, Expectorated         Latest lactate reviewed, they are-  No results for input(s): LACTATE in the last 72 hours.    Organ dysfunction indicated by Encephalopathy   Source- ?kidney, ?bacteremia    - 3/4 SIRS: , RR 24, and WBC 28 K. Afebrile.  BP drop to map 61.  - Source control Achieved by- Abx and fluids  - Kidneys with perinephritic stranding, however UA does not appear to have infection.  - Vancomycin and zosyn  - Check LDH, uric acid - within normal limitis  - stop vanc on 1/28 - monitor off vanc

## 2023-01-28 NOTE — NURSING
[facility] [place] 4 [place]      1/28/2023       Skin assessed on: Saturday      [] No Pressure Injuries Present    []Prevention Measures Documented    [x] Yes LDA  for Pressure Injury Previously documented     [] Yes New Pressure Injury Discovered   [] LDA for New Pressure Injury Added      Attending RN:  Mercedez Finney RN     Second RN:  Vira RAMIREZ LPN

## 2023-01-29 PROBLEM — Z71.89 ACP (ADVANCE CARE PLANNING): Status: ACTIVE | Noted: 2023-01-29

## 2023-01-29 LAB
ALBUMIN SERPL BCP-MCNC: 2.1 G/DL (ref 3.5–5.2)
ALP SERPL-CCNC: 82 U/L (ref 55–135)
ALT SERPL W/O P-5'-P-CCNC: 48 U/L (ref 10–44)
ANION GAP SERPL CALC-SCNC: 10 MMOL/L (ref 8–16)
AST SERPL-CCNC: 47 U/L (ref 10–40)
BACTERIA BLD CULT: NORMAL
BACTERIA BLD CULT: NORMAL
BASOPHILS # BLD AUTO: 0.09 K/UL (ref 0–0.2)
BASOPHILS NFR BLD: 1 % (ref 0–1.9)
BILIRUB SERPL-MCNC: 0.7 MG/DL (ref 0.1–1)
BUN SERPL-MCNC: 8 MG/DL (ref 8–23)
CALCIUM SERPL-MCNC: 8.5 MG/DL (ref 8.7–10.5)
CHLORIDE SERPL-SCNC: 99 MMOL/L (ref 95–110)
CO2 SERPL-SCNC: 25 MMOL/L (ref 23–29)
CREAT SERPL-MCNC: 0.6 MG/DL (ref 0.5–1.4)
DIFFERENTIAL METHOD: ABNORMAL
EOSINOPHIL # BLD AUTO: 0.3 K/UL (ref 0–0.5)
EOSINOPHIL NFR BLD: 3.6 % (ref 0–8)
ERYTHROCYTE [DISTWIDTH] IN BLOOD BY AUTOMATED COUNT: 12.9 % (ref 11.5–14.5)
EST. GFR  (NO RACE VARIABLE): >60 ML/MIN/1.73 M^2
GLUCOSE SERPL-MCNC: 115 MG/DL (ref 70–110)
HCT VFR BLD AUTO: 34.4 % (ref 40–54)
HGB BLD-MCNC: 12.3 G/DL (ref 14–18)
IMM GRANULOCYTES # BLD AUTO: 0.14 K/UL (ref 0–0.04)
IMM GRANULOCYTES NFR BLD AUTO: 1.6 % (ref 0–0.5)
LYMPHOCYTES # BLD AUTO: 0.8 K/UL (ref 1–4.8)
LYMPHOCYTES NFR BLD: 9.7 % (ref 18–48)
MCH RBC QN AUTO: 32.7 PG (ref 27–31)
MCHC RBC AUTO-ENTMCNC: 35.8 G/DL (ref 32–36)
MCV RBC AUTO: 92 FL (ref 82–98)
MONOCYTES # BLD AUTO: 0.2 K/UL (ref 0.3–1)
MONOCYTES NFR BLD: 1.7 % (ref 4–15)
NEUTROPHILS # BLD AUTO: 7.1 K/UL (ref 1.8–7.7)
NEUTROPHILS NFR BLD: 82.4 % (ref 38–73)
NRBC BLD-RTO: 0 /100 WBC
PLATELET # BLD AUTO: 284 K/UL (ref 150–450)
PMV BLD AUTO: 9.8 FL (ref 9.2–12.9)
POCT GLUCOSE: 125 MG/DL (ref 70–110)
POCT GLUCOSE: 204 MG/DL (ref 70–110)
POTASSIUM SERPL-SCNC: 3.1 MMOL/L (ref 3.5–5.1)
PROT SERPL-MCNC: 5.9 G/DL (ref 6–8.4)
RBC # BLD AUTO: 3.76 M/UL (ref 4.6–6.2)
SODIUM SERPL-SCNC: 134 MMOL/L (ref 136–145)
WBC # BLD AUTO: 8.62 K/UL (ref 3.9–12.7)

## 2023-01-29 PROCEDURE — 85025 COMPLETE CBC W/AUTO DIFF WBC: CPT | Performed by: STUDENT IN AN ORGANIZED HEALTH CARE EDUCATION/TRAINING PROGRAM

## 2023-01-29 PROCEDURE — 25000003 PHARM REV CODE 250: Performed by: INTERNAL MEDICINE

## 2023-01-29 PROCEDURE — 97110 THERAPEUTIC EXERCISES: CPT | Performed by: PHYSICAL THERAPIST

## 2023-01-29 PROCEDURE — 80053 COMPREHEN METABOLIC PANEL: CPT | Performed by: STUDENT IN AN ORGANIZED HEALTH CARE EDUCATION/TRAINING PROGRAM

## 2023-01-29 PROCEDURE — 94640 AIRWAY INHALATION TREATMENT: CPT

## 2023-01-29 PROCEDURE — 25000003 PHARM REV CODE 250: Performed by: STUDENT IN AN ORGANIZED HEALTH CARE EDUCATION/TRAINING PROGRAM

## 2023-01-29 PROCEDURE — 25000242 PHARM REV CODE 250 ALT 637 W/ HCPCS: Performed by: STUDENT IN AN ORGANIZED HEALTH CARE EDUCATION/TRAINING PROGRAM

## 2023-01-29 PROCEDURE — 63600175 PHARM REV CODE 636 W HCPCS: Performed by: STUDENT IN AN ORGANIZED HEALTH CARE EDUCATION/TRAINING PROGRAM

## 2023-01-29 PROCEDURE — 36415 COLL VENOUS BLD VENIPUNCTURE: CPT | Performed by: STUDENT IN AN ORGANIZED HEALTH CARE EDUCATION/TRAINING PROGRAM

## 2023-01-29 PROCEDURE — S4991 NICOTINE PATCH NONLEGEND: HCPCS | Performed by: STUDENT IN AN ORGANIZED HEALTH CARE EDUCATION/TRAINING PROGRAM

## 2023-01-29 PROCEDURE — 11000001 HC ACUTE MED/SURG PRIVATE ROOM

## 2023-01-29 PROCEDURE — 97161 PT EVAL LOW COMPLEX 20 MIN: CPT | Performed by: PHYSICAL THERAPIST

## 2023-01-29 PROCEDURE — 63600175 PHARM REV CODE 636 W HCPCS: Performed by: INTERNAL MEDICINE

## 2023-01-29 PROCEDURE — 94761 N-INVAS EAR/PLS OXIMETRY MLT: CPT

## 2023-01-29 RX ORDER — AMOXICILLIN AND CLAVULANATE POTASSIUM 875; 125 MG/1; MG/1
1 TABLET, FILM COATED ORAL EVERY 12 HOURS
Status: DISCONTINUED | OUTPATIENT
Start: 2023-01-29 | End: 2023-01-30 | Stop reason: HOSPADM

## 2023-01-29 RX ADMIN — Medication 1 PATCH: at 09:01

## 2023-01-29 RX ADMIN — MUPIROCIN: 20 OINTMENT TOPICAL at 08:01

## 2023-01-29 RX ADMIN — PIPERACILLIN AND TAZOBACTAM 4.5 G: 4; .5 INJECTION, POWDER, LYOPHILIZED, FOR SOLUTION INTRAVENOUS; PARENTERAL at 01:01

## 2023-01-29 RX ADMIN — ENOXAPARIN SODIUM 40 MG: 40 INJECTION SUBCUTANEOUS at 05:01

## 2023-01-29 RX ADMIN — LOPERAMIDE HYDROCHLORIDE 2 MG: 2 CAPSULE ORAL at 02:01

## 2023-01-29 RX ADMIN — POTASSIUM CHLORIDE 40 MEQ: 1500 TABLET, EXTENDED RELEASE ORAL at 09:01

## 2023-01-29 RX ADMIN — PIPERACILLIN AND TAZOBACTAM 4.5 G: 4; .5 INJECTION, POWDER, LYOPHILIZED, FOR SOLUTION INTRAVENOUS; PARENTERAL at 09:01

## 2023-01-29 RX ADMIN — ACETAMINOPHEN 650 MG: 325 TABLET ORAL at 06:01

## 2023-01-29 RX ADMIN — AMOXICILLIN AND CLAVULANATE POTASSIUM 1 TABLET: 875; 125 TABLET, FILM COATED ORAL at 10:01

## 2023-01-29 RX ADMIN — TIOTROPIUM BROMIDE INHALATION SPRAY 2 PUFF: 3.12 SPRAY, METERED RESPIRATORY (INHALATION) at 08:01

## 2023-01-29 RX ADMIN — AMOXICILLIN AND CLAVULANATE POTASSIUM 1 TABLET: 875; 125 TABLET, FILM COATED ORAL at 08:01

## 2023-01-29 NOTE — PLAN OF CARE
Problem: Physical Therapy  Goal: Physical Therapy Goal  Description: Goals to be met by:  2023    Patient will increase functional independence with mobility by performin. Supine to sit with [place]  2. Sit to supine with [place]  3. Sit to stand transfer with Modified [place] using Rolling Walker.   4. Gait  x 400 feet with Modified [place] using Rolling Walker.    Recommend: Outpatient Physical Therapy at time of discharge to home with family.        Outcome: Ongoing, Progressing

## 2023-01-29 NOTE — RESPIRATORY THERAPY
Pt weaned to RA, WOB NL. no complaints of SOB. BS are clear and diminished. Will continue to monitor.

## 2023-01-29 NOTE — PLAN OF CARE
Problem: Skin Injury Risk Increased  Goal: Skin Health and Integrity  Outcome: Ongoing, Progressing  Intervention: Optimize Skin Protection  Flowsheets (Taken 1/29/2023 1320)  Pressure Reduction Techniques:   frequent weight shift encouraged   heels elevated off bed  Pressure Reduction Devices: chair cushion utilized  Skin Protection: adhesive use limited  Head of Bed (HOB) Positioning: HOB elevated  Intervention: Promote and Optimize Oral Intake  Flowsheets (Taken 1/29/2023 1320)  Oral Nutrition Promotion: calorie-dense foods provided     Problem: Fall Injury Risk  Goal: Absence of Fall and Fall-Related Injury  Outcome: Ongoing, Progressing  Intervention: Identify and Manage Contributors  Flowsheets (Taken 1/29/2023 1320)  Self-Care Promotion:   BADL personal objects within reach   safe use of adaptive equipment encouraged  Medication Review/Management: medications reviewed  Intervention: Promote Injury-Free Environment  Flowsheets (Taken 1/29/2023 1320)  Safety Promotion/Fall Prevention:   assistive device/personal item within reach   bed alarm set   commode/urinal/bedpan at bedside   Fall Risk reviewed with patient/family   Fall Risk signage in place   nonskid shoes/socks when out of bed   side rails raised x 2   room near unit station   instructed to call staff for mobility

## 2023-01-29 NOTE — SUBJECTIVE & OBJECTIVE
Interval History: no new issues, had accident earlier. Encouraged patient to ambulate to bedside commode    Review of Systems  Objective:     Vital Signs (Most Recent):  Temp: 98.1 °F (36.7 °C) (01/29/23 1051)  Pulse: 90 (01/29/23 1051)  Resp: 20 (01/29/23 1051)  BP: 125/62 (01/29/23 1051)  SpO2: (!) 94 % (01/29/23 1051)   Vital Signs (24h Range):  Temp:  [97.9 °F (36.6 °C)-98.3 °F (36.8 °C)] 98.1 °F (36.7 °C)  Pulse:  [] 90  Resp:  [16-20] 20  SpO2:  [92 %-95 %] 94 %  BP: (117-145)/(57-77) 125/62     Weight: 65 kg (143 lb 4.8 oz)  Body mass index is 21.79 kg/m².    Intake/Output Summary (Last 24 hours) at 1/29/2023 1416  Last data filed at 1/29/2023 1247  Gross per 24 hour   Intake 1303.43 ml   Output 2500 ml   Net -1196.57 ml        Physical Exam  Vitals reviewed.   Constitutional:       General: He is not in acute distress.     Appearance: He is well-developed. He is ill-appearing. He is not toxic-appearing or diaphoretic.   Eyes:      General: No scleral icterus.  Neck:      Thyroid: No thyromegaly.   Cardiovascular:      Rate and Rhythm: Normal rate and regular rhythm.      Heart sounds: No murmur heard.    No gallop.   Pulmonary:      Effort: Pulmonary effort is normal.      Breath sounds: Normal breath sounds. No wheezing or rales.   Abdominal:      General: There is no distension.      Palpations: Abdomen is soft.      Tenderness: There is no abdominal tenderness.   Musculoskeletal:         General: No swelling. Normal range of motion.   Skin:     General: Skin is warm.      Capillary Refill: Capillary refill takes less than 2 seconds.   Neurological:      Mental Status: He is alert and oriented to person, place, and time.      Motor: Weakness present.      Gait: Gait abnormal.   Psychiatric:         Mood and Affect: Mood normal.         Behavior: Behavior normal.       Significant Labs: All pertinent labs within the past 24 hours have been reviewed.    Significant Imaging: I have reviewed all  pertinent imaging results/findings within the past 24 hours.

## 2023-01-29 NOTE — NURSING
Pt resting in bed asleep. No complaints of pain/discomfort. IV saline lock. Tele #[id number]. Pt on 1L O2 NC; no distress noted. Vitals assessed. Safety measures maintained. Will cont to monitor

## 2023-01-29 NOTE — PLAN OF CARE
01/29/23 1547   Medicare Message   Important Message from Medicare regarding Discharge Appeal Rights Given to patient/caregiver;Explained to patient/caregiver;Signed/date by patient/caregiver   Date IMM was signed 01/29/23   Time IMM was signed 0113

## 2023-01-29 NOTE — ASSESSMENT & PLAN NOTE
This patient does have evidence of infective focus  My overall impression is septic shock. Vital signs were reviewed and noted in progress note.  Antibiotics given-   Antibiotics (From admission, onward)    Start     Stop Route Frequency Ordered    01/29/23 1045  amoxicillin-clavulanate 875-125mg per tablet 1 tablet         -- Oral Every 12 hours 01/29/23 0933    01/25/23 0900  mupirocin 2 % ointment         01/30 0859 Nasl 2 times daily 01/25/23 0624        Cultures were taken-   Microbiology Results (last 7 days)     Procedure Component Value Units Date/Time    Blood culture x two cultures. Draw prior to antibiotics. [[id number]] Collected: 01/25/23 0150    Order Status: Completed Specimen: Blood from Peripheral, Upper Arm, Right Updated: 01/29/23 0303     Blood Culture, Routine No Growth after 4 days.    Narrative:      Aerobic and anaerobic    Blood culture x two cultures. Draw prior to antibiotics. [[id number]] Collected: 01/25/23 0129    Order Status: Completed Specimen: Blood from Peripheral, Antecubital, Left Updated: 01/29/23 0303     Blood Culture, Routine No Growth after 4 days.    Narrative:      Aerobic and anaerobic    Culture, Respiratory with Gram Stain [[id number]]     Order Status: No result Specimen: Sputum, Expectorated         Latest lactate reviewed, they are-  No results for input(s): LACTATE in the last 72 hours.    Organ dysfunction indicated by Encephalopathy   Source- ?kidney, ?bacteremia    - 3/4 SIRS: , RR 24, and WBC 28 K. Afebrile.  BP drop to map 61.  - Source control Achieved by- Abx and fluids  - Kidneys with perinephritic stranding, however UA does not appear to have infection.  - Vancomycin and zosyn  - Check LDH, uric acid - within normal limitis  - stop vanc on 1/28 - monitor off vanc.  Zosyn changed to Augmentin.  Cultures are also negative to date so unclear if patient improved on his own versus due to antibiotics.  Patient is improving, hopefully discharge home  tomorrow.

## 2023-01-29 NOTE — PLAN OF CARE
Problem: Infection  Goal: Absence of Infection Signs and Symptoms  Outcome: Ongoing, Progressing  Intervention: Prevent or Manage Infection  Flowsheets (Taken 1/28/2023 1803)  Infection Management: aseptic technique maintained  Isolation Precautions: protective     Problem: Skin Injury Risk Increased  Goal: Skin Health and Integrity  Outcome: Ongoing, Progressing  Intervention: Optimize Skin Protection  Flowsheets (Taken 1/28/2023 1803)  Pressure Reduction Techniques:   frequent weight shift encouraged   heels elevated off bed  Pressure Reduction Devices: chair cushion utilized  Skin Protection:   adhesive use limited   tubing/devices free from skin contact  Head of Bed (HOB) Positioning: HOB elevated  Intervention: Promote and Optimize Oral Intake  Flowsheets (Taken 1/28/2023 1803)  Oral Nutrition Promotion: calorie-dense foods provided     Problem: Fall Injury Risk  Goal: Absence of Fall and Fall-Related Injury  Outcome: Ongoing, Progressing  Intervention: Identify and Manage Contributors  Flowsheets (Taken 1/28/2023 1803)  Self-Care Promotion:   BADL personal objects within reach   safe use of adaptive equipment encouraged  Medication Review/Management: medications reviewed  Intervention: Promote Injury-Free Environment  Flowsheets (Taken 1/28/2023 1803)  Safety Promotion/Fall Prevention:   assistive device/personal item within reach   bed alarm set   commode/urinal/bedpan at bedside   Fall Risk signage in place   Fall Risk reviewed with patient/family   nonskid shoes/socks when out of bed   room near unit station   side rails raised x 2   instructed to call staff for mobility

## 2023-01-29 NOTE — PROGRESS NOTES
[facility] Medicine  Progress Note    Patient Name: Manuel Tyler  MRN: [id number]  Patient Class: IP- Inpatient   Admission Date: 1/25/2023  Length of Stay: 4 days  Attending Physician: Jonas Vera MD  Primary Care Provider: Victor M Stokes MD        Subjective:     Principal Problem:Severe sepsis        HPI:    Manuel Tyler is a 72 y.o. 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  Patient received his 1st round of chemotherapy two days ago on 01/23, the next day the patient became altered around 10 30 at nighttime.  Patient was walking with his walker and dragging his feet, unable to answer basic questions; he was alert but he was confused.  He reports 1 episode of blood in his stool, hemoglobin 14.8 on arrival.  WBC count elevated at 28 K, with mildly elevated procalcitonin of 1.26 with normal renal function.  Patient had dips of SBP to 84 while in the ED, so was placed in the ICU for close monitoring with concerns of sepsis.  Workup unremarkable.  His kidneys had nonspecific perinephritic stranding, however his urine was pristine with no sign of infection.  Renal ultrasound unremarkable.  He was afebrile upon arrival, with no subjective fevers or chills either.  Denies chest pain or shortness of breath. follow up EGD with PD stent removal was needed.       Overview/Hospital Course:  No notes on file    Interval History: no new issues, had accident earlier. Encouraged patient to ambulate to bedside commode    Review of Systems  Objective:     Vital Signs (Most Recent):  Temp: 98.1 °F (36.7 °C) (01/29/23 1051)  Pulse: 90 (01/29/23 1051)  Resp: 20 (01/29/23 1051)  BP: 125/62 (01/29/23 1051)  SpO2: (!) 94 % (01/29/23 1051)   Vital Signs (24h Range):  Temp:  [97.9 °F (36.6 °C)-98.3 °F (36.8 °C)] 98.1 °F (36.7 °C)  Pulse:   [] 90  Resp:  [16-20] 20  SpO2:  [92 %-95 %] 94 %  BP: (117-145)/(57-77) 125/62     Weight: 65 kg (143 lb 4.8 oz)  Body mass index is 21.79 kg/m².    Intake/Output Summary (Last 24 hours) at 1/29/2023 1416  Last data filed at 1/29/2023 1247  Gross per 24 hour   Intake 1303.43 ml   Output 2500 ml   Net -1196.57 ml        Physical Exam  Vitals reviewed.   Constitutional:       General: He is not in acute distress.     Appearance: He is well-developed. He is ill-appearing. He is not toxic-appearing or diaphoretic.   Eyes:      General: No scleral icterus.  Neck:      Thyroid: No thyromegaly.   Cardiovascular:      Rate and Rhythm: Normal rate and regular rhythm.      Heart sounds: No murmur heard.    No gallop.   Pulmonary:      Effort: Pulmonary effort is normal.      Breath sounds: Normal breath sounds. No wheezing or rales.   Abdominal:      General: There is no distension.      Palpations: Abdomen is soft.      Tenderness: There is no abdominal tenderness.   Musculoskeletal:         General: No swelling. Normal range of motion.   Skin:     General: Skin is warm.      Capillary Refill: Capillary refill takes less than 2 seconds.   Neurological:      Mental Status: He is alert and oriented to person, place, and time.      Motor: Weakness present.      Gait: Gait abnormal.   Psychiatric:         Mood and Affect: Mood normal.         Behavior: Behavior normal.       Significant Labs: All pertinent labs within the past 24 hours have been reviewed.    Significant Imaging: I have reviewed all pertinent imaging results/findings within the past 24 hours.      Assessment/Plan:      * Severe sepsis  This patient does have evidence of infective focus  My overall impression is septic shock. Vital signs were reviewed and noted in progress note.  Antibiotics given-   Antibiotics (From admission, onward)    Start     Stop Route Frequency Ordered    01/29/23 1045  amoxicillin-clavulanate 875-125mg per tablet 1 tablet          -- Oral Every 12 hours 01/29/23 0933    01/25/23 0900  mupirocin 2 % ointment         01/30 0859 Nasl 2 times daily 01/25/23 0624        Cultures were taken-   Microbiology Results (last 7 days)     Procedure Component Value Units Date/Time    Blood culture x two cultures. Draw prior to antibiotics. [[id number]] Collected: 01/25/23 0150    Order Status: Completed Specimen: Blood from Peripheral, Upper Arm, Right Updated: 01/29/23 0303     Blood Culture, Routine No Growth after 4 days.    Narrative:      Aerobic and anaerobic    Blood culture x two cultures. Draw prior to antibiotics. [[id number]] Collected: 01/25/23 0129    Order Status: Completed Specimen: Blood from Peripheral, Antecubital, Left Updated: 01/29/23 0303     Blood Culture, Routine No Growth after 4 days.    Narrative:      Aerobic and anaerobic    Culture, Respiratory with Gram Stain [[id number]]     Order Status: No result Specimen: Sputum, Expectorated         Latest lactate reviewed, they are-  No results for input(s): LACTATE in the last 72 hours.    Organ dysfunction indicated by Encephalopathy   Source- ?kidney, ?bacteremia    - 3/4 SIRS: , RR 24, and WBC 28 K. Afebrile.  BP drop to map 61.  - Source control Achieved by- Abx and fluids  - Kidneys with perinephritic stranding, however UA does not appear to have infection.  - Vancomycin and zosyn  - Check LDH, uric acid - within normal limitis  - stop vanc on 1/28 - monitor off vanc.  Zosyn changed to Augmentin.  Cultures are also negative to date so unclear if patient improved on his own versus due to antibiotics.  Patient is improving, hopefully discharge home tomorrow.      Encephalopathy, toxic   Presented with acute encephalopathy. CTH w/o acute abnormality. Ammonia wnl. Suspect sepsis/chemo/dehydration.  - Improved with fluids and antibiotics. Still having some delirium changes.   - monitor    (HFpEF) heart failure with preserved ejection fraction  - No previous ECHO to compare  -  Indeterminant diastolic dysfunction with elevated BNP of 642  - Lasix PRN, does not appear significantly volume overloaded at this time.    Liver metastases  Noted metastasis. Ammonia wnl.      Malignant neoplasm of head of pancreas  - Initiated Gemcitabine and Paclitaxel on 1/23. This was dose reduced for concerns for toxicity. Did not tolerate well.   - Palliative Care consulted  - Appreciate Oncology recommendations      Alcohol abuse  Stable, no signs of w/d      Hyperlipidemia  Not taking statin      Primary hypertension  · Not currently on BP meds  · Currently hypotensive, monitor   · This has now resolved        VTE Risk Mitigation (From admission, onward)         Ordered     enoxaparin injection 40 mg  Daily         01/25/23 0522     IP VTE HIGH RISK PATIENT  Once         01/25/23 0522     Place sequential compression device  Until discontinued         01/25/23 0522     Place FRANCES hose  Until discontinued         01/25/23 0522                Discharge Planning   MIKO:      Code Status: Full Code   Is the patient medically ready for discharge?:     Reason for patient still in hospital (select all that apply): Treatment  Discharge Plan A: Home Health   Discharge Delays: None known at this time              Jonas Vera MD  Department of Hospital Medicine   [facility] [place] [facility]

## 2023-01-29 NOTE — PT/OT/SLP EVAL
Physical Therapy Evaluation    Patient Name:  Manuel Tyler   MRN:  [id number]    Recommendations:     Discharge Recommendations: outpatient PT   Discharge Equipment Recommendations: none   Barriers to discharge: None    Assessment:     Manuel Tyler is a 72 y.o. male admitted with a medical diagnosis of Severe sepsis.  He presents with the following impairments/functional limitations: impaired endurance, gait instability, decreased safety awareness .    Rehab Prognosis: Good; patient would benefit from acute skilled PT services to address these deficits and reach maximum level of function.    Recent Surgery: * No surgery found *      Plan:     During this hospitalization, patient to be seen 3 x/week to address the identified rehab impairments via gait training, therapeutic activities, therapeutic exercises and progress toward the following goals:    Plan of Care Expires:  02/11/23    Subjective     Chief Complaint: none reported during tx  Patient/Family Comments/goals: to return to PLOF  Pain/Comfort:  Pain Rating 1: 0/10    Patients cultural, spiritual, Quaker conflicts given the current situation:      Living Environment:  Pt lives with spouse in a [facility] with 1 RIKI.   Prior to admission, patients level of function was Independent, but is currently undergoing a bout of Chemo.  Equipment used at home: shower chair, walker, rolling.  DME owned (not currently used): none.  Upon discharge, patient will have assistance from Spouse and Self.    Objective:     Communicated with Nurse prior to session.  Patient found supine with peripheral IV, PureWick, telemetry  upon PT entry to room.    General Precautions: Standard, vision impaired (- needs glasses to walk.)  Orthopedic Precautions:Full weight bearing   Braces: N/A  Respiratory Status: Room air    Exams:  Cognitive Exam:  Patient is oriented to Person, Place, and Situation  Gross Motor Coordination:  WFL  Postural Exam:  Patient presented with the following  "abnormalities:    -       Rounded shoulders  -       Forward head  -       Abnormal trunk flexion  RLE ROM: WNL  RLE Strength: Deficits: Quad = 4+/5  LLE ROM: WFL  LLE Strength: Deficits: Quad = 4+/5    Functional Mobility:  Bed Mobility:     Supine to Sit: stand by assistance  Sit to Supine: stand by assistance  Transfers:     Sit to Stand:  contact guard assistance with rolling walker  Gait: 200' with RW and CGA.  Pt with occasional side-step to prevent any LOB but able to recover without any external assistance. Pt ambulates with decrease navarro and decrease "toe off" or "heel strike."        AM-PAC 6 CLICK MOBILITY  Total Score:19       Treatment & Education:  Lower Extremity Exercises.   Patient educated on the purpose of therapeutic exercise.    Patient verbalized acceptance/understanding of instructions, expectations, and limitations(for safety).  Patient performed: 2 sets of 10 reps (each) of B LE There Ex: AP, LAQ, Hip abd/add, Hip flexion while sitting up on EOB.       Patient required still requires verbal cues/tactile cues to ensure correct sequence, to maintain proper form, and to allow for self-correction.  Pt reported no complaints or problems with exercise activity.      Patient left supine with all lines intact, call button in reach, and spouse present.    GOALS:   Multidisciplinary Problems       Physical Therapy Goals          Problem: Physical Therapy    Goal Priority Disciplines Outcome Goal Variances Interventions   Physical Therapy Goal     PT, PT/OT Ongoing, Progressing     Description: Goals to be met by:  2023    Patient will increase functional independence with mobility by performin. Supine to sit with [place]  2. Sit to supine with [place]  3. Sit to stand transfer with Modified [place] using Rolling Walker.   4. Gait  x 300 feet with Modified [place] using Rolling Walker.    Recommend: Outpatient Physical Therapy at time of discharge to home with family. "                             History:     Past Medical History:   Diagnosis Date    (HFpEF) heart failure with preserved ejection fraction 1/25/2023    Alcohol abuse 12/27/2022    Hyperlipidemia     Hypertension     Liver metastases 1/18/2023    Malignant neoplasm of head of pancreas 1/18/2023    Other specified noninfective gastroenteritis and colitis     Pancreatitis     Personal history of colonic polyps        Past Surgical History:   Procedure Laterality Date    COLONOSCOPY      ENDOSCOPIC ULTRASOUND OF UPPER GASTROINTESTINAL TRACT Left 12/30/2022    Procedure: ULTRASOUND, UPPER GI TRACT, ENDOSCOPIC;  Surgeon: Gregory Cristina MD;  Location: [address]);  Service: Endoscopy;  Laterality: Left;    ERCP Left 12/30/2022    Procedure: ERCP (ENDOSCOPIC RETROGRADE CHOLANGIOPANCREATOGRAPHY);  Surgeon: Gregory Cristina MD;  Location: [facility] ([address] [place] [address]);  Service: Endoscopy;  Laterality: Left;    FOOT SURGERY Left     INSERTION OF TUNNELED CENTRAL VENOUS CATHETER (CVC) WITH SUBCUTANEOUS PORT Bilateral 1/19/2023    Procedure: CARSTUSTK-[id number]-P-CATH;  Surgeon: Amador Castellon MD;  Location: [facility];  Service: General;  Laterality: Bilateral;  Right v Left PORTACATH. needs ultrasound and fluoro  RN PREOP 01/18/2023    TONSILLECTOMY         Time Tracking:     PT Received On: 01/29/23  PT Start Time: 0930     PT Stop Time: 1000  PT Total Time (min): 30 min     Billable Minutes: Evaluation 15 min and Therapeutic Exercise 15 min      01/29/2023

## 2023-01-29 NOTE — ASSESSMENT & PLAN NOTE
Advance Care Planning     Date: 01/29/2023  Patient is currently Full Code. Family still interested in pursuing treatment for cancer at this time.

## 2023-01-29 NOTE — NURSING
Pt has had 7 BMs on shift. Imodium was given and MD notified. No C-diff precautions or BM sample ordered.

## 2023-01-30 VITALS
DIASTOLIC BLOOD PRESSURE: 63 MMHG | OXYGEN SATURATION: 94 % | WEIGHT: 143.31 LBS | RESPIRATION RATE: 18 BRPM | BODY MASS INDEX: 21.72 KG/M2 | TEMPERATURE: 98 F | SYSTOLIC BLOOD PRESSURE: 122 MMHG | HEIGHT: 68 IN | HEART RATE: 99 BPM

## 2023-01-30 PROBLEM — Z66 DNR (DO NOT RESUSCITATE): Status: ACTIVE | Noted: 2023-01-30

## 2023-01-30 PROCEDURE — 99497 ADVNCD CARE PLAN 30 MIN: CPT | Mod: 25,,, | Performed by: NURSE PRACTITIONER

## 2023-01-30 PROCEDURE — 25000003 PHARM REV CODE 250: Performed by: STUDENT IN AN ORGANIZED HEALTH CARE EDUCATION/TRAINING PROGRAM

## 2023-01-30 PROCEDURE — 99223 1ST HOSP IP/OBS HIGH 75: CPT | Mod: ,,, | Performed by: STUDENT IN AN ORGANIZED HEALTH CARE EDUCATION/TRAINING PROGRAM

## 2023-01-30 PROCEDURE — 99223 1ST HOSP IP/OBS HIGH 75: CPT | Mod: ,,, | Performed by: NURSE PRACTITIONER

## 2023-01-30 PROCEDURE — S4991 NICOTINE PATCH NONLEGEND: HCPCS | Performed by: STUDENT IN AN ORGANIZED HEALTH CARE EDUCATION/TRAINING PROGRAM

## 2023-01-30 PROCEDURE — 25000003 PHARM REV CODE 250: Performed by: INTERNAL MEDICINE

## 2023-01-30 PROCEDURE — 99223 PR INITIAL HOSPITAL CARE,LEVL III: ICD-10-PCS | Mod: ,,, | Performed by: NURSE PRACTITIONER

## 2023-01-30 PROCEDURE — 99223 PR INITIAL HOSPITAL CARE,LEVL III: ICD-10-PCS | Mod: ,,, | Performed by: STUDENT IN AN ORGANIZED HEALTH CARE EDUCATION/TRAINING PROGRAM

## 2023-01-30 PROCEDURE — 99497 PR ADVNCD CARE PLAN 30 MIN: ICD-10-PCS | Mod: 25,,, | Performed by: NURSE PRACTITIONER

## 2023-01-30 PROCEDURE — 94640 AIRWAY INHALATION TREATMENT: CPT

## 2023-01-30 RX ORDER — IBUPROFEN 200 MG
1 TABLET ORAL DAILY
Qty: 30 PATCH | Refills: 3 | Status: ON HOLD | OUTPATIENT
Start: 2023-01-31 | End: 2023-08-08 | Stop reason: HOSPADM

## 2023-01-30 RX ORDER — HYDRALAZINE HYDROCHLORIDE 25 MG/1
50 TABLET, FILM COATED ORAL EVERY 8 HOURS
Status: DISCONTINUED | OUTPATIENT
Start: 2023-01-30 | End: 2023-01-30 | Stop reason: HOSPADM

## 2023-01-30 RX ORDER — HYDRALAZINE HYDROCHLORIDE 25 MG/1
75 TABLET, FILM COATED ORAL EVERY 8 HOURS
Status: DISCONTINUED | OUTPATIENT
Start: 2023-01-30 | End: 2023-01-30

## 2023-01-30 RX ORDER — AMOXICILLIN AND CLAVULANATE POTASSIUM 875; 125 MG/1; MG/1
1 TABLET, FILM COATED ORAL EVERY 12 HOURS
Qty: 6 TABLET | Refills: 0 | Status: SHIPPED | OUTPATIENT
Start: 2023-01-30 | End: 2023-02-02

## 2023-01-30 RX ADMIN — TIOTROPIUM BROMIDE INHALATION SPRAY 2 PUFF: 3.12 SPRAY, METERED RESPIRATORY (INHALATION) at 07:01

## 2023-01-30 RX ADMIN — POTASSIUM CHLORIDE 40 MEQ: 1500 TABLET, EXTENDED RELEASE ORAL at 08:01

## 2023-01-30 RX ADMIN — ACETAMINOPHEN 650 MG: 325 TABLET ORAL at 10:01

## 2023-01-30 RX ADMIN — HYDRALAZINE HYDROCHLORIDE 50 MG: 25 TABLET, FILM COATED ORAL at 08:01

## 2023-01-30 RX ADMIN — Medication 1 PATCH: at 08:01

## 2023-01-30 RX ADMIN — AMOXICILLIN AND CLAVULANATE POTASSIUM 1 TABLET: 875; 125 TABLET, FILM COATED ORAL at 08:01

## 2023-01-30 NOTE — PLAN OF CARE
Plan for discharge to resume [facility]. Updated clinical packet sent to [facility]  via [facility]. [place] to follow    01/30/23 1041   Post-Acute Status   Post-Acute Authorization Home Health   Home Health Status Pending post-acute provider review/more information requested   Coverage Medicare   Discharge Delays None known at this time   Discharge Plan   Discharge Plan A Home Health   Discharge Plan B Home with family

## 2023-01-30 NOTE — PROGRESS NOTES
Discharge paperwork given to pt. IV and tele monitor removed. All questions and concerns answered and addressed. Pt transported off unit with all belongings in possession.   No pertinent past medical history

## 2023-01-30 NOTE — PLAN OF CARE
[facility] - Med Surg  Discharge Final Note    Patient clear to discharge from case management stand point. Reviewed follow up appointments with pt at bedside, verbalized understanding and listed on avs. Pt stated his spouse will be taking him home. Accepting to resume [facility].   Primary Care Provider: Victor M Stokes MD    Expected Discharge Date: 1/30/2023    Final Discharge Note (most recent)       Final Note - 01/30/23 1243          Final Note    Assessment Type Final Discharge Note     Anticipated Discharge Disposition Home-Health Care Svc   [facility]    What phone number can be called within the next 1-3 days to see how you are doing after discharge? [phone]        Post-Acute Status    Post-Acute Authorization Home Health (P)      Home Health Status Set-up Complete/Auth obtained (P)      Coverage Medicare (P)      Discharge Delays None known at this time (P)                      Important Message from Medicare  Important Message from Medicare regarding Discharge Appeal Rights: Given to patient/caregiver, Explained to patient/caregiver, Signed/date by patient/caregiver     Date IMM was signed: 01/29/23  Time IMM was signed: 2425    Contact Info       Victor M Stokes MD   Specialty: Family Medicine   Relationship: PCP - General    [address]  [address]  [place] [address]   Phone: [phone]       Next Steps: Follow up on 2/8/2023    Instructions: @10:00am for a hospital follow up    [facility]   Specialty: Home Health Services, Home Therapy Services, Home Living Aide Services    [address], [address]  [place] [address]   Phone: [phone]       Next Steps: Follow up

## 2023-01-30 NOTE — PLAN OF CARE
[facility] - [facility] Surg      HOME HEALTH ORDERS  FACE TO FACE ENCOUNTER    Patient Name: Manuel Tyler  YOB: 1950    PCP: Victor M Stokes MD   PCP Address: [address] / [place] [address]  PCP Phone Number: [phone]  PCP Fax: [phone]    Encounter Date: 1/25/23    Admit to Home Health    Diagnoses:  Active Hospital Problems    Diagnosis  POA    *Severe sepsis [A41.9, R65.20]  Yes    DNR (do not resuscitate) [Z66]  Yes     Per patient is a DNR and was prior to admit      ACP (advance care planning) [Z71.89]  Not Applicable    (HFpEF) heart failure with preserved ejection fraction [I50.30]  Yes    Encephalopathy, toxic [G92.9]  Yes    Liver metastases [C78.7]  Yes    Malignant neoplasm of head of pancreas [C25.0]  Yes    Alcohol abuse [F10.10]  Yes    Hyperlipidemia [E78.5]  Yes    Primary hypertension [I10]  Yes      Resolved Hospital Problems   No resolved problems to display.       Follow Up Appointments:  Future Appointments   Date Time Provider Department Center   2/6/2023  9:00 AM LAB, BEL[facility] DRAW STATION [facility] LAB [facility] [place]   2/7/2023  8:40 AM Latesha Carver MD [facility] HEM ONC [facility]   2/7/2023 11:30 AM CHAIR 10 [facility] CHEMO [facility]   2/9/2023  9:00 AM Colleen Ramey RD [facility] INONCTF [place]   2/13/2023  9:00 AM LAB, BELH DRAW STATION BEL[facility] LAB ST [place]   2/14/2023  8:40 AM Latesha Carver MD [facility] HEM ONC [facility]   2/22/2023 11:00 AM CHAIR 03 [facility] CHEMO [facility]   3/6/2023 10:00 AM LAB, BELH DRAW STATION BELH LAB ST [place]       Allergies:Review of patient's allergies indicates:  No Known Allergies    Medications: Review discharge medications with patient and family and provide education.      I have seen and examined this patient within the last 30 days. My clinical findings that support the need for the home health skilled services and home bound status are the following:no   Weakness/numbness causing balance and gait  disturbance due to Malignancy/Cancer making it taxing to leave home.  Requiring assistive device to leave home due to unsteady gait caused by  Malignancy/Cancer.     Diet:   regular diet    Labs:  N/a    Referrals/ Consults  Physical Therapy to evaluate and treat. Evaluate for home safety and equipment needs; Establish/upgrade home exercise program. Perform / instruct on therapeutic exercises, gait training, transfer training, and Range of Motion.  Occupational Therapy to evaluate and treat. Evaluate home environment for safety and equipment needs. Perform/Instruct on transfers, ADL training, ROM, and therapeutic exercises.  Aide to provide assistance with personal care, ADLs, and vital signs.    Activities:   activity as tolerated    Nursing:   Agency to admit patient within 24 hours of hospital discharge unless specified on physician order or at patient request    SN to complete comprehensive assessment including routine vital signs. Instruct on disease process and s/s of complications to report to MD. Review/verify medication list sent home with the patient at time of discharge  and instruct patient/caregiver as needed. Frequency may be adjusted depending on start of care date.     Skilled nurse to perform up to 3 visits PRN for symptoms related to diagnosis    Notify MD if SBP > 160 or < 90; DBP > 90 or < 50; HR > 120 or < 50; Temp > 101; O2 < 88%; Other:       Ok to schedule additional visits based on staff availability and patient request on consecutive days within the home health episode.    Miscellaneous       Home Health Aide:  Nursing , Physical Therapy , and Occupational Therapy     Wound Care Orders  no    I certify that this patient is confined to his home and needs intermittent skilled nursing care, physical therapy, and occupational therapy.

## 2023-01-30 NOTE — SUBJECTIVE & OBJECTIVE
Past Medical History:   Diagnosis Date    (HFpEF) heart failure with preserved ejection fraction 1/25/2023    Alcohol abuse 12/27/2022    Hyperlipidemia     Hypertension     Liver metastases 1/18/2023    Malignant neoplasm of head of pancreas 1/18/2023    Other specified noninfective gastroenteritis and colitis     Pancreatitis     Personal history of colonic polyps        Past Surgical History:   Procedure Laterality Date    COLONOSCOPY      ENDOSCOPIC ULTRASOUND OF UPPER GASTROINTESTINAL TRACT Left 12/30/2022    Procedure: ULTRASOUND, UPPER GI TRACT, ENDOSCOPIC;  Surgeon: Gregory Cristina MD;  Location: [facility] ENDO (2ND FLR);  Service: Endoscopy;  Laterality: Left;    ERCP Left 12/30/2022    Procedure: ERCP (ENDOSCOPIC RETROGRADE CHOLANGIOPANCREATOGRAPHY);  Surgeon: Gregory Cristina MD;  Location: [facility] ENDO (2ND FLR);  Service: Endoscopy;  Laterality: Left;    FOOT SURGERY Left     INSERTION OF TUNNELED CENTRAL VENOUS CATHETER (CVC) WITH SUBCUTANEOUS PORT Bilateral 1/19/2023    Procedure: [id number];  Surgeon: Amador Castellon MD;  Location: [facility];  Service: General;  Laterality: Bilateral;  Right v Left PORTACATH. needs ultrasound and fluoro  RN PREOP 01/18/2023    TONSILLECTOMY         Review of patient's allergies indicates:  No Known Allergies    Medications:  Continuous Infusions:  Scheduled Meds:   amoxicillin-clavulanate 875-125mg  1 tablet Oral Q12H    enoxaparin  40 mg Subcutaneous Daily    hydrALAZINE  50 mg Oral Q8H    nicotine  1 patch Transdermal Daily    potassium chloride  40 mEq Oral Daily    tiotropium bromide  2 puff Inhalation Daily     PRN Meds:acetaminophen, aluminum-magnesium hydroxide-simethicone, dextrose 10%, dextrose 10%, glucagon (human recombinant), glucose, glucose, loperamide, morphine, morphine, naloxone, ondansetron, promethazine (PHENERGAN) IVPB, senna-docusate 8.6-50 mg, simethicone, sodium chloride 0.9%    Family History       Problem Relation (Age of Onset)    Cancer  Father (69)          Tobacco Use    Smoking status: Every Day     Packs/day: 1.00     Types: Cigarettes    Smokeless tobacco: Not on file   Substance and Sexual Activity    Alcohol use: Yes     Comment: last drink 12/22/22    Drug use: Never    Sexual activity: Not on file       Review of Systems   Constitutional:  Positive for activity change, appetite change and fatigue.   Gastrointestinal:  Negative for abdominal pain, constipation, diarrhea, nausea and vomiting.   Musculoskeletal:  Positive for back pain.   Neurological:  Positive for weakness.   Objective:     Vital Signs (Most Recent):  Temp: 98.4 °F (36.9 °C) (01/30/23 0704)  Pulse: 97 (01/30/23 0724)  Resp: 20 (01/30/23 0724)  BP: (!) 171/87 (01/30/23 0704)  SpO2: 95 % (01/30/23 0724)   Vital Signs (24h Range):  Temp:  [97.8 °F (36.6 °C)-98.4 °F (36.9 °C)] 98.4 °F (36.9 °C)  Pulse:  [] 97  Resp:  [16-20] 20  SpO2:  [95 %-96 %] 95 %  BP: (142-171)/(74-88) 171/87     Weight: 65 kg (143 lb 4.8 oz)  Body mass index is 21.79 kg/m².    Physical Exam  Vitals and nursing note reviewed.   Constitutional:       General: He is not in acute distress.     Appearance: He is ill-appearing. He is not toxic-appearing or diaphoretic.   HENT:      Head: Normocephalic and atraumatic.      Right Ear: External ear normal.      Left Ear: External ear normal.      Nose: Nose normal.   Eyes:      General:         Right eye: No discharge.         Left eye: No discharge.   Cardiovascular:      Rate and Rhythm: Normal rate and regular rhythm.   Pulmonary:      Effort: Pulmonary effort is normal.   Abdominal:      Palpations: Abdomen is soft.   Musculoskeletal:      Right lower leg: No edema.      Left lower leg: No edema.   Neurological:      Mental Status: He is alert and oriented to person, place, and time.      Motor: Weakness present.   Psychiatric:         Mood and Affect: Affect is flat.         Behavior: Behavior normal.       Review of Symptoms      Symptom Assessment  (ESAS 0-10 Scale)  Pain:  3  Dyspnea:  0  Anxiety:  0  Nausea:  0  Depression:  0  Anorexia:  0  Fatigue:  0  Insomnia:  0  Restlessness:  0  Agitation:  0       Constipation:  No constipation    Pain Assessment:    Location(s): back    Back       Location: posterior        Quality: Aching        Quantity: 3/10 in intensity        Chronicity: Onset 5 week(s) ago, unchanged        Aggravating Factors: Sitting up        Alleviating Factors: Acetaminophen       Associated Symptoms: None    Performance Status:  80    ECOG Performance Status ndGndrndanddndend:nd nd2nd Living Arrangements:  Lives with spouse    Psychosocial/Cultural:   See Palliative Psychosocial Note: No  **Primary  to Follow**  Palliative Care  Consult: No      Advance Care Planning   Advance Directives:   Living Will: Yes (requested copy)        Copy on chart: No        Oral Declaration: Yes  [place]: will complete prior to d/c.    Do Not Resuscitate Status: Yes (per patient he is a DNR)    Goals of Care: The patient endorses that what is most important right now is to focus on optimizing tx. He hopes to have some improvement in his condition but understands that he has an advanced cancer. He does tell me he is a DNR    Accordingly, we have decided that the best plan to meet the patient's goals includes continuing with chemo tx.       Significant Labs: All pertinent labs within the past 24 hours have been reviewed.  CBC:   Recent Labs   Lab 01/29/23  0749   WBC 8.62   HGB 12.3*   HCT 34.4*   MCV 92        BMP:  No results for input(s): GLU, NA, K, CL, CO2, BUN, CREATININE, CALCIUM, MG in the last 24 hours.  LFT:  Lab Results   Component Value Date    AST 47 (H) 01/29/2023    ALKPHOS 82 01/29/2023    BILITOT 0.7 01/29/2023     Albumin:   Albumin   Date Value Ref Range Status   01/29/2023 2.1 (L) 3.5 - 5.2 g/dL Final     Protein:   Total Protein   Date Value Ref Range Status   01/29/2023 5.9 (L) 6.0 - 8.4 g/dL Final     Lactic acid:    Lab Results   Component Value Date    LACTATE 2.2 01/25/2023    LACTATE 2.0 01/25/2023       Significant Imaging: nothing since 1/25/23

## 2023-01-30 NOTE — CONSULTS
Hematology- Oncology Consult Note :     1/30/2023    Reason for consult: Pancreatic cancer        HPI    Manuel Tyler is a 72 y.o. male who has a past medical history of Alcohol Abuse, Hyperlipidemia, Hypertension, Pancreatitis, and Metastatic Pancreatic CA  with liver mets  presented to the ED with CC of JOON. He completed C1D1 of chemo with gem/abraxane on 1/23/23 and was admitted due to concerns for severe sepsis. Oncology service consulted for pancreatic cancer.  Pt's hospitalization has been complicated by confusion that has since resolved.  Pt seen with wife this AM and feeling well.  Pt has follow up apt on 2/07/23.  All questions answered to pt's satisfaction.          Active Problem List with Overview Notes    Diagnosis Date Noted    DNR (do not resuscitate) 01/30/2023     Per patient is a DNR and was prior to admit      ACP (advance care planning) 01/29/2023    (HFpEF) heart failure with preserved ejection fraction 01/25/2023    Encephalopathy, toxic 01/25/2023    Severe sepsis 01/25/2023    Malignant neoplasm of head of pancreas 01/18/2023    Liver metastases 01/18/2023    Primary hypertension 12/27/2022    Hyperlipidemia 12/27/2022    Alcohol abuse 12/27/2022    Fracture, metatarsal, open 08/19/2014       Patient Active Problem List    Diagnosis Date Noted    DNR (do not resuscitate) 01/30/2023    ACP (advance care planning) 01/29/2023    (HFpEF) heart failure with preserved ejection fraction 01/25/2023    Encephalopathy, toxic 01/25/2023    Severe sepsis 01/25/2023    Malignant neoplasm of head of pancreas 01/18/2023    Liver metastases 01/18/2023    Primary hypertension 12/27/2022    Hyperlipidemia 12/27/2022    Alcohol abuse 12/27/2022    Fracture, metatarsal, open 08/19/2014     Past Medical History:   Diagnosis Date    (HFpEF) heart failure with preserved ejection fraction 1/25/2023    Alcohol abuse 12/27/2022    Hyperlipidemia     Hypertension     Liver metastases 1/18/2023    Malignant neoplasm of  head of pancreas 1/18/2023    Other specified noninfective gastroenteritis and colitis     Pancreatitis     Personal history of colonic polyps       Past Surgical History:   Procedure Laterality Date    COLONOSCOPY      ENDOSCOPIC ULTRASOUND OF UPPER GASTROINTESTINAL TRACT Left 12/30/2022    Procedure: ULTRASOUND, UPPER GI TRACT, ENDOSCOPIC;  Surgeon: Gregory Cristina MD;  Location: [facility] ENDO (2ND FLR);  Service: Endoscopy;  Laterality: Left;    ERCP Left 12/30/2022    Procedure: ERCP (ENDOSCOPIC RETROGRADE CHOLANGIOPANCREATOGRAPHY);  Surgeon: Gregory Cristina MD;  Location: [facility] (2ND FLR);  Service: Endoscopy;  Laterality: Left;    FOOT SURGERY Left     INSERTION OF TUNNELED CENTRAL VENOUS CATHETER (CVC) WITH SUBCUTANEOUS PORT Bilateral 1/19/2023    Procedure: [id number];  Surgeon: Amador Castellon MD;  Location: [facility];  Service: General;  Laterality: Bilateral;  Right v Left PORTACATH. needs ultrasound and fluoro  RN PREOP 01/18/2023    TONSILLECTOMY        Medications Prior to Admission   Medication Sig Dispense Refill Last Dose    ergocalciferol (ERGOCALCIFEROL) 50,000 unit Cap Take 50,000 Units by mouth every 7 days.   1/24/2023    hydrALAZINE (APRESOLINE) 25 MG tablet Take 3 tablets (75 mg total) by mouth every 8 (eight) hours. 90 tablet 5 1/24/2023    HYDROcodone-acetaminophen (NORCO) 5-325 mg per tablet Take 1 tablet by mouth every 6 (six) hours as needed for Pain. 15 tablet 0     ondansetron (ZOFRAN) 4 MG tablet Take 1 tablet (4 mg total) by mouth every 8 (eight) hours as needed for Nausea. 30 tablet 1     [DISCONTINUED] citalopram (CELEXA) 20 MG tablet Take 20 mg by mouth once daily.       [DISCONTINUED] diltiazem (CARDIZEM LA) 180 mg 24 hr tablet Take 240 mg by mouth once daily.       [DISCONTINUED] oxyCODONE (ROXICODONE) 5 MG immediate release tablet Take 1 tablet (5 mg total) by mouth every 6 (six) hours as needed for Pain. 60 tablet 0     [DISCONTINUED] pravastatin (PRAVACHOL) 20 MG tablet  Take 20 mg by mouth nightly.       [DISCONTINUED] venlafaxine (EFFEXOR-XR) 37.5 MG 24 hr capsule Take 37.5 mg by mouth once daily.       [DISCONTINUED] venlafaxine (EFFEXOR-XR) 75 MG 24 hr capsule         Review of patient's allergies indicates:  No Known Allergies   Social History     Tobacco Use    Smoking status: Every Day     Packs/day: 1.00     Types: Cigarettes    Smokeless tobacco: Not on file   Substance Use Topics    Alcohol use: Yes     Comment: last drink 12/22/22      Family History   Problem Relation Age of Onset    Cancer Father 69        pancreatic cancer        Review of Systems :  Review of Systems   Constitutional:  Positive for malaise/fatigue. Negative for fever and weight loss.   HENT:  Negative for congestion and nosebleeds.    Eyes:  Negative for blurred vision and double vision.   Respiratory:  Negative for cough and shortness of breath.    Cardiovascular:  Negative for chest pain.   Gastrointestinal:  Negative for abdominal pain, constipation, diarrhea and heartburn.   Musculoskeletal:  Negative for joint pain and myalgias.   Skin:  Negative for itching and rash.   Neurological:  Negative for dizziness and weakness.   Endo/Heme/Allergies:  Does not bruise/bleed easily.   Psychiatric/Behavioral:  Negative for depression. The patient is not nervous/anxious.      Physical Exam :  Wt Readings from Last 3 Encounters:   01/26/23 65 kg (143 lb 4.8 oz)   01/24/23 63 kg (138 lb 12.8 oz)   01/18/23 63.4 kg (139 lb 12 oz)     Temp Readings from Last 3 Encounters:   01/30/23 98.3 °F (36.8 °C) (Oral)   01/24/23 98.1 °F (36.7 °C)   01/19/23 98.6 °F (37 °C) (Oral)     BP Readings from Last 3 Encounters:   01/30/23 122/63   01/24/23 (!) 123/59   01/19/23 117/61     Pulse Readings from Last 3 Encounters:   01/30/23 99   01/24/23 85   01/19/23 90     Body mass index is 21.79 kg/m².    Physical Exam  Vitals reviewed.   Constitutional:       Comments: Thin in appearance   HENT:      Head: Normocephalic and  atraumatic.      Nose: Nose normal.      Mouth/Throat:      Mouth: Mucous membranes are moist.   Eyes:      Pupils: Pupils are equal, round, and reactive to light.   Cardiovascular:      Rate and Rhythm: Normal rate.      Heart sounds: Normal heart sounds.   Pulmonary:      Effort: Pulmonary effort is normal.      Breath sounds: Normal breath sounds.   Musculoskeletal:         General: Normal range of motion.      Cervical back: Normal range of motion.   Skin:     General: Skin is warm and dry.   Neurological:      Mental Status: He is alert and oriented to person, place, and time.   Psychiatric:         Mood and Affect: Mood normal.         Behavior: Behavior normal.         Thought Content: Thought content normal.         Judgment: Judgment normal.         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    -Mental status now at baseline, confusion appears to be secondary to ICU delirium and acute illness  -Cycle 1 of chemotherapy was dose reduced secondary to concern for potential toxicity.    -Pt may need further dose reductions outpt  -Chemo will be delayed until pt recovers and seen outpt  -Pt will continue following with palliative care  -Pt to follow up in oncology clinic on 2/07/23        Future Appointments   Date Time Provider Department Center   2/6/2023  9:00 AM LAB, BEL DRAW STATION BEL LAB [facility]   2/7/2023  8:40 AM Latesha Carver MD [facility] HEM ONC [facility]   2/7/2023 11:30 AM CHAIR 10 [facility] CHEMO [facility]    2/9/2023  9:00 AM Colleen Ramey RD [facility] INONCTF [place]   2/13/2023  9:00 AM LAB, BEL DRAW STATION BEL LAB [facility] [place]   2/14/2023  8:40 AM Latesha Carver MD [facility] HEM ONC [facility]   2/22/2023 11:00 AM CHAIR 03 [facility] CHEMO [facility]   3/6/2023 10:00 AM LAB, BEL DRAW STATION [facility] LAB ST [place]     Time spent on case 15 minutes      Thank you for this consult. Please contact us for any questions or concerns.    Keisha Tobar MD   Hematology/oncology, [facility]

## 2023-01-30 NOTE — CONSULTS
"[facility]  Palliative Medicine  Consult Note    Patient Name: Manuel Tyler  MRN: [id number]  Admission Date: 1/25/2023  Hospital Length of Stay: 5 days  Code Status: DNR   Attending Provider: Jonas Vera MD  Consulting Provider: Jael Perez NP  Primary Care Physician: Victor M Stokes MD  Principal Problem:Severe sepsis    Patient information was obtained from patient, past medical records and ER records.      Inpatient consult to Palliative Care  Consult performed by: Jael Perez NP  Consult ordered by: Jonas Vera MD  Reason for consult: [facility]        Assessment/Plan:   Palliative encounter:  Advance Care Planning     -Palliative consult received  -chart reviewed in detail  -at time of consult patient sitting up in chair but wanted to get back in bed due to pain in his back.3/10 after tylenol he tells me that Tylenol usually helps his pain andt he has opioids at home but so far has not required them.    -he lives at home with his spouse and he has adult children.   -we discussed his current clinical condition and his cancer and how he is coping with the dx. He tells me he is "ok" and that he has good family support and many visitors recently.    -he reports he had chemo last week and feels that most of his problems stem from s/e of the tx. He has spoken with oncology and the POC is to try a lower dose or to change tx. At this time he does want to continue tx.  -We did discuss Palliative tx vs curative tx and he verbalized understanding      [facility]  I engaged the patient in a conversation about advance care planning and we specifically addressed what the goals of care would be moving forward, in light of the patient's change in clinical status, specifically EOL.  We did not specifically address the patient's likely prognosis, which is poor.  We explored the patient's values and preferences for future care.  The patient endorses that what is most important right now is to continue chemo " tx as long as he can tolerate. He tells me that he is a DO NOT RESUSCITATE and that he has a Living will.  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 CTH w/o acute abnormality. Ammonia wnl. Suspect sepsis/chemo/dehydration.  - Improved with fluids and antibiotics. Still having some delirium changes.   -seems to be at baseline now 1/30  -mgmt per primary     (HFpEF) heart failure with preserved ejection fraction  No previous echo    Alcohol abuse     Hyperlipidemia     Primary hypertension      Thank you for your consult. I will follow-up with patient. Please contact us if you have any additional questions.    Subjective:     HPI:   Per chart review Manuel Tyler is a 72 y.o. 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  Patient received his 1st round of chemotherapy two days ago on 01/23, the next day the patient became altered around 10 30 at nighttime.  Patient was walking with his walker and dragging his feet, unable to answer basic questions; he was alert but he was confused.  He reports 1 episode of blood in his stool, hemoglobin 14.8 on arrival.  WBC count elevated at 28 K, with mildly elevated procalcitonin of 1.26 with normal renal function.  Patient had dips of SBP to 84 while in the ED, so was placed in the ICU for close monitoring with concerns of sepsis.  Workup unremarkable.  His kidneys had nonspecific perinephritic stranding, however his urine was pristine with no sign of infection.  Renal  ultrasound unremarkable.  He was afebrile upon arrival, with no subjective fevers or chills either.  Denies chest pain or shortness of breath. follow up EGD with PD stent removal was needed.          Hospital Course:  No notes on file      Past Medical History:   Diagnosis Date    (HFpEF) heart failure with preserved ejection fraction 1/25/2023    Alcohol abuse 12/27/2022    Hyperlipidemia     Hypertension     Liver metastases 1/18/2023    Malignant neoplasm of head of pancreas 1/18/2023    Other specified noninfective gastroenteritis and colitis     Pancreatitis     Personal history of colonic polyps        Past Surgical History:   Procedure Laterality Date    COLONOSCOPY      ENDOSCOPIC ULTRASOUND OF UPPER GASTROINTESTINAL TRACT Left 12/30/2022    Procedure: ULTRASOUND, UPPER GI TRACT, ENDOSCOPIC;  Surgeon: Gregory Cristina MD;  Location: [facility] ([address] [place] [address]);  Service: Endoscopy;  Laterality: Left;    ERCP Left 12/30/2022    Procedure: ERCP (ENDOSCOPIC RETROGRADE CHOLANGIOPANCREATOGRAPHY);  Surgeon: Gregory Cristina MD;  Location: [address]);  Service: Endoscopy;  Laterality: Left;    FOOT SURGERY Left     INSERTION OF TUNNELED CENTRAL VENOUS CATHETER (CVC) WITH SUBCUTANEOUS PORT Bilateral 1/19/2023    Procedure: ZULPGAIZB-LCMV-P-CATH;  Surgeon: Amador Castellon MD;  Location: [facility];  Service: General;  Laterality: Bilateral;  Right v Left PORTACATH. needs ultrasound and fluoro  RN PREOP 01/18/2023    TONSILLECTOMY         Review of patient's allergies indicates:  No Known Allergies    Medications:  Continuous Infusions:  Scheduled Meds:   amoxicillin-clavulanate 875-125mg  1 tablet Oral Q12H    enoxaparin  40 mg Subcutaneous Daily    hydrALAZINE  50 mg Oral Q8H    nicotine  1 patch Transdermal Daily    potassium chloride  40 mEq Oral Daily    tiotropium bromide  2 puff Inhalation Daily     PRN Meds:acetaminophen, aluminum-magnesium hydroxide-simethicone, dextrose 10%, dextrose 10%,  glucagon (human recombinant), glucose, glucose, loperamide, morphine, morphine, naloxone, ondansetron, promethazine (PHENERGAN) IVPB, senna-docusate 8.6-50 mg, simethicone, sodium chloride 0.9%    Family History       Problem Relation (Age of Onset)    Cancer Father (69)          Tobacco Use    Smoking status: Every Day     Packs/day: 1.00     Types: Cigarettes    Smokeless tobacco: Not on file   Substance and Sexual Activity    Alcohol use: Yes     Comment: last drink 12/22/22    Drug use: Never    Sexual activity: Not on file       Review of Systems   Constitutional:  Positive for activity change, appetite change and fatigue.   Gastrointestinal:  Negative for abdominal pain, constipation, diarrhea, nausea and vomiting.   Musculoskeletal:  Positive for back pain.   Neurological:  Positive for weakness.   Objective:     Vital Signs (Most Recent):  Temp: 98.4 °F (36.9 °C) (01/30/23 0704)  Pulse: 97 (01/30/23 0724)  Resp: 20 (01/30/23 0724)  BP: (!) 171/87 (01/30/23 0704)  SpO2: 95 % (01/30/23 0724)   Vital Signs (24h Range):  Temp:  [97.8 °F (36.6 °C)-98.4 °F (36.9 °C)] 98.4 °F (36.9 °C)  Pulse:  [] 97  Resp:  [16-20] 20  SpO2:  [95 %-96 %] 95 %  BP: (142-171)/(74-88) 171/87     Weight: 65 kg (143 lb 4.8 oz)  Body mass index is 21.79 kg/m².    Physical Exam  Vitals and nursing note reviewed.   Constitutional:       General: He is not in acute distress.     Appearance: He is ill-appearing. He is not toxic-appearing or diaphoretic.   HENT:      Head: Normocephalic and atraumatic.      Right Ear: External ear normal.      Left Ear: External ear normal.      Nose: Nose normal.   Eyes:      General:         Right eye: No discharge.         Left eye: No discharge.   Cardiovascular:      Rate and Rhythm: Normal rate and regular rhythm.   Pulmonary:      Effort: Pulmonary effort is normal.   Abdominal:      Palpations: Abdomen is soft.   Musculoskeletal:      Right lower leg: No edema.      Left lower leg: No  edema.   Neurological:      Mental Status: He is alert and oriented to person, place, and time.      Motor: Weakness present.   Psychiatric:         Mood and Affect: Affect is flat.         Behavior: Behavior normal.       Review of Symptoms      Symptom Assessment (ESAS 0-10 Scale)  Pain:  3  Dyspnea:  0  Anxiety:  0  Nausea:  0  Depression:  0  Anorexia:  0  Fatigue:  0  Insomnia:  0  Restlessness:  0  Agitation:  0       Constipation:  No constipation    Pain Assessment:    Location(s): back    Back       Location: posterior        Quality: Aching        Quantity: 3/10 in intensity        Chronicity: Onset 5 week(s) ago, unchanged        Aggravating Factors: Sitting up        Alleviating Factors: Acetaminophen       Associated Symptoms: None    Performance Status:  80    ECOG Performance Status ndGndrndanddndend:nd nd2nd Living Arrangements:  Lives with spouse    Psychosocial/Cultural:   See Palliative Psychosocial Note: No  **Primary  to Follow**  Palliative Care  Consult: No      Advance Care Planning   Advance Directives:   Living Will: Yes (requested copy)        Copy on chart: No        Oral Declaration: Yes  LaPost: will complete prior to d/c.    Do Not Resuscitate Status: Yes (per patient he is a DNR)    Goals of Care: The patient endorses that what is most important right now is to focus on optimizing tx. He hopes to have some improvement in his condition but understands that he has an advanced cancer. He does tell me he is a DNR    Accordingly, we have decided that the best plan to meet the patient's goals includes continuing with chemo tx.       Significant Labs: All pertinent labs within the past 24 hours have been reviewed.  CBC:   Recent Labs   Lab 01/29/23  0749   WBC 8.62   HGB 12.3*   HCT 34.4*   MCV 92        BMP:  No results for input(s): GLU, NA, K, CL, CO2, BUN, CREATININE, CALCIUM, MG in the last 24 hours.  LFT:  Lab Results   Component Value Date    AST 47 (H) 01/29/2023     ALKPHOS 82 01/29/2023    BILITOT 0.7 01/29/2023     Albumin:   Albumin   Date Value Ref Range Status   01/29/2023 2.1 (L) 3.5 - 5.2 g/dL Final     Protein:   Total Protein   Date Value Ref Range Status   01/29/2023 5.9 (L) 6.0 - 8.4 g/dL Final     Lactic acid:   Lab Results   Component Value Date    LACTATE 2.2 01/25/2023    LACTATE 2.0 01/25/2023       Significant Imaging: nothing since 1/25/23       20 min ACP time spent in goals of care, emotional support, formulating and communicating prognosis, exploring burden/benefit of various approaches of treatment.      >50% of 70 mins spent in chart review, face to face discussion, symptom assessment, coordination of care with other specialists and d/c planning.    Total time 90 mins      Jael Perez NP  Palliative Medicine  [facility] - [place], [place] - Med Surg

## 2023-01-30 NOTE — HPI
Per chart review Manuel Tyler is a 72 y.o. 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  Patient received his 1st round of chemotherapy two days ago on 01/23, the next day the patient became altered around 10 30 at nighttime.  Patient was walking with his walker and dragging his feet, unable to answer basic questions; he was alert but he was confused.  He reports 1 episode of blood in his stool, hemoglobin 14.8 on arrival.  WBC count elevated at 28 K, with mildly elevated procalcitonin of 1.26 with normal renal function.  Patient had dips of SBP to 84 while in the ED, so was placed in the ICU for close monitoring with concerns of sepsis.  Workup unremarkable.  His kidneys had nonspecific perinephritic stranding, however his urine was pristine with no sign of infection.  Renal ultrasound unremarkable.  He was afebrile upon arrival, with no subjective fevers or chills either.  Denies chest pain or shortness of breath. follow up EGD with PD stent removal was needed.

## 2023-01-31 ENCOUNTER — NURSE TRIAGE (OUTPATIENT)
Dept: ADMINISTRATIVE | Facility: CLINIC | Age: 73
End: 2023-01-31
Payer: MEDICARE

## 2023-01-31 NOTE — DISCHARGE SUMMARY
[facility] Medicine  Discharge Summary      Patient Name: Manuel Tyler  MRN: [id number]  [facility]: [id number]  Patient Class: IP- Inpatient  Admission Date: 1/25/2023  Hospital Length of Stay: 5 days  Discharge Date and Time: 1/30/2023  1:39 PM  Attending Physician: No att. providers found   Discharging Provider: Jonas Vera MD  Primary Care Provider: Victor M Stokes MD    Primary Care Team: Networked reference to record PCT     HPI:     Manuel Tyler is a 72 y.o. 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  Patient received his 1st round of chemotherapy two days ago on 01/23, the next day the patient became altered around 10 30 at nighttime.  Patient was walking with his walker and dragging his feet, unable to answer basic questions; he was alert but he was confused.  He reports 1 episode of blood in his stool, hemoglobin 14.8 on arrival.  WBC count elevated at 28 K, with mildly elevated procalcitonin of 1.26 with normal renal function.  Patient had dips of SBP to 84 while in the ED, so was placed in the ICU for close monitoring with concerns of sepsis.  Workup unremarkable.  His kidneys had nonspecific perinephritic stranding, however his urine was pristine with no sign of infection.  Renal ultrasound unremarkable.  He was afebrile upon arrival, with no subjective fevers or chills either.  Denies chest pain or shortness of breath. follow up EGD with PD stent removal was needed.       * No surgery found *      Hospital Course:   Mr. Tyler was admitted with mental status changes after receiving his first chemotherapy a few days prior for his pancreatic cancer. He was found to be dehydrated, hypotensive and concerns for infection/septic shock. He was admitted to ICU initially for closer monitoring. STarted on broad spectrum  antibiotics and IVF. Blood pressure slowly improved and he returned to his baseline mental status. Cultures remained negative and he was de-escalated. WBC on admission was 27,000 and this eventually returned to normal prior to discharge. PT/OT consulted and he was ambulating well with plans to resume home health at discharge. Unclear etiology, suspect from chemotherapy but unclear if antibiotics played role in improvement. Patient was discharged home in stable condition. He will need to follow up with Oncology to determine plan. All questions answered. Encouraged to seek medical attention if symptoms were to recurr.       Goals of Care Treatment Preferences:  Code Status: DNR    Living Will: Yes         Consults:   Consults (From admission, onward)        Status Ordering Provider     Inpatient consult to Palliative Care  Once        Provider:  Jael Perez NP    Completed CHAN LERNER     Inpatient consult to Telemedicine - Oncology  Once        Provider:  Latesha Carver MD    Completed LATESHA CARVER     Inpatient consult to Hematology/Oncology - [facility]  Once        Provider:  Keisha Tobar MD    Completed TUAN DRAKE     Inpatient consult to Gastroenterology  Once        Provider:  Brit Melendez MD    Completed FRED MARCH          ACP (advance care planning)  Advance Care Planning     Date: 01/29/2023  Patient is currently Full Code. Family still interested in pursuing treatment for cancer at this time.            Final Active Diagnoses:    Diagnosis Date Noted POA    PRINCIPAL PROBLEM:  Severe sepsis [A41.9, R65.20] 01/25/2023 Yes    DNR (do not resuscitate) [Z66] 01/30/2023 Yes    ACP (advance care planning) [Z71.89] 01/29/2023 Not Applicable    (HFpEF) heart failure with preserved ejection fraction [I50.30] 01/25/2023 Yes    Encephalopathy, toxic [G92.9] 01/25/2023 Yes    Liver metastases [C78.7] 01/18/2023 Yes    Malignant neoplasm of head of pancreas [C25.0] 01/18/2023 Yes     Alcohol abuse [F10.10] 12/27/2022 Yes    Hyperlipidemia [E78.5] 12/27/2022 Yes    Primary hypertension [I10] 12/27/2022 Yes      Problems Resolved During this Admission:       Discharged Condition: fair    Disposition: Home or Self Care    Follow Up:   Follow-up Information     Victor M Stokes MD Follow up on 2/8/2023.    Specialty: Family Medicine  Why: @10:00am for a hospital follow up  Contact information:  [address]  [facility] [address]  [place] [address]  [phone]             [facility] Follow up.    Specialties: Home Health Services, Home Therapy Services, Home Living Aide Services  Contact information:  [address] [facility], [facility] [address]  [facility] [address]  [phone]                     Patient Instructions:      Diet Adult Regular     Notify your health care provider if you experience any of the following:  temperature >100.4     Notify your health care provider if you experience any of the following:  persistent nausea and vomiting or diarrhea     Notify your health care provider if you experience any of the following:  severe uncontrolled pain     Notify your health care provider if you experience any of the following:  difficulty breathing or increased cough     Notify your health care provider if you experience any of the following:  severe persistent headache     Notify your health care provider if you experience any of the following:  worsening rash     Notify your health care provider if you experience any of the following:  persistent dizziness, light-headedness, or visual disturbances     Notify your health care provider if you experience any of the following:  increased confusion or weakness     Activity as tolerated       Significant Diagnostic Studies: Labs: All labs within the past 24 hours have been reviewed    Pending Diagnostic Studies:     None         Medications:  Reconciled Home Medications:      Medication List      START taking these medications     amoxicillin-clavulanate 875-125mg 875-125 mg per tablet  Commonly known as: AUGMENTIN  Take 1 tablet by mouth every 12 (twelve) hours. for 3 days     nicotine 21 mg/24 hr  Commonly known as: NICODERM CQ  Place 1 patch onto the skin once daily.     tiotropium bromide 2.5 mcg/actuation inhaler  Commonly known as: SPIRIVA RESPIMAT  Inhale 2 puffs into the lungs once daily. Controller        CONTINUE taking these medications    ergocalciferol 50,000 unit Cap  Commonly known as: ERGOCALCIFEROL  Take 50,000 Units by mouth every 7 days.     hydrALAZINE 25 MG tablet  Commonly known as: APRESOLINE  Take 3 tablets (75 mg total) by mouth every 8 (eight) hours.     HYDROcodone-acetaminophen 5-325 mg per tablet  Commonly known as: NORCO  Take 1 tablet by mouth every 6 (six) hours as needed for Pain.     ondansetron 4 MG tablet  Commonly known as: ZOFRAN  Take 1 tablet (4 mg total) by mouth every 8 (eight) hours as needed for Nausea.        STOP taking these medications    citalopram 20 MG tablet  Commonly known as: CeleXA     diltiaZEM 180 mg 24 hr tablet  Commonly known as: CARDIZEM LA     oxyCODONE 5 MG immediate release tablet  Commonly known as: ROXICODONE     pravastatin 20 MG tablet  Commonly known as: PRAVACHOL     venlafaxine 37.5 MG 24 hr capsule  Commonly known as: EFFEXOR-XR     venlafaxine 75 MG 24 hr capsule  Commonly known as: EFFEXOR-XR            Indwelling Lines/Drains at time of discharge:   Lines/Drains/Airways     Central Venous Catheter Line  Duration           Port A Cath Single Lumen 01/24/23 right atrial 7 days                Time spent on the discharge of patient: >35 minutes         Jonas Vera MD  Department of Hospital Medicine  [facility]

## 2023-01-31 NOTE — TELEPHONE ENCOUNTER
Pt called for post discharge tracker Day #1 and he answered and he said that he was doing fine. Pt told that he will continue to receive messages and ask if he needs assistance and to respond as needed. Pts PCP is non [facility] pt was given the number to [facility] if he needs anything or questions or concerns and as of now he is ok.            Reason for Disposition   Information only question and nurse able to answer    Protocols used: Information Only Call - No Triage-A-OH

## 2023-02-06 ENCOUNTER — LAB VISIT (OUTPATIENT)
Dept: LAB | Facility: HOSPITAL | Age: 73
End: 2023-02-06
Attending: INTERNAL MEDICINE
Payer: MEDICARE

## 2023-02-06 DIAGNOSIS — C25.0 MALIGNANT NEOPLASM OF HEAD OF PANCREAS: ICD-10-CM

## 2023-02-06 LAB
ALBUMIN SERPL BCP-MCNC: 3.1 G/DL (ref 3.5–5.2)
ALP SERPL-CCNC: 105 U/L (ref 55–135)
ALT SERPL W/O P-5'-P-CCNC: 56 U/L (ref 10–44)
ANION GAP SERPL CALC-SCNC: 10 MMOL/L (ref 8–16)
AST SERPL-CCNC: 55 U/L (ref 10–40)
BASOPHILS # BLD AUTO: 0.19 K/UL (ref 0–0.2)
BASOPHILS NFR BLD: 1.6 % (ref 0–1.9)
BILIRUB SERPL-MCNC: 0.4 MG/DL (ref 0.1–1)
BUN SERPL-MCNC: 12 MG/DL (ref 8–23)
CALCIUM SERPL-MCNC: 9.9 MG/DL (ref 8.7–10.5)
CHLORIDE SERPL-SCNC: 105 MMOL/L (ref 95–110)
CO2 SERPL-SCNC: 24 MMOL/L (ref 23–29)
CREAT SERPL-MCNC: 0.8 MG/DL (ref 0.5–1.4)
DIFFERENTIAL METHOD: ABNORMAL
EOSINOPHIL # BLD AUTO: 0.7 K/UL (ref 0–0.5)
EOSINOPHIL NFR BLD: 5.6 % (ref 0–8)
ERYTHROCYTE [DISTWIDTH] IN BLOOD BY AUTOMATED COUNT: 14.6 % (ref 11.5–14.5)
EST. GFR  (NO RACE VARIABLE): >60 ML/MIN/1.73 M^2
GLUCOSE SERPL-MCNC: 136 MG/DL (ref 70–110)
HCT VFR BLD AUTO: 43.9 % (ref 40–54)
HGB BLD-MCNC: 14.6 G/DL (ref 14–18)
IMM GRANULOCYTES # BLD AUTO: 0.09 K/UL (ref 0–0.04)
IMM GRANULOCYTES NFR BLD AUTO: 0.8 % (ref 0–0.5)
LYMPHOCYTES # BLD AUTO: 2.4 K/UL (ref 1–4.8)
LYMPHOCYTES NFR BLD: 20.6 % (ref 18–48)
MAGNESIUM SERPL-MCNC: 1.8 MG/DL (ref 1.6–2.6)
MCH RBC QN AUTO: 32.7 PG (ref 27–31)
MCHC RBC AUTO-ENTMCNC: 33.3 G/DL (ref 32–36)
MCV RBC AUTO: 98 FL (ref 82–98)
MONOCYTES # BLD AUTO: 1.8 K/UL (ref 0.3–1)
MONOCYTES NFR BLD: 15.7 % (ref 4–15)
NEUTROPHILS # BLD AUTO: 6.5 K/UL (ref 1.8–7.7)
NEUTROPHILS NFR BLD: 55.7 % (ref 38–73)
NRBC BLD-RTO: 0 /100 WBC
PLATELET # BLD AUTO: 856 K/UL (ref 150–450)
PMV BLD AUTO: 9.2 FL (ref 9.2–12.9)
POTASSIUM SERPL-SCNC: 4.5 MMOL/L (ref 3.5–5.1)
PROT SERPL-MCNC: 7.6 G/DL (ref 6–8.4)
RBC # BLD AUTO: 4.47 M/UL (ref 4.6–6.2)
SODIUM SERPL-SCNC: 139 MMOL/L (ref 136–145)
WBC # BLD AUTO: 11.69 K/UL (ref 3.9–12.7)

## 2023-02-06 PROCEDURE — 80053 COMPREHEN METABOLIC PANEL: CPT | Performed by: INTERNAL MEDICINE

## 2023-02-06 PROCEDURE — 85025 COMPLETE CBC W/AUTO DIFF WBC: CPT | Performed by: INTERNAL MEDICINE

## 2023-02-06 PROCEDURE — 36415 COLL VENOUS BLD VENIPUNCTURE: CPT | Mod: PO | Performed by: INTERNAL MEDICINE

## 2023-02-06 PROCEDURE — 83735 ASSAY OF MAGNESIUM: CPT | Performed by: INTERNAL MEDICINE

## 2023-02-07 ENCOUNTER — TELEPHONE (OUTPATIENT)
Dept: HEMATOLOGY/ONCOLOGY | Facility: CLINIC | Age: 73
End: 2023-02-07

## 2023-02-07 ENCOUNTER — INFUSION (OUTPATIENT)
Dept: INFUSION THERAPY | Facility: HOSPITAL | Age: 73
End: 2023-02-07
Attending: INTERNAL MEDICINE
Payer: MEDICARE

## 2023-02-07 ENCOUNTER — OFFICE VISIT (OUTPATIENT)
Dept: HEMATOLOGY/ONCOLOGY | Facility: CLINIC | Age: 73
End: 2023-02-07
Payer: MEDICARE

## 2023-02-07 VITALS
WEIGHT: 133.19 LBS | OXYGEN SATURATION: 97 % | HEIGHT: 68 IN | SYSTOLIC BLOOD PRESSURE: 129 MMHG | BODY MASS INDEX: 20.18 KG/M2 | HEART RATE: 123 BPM | DIASTOLIC BLOOD PRESSURE: 65 MMHG

## 2023-02-07 VITALS
DIASTOLIC BLOOD PRESSURE: 59 MMHG | HEART RATE: 118 BPM | RESPIRATION RATE: 17 BRPM | OXYGEN SATURATION: 98 % | TEMPERATURE: 98 F | SYSTOLIC BLOOD PRESSURE: 124 MMHG

## 2023-02-07 DIAGNOSIS — C25.0 MALIGNANT NEOPLASM OF HEAD OF PANCREAS: Primary | ICD-10-CM

## 2023-02-07 DIAGNOSIS — C78.7 LIVER METASTASES: ICD-10-CM

## 2023-02-07 DIAGNOSIS — G89.3 NEOPLASM RELATED PAIN: ICD-10-CM

## 2023-02-07 DIAGNOSIS — E83.39 HYPOPHOSPHATEMIA: ICD-10-CM

## 2023-02-07 PROCEDURE — 96374 THER/PROPH/DIAG INJ IV PUSH: CPT

## 2023-02-07 PROCEDURE — 96413 CHEMO IV INFUSION 1 HR: CPT

## 2023-02-07 PROCEDURE — 25000003 PHARM REV CODE 250: Performed by: INTERNAL MEDICINE

## 2023-02-07 PROCEDURE — 96375 TX/PRO/DX INJ NEW DRUG ADDON: CPT

## 2023-02-07 PROCEDURE — 99999 PR PBB SHADOW E&M-EST. PATIENT-LVL III: CPT | Mod: PBBFAC,,, | Performed by: INTERNAL MEDICINE

## 2023-02-07 PROCEDURE — 63600175 PHARM REV CODE 636 W HCPCS: Performed by: INTERNAL MEDICINE

## 2023-02-07 PROCEDURE — 99213 OFFICE O/P EST LOW 20 MIN: CPT | Mod: PBBFAC,25 | Performed by: INTERNAL MEDICINE

## 2023-02-07 PROCEDURE — A4216 STERILE WATER/SALINE, 10 ML: HCPCS | Performed by: INTERNAL MEDICINE

## 2023-02-07 PROCEDURE — 99999 PR PBB SHADOW E&M-EST. PATIENT-LVL III: ICD-10-PCS | Mod: PBBFAC,,, | Performed by: INTERNAL MEDICINE

## 2023-02-07 PROCEDURE — 99215 OFFICE O/P EST HI 40 MIN: CPT | Mod: S$PBB,,, | Performed by: INTERNAL MEDICINE

## 2023-02-07 PROCEDURE — 99215 PR OFFICE/OUTPT VISIT, EST, LEVL V, 40-54 MIN: ICD-10-PCS | Mod: S$PBB,,, | Performed by: INTERNAL MEDICINE

## 2023-02-07 RX ORDER — ONDANSETRON 2 MG/ML
8 INJECTION INTRAMUSCULAR; INTRAVENOUS
Status: COMPLETED | OUTPATIENT
Start: 2023-02-07 | End: 2023-02-07

## 2023-02-07 RX ORDER — SODIUM CHLORIDE 0.9 % (FLUSH) 0.9 %
10 SYRINGE (ML) INJECTION
Status: DISCONTINUED | OUTPATIENT
Start: 2023-02-07 | End: 2023-02-07 | Stop reason: HOSPADM

## 2023-02-07 RX ORDER — HEPARIN 100 UNIT/ML
500 SYRINGE INTRAVENOUS
Status: DISCONTINUED | OUTPATIENT
Start: 2023-02-07 | End: 2023-02-07 | Stop reason: HOSPADM

## 2023-02-07 RX ADMIN — GEMCITABINE 1400 MG: 38 INJECTION, SOLUTION INTRAVENOUS at 10:02

## 2023-02-07 RX ADMIN — Medication 10 ML: at 11:02

## 2023-02-07 RX ADMIN — ONDANSETRON 8 MG: 2 INJECTION INTRAMUSCULAR; INTRAVENOUS at 09:02

## 2023-02-07 RX ADMIN — HEPARIN 500 UNITS: 100 SYRINGE at 11:02

## 2023-02-07 NOTE — PROGRESS NOTES
Subjective:       Patient ID: Manuel Tyler is a 72 y.o. male.    Chief Complaint: Pancreatic Cancer  Diagnosis: Stage IV pancreatic CA  with liver mets diagnosed 12/30/22  ANDREA   BRAF V600E pos  JAK2 V617F pos   Therapy: [place]/abraxane (dose reduced) 1/24/23  HPI 72 y.o male with medical diagnoses listed seen today in consultation for Stage IV pancreatic adenocarcinoma with liver metastases.He was  admitted with pancreatitis likely secondary to ETOH abuse. C-diff negative. CT a/p w/contrast 12/27/22 shows Mild wall thickening involving the sigmoid colon with abnormal appearance seen within the left lower pelvis along the left lateral aspect of the sigmoid colon.  Uncertain if findings may in part relate to sigmoid tortuosity, however potential colocolic fistula not excluded given appearance.  No acute inflammatory changes are seen.  No prior studies are available for comparison.  Colonoscopy follow-up may be warranted if not previously/recently obtained.   Distended gallbladder. US abd limited 12/28/22 shows Biliary sludge with evidence of acute cholecystitis including gallbladder distension and a positive sonographic Hahn sign.CBD dilatation up to 13 mm, which could be secondary to cholecystitis, choledocholithiasis, or other obstruction.Ill-defined 2.6 cm lesion in the region of the pancreatic head, which could represent a lymph node or pancreatic neoplasm.  GI performed EUS and ERCP on 12/30/22 - suspicious for pancreatic CA- stent placed. Upper EUS 12/30/22 shows A single metastatic lesion was found in the left lobe of the liver.   Fine needle aspiration performed. A mass was identified in the pancreatic head. Fine needle biopsy performed  Pancreas, head, mass, EUS guided fine-needle aspiration: Positive for malignancy, adenocarcinoma, see comment.   Liver, EUS guided fine-needle aspiration: - Positive for malignancy, adenocarcinoma,He is taking OTC acetaminophen for abd pain which intermittently radiates to  back.  He rates pain 3/10 in intensity. He has intermittent nausea. He is reports changes in bowel habits He needs assistance with some daily activities.No falls. He reports mild SKAGSG. No CP/hemoptysis. He is accompanied by family members.       Interval Hx: Accompanied by fam members  S/p C1D1 gem/abraxane ( dose reduced) on 1/24/23    admitted on 125/23 with AMS. He was found to be dehydrated, hypotensive and concerns for infection/septic shock. He was admitted to ICU initially for closer monitoring. STarted on broad spectrum antibiotics and IVF. Blood pressure slowly improved and he returned to his baseline mental status. Cultures remained negative and he was de-escalated. WBC on admission was 27,000 and this eventually returned to normal prior to discharge. . Unclear etiology, suspect from chemotherapy but unclear if antibiotics played role in improvement.   Today, Mild nausea  He continues with LBP  He reports he is taking 1-2 pain pills daily although some days he does not take any   No SOB/CPcough  He has 1-2 ensures daily  According to family no episodes of confusion since hospital discharge  No generalized weakness  He sits on sofa most of day and watches TV  He is no longer on bp meds  He was f/b palliative care during hospitalization  According to chart pt DNR  laPost not completed prior to discharge      Past Medical History:   Diagnosis Date    (HFpEF) heart failure with preserved ejection fraction 1/25/2023    Alcohol abuse 12/27/2022    Hyperlipidemia     Hypertension     Liver metastases 1/18/2023    Malignant neoplasm of head of pancreas 1/18/2023    Other specified noninfective gastroenteritis and colitis     Pancreatitis     Personal history of colonic polyps        Past Surgical History:   Procedure Laterality Date    COLONOSCOPY      ENDOSCOPIC ULTRASOUND OF UPPER GASTROINTESTINAL TRACT Left 12/30/2022    Procedure: ULTRASOUND, UPPER GI TRACT, ENDOSCOPIC;  Surgeon: Gregory Cristina MD;  Location:  [facility] ENDO (2ND FLR);  Service: Endoscopy;  Laterality: Left;    ERCP Left 12/30/2022    Procedure: ERCP (ENDOSCOPIC RETROGRADE CHOLANGIOPANCREATOGRAPHY);  Surgeon: Gregory Cristina MD;  Location: [facility] (2ND FLR);  Service: Endoscopy;  Laterality: Left;    FOOT SURGERY Left     INSERTION OF TUNNELED CENTRAL VENOUS CATHETER (CVC) WITH SUBCUTANEOUS PORT Bilateral 1/19/2023    Procedure: [id number];  Surgeon: Amador Castellon MD;  Location: [facility];  Service: General;  Laterality: Bilateral;  Right v Left PORTACATH. needs ultrasound and fluoro  RN PREOP 01/18/2023    TONSILLECTOMY          Review of Systems   Constitutional:  Positive for appetite change, fatigue and unexpected weight change. Negative for fever.   HENT:  Negative for mouth sores.    Eyes:  Negative for visual disturbance.   Respiratory:  Negative for cough and shortness of breath.    Cardiovascular:  Negative for chest pain.   Gastrointestinal:  Positive for nausea. Negative for abdominal pain and diarrhea.   Genitourinary:  Negative for frequency.   Musculoskeletal:  Positive for back pain.   Integumentary:  Negative for rash.   Neurological:  Negative for headaches.   Hematological:  Negative for adenopathy.   Psychiatric/Behavioral:  The patient is not nervous/anxious.        Objective:     ECOG 2  Vitals:    02/07/23 0835   BP: 129/65   BP Location: Left arm   Patient Position: Sitting   BP Method: Large (Automatic)   Pulse: (!) 123   SpO2: 97%   Weight: 60.4 kg (133 lb 2.5 oz)   Height: 5' 8" (1.727 m)       Physical Exam  Constitutional:       Appearance: He is underweight.   HENT:      Head: Normocephalic.      Mouth/Throat:      Pharynx: No oropharyngeal exudate.   Eyes:      General: No scleral icterus.        Right eye: No discharge.         Left eye: No discharge.      Conjunctiva/sclera: Conjunctivae normal.   Neck:      Thyroid: No thyromegaly.   Cardiovascular:      Rate and Rhythm: Regular rhythm. Tachycardia present.      " Heart sounds: No murmur heard.  Pulmonary:      Effort: Pulmonary effort is normal.      Breath sounds: Normal breath sounds. No wheezing or rales.   Abdominal:      General: Bowel sounds are normal.      Palpations: Abdomen is soft.      Tenderness: There is no abdominal tenderness. There is no guarding or rebound.   Musculoskeletal:         General: No swelling. Normal range of motion.      Cervical back: Normal range of motion and neck supple.   Lymphadenopathy:      Cervical: No cervical adenopathy.      Upper Body:      Right upper body: No supraclavicular adenopathy.      Left upper body: No supraclavicular adenopathy.   Skin:     Findings: No erythema or rash.   Neurological:      Mental Status: He is alert and oriented to person, place, and time.      Cranial Nerves: No cranial nerve deficit.   Psychiatric:         Mood and Affect: Mood normal.         Behavior: Behavior normal.           CT a/p w/contrast 12/27/22  Impression:     1. Mild wall thickening involving the sigmoid colon with abnormal appearance seen within the left lower pelvis along the left lateral aspect of the sigmoid colon.  Uncertain if findings may in part relate to sigmoid tortuosity, however potential colocolic fistula not excluded given appearance.  No acute inflammatory changes are seen.  No prior studies are available for comparison.  Colonoscopy follow-up may be warranted if not previously/recently obtained.  Otherwise consider future fluoroscopic Gastrografin enema for further evaluation.  2. Distended gallbladder.  Consider gallbladder ultrasound follow-up if clinically indicated.  3. Additional findings as detailed above.     US abd limited 12/28/22  Impression:     Biliary sludge with evidence of acute cholecystitis including gallbladder distension and a positive sonographic Hahn sign.     CBD dilatation up to 13 mm, which could be secondary to cholecystitis, choledocholithiasis, or other obstruction.     Ill-defined 2.6 cm  lesion in the region of the pancreatic head, which could represent a lymph node or pancreatic neoplasm.     RECOMMENDATIONS:  Contrast enhanced MRI/MRCP for further evaluation of the above findings    ERCP 12/30/22  - The major papilla appeared congested.                          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                          - A pancreatic sphincterotomy was performed.                          - One plastic stent was placed into the ventral                          pancreatic duct.                          - A single severe biliary stricture was found in                          the lower third of the main bile duct. The                          stricture was malignant appearing.                          - A biliary sphincterotomy was performed.                          - One covered metal stent was placed into the                          common bile duct.   Recommendation:        - Return patient to hospital [facility] for ongoing care.                          - EUS findings concerning for metastatic                          pancreatic cancer (see separate EUS report  Upper EUS 12/30/22  Impression:            - A single metastatic lesion was found in the left                          lobe of the liver. Cytology results are pending.                          However, the endosonographic appearance is                          suggestive of metastatic pancreatic                          adenocarcinoma. Fine needle aspiration performed.                          - A mass was identified in the pancreatic head.                          Fine needle biopsy performed  Pathology 12/30/22    1. Pancreas, head, mass, EUS guided fine-needle aspiration:   - Positive for malignancy, adenocarcinoma, see comment.   2. Liver, EUS guided fine-needle aspiration:   - Positive for malignancy, adenocarcinoma, see comment.   COMMENT:  Immunostain for cytokeratin performed on specimen 1 shows an   infiltrative epithelial  process.  Parts 1 and 2 of this case are reviewed by   Dr. XIAO Gan who agrees with the above diagnoses.  Appropriate positive   controls are examined.   Immunohistochemistry (IHC) Testing for Mismatch Repair (MMR) Proteins   performed on specimen 1:   MLH1 - Intact nuclear expression   MSH2 - Intact nuclear expression   MSH6 - Intact nuclear expression   PMS2 - Intact nuclear expression   Background nonneoplastic tissue/internal control with intact nuclear   expression   IHC Interpretation   No loss of nuclear expression of MMR proteins: low probability of   microsatellite instability   There are exceptions to the above IHC interpretations. These results should   not be considered in isolation, and clinical correlation with genetic   counseling is recommended to assess the need for germline testing.      Comment: Interp By Colleen Olmos MD, Signed on 01/10/2023 at 09:30      Latest Reference Range & Units Most Recent   CA 19-9 0.0 - 40.0 U/mL 5348.6 (H)  1/11/23 15:22   (H): Data is abnormally high    Assessment:       Problem List Items Addressed This Visit          Oncology    Diagnosed 12/30/22  Pancreas, head, mass, EUS guided fine-needle aspiration: Positive for malignancy, adenocarcinoma  Liver, EUS guided fine-needle aspiration: - Positive for malignancy, adenocarcinoma  CA 19-9 5348U/mL on 1/11/23  Plan was for OP PANC NAB-PACLITAXEL + GEMCITABINE Q4W  S/p C1D1 gem/abraxane ( dose reduced) on 1/24/23    admitted on 12/5/23 with AMS.   Plan proceed with single agent gem( further dose reduction at 800mg/m2) and then reassess tolerability  Detailed d/w pt and fam members regarding palliative chemo and principle of palliative chemo and not for curative intent. Will continue to monitor risks vs benefits of palliative chemo for pt  Follow -up on BRCA  Follow up on pharmacogenomics panel   Guardant findings reviewed-BRAF V600E pos - clinical trial availability in [place]      Relevant Orders    CANCER ANTIGEN 19-9     Liver metastases    Relevant Orders    CANCER ANTIGEN 19-9    Neoplasm related pain  Currently well controlled  Cont current regimen     Other Visit Diagnoses       Hypophosphatemia        Relevant Orders    Phosphorus              Follow-up:      .proceed with chemo today and on 2/14/23  Cbc,cmp, phos prior to chemo on 2/14/23 and prior to f/u on 2/28      I spent a total of  45  minutes on the day of the visit.This includes face to face time and non-face to face time preparing to see the patient (eg, review of tests), obtaining and/or reviewing separately obtained history, documenting clinical information in the electronic or other health record, independently interpreting results and communicating results to the patient/family/caregiver, and coordinating care.

## 2023-02-07 NOTE — TELEPHONE ENCOUNTER
To Flower Lyles research at [facility]  Good afternoon Miss Lyles. I am Lee Lee Rn Navigator with the [facility] at the [facility] location. Dr Latesha Carver has a new patient  MRN # [id number] Manuel Tyler with pancreatic cancer w/ liver mets. His guardant test resulted that he has the potential to be a candidate for the BRAF clinical trial. We were inquiring as to if you still have that trial available and if so could he be scheduled for an evaluation ? Our offoce number is [phone] if needed. Thank you for your assistance in this matter. Lee Lee RN

## 2023-02-07 NOTE — Clinical Note
proceed with chemo today and on 2/14/23 Cbc,cmp, phos prior to chemo on 2/14/23 and prior to f/u on 2/28

## 2023-02-07 NOTE — PLAN OF CARE
Pt completed C1D8 of only Gemzar. Pt completed his follow-up in clinic with  this morning.  eliminating the Abraxane from his tx plan due to the pt being hospitalized for altered mental status after his first cycle of chemo. Feeling well overall. Labs from yesterday reviewed. VSS. Pt received pre-med of Zofran IVP. Gemzar infused over 30 minutes. Pt also received an additional 1L NS. Tolerated well. Pt and son received discharge instructions along with his next appts. Pt and son instructed to return on 2/28 for his next tx. Verbalized understanding. Pt discharged from unit using walker accompanied by son in NAD.

## 2023-02-08 ENCOUNTER — TUMOR BOARD CONFERENCE (OUTPATIENT)
Dept: HEMATOLOGY/ONCOLOGY | Facility: CLINIC | Age: 73
End: 2023-02-08
Payer: MEDICARE

## 2023-02-08 ENCOUNTER — TELEPHONE (OUTPATIENT)
Dept: HEMATOLOGY/ONCOLOGY | Facility: CLINIC | Age: 73
End: 2023-02-08
Payer: MEDICARE

## 2023-02-08 NOTE — Clinical Note
Dr. Carver,  Thank you for sending this patient for review at the [facility] Tumor Board. We do not have a trial that this patient at this time. We do suggest switching treatment to BRAF inhibitors (dabrafenib and trametinib) - now approved for all tumors with BRAF V600E mutation. This is likely his best chance at a durable response especially since he is not tolerating chemo well.   Thank You, Meghan Mancilla, NP

## 2023-02-08 NOTE — PROGRESS NOTES
[facility] Tumor [facility]    Date: 2/8/2023    Patient Name: Manuel Tyler    MRN: [id number]    Diagnosis: Metastatic Pancreatic Cancer, liver mets - Diagnosed in 12/30/22.    Referring Provider: Dr. Carevr    Present PCTP Providers:     Dr. Christiano Escalante, Dr. Jose Sheets, Dr. Minesh Peoples, Meghan Mancilla, NP    Patient Summary:  Pathology:    Has failed       Current treatment(s): Magoffin/Abraxane (1/21/23- current) > single agent Magoffin d/t poor tolerance.  ECOG: Ambulatory and capable of all selfcare but unable to carry out any work activities.  Up and about more than 50% of waking hours    Molecular Workup:    Guardant 360  Guardant 360 Details: ANDREA, BRAF V600E, JAK2 V617F      Board Recommendations:    Standard of care recommendations: BRAF inhibitors - dabrafenib and trametinib.   Trial recommendations: Late phase: No trials.  Early phase: No trials.

## 2023-02-08 NOTE — TELEPHONE ENCOUNTER
Tc to pt's number and spouse   Message left on VM requesting they contact our office re some information Dr Langford wants to share with them        Tc to pt advising him that there is a Clinical trial that he may be eligible for and that we have contacted that department and requested they reach out to him  and discuss if he is eligible. He thanked Dr Carver for this additional information and said he will respond to them when they contact him        Tc to pt to advise him that his case was reviewed at [facility] and at this time there is no clinical trial available per violeta Mcghee NP  he thanked office for there concern

## 2023-02-08 NOTE — PROGRESS NOTES
Audio Only Telehealth Visit     The patient location is: home  The chief complaint leading to consultation is: weight loss  Visit type: Virtual visit with audio only (telephone)  Total time spent with patient: 14 minutes      The reason for the audio only service rather than synchronous audio and video virtual visit was related to technical difficulties or patient preference/necessity.     Each patient to whom I provide medical services by telemedicine is:  (1) informed of the relationship between the physician and patient and the respective role of any other health care provider with respect to management of the patient; and (2) notified that they may decline to receive medical services by telemedicine and may withdraw from such care at any time. Patient verbally consented to receive this service via voice-only telephone call.    This service was not originating from a related E/M service provided within the previous 7 days nor will  to an E/M service or procedure within the next 24 hours or my soonest available appointment.  Prevailing standard of care was able to be met in this audio-only visit.      Oncology Nutrition Assessment for Medical Nutrition Therapy  Initial Visit    Manuel Tyler   1950    Referring Provider:  Latesha Carver MD      Reason for Visit: Pt in for education and nutrition counseling     PMHx:   Past Medical History:   Diagnosis Date    (HFpEF) heart failure with preserved ejection fraction 1/25/2023    Alcohol abuse 12/27/2022    Hyperlipidemia     Hypertension     Liver metastases 1/18/2023    Malignant neoplasm of head of pancreas 1/18/2023    Other specified noninfective gastroenteritis and colitis     Pancreatitis     Personal history of colonic polyps        Nutrition Assessment    This is a 72 y.o.male with newly diagnosed metastatic pancreatic cancer s/p C1D1 gem/abraxane ( ose reduced) on 1/24/23. Admitted on 1/25/23 with AMS. He was found to be dehydrated,  hypotensive and concerns for infection/septic shock. Discharged 1/30. Now receiving Gemzar alone. Referred to nutrition due to appetite loss and weight loss.   Reports feeling fine since most recent Gemzar infusion. Denies any nutrition related impact symptoms. Feeling well overall. Appetite is currently fair. Weight has stabilized but he would like to gain some weight back. Had eggs, toast, Ensure Plus this morning. Having chicken and mixed vegetables for lunch. Eats three meals per day as much as possible. Usually has chicken, eggs, or other protein sources at meals.   He is using Ensure Plus 2-3 times per day. Drinks 3 8oz bottles of water per day. Drinks black coffee as well  2-3 cups per day.     Weight:   Height:   BMI:There is no height or weight on file to calculate BMI.   IBW: Patient weight not recorded    Usual BW: 170lb   Weight Change:about 40lb loss  Stable around 130lb for the past few weeks     Allergies: Patient has no known allergies.    Current Medications:    Current Outpatient Medications:     ergocalciferol (ERGOCALCIFEROL) 50,000 unit Cap, Take 50,000 Units by mouth every 7 days., Disp: , Rfl:     hydrALAZINE (APRESOLINE) 25 MG tablet, Take 3 tablets (75 mg total) by mouth every 8 (eight) hours., Disp: 90 tablet, Rfl: 5    HYDROcodone-acetaminophen (NORCO) 5-325 mg per tablet, Take 1 tablet by mouth every 6 (six) hours as needed for Pain., Disp: 15 tablet, Rfl: 0    nicotine (NICODERM CQ) 21 mg/24 hr, Place 1 patch onto the skin once daily., Disp: 30 patch, Rfl: 3    ondansetron (ZOFRAN) 4 MG tablet, Take 1 tablet (4 mg total) by mouth every 8 (eight) hours as needed for Nausea., Disp: 30 tablet, Rfl: 1    tiotropium bromide (SPIRIVA RESPIMAT) 2.5 mcg/actuation inhaler, Inhale 2 puffs into the lungs once daily. Controller, Disp: 4 g, Rfl: 3  No current facility-administered medications for this visit.    Vitamins/Supplements: multivitamin     Labs: Reviewed from 2/6- Albumin 3.1, Glucose  136    Nutrition Diagnosis    Problem: malnutrition  Etiology (related to): appetite loss , advanced cancer   Signs/Symptoms (as evidenced by): weight loss of 25% usual body weight in less than one year    Nutrition Intervention    Nutrition Prescription   6405-0163 Kcals (30-35kcal/kg)  77-89g protein (1.3-1.5g/kg)   5000-8143 mL fluid (30-35mL/kg)    Recommendations:  Increase water intake to 3-4 16oz bottles of water per day   Shorten fasting time- have late evening snack (Ensure/Boost, half sandwich, etc) and breakfast immediately in the morning   Continue Boost Plus or Ensure Plus TID     Nutrition Monitoring and Evaluation    Monitor: energy intake, diet tolerance , and weight    Goals: weight gain     Follow up Patient provided with dietitian contact number and advised to call with questions or make future appointment if further intervention is needed.    Communication to referring provider/care team: note available in chart     Counseling time: 15 Minutes    Colleen Ramey, MPH, RD, , LDN, FAND   [phone]

## 2023-02-09 ENCOUNTER — CLINICAL SUPPORT (OUTPATIENT)
Dept: HEMATOLOGY/ONCOLOGY | Facility: CLINIC | Age: 73
End: 2023-02-09
Payer: MEDICARE

## 2023-02-09 VITALS — BODY MASS INDEX: 20.16 KG/M2 | WEIGHT: 133 LBS | HEIGHT: 68 IN

## 2023-02-09 DIAGNOSIS — E44.0 MODERATE PROTEIN-CALORIE MALNUTRITION: ICD-10-CM

## 2023-02-09 DIAGNOSIS — C25.0 MALIGNANT NEOPLASM OF HEAD OF PANCREAS: ICD-10-CM

## 2023-02-09 DIAGNOSIS — Z71.3 NUTRITIONAL COUNSELING: Primary | ICD-10-CM

## 2023-02-09 PROCEDURE — 98967 PR NONPHYSICIAN TELEPHONE ASSESSMENT 11-20 MIN: ICD-10-PCS | Mod: 95,,, | Performed by: DIETITIAN, REGISTERED

## 2023-02-09 PROCEDURE — 98967 PH1 ASSMT&MGMT NQHP 11-20: CPT | Mod: 95,,, | Performed by: DIETITIAN, REGISTERED

## 2023-02-15 ENCOUNTER — TELEPHONE (OUTPATIENT)
Dept: HEMATOLOGY/ONCOLOGY | Facility: CLINIC | Age: 73
End: 2023-02-15
Payer: MEDICARE

## 2023-02-15 NOTE — TELEPHONE ENCOUNTER
'Pts spouse placed urgent care inquiring as to whether opt not we ordered Hospice  Advised her we did not order Hospice  She stated hospital physician ordered home health upon hospital discharge and when she came into she informed her that it appeared Hospice had been ordered and she could not be render services if they were there  She could not give spouse name of physician that ordered Hospice nor name of agency    Nurse advised her that she should contact home health and advise we did not order  hospice and they should continue to come in for services  She acknowledged understanding

## 2023-02-28 ENCOUNTER — OFFICE VISIT (OUTPATIENT)
Dept: HEMATOLOGY/ONCOLOGY | Facility: CLINIC | Age: 73
End: 2023-02-28
Payer: MEDICARE

## 2023-02-28 VITALS
OXYGEN SATURATION: 96 % | BODY MASS INDEX: 21.11 KG/M2 | HEART RATE: 115 BPM | SYSTOLIC BLOOD PRESSURE: 140 MMHG | WEIGHT: 139.31 LBS | HEIGHT: 68 IN | DIASTOLIC BLOOD PRESSURE: 88 MMHG

## 2023-02-28 DIAGNOSIS — G89.3 NEOPLASM RELATED PAIN: ICD-10-CM

## 2023-02-28 DIAGNOSIS — C25.0 MALIGNANT NEOPLASM OF HEAD OF PANCREAS: Primary | ICD-10-CM

## 2023-02-28 DIAGNOSIS — C78.7 LIVER METASTASES: ICD-10-CM

## 2023-02-28 PROCEDURE — 99999 PR PBB SHADOW E&M-EST. PATIENT-LVL III: CPT | Mod: PBBFAC,,, | Performed by: INTERNAL MEDICINE

## 2023-02-28 PROCEDURE — 99999 PR PBB SHADOW E&M-EST. PATIENT-LVL III: ICD-10-PCS | Mod: PBBFAC,,, | Performed by: INTERNAL MEDICINE

## 2023-02-28 PROCEDURE — 99215 OFFICE O/P EST HI 40 MIN: CPT | Mod: S$PBB,,, | Performed by: INTERNAL MEDICINE

## 2023-02-28 PROCEDURE — 99213 OFFICE O/P EST LOW 20 MIN: CPT | Mod: PBBFAC | Performed by: INTERNAL MEDICINE

## 2023-02-28 PROCEDURE — 99215 PR OFFICE/OUTPT VISIT, EST, LEVL V, 40-54 MIN: ICD-10-PCS | Mod: S$PBB,,, | Performed by: INTERNAL MEDICINE

## 2023-02-28 NOTE — PROGRESS NOTES
Subjective:       Patient ID: Manuel Tyler is a 72 y.o. male.    Chief Complaint: No chief complaint on file.  Diagnosis: Stage IV pancreatic CA  with liver mets diagnosed 12/30/22  ANDREA   BRAF V600E pos  JAK2 V617F pos   Therapy: [place]/abraxane (dose reduced) 1/24/23  HPI 72 y.o male with medical diagnoses listed seen today in consultation for Stage IV pancreatic adenocarcinoma with liver metastases.He was  admitted with pancreatitis likely secondary to ETOH abuse. C-diff negative. CT a/p w/contrast 12/27/22 shows Mild wall thickening involving the sigmoid colon with abnormal appearance seen within the left lower pelvis along the left lateral aspect of the sigmoid colon.  Uncertain if findings may in part relate to sigmoid tortuosity, however potential colocolic fistula not excluded given appearance.  No acute inflammatory changes are seen.  No prior studies are available for comparison.  Colonoscopy follow-up may be warranted if not previously/recently obtained.   Distended gallbladder. US abd limited 12/28/22 shows Biliary sludge with evidence of acute cholecystitis including gallbladder distension and a positive sonographic Hahn sign.CBD dilatation up to 13 mm, which could be secondary to cholecystitis, choledocholithiasis, or other obstruction.Ill-defined 2.6 cm lesion in the region of the pancreatic head, which could represent a lymph node or pancreatic neoplasm.  GI performed EUS and ERCP on 12/30/22 - suspicious for pancreatic CA- stent placed. Upper EUS 12/30/22 shows A single metastatic lesion was found in the left lobe of the liver.   Fine needle aspiration performed. A mass was identified in the pancreatic head. Fine needle biopsy performed  Pancreas, head, mass, EUS guided fine-needle aspiration: Positive for malignancy, adenocarcinoma, see comment.   Liver, EUS guided fine-needle aspiration: - Positive for malignancy, adenocarcinoma,He is taking OTC acetaminophen for abd pain which intermittently  radiates to back.  He rates pain 3/10 in intensity. He has intermittent nausea. He is reports changes in bowel habits He needs assistance with some daily activities.No falls. He reports mild SKAGGS. No CP/hemoptysis. He is accompanied by family members.       Interval Hx: Accompanied by fam members  S/p C1D8 gem/abraxane ( dose reduced) on 2/7/23    admitted on 1/25/23 with AMS. He was found to be dehydrated, hypotensive and concerns for infection/septic shock. He was admitted to ICU initially for closer monitoring. STarted on broad spectrum antibiotics and IVF. Blood pressure slowly improved and he returned to his baseline mental status. Cultures remained negative and he was de-escalated. WBC on admission was 27,000 and this eventually returned to normal prior to discharge. . Unclear etiology, suspect from chemotherapy but unclear if antibiotics played role in improvement.   Today, doing okay  Pt was prescribed clindamycin pills and topical ointment  for skin infection  Appetite stable   Wt improved   No N/V  He continues with LBP  He reports he is taking 1-2 pain pills daily although some days he does not take any   No SOB/CPcough  He has 1-2 ensures daily  He sits on sofa most of day and watches TV  He is no longer on bp meds  He was f/b palliative care during hospitalization  According to chart pt DNR  la[place] not completed prior to discharge  Pt reports Hospice came to house although not sure if home health        Guardant 360 biopsy resulted  Details: ANDREA, BRAF V600E, JAK2 V617F            Board Recommendations:     Standard of care recommendations: BRAF inhibitors - dabrafenib and trametinib.     Past Medical History:   Diagnosis Date    (HFpEF) heart failure with preserved ejection fraction 1/25/2023    Alcohol abuse 12/27/2022    Hyperlipidemia     Hypertension     Liver metastases 1/18/2023    Malignant neoplasm of head of pancreas 1/18/2023    Other specified noninfective gastroenteritis and colitis      "Pancreatitis     Personal history of colonic polyps        Past Surgical History:   Procedure Laterality Date    COLONOSCOPY      ENDOSCOPIC ULTRASOUND OF UPPER GASTROINTESTINAL TRACT Left 12/30/2022    Procedure: ULTRASOUND, UPPER GI TRACT, ENDOSCOPIC;  Surgeon: Gregory Cristina MD;  [facility]  Service: Endoscopy;  Laterality: Left;    ERCP Left 12/30/2022    Procedure: ERCP (ENDOSCOPIC RETROGRADE CHOLANGIOPANCREATOGRAPHY);  Surgeon: Gregory Cristina MD;  [facility]  Service: Endoscopy;  Laterality: Left;    FOOT SURGERY Left     INSERTION OF TUNNELED CENTRAL VENOUS CATHETER (CVC) WITH SUBCUTANEOUS PORT Bilateral 1/19/2023    Procedure: WVUINIBOG-FYBA-B-CATH;  Surgeon: Amador Castellon MD;  Location: [facility];  Service: General;  Laterality: Bilateral;  Right v Left PORTACATH. needs ultrasound and fluoro  RN PREOP 01/18/2023    TONSILLECTOMY          Review of Systems   Constitutional:  Positive for appetite change, fatigue and unexpected weight change. Negative for fever.   HENT:  Negative for mouth sores.    Eyes:  Negative for visual disturbance.   Respiratory:  Negative for cough and shortness of breath.    Cardiovascular:  Negative for chest pain.   Gastrointestinal:  Positive for nausea. Negative for abdominal pain and diarrhea.   Genitourinary:  Negative for frequency.   Musculoskeletal:  Positive for back pain.   Integumentary:  Negative for rash.   Neurological:  Negative for headaches.   Hematological:  Negative for adenopathy.   Psychiatric/Behavioral:  The patient is not nervous/anxious.        Objective:     ECOG 2  Vitals:    02/28/23 0811   BP: (!) 140/88   Pulse: (!) 115   SpO2: 96%   Weight: 63.2 kg (139 lb 5.3 oz)   Height: 5' 8" (1.727 m)       Physical Exam  Constitutional:       Appearance: He is underweight.   HENT:      Head: Normocephalic.      Mouth/Throat:      Pharynx: No oropharyngeal exudate.   Eyes:      General: No scleral icterus.        Right eye: " No discharge.         Left eye: No discharge.      Conjunctiva/sclera: Conjunctivae normal.   Neck:      Thyroid: No thyromegaly.   Cardiovascular:      Rate and Rhythm: Regular rhythm. Tachycardia present.      Heart sounds: No murmur heard.  Pulmonary:      Effort: Pulmonary effort is normal.      Breath sounds: Normal breath sounds. No wheezing or rales.   Abdominal:      General: Bowel sounds are normal.      Palpations: Abdomen is soft.      Tenderness: There is no abdominal tenderness. There is no guarding or rebound.   Musculoskeletal:         General: No swelling. Normal range of motion.      Cervical back: Normal range of motion and neck supple.   Lymphadenopathy:      Cervical: No cervical adenopathy.      Upper Body:      Right upper body: No supraclavicular adenopathy.      Left upper body: No supraclavicular adenopathy.   Skin:     Findings: No erythema or rash.   Neurological:      Mental Status: He is alert and oriented to person, place, and time.      Cranial Nerves: No cranial nerve deficit.   Psychiatric:         Mood and Affect: Mood normal.         Behavior: Behavior normal.           CT a/p w/contrast 12/27/22  Impression:     1. Mild wall thickening involving the sigmoid colon with abnormal appearance seen within the left lower pelvis along the left lateral aspect of the sigmoid colon.  Uncertain if findings may in part relate to sigmoid tortuosity, however potential colocolic fistula not excluded given appearance.  No acute inflammatory changes are seen.  No prior studies are available for comparison.  Colonoscopy follow-up may be warranted if not previously/recently obtained.  Otherwise consider future fluoroscopic Gastrografin enema for further evaluation.  2. Distended gallbladder.  Consider gallbladder ultrasound follow-up if clinically indicated.  3. Additional findings as detailed above.     US abd limited 12/28/22  Impression:     Biliary sludge with evidence of acute cholecystitis  including gallbladder distension and a positive sonographic Hahn sign.     CBD dilatation up to 13 mm, which could be secondary to cholecystitis, choledocholithiasis, or other obstruction.     Ill-defined 2.6 cm lesion in the region of the pancreatic head, which could represent a lymph node or pancreatic neoplasm.     RECOMMENDATIONS:  Contrast enhanced MRI/MRCP for further evaluation of the above findings    ERCP 12/30/22  - The major papilla appeared congested.                          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                          - A pancreatic sphincterotomy was performed.                          - One plastic stent was placed into the ventral                          pancreatic duct.                          - A single severe biliary stricture was found in                          the lower third of the main bile duct. The                          stricture was malignant appearing.                          - A biliary sphincterotomy was performed.                          - One covered metal stent was placed into the                          common bile duct.   Recommendation:        - Return patient to [facility] for ongoing care.                          - EUS findings concerning for metastatic                          pancreatic cancer (see separate EUS report  Upper EUS 12/30/22  Impression:            - A single metastatic lesion was found in the left                          lobe of the liver. Cytology results are pending.                          However, the endosonographic appearance is                          suggestive of metastatic pancreatic                          adenocarcinoma. Fine needle aspiration performed.                          - A mass was identified in the pancreatic head.                          Fine needle biopsy performed  Pathology 12/30/22    1. Pancreas, head, mass, EUS guided fine-needle aspiration:   - Positive for malignancy, adenocarcinoma, see  comment.   2. Liver, EUS guided fine-needle aspiration:   - Positive for malignancy, adenocarcinoma, see comment.   COMMENT:  Immunostain for cytokeratin performed on specimen 1 shows an   infiltrative epithelial process.  Parts 1 and 2 of this case are reviewed by   Dr. XIAO Gan who agrees with the above diagnoses.  Appropriate positive   controls are examined.   Immunohistochemistry (IHC) Testing for Mismatch Repair (MMR) Proteins   performed on specimen 1:   MLH1 - Intact nuclear expression   MSH2 - Intact nuclear expression   MSH6 - Intact nuclear expression   PMS2 - Intact nuclear expression   Background nonneoplastic tissue/internal control with intact nuclear   expression   IHC Interpretation   No loss of nuclear expression of MMR proteins: low probability of   microsatellite instability   There are exceptions to the above IHC interpretations. These results should   not be considered in isolation, and clinical correlation with genetic   counseling is recommended to assess the need for germline testing.      Comment: Interp By Colleen Olmos MD, Signed on 01/10/2023 at 09:30      Latest Reference Range & Units Most Recent   CA 19-9 0.0 - 40.0 U/mL 5348.6 (H)  1/11/23 15:22   (H): Data is abnormally high      2- D echo 1/25/23   The left ventricle is normal in size with concentric hypertrophy and normal systolic function.  The estimated ejection fraction is 65%.  Indeterminate left ventricular diastolic function.  Normal right ventricular size with normal right ventricular systolic function.  Mild left atrial enlargement.  Mild-to-moderate aortic regurgitation.  Mild tricuspid regurgitation.  Normal central venous pressure (3 mmHg).  The estimated PA systolic pressure is 33 mmHg.           Assessment:       Problem List Items Addressed This Visit          Oncology    Diagnosed 12/30/22  Pancreas, head, mass, EUS guided fine-needle aspiration: Positive for malignancy, adenocarcinoma  Liver, EUS guided  fine-needle aspiration: - Positive for malignancy, adenocarcinoma  CA 19-9 5348U/mL on 1/11/23  Plan was for OP PANC NAB-PACLITAXEL + GEMCITABINE Q4W  S/p C1D8 gem/abraxane ( dose reduced) on 1/24/23    admitted on 12/5/23 with AMS.   Guardant 360 Details: ANDREA, BRAF V600E, JAK2 V617F  Plan switch therapy to DABRENIB AND TRAMETINIB  Solid tumors, unresectable or metastatic (with BRAF V600E mutation): DABRAFENIB Oral: 150 mg twice daily, approximately every 12 hours (in combination with trametinib); continue until disease progression or unacceptable toxicity.     Solid tumors, unresectable or metastatic, with BRAF V600E mutation: Oral: trametinib.  2 mg once daily (in combination with dabrafenib) until disease progression or unacceptable toxicity.     DISCONTINUE CHEMO   Discussed potential toxicities     Relevant Orders    CANCER ANTIGEN 19-9    Liver metastases    Relevant Orders    CANCER ANTIGEN 19-9    Neoplasm related pain  Currently well controlled  Cont current regimen     Other Visit Diagnoses                             Follow-up:     D/c chemo   ECG 1 WEEK PRIOR TO F/U   Cbc,cmp, phos, MAG CA 19-9  prior to f/u in 1 month  Pt advised to contact office when he received meds from specialty pharmacy        I spent a total of  45  minutes on the day of the visit.This includes face to face time and non-face to face time preparing to see the patient (eg, review of tests), obtaining and/or reviewing separately obtained history, documenting clinical information in the electronic or other health record, independently interpreting results and communicating results to the patient/family/caregiver, and coordinating care.

## 2023-02-28 NOTE — Clinical Note
D/c chemo  ECG 1 WEEK PRIOR TO F/U  Cbc,cmp, phos, MAG CA 19-9  prior to f/u in 1 month Pt advised to contact office when he received meds from specialty pharmacy

## 2023-03-02 ENCOUNTER — SPECIALTY PHARMACY (OUTPATIENT)
Dept: PHARMACY | Facility: CLINIC | Age: 73
End: 2023-03-02
Payer: MEDICARE

## 2023-03-02 NOTE — TELEPHONE ENCOUNTER
Tita, this is Luis Alfredo Fabian with [facility].  We are working on your prescription that your doctor has sent us. We will be working with your insurance to get this approved for you. We will be calling you along the way with updates on your medication.  If you have any questions, you can reach us at [phone].    Welcome call outcome: Patient/caregiver reached    [facility] not contracted with plan. Rx needs to be sent to [facility]. Pt informed and he voiced understanding.

## 2023-03-03 ENCOUNTER — DOCUMENT SCAN (OUTPATIENT)
Dept: HOME HEALTH SERVICES | Facility: HOSPITAL | Age: 73
End: 2023-03-03
Payer: MEDICARE

## 2023-03-06 ENCOUNTER — LAB VISIT (OUTPATIENT)
Dept: LAB | Facility: HOSPITAL | Age: 73
End: 2023-03-06
Attending: INTERNAL MEDICINE
Payer: MEDICARE

## 2023-03-06 DIAGNOSIS — C25.0 MALIGNANT NEOPLASM OF HEAD OF PANCREAS: ICD-10-CM

## 2023-03-06 LAB
ALBUMIN SERPL BCP-MCNC: 3.7 G/DL (ref 3.5–5.2)
ALP SERPL-CCNC: 78 U/L (ref 55–135)
ALT SERPL W/O P-5'-P-CCNC: 34 U/L (ref 10–44)
ANION GAP SERPL CALC-SCNC: 10 MMOL/L (ref 8–16)
AST SERPL-CCNC: 38 U/L (ref 10–40)
BASOPHILS # BLD AUTO: 0.27 K/UL (ref 0–0.2)
BASOPHILS NFR BLD: 1.6 % (ref 0–1.9)
BILIRUB SERPL-MCNC: 0.6 MG/DL (ref 0.1–1)
BUN SERPL-MCNC: 21 MG/DL (ref 8–23)
CALCIUM SERPL-MCNC: 10 MG/DL (ref 8.7–10.5)
CHLORIDE SERPL-SCNC: 104 MMOL/L (ref 95–110)
CO2 SERPL-SCNC: 24 MMOL/L (ref 23–29)
CREAT SERPL-MCNC: 0.8 MG/DL (ref 0.5–1.4)
DIFFERENTIAL METHOD: ABNORMAL
EOSINOPHIL # BLD AUTO: 0.7 K/UL (ref 0–0.5)
EOSINOPHIL NFR BLD: 4 % (ref 0–8)
ERYTHROCYTE [DISTWIDTH] IN BLOOD BY AUTOMATED COUNT: 15.8 % (ref 11.5–14.5)
EST. GFR  (NO RACE VARIABLE): >60 ML/MIN/1.73 M^2
GLUCOSE SERPL-MCNC: 101 MG/DL (ref 70–110)
HCT VFR BLD AUTO: 46.7 % (ref 40–54)
HGB BLD-MCNC: 15.3 G/DL (ref 14–18)
IMM GRANULOCYTES # BLD AUTO: 0.08 K/UL (ref 0–0.04)
IMM GRANULOCYTES NFR BLD AUTO: 0.5 % (ref 0–0.5)
LYMPHOCYTES # BLD AUTO: 3.1 K/UL (ref 1–4.8)
LYMPHOCYTES NFR BLD: 17.8 % (ref 18–48)
MAGNESIUM SERPL-MCNC: 2 MG/DL (ref 1.6–2.6)
MCH RBC QN AUTO: 31.5 PG (ref 27–31)
MCHC RBC AUTO-ENTMCNC: 32.8 G/DL (ref 32–36)
MCV RBC AUTO: 96 FL (ref 82–98)
MONOCYTES # BLD AUTO: 2.5 K/UL (ref 0.3–1)
MONOCYTES NFR BLD: 14.4 % (ref 4–15)
NEUTROPHILS # BLD AUTO: 10.7 K/UL (ref 1.8–7.7)
NEUTROPHILS NFR BLD: 61.7 % (ref 38–73)
NRBC BLD-RTO: 0 /100 WBC
PLATELET # BLD AUTO: 507 K/UL (ref 150–450)
PMV BLD AUTO: 9.4 FL (ref 9.2–12.9)
POTASSIUM SERPL-SCNC: 5 MMOL/L (ref 3.5–5.1)
PROT SERPL-MCNC: 8 G/DL (ref 6–8.4)
RBC # BLD AUTO: 4.85 M/UL (ref 4.6–6.2)
SODIUM SERPL-SCNC: 138 MMOL/L (ref 136–145)
WBC # BLD AUTO: 17.31 K/UL (ref 3.9–12.7)

## 2023-03-06 PROCEDURE — 36415 COLL VENOUS BLD VENIPUNCTURE: CPT | Mod: PO | Performed by: INTERNAL MEDICINE

## 2023-03-06 PROCEDURE — 80053 COMPREHEN METABOLIC PANEL: CPT | Performed by: INTERNAL MEDICINE

## 2023-03-06 PROCEDURE — 83735 ASSAY OF MAGNESIUM: CPT | Performed by: INTERNAL MEDICINE

## 2023-03-06 PROCEDURE — 85025 COMPLETE CBC W/AUTO DIFF WBC: CPT | Performed by: INTERNAL MEDICINE

## 2023-03-07 ENCOUNTER — DOCUMENT SCAN (OUTPATIENT)
Dept: HOME HEALTH SERVICES | Facility: HOSPITAL | Age: 73
End: 2023-03-07
Payer: MEDICARE

## 2023-03-10 ENCOUNTER — TELEPHONE (OUTPATIENT)
Dept: HEMATOLOGY/ONCOLOGY | Facility: CLINIC | Age: 73
End: 2023-03-10
Payer: MEDICARE

## 2023-03-10 DIAGNOSIS — C25.0 MALIGNANT NEOPLASM OF HEAD OF PANCREAS: Primary | ICD-10-CM

## 2023-03-10 RX ORDER — MORPHINE SULFATE 15 MG/1
15 TABLET, FILM COATED, EXTENDED RELEASE ORAL EVERY 12 HOURS
Qty: 14 TABLET | Refills: 0 | Status: SHIPPED | OUTPATIENT
Start: 2023-03-10 | End: 2023-03-14 | Stop reason: SDUPTHER

## 2023-03-10 NOTE — TELEPHONE ENCOUNTER
"===View-only below this line===  ----- Message -----  From: Kaye Chan RN  Sent: 2/14/2023   1:41 PM CST  To: Latesha Carver MD, Lee Lee RN    Hi Dr. Carver,    Apologies for the delay.  I did some digging and from his Guardant report it looks as though this is the trial referenced:    "Expanded Access to Ulixertinib (BVD-523) in Patients With Advanced MAPK Pathway-Altered Malignancies"      https://clinicaltrials.gov/ct2/show/LEL86832350?xdpw=FPJ43955079&draw=2&rank=1    We do not have this study open at [facility], but according to clinicaltrials.gov, this trial is open at [facility] with Dr. Hardy Issa.  Their webiste lists their contact # as [phone] and email of [email or web address].    The sponsor contact listed is:  Contact: [employer] [phone] [email or web address].    I hope this helps!    El  "

## 2023-03-10 NOTE — TELEPHONE ENCOUNTER
Tc rec'd form spouse pf pt stating that he has been having more pain today in the center and URQ of abd  She states he denies any N/V/D/ temp  had a normal BM yesterday and no burning w/ urination he has not eaten anything today due to the pain  Discussed w/ Dr Tobar he sent script for MS Contin 15 mg one q/ 12 hrs  and to continue w/ breakthrough pain medication   Recommendations given to spouse. She acknowledged understanding  Advised her that if pain becomes worse at anytime to bring to ER immediately She agreed

## 2023-03-13 ENCOUNTER — TELEPHONE (OUTPATIENT)
Dept: HEMATOLOGY/ONCOLOGY | Facility: CLINIC | Age: 73
End: 2023-03-13
Payer: MEDICARE

## 2023-03-13 DIAGNOSIS — C25.0 MALIGNANT NEOPLASM OF HEAD OF PANCREAS: ICD-10-CM

## 2023-03-13 DIAGNOSIS — C78.7 LIVER METASTASES: ICD-10-CM

## 2023-03-13 NOTE — TELEPHONE ENCOUNTER
Tc to [facility]  to inquire about status of Tafinlar & mekinist that was e scribed on 2-28-23    Spoke w/ [facility] pharmacy Tech  he stated that there was an insurance issure therefore I would need to speak w/ insurance separtment  Spoke w/ [employer] insurance rep   She stated they do not acceppt his insurance  Asked why no one advised physician office or patient  No response  Advised her to Cx Rx     Rx sent to [facility]  Pt notified of such  and instructed to contact office when medication arrives   Dr Carver informed of above

## 2023-03-14 ENCOUNTER — SPECIALTY PHARMACY (OUTPATIENT)
Dept: PHARMACY | Facility: CLINIC | Age: 73
End: 2023-03-14
Payer: MEDICARE

## 2023-03-14 DIAGNOSIS — C25.0 MALIGNANT NEOPLASM OF HEAD OF PANCREAS: ICD-10-CM

## 2023-03-14 DIAGNOSIS — C78.7 LIVER METASTASES: ICD-10-CM

## 2023-03-14 RX ORDER — MORPHINE SULFATE 15 MG/1
15 TABLET, FILM COATED, EXTENDED RELEASE ORAL EVERY 12 HOURS
Qty: 60 TABLET | Refills: 0 | Status: SHIPPED | OUTPATIENT
Start: 2023-03-14 | End: 2023-08-10 | Stop reason: SDUPTHER

## 2023-03-14 NOTE — TELEPHONE ENCOUNTER
Patient states he was never able to  rx for morphine due to pharmacy not having it in stock. Patient would like it resent to a different pharmacy. Pharmacy added to chart.

## 2023-03-24 ENCOUNTER — TELEPHONE (OUTPATIENT)
Dept: HEMATOLOGY/ONCOLOGY | Facility: CLINIC | Age: 73
End: 2023-03-24
Payer: MEDICARE

## 2023-03-24 NOTE — TELEPHONE ENCOUNTER
TC from Justice pharmacist at [facility] informing nurse that a prior auth was needed from patients insurance    Tc to begin auth initiation Spoke w/ sue at [facility]    Transferred to [facility].Advised per her that we had to contact [facility] as they are the payor for [facility]    Rx Bin # [id number]  ID # [id number]    Tc to [facility] at [phone]  or [phone]   Spoke w/ Linda  She transferred me to Stefan She stated she would initiate the auth   patient's case number is [id number] for Tafinlar  Category is 2B    Had to obtain auth for each medication separately After Stefan completed obtaining information for prescription medication I spoke w/ pharmacist Carlyn to verify Tafinlar    Auth for mekinist was initiated  and approved  Case # [id number]       Tc to [facility] to advise them that auth was obtained   Spoke w/ Nany  transferred to physician line    Spoke michelle/ Kathy She obtained the same information that I have given to all parties involved many times during this conversation  David advised nurse that she will have to contact patient and have him call to schedule an delivery time     Tc to pt to advise him of above he stated he will call now    TOTAL TIME FOR THIS PROCESS 1 HOUR  32 MINUTES

## 2023-03-27 ENCOUNTER — HOSPITAL ENCOUNTER (OUTPATIENT)
Dept: CARDIOLOGY | Facility: HOSPITAL | Age: 73
Discharge: HOME | End: 2023-03-27
Attending: INTERNAL MEDICINE
Payer: MEDICARE

## 2023-03-27 DIAGNOSIS — C25.0 MALIGNANT NEOPLASM OF HEAD OF PANCREAS: ICD-10-CM

## 2023-03-27 PROCEDURE — 93010 EKG 12-LEAD: ICD-10-PCS | Mod: ,,, | Performed by: INTERNAL MEDICINE

## 2023-03-27 PROCEDURE — 93005 ELECTROCARDIOGRAM TRACING: CPT

## 2023-03-27 PROCEDURE — 93010 ELECTROCARDIOGRAM REPORT: CPT | Mod: ,,, | Performed by: INTERNAL MEDICINE

## 2023-03-28 ENCOUNTER — EXTERNAL HOME HEALTH (OUTPATIENT)
Dept: HOME HEALTH SERVICES | Facility: HOSPITAL | Age: 73
End: 2023-03-28
Payer: MEDICARE

## 2023-03-29 ENCOUNTER — TELEPHONE (OUTPATIENT)
Dept: HEMATOLOGY/ONCOLOGY | Facility: CLINIC | Age: 73
End: 2023-03-29
Payer: MEDICARE

## 2023-03-29 NOTE — TELEPHONE ENCOUNTER
Tc from pt stating he rec'd his Mekinist & talfinar   Per Dr alvarado advised to start today  Return appt w/ labs scheduled in 4 weeks  date & time given to pt   He Acknowledged understanding

## 2023-03-29 NOTE — TELEPHONE ENCOUNTER
Patient called and stated that he has received his medication. He states he received:  -tramatinid  -dabrafenid    He would like glen to call him so he can know when to start.

## 2023-04-12 ENCOUNTER — PATIENT MESSAGE (OUTPATIENT)
Dept: HEMATOLOGY/ONCOLOGY | Facility: CLINIC | Age: 73
End: 2023-04-12
Payer: MEDICARE

## 2023-04-13 ENCOUNTER — LAB VISIT (OUTPATIENT)
Dept: LAB | Facility: HOSPITAL | Age: 73
End: 2023-04-13
Attending: INTERNAL MEDICINE
Payer: MEDICARE

## 2023-04-13 DIAGNOSIS — C25.0 MALIGNANT NEOPLASM OF HEAD OF PANCREAS: ICD-10-CM

## 2023-04-13 DIAGNOSIS — E83.39 HYPOPHOSPHATEMIA: ICD-10-CM

## 2023-04-13 DIAGNOSIS — C78.7 MALIGNANT NEOPLASM METASTATIC TO LIVER: ICD-10-CM

## 2023-04-13 LAB
ALBUMIN SERPL BCP-MCNC: 3.6 G/DL (ref 3.5–5.2)
ALP SERPL-CCNC: 111 U/L (ref 55–135)
ALT SERPL W/O P-5'-P-CCNC: 27 U/L (ref 10–44)
ANION GAP SERPL CALC-SCNC: 11 MMOL/L (ref 8–16)
AST SERPL-CCNC: 34 U/L (ref 10–40)
BASOPHILS # BLD AUTO: 0.13 K/UL (ref 0–0.2)
BASOPHILS NFR BLD: 1.1 % (ref 0–1.9)
BILIRUB SERPL-MCNC: 0.4 MG/DL (ref 0.1–1)
BUN SERPL-MCNC: 17 MG/DL (ref 8–23)
CALCIUM SERPL-MCNC: 10.4 MG/DL (ref 8.7–10.5)
CANCER AG19-9 SERPL-ACNC: 3368 U/ML (ref 0–40)
CHLORIDE SERPL-SCNC: 102 MMOL/L (ref 95–110)
CO2 SERPL-SCNC: 26 MMOL/L (ref 23–29)
CREAT SERPL-MCNC: 0.9 MG/DL (ref 0.5–1.4)
DIFFERENTIAL METHOD: ABNORMAL
EOSINOPHIL # BLD AUTO: 1 K/UL (ref 0–0.5)
EOSINOPHIL NFR BLD: 8.3 % (ref 0–8)
ERYTHROCYTE [DISTWIDTH] IN BLOOD BY AUTOMATED COUNT: 14 % (ref 11.5–14.5)
EST. GFR  (NO RACE VARIABLE): >60 ML/MIN/1.73 M^2
GLUCOSE SERPL-MCNC: 141 MG/DL (ref 70–110)
HCT VFR BLD AUTO: 47.9 % (ref 40–54)
HGB BLD-MCNC: 15.9 G/DL (ref 14–18)
IMM GRANULOCYTES # BLD AUTO: 0.04 K/UL (ref 0–0.04)
IMM GRANULOCYTES NFR BLD AUTO: 0.3 % (ref 0–0.5)
LYMPHOCYTES # BLD AUTO: 3.1 K/UL (ref 1–4.8)
LYMPHOCYTES NFR BLD: 26.7 % (ref 18–48)
MAGNESIUM SERPL-MCNC: 2 MG/DL (ref 1.6–2.6)
MCH RBC QN AUTO: 31.2 PG (ref 27–31)
MCHC RBC AUTO-ENTMCNC: 33.2 G/DL (ref 32–36)
MCV RBC AUTO: 94 FL (ref 82–98)
MONOCYTES # BLD AUTO: 0.7 K/UL (ref 0.3–1)
MONOCYTES NFR BLD: 6.1 % (ref 4–15)
NEUTROPHILS # BLD AUTO: 6.6 K/UL (ref 1.8–7.7)
NEUTROPHILS NFR BLD: 57.5 % (ref 38–73)
NRBC BLD-RTO: 0 /100 WBC
PHOSPHATE SERPL-MCNC: 2.9 MG/DL (ref 2.7–4.5)
PLATELET # BLD AUTO: 602 K/UL (ref 150–450)
PMV BLD AUTO: 9.4 FL (ref 9.2–12.9)
POTASSIUM SERPL-SCNC: 4.6 MMOL/L (ref 3.5–5.1)
PROT SERPL-MCNC: 7.9 G/DL (ref 6–8.4)
RBC # BLD AUTO: 5.09 M/UL (ref 4.6–6.2)
SODIUM SERPL-SCNC: 139 MMOL/L (ref 136–145)
WBC # BLD AUTO: 11.51 K/UL (ref 3.9–12.7)

## 2023-04-13 PROCEDURE — 84100 ASSAY OF PHOSPHORUS: CPT | Performed by: INTERNAL MEDICINE

## 2023-04-13 PROCEDURE — 36415 COLL VENOUS BLD VENIPUNCTURE: CPT | Mod: PO | Performed by: INTERNAL MEDICINE

## 2023-04-13 PROCEDURE — 83735 ASSAY OF MAGNESIUM: CPT | Performed by: INTERNAL MEDICINE

## 2023-04-13 PROCEDURE — 85025 COMPLETE CBC W/AUTO DIFF WBC: CPT | Performed by: INTERNAL MEDICINE

## 2023-04-13 PROCEDURE — 80053 COMPREHEN METABOLIC PANEL: CPT | Performed by: INTERNAL MEDICINE

## 2023-04-13 PROCEDURE — 86301 IMMUNOASSAY TUMOR CA 19-9: CPT | Performed by: INTERNAL MEDICINE

## 2023-04-17 ENCOUNTER — OFFICE VISIT (OUTPATIENT)
Dept: HEMATOLOGY/ONCOLOGY | Facility: CLINIC | Age: 73
End: 2023-04-17
Payer: MEDICARE

## 2023-04-17 VITALS
SYSTOLIC BLOOD PRESSURE: 147 MMHG | BODY MASS INDEX: 24.24 KG/M2 | HEIGHT: 64 IN | WEIGHT: 142 LBS | DIASTOLIC BLOOD PRESSURE: 72 MMHG | HEART RATE: 110 BPM | OXYGEN SATURATION: 97 %

## 2023-04-17 DIAGNOSIS — C78.7 MALIGNANT NEOPLASM METASTATIC TO LIVER: ICD-10-CM

## 2023-04-17 DIAGNOSIS — G89.3 NEOPLASM RELATED PAIN: ICD-10-CM

## 2023-04-17 DIAGNOSIS — C25.0 MALIGNANT NEOPLASM OF HEAD OF PANCREAS: Primary | ICD-10-CM

## 2023-04-17 PROCEDURE — 99214 PR OFFICE/OUTPT VISIT, EST, LEVL IV, 30-39 MIN: ICD-10-PCS | Mod: S$PBB,,, | Performed by: INTERNAL MEDICINE

## 2023-04-17 PROCEDURE — 99214 OFFICE O/P EST MOD 30 MIN: CPT | Mod: S$PBB,,, | Performed by: INTERNAL MEDICINE

## 2023-04-17 PROCEDURE — 99999 PR PBB SHADOW E&M-EST. PATIENT-LVL II: ICD-10-PCS | Mod: PBBFAC,,, | Performed by: INTERNAL MEDICINE

## 2023-04-17 PROCEDURE — 99999 PR PBB SHADOW E&M-EST. PATIENT-LVL II: CPT | Mod: PBBFAC,,, | Performed by: INTERNAL MEDICINE

## 2023-04-17 PROCEDURE — 99212 OFFICE O/P EST SF 10 MIN: CPT | Mod: PBBFAC | Performed by: INTERNAL MEDICINE

## 2023-04-17 NOTE — PROGRESS NOTES
Subjective:       Patient ID: Manuel Tyler is a 72 y.o. male.    Chief Complaint: No chief complaint on file.  Diagnosis: Stage IV pancreatic CA  with liver mets diagnosed 12/30/22    ANDREA, BRAF V600E, JAK2 V617F  Therapy: [place]/abraxane (dose reduced) 1/24/23-2/7/23   DABRENIB AND TRAMETINIB  3/29/23-present  HPI 72 y.o male with medical diagnoses listed seen today for Stage IV pancreatic adenocarcinoma with liver metastases.He was  admitted with pancreatitis likely secondary to ETOH abuse. C-diff negative. CT a/p w/contrast 12/27/22 shows Mild wall thickening involving the sigmoid colon with abnormal appearance seen within the left lower pelvis along the left lateral aspect of the sigmoid colon.  Uncertain if findings may in part relate to sigmoid tortuosity, however potential colocolic fistula not excluded given appearance.  No acute inflammatory changes are seen.  No prior studies are available for comparison.  Colonoscopy follow-up may be warranted if not previously/recently obtained.   Distended gallbladder. US abd limited 12/28/22 shows Biliary sludge with evidence of acute cholecystitis including gallbladder distension and a positive sonographic Hahn sign.CBD dilatation up to 13 mm, which could be secondary to cholecystitis, choledocholithiasis, or other obstruction.Ill-defined 2.6 cm lesion in the region of the pancreatic head, which could represent a lymph node or pancreatic neoplasm.  GI performed EUS and ERCP on 12/30/22 - suspicious for pancreatic CA- stent placed. Upper EUS 12/30/22 shows A single metastatic lesion was found in the left lobe of the liver.   Fine needle aspiration performed. A mass was identified in the pancreatic head. Fine needle biopsy performed  Pancreas, head, mass, EUS guided fine-needle aspiration: Positive for malignancy, adenocarcinoma, see comment.   Liver, EUS guided fine-needle aspiration: - Positive for malignancy, adenocarcinoma,He is taking OTC acetaminophen for abd pain  which intermittently radiates to back.  He rates pain 3/10 in intensity. He has intermittent nausea. He is reports changes in bowel habits He needs assistance with some daily activities.No falls. He reports mild SKAGGS. No CP/hemoptysis. He is accompanied by family members.   S/p C1D8 gem/abraxane ( dose reduced) on 2/7/23    admitted on 1/25/23 with AMS. He was found to be dehydrated, hypotensive and concerns for infection/septic shock. H  He was switched to 2nd line therapy with  DABRENIB AND TRAMETINIB on 3/29/23 2/2 lack of tolerability with first line therapy and subsequent hospitalizations    Interval Hx: Accompanied by fam member  Doing well   He started therapy DABRENIB AND TRAMETINIB on 3/29/23  He has reports wt and appetite improved  No rashes   No diarrhea   No N/V   No CP  No SOB/CPcough  He has 1-2 ensures daily  HHe was f/b palliative care during hospitalization  According to chart pt DNR  laPost not completed prior to discharge      Guardant 360 biopsy resulted  Details: ANDREA, BRAF V600E, JAK2 V617F       Past Medical History:   Diagnosis Date    (HFpEF) heart failure with preserved ejection fraction 1/25/2023    Alcohol abuse 12/27/2022    Hyperlipidemia     Hypertension     Liver metastases 1/18/2023    Malignant neoplasm of head of pancreas 1/18/2023    Other specified noninfective gastroenteritis and colitis     Pancreatitis     Personal history of colonic polyps        Past Surgical History:   Procedure Laterality Date    COLONOSCOPY      ENDOSCOPIC ULTRASOUND OF UPPER GASTROINTESTINAL TRACT Left 12/30/2022    Procedure: ULTRASOUND, UPPER GI TRACT, ENDOSCOPIC;  Surgeon: Gregory Cristina MD;  Location: [address]);  Service: Endoscopy;  Laterality: Left;    ERCP Left 12/30/2022    Procedure: ERCP (ENDOSCOPIC RETROGRADE CHOLANGIOPANCREATOGRAPHY);  Surgeon: Gregory Cristina MD;  Location: [facility] ([address] [place] [address]);  Service: Endoscopy;  Laterality: Left;    FOOT SURGERY Left     INSERTION OF TUNNELED  "CENTRAL VENOUS CATHETER (CVC) WITH SUBCUTANEOUS PORT Bilateral 1/19/2023    Procedure: [id number]-L-CATH;  Surgeon: Amador Castellon MD;  Location: [facility];  Service: General;  Laterality: Bilateral;  Right v Left PORTACATH. needs ultrasound and fluoro  RN PREOP 01/18/2023    TONSILLECTOMY          Review of Systems   Constitutional:  Positive for appetite change, fatigue and unexpected weight change. Negative for fever.   HENT:  Negative for mouth sores.    Eyes:  Negative for visual disturbance.   Respiratory:  Negative for cough and shortness of breath.    Cardiovascular:  Negative for chest pain.   Gastrointestinal:  Positive for nausea. Negative for abdominal pain and diarrhea.   Genitourinary:  Negative for frequency.   Musculoskeletal:  Positive for back pain.   Integumentary:  Negative for rash.   Neurological:  Negative for headaches.   Hematological:  Negative for adenopathy.   Psychiatric/Behavioral:  The patient is not nervous/anxious.        Objective:     ECOG 1      Vitals:    04/17/23 1306   BP: (!) 147/72   BP Location: Right arm   Patient Position: Sitting   BP Method: Large (Automatic)   Pulse: 110   SpO2: 97%   Weight: 64.4 kg (141 lb 15.6 oz)   Height: 5' 4" (1.626 m)         Physical Exam  Constitutional:       Appearance: He is underweight.   HENT:      Head: Normocephalic.      Mouth/Throat:      Pharynx: No oropharyngeal exudate.   Eyes:      General: No scleral icterus.        Right eye: No discharge.         Left eye: No discharge.      Conjunctiva/sclera: Conjunctivae normal.   Neck:      Thyroid: No thyromegaly.   Cardiovascular:      Rate and Rhythm: Regular rhythm.      Heart sounds: No murmur heard.  Pulmonary:      Effort: Pulmonary effort is normal.      Breath sounds: Normal breath sounds. No wheezing or rales.   Abdominal:      General: Bowel sounds are normal.      Palpations: Abdomen is soft.      Tenderness: There is no abdominal tenderness. There is no guarding or rebound. "   Musculoskeletal:         General: No swelling. Normal range of motion.      Cervical back: Normal range of motion and neck supple.   Lymphadenopathy:      Cervical: No cervical adenopathy.      Upper Body:      Right upper body: No supraclavicular adenopathy.      Left upper body: No supraclavicular adenopathy.   Skin:     Findings: No erythema or rash.   Neurological:      Mental Status: He is alert and oriented to person, place, and time.      Cranial Nerves: No cranial nerve deficit.   Psychiatric:         Mood and Affect: Mood normal.         Behavior: Behavior normal.           CT a/p w/contrast 12/27/22  Impression:     1. Mild wall thickening involving the sigmoid colon with abnormal appearance seen within the left lower pelvis along the left lateral aspect of the sigmoid colon.  Uncertain if findings may in part relate to sigmoid tortuosity, however potential colocolic fistula not excluded given appearance.  No acute inflammatory changes are seen.  No prior studies are available for comparison.  Colonoscopy follow-up may be warranted if not previously/recently obtained.  Otherwise consider future fluoroscopic Gastrografin enema for further evaluation.  2. Distended gallbladder.  Consider gallbladder ultrasound follow-up if clinically indicated.  3. Additional findings as detailed above.     US abd limited 12/28/22  Impression:     Biliary sludge with evidence of acute cholecystitis including gallbladder distension and a positive sonographic Hahn sign.     CBD dilatation up to 13 mm, which could be secondary to cholecystitis, choledocholithiasis, or other obstruction.     Ill-defined 2.6 cm lesion in the region of the pancreatic head, which could represent a lymph node or pancreatic neoplasm.     RECOMMENDATIONS:  Contrast enhanced MRI/MRCP for further evaluation of the above findings    ERCP 12/30/22  - The major papilla appeared congested.                          - A pancreatic duct stricture was  found.                          - A pancreatic sphincterotomy was performed.                          - One plastic stent was placed into the ventral                          pancreatic duct.                          - A single severe biliary stricture was found in                          the lower third of the main bile duct. The                          stricture was malignant appearing.                          - A biliary sphincterotomy was performed.                          - One covered metal stent was placed into the                          common bile duct.   Recommendation:        - [facility] patient to [facility] for ongoing care.                          - EUS findings concerning for metastatic                          pancreatic cancer (see separate EUS report  Upper EUS 12/30/22  Impression:            - A single metastatic lesion was found in the left                          lobe of the liver. Cytology results are pending.                          However, the endosonographic appearance is                          suggestive of metastatic pancreatic                          adenocarcinoma. Fine needle aspiration performed.                          - A mass was identified in the pancreatic head.                          Fine needle biopsy performed  Pathology 12/30/22    1. Pancreas, head, mass, EUS guided fine-needle aspiration:   - Positive for malignancy, adenocarcinoma, see comment.   2. Liver, EUS guided fine-needle aspiration:   - Positive for malignancy, adenocarcinoma, see comment.   COMMENT:  Immunostain for cytokeratin performed on specimen 1 shows an   infiltrative epithelial process.  Parts 1 and 2 of this case are reviewed by   Dr. XIAO Gan who agrees with the above diagnoses.  Appropriate positive   controls are examined.   Immunohistochemistry (IHC) Testing for Mismatch Repair (MMR) Proteins   performed on specimen 1:   MLH1 - Intact nuclear expression   MSH2 - Intact nuclear  expression   MSH6 - Intact nuclear expression   PMS2 - Intact nuclear expression   Background nonneoplastic tissue/internal control with intact nuclear   expression   IHC Interpretation   No loss of nuclear expression of MMR proteins: low probability of   microsatellite instability   There are exceptions to the above IHC interpretations. These results should   not be considered in isolation, and clinical correlation with genetic   counseling is recommended to assess the need for germline testing.      Comment: Interp By Colleen Olmos MD, Signed on 01/10/2023 at 09:30      Latest Reference Range & Units Most Recent   CA 19-9 0.0 - 40.0 U/mL 5348.6 (H)  1/11/23 15:22   (H): Data is abnormally high      2- D echo 1/25/23   The left ventricle is normal in size with concentric hypertrophy and normal systolic function.  The estimated ejection fraction is 65%.  Indeterminate left ventricular diastolic function.  Normal right ventricular size with normal right ventricular systolic function.  Mild left atrial enlargement.  Mild-to-moderate aortic regurgitation.  Mild tricuspid regurgitation.  Normal central venous pressure (3 mmHg).  The estimated PA systolic pressure is 33 mmHg.       Component      Latest Ref Rng & Units 4/11/2023 3/1/2023 2/7/2023 12/9/2022   CA 15-3      <30 U/mL 662 (H) 700 (H) 679 (H) 719 (H)   CA 27.29      <=38.0 U/mL 1564 (H) 1399 (H) 1399 (H) 1229 (H)     Component      Latest Ref Rng & Units 11/21/2022   CA 15-3      <30 U/mL 856 (H)   CA 27.29      <=38.0 U/mL 1536 (H)       Assessment:       Problem List Items Addressed This Visit          Oncology    Diagnosed 12/30/22  Pancreas, head, mass, EUS guided fine-needle aspiration: Positive for malignancy, adenocarcinoma  Liver, EUS guided fine-needle aspiration: - Positive for malignancy, adenocarcinoma  CA 19-9 5348U/mL on 1/11/23  Plan was for OP PANC NAB-PACLITAXEL + GEMCITABINE Q4W  S/p C1D8 gem/abraxane ( dose reduced) on 1/24/23     admitted on 12/5/23 with AMS.   Guardant 360 Details: ANDREA, BRAF V600E, JAK2 V617F  Plan switch therapy to DABRENIB AND TRAMETINIB   (with BRAF V600E mutation): DABRAFENIB Oral: 150 mg twice daily, approximately every 12 hours (in combination with trametinib); continue until disease progression or unacceptable toxicity.   BRAF V600E mutation: Oral: trametinib.  2 mg once daily (in combination with dabrafenib) until disease progression or unacceptable toxicity.   Pt tolerating therapy   CA 19-9 levels improved  Plan follow up imaging studies in near future  Plan ECG prior to f/u   Labs reviewed  Follow up in 1 month with labs    Relevant Orders    CANCER ANTIGEN 19-9    Liver metastases    Relevant Orders    CANCER ANTIGEN 19-9    Neoplasm related pain  Currently well controlled  Cont current regimen     Other Visit Diagnoses              Follow-up:       ECG 1 WEEK PRIOR TO F/U   Cbc,cmp, phos, MAG CA 19-9  prior to f/u in 1 month

## 2023-04-18 DIAGNOSIS — R94.31 ABNORMAL FINDING ON EKG: Primary | ICD-10-CM

## 2023-04-18 DIAGNOSIS — C25.0 MALIGNANT NEOPLASM OF HEAD OF PANCREAS: ICD-10-CM

## 2023-04-18 DIAGNOSIS — R06.09 DOE (DYSPNEA ON EXERTION): ICD-10-CM

## 2023-04-28 DIAGNOSIS — C78.7 MALIGNANT NEOPLASM METASTATIC TO LIVER: ICD-10-CM

## 2023-04-28 DIAGNOSIS — C25.0 MALIGNANT NEOPLASM OF HEAD OF PANCREAS: ICD-10-CM

## 2023-05-01 ENCOUNTER — PATIENT MESSAGE (OUTPATIENT)
Dept: HEMATOLOGY/ONCOLOGY | Facility: CLINIC | Age: 73
End: 2023-05-01
Payer: MEDICARE

## 2023-05-01 PROBLEM — R65.20 SEVERE SEPSIS: Status: RESOLVED | Noted: 2023-01-25 | Resolved: 2023-05-01

## 2023-05-01 PROBLEM — A41.9 SEVERE SEPSIS: Status: RESOLVED | Noted: 2023-01-25 | Resolved: 2023-05-01

## 2023-05-02 RX ORDER — HEPARIN 100 UNIT/ML
500 SYRINGE INTRAVENOUS
Status: CANCELLED | OUTPATIENT
Start: 2023-05-04

## 2023-05-02 RX ORDER — SODIUM CHLORIDE 0.9 % (FLUSH) 0.9 %
10 SYRINGE (ML) INJECTION
Status: CANCELLED | OUTPATIENT
Start: 2023-05-04

## 2023-05-04 ENCOUNTER — INFUSION (OUTPATIENT)
Dept: INFUSION THERAPY | Facility: HOSPITAL | Age: 73
End: 2023-05-04
Attending: INTERNAL MEDICINE
Payer: MEDICARE

## 2023-05-04 VITALS
TEMPERATURE: 98 F | OXYGEN SATURATION: 98 % | RESPIRATION RATE: 18 BRPM | SYSTOLIC BLOOD PRESSURE: 128 MMHG | HEART RATE: 107 BPM | DIASTOLIC BLOOD PRESSURE: 78 MMHG

## 2023-05-04 DIAGNOSIS — C25.0 MALIGNANT NEOPLASM OF HEAD OF PANCREAS: Primary | ICD-10-CM

## 2023-05-04 PROCEDURE — G0179 PR HOME HEALTH MD RECERTIFICATION: ICD-10-PCS | Mod: ,,, | Performed by: INTERNAL MEDICINE

## 2023-05-04 PROCEDURE — 63600175 PHARM REV CODE 636 W HCPCS: Performed by: INTERNAL MEDICINE

## 2023-05-04 PROCEDURE — 96523 IRRIG DRUG DELIVERY DEVICE: CPT

## 2023-05-04 PROCEDURE — G0179 MD RECERTIFICATION HHA PT: HCPCS | Mod: ,,, | Performed by: INTERNAL MEDICINE

## 2023-05-04 PROCEDURE — 25000003 PHARM REV CODE 250: Performed by: INTERNAL MEDICINE

## 2023-05-04 PROCEDURE — A4216 STERILE WATER/SALINE, 10 ML: HCPCS | Performed by: INTERNAL MEDICINE

## 2023-05-04 RX ORDER — SODIUM CHLORIDE 0.9 % (FLUSH) 0.9 %
10 SYRINGE (ML) INJECTION
Status: CANCELLED | OUTPATIENT
Start: 2023-05-04

## 2023-05-04 RX ORDER — HEPARIN 100 UNIT/ML
500 SYRINGE INTRAVENOUS
Status: CANCELLED | OUTPATIENT
Start: 2023-05-04

## 2023-05-04 RX ORDER — SODIUM CHLORIDE 0.9 % (FLUSH) 0.9 %
10 SYRINGE (ML) INJECTION
Status: DISCONTINUED | OUTPATIENT
Start: 2023-05-04 | End: 2023-05-04 | Stop reason: HOSPADM

## 2023-05-04 RX ORDER — HEPARIN 100 UNIT/ML
500 SYRINGE INTRAVENOUS
Status: DISCONTINUED | OUTPATIENT
Start: 2023-05-04 | End: 2023-05-04 | Stop reason: HOSPADM

## 2023-05-04 RX ADMIN — HEPARIN 500 UNITS: 100 SYRINGE at 10:05

## 2023-05-04 RX ADMIN — Medication 10 ML: at 10:05

## 2023-05-05 ENCOUNTER — LAB VISIT (OUTPATIENT)
Dept: LAB | Facility: HOSPITAL | Age: 73
End: 2023-05-05
Attending: INTERNAL MEDICINE
Payer: MEDICARE

## 2023-05-05 DIAGNOSIS — C78.7 MALIGNANT NEOPLASM METASTATIC TO LIVER: ICD-10-CM

## 2023-05-05 DIAGNOSIS — E83.39 HYPOPHOSPHATEMIA: ICD-10-CM

## 2023-05-05 DIAGNOSIS — C25.0 MALIGNANT NEOPLASM OF HEAD OF PANCREAS: ICD-10-CM

## 2023-05-05 LAB
ALBUMIN SERPL BCP-MCNC: 3.4 G/DL (ref 3.5–5.2)
ALP SERPL-CCNC: 104 U/L (ref 55–135)
ALT SERPL W/O P-5'-P-CCNC: 16 U/L (ref 10–44)
ANION GAP SERPL CALC-SCNC: 8 MMOL/L (ref 8–16)
AST SERPL-CCNC: 22 U/L (ref 10–40)
BASOPHILS # BLD AUTO: 0.17 K/UL (ref 0–0.2)
BASOPHILS NFR BLD: 1.2 % (ref 0–1.9)
BILIRUB SERPL-MCNC: 0.3 MG/DL (ref 0.1–1)
BUN SERPL-MCNC: 12 MG/DL (ref 8–23)
CALCIUM SERPL-MCNC: 10.1 MG/DL (ref 8.7–10.5)
CANCER AG19-9 SERPL-ACNC: ABNORMAL U/ML (ref 0–40)
CHLORIDE SERPL-SCNC: 105 MMOL/L (ref 95–110)
CO2 SERPL-SCNC: 27 MMOL/L (ref 23–29)
CREAT SERPL-MCNC: 0.9 MG/DL (ref 0.5–1.4)
DIFFERENTIAL METHOD: ABNORMAL
EOSINOPHIL # BLD AUTO: 1.7 K/UL (ref 0–0.5)
EOSINOPHIL NFR BLD: 12 % (ref 0–8)
ERYTHROCYTE [DISTWIDTH] IN BLOOD BY AUTOMATED COUNT: 14.5 % (ref 11.5–14.5)
EST. GFR  (NO RACE VARIABLE): >60 ML/MIN/1.73 M^2
GLUCOSE SERPL-MCNC: 107 MG/DL (ref 70–110)
HCT VFR BLD AUTO: 44.3 % (ref 40–54)
HGB BLD-MCNC: 14.7 G/DL (ref 14–18)
IMM GRANULOCYTES # BLD AUTO: 0.05 K/UL (ref 0–0.04)
IMM GRANULOCYTES NFR BLD AUTO: 0.4 % (ref 0–0.5)
LYMPHOCYTES # BLD AUTO: 3.2 K/UL (ref 1–4.8)
LYMPHOCYTES NFR BLD: 23 % (ref 18–48)
MAGNESIUM SERPL-MCNC: 1.7 MG/DL (ref 1.6–2.6)
MCH RBC QN AUTO: 30.8 PG (ref 27–31)
MCHC RBC AUTO-ENTMCNC: 33.2 G/DL (ref 32–36)
MCV RBC AUTO: 93 FL (ref 82–98)
MONOCYTES # BLD AUTO: 1.4 K/UL (ref 0.3–1)
MONOCYTES NFR BLD: 10.1 % (ref 4–15)
NEUTROPHILS # BLD AUTO: 7.4 K/UL (ref 1.8–7.7)
NEUTROPHILS NFR BLD: 53.3 % (ref 38–73)
NRBC BLD-RTO: 0 /100 WBC
PHOSPHATE SERPL-MCNC: 4.5 MG/DL (ref 2.7–4.5)
PLATELET # BLD AUTO: 456 K/UL (ref 150–450)
PMV BLD AUTO: 9 FL (ref 9.2–12.9)
POTASSIUM SERPL-SCNC: 3.8 MMOL/L (ref 3.5–5.1)
PROT SERPL-MCNC: 7.5 G/DL (ref 6–8.4)
RBC # BLD AUTO: 4.78 M/UL (ref 4.6–6.2)
SODIUM SERPL-SCNC: 140 MMOL/L (ref 136–145)
WBC # BLD AUTO: 13.93 K/UL (ref 3.9–12.7)

## 2023-05-05 PROCEDURE — 80053 COMPREHEN METABOLIC PANEL: CPT | Performed by: INTERNAL MEDICINE

## 2023-05-05 PROCEDURE — 86301 IMMUNOASSAY TUMOR CA 19-9: CPT | Performed by: INTERNAL MEDICINE

## 2023-05-05 PROCEDURE — 85025 COMPLETE CBC W/AUTO DIFF WBC: CPT | Performed by: INTERNAL MEDICINE

## 2023-05-05 PROCEDURE — 36415 COLL VENOUS BLD VENIPUNCTURE: CPT | Performed by: INTERNAL MEDICINE

## 2023-05-05 PROCEDURE — 83735 ASSAY OF MAGNESIUM: CPT | Performed by: INTERNAL MEDICINE

## 2023-05-05 PROCEDURE — 84100 ASSAY OF PHOSPHORUS: CPT | Performed by: INTERNAL MEDICINE

## 2023-05-08 ENCOUNTER — EXTERNAL HOME HEALTH (OUTPATIENT)
Dept: HOME HEALTH SERVICES | Facility: HOSPITAL | Age: 73
End: 2023-05-08
Payer: MEDICARE

## 2023-05-11 ENCOUNTER — PATIENT MESSAGE (OUTPATIENT)
Dept: FAMILY MEDICINE | Facility: CLINIC | Age: 73
End: 2023-05-11
Payer: MEDICARE

## 2023-05-15 ENCOUNTER — HOSPITAL ENCOUNTER (OUTPATIENT)
Dept: CARDIOLOGY | Facility: HOSPITAL | Age: 73
Discharge: HOME | End: 2023-05-15
Attending: INTERNAL MEDICINE
Payer: MEDICARE

## 2023-05-15 DIAGNOSIS — R94.31 ABNORMAL FINDING ON EKG: ICD-10-CM

## 2023-05-15 DIAGNOSIS — R06.09 DOE (DYSPNEA ON EXERTION): ICD-10-CM

## 2023-05-15 DIAGNOSIS — C25.0 MALIGNANT NEOPLASM OF HEAD OF PANCREAS: ICD-10-CM

## 2023-05-15 PROCEDURE — 93005 ELECTROCARDIOGRAM TRACING: CPT

## 2023-05-15 PROCEDURE — 93010 ELECTROCARDIOGRAM REPORT: CPT | Mod: ,,, | Performed by: INTERNAL MEDICINE

## 2023-05-15 PROCEDURE — 93010 EKG 12-LEAD: ICD-10-PCS | Mod: ,,, | Performed by: INTERNAL MEDICINE

## 2023-05-17 ENCOUNTER — OFFICE VISIT (OUTPATIENT)
Dept: HEMATOLOGY/ONCOLOGY | Facility: CLINIC | Age: 73
End: 2023-05-17
Payer: MEDICARE

## 2023-05-17 VITALS
DIASTOLIC BLOOD PRESSURE: 80 MMHG | BODY MASS INDEX: 21.75 KG/M2 | OXYGEN SATURATION: 97 % | HEIGHT: 68 IN | WEIGHT: 143.5 LBS | SYSTOLIC BLOOD PRESSURE: 138 MMHG | HEART RATE: 89 BPM

## 2023-05-17 DIAGNOSIS — C25.0 MALIGNANT NEOPLASM OF HEAD OF PANCREAS: Primary | ICD-10-CM

## 2023-05-17 PROCEDURE — 99999 PR PBB SHADOW E&M-EST. PATIENT-LVL III: CPT | Mod: PBBFAC,,, | Performed by: INTERNAL MEDICINE

## 2023-05-17 PROCEDURE — 99214 OFFICE O/P EST MOD 30 MIN: CPT | Mod: S$PBB,,, | Performed by: INTERNAL MEDICINE

## 2023-05-17 PROCEDURE — 99999 PR PBB SHADOW E&M-EST. PATIENT-LVL III: ICD-10-PCS | Mod: PBBFAC,,, | Performed by: INTERNAL MEDICINE

## 2023-05-17 PROCEDURE — 99214 PR OFFICE/OUTPT VISIT, EST, LEVL IV, 30-39 MIN: ICD-10-PCS | Mod: S$PBB,,, | Performed by: INTERNAL MEDICINE

## 2023-05-17 PROCEDURE — 99213 OFFICE O/P EST LOW 20 MIN: CPT | Mod: PBBFAC | Performed by: INTERNAL MEDICINE

## 2023-05-17 NOTE — PROGRESS NOTES
Subjective:       Patient ID: Manuel Tyler is a 72 y.o. male.    Chief Complaint: No chief complaint on file.  Diagnosis: Stage IV pancreatic CA  with liver mets diagnosed 12/30/22    ANDREA, BRAF V600E, JAK2 V617F  Therapy: [place]/abraxane (dose reduced) 1/24/23-2/7/23   DABRENIB AND TRAMETINIB  3/29/23-present  HPI 72 y.o male with medical diagnoses listed seen today for Stage IV pancreatic adenocarcinoma with liver metastases.He was  admitted with pancreatitis likely secondary to ETOH abuse. C-diff negative. CT a/p w/contrast 12/27/22 shows Mild wall thickening involving the sigmoid colon with abnormal appearance seen within the left lower pelvis along the left lateral aspect of the sigmoid colon.  Uncertain if findings may in part relate to sigmoid tortuosity, however potential colocolic fistula not excluded given appearance.  No acute inflammatory changes are seen.  No prior studies are available for comparison.  Colonoscopy follow-up may be warranted if not previously/recently obtained.   Distended gallbladder. US abd limited 12/28/22 shows Biliary sludge with evidence of acute cholecystitis including gallbladder distension and a positive sonographic Hahn sign.CBD dilatation up to 13 mm, which could be secondary to cholecystitis, choledocholithiasis, or other obstruction.Ill-defined 2.6 cm lesion in the region of the pancreatic head, which could represent a lymph node or pancreatic neoplasm.  GI performed EUS and ERCP on 12/30/22 - suspicious for pancreatic CA- stent placed. Upper EUS 12/30/22 shows A single metastatic lesion was found in the left lobe of the liver.   Fine needle aspiration performed. A mass was identified in the pancreatic head. Fine needle biopsy performed  Pancreas, head, mass, EUS guided fine-needle aspiration: Positive for malignancy, adenocarcinoma, see comment.   Liver, EUS guided fine-needle aspiration: - Positive for malignancy, adenocarcinoma,He is taking OTC acetaminophen for abd pain  which intermittently radiates to back.  He rates pain 3/10 in intensity. He has intermittent nausea. He is reports changes in bowel habits He needs assistance with some daily activities.No falls. He reports mild SKAGGS. No CP/hemoptysis. He is accompanied by family members.   S/p C1D8 gem/abraxane ( dose reduced) on 2/7/23    admitted on 1/25/23 with AMS. He was found to be dehydrated, hypotensive and concerns for infection/septic shock. H  He was switched to 2nd line therapy with  DABRENIB AND TRAMETINIB on 3/29/23 2/2 lack of tolerability with first line therapy and subsequent hospitalizations    Interval Hx: Accompanied by fam member  Doing well   He started therapy DABRENIB AND TRAMETINIB on 3/29/23  He reports he has completed 1 1/2 months of treatment   Pain control improved  Mild fatigue   He has reports wt and appetite improved  No rashes   No diarrhea   No N/V   No CP  No SOB/CPcough  He has 1-2 ensures daily  He was f/b palliative care during hospitalization  According to chart pt DNR  la[place] not completed prior to discharge      Guardant 360 biopsy resulted  Details: ANDREA, BRAF V600E, JAK2 V617F       Past Medical History:   Diagnosis Date    (HFpEF) heart failure with preserved ejection fraction 1/25/2023    Alcohol abuse 12/27/2022    Hyperlipidemia     Hypertension     Liver metastases 1/18/2023    Malignant neoplasm of head of pancreas 1/18/2023    Other specified noninfective gastroenteritis and colitis     Pancreatitis     Personal history of colonic polyps        Past Surgical History:   Procedure Laterality Date    COLONOSCOPY      ENDOSCOPIC ULTRASOUND OF UPPER GASTROINTESTINAL TRACT Left 12/30/2022    Procedure: ULTRASOUND, UPPER GI TRACT, ENDOSCOPIC;  Surgeon: Gregory Cristina MD;  Location: [facility] (2ND FLR);  Service: Endoscopy;  Laterality: Left;    ERCP Left 12/30/2022    Procedure: ERCP (ENDOSCOPIC RETROGRADE CHOLANGIOPANCREATOGRAPHY);  Surgeon: Gregory Cristina MD;  Location: [facility] (2ND  FLR);  Service: Endoscopy;  Laterality: Left;    FOOT SURGERY Left     INSERTION OF TUNNELED CENTRAL VENOUS CATHETER (CVC) WITH SUBCUTANEOUS PORT Bilateral 1/19/2023    Procedure: [id number]-VHDL-K-CATH;  Surgeon: Amador Castellon MD;  Location: [facility];  Service: General;  Laterality: Bilateral;  Right v Left PORTACATH. needs ultrasound and fluoro  RN PREOP 01/18/2023    TONSILLECTOMY          Review of Systems   Constitutional:  Positive for appetite change, fatigue and unexpected weight change. Negative for fever.   HENT:  Negative for mouth sores.    Eyes:  Negative for visual disturbance.   Respiratory:  Negative for cough and shortness of breath.    Cardiovascular:  Negative for chest pain.   Gastrointestinal:  Negative for abdominal pain, diarrhea and nausea.   Genitourinary:  Negative for frequency.   Musculoskeletal:  Negative for back pain.   Integumentary:  Negative for rash.   Neurological:  Negative for headaches.   Hematological:  Negative for adenopathy.   Psychiatric/Behavioral:  The patient is not nervous/anxious.        Objective:     ECOG 2      There were no vitals filed for this visit.        Physical Exam  Constitutional:       Appearance: He is underweight.   HENT:      Head: Normocephalic.      Mouth/Throat:      Pharynx: No oropharyngeal exudate.   Eyes:      General: No scleral icterus.        Right eye: No discharge.         Left eye: No discharge.      Conjunctiva/sclera: Conjunctivae normal.   Neck:      Thyroid: No thyromegaly.   Cardiovascular:      Rate and Rhythm: Regular rhythm.      Heart sounds: No murmur heard.  Pulmonary:      Effort: Pulmonary effort is normal.      Breath sounds: Normal breath sounds. No wheezing or rales.   Abdominal:      General: Bowel sounds are normal.      Palpations: Abdomen is soft.      Tenderness: There is no abdominal tenderness. There is no guarding or rebound.   Musculoskeletal:         General: No swelling. Normal range of motion.      Cervical  back: Normal range of motion and neck supple.   Lymphadenopathy:      Cervical: No cervical adenopathy.      Upper Body:      Right upper body: No supraclavicular adenopathy.      Left upper body: No supraclavicular adenopathy.   Skin:     Findings: No erythema or rash.   Neurological:      Mental Status: He is alert and oriented to person, place, and time.      Cranial Nerves: No cranial nerve deficit.   Psychiatric:         Mood and Affect: Mood normal.         Behavior: Behavior normal.           CT a/p w/contrast 12/27/22  Impression:     1. Mild wall thickening involving the sigmoid colon with abnormal appearance seen within the left lower pelvis along the left lateral aspect of the sigmoid colon.  Uncertain if findings may in part relate to sigmoid tortuosity, however potential colocolic fistula not excluded given appearance.  No acute inflammatory changes are seen.  No prior studies are available for comparison.  Colonoscopy follow-up may be warranted if not previously/recently obtained.  Otherwise consider future fluoroscopic Gastrografin enema for further evaluation.  2. Distended gallbladder.  Consider gallbladder ultrasound follow-up if clinically indicated.  3. Additional findings as detailed above.     US abd limited 12/28/22  Impression:     Biliary sludge with evidence of acute cholecystitis including gallbladder distension and a positive sonographic Hahn sign.     CBD dilatation up to 13 mm, which could be secondary to cholecystitis, choledocholithiasis, or other obstruction.     Ill-defined 2.6 cm lesion in the region of the pancreatic head, which could represent a lymph node or pancreatic neoplasm.     RECOMMENDATIONS:  Contrast enhanced MRI/MRCP for further evaluation of the above findings    ERCP 12/30/22  - The major papilla appeared congested.                          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                          - A pancreatic sphincterotomy was performed.                           - One plastic stent was placed into the ventral                          pancreatic duct.                          - A single severe biliary stricture was found in                          the lower third of the main bile duct. The                          stricture was malignant appearing.                          - A biliary sphincterotomy was performed.                          - One covered metal stent was placed into the                          common bile duct.   Recommendation:        - Return patient to hospital [facility] for ongoing care.                          - EUS findings concerning for metastatic                          pancreatic cancer (see separate EUS report  Upper EUS 12/30/22  Impression:            - A single metastatic lesion was found in the left                          lobe of the liver. Cytology results are pending.                          However, the endosonographic appearance is                          suggestive of metastatic pancreatic                          adenocarcinoma. Fine needle aspiration performed.                          - A mass was identified in the pancreatic head.                          Fine needle biopsy performed  Pathology 12/30/22    1. Pancreas, head, mass, EUS guided fine-needle aspiration:   - Positive for malignancy, adenocarcinoma, see comment.   2. Liver, EUS guided fine-needle aspiration:   - Positive for malignancy, adenocarcinoma, see comment.   COMMENT:  Immunostain for cytokeratin performed on specimen 1 shows an   infiltrative epithelial process.  Parts 1 and 2 of this case are reviewed by   Dr. XIAO Gan who agrees with the above diagnoses.  Appropriate positive   controls are examined.   Immunohistochemistry (IHC) Testing for Mismatch Repair (MMR) Proteins   performed on specimen 1:   MLH1 - Intact nuclear expression   MSH2 - Intact nuclear expression   MSH6 - Intact nuclear expression   PMS2 - Intact nuclear expression   Background  nonneoplastic tissue/internal control with intact nuclear   expression   IHC Interpretation   No loss of nuclear expression of MMR proteins: low probability of   microsatellite instability   There are exceptions to the above IHC interpretations. These results should   not be considered in isolation, and clinical correlation with genetic   counseling is recommended to assess the need for germline testing.      Comment: Interp By Colleen Olmos MD, Signed on 01/10/2023 at 09:30      Latest Reference Range & Units Most Recent   CA 19-9 0.0 - 40.0 U/mL 5348.6 (H)  1/11/23 15:22   (H): Data is abnormally high      2- D echo 1/25/23   The left ventricle is normal in size with concentric hypertrophy and normal systolic function.  The estimated ejection fraction is 65%.  Indeterminate left ventricular diastolic function.  Normal right ventricular size with normal right ventricular systolic function.  Mild left atrial enlargement.  Mild-to-moderate aortic regurgitation.  Mild tricuspid regurgitation.  Normal central venous pressure (3 mmHg).  The estimated PA systolic pressure is 33 mmHg.       Component      Latest Ref Rng & Units 4/11/2023 3/1/2023 2/7/2023 12/9/2022   CA 15-3      <30 U/mL 662 (H) 700 (H) 679 (H) 719 (H)   CA 27.29      <=38.0 U/mL 1564 (H) 1399 (H) 1399 (H) 1229 (H)     Component      Latest Ref Rng & Units 11/21/2022   CA 15-3      <30 U/mL 856 (H)   CA 27.29      <=38.0 U/mL 1536 (H)       Assessment:       Problem List Items Addressed This Visit          Oncology    Diagnosed 12/30/22  Pancreas, head, mass, EUS guided fine-needle aspiration: Positive for malignancy, adenocarcinoma  Liver, EUS guided fine-needle aspiration: - Positive for malignancy, adenocarcinoma  CA 19-9 5348U/mL on 1/11/23  Plan was for OP PANC NAB-PACLITAXEL + GEMCITABINE Q4W  S/p C1D8 gem/abraxane ( dose reduced) on 1/24/23    admitted on 12/5/23 with AMS.   Guardant 360 Details: ANDREA, BRAF V600E, JAK2 V617F  Plan switch  therapy to DABRENIB AND TRAMETINIB   (with BRAF V600E mutation): DABRAFENIB Oral: 150 mg twice daily, approximately every 12 hours (in combination with trametinib); continue until disease progression or unacceptable toxicity.   BRAF V600E mutation: Oral: trametinib.  2 mg once daily (in combination with dabrafenib) until disease progression or unacceptable toxicity.   Pt tolerating therapy   CA 19-9 levels rising  Plan follow up imaging studies prior to f/u   Plan ECG prior to f/u   Labs reviewed  Follow up in 1 month with labs    Relevant Orders    CANCER ANTIGEN 19-9                Neoplasm related pain  Currently well controlled  Cont current regimen     Other Visit Diagnoses              Follow-up:       Schedule CT first week of June   Cbc,cmp, MAG CA 19-9  prior to f/u in 1 month

## 2023-05-24 ENCOUNTER — CLINICAL SUPPORT (OUTPATIENT)
Dept: ENDOSCOPY | Facility: HOSPITAL | Age: 73
End: 2023-05-24
Attending: STUDENT IN AN ORGANIZED HEALTH CARE EDUCATION/TRAINING PROGRAM
Payer: MEDICARE

## 2023-05-24 ENCOUNTER — TELEPHONE (OUTPATIENT)
Dept: ENDOSCOPY | Facility: HOSPITAL | Age: 73
End: 2023-05-24
Payer: MEDICARE

## 2023-05-24 DIAGNOSIS — C25.9 PANCREATIC CANCER METASTASIZED TO LIVER: Primary | ICD-10-CM

## 2023-05-24 DIAGNOSIS — C78.7 PANCREATIC CANCER METASTASIZED TO LIVER: Primary | ICD-10-CM

## 2023-05-24 DIAGNOSIS — Z46.89 ENCOUNTER FOR REMOVAL OF PANCREATIC STENT: ICD-10-CM

## 2023-05-24 NOTE — PLAN OF CARE
Pt. Needs specialized procedure (possibly) on 2nd floor. Pt. Information given to Mamie to assist. Pt. Notified someone will call him back to schedule.Verbalized understanding.

## 2023-05-24 NOTE — TELEPHONE ENCOUNTER
Spoke with pt's wife, Vesta.  Abdominal xray ordered per Dr. Cristina to assess for presence of pancreatic duct stent.  Scheduled for 6/1/23 at 9:30am.  Vesta confirmed appointment.

## 2023-05-31 ENCOUNTER — PATIENT MESSAGE (OUTPATIENT)
Dept: HEMATOLOGY/ONCOLOGY | Facility: CLINIC | Age: 73
End: 2023-05-31
Payer: MEDICARE

## 2023-06-01 ENCOUNTER — HOSPITAL ENCOUNTER (OUTPATIENT)
Dept: RADIOLOGY | Facility: HOSPITAL | Age: 73
Discharge: HOME | End: 2023-06-01
Attending: INTERNAL MEDICINE
Payer: MEDICARE

## 2023-06-01 ENCOUNTER — INFUSION (OUTPATIENT)
Dept: INFUSION THERAPY | Facility: HOSPITAL | Age: 73
End: 2023-06-01
Attending: INTERNAL MEDICINE
Payer: MEDICARE

## 2023-06-01 VITALS
SYSTOLIC BLOOD PRESSURE: 135 MMHG | HEART RATE: 89 BPM | RESPIRATION RATE: 18 BRPM | OXYGEN SATURATION: 96 % | DIASTOLIC BLOOD PRESSURE: 65 MMHG | TEMPERATURE: 98 F

## 2023-06-01 DIAGNOSIS — C25.0 MALIGNANT NEOPLASM OF HEAD OF PANCREAS: ICD-10-CM

## 2023-06-01 DIAGNOSIS — C25.0 MALIGNANT NEOPLASM OF HEAD OF PANCREAS: Primary | ICD-10-CM

## 2023-06-01 DIAGNOSIS — C78.7 PANCREATIC CANCER METASTASIZED TO LIVER: ICD-10-CM

## 2023-06-01 DIAGNOSIS — C25.9 PANCREATIC CANCER METASTASIZED TO LIVER: ICD-10-CM

## 2023-06-01 PROCEDURE — 74019 RADEX ABDOMEN 2 VIEWS: CPT | Mod: TC,FY

## 2023-06-01 PROCEDURE — 71260 CT CHEST ABDOMEN PELVIS WITH CONTRAST (XPD): ICD-10-PCS | Mod: 26,,, | Performed by: RADIOLOGY

## 2023-06-01 PROCEDURE — 71260 CT THORAX DX C+: CPT | Mod: TC

## 2023-06-01 PROCEDURE — 96523 IRRIG DRUG DELIVERY DEVICE: CPT

## 2023-06-01 PROCEDURE — 25000003 PHARM REV CODE 250: Performed by: INTERNAL MEDICINE

## 2023-06-01 PROCEDURE — 63600175 PHARM REV CODE 636 W HCPCS: Performed by: INTERNAL MEDICINE

## 2023-06-01 PROCEDURE — 74019 XR ABDOMEN FLAT AND ERECT: ICD-10-PCS | Mod: 26,,, | Performed by: RADIOLOGY

## 2023-06-01 PROCEDURE — 71260 CT THORAX DX C+: CPT | Mod: 26,,, | Performed by: RADIOLOGY

## 2023-06-01 PROCEDURE — 74177 CT CHEST ABDOMEN PELVIS WITH CONTRAST (XPD): ICD-10-PCS | Mod: 26,,, | Performed by: RADIOLOGY

## 2023-06-01 PROCEDURE — 25500020 PHARM REV CODE 255: Performed by: INTERNAL MEDICINE

## 2023-06-01 PROCEDURE — 74177 CT ABD & PELVIS W/CONTRAST: CPT | Mod: 26,,, | Performed by: RADIOLOGY

## 2023-06-01 PROCEDURE — 74177 CT ABD & PELVIS W/CONTRAST: CPT | Mod: TC

## 2023-06-01 PROCEDURE — 74019 RADEX ABDOMEN 2 VIEWS: CPT | Mod: 26,,, | Performed by: RADIOLOGY

## 2023-06-01 PROCEDURE — A4216 STERILE WATER/SALINE, 10 ML: HCPCS | Performed by: INTERNAL MEDICINE

## 2023-06-01 RX ORDER — SODIUM CHLORIDE 0.9 % (FLUSH) 0.9 %
10 SYRINGE (ML) INJECTION
Status: CANCELLED | OUTPATIENT
Start: 2023-06-01

## 2023-06-01 RX ORDER — HEPARIN 100 UNIT/ML
500 SYRINGE INTRAVENOUS
Status: DISCONTINUED | OUTPATIENT
Start: 2023-06-01 | End: 2023-06-01 | Stop reason: HOSPADM

## 2023-06-01 RX ORDER — SODIUM CHLORIDE 0.9 % (FLUSH) 0.9 %
10 SYRINGE (ML) INJECTION
Status: DISCONTINUED | OUTPATIENT
Start: 2023-06-01 | End: 2023-06-01 | Stop reason: HOSPADM

## 2023-06-01 RX ORDER — HEPARIN 100 UNIT/ML
500 SYRINGE INTRAVENOUS
Status: CANCELLED | OUTPATIENT
Start: 2023-06-01

## 2023-06-01 RX ADMIN — IOHEXOL 85 ML: 350 INJECTION, SOLUTION INTRAVENOUS at 07:06

## 2023-06-01 RX ADMIN — HEPARIN 500 UNITS: 100 SYRINGE at 08:06

## 2023-06-01 RX ADMIN — Medication 10 ML: at 08:06

## 2023-06-01 NOTE — PLAN OF CARE
Pt arrived to unit for his maintenace PAC flush. PAC accessed and flushed without difficulty. Pt has his next upcoming appointments through [facility]. Discharged from unit in NAD.

## 2023-06-06 ENCOUNTER — TELEPHONE (OUTPATIENT)
Dept: HEMATOLOGY/ONCOLOGY | Facility: CLINIC | Age: 73
End: 2023-06-06
Payer: MEDICARE

## 2023-06-06 NOTE — TELEPHONE ENCOUNTER
Tc from velvet, pharmacist at [facility] requesting Rx for talfinar quantity be changed from 90 to 120 w/ same instructions  manufacture  no longer allows bottles to be broken  permission given for 120 tabs to be dispensed w/ 1 refill

## 2023-06-08 ENCOUNTER — OFFICE VISIT (OUTPATIENT)
Dept: FAMILY MEDICINE | Facility: CLINIC | Age: 73
End: 2023-06-08
Payer: MEDICARE

## 2023-06-08 VITALS
WEIGHT: 147.94 LBS | DIASTOLIC BLOOD PRESSURE: 70 MMHG | OXYGEN SATURATION: 95 % | BODY MASS INDEX: 22.42 KG/M2 | HEIGHT: 68 IN | HEART RATE: 91 BPM | SYSTOLIC BLOOD PRESSURE: 120 MMHG | TEMPERATURE: 99 F

## 2023-06-08 DIAGNOSIS — Z12.12 ENCOUNTER FOR COLORECTAL CANCER SCREENING: ICD-10-CM

## 2023-06-08 DIAGNOSIS — C25.0 MALIGNANT NEOPLASM OF HEAD OF PANCREAS: ICD-10-CM

## 2023-06-08 DIAGNOSIS — R45.89 DEPRESSED MOOD: ICD-10-CM

## 2023-06-08 DIAGNOSIS — R79.9 ABNORMAL FINDING OF BLOOD CHEMISTRY, UNSPECIFIED: ICD-10-CM

## 2023-06-08 DIAGNOSIS — C78.7 MALIGNANT NEOPLASM METASTATIC TO LIVER: ICD-10-CM

## 2023-06-08 DIAGNOSIS — Z72.0 TOBACCO USE: ICD-10-CM

## 2023-06-08 DIAGNOSIS — Z12.11 ENCOUNTER FOR COLORECTAL CANCER SCREENING: ICD-10-CM

## 2023-06-08 DIAGNOSIS — Z00.00 ANNUAL PHYSICAL EXAM: Primary | ICD-10-CM

## 2023-06-08 PROCEDURE — 99999 PR PBB SHADOW E&M-EST. PATIENT-LVL IV: ICD-10-PCS | Mod: PBBFAC,,, | Performed by: INTERNAL MEDICINE

## 2023-06-08 PROCEDURE — 99387 INIT PM E/M NEW PAT 65+ YRS: CPT | Mod: GZ,S$PBB,, | Performed by: INTERNAL MEDICINE

## 2023-06-08 PROCEDURE — 99387 PR PREVENTIVE VISIT,NEW,65 & OVER: ICD-10-PCS | Mod: GZ,S$PBB,, | Performed by: INTERNAL MEDICINE

## 2023-06-08 PROCEDURE — 99214 OFFICE O/P EST MOD 30 MIN: CPT | Mod: PBBFAC,PO | Performed by: INTERNAL MEDICINE

## 2023-06-08 PROCEDURE — 99999 PR PBB SHADOW E&M-EST. PATIENT-LVL IV: CPT | Mod: PBBFAC,,, | Performed by: INTERNAL MEDICINE

## 2023-06-08 RX ORDER — BUPROPION HYDROCHLORIDE 150 MG/1
150 TABLET ORAL DAILY
Qty: 30 TABLET | Refills: 0 | Status: SHIPPED | OUTPATIENT
Start: 2023-06-08 | End: 2023-06-29

## 2023-06-08 NOTE — PROGRESS NOTES
SUBJECTIVE     Chief Complaint   Patient presents with    Establish Care       HPI  Manuel Tyler is a 72 y.o. male with multiple medical diagnoses as listed in the medical history and problem list that presents for annual exam. Pt has been doing okay since his last visit. He has an up and down appetite. He does not exercise. He sleeps for ~10 hours nightly. Pt does take OTC supplements, which is a MVI. He does not have any current stressors. Pt is UTD on age appropriate CA screening.    PAST MEDICAL HISTORY:  Past Medical History:   Diagnosis Date    (HFpEF) heart failure with preserved ejection fraction 1/25/2023    Alcohol abuse 12/27/2022    Hyperlipidemia     Hypertension     Liver metastases 1/18/2023    Malignant neoplasm of head of pancreas 1/18/2023    Other specified noninfective gastroenteritis and colitis     Pancreatitis     Personal history of colonic polyps        PAST SURGICAL HISTORY:  Past Surgical History:   Procedure Laterality Date    COLONOSCOPY      ENDOSCOPIC ULTRASOUND OF UPPER GASTROINTESTINAL TRACT Left 12/30/2022    Procedure: ULTRASOUND, UPPER GI TRACT, ENDOSCOPIC;  Surgeon: Gregory Cristina MD;  Location: [address]);  Service: Endoscopy;  Laterality: Left;    ERCP Left 12/30/2022    Procedure: ERCP (ENDOSCOPIC RETROGRADE CHOLANGIOPANCREATOGRAPHY);  Surgeon: Gregory Cristina MD;  Location: [address]);  Service: Endoscopy;  Laterality: Left;    FOOT SURGERY Left     INSERTION OF TUNNELED CENTRAL VENOUS CATHETER (CVC) WITH SUBCUTANEOUS PORT Bilateral 1/19/2023    Procedure: SUABLOOZU-BLYN-K-CATH;  Surgeon: Amador Castellon MD;  Location: [facility];  Service: General;  Laterality: Bilateral;  Right v Left PORTACATH. needs ultrasound and fluoro  RN PREOP 01/18/2023    TONSILLECTOMY         SOCIAL HISTORY:  Social History     Socioeconomic History    Marital status:    Tobacco Use    Smoking status: Every Day     Packs/day: 0.50     Types: Cigarettes    Smokeless tobacco:  Former     Types: Chew   Substance and Sexual Activity    Alcohol use: Yes     Comment: last drink 1-2 months ago    Drug use: Never       FAMILY HISTORY:  Family History   Problem Relation Age of Onset    Stroke Mother     Breast cancer Mother     Hypertension Mother     Arthritis Mother     Skin cancer Father 69    COPD Father     Arthritis Father     Hypertension Father        ALLERGIES AND MEDICATIONS: updated and reviewed.  Review of patient's allergies indicates:   Allergen Reactions    Jakafi [ruxolitinib]      Current Outpatient Medications   Medication Sig Dispense Refill    dabrafenib (TAFINLAR) 50 mg Cap 3 capsule 2 TIMES DAILY (route: oral)      DULCOLAX, BISACODYL, ORAL Take by mouth.      HYDROcodone-acetaminophen (NORCO) 5-325 mg per tablet Take 1 tablet by mouth every 6 (six) hours as needed for Pain. 15 tablet 0    morphine (MS CONTIN) 15 MG 12 hr tablet Take 1 tablet (15 mg total) by mouth every 12 (twelve) hours. 60 tablet 0    multivit-mins/iron/folic/lycop (CENTRUM MEN ORAL) Take by mouth.      nicotine (NICODERM CQ) 21 mg/24 hr Place 1 patch onto the skin once daily. 30 patch 3    ondansetron (ZOFRAN) 4 MG tablet Take 1 tablet (4 mg total) by mouth every 8 (eight) hours as needed for Nausea. 30 tablet 1    tiotropium bromide (SPIRIVA RESPIMAT) 2.5 mcg/actuation inhaler Inhale 2 puffs into the lungs once daily. Controller 4 g 3    trametinib (MEKINIST) 2 mg Tab TAKE 1 TABLET ONCE DAILY 60 tablet 1    buPROPion (WELLBUTRIN XL) 150 MG TB24 tablet Take 1 tablet (150 mg total) by mouth once daily. 30 tablet 0     No current facility-administered medications for this visit.       ROS  Review of Systems   Constitutional:  Negative for chills and fever.   HENT:  Negative for hearing loss and sore throat.    Eyes:  Negative for visual disturbance.   Respiratory:  Positive for cough. Negative for shortness of breath.    Cardiovascular:  Negative for chest pain, palpitations and leg swelling.  "  Gastrointestinal:  Positive for constipation. Negative for abdominal pain, diarrhea, nausea and vomiting.   Genitourinary:  Negative for dysuria, frequency and urgency.   Musculoskeletal:  Positive for arthralgias (L shoulder). Negative for joint swelling and myalgias.   Skin:  Negative for rash and wound.   Neurological:  Negative for headaches.   Psychiatric/Behavioral:  Positive for confusion and dysphoric mood. Negative for agitation. The patient is not nervous/anxious.        OBJECTIVE     Physical Exam  Vitals:    06/08/23 1516   BP: 120/70   Pulse: 91   Temp: 98.8 °F (37.1 °C)    Body mass index is 22.49 kg/m².  Weight: 67.1 kg (147 lb 14.9 oz)   Height: 5' 8" (172.7 cm)     Physical Exam  Constitutional:       General: He is not in acute distress.     Appearance: He is well-developed.   HENT:      Head: Normocephalic and atraumatic.      Right Ear: Tympanic membrane, ear canal and external ear normal.      Left Ear: Tympanic membrane, ear canal and external ear normal.      Nose: Nose normal.   Eyes:      General: No scleral icterus.        Right eye: No discharge.         Left eye: No discharge.      Conjunctiva/sclera: Conjunctivae normal.   Neck:      Vascular: No JVD.      Trachea: No tracheal deviation.   Cardiovascular:      Rate and Rhythm: Normal rate and regular rhythm.      Heart sounds: Normal heart sounds. No murmur heard.    No friction rub. No gallop.   Pulmonary:      Effort: Pulmonary effort is normal. No respiratory distress.      Breath sounds: Normal breath sounds. No wheezing.   Abdominal:      General: Bowel sounds are normal. There is no distension.      Palpations: Abdomen is soft. There is no mass.      Tenderness: There is no abdominal tenderness. There is no guarding or rebound.   Musculoskeletal:         General: No tenderness or deformity. Normal range of motion.      Cervical back: Normal range of motion and neck supple.   Skin:     General: Skin is warm and dry.      Findings: " No erythema or rash.   Neurological:      Mental Status: He is alert and oriented to person, place, and time.      Motor: No abnormal muscle tone.      Coordination: Coordination normal.   Psychiatric:         Behavior: Behavior normal.         Thought Content: Thought content normal.         Judgment: Judgment normal.         Health Maintenance         Date Due Completion Date    Lipid Panel Never done ---    Shingles Vaccine (1 of 2) Never done ---    Colorectal Cancer Screening Never done ---    Pneumococcal Vaccines (Age 65+) (2 - PPSV23 if available, else PCV20) 06/26/2018 5/1/2018    COVID-19 Vaccine (1) 10/23/2021 10/23/2021    Influenza Vaccine (Season Ended) 09/01/2023 10/23/2021    TETANUS VACCINE 08/19/2024 8/19/2014              ASSESSMENT     72 y.o. male with     1. Annual physical exam    2. Depressed mood    3. Tobacco use    4. Encounter for colorectal cancer screening    5. Abnormal finding of blood chemistry, unspecified    6. Malignant neoplasm of head of pancreas    7. Liver metastases        PLAN:     1. Annual physical exam  - Counseled on age appropriate medical preventative services, including age appropriate cancer screenings, over all nutritional health, need for a consistent exercise regimen and an over all push towards maintaining a vigorous and active lifestyle.  Counseled on age appropriate vaccines and discussed upcoming health care needs based on age/gender.  Spent time with patient counseling on need for a good patient/doctor relationship moving forward.  Discussed use of common OTC medications and supplements.  Discussed common dietary aids and use of caffeine and the need for good sleep hygiene and stress management.  - Lipid Panel; Future    2. Depressed mood  - start trial Wellbutrin  - buPROPion (WELLBUTRIN XL) 150 MG TB24 tablet; Take 1 tablet (150 mg total) by mouth once daily.  Dispense: 30 tablet; Refill: 0    3. Tobacco use  - Ambulatory referral/consult to Smoking  Cessation Program; Future  - buPROPion (WELLBUTRIN XL) 150 MG TB24 tablet; Take 1 tablet (150 mg total) by mouth once daily.  Dispense: 30 tablet; Refill: 0    4. Encounter for colorectal cancer screening  - Ambulatory referral/consult to Endo Procedure ; Future    5. Abnormal finding of blood chemistry, unspecified  - Lipid Panel; Future    6. Malignant neoplasm of head of pancreas  - Stable; no acute issues  - continue management per heme/Onc    7. Liver metastases  - Stable; no acute issues  - continue management per heme/Onc        RTC in 3 months     Fatmata Vera MD  06/08/2023 3:46 PM        No follow-ups on file.

## 2023-06-09 ENCOUNTER — LAB VISIT (OUTPATIENT)
Dept: LAB | Facility: HOSPITAL | Age: 73
End: 2023-06-09
Attending: INTERNAL MEDICINE
Payer: MEDICARE

## 2023-06-09 DIAGNOSIS — Z00.00 ANNUAL PHYSICAL EXAM: ICD-10-CM

## 2023-06-09 DIAGNOSIS — E83.39 HYPOPHOSPHATEMIA: ICD-10-CM

## 2023-06-09 DIAGNOSIS — C78.7 MALIGNANT NEOPLASM METASTATIC TO LIVER: ICD-10-CM

## 2023-06-09 DIAGNOSIS — C25.0 MALIGNANT NEOPLASM OF HEAD OF PANCREAS: ICD-10-CM

## 2023-06-09 DIAGNOSIS — R79.9 ABNORMAL FINDING OF BLOOD CHEMISTRY, UNSPECIFIED: ICD-10-CM

## 2023-06-09 LAB
ALBUMIN SERPL BCP-MCNC: 3.3 G/DL (ref 3.5–5.2)
ALP SERPL-CCNC: 133 U/L (ref 55–135)
ALT SERPL W/O P-5'-P-CCNC: 16 U/L (ref 10–44)
ANION GAP SERPL CALC-SCNC: 10 MMOL/L (ref 8–16)
AST SERPL-CCNC: 24 U/L (ref 10–40)
BASOPHILS # BLD AUTO: 0.17 K/UL (ref 0–0.2)
BASOPHILS NFR BLD: 1.4 % (ref 0–1.9)
BILIRUB SERPL-MCNC: 0.2 MG/DL (ref 0.1–1)
BUN SERPL-MCNC: 14 MG/DL (ref 8–23)
CALCIUM SERPL-MCNC: 10 MG/DL (ref 8.7–10.5)
CANCER AG19-9 SERPL-ACNC: ABNORMAL U/ML (ref 0–40)
CHLORIDE SERPL-SCNC: 104 MMOL/L (ref 95–110)
CHOLEST SERPL-MCNC: 171 MG/DL (ref 120–199)
CHOLEST/HDLC SERPL: 3.7 {RATIO} (ref 2–5)
CO2 SERPL-SCNC: 26 MMOL/L (ref 23–29)
CREAT SERPL-MCNC: 1 MG/DL (ref 0.5–1.4)
DIFFERENTIAL METHOD: ABNORMAL
EOSINOPHIL # BLD AUTO: 0.9 K/UL (ref 0–0.5)
EOSINOPHIL NFR BLD: 7.3 % (ref 0–8)
ERYTHROCYTE [DISTWIDTH] IN BLOOD BY AUTOMATED COUNT: 14.7 % (ref 11.5–14.5)
EST. GFR  (NO RACE VARIABLE): >60 ML/MIN/1.73 M^2
GLUCOSE SERPL-MCNC: 113 MG/DL (ref 70–110)
HCT VFR BLD AUTO: 46.1 % (ref 40–54)
HDLC SERPL-MCNC: 46 MG/DL (ref 40–75)
HDLC SERPL: 26.9 % (ref 20–50)
HGB BLD-MCNC: 15.1 G/DL (ref 14–18)
IMM GRANULOCYTES # BLD AUTO: 0.05 K/UL (ref 0–0.04)
IMM GRANULOCYTES NFR BLD AUTO: 0.4 % (ref 0–0.5)
LDLC SERPL CALC-MCNC: 108.4 MG/DL (ref 63–159)
LYMPHOCYTES # BLD AUTO: 3.2 K/UL (ref 1–4.8)
LYMPHOCYTES NFR BLD: 25.7 % (ref 18–48)
MAGNESIUM SERPL-MCNC: 1.9 MG/DL (ref 1.6–2.6)
MCH RBC QN AUTO: 30.6 PG (ref 27–31)
MCHC RBC AUTO-ENTMCNC: 32.8 G/DL (ref 32–36)
MCV RBC AUTO: 93 FL (ref 82–98)
MONOCYTES # BLD AUTO: 1.6 K/UL (ref 0.3–1)
MONOCYTES NFR BLD: 12.6 % (ref 4–15)
NEUTROPHILS # BLD AUTO: 6.6 K/UL (ref 1.8–7.7)
NEUTROPHILS NFR BLD: 52.6 % (ref 38–73)
NONHDLC SERPL-MCNC: 125 MG/DL
NRBC BLD-RTO: 0 /100 WBC
PHOSPHATE SERPL-MCNC: 3 MG/DL (ref 2.7–4.5)
PLATELET # BLD AUTO: 462 K/UL (ref 150–450)
PMV BLD AUTO: 9.6 FL (ref 9.2–12.9)
POTASSIUM SERPL-SCNC: 4.7 MMOL/L (ref 3.5–5.1)
PROT SERPL-MCNC: 7.9 G/DL (ref 6–8.4)
RBC # BLD AUTO: 4.94 M/UL (ref 4.6–6.2)
SODIUM SERPL-SCNC: 140 MMOL/L (ref 136–145)
TRIGL SERPL-MCNC: 83 MG/DL (ref 30–150)
WBC # BLD AUTO: 12.58 K/UL (ref 3.9–12.7)

## 2023-06-09 PROCEDURE — 86301 IMMUNOASSAY TUMOR CA 19-9: CPT | Performed by: INTERNAL MEDICINE

## 2023-06-09 PROCEDURE — 80061 LIPID PANEL: CPT | Performed by: INTERNAL MEDICINE

## 2023-06-09 PROCEDURE — 80053 COMPREHEN METABOLIC PANEL: CPT | Performed by: INTERNAL MEDICINE

## 2023-06-09 PROCEDURE — 85025 COMPLETE CBC W/AUTO DIFF WBC: CPT | Performed by: INTERNAL MEDICINE

## 2023-06-09 PROCEDURE — 83735 ASSAY OF MAGNESIUM: CPT | Performed by: INTERNAL MEDICINE

## 2023-06-09 PROCEDURE — 84100 ASSAY OF PHOSPHORUS: CPT | Performed by: INTERNAL MEDICINE

## 2023-06-09 PROCEDURE — 36415 COLL VENOUS BLD VENIPUNCTURE: CPT | Mod: PO | Performed by: INTERNAL MEDICINE

## 2023-06-13 ENCOUNTER — PATIENT MESSAGE (OUTPATIENT)
Dept: ENDOSCOPY | Facility: HOSPITAL | Age: 73
End: 2023-06-13
Payer: MEDICARE

## 2023-06-13 ENCOUNTER — OFFICE VISIT (OUTPATIENT)
Dept: HEMATOLOGY/ONCOLOGY | Facility: CLINIC | Age: 73
End: 2023-06-13
Payer: MEDICARE

## 2023-06-13 ENCOUNTER — TELEPHONE (OUTPATIENT)
Dept: ENDOSCOPY | Facility: HOSPITAL | Age: 73
End: 2023-06-13
Payer: MEDICARE

## 2023-06-13 ENCOUNTER — OFFICE VISIT (OUTPATIENT)
Dept: SURGERY | Facility: CLINIC | Age: 73
End: 2023-06-13
Payer: MEDICARE

## 2023-06-13 VITALS
BODY MASS INDEX: 21.72 KG/M2 | WEIGHT: 143.31 LBS | OXYGEN SATURATION: 96 % | HEIGHT: 68 IN | SYSTOLIC BLOOD PRESSURE: 115 MMHG | HEART RATE: 100 BPM | DIASTOLIC BLOOD PRESSURE: 74 MMHG

## 2023-06-13 VITALS
SYSTOLIC BLOOD PRESSURE: 115 MMHG | BODY MASS INDEX: 21.72 KG/M2 | DIASTOLIC BLOOD PRESSURE: 74 MMHG | HEIGHT: 68 IN | WEIGHT: 143.31 LBS

## 2023-06-13 DIAGNOSIS — S61.218A: ICD-10-CM

## 2023-06-13 DIAGNOSIS — R93.5 ABNORMAL CT OF THE ABDOMEN: ICD-10-CM

## 2023-06-13 DIAGNOSIS — S61.216A LACERATION OF RIGHT LITTLE FINGER WITHOUT FOREIGN BODY WITHOUT DAMAGE TO NAIL, INITIAL ENCOUNTER: Primary | ICD-10-CM

## 2023-06-13 DIAGNOSIS — G89.3 NEOPLASM RELATED PAIN: ICD-10-CM

## 2023-06-13 DIAGNOSIS — C25.0 MALIGNANT NEOPLASM OF HEAD OF PANCREAS: Primary | ICD-10-CM

## 2023-06-13 PROCEDURE — 99999 PR PBB SHADOW E&M-EST. PATIENT-LVL III: CPT | Mod: PBBFAC,,, | Performed by: SURGERY

## 2023-06-13 PROCEDURE — 99215 PR OFFICE/OUTPT VISIT, EST, LEVL V, 40-54 MIN: ICD-10-PCS | Mod: S$PBB,,, | Performed by: INTERNAL MEDICINE

## 2023-06-13 PROCEDURE — 99214 OFFICE O/P EST MOD 30 MIN: CPT | Mod: PBBFAC | Performed by: INTERNAL MEDICINE

## 2023-06-13 PROCEDURE — 99213 OFFICE O/P EST LOW 20 MIN: CPT | Mod: S$PBB,,, | Performed by: SURGERY

## 2023-06-13 PROCEDURE — 99999 PR PBB SHADOW E&M-EST. PATIENT-LVL IV: CPT | Mod: PBBFAC,,, | Performed by: INTERNAL MEDICINE

## 2023-06-13 PROCEDURE — 99213 OFFICE O/P EST LOW 20 MIN: CPT | Mod: PBBFAC,27 | Performed by: SURGERY

## 2023-06-13 PROCEDURE — 99999 PR PBB SHADOW E&M-EST. PATIENT-LVL III: ICD-10-PCS | Mod: PBBFAC,,, | Performed by: SURGERY

## 2023-06-13 PROCEDURE — 99213 PR OFFICE/OUTPT VISIT, EST, LEVL III, 20-29 MIN: ICD-10-PCS | Mod: S$PBB,,, | Performed by: SURGERY

## 2023-06-13 PROCEDURE — 99999 PR PBB SHADOW E&M-EST. PATIENT-LVL IV: ICD-10-PCS | Mod: PBBFAC,,, | Performed by: INTERNAL MEDICINE

## 2023-06-13 PROCEDURE — 99215 OFFICE O/P EST HI 40 MIN: CPT | Mod: S$PBB,,, | Performed by: INTERNAL MEDICINE

## 2023-06-13 RX ORDER — AMOXICILLIN AND CLAVULANATE POTASSIUM 875; 125 MG/1; MG/1
1 TABLET, FILM COATED ORAL 2 TIMES DAILY
Qty: 6 TABLET | Refills: 0 | Status: SHIPPED | OUTPATIENT
Start: 2023-06-13 | End: 2023-06-16

## 2023-06-13 RX ORDER — HYDROCODONE BITARTRATE AND ACETAMINOPHEN 5; 325 MG/1; MG/1
1 TABLET ORAL EVERY 6 HOURS PRN
Qty: 15 TABLET | Refills: 0 | Status: SHIPPED | OUTPATIENT
Start: 2023-06-13 | End: 2023-08-09

## 2023-06-13 NOTE — PROGRESS NOTES
Surgery Clinic Note    Manuel Tyler is a 72 y.o. year old male known to my clinic s/p PORTACATH placement years ago now with finger laceration sent over from his oncology clinic. Injury yesterday after closing trunk of car, has a laceration to the 5th digit right hand, just distal to the DIP.  He is right handed. I offered him to [facility] [place] ED or see hand surgery but was eager for repair and we performed in clinic bedside repair of laceration.  No f/c/n/v/sob/cp    ROS:  Negative except above    PE:  Vitals:    06/13/23 1413   BP: 115/74       NAD  No belabored breathing  Right hand, 5th digit: flap of skin, some denuded skin but mostly viable. Not soiled. Full ROM.    A/P:    Past Medical History:   Diagnosis Date    (HFpEF) heart failure with preserved ejection fraction 1/25/2023    Alcohol abuse 12/27/2022    Hyperlipidemia     Hypertension     Liver metastases 1/18/2023    Malignant neoplasm of head of pancreas 1/18/2023    Other specified noninfective gastroenteritis and colitis     Pancreatitis     Personal history of colonic polyps        Manuel Tyler is a 72 y.o. year old male w CHF, pancreatic cancer, with a finger laceration    -irrigated and reapproximated the skin edge today in clinic see procedure note  Augmentin for 3 days and prn norco  Remove dressing in 48 hrs and change dressing daily. Keep covered. Rtc next week for suture removal    Amador Castellon  General Surgery - [facility]  6/13/2023

## 2023-06-13 NOTE — TELEPHONE ENCOUNTER
"Contacted the patient to schedule an endoscopy procedure(s) . The patient did not answer the call and left a voice message asking them to contact Endoscopy Scheduling as soon as possible at [phone].    Mariia Luevano MD  [facility] Endoscopist Clinic Patients  Procedure: Colonoscopy     Diagnosis: Abnormal finding on GI tract imaging     Procedure Timin-4 weeks     *If within 4 weeks selected, please ernestine as high priority*     *If greater than 12 weeks, please select "5-12 weeks" and delay sending until 2 months prior to requested date*     Provider: Any GI provider     Location: [facility]     Additional Scheduling Information: No scheduling concerns     Prep Specifications:Standard prep     Have you attached a patient to this message: yes   "

## 2023-06-13 NOTE — PROCEDURES
Procedures    Laceration repair, right hand, 5th digit.    Consent signed.    Lidocaine w/out epinephrine used    Irrigated with betadine.    Digital block with 3 mL of lidocaine    Some skin was pale and dusky and sharply excised. Skin edges reapproximated with 4-0 nylon suture. No tension. Covered completely.    Covered with bacitracin, vaseline gauze, pressure gauze.    Tolerated well.

## 2023-06-13 NOTE — PROGRESS NOTES
Subjective:       Patient ID: Manuel Tyler is a 72 y.o. male.    Chief Complaint: Follow-up  Diagnosis: Stage IV pancreatic CA  with liver mets diagnosed 12/30/22    ANDREA, BRAF V600E, JAK2 V617F  Therapy: Allison/abraxane (dose reduced) 1/24/23-2/7/23   DABRENIB AND TRAMETINIB  3/29/23-present  HPI 72 y.o male with medical diagnoses listed seen today for Stage IV pancreatic adenocarcinoma with liver metastases.He was  admitted with pancreatitis likely secondary to ETOH abuse. C-diff negative. CT a/p w/contrast 12/27/22 shows Mild wall thickening involving the sigmoid colon with abnormal appearance seen within the left lower pelvis along the left lateral aspect of the sigmoid colon.  Uncertain if findings may in part relate to sigmoid tortuosity, however potential colocolic fistula not excluded given appearance.  No acute inflammatory changes are seen.  No prior studies are available for comparison.  Colonoscopy follow-up may be warranted if not previously/recently obtained.   Distended gallbladder. US abd limited 12/28/22 shows Biliary sludge with evidence of acute cholecystitis including gallbladder distension and a positive sonographic Hahn sign.CBD dilatation up to 13 mm, which could be secondary to cholecystitis, choledocholithiasis, or other obstruction.Ill-defined 2.6 cm lesion in the region of the pancreatic head, which could represent a lymph node or pancreatic neoplasm.  GI performed EUS and ERCP on 12/30/22 - suspicious for pancreatic CA- stent placed. Upper EUS 12/30/22 shows A single metastatic lesion was found in the left lobe of the liver.   Fine needle aspiration performed. A mass was identified in the pancreatic head. Fine needle biopsy performed  Pancreas, head, mass, EUS guided fine-needle aspiration: Positive for malignancy, adenocarcinoma, see comment.   Liver, EUS guided fine-needle aspiration: - Positive for malignancy, adenocarcinoma,He is taking OTC acetaminophen for abd pain which  intermittently radiates to back.  He rates pain 3/10 in intensity. He has intermittent nausea. He is reports changes in bowel habits He needs assistance with some daily activities.No falls. He reports mild SKAGGS. No CP/hemoptysis. He is accompanied by family members.   S/p C1D8 gem/abraxane ( dose reduced) on 2/7/23    admitted on 1/25/23 with AMS. He was found to be dehydrated, hypotensive and concerns for infection/septic shock. H  He was switched to 2nd line therapy with  DABRENIB AND TRAMETINIB on 3/29/23 2/2 lack of tolerability with first line therapy and subsequent hospitalizations    Interval Hx: Accompanied by fam members  He  remains on  DABRENIB AND TRAMETINIB ( started on 3/29/23)  He reports a 5th rt finger laceration suffered yesterday after he closed the trunk of car on his finger  He did not seek medical attention  The bandaged site continues to intermittently bleed  No loss of Motor strength  Pain control sig improved  Mild fatigue   Appetite and weight stable  He reports intermittent constipation  No N/V   No CP  No SOB/CPcough  He has 1-2 ensures daily  He was f/b palliative care during hospitalization  According to chart pt DNR  laPost not completed prior to discharge      Guardant 360 biopsy resulted  Details: ANDREA, BRAF V600E, JAK2 V617F       Past Medical History:   Diagnosis Date    (HFpEF) heart failure with preserved ejection fraction 1/25/2023    Alcohol abuse 12/27/2022    Hyperlipidemia     Hypertension     Liver metastases 1/18/2023    Malignant neoplasm of head of pancreas 1/18/2023    Other specified noninfective gastroenteritis and colitis     Pancreatitis     Personal history of colonic polyps        Past Surgical History:   Procedure Laterality Date    COLONOSCOPY      ENDOSCOPIC ULTRASOUND OF UPPER GASTROINTESTINAL TRACT Left 12/30/2022    Procedure: ULTRASOUND, UPPER GI TRACT, ENDOSCOPIC;  Surgeon: Gregory Cristina MD;  Location: [facility] ([address] [place] [address]);  Service: Endoscopy;   "Laterality: Left;    ERCP Left 12/30/2022    Procedure: ERCP (ENDOSCOPIC RETROGRADE CHOLANGIOPANCREATOGRAPHY);  Surgeon: Gregory Cristina MD;  Location: [address]);  Service: Endoscopy;  Laterality: Left;    FOOT SURGERY Left     INSERTION OF TUNNELED CENTRAL VENOUS CATHETER (CVC) WITH SUBCUTANEOUS PORT Bilateral 1/19/2023    Procedure: [id number];  Surgeon: Amador Castellon MD;  Location: [facility];  Service: General;  Laterality: Bilateral;  Right v Left PORTACATH. needs ultrasound and fluoro  RN PREOP 01/18/2023    TONSILLECTOMY          Review of Systems   Constitutional:  Positive for appetite change, fatigue and unexpected weight change. Negative for fever.   HENT:  Negative for mouth sores.    Eyes:  Negative for visual disturbance.   Respiratory:  Negative for cough and shortness of breath.    Cardiovascular:  Negative for chest pain.   Gastrointestinal:  Negative for abdominal pain, diarrhea and nausea.   Genitourinary:  Negative for frequency.   Musculoskeletal:  Negative for back pain.   Integumentary:  Negative for rash.   Neurological:  Negative for headaches.   Hematological:  Negative for adenopathy.   Psychiatric/Behavioral:  The patient is not nervous/anxious.        Objective:     ECOG 2      Vitals:    06/13/23 1309   BP: 115/74   Pulse: 100   SpO2: 96%   Weight: 65 kg (143 lb 4.8 oz)   Height: 5' 8" (1.727 m)           Physical Exam  Constitutional:       Appearance: He is underweight.   HENT:      Head: Normocephalic.      Mouth/Throat:      Pharynx: No oropharyngeal exudate.   Eyes:      General: No scleral icterus.        Right eye: No discharge.         Left eye: No discharge.      Conjunctiva/sclera: Conjunctivae normal.   Neck:      Thyroid: No thyromegaly.   Cardiovascular:      Rate and Rhythm: Regular rhythm.      Heart sounds: No murmur heard.  Pulmonary:      Effort: Pulmonary effort is normal.      Breath sounds: Normal breath sounds. No wheezing or rales. "   Abdominal:      General: Bowel sounds are normal.      Palpations: Abdomen is soft.      Tenderness: There is no abdominal tenderness. There is no guarding or rebound.   Musculoskeletal:         General: No swelling. Normal range of motion.      Cervical back: Normal range of motion and neck supple.   Lymphadenopathy:      Cervical: No cervical adenopathy.      Upper Body:      Right upper body: No supraclavicular adenopathy.      Left upper body: No supraclavicular adenopathy.   Skin:     Comments: Rt 5th finger wound, bleeding   Neurological:      Mental Status: He is alert and oriented to person, place, and time.      Cranial Nerves: No cranial nerve deficit.   Psychiatric:         Mood and Affect: Mood normal.         Behavior: Behavior normal.           CT a/p w/contrast 12/27/22  Impression:     1. Mild wall thickening involving the sigmoid colon with abnormal appearance seen within the left lower pelvis along the left lateral aspect of the sigmoid colon.  Uncertain if findings may in part relate to sigmoid tortuosity, however potential colocolic fistula not excluded given appearance.  No acute inflammatory changes are seen.  No prior studies are available for comparison.  Colonoscopy follow-up may be warranted if not previously/recently obtained.  Otherwise consider future fluoroscopic Gastrografin enema for further evaluation.  2. Distended gallbladder.  Consider gallbladder ultrasound follow-up if clinically indicated.  3. Additional findings as detailed above.     US abd limited 12/28/22  Impression:     Biliary sludge with evidence of acute cholecystitis including gallbladder distension and a positive sonographic Hahn sign.     CBD dilatation up to 13 mm, which could be secondary to cholecystitis, choledocholithiasis, or other obstruction.     Ill-defined 2.6 cm lesion in the region of the pancreatic head, which could represent a lymph node or pancreatic neoplasm.     RECOMMENDATIONS:  Contrast  enhanced MRI/MRCP for further evaluation of the above findings    ERCP 12/30/22  - The major papilla appeared congested.                          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                          - A pancreatic sphincterotomy was performed.                          - One plastic stent was placed into the ventral                          pancreatic duct.                          - A single severe biliary stricture was found in                          the lower third of the main bile duct. The                          stricture was malignant appearing.                          - A biliary sphincterotomy was performed.                          - One covered metal stent was placed into the                          common bile duct.   Recommendation:        - Return patient to hospital [facility] for ongoing care.                          - EUS findings concerning for metastatic                          pancreatic cancer (see separate EUS report  Upper EUS 12/30/22  Impression:            - A single metastatic lesion was found in the left                          lobe of the liver. Cytology results are pending.                          However, the endosonographic appearance is                          suggestive of metastatic pancreatic                          adenocarcinoma. Fine needle aspiration performed.                          - A mass was identified in the pancreatic head.                          Fine needle biopsy performed  Pathology 12/30/22    1. Pancreas, head, mass, EUS guided fine-needle aspiration:   - Positive for malignancy, adenocarcinoma, see comment.   2. Liver, EUS guided fine-needle aspiration:   - Positive for malignancy, adenocarcinoma, see comment.   COMMENT:  Immunostain for cytokeratin performed on specimen 1 shows an   infiltrative epithelial process.  Parts 1 and 2 of this case are reviewed by   Dr. XIAO Gan who agrees with the above diagnoses.  Appropriate positive   controls  are examined.   Immunohistochemistry (IHC) Testing for Mismatch Repair (MMR) Proteins   performed on specimen 1:   MLH1 - Intact nuclear expression   MSH2 - Intact nuclear expression   MSH6 - Intact nuclear expression   PMS2 - Intact nuclear expression   Background nonneoplastic tissue/internal control with intact nuclear   expression   IHC Interpretation   No loss of nuclear expression of MMR proteins: low probability of   microsatellite instability   There are exceptions to the above IHC interpretations. These results should   not be considered in isolation, and clinical correlation with genetic   counseling is recommended to assess the need for germline testing.      Comment: Interp By Colleen Olmos MD, Signed on 01/10/2023 at 09:30      Latest Reference Range & Units Most Recent   CA 19-9 0.0 - 40.0 U/mL 5348.6 (H)  1/11/23 15:22   (H): Data is abnormally high      2- D echo 1/25/23   The left ventricle is normal in size with concentric hypertrophy and normal systolic function.  The estimated ejection fraction is 65%.  Indeterminate left ventricular diastolic function.  Normal right ventricular size with normal right ventricular systolic function.  Mild left atrial enlargement.  Mild-to-moderate aortic regurgitation.  Mild tricuspid regurgitation.  Normal central venous pressure (3 mmHg).  The estimated PA systolic pressure is 33 mmHg.       Component      Latest Ref Rng & Units 4/11/2023 3/1/2023 2/7/2023 12/9/2022   CA 15-3      <30 U/mL 662 (H) 700 (H) 679 (H) 719 (H)   CA 27.29      <=38.0 U/mL 1564 (H) 1399 (H) 1399 (H) 1229 (H)     Component      Latest Ref Rng & Units 11/21/2022   CA 15-3      <30 U/mL 856 (H)   CA 27.29      <=38.0 U/mL 1536 (H)           CT c/a/p w/contrast 6/1/23  Impression:     There is a ill-defined infiltrating pancreatic head mass.  This is difficult to measure but probably 2.8 cm in diameter.     Bile duct stent, pancreatic stent with pneumobilia.     Coronary calcifications,  and bilateral renal cysts.     There is abnormal thickening at the rectosigmoid junction worrisome for neoplasm.  This could be a 2nd neoplasm/colon cancer.                 Assessment:       Problem List Items Addressed This Visit          Oncology    Diagnosed 12/30/22  Pancreas, head, mass, EUS guided fine-needle aspiration: Positive for malignancy, adenocarcinoma  Liver, EUS guided fine-needle aspiration: - Positive for malignancy, adenocarcinoma  CA 19-9 5348U/mL on 1/11/23  Plan was for OP PANC NAB-PACLITAXEL + GEMCITABINE Q4W  S/p C1D8 gem/abraxane ( dose reduced) on 1/24/23    admitted on 12/5/23 with AMS.   Guardant 360 Details: ANDREA, BRAF V600E, JAK2 V617F  Plan switch therapy to DABRENIB AND TRAMETINIB   (with BRAF V600E mutation): DABRAFENIB Oral: 150 mg twice daily, approximately every 12 hours (in combination with trametinib); continue until disease progression or unacceptable toxicity.   BRAF V600E mutation: Oral: trametinib.  2 mg once daily (in combination with dabrafenib) until disease progression or unacceptable toxicity.   Pt tolerating therapy   CA 19-9 levels rising  Follow up imaging studies shows ill-defined infiltrating pancreatic head mass.  This is difficult to measure but probably 2.8 cm in diameter.abnormal thickening at the rectosigmoid junction worrisome for neoplasm.  This could be a 2nd neoplasm/colon cancer.  PLAN AMBULATORY REFERRAL TO GI - URGENT COLONOSCOPY- ENDO  ON 6/23/23  Labs reviewed  Follow up in 1 month with labs    Relevant Orders    CANCER ANTIGEN 19-9                Neoplasm related pain  Currently well controlled  Cont current regimen        FINGER LACERATION      URGENT REFERRAL TO SURGERY  Follow-up:       Referral to GI- urgent  Referral to Surgery ASAP   Cbc,cmp, MAG CA 19-9  prior to f/u in 1 month

## 2023-06-22 ENCOUNTER — TELEPHONE (OUTPATIENT)
Dept: ENDOSCOPY | Facility: HOSPITAL | Age: 73
End: 2023-06-22
Payer: MEDICARE

## 2023-06-22 ENCOUNTER — OFFICE VISIT (OUTPATIENT)
Dept: SURGERY | Facility: CLINIC | Age: 73
End: 2023-06-22
Payer: MEDICARE

## 2023-06-22 ENCOUNTER — PATIENT MESSAGE (OUTPATIENT)
Dept: ENDOSCOPY | Facility: HOSPITAL | Age: 73
End: 2023-06-22
Payer: MEDICARE

## 2023-06-22 VITALS
OXYGEN SATURATION: 96 % | BODY MASS INDEX: 22.07 KG/M2 | WEIGHT: 145.63 LBS | HEART RATE: 90 BPM | SYSTOLIC BLOOD PRESSURE: 116 MMHG | HEIGHT: 68 IN | DIASTOLIC BLOOD PRESSURE: 77 MMHG

## 2023-06-22 DIAGNOSIS — R93.3 ABNORMAL FINDING ON GI TRACT IMAGING: Primary | ICD-10-CM

## 2023-06-22 DIAGNOSIS — S61.216A LACERATION OF RIGHT LITTLE FINGER WITHOUT FOREIGN BODY WITHOUT DAMAGE TO NAIL, INITIAL ENCOUNTER: Primary | ICD-10-CM

## 2023-06-22 PROCEDURE — 99024 POSTOP FOLLOW-UP VISIT: CPT | Mod: POP,,, | Performed by: SURGERY

## 2023-06-22 PROCEDURE — 99213 OFFICE O/P EST LOW 20 MIN: CPT | Mod: PBBFAC | Performed by: SURGERY

## 2023-06-22 PROCEDURE — 99999 PR PBB SHADOW E&M-EST. PATIENT-LVL III: ICD-10-PCS | Mod: PBBFAC,,, | Performed by: SURGERY

## 2023-06-22 PROCEDURE — 99999 PR PBB SHADOW E&M-EST. PATIENT-LVL III: CPT | Mod: PBBFAC,,, | Performed by: SURGERY

## 2023-06-22 PROCEDURE — 99024 PR POST-OP FOLLOW-UP VISIT: ICD-10-PCS | Mod: POP,,, | Performed by: SURGERY

## 2023-06-22 NOTE — TELEPHONE ENCOUNTER
"Spoke to pt to schedule procedure(s) Colonoscopy       Physician to perform procedure(s) Dr. GUERO Estrella  Date of Procedure (s) 2023  Arrival Time 12:00 PM  Time of Procedure(s) 1:00 PM   Location of Procedure(s) [address]  Type of Rx Prep sent to patient: PEG  Instructions provided to patient via [facility]    Patient was informed on the following information and verbalized understanding. Screening questionnaire reviewed with patient and complete. If procedure requires anesthesia, a responsible adult needs to be present to accompany the patient home, patient cannot drive after receiving anesthesia. Appointment details are tentative, especially check-in time. Patient will receive a prep-op call 4 days prior to confirm check-in time for procedure. If applicable the patient should contact their pharmacy to verify Rx for procedure prep is ready for pick-up. Patient was advised to call the scheduling department at [phone] if pharmacy states no Rx is available. Patient was advised to call the endoscopy scheduling department if any questions or concerns arise.      [facility] Endoscopy Scheduling Department    Mariia Luevano MD  [facility] Endoscopist Clinic Patients  Procedure: Colonoscopy     Diagnosis: Abnormal finding on GI tract imaging     Procedure Timin-4 weeks     *If within 4 weeks selected, please ernestine as high priority*     *If greater than 12 weeks, please select "5-12 weeks" and delay sending until 2 months prior to requested date*     Provider: Any GI provider     Location: [facility]     Additional Scheduling Information: No scheduling concerns     Prep Specifications:Standard prep     Have you attached a patient to this message: yes    "

## 2023-06-22 NOTE — PROGRESS NOTES
Surgery Clinic Note    Manuel Tyler is a 72 y.o. year old male in clinic today for follow up of finger laceration s/p debridement and repair in clinic. Healing well but has a small area where the flap looks frail and sutures have only been in place 9 days, will wait until 14 days post repair to remove sutures.  No f/c/n/v/sob/cp    ROS:  Negative except above    PE:  Vitals:    06/22/23 1106   BP: 116/77   Pulse: 90       NAD  No belabored breathing  Abd soft nt nd  Right hand 5th digit, no cellulitis or drainage. Sutures in place.    A/P:  Manuel Tyler is a 72 y.o. year old male s/p laceration repair of 5th digit, right hand, distal finger near the DIP joint    -continue daily wound care with topical antibiotics  -rtc next week on Tuesday for suture removal    Amador Castellon  General Surgery - [facility]  6/22/2023

## 2023-06-23 ENCOUNTER — CLINICAL SUPPORT (OUTPATIENT)
Dept: ENDOSCOPY | Facility: HOSPITAL | Age: 73
End: 2023-06-23
Attending: INTERNAL MEDICINE
Payer: MEDICARE

## 2023-06-23 DIAGNOSIS — Z12.11 ENCOUNTER FOR COLORECTAL CANCER SCREENING: ICD-10-CM

## 2023-06-23 DIAGNOSIS — Z12.12 ENCOUNTER FOR COLORECTAL CANCER SCREENING: ICD-10-CM

## 2023-06-23 NOTE — PLAN OF CARE
Spoke to patient and he is already scheduled for his colonoscopy on 8/7/23. Answered some questions the patient had about his prep.

## 2023-06-27 ENCOUNTER — OFFICE VISIT (OUTPATIENT)
Dept: SURGERY | Facility: CLINIC | Age: 73
End: 2023-06-27
Payer: MEDICARE

## 2023-06-27 VITALS
WEIGHT: 144.75 LBS | BODY MASS INDEX: 21.94 KG/M2 | DIASTOLIC BLOOD PRESSURE: 66 MMHG | OXYGEN SATURATION: 98 % | SYSTOLIC BLOOD PRESSURE: 91 MMHG | HEIGHT: 68 IN | HEART RATE: 114 BPM

## 2023-06-27 DIAGNOSIS — S61.216A LACERATION OF RIGHT LITTLE FINGER WITHOUT FOREIGN BODY WITHOUT DAMAGE TO NAIL, INITIAL ENCOUNTER: Primary | ICD-10-CM

## 2023-06-27 PROCEDURE — 99024 PR POST-OP FOLLOW-UP VISIT: ICD-10-PCS | Mod: POP,,, | Performed by: SURGERY

## 2023-06-27 PROCEDURE — 99024 POSTOP FOLLOW-UP VISIT: CPT | Mod: POP,,, | Performed by: SURGERY

## 2023-06-27 PROCEDURE — 99999 PR PBB SHADOW E&M-EST. PATIENT-LVL III: CPT | Mod: PBBFAC,,, | Performed by: SURGERY

## 2023-06-27 PROCEDURE — 99999 PR PBB SHADOW E&M-EST. PATIENT-LVL III: ICD-10-PCS | Mod: PBBFAC,,, | Performed by: SURGERY

## 2023-06-27 PROCEDURE — 99213 OFFICE O/P EST LOW 20 MIN: CPT | Mod: PBBFAC | Performed by: SURGERY

## 2023-06-27 NOTE — PROGRESS NOTES
Surgery Clinic Note    Manuel Tyler is a 72 y.o. year old male in clinic today for follow up of finger laceration repair. Sutures removed today. Has healed very well. No infection. Moves finger with no issue.  No f/c/n/v/sob/cp    ROS:  Negative except above    PE:  Vitals:    06/27/23 1035   BP: 91/66   Pulse: (!) 114       NAD  No belabored breathing  Right hand 5th digit laceration repair at DIP joint/finger tip    A/P:  Manuel Tyler is a 72 y.o. year old male s/p lac repair of finger    -rtc prn    Amador Castellon  General Surgery - [facility]  6/27/2023

## 2023-06-28 ENCOUNTER — PATIENT MESSAGE (OUTPATIENT)
Dept: FAMILY MEDICINE | Facility: CLINIC | Age: 73
End: 2023-06-28
Payer: MEDICARE

## 2023-06-29 ENCOUNTER — INFUSION (OUTPATIENT)
Dept: INFUSION THERAPY | Facility: HOSPITAL | Age: 73
End: 2023-06-29
Attending: INTERNAL MEDICINE
Payer: MEDICARE

## 2023-06-29 VITALS
DIASTOLIC BLOOD PRESSURE: 81 MMHG | TEMPERATURE: 98 F | SYSTOLIC BLOOD PRESSURE: 146 MMHG | RESPIRATION RATE: 16 BRPM | OXYGEN SATURATION: 97 % | HEART RATE: 102 BPM

## 2023-06-29 DIAGNOSIS — Z72.0 TOBACCO USE: ICD-10-CM

## 2023-06-29 DIAGNOSIS — C25.0 MALIGNANT NEOPLASM OF HEAD OF PANCREAS: Primary | ICD-10-CM

## 2023-06-29 DIAGNOSIS — R45.89 DEPRESSED MOOD: ICD-10-CM

## 2023-06-29 PROCEDURE — 96523 IRRIG DRUG DELIVERY DEVICE: CPT

## 2023-06-29 RX ORDER — SODIUM CHLORIDE 0.9 % (FLUSH) 0.9 %
10 SYRINGE (ML) INJECTION
Status: DISCONTINUED | OUTPATIENT
Start: 2023-06-29 | End: 2023-06-29 | Stop reason: HOSPADM

## 2023-06-29 RX ORDER — SODIUM CHLORIDE 0.9 % (FLUSH) 0.9 %
10 SYRINGE (ML) INJECTION
Status: CANCELLED | OUTPATIENT
Start: 2023-06-29

## 2023-06-29 RX ORDER — BUPROPION HYDROCHLORIDE 300 MG/1
300 TABLET ORAL DAILY
Qty: 30 TABLET | Refills: 0 | Status: SHIPPED | OUTPATIENT
Start: 2023-06-29 | End: 2023-07-07 | Stop reason: SDUPTHER

## 2023-06-29 RX ORDER — HEPARIN 100 UNIT/ML
500 SYRINGE INTRAVENOUS
Status: CANCELLED | OUTPATIENT
Start: 2023-06-29

## 2023-06-29 RX ORDER — HEPARIN 100 UNIT/ML
500 SYRINGE INTRAVENOUS
Status: DISCONTINUED | OUTPATIENT
Start: 2023-06-29 | End: 2023-06-29 | Stop reason: HOSPADM

## 2023-06-29 NOTE — PLAN OF CARE
Pt arrived to unit, ambulatory for port-a-cath flush. Alert and oriented with no complaints upon arrival. Tolerated port flush with no complaints. Discharged home.

## 2023-07-03 DIAGNOSIS — C25.0 MALIGNANT NEOPLASM OF HEAD OF PANCREAS: Primary | ICD-10-CM

## 2023-07-05 RX ORDER — DABRAFENIB 50 MG/1
CAPSULE ORAL
Qty: 180 CAPSULE | Refills: 1 | Status: SHIPPED | OUTPATIENT
Start: 2023-07-05 | End: 2023-07-10 | Stop reason: SDUPTHER

## 2023-07-07 DIAGNOSIS — Z72.0 TOBACCO USE: ICD-10-CM

## 2023-07-07 DIAGNOSIS — R45.89 DEPRESSED MOOD: ICD-10-CM

## 2023-07-07 RX ORDER — BUPROPION HYDROCHLORIDE 300 MG/1
300 TABLET ORAL DAILY
Qty: 90 TABLET | Refills: 1 | Status: SHIPPED | OUTPATIENT
Start: 2023-07-07 | End: 2023-07-10 | Stop reason: SDUPTHER

## 2023-07-07 NOTE — TELEPHONE ENCOUNTER
No care due was identified.  [facility] Embedded Care Due Messages. Reference number: [id number].   7/07/2023 1:25:31 PM CDT

## 2023-07-10 ENCOUNTER — TELEPHONE (OUTPATIENT)
Dept: SURGERY | Facility: CLINIC | Age: 73
End: 2023-07-10
Payer: MEDICARE

## 2023-07-10 ENCOUNTER — EXTERNAL HOME HEALTH (OUTPATIENT)
Dept: HOME HEALTH SERVICES | Facility: HOSPITAL | Age: 73
End: 2023-07-10
Payer: MEDICARE

## 2023-07-10 ENCOUNTER — LAB VISIT (OUTPATIENT)
Dept: LAB | Facility: HOSPITAL | Age: 73
End: 2023-07-10
Attending: INTERNAL MEDICINE
Payer: MEDICARE

## 2023-07-10 DIAGNOSIS — C25.0 MALIGNANT NEOPLASM OF HEAD OF PANCREAS: ICD-10-CM

## 2023-07-10 DIAGNOSIS — R45.89 DEPRESSED MOOD: ICD-10-CM

## 2023-07-10 DIAGNOSIS — Z72.0 TOBACCO USE: ICD-10-CM

## 2023-07-10 DIAGNOSIS — C78.7 MALIGNANT NEOPLASM METASTATIC TO LIVER: ICD-10-CM

## 2023-07-10 LAB
ALBUMIN SERPL BCP-MCNC: 3.3 G/DL (ref 3.5–5.2)
ALP SERPL-CCNC: 144 U/L (ref 55–135)
ALT SERPL W/O P-5'-P-CCNC: 20 U/L (ref 10–44)
ANION GAP SERPL CALC-SCNC: 8 MMOL/L (ref 8–16)
AST SERPL-CCNC: 26 U/L (ref 10–40)
BASOPHILS # BLD AUTO: 0.13 K/UL (ref 0–0.2)
BASOPHILS NFR BLD: 0.9 % (ref 0–1.9)
BILIRUB SERPL-MCNC: 0.6 MG/DL (ref 0.1–1)
BUN SERPL-MCNC: 20 MG/DL (ref 8–23)
CALCIUM SERPL-MCNC: 10 MG/DL (ref 8.7–10.5)
CANCER AG19-9 SERPL-ACNC: ABNORMAL U/ML (ref 0–40)
CHLORIDE SERPL-SCNC: 100 MMOL/L (ref 95–110)
CO2 SERPL-SCNC: 26 MMOL/L (ref 23–29)
CREAT SERPL-MCNC: 1 MG/DL (ref 0.5–1.4)
DIFFERENTIAL METHOD: ABNORMAL
EOSINOPHIL # BLD AUTO: 0.6 K/UL (ref 0–0.5)
EOSINOPHIL NFR BLD: 3.9 % (ref 0–8)
ERYTHROCYTE [DISTWIDTH] IN BLOOD BY AUTOMATED COUNT: 14.8 % (ref 11.5–14.5)
EST. GFR  (NO RACE VARIABLE): >60 ML/MIN/1.73 M^2
GLUCOSE SERPL-MCNC: 106 MG/DL (ref 70–110)
HCT VFR BLD AUTO: 44.5 % (ref 40–54)
HGB BLD-MCNC: 15.1 G/DL (ref 14–18)
IMM GRANULOCYTES # BLD AUTO: 0.12 K/UL (ref 0–0.04)
IMM GRANULOCYTES NFR BLD AUTO: 0.8 % (ref 0–0.5)
LYMPHOCYTES # BLD AUTO: 3 K/UL (ref 1–4.8)
LYMPHOCYTES NFR BLD: 20.2 % (ref 18–48)
MAGNESIUM SERPL-MCNC: 2 MG/DL (ref 1.6–2.6)
MCH RBC QN AUTO: 31.1 PG (ref 27–31)
MCHC RBC AUTO-ENTMCNC: 33.9 G/DL (ref 32–36)
MCV RBC AUTO: 92 FL (ref 82–98)
MONOCYTES # BLD AUTO: 1.8 K/UL (ref 0.3–1)
MONOCYTES NFR BLD: 12.4 % (ref 4–15)
NEUTROPHILS # BLD AUTO: 9.1 K/UL (ref 1.8–7.7)
NEUTROPHILS NFR BLD: 61.8 % (ref 38–73)
NRBC BLD-RTO: 0 /100 WBC
PLATELET # BLD AUTO: 449 K/UL (ref 150–450)
PMV BLD AUTO: 8.8 FL (ref 9.2–12.9)
POTASSIUM SERPL-SCNC: 4.9 MMOL/L (ref 3.5–5.1)
PROT SERPL-MCNC: 8 G/DL (ref 6–8.4)
RBC # BLD AUTO: 4.86 M/UL (ref 4.6–6.2)
SODIUM SERPL-SCNC: 134 MMOL/L (ref 136–145)
WBC # BLD AUTO: 14.71 K/UL (ref 3.9–12.7)

## 2023-07-10 PROCEDURE — 86301 IMMUNOASSAY TUMOR CA 19-9: CPT | Performed by: INTERNAL MEDICINE

## 2023-07-10 PROCEDURE — 85025 COMPLETE CBC W/AUTO DIFF WBC: CPT | Performed by: INTERNAL MEDICINE

## 2023-07-10 PROCEDURE — 36415 COLL VENOUS BLD VENIPUNCTURE: CPT | Performed by: INTERNAL MEDICINE

## 2023-07-10 PROCEDURE — 83735 ASSAY OF MAGNESIUM: CPT | Performed by: INTERNAL MEDICINE

## 2023-07-10 PROCEDURE — 80053 COMPREHEN METABOLIC PANEL: CPT | Performed by: INTERNAL MEDICINE

## 2023-07-10 RX ORDER — BUPROPION HYDROCHLORIDE 300 MG/1
300 TABLET ORAL DAILY
Qty: 90 TABLET | Refills: 1 | Status: SHIPPED | OUTPATIENT
Start: 2023-07-10 | End: 2024-01-06

## 2023-07-10 NOTE — TELEPHONE ENCOUNTER
No care due was identified.  [facility] Embedded Care Due Messages. Reference number: [id number].   7/10/2023 10:22:56 AM CDT

## 2023-07-10 NOTE — TELEPHONE ENCOUNTER
Spoke with patient regarding his upcoming appointment to receive more info. Patient stated that is currently undergoing chemo for pancreatic cancer and was informed to schedule appointment as a precaution to check if cancer had spread to other areas. He was informed that he has a colonoscopy already scheduled for 8/7 and this will be a clinic visit. I informed him that I will discuss appointment with Dr Adame to see if any additional images are needed prior to appointment or will the appointment be cancelled. He verbalized understanding

## 2023-07-11 ENCOUNTER — OFFICE VISIT (OUTPATIENT)
Dept: HEMATOLOGY/ONCOLOGY | Facility: CLINIC | Age: 73
DRG: 177 | End: 2023-07-11
Payer: MEDICARE

## 2023-07-11 VITALS
HEIGHT: 68 IN | BODY MASS INDEX: 21.75 KG/M2 | WEIGHT: 143.5 LBS | DIASTOLIC BLOOD PRESSURE: 80 MMHG | HEART RATE: 93 BPM | OXYGEN SATURATION: 98 % | SYSTOLIC BLOOD PRESSURE: 135 MMHG

## 2023-07-11 DIAGNOSIS — C25.0 MALIGNANT NEOPLASM OF HEAD OF PANCREAS: Primary | ICD-10-CM

## 2023-07-11 DIAGNOSIS — C78.7 MALIGNANT NEOPLASM METASTATIC TO LIVER: ICD-10-CM

## 2023-07-11 DIAGNOSIS — Z09 FOLLOW-UP EXAM: ICD-10-CM

## 2023-07-11 DIAGNOSIS — Z79.899 HIGH RISK MEDICATION USE: ICD-10-CM

## 2023-07-11 DIAGNOSIS — G89.3 NEOPLASM RELATED PAIN: ICD-10-CM

## 2023-07-11 PROCEDURE — 99215 PR OFFICE/OUTPT VISIT, EST, LEVL V, 40-54 MIN: ICD-10-PCS | Mod: S$PBB,,, | Performed by: STUDENT IN AN ORGANIZED HEALTH CARE EDUCATION/TRAINING PROGRAM

## 2023-07-11 PROCEDURE — 99213 OFFICE O/P EST LOW 20 MIN: CPT | Mod: PBBFAC | Performed by: STUDENT IN AN ORGANIZED HEALTH CARE EDUCATION/TRAINING PROGRAM

## 2023-07-11 PROCEDURE — 99215 OFFICE O/P EST HI 40 MIN: CPT | Mod: S$PBB,,, | Performed by: STUDENT IN AN ORGANIZED HEALTH CARE EDUCATION/TRAINING PROGRAM

## 2023-07-11 PROCEDURE — 99999 PR PBB SHADOW E&M-EST. PATIENT-LVL III: CPT | Mod: PBBFAC,,, | Performed by: STUDENT IN AN ORGANIZED HEALTH CARE EDUCATION/TRAINING PROGRAM

## 2023-07-11 PROCEDURE — 99999 PR PBB SHADOW E&M-EST. PATIENT-LVL III: ICD-10-PCS | Mod: PBBFAC,,, | Performed by: STUDENT IN AN ORGANIZED HEALTH CARE EDUCATION/TRAINING PROGRAM

## 2023-07-11 NOTE — PROGRESS NOTES
Subjective:       Patient ID: Manuel Tyler is a 72 y.o. male.    Chief Complaint: No chief complaint on file.  Diagnosis: Stage IV pancreatic CA  with liver mets diagnosed 12/30/22    ANDREA, BRAF V600E, JAK2 V617F  Therapy: [place]/abraxane (dose reduced) 1/24/23-2/7/23   DABRENIB AND TRAMETINIB  3/29/23-present  HPI 72 y.o male with medical diagnoses listed seen today for Stage IV pancreatic adenocarcinoma with liver metastases.He was  admitted with pancreatitis likely secondary to ETOH abuse. C-diff negative. CT a/p w/contrast 12/27/22 shows Mild wall thickening involving the sigmoid colon with abnormal appearance seen within the left lower pelvis along the left lateral aspect of the sigmoid colon.  Uncertain if findings may in part relate to sigmoid tortuosity, however potential colocolic fistula not excluded given appearance.  No acute inflammatory changes are seen.  No prior studies are available for comparison.  Colonoscopy follow-up may be warranted if not previously/recently obtained.   Distended gallbladder. US abd limited 12/28/22 shows Biliary sludge with evidence of acute cholecystitis including gallbladder distension and a positive sonographic Hahn sign.CBD dilatation up to 13 mm, which could be secondary to cholecystitis, choledocholithiasis, or other obstruction.Ill-defined 2.6 cm lesion in the region of the pancreatic head, which could represent a lymph node or pancreatic neoplasm.  GI performed EUS and ERCP on 12/30/22 - suspicious for pancreatic CA- stent placed. Upper EUS 12/30/22 shows A single metastatic lesion was found in the left lobe of the liver.   Fine needle aspiration performed. A mass was identified in the pancreatic head. Fine needle biopsy performed  Pancreas, head, mass, EUS guided fine-needle aspiration: Positive for malignancy, adenocarcinoma, see comment.   Liver, EUS guided fine-needle aspiration: - Positive for malignancy, adenocarcinoma,He is taking OTC acetaminophen for abd pain  which intermittently radiates to back.  He rates pain 3/10 in intensity. He has intermittent nausea. He is reports changes in bowel habits He needs assistance with some daily activities.No falls. He reports mild SKAGGS. No CP/hemoptysis. He is accompanied by family members.   S/p C1D8 gem/abraxane ( dose reduced) on 2/7/23    admitted on 1/25/23 with AMS. He was found to be dehydrated, hypotensive and concerns for infection/septic shock. H  He was switched to 2nd line therapy with  DABRENIB AND TRAMETINIB on 3/29/23 2/2 lack of tolerability with first line therapy and subsequent hospitalizations    Interval Hx: Accompanied by fam members  He  remains on  DABRENIB AND TRAMETINIB ( started on 3/29/23)  He reports a 5th rt finger laceration suffered yesterday after he closed the trunk of car on his finger-s/p sutures with Dr. Castellon   No loss of Motor strength  Pain control sig improved  Mild fatigue   Appetite and weight stable  He reports intermittent constipation  No N/V   No CP  No SOB/CPcough  He has 1-2 ensures daily  He was f/b palliative care during hospitalization  According to chart pt DNR  laPost not completed prior to discharge      Guardant 360 biopsy resulted  Details: ANDREA, BRAF V600E, JAK2 V617F       Past Medical History:   Diagnosis Date    (HFpEF) heart failure with preserved ejection fraction 1/25/2023    Alcohol abuse 12/27/2022    Hyperlipidemia     Hypertension     Liver metastases 1/18/2023    Malignant neoplasm of head of pancreas 1/18/2023    Other specified noninfective gastroenteritis and colitis     Pancreatitis     Personal history of colonic polyps        Past Surgical History:   Procedure Laterality Date    COLONOSCOPY      ENDOSCOPIC ULTRASOUND OF UPPER GASTROINTESTINAL TRACT Left 12/30/2022    Procedure: ULTRASOUND, UPPER GI TRACT, ENDOSCOPIC;  Surgeon: Gregory Cristina MD;  Location: [facility] ([address] [place] [address]);  Service: Endoscopy;  Laterality: Left;    ERCP Left 12/30/2022    Procedure: ERCP  (ENDOSCOPIC RETROGRADE CHOLANGIOPANCREATOGRAPHY);  Surgeon: Gregroy Cristina MD;  Location: [facility] ([address] [place] [address]);  Service: Endoscopy;  Laterality: Left;    FOOT SURGERY Left     INSERTION OF TUNNELED CENTRAL VENOUS CATHETER (CVC) WITH SUBCUTANEOUS PORT Bilateral 1/19/2023    Procedure: [id number];  Surgeon: Amador Castellon MD;  Location: [facility];  Service: General;  Laterality: Bilateral;  Right v Left PORTACATH. needs ultrasound and fluoro  RN PREOP 01/18/2023    TONSILLECTOMY          Review of Systems   Constitutional:  Positive for appetite change, fatigue and unexpected weight change. Negative for fever.   HENT:  Negative for mouth sores.    Eyes:  Negative for visual disturbance.   Respiratory:  Negative for cough and shortness of breath.    Cardiovascular:  Negative for chest pain.   Gastrointestinal:  Negative for abdominal pain, diarrhea and nausea.   Genitourinary:  Negative for frequency.   Musculoskeletal:  Negative for back pain.   Integumentary:  Negative for rash.   Neurological:  Negative for headaches.   Hematological:  Negative for adenopathy.   Psychiatric/Behavioral:  The patient is not nervous/anxious.        Objective:     ECOG 2      There were no vitals filed for this visit.          Physical Exam  Constitutional:       Appearance: He is underweight.   HENT:      Head: Normocephalic.      Mouth/Throat:      Pharynx: No oropharyngeal exudate.   Eyes:      General: No scleral icterus.        Right eye: No discharge.         Left eye: No discharge.      Conjunctiva/sclera: Conjunctivae normal.   Neck:      Thyroid: No thyromegaly.   Cardiovascular:      Rate and Rhythm: Regular rhythm.      Heart sounds: No murmur heard.  Pulmonary:      Effort: Pulmonary effort is normal.      Breath sounds: Normal breath sounds. No wheezing or rales.   Abdominal:      General: Bowel sounds are normal.      Palpations: Abdomen is soft.      Tenderness: There is no abdominal tenderness. There is no  guarding or rebound.   Musculoskeletal:         General: No swelling. Normal range of motion.      Cervical back: Normal range of motion and neck supple.   Lymphadenopathy:      Cervical: No cervical adenopathy.      Upper Body:      Right upper body: No supraclavicular adenopathy.      Left upper body: No supraclavicular adenopathy.   Skin:     Comments: Rt 5th finger wound, bleeding   Neurological:      Mental Status: He is alert and oriented to person, place, and time.      Cranial Nerves: No cranial nerve deficit.   Psychiatric:         Mood and Affect: Mood normal.         Behavior: Behavior normal.           CT a/p w/contrast 12/27/22  Impression:     1. Mild wall thickening involving the sigmoid colon with abnormal appearance seen within the left lower pelvis along the left lateral aspect of the sigmoid colon.  Uncertain if findings may in part relate to sigmoid tortuosity, however potential colocolic fistula not excluded given appearance.  No acute inflammatory changes are seen.  No prior studies are available for comparison.  Colonoscopy follow-up may be warranted if not previously/recently obtained.  Otherwise consider future fluoroscopic Gastrografin enema for further evaluation.  2. Distended gallbladder.  Consider gallbladder ultrasound follow-up if clinically indicated.  3. Additional findings as detailed above.     US abd limited 12/28/22  Impression:     Biliary sludge with evidence of acute cholecystitis including gallbladder distension and a positive sonographic Hahn sign.     CBD dilatation up to 13 mm, which could be secondary to cholecystitis, choledocholithiasis, or other obstruction.     Ill-defined 2.6 cm lesion in the region of the pancreatic head, which could represent a lymph node or pancreatic neoplasm.     RECOMMENDATIONS:  Contrast enhanced MRI/MRCP for further evaluation of the above findings    ERCP 12/30/22  - The major papilla appeared congested.                          - A  pancreatic duct stricture was found.                          - A pancreatic sphincterotomy was performed.                          - One plastic stent was placed into the ventral                          pancreatic duct.                          - A single severe biliary stricture was found in                          the lower third of the main bile duct. The                          stricture was malignant appearing.                          - A biliary sphincterotomy was performed.                          - One covered metal stent was placed into the                          common bile duct.   Recommendation:        - Return patient to hospital [facility] for ongoing care.                          - EUS findings concerning for metastatic                          pancreatic cancer (see separate EUS report  Upper EUS 12/30/22  Impression:            - A single metastatic lesion was found in the left                          lobe of the liver. Cytology results are pending.                          However, the endosonographic appearance is                          suggestive of metastatic pancreatic                          adenocarcinoma. Fine needle aspiration performed.                          - A mass was identified in the pancreatic head.                          Fine needle biopsy performed  Pathology 12/30/22    1. Pancreas, head, mass, EUS guided fine-needle aspiration:   - Positive for malignancy, adenocarcinoma, see comment.   2. Liver, EUS guided fine-needle aspiration:   - Positive for malignancy, adenocarcinoma, see comment.   COMMENT:  Immunostain for cytokeratin performed on specimen 1 shows an   infiltrative epithelial process.  Parts 1 and 2 of this case are reviewed by   Dr. XIAO Gan who agrees with the above diagnoses.  Appropriate positive   controls are examined.   Immunohistochemistry (IHC) Testing for Mismatch Repair (MMR) Proteins   performed on specimen 1:   MLH1 - Intact nuclear expression    MSH2 - Intact nuclear expression   MSH6 - Intact nuclear expression   PMS2 - Intact nuclear expression   Background nonneoplastic tissue/internal control with intact nuclear   expression   IHC Interpretation   No loss of nuclear expression of MMR proteins: low probability of   microsatellite instability   There are exceptions to the above IHC interpretations. These results should   not be considered in isolation, and clinical correlation with genetic   counseling is recommended to assess the need for germline testing.      Comment: Interp By Colleen Olmos MD, Signed on 01/10/2023 at 09:30      Latest Reference Range & Units Most Recent   CA 19-9 0.0 - 40.0 U/mL 5348.6 (H)  1/11/23 15:22   (H): Data is abnormally high      2- D echo 1/25/23   The left ventricle is normal in size with concentric hypertrophy and normal systolic function.  The estimated ejection fraction is 65%.  Indeterminate left ventricular diastolic function.  Normal right ventricular size with normal right ventricular systolic function.  Mild left atrial enlargement.  Mild-to-moderate aortic regurgitation.  Mild tricuspid regurgitation.  Normal central venous pressure (3 mmHg).  The estimated PA systolic pressure is 33 mmHg.       Component      Latest Ref Rng & Units 4/11/2023 3/1/2023 2/7/2023 12/9/2022   CA 15-3      <30 U/mL 662 (H) 700 (H) 679 (H) 719 (H)   CA 27.29      <=38.0 U/mL 1564 (H) 1399 (H) 1399 (H) 1229 (H)     Component      Latest Ref Rng & Units 11/21/2022   CA 15-3      <30 U/mL 856 (H)   CA 27.29      <=38.0 U/mL 1536 (H)           CT c/a/p w/contrast 6/1/23  Impression:     There is a ill-defined infiltrating pancreatic head mass.  This is difficult to measure but probably 2.8 cm in diameter.     Bile duct stent, pancreatic stent with pneumobilia.     Coronary calcifications, and bilateral renal cysts.     There is abnormal thickening at the rectosigmoid junction worrisome for neoplasm.  This could be a 2nd  neoplasm/colon cancer.                 Assessment:       Problem List Items Addressed This Visit          Oncology    Diagnosed 12/30/22  Pancreas, head, mass, EUS guided fine-needle aspiration: Positive for malignancy, adenocarcinoma  Liver, EUS guided fine-needle aspiration: - Positive for malignancy, adenocarcinoma  CA 19-9 5348U/mL on 1/11/23  Plan was for OP PANC NAB-PACLITAXEL + GEMCITABINE Q4W  S/p C1D8 gem/abraxane ( dose reduced) on 1/24/23    admitted on 12/5/23 with AMS.   Guardant 360 Details: ANDREA, BRAF V600E, JAK2 V617F  Plan switch therapy to DABRENIB AND TRAMETINIB   (with BRAF V600E mutation): DABRAFENIB Oral: 150 mg twice daily, approximately every 12 hours (in combination with trametinib); continue until disease progression or unacceptable toxicity.   BRAF V600E mutation: Oral: trametinib.  2 mg once daily (in combination with dabrafenib) until disease progression or unacceptable toxicity.   Pt tolerating therapy   CA 19-9 levels rising  Follow up imaging studies shows ill-defined infiltrating pancreatic head mass.  This is difficult to measure but probably 2.8 cm in diameter.abnormal thickening at the rectosigmoid junction worrisome for neoplasm.  This could be a 2nd neoplasm/colon cancer.  PLAN AMBULATORY REFERRAL TO GI - URGENT COLONOSCOPY- ENDO  ON 6/23/23  Labs reviewed  Follow up in 1 month with labs    Relevant Orders    CANCER ANTIGEN 19-9                Neoplasm related pain  Currently well controlled  Cont current regimen        FINGER LACERATION-healing well  Follow-up:       Referral to GI- colonoscopy pending 8/07/23  Cbc,cmp, MAG CA 19-9  prior to f/u in 1 month with Dr. Mehul Tobar MD  Heme/Onc [facility]

## 2023-07-11 NOTE — Clinical Note
Cbc,cmp, MAG CA 19-9  prior to f/u in 1 month with Dr. Carver -Please print off future apt schedule for pt

## 2023-07-14 ENCOUNTER — HOSPITAL ENCOUNTER (INPATIENT)
Facility: HOSPITAL | Age: 73
LOS: 2 days | Discharge: HOME | DRG: 177 | End: 2023-07-16
Attending: EMERGENCY MEDICINE | Admitting: STUDENT IN AN ORGANIZED HEALTH CARE EDUCATION/TRAINING PROGRAM
Payer: MEDICARE

## 2023-07-14 DIAGNOSIS — U07.1 COVID-19 VIRUS DETECTED: ICD-10-CM

## 2023-07-14 DIAGNOSIS — C25.0 MALIGNANT NEOPLASM OF HEAD OF PANCREAS: ICD-10-CM

## 2023-07-14 DIAGNOSIS — U07.1 COVID-19: ICD-10-CM

## 2023-07-14 DIAGNOSIS — A41.9 SEPSIS: ICD-10-CM

## 2023-07-14 DIAGNOSIS — R09.02 HYPOXIA: Primary | ICD-10-CM

## 2023-07-14 PROBLEM — J96.01 ACUTE HYPOXEMIC RESPIRATORY FAILURE: Status: ACTIVE | Noted: 2023-07-14

## 2023-07-14 LAB
ALBUMIN SERPL BCP-MCNC: 3.4 G/DL (ref 3.5–5.2)
ALP SERPL-CCNC: 134 U/L (ref 55–135)
ALT SERPL W/O P-5'-P-CCNC: 32 U/L (ref 10–44)
ANION GAP SERPL CALC-SCNC: 8 MMOL/L (ref 8–16)
AST SERPL-CCNC: 55 U/L (ref 10–40)
BASOPHILS # BLD AUTO: 0.11 K/UL (ref 0–0.2)
BASOPHILS NFR BLD: 0.9 % (ref 0–1.9)
BILIRUB SERPL-MCNC: 0.3 MG/DL (ref 0.1–1)
BNP SERPL-MCNC: 22 PG/ML (ref 0–99)
BUN SERPL-MCNC: 20 MG/DL (ref 8–23)
CALCIUM SERPL-MCNC: 9.7 MG/DL (ref 8.7–10.5)
CHLORIDE SERPL-SCNC: 100 MMOL/L (ref 95–110)
CO2 SERPL-SCNC: 24 MMOL/L (ref 23–29)
CREAT SERPL-MCNC: 1.3 MG/DL (ref 0.5–1.4)
CTP QC/QA: YES
CTP QC/QA: YES
DIFFERENTIAL METHOD: ABNORMAL
EOSINOPHIL # BLD AUTO: 0.2 K/UL (ref 0–0.5)
EOSINOPHIL NFR BLD: 1.6 % (ref 0–8)
ERYTHROCYTE [DISTWIDTH] IN BLOOD BY AUTOMATED COUNT: 14.7 % (ref 11.5–14.5)
EST. GFR  (NO RACE VARIABLE): 59 ML/MIN/1.73 M^2
GLUCOSE SERPL-MCNC: 99 MG/DL (ref 70–110)
HCT VFR BLD AUTO: 43.2 % (ref 40–54)
HGB BLD-MCNC: 14.5 G/DL (ref 14–18)
IMM GRANULOCYTES # BLD AUTO: 0.08 K/UL (ref 0–0.04)
IMM GRANULOCYTES NFR BLD AUTO: 0.6 % (ref 0–0.5)
LACTATE SERPL-SCNC: 1.2 MMOL/L (ref 0.5–2.2)
LACTATE SERPL-SCNC: 2.7 MMOL/L (ref 0.5–2.2)
LIPASE SERPL-CCNC: 75 U/L (ref 4–60)
LYMPHOCYTES # BLD AUTO: 1.6 K/UL (ref 1–4.8)
LYMPHOCYTES NFR BLD: 12.5 % (ref 18–48)
MCH RBC QN AUTO: 30.7 PG (ref 27–31)
MCHC RBC AUTO-ENTMCNC: 33.6 G/DL (ref 32–36)
MCV RBC AUTO: 91 FL (ref 82–98)
MONOCYTES # BLD AUTO: 2.4 K/UL (ref 0.3–1)
MONOCYTES NFR BLD: 19 % (ref 4–15)
NEUTROPHILS # BLD AUTO: 8.4 K/UL (ref 1.8–7.7)
NEUTROPHILS NFR BLD: 65.4 % (ref 38–73)
NRBC BLD-RTO: 0 /100 WBC
PLATELET # BLD AUTO: 482 K/UL (ref 150–450)
PMV BLD AUTO: 9.4 FL (ref 9.2–12.9)
POC MOLECULAR INFLUENZA A AGN: NEGATIVE
POC MOLECULAR INFLUENZA B AGN: NEGATIVE
POTASSIUM SERPL-SCNC: 4.6 MMOL/L (ref 3.5–5.1)
PROCALCITONIN SERPL IA-MCNC: 0.07 NG/ML
PROT SERPL-MCNC: 7.9 G/DL (ref 6–8.4)
RBC # BLD AUTO: 4.73 M/UL (ref 4.6–6.2)
SARS-COV-2 RDRP RESP QL NAA+PROBE: POSITIVE
SODIUM SERPL-SCNC: 132 MMOL/L (ref 136–145)
TROPONIN I SERPL DL<=0.01 NG/ML-MCNC: <0.006 NG/ML (ref 0–0.03)
WBC # BLD AUTO: 12.79 K/UL (ref 3.9–12.7)

## 2023-07-14 PROCEDURE — 84145 PROCALCITONIN (PCT): CPT | Performed by: EMERGENCY MEDICINE

## 2023-07-14 PROCEDURE — 83690 ASSAY OF LIPASE: CPT | Performed by: EMERGENCY MEDICINE

## 2023-07-14 PROCEDURE — 27000207 HC ISOLATION

## 2023-07-14 PROCEDURE — 85025 COMPLETE CBC W/AUTO DIFF WBC: CPT | Performed by: EMERGENCY MEDICINE

## 2023-07-14 PROCEDURE — 63600175 PHARM REV CODE 636 W HCPCS: Mod: JZ,TB | Performed by: EMERGENCY MEDICINE

## 2023-07-14 PROCEDURE — 87040 BLOOD CULTURE FOR BACTERIA: CPT | Mod: 59 | Performed by: EMERGENCY MEDICINE

## 2023-07-14 PROCEDURE — 93005 ELECTROCARDIOGRAM TRACING: CPT

## 2023-07-14 PROCEDURE — 80053 COMPREHEN METABOLIC PANEL: CPT | Performed by: EMERGENCY MEDICINE

## 2023-07-14 PROCEDURE — 99285 EMERGENCY DEPT VISIT HI MDM: CPT | Mod: 25

## 2023-07-14 PROCEDURE — 87635 SARS-COV-2 COVID-19 AMP PRB: CPT | Performed by: EMERGENCY MEDICINE

## 2023-07-14 PROCEDURE — 83605 ASSAY OF LACTIC ACID: CPT | Mod: 91 | Performed by: EMERGENCY MEDICINE

## 2023-07-14 PROCEDURE — 12000002 HC ACUTE/MED SURGE SEMI-PRIVATE ROOM

## 2023-07-14 PROCEDURE — 81003 URINALYSIS AUTO W/O SCOPE: CPT | Performed by: EMERGENCY MEDICINE

## 2023-07-14 PROCEDURE — 25000003 PHARM REV CODE 250: Performed by: EMERGENCY MEDICINE

## 2023-07-14 PROCEDURE — 84484 ASSAY OF TROPONIN QUANT: CPT | Performed by: EMERGENCY MEDICINE

## 2023-07-14 PROCEDURE — 93010 ELECTROCARDIOGRAM REPORT: CPT | Mod: ,,, | Performed by: INTERNAL MEDICINE

## 2023-07-14 PROCEDURE — 93010 EKG 12-LEAD: ICD-10-PCS | Mod: ,,, | Performed by: INTERNAL MEDICINE

## 2023-07-14 PROCEDURE — 83880 ASSAY OF NATRIURETIC PEPTIDE: CPT | Performed by: EMERGENCY MEDICINE

## 2023-07-14 PROCEDURE — 96360 HYDRATION IV INFUSION INIT: CPT

## 2023-07-14 RX ORDER — ACETAMINOPHEN 325 MG/1
650 TABLET ORAL EVERY 8 HOURS PRN
Status: DISCONTINUED | OUTPATIENT
Start: 2023-07-15 | End: 2023-07-16 | Stop reason: HOSPADM

## 2023-07-14 RX ORDER — GLUCAGON 1 MG
1 KIT INJECTION
Status: DISCONTINUED | OUTPATIENT
Start: 2023-07-15 | End: 2023-07-16 | Stop reason: HOSPADM

## 2023-07-14 RX ORDER — SIMETHICONE 80 MG
1 TABLET,CHEWABLE ORAL 4 TIMES DAILY PRN
Status: DISCONTINUED | OUTPATIENT
Start: 2023-07-15 | End: 2023-07-16 | Stop reason: HOSPADM

## 2023-07-14 RX ORDER — ALBUTEROL SULFATE 2.5 MG/.5ML
2.5 SOLUTION RESPIRATORY (INHALATION) EVERY 6 HOURS
Status: DISCONTINUED | OUTPATIENT
Start: 2023-07-15 | End: 2023-07-15

## 2023-07-14 RX ORDER — MUPIROCIN 20 MG/G
OINTMENT TOPICAL 2 TIMES DAILY
Status: DISCONTINUED | OUTPATIENT
Start: 2023-07-15 | End: 2023-07-16 | Stop reason: HOSPADM

## 2023-07-14 RX ORDER — ASCORBIC ACID 500 MG
500 TABLET ORAL 2 TIMES DAILY
Status: DISCONTINUED | OUTPATIENT
Start: 2023-07-15 | End: 2023-07-16 | Stop reason: HOSPADM

## 2023-07-14 RX ORDER — DEXAMETHASONE SODIUM PHOSPHATE 4 MG/ML
6 INJECTION, SOLUTION INTRA-ARTICULAR; INTRALESIONAL; INTRAMUSCULAR; INTRAVENOUS; SOFT TISSUE
Status: COMPLETED | OUTPATIENT
Start: 2023-07-14 | End: 2023-07-14

## 2023-07-14 RX ORDER — SODIUM,POTASSIUM PHOSPHATES 280-250MG
2 POWDER IN PACKET (EA) ORAL
Status: DISCONTINUED | OUTPATIENT
Start: 2023-07-15 | End: 2023-07-16 | Stop reason: HOSPADM

## 2023-07-14 RX ORDER — IBUPROFEN 200 MG
16 TABLET ORAL
Status: DISCONTINUED | OUTPATIENT
Start: 2023-07-15 | End: 2023-07-16 | Stop reason: HOSPADM

## 2023-07-14 RX ORDER — INSULIN ASPART 100 [IU]/ML
0-5 INJECTION, SOLUTION INTRAVENOUS; SUBCUTANEOUS
Status: DISCONTINUED | OUTPATIENT
Start: 2023-07-15 | End: 2023-07-16 | Stop reason: HOSPADM

## 2023-07-14 RX ORDER — HYDROCODONE BITARTRATE AND ACETAMINOPHEN 5; 325 MG/1; MG/1
1 TABLET ORAL EVERY 6 HOURS PRN
Status: DISCONTINUED | OUTPATIENT
Start: 2023-07-15 | End: 2023-07-16 | Stop reason: HOSPADM

## 2023-07-14 RX ORDER — NALOXONE HCL 0.4 MG/ML
0.02 VIAL (ML) INJECTION
Status: DISCONTINUED | OUTPATIENT
Start: 2023-07-15 | End: 2023-07-16 | Stop reason: HOSPADM

## 2023-07-14 RX ORDER — LANOLIN ALCOHOL/MO/W.PET/CERES
800 CREAM (GRAM) TOPICAL
Status: DISCONTINUED | OUTPATIENT
Start: 2023-07-15 | End: 2023-07-16 | Stop reason: HOSPADM

## 2023-07-14 RX ORDER — ACETAMINOPHEN 325 MG/1
650 TABLET ORAL EVERY 4 HOURS PRN
Status: DISCONTINUED | OUTPATIENT
Start: 2023-07-15 | End: 2023-07-16 | Stop reason: HOSPADM

## 2023-07-14 RX ORDER — TALC
6 POWDER (GRAM) TOPICAL NIGHTLY PRN
Status: DISCONTINUED | OUTPATIENT
Start: 2023-07-15 | End: 2023-07-16 | Stop reason: HOSPADM

## 2023-07-14 RX ORDER — ENOXAPARIN SODIUM 100 MG/ML
1 INJECTION SUBCUTANEOUS 2 TIMES DAILY
Status: DISCONTINUED | OUTPATIENT
Start: 2023-07-14 | End: 2023-07-16 | Stop reason: HOSPADM

## 2023-07-14 RX ORDER — SODIUM CHLORIDE 9 MG/ML
1000 INJECTION, SOLUTION INTRAVENOUS
Status: COMPLETED | OUTPATIENT
Start: 2023-07-14 | End: 2023-07-14

## 2023-07-14 RX ORDER — POLYETHYLENE GLYCOL 3350 17 G/17G
17 POWDER, FOR SOLUTION ORAL DAILY
Status: DISCONTINUED | OUTPATIENT
Start: 2023-07-15 | End: 2023-07-16 | Stop reason: HOSPADM

## 2023-07-14 RX ORDER — PROCHLORPERAZINE EDISYLATE 5 MG/ML
5 INJECTION INTRAMUSCULAR; INTRAVENOUS EVERY 6 HOURS PRN
Status: DISCONTINUED | OUTPATIENT
Start: 2023-07-15 | End: 2023-07-16 | Stop reason: HOSPADM

## 2023-07-14 RX ORDER — ONDANSETRON 2 MG/ML
4 INJECTION INTRAMUSCULAR; INTRAVENOUS EVERY 8 HOURS PRN
Status: DISCONTINUED | OUTPATIENT
Start: 2023-07-15 | End: 2023-07-16 | Stop reason: HOSPADM

## 2023-07-14 RX ORDER — BISACODYL 10 MG
10 SUPPOSITORY, RECTAL RECTAL DAILY PRN
Status: DISCONTINUED | OUTPATIENT
Start: 2023-07-15 | End: 2023-07-16 | Stop reason: HOSPADM

## 2023-07-14 RX ORDER — MAG HYDROX/ALUMINUM HYD/SIMETH 200-200-20
30 SUSPENSION, ORAL (FINAL DOSE FORM) ORAL 4 TIMES DAILY PRN
Status: DISCONTINUED | OUTPATIENT
Start: 2023-07-15 | End: 2023-07-16 | Stop reason: HOSPADM

## 2023-07-14 RX ORDER — SODIUM CHLORIDE 0.9 % (FLUSH) 0.9 %
10 SYRINGE (ML) INJECTION EVERY 12 HOURS PRN
Status: DISCONTINUED | OUTPATIENT
Start: 2023-07-15 | End: 2023-07-16 | Stop reason: HOSPADM

## 2023-07-14 RX ORDER — IBUPROFEN 200 MG
24 TABLET ORAL
Status: DISCONTINUED | OUTPATIENT
Start: 2023-07-15 | End: 2023-07-16 | Stop reason: HOSPADM

## 2023-07-14 RX ORDER — BUPROPION HYDROCHLORIDE 150 MG/1
300 TABLET ORAL DAILY
Status: DISCONTINUED | OUTPATIENT
Start: 2023-07-15 | End: 2023-07-16 | Stop reason: HOSPADM

## 2023-07-14 RX ORDER — GUAIFENESIN/DEXTROMETHORPHAN 100-10MG/5
10 SYRUP ORAL EVERY 4 HOURS PRN
Status: DISCONTINUED | OUTPATIENT
Start: 2023-07-15 | End: 2023-07-16 | Stop reason: HOSPADM

## 2023-07-14 RX ORDER — SODIUM CHLORIDE 0.9 % (FLUSH) 0.9 %
10 SYRINGE (ML) INJECTION
Status: DISCONTINUED | OUTPATIENT
Start: 2023-07-15 | End: 2023-07-16 | Stop reason: HOSPADM

## 2023-07-14 RX ORDER — ACETAMINOPHEN 500 MG
1000 TABLET ORAL
Status: COMPLETED | OUTPATIENT
Start: 2023-07-14 | End: 2023-07-14

## 2023-07-14 RX ADMIN — SODIUM CHLORIDE 500 ML: 9 INJECTION, SOLUTION INTRAVENOUS at 07:07

## 2023-07-14 RX ADMIN — ACETAMINOPHEN 1000 MG: 500 TABLET ORAL at 07:07

## 2023-07-14 RX ADMIN — REMDESIVIR 200 MG: 100 INJECTION, POWDER, LYOPHILIZED, FOR SOLUTION INTRAVENOUS at 10:07

## 2023-07-14 RX ADMIN — SODIUM CHLORIDE 1000 ML: 9 INJECTION, SOLUTION INTRAVENOUS at 09:07

## 2023-07-14 RX ADMIN — DEXAMETHASONE SODIUM PHOSPHATE 6 MG: 4 INJECTION, SOLUTION INTRA-ARTICULAR; INTRALESIONAL; INTRAMUSCULAR; INTRAVENOUS; SOFT TISSUE at 10:07

## 2023-07-14 NOTE — Clinical Note
Diagnosis: COVID-19 [[id number]]   Admitting Provider:: ODETTE HE [[id number]]   Future Attending Provider: TUAN DRAKE [[id number]]   Reason for IP Medical Treatment  (Clinical interventions that can only be accomplished in the IP setting? ) :: Supplemental oxygen, IV steroid, remdesivir   I certify that Inpatient services for greater than or equal to 2 midnights are medically necessary:: Yes   Plans for Post-Acute care--if anticipated (pick the single best option):: A. No post acute care anticipated at this time   Special Needs:: No Special Needs [1]

## 2023-07-15 LAB
BILIRUB UR QL STRIP: NEGATIVE
CLARITY UR: CLEAR
COLOR UR: YELLOW
GLUCOSE UR QL STRIP: NEGATIVE
HGB UR QL STRIP: ABNORMAL
KETONES UR QL STRIP: NEGATIVE
LEUKOCYTE ESTERASE UR QL STRIP: NEGATIVE
NITRITE UR QL STRIP: NEGATIVE
PH UR STRIP: 7 [PH] (ref 5–8)
POCT GLUCOSE: 104 MG/DL (ref 70–110)
POCT GLUCOSE: 111 MG/DL (ref 70–110)
POCT GLUCOSE: 116 MG/DL (ref 70–110)
POCT GLUCOSE: 127 MG/DL (ref 70–110)
PROT UR QL STRIP: ABNORMAL
SP GR UR STRIP: 1.01 (ref 1–1.03)
URN SPEC COLLECT METH UR: ABNORMAL
UROBILINOGEN UR STRIP-ACNC: NEGATIVE EU/DL

## 2023-07-15 PROCEDURE — 25000003 PHARM REV CODE 250: Performed by: STUDENT IN AN ORGANIZED HEALTH CARE EDUCATION/TRAINING PROGRAM

## 2023-07-15 PROCEDURE — 94761 N-INVAS EAR/PLS OXIMETRY MLT: CPT

## 2023-07-15 PROCEDURE — 25000242 PHARM REV CODE 250 ALT 637 W/ HCPCS: Performed by: STUDENT IN AN ORGANIZED HEALTH CARE EDUCATION/TRAINING PROGRAM

## 2023-07-15 PROCEDURE — 25000003 PHARM REV CODE 250: Performed by: EMERGENCY MEDICINE

## 2023-07-15 PROCEDURE — 21400001 HC TELEMETRY ROOM

## 2023-07-15 PROCEDURE — 94640 AIRWAY INHALATION TREATMENT: CPT

## 2023-07-15 PROCEDURE — 27000221 HC OXYGEN, UP TO 24 HOURS

## 2023-07-15 PROCEDURE — 63600175 PHARM REV CODE 636 W HCPCS: Mod: JZ,TB | Performed by: EMERGENCY MEDICINE

## 2023-07-15 PROCEDURE — 63600175 PHARM REV CODE 636 W HCPCS: Performed by: STUDENT IN AN ORGANIZED HEALTH CARE EDUCATION/TRAINING PROGRAM

## 2023-07-15 PROCEDURE — 27000207 HC ISOLATION

## 2023-07-15 RX ADMIN — MUPIROCIN: 20 OINTMENT TOPICAL at 08:07

## 2023-07-15 RX ADMIN — ENOXAPARIN SODIUM 60 MG: 60 INJECTION SUBCUTANEOUS at 08:07

## 2023-07-15 RX ADMIN — OXYCODONE HYDROCHLORIDE AND ACETAMINOPHEN 500 MG: 500 TABLET ORAL at 08:07

## 2023-07-15 RX ADMIN — REMDESIVIR 100 MG: 100 INJECTION, POWDER, LYOPHILIZED, FOR SOLUTION INTRAVENOUS at 12:07

## 2023-07-15 RX ADMIN — THERA TABS 1 TABLET: TAB at 08:07

## 2023-07-15 RX ADMIN — DEXAMETHASONE 6 MG: 2 TABLET ORAL at 08:07

## 2023-07-15 RX ADMIN — POLYETHYLENE GLYCOL 3350 17 G: 17 POWDER, FOR SOLUTION ORAL at 08:07

## 2023-07-15 RX ADMIN — ALBUTEROL SULFATE 2.5 MG: 2.5 SOLUTION RESPIRATORY (INHALATION) at 09:07

## 2023-07-15 RX ADMIN — ENOXAPARIN SODIUM 60 MG: 60 INJECTION SUBCUTANEOUS at 12:07

## 2023-07-15 RX ADMIN — BUPROPION HYDROCHLORIDE 300 MG: 150 TABLET, FILM COATED, EXTENDED RELEASE ORAL at 08:07

## 2023-07-15 NOTE — ED TRIAGE NOTES
Patient presents to the ED via EMS from home c/o fatigue. Pt has a hx of stage 4 pancreatic cancer w/ mets and is on active chemo. Pt appears very lethargic and slow to respond to questions. Pt febrile and hypoxic at 91% on RA during initial assessment. Pt denies any complaints at this time. Daughter to remain at bedside.

## 2023-07-15 NOTE — HPI
This is a 72-year-old male with a past medical history of metastatic pancreatic cancer (on oral chemo), HFpEF (EF: 65%), hypertension, hyperlipidemia, who presents with disorientation.     Patient reports that he was brought in to the hospital due to disorientation noted by his family. He was visited by his son and wife and they noted that he was confused. Patient returned to his baseline while in the hospital. He reports baseline fatigue due to his chemotherapy that has not gotten worse. He denied having chest pain, shortness of breath, chills or fevers. No other symptoms reported.     In the ED, the patient was febrile (T-max 38.2°), hypoxic (SpO2 87%, requiring 3 L of supplemental oxygen.  Labs were remarkable for leukocytosis (12.7), elevated lactic acid (2.7), and positive COVID-19.  Chest x-ray showed no acute process.  He received 1.5 L of NS, Tylenol 1 g p.o., dexamethasone 6 mg IV, and remdesivir.  He was admitted for further management.

## 2023-07-15 NOTE — ASSESSMENT & PLAN NOTE
Patient is identified as Severe COVID-19 based on hypoxemia with O2 saturations <94% on room air or on ambulation   Initiate standard COVID protocols; COVID-19 testing ,Infection Control notification  and isolation- respiratory, contact and droplet per protocol    Diagnostics: Labs, imaging.     Management: Inhaled bronchodilators as needed for shortness of breath., decadron, Remdesivir.

## 2023-07-15 NOTE — ASSESSMENT & PLAN NOTE
Patient with Hypoxic Respiratory failure which is Acute.  he is not on home oxygen.   Secondary to Covid-19.

## 2023-07-15 NOTE — ED NOTES
SpO2 dropped down to 87%, Pt immediately placed back on 3L via NC w/ increase in SpO2 to 95%. Pt denied any SOB or CP. MD made aware.

## 2023-07-15 NOTE — SUBJECTIVE & OBJECTIVE
Past Medical History:   Diagnosis Date    (HFpEF) heart failure with preserved ejection fraction 1/25/2023    Alcohol abuse 12/27/2022    Hyperlipidemia     Hypertension     Liver metastases 1/18/2023    Malignant neoplasm of head of pancreas 1/18/2023    Other specified noninfective gastroenteritis and colitis     Pancreatitis     Personal history of colonic polyps        Past Surgical History:   Procedure Laterality Date    COLONOSCOPY      ENDOSCOPIC ULTRASOUND OF UPPER GASTROINTESTINAL TRACT Left 12/30/2022    Procedure: ULTRASOUND, UPPER GI TRACT, ENDOSCOPIC;  Surgeon: Gregory Cristina MD;  Location: [facility] ENDO (2ND FLR);  Service: Endoscopy;  Laterality: Left;    ERCP Left 12/30/2022    Procedure: ERCP (ENDOSCOPIC RETROGRADE CHOLANGIOPANCREATOGRAPHY);  Surgeon: Gregory Cristina MD;  Location: [facility] ENDO (2ND FLR);  Service: Endoscopy;  Laterality: Left;    FOOT SURGERY Left     INSERTION OF TUNNELED CENTRAL VENOUS CATHETER (CVC) WITH SUBCUTANEOUS PORT Bilateral 1/19/2023    Procedure: [id number];  Surgeon: Amador Castellon MD;  Location: [facility] OR;  Service: General;  Laterality: Bilateral;  Right v Left PORTACATH. needs ultrasound and fluoro  RN PREOP 01/18/2023    TONSILLECTOMY         Review of patient's allergies indicates:   Allergen Reactions    Jakafi [ruxolitinib]        No current facility-administered medications on file prior to encounter.     Current Outpatient Medications on File Prior to Encounter   Medication Sig    buPROPion (WELLBUTRIN XL) 300 MG 24 hr tablet Take 1 tablet (300 mg total) by mouth once daily.    HYDROcodone-acetaminophen (NORCO) 5-325 mg per tablet Take 1 tablet by mouth every 6 (six) hours as needed for Pain.    dabrafenib (TAFINLAR) 50 mg Cap TAKE 3 CAPSULES (150 MG TOTAL) TWICE DAILY.    DULCOLAX, BISACODYL, ORAL Take by mouth.    HYDROcodone-acetaminophen (NORCO) 5-325 mg per tablet Take 1 tablet by mouth every 6 (six) hours as needed for Pain.    morphine (MS CONTIN) 15  MG 12 hr tablet Take 1 tablet (15 mg total) by mouth every 12 (twelve) hours.    multivit-mins/iron/folic/lycop (CENTRUM MEN ORAL) Take by mouth.    nicotine (NICODERM CQ) 21 mg/24 hr Place 1 patch onto the skin once daily.    ondansetron (ZOFRAN) 4 MG tablet Take 1 tablet (4 mg total) by mouth every 8 (eight) hours as needed for Nausea.    tiotropium bromide (SPIRIVA RESPIMAT) 2.5 mcg/actuation inhaler Inhale 2 puffs into the lungs once daily. Controller    trametinib (MEKINIST) 2 mg Tab TAKE 1 TABLET ONCE DAILY     Family History       Problem Relation (Age of Onset)    Arthritis Mother, Father    Breast cancer Mother    COPD Father    Hypertension Mother, Father    Skin cancer Father (69)    Stroke Mother          Tobacco Use    Smoking status: Every Day     Packs/day: 0.50     Types: Cigarettes    Smokeless tobacco: Former     Types: Chew   Substance and Sexual Activity    Alcohol use: Yes     Comment: last drink 1-2 months ago    Drug use: Never    Sexual activity: Not on file     Review of Systems   Constitutional:  Positive for fatigue.   HENT: Negative.     Eyes: Negative.    Respiratory: Negative.     Cardiovascular: Negative.    Gastrointestinal: Negative.    Endocrine: Negative.    Genitourinary: Negative.    Musculoskeletal: Negative.    Skin: Negative.    Allergic/Immunologic: Negative.    Neurological: Negative.    Psychiatric/Behavioral:  Positive for confusion.    Objective:     Vital Signs (Most Recent):  Temp: 98 °F (36.7 °C) (07/14/23 2046)  Pulse: 69 (07/14/23 2332)  Resp: 14 (07/14/23 2332)  BP: 133/62 (07/14/23 2332)  SpO2: 95 % (07/14/23 2332) Vital Signs (24h Range):  Temp:  [98 °F (36.7 °C)-100.8 °F (38.2 °C)] 98 °F (36.7 °C)  Pulse:  [] 69  Resp:  [14-20] 14  SpO2:  [87 %-96 %] 95 %  BP: ()/(53-62) 133/62     Weight: 64.4 kg (142 lb)  Body mass index is 22.24 kg/m².     Physical Exam  Vitals and nursing note reviewed.   Constitutional:       General: He is not in acute  distress.     Appearance: Normal appearance. He is not ill-appearing.   HENT:      Head: Normocephalic and atraumatic.      Nose: Nose normal.      Mouth/Throat:      Mouth: Mucous membranes are moist.   Eyes:      Extraocular Movements: Extraocular movements intact.   Cardiovascular:      Rate and Rhythm: Normal rate.      Pulses: Normal pulses.      Heart sounds: No murmur heard.  Pulmonary:      Effort: Pulmonary effort is normal. No respiratory distress.      Comments: On supplemental O2  Abdominal:      General: Abdomen is flat.      Palpations: Abdomen is soft.      Tenderness: There is no abdominal tenderness.   Musculoskeletal:      Right lower leg: No edema.      Left lower leg: No edema.   Skin:     General: Skin is warm.      Capillary Refill: Capillary refill takes less than 2 seconds.   Neurological:      General: No focal deficit present.      Mental Status: He is alert.   Psychiatric:         Mood and Affect: Mood normal.              Significant Labs: All pertinent labs within the past 24 hours have been reviewed.    Significant Imaging: I have reviewed all pertinent imaging results/findings within the past 24 hours.

## 2023-07-15 NOTE — ASSESSMENT & PLAN NOTE
Advance Care Planning     Date: 07/14/2023    Code Status  In light of the patients advanced and life limiting illness,I engaged the the patient in a voluntary conversation about the patient's preferences for care  at the very end of life. The patient wishes to have a natural, peaceful death.  Along those lines, the patient does not wish to have CPR or other invasive treatments performed when his heart and/or breathing stops. I communicated to the patient that a DNR order would be placed in his medical record to reflect this preference.  I spent a total of 12 minutes engaging the patient in this advance care planning discussion.       Patient does not wish to be resuscitated, and does not want chest compressions, intubation or cardioversion.

## 2023-07-15 NOTE — H&P
[facility] Emergency [facility] Medicine  History & Physical    Patient Name: Manuel Tyler  MRN: [id number]  Patient Class: IP- Inpatient  Admission Date: 7/14/2023  Attending Physician: Vini Hayes MD   Primary Care Provider: Fatmata Vera MD         Patient information was obtained from patient and ER records.     Subjective:     Principal Problem:COVID-19    Chief Complaint:   Chief Complaint   Patient presents with    Fatigue     EMs called to 73yo male that has been feeling more fatigued today than normal. Denied any pain. Hx of stage 4 pancreatic cancer with mets. Patient is febrile at triage.         HPI: This is a 72-year-old male with a past medical history of metastatic pancreatic cancer (on oral chemo), HFpEF (EF: 65%), hypertension, hyperlipidemia, who presents with disorientation.     Patient reports that he was brought in to the hospital due to disorientation noted by his family. He was visited by his son and wife and they noted that he was confused. Patient returned to his baseline while in the hospital. He reports baseline fatigue due to his chemotherapy that has not gotten worse. He denied having chest pain, shortness of breath, chills or fevers. No other symptoms reported.     In the ED, the patient was febrile (T-max 38.2°), hypoxic (SpO2 87%, requiring 3 L of supplemental oxygen.  Labs were remarkable for leukocytosis (12.7), elevated lactic acid (2.7), and positive COVID-19.  Chest x-ray showed no acute process.  He received 1.5 L of NS, Tylenol 1 g p.o., dexamethasone 6 mg IV, and remdesivir.  He was admitted for further management.      Past Medical History:   Diagnosis Date    (HFpEF) heart failure with preserved ejection fraction 1/25/2023    Alcohol abuse 12/27/2022    Hyperlipidemia     Hypertension     Liver metastases 1/18/2023    Malignant neoplasm of head of pancreas 1/18/2023    Other specified noninfective gastroenteritis and colitis     Pancreatitis     Personal  history of colonic polyps        Past Surgical History:   Procedure Laterality Date    COLONOSCOPY      ENDOSCOPIC ULTRASOUND OF UPPER GASTROINTESTINAL TRACT Left 12/30/2022    Procedure: ULTRASOUND, UPPER GI TRACT, ENDOSCOPIC;  Surgeon: Gregory Cristina MD;  Location: [facility] ENDO (2ND FLR);  Service: Endoscopy;  Laterality: Left;    ERCP Left 12/30/2022    Procedure: ERCP (ENDOSCOPIC RETROGRADE CHOLANGIOPANCREATOGRAPHY);  Surgeon: Gregory Cristina MD;  Location: [facility] ENDO (2ND FLR);  Service: Endoscopy;  Laterality: Left;    FOOT SURGERY Left     INSERTION OF TUNNELED CENTRAL VENOUS CATHETER (CVC) WITH SUBCUTANEOUS PORT Bilateral 1/19/2023    Procedure: [id number];  Surgeon: Amador Castellon MD;  Location: [facility] OR;  Service: General;  Laterality: Bilateral;  Right v Left PORTACATH. needs ultrasound and fluoro  RN PREOP 01/18/2023    TONSILLECTOMY         Review of patient's allergies indicates:   Allergen Reactions    Jakafi [ruxolitinib]        No current facility-administered medications on file prior to encounter.     Current Outpatient Medications on File Prior to Encounter   Medication Sig    buPROPion (WELLBUTRIN XL) 300 MG 24 hr tablet Take 1 tablet (300 mg total) by mouth once daily.    HYDROcodone-acetaminophen (NORCO) 5-325 mg per tablet Take 1 tablet by mouth every 6 (six) hours as needed for Pain.    dabrafenib (TAFINLAR) 50 mg Cap TAKE 3 CAPSULES (150 MG TOTAL) TWICE DAILY.    DULCOLAX, BISACODYL, ORAL Take by mouth.    HYDROcodone-acetaminophen (NORCO) 5-325 mg per tablet Take 1 tablet by mouth every 6 (six) hours as needed for Pain.    morphine (MS CONTIN) 15 MG 12 hr tablet Take 1 tablet (15 mg total) by mouth every 12 (twelve) hours.    multivit-mins/iron/folic/lycop (CENTRUM MEN ORAL) Take by mouth.    nicotine (NICODERM CQ) 21 mg/24 hr Place 1 patch onto the skin once daily.    ondansetron (ZOFRAN) 4 MG tablet Take 1 tablet (4 mg total) by mouth every 8 (eight) hours as needed  for Nausea.    tiotropium bromide (SPIRIVA RESPIMAT) 2.5 mcg/actuation inhaler Inhale 2 puffs into the lungs once daily. Controller    trametinib (MEKINIST) 2 mg Tab TAKE 1 TABLET ONCE DAILY     Family History       Problem Relation (Age of Onset)    Arthritis Mother, Father    Breast cancer Mother    COPD Father    Hypertension Mother, Father    Skin cancer Father (69)    Stroke Mother          Tobacco Use    Smoking status: Every Day     Packs/day: 0.50     Types: Cigarettes    Smokeless tobacco: Former     Types: Chew   Substance and Sexual Activity    Alcohol use: Yes     Comment: last drink 1-2 months ago    Drug use: Never    Sexual activity: Not on file     Review of Systems   Constitutional:  Positive for fatigue.   HENT: Negative.     Eyes: Negative.    Respiratory: Negative.     Cardiovascular: Negative.    Gastrointestinal: Negative.    Endocrine: Negative.    Genitourinary: Negative.    Musculoskeletal: Negative.    Skin: Negative.    Allergic/Immunologic: Negative.    Neurological: Negative.    Psychiatric/Behavioral:  Positive for confusion.    Objective:     Vital Signs (Most Recent):  Temp: 98 °F (36.7 °C) (07/14/23 2046)  Pulse: 69 (07/14/23 2332)  Resp: 14 (07/14/23 2332)  BP: 133/62 (07/14/23 2332)  SpO2: 95 % (07/14/23 2332) Vital Signs (24h Range):  Temp:  [98 °F (36.7 °C)-100.8 °F (38.2 °C)] 98 °F (36.7 °C)  Pulse:  [] 69  Resp:  [14-20] 14  SpO2:  [87 %-96 %] 95 %  BP: ()/(53-62) 133/62     Weight: 64.4 kg (142 lb)  Body mass index is 22.24 kg/m².     Physical Exam  Vitals and nursing note reviewed.   Constitutional:       General: He is not in acute distress.     Appearance: Normal appearance. He is not ill-appearing.   HENT:      Head: Normocephalic and atraumatic.      Nose: Nose normal.      Mouth/Throat:      Mouth: Mucous membranes are moist.   Eyes:      Extraocular Movements: Extraocular movements intact.   Cardiovascular:      Rate and Rhythm: Normal rate.       Pulses: Normal pulses.      Heart sounds: No murmur heard.  Pulmonary:      Effort: Pulmonary effort is normal. No respiratory distress.      Comments: On supplemental O2  Abdominal:      General: Abdomen is flat.      Palpations: Abdomen is soft.      Tenderness: There is no abdominal tenderness.   Musculoskeletal:      Right lower leg: No edema.      Left lower leg: No edema.   Skin:     General: Skin is warm.      Capillary Refill: Capillary refill takes less than 2 seconds.   Neurological:      General: No focal deficit present.      Mental Status: He is alert.   Psychiatric:         Mood and Affect: Mood normal.              Significant Labs: All pertinent labs within the past 24 hours have been reviewed.    Significant Imaging: I have reviewed all pertinent imaging results/findings within the past 24 hours.    Assessment/Plan:     * COVID-19  Patient is identified as Severe COVID-19 based on hypoxemia with O2 saturations <94% on room air or on ambulation   Initiate standard COVID protocols; COVID-19 testing ,Infection Control notification  and isolation- respiratory, contact and droplet per protocol    Diagnostics: Labs, imaging.     Management: Inhaled bronchodilators as needed for shortness of breath., decadron, Remdesivir.       Acute hypoxemic respiratory failure  Patient with Hypoxic Respiratory failure which is Acute.  he is not on home oxygen.   Secondary to Covid-19.     Neoplasm related pain  Resume PRN norco      ACP (advance care planning)  Advance Care Planning     Date: 07/14/2023    Code Status  In light of the patients advanced and life limiting illness,I engaged the the patient in a voluntary conversation about the patient's preferences for care  at the very end of life. The patient wishes to have a natural, peaceful death.  Along those lines, the patient does not wish to have CPR or other invasive treatments performed when his heart and/or breathing stops. I communicated to the patient that a  DNR order would be placed in his medical record to reflect this preference.  I spent a total of 12 minutes engaging the patient in this advance care planning discussion.      Patient does not wish to be resuscitated, and does not want chest compressions, intubation or cardioversion.         (HFpEF) heart failure with preserved ejection fraction  Results for orders placed during the hospital encounter of 01/25/23    Echo Saline Bubble? No    Interpretation Summary  · The left ventricle is normal in size with concentric hypertrophy and normal systolic function.  · The estimated ejection fraction is 65%.  · Indeterminate left ventricular diastolic function.  · Normal right ventricular size with normal right ventricular systolic function.  · Mild left atrial enlargement.  · Mild-to-moderate aortic regurgitation.  · Mild tricuspid regurgitation.  · Normal central venous pressure (3 mmHg).  · The estimated PA systolic pressure is 33 mmHg.  No acute issues.       Malignant neoplasm of head of pancreas  Resume oral chemotherapy   Outpatient follow up with oncology       Primary hypertension  Monitor BP        VTE Risk Mitigation (From admission, onward)         Ordered     enoxaparin injection 60 mg  2 times daily         07/14/23 2326     IP VTE HIGH RISK PATIENT  Once         07/14/23 2325     Place sequential compression device  Until discontinued         07/14/23 2327                 Hilton Griffin MD  Department of Hospital Medicine  [facility] [place] - Emergency Dept

## 2023-07-15 NOTE — ED PROVIDER NOTES
Encounter Date: 7/14/2023    SCRIBE #1 NOTE: I, Melissa Demarco, am scribing for, and in the presence of,  Symone Gilmore MD. I have scribed the following portions of the note - Other sections scribed: HPI, ROS, PE, EKG, MDM.     History     Chief Complaint   Patient presents with    Fatigue     EMs called to 71yo male that has been feeling more fatigued today than normal. Denied any pain. Hx of stage 4 pancreatic cancer with mets. Patient is febrile at triage.      This is a 72 y.o. male, with a PMHx of heart failure, HLD, HTN, and malignant neoplasm of head of pancreas, who presents to the Emergency Department complaining of fatigue today. Patient's daughter in law reports her mother in law noticing the patient experiencing increased fatigue and weakness today. Patient endorses intermittent chest pain, productive cough, nausea, vomiting, and frequency. Patient denies using NC O2 at home. Denies SOB, diarrhea, or other associated symptoms.       The history is provided by the patient. No  was used.   Review of patient's allergies indicates:   Allergen Reactions    Jakafi [ruxolitinib]      Past Medical History:   Diagnosis Date    (HFpEF) heart failure with preserved ejection fraction 1/25/2023    Alcohol abuse 12/27/2022    Hyperlipidemia     Hypertension     Liver metastases 1/18/2023    Malignant neoplasm of head of pancreas 1/18/2023    Other specified noninfective gastroenteritis and colitis     Pancreatitis     Personal history of colonic polyps      Past Surgical History:   Procedure Laterality Date    COLONOSCOPY      ENDOSCOPIC ULTRASOUND OF UPPER GASTROINTESTINAL TRACT Left 12/30/2022    Procedure: ULTRASOUND, UPPER GI TRACT, ENDOSCOPIC;  Surgeon: Gregory Cristina MD;  Location: [facility] ([address] [place] [address]);  Service: Endoscopy;  Laterality: Left;    ERCP Left 12/30/2022    Procedure: ERCP (ENDOSCOPIC RETROGRADE CHOLANGIOPANCREATOGRAPHY);  Surgeon: Gregory Cristina MD;  Location: [facility] (2ND  FLR);  Service: Endoscopy;  Laterality: Left;    FOOT SURGERY Left     INSERTION OF TUNNELED CENTRAL VENOUS CATHETER (CVC) WITH SUBCUTANEOUS PORT Bilateral 1/19/2023    Procedure: [id number];  Surgeon: Amador Castellon MD;  Location: [facility] OR;  Service: General;  Laterality: Bilateral;  Right v Left PORTACATH. needs ultrasound and fluoro  RN PREOP 01/18/2023    TONSILLECTOMY       Family History   Problem Relation Age of Onset    Stroke Mother     Breast cancer Mother     Hypertension Mother     Arthritis Mother     Skin cancer Father 69    COPD Father     Arthritis Father     Hypertension Father      Social History     Tobacco Use    Smoking status: Every Day     Packs/day: 0.50     Types: Cigarettes    Smokeless tobacco: Former     Types: Chew   Substance Use Topics    Alcohol use: Yes     Comment: last drink 1-2 months ago    Drug use: Never     Review of Systems   Constitutional:  Positive for fatigue. Negative for fever.   HENT:  Negative for facial swelling and sore throat.    Eyes:  Negative for pain and discharge.   Respiratory:  Positive for cough (Productive). Negative for choking and shortness of breath.    Cardiovascular:  Positive for chest pain (Intermittent).   Gastrointestinal:  Positive for nausea and vomiting. Negative for abdominal pain.   Genitourinary:  Positive for frequency. Negative for dysuria.   Musculoskeletal:  Negative for back pain.   Skin:  Negative for rash.   Neurological:  Negative for weakness and numbness.     Physical Exam     Initial Vitals [07/14/23 1856]   BP Pulse Resp Temp SpO2   (!) 110/55 107 20 (!) 100.6 °F (38.1 °C) (!) 92 %      MAP       --         Physical Exam    Nursing note and vitals reviewed.  Constitutional: He is not diaphoretic. No distress.   Ill appearing.    HENT:   Head: Normocephalic and atraumatic.   Protecting airway   Eyes: Conjunctivae and EOM are normal. No scleral icterus.   Neck: Neck supple. No tracheal deviation present.   Normal range  of motion.  Cardiovascular:  Normal rate, regular rhythm and intact distal pulses.           Pulmonary/Chest: No stridor. No respiratory distress. He has no rhonchi.   Speaking in full sentences   Abdominal: Abdomen is soft. He exhibits no distension. There is no abdominal tenderness.   Musculoskeletal:         General: No tenderness or edema.      Cervical back: Normal range of motion and neck supple.     Neurological: He is alert. He has normal strength. No cranial nerve deficit or sensory deficit.   Skin: Skin is warm and dry.   Psychiatric: He has a normal mood and affect.       ED Course   Procedures  Labs Reviewed   CBC W/ AUTO DIFFERENTIAL - Abnormal; Notable for the following components:       Result Value    WBC 12.79 (*)     RDW 14.7 (*)     Platelets 482 (*)     Immature Granulocytes 0.6 (*)     Gran # (ANC) 8.4 (*)     Immature Grans (Abs) 0.08 (*)     Mono # 2.4 (*)     Lymph % 12.5 (*)     Mono % 19.0 (*)     All other components within normal limits   COMPREHENSIVE METABOLIC PANEL - Abnormal; Notable for the following components:    Sodium 132 (*)     Albumin 3.4 (*)     AST 55 (*)     eGFR 59 (*)     All other components within normal limits   LIPASE - Abnormal; Notable for the following components:    Lipase 75 (*)     All other components within normal limits   LACTIC ACID, PLASMA - Abnormal; Notable for the following components:    Lactate (Lactic Acid) 2.7 (*)     All other components within normal limits   SARS-COV-2 RDRP GENE - Abnormal; Notable for the following components:    POC Rapid COVID Positive (*)     All other components within normal limits   CULTURE, BLOOD   CULTURE, BLOOD   PROCALCITONIN   B-TYPE NATRIURETIC PEPTIDE   TROPONIN I   URINALYSIS, REFLEX TO URINE CULTURE   LACTIC ACID, PLASMA   POCT INFLUENZA A/B MOLECULAR     EKG Readings: (Independently Interpreted)   Initial Reading: No STEMI. Rhythm: Normal Sinus Rhythm. Heart Rate: 92. Ectopy: No Ectopy. Conduction: Normal. ST  Segments: Normal ST Segments. T Waves: Normal. Clinical Impression: Normal Sinus Rhythm     Imaging Results              X-Ray Chest AP Portable (Final result)  Result time 07/14/23 20:42:32      Final result by Chaim Blum MD (07/14/23 20:42:32)                   Impression:      No acute cardiopulmonary process identified.      Electronically signed by: Chaim Blum MD  Date:    07/14/2023  Time:    20:42               Narrative:    EXAMINATION:  XR CHEST AP PORTABLE    CLINICAL HISTORY:  Sepsis;    TECHNIQUE:  Single frontal view of the chest was performed.    COMPARISON:  01/25/2023.    FINDINGS:  Cardiac silhouette is normal in size.  Right-sided chest port is in stable position.  Lungs are symmetrically expanded.  No evidence of focal consolidative process, pneumothorax, or significant pleural effusion.  No acute osseous abnormality identified.                                       Medications   remdesivir 200 mg in sodium chloride 0.9% 250 mL infusion (200 mg Intravenous New Bag 7/14/23 2252)     Followed by   remdesivir 100 mg in sodium chloride 0.9% 250 mL infusion (has no administration in time range)   sodium chloride 0.9% bolus 500 mL 500 mL (0 mLs Intravenous Stopped 7/14/23 2054)   acetaminophen tablet 1,000 mg (1,000 mg Oral Given 7/14/23 1956)   0.9%  NaCl infusion (0 mLs Intravenous Stopped 7/14/23 2247)   dexAMETHasone injection 6 mg (6 mg Intravenous Given 7/14/23 2246)     Medical Decision Making:   History:   Old Medical Records: I decided to obtain old medical records.  Differential Diagnosis:   Differential diagnosis include but are not limited to: sepsis, occult infection, bacteremia, COVID, flu, pneumonia, pancreatitis, and UTI.    Independently Interpreted Test(s):   I have ordered and independently interpreted EKG Reading(s) - see summary below       <> Summary of EKG Reading(s): EKG independently interpreted by me reads: see above/see below/see ED management.     Clinical Tests:    Lab Tests: Reviewed and Ordered  Radiological Study: Ordered and Reviewed  Medical Tests: Ordered and Reviewed  ED Management:  Fever and is hypoxic on room air.  Oxygen saturation normalizes with supplemental nasal cannula.  Symptoms concerning for sepsis.  Hypoxia patient started with cautious IV hydration due to concern for fluid overload.  Labs notable for leukocytosis of 12.8.  This is improved compared to 4 days prior.  There is no bandemia.  Lactic acid is elevated at 2.7.  Patient has mild hyponatremia.  Procalcitonin is within normal ranges.  Does not demonstrate pneumonia.  Patient given IV hydration, antipyretic on reassessment has improvement in fever.  He reports feeling well on reassessment.  His ambulatory O2 sat is 87%.  Given patient's comorbidities, hypoxia he has been given IV Decadron and ordered for remdesivir.  He is to be admitted to Hospital Medicine for further management.    Please put in 60 minutes of critical care due to patient having a high risk of respiratory failure.   Separate from teaching and exclusive of procedure and ekg time  Includes:  Time at bedside  Time reviewing test results  Time discussing case with staff  Time documenting the medical record  Time spent with family members  Time spent with consults  Management   This chart was completed using dictation software, as a result there may be some transcription errors.           Scribe Attestation:   Scribe #1: I performed the above scribed service and the documentation accurately describes the services I performed. I attest to the accuracy of the note.                 I, Symone Gilmore , personally performed the services described in this documentation. All medical record entries made by the scribe were at my direction and in my presence. I have reviewed the chart and agree that the record reflects my personal performance and is accurate and complete.    Clinical Impression:   Final diagnoses:  [A41.9] Sepsis  [U07.1]  COVID-19  [R09.02] Hypoxia (Primary)        ED Disposition Condition    Admit Stable                Symone Gilmore MD  07/14/23 9159

## 2023-07-15 NOTE — NURSING
Nurses Note -- 4 Eyes      7/15/2023   2:21 AM      Skin assessed during: Admit      [] No Altered Skin Integrity Present    []Prevention Measures Documented      [x] Yes- Altered Skin Integrity Present or Discovered   [x] LDA Added if Not in Epic (Describe Wound)   [] New Altered Skin Integrity was Present on Admit and Documented in LDA   [] Wound Image Taken    Skin Tear noted on right elbow    Wound Care Consulted? No    Attending Nurse:  Amarjit Cunningham RN     Second RN/Staff Member:  JASMIN Aguilar

## 2023-07-15 NOTE — ASSESSMENT & PLAN NOTE
Results for orders placed during the hospital encounter of 01/25/23    Echo Saline Bubble? No    Interpretation Summary  · The left ventricle is normal in size with concentric hypertrophy and normal systolic function.  · The estimated ejection fraction is 65%.  · Indeterminate left ventricular diastolic function.  · Normal right ventricular size with normal right ventricular systolic function.  · Mild left atrial enlargement.  · Mild-to-moderate aortic regurgitation.  · Mild tricuspid regurgitation.  · Normal central venous pressure (3 mmHg).  · The estimated PA systolic pressure is 33 mmHg.  No acute issues.

## 2023-07-15 NOTE — PLAN OF CARE
Case Management Assessment     PCP: Fatmata Vera MD  Pharmacy: [facility]  Patient Arrived From: Home  Existing Help at Home: Vesta-spouse  Barriers to Discharge: None    Discharge Plan:    A. Home with family; continue [facility] ; follow-up   B. TBD    Independent at home with spouse; no assist needed; current with [facility] ; no DME used; no needs reported.      07/15/23 1243   Discharge Assessment   Assessment Type Discharge Planning Assessment   Confirmed/corrected address, phone number and insurance Yes   Confirmed Demographics Correct on Facesheet   Source of Information health record;patient   Communicated MIKO with patient/caregiver Date not available/Unable to determine   Reason For Admission Malignant neoplasm   People in Home spouse   Facility Arrived From: Home   Do you expect to return to your current living situation? Yes   Do you have help at home or someone to help you manage your care at home? Yes   Who are your caregiver(s) and their phone number(s)? Vesta-spouse: [phone]   Prior to hospitilization cognitive status: Alert/Oriented   Current cognitive status: Alert/Oriented   Home Accessibility wheelchair accessible   Home Layout Able to live on 1st floor   Equipment Currently Used at Home none   Readmission within 30 days? No   Patient currently being followed by outpatient case management? No   Do you currently have service(s) that help you manage your care at home? No  ([facility] )   Do you take prescription medications? Yes   Do you have prescription coverage? Yes   Coverage Medicare;    Do you have any problems affording any of your prescribed medications? No   Is the patient taking medications as prescribed? yes   Who is going to help you get home at discharge? Vesta-spouse   How do you get to doctors appointments? car, drives self   Are you on dialysis? No   Do you take coumadin? No   Discharge Plan A Home with family  (Continue Og[facility] ; follow-up)   Discharge Plan B    ([phone])   DME Needed Upon Discharge  other (see comments)  (TBD)   Discharge Plan discussed with: Patient   Transition of Care Barriers None     SW Role explained to patient; two patient identifiers recognized; SW contact information placed on Communication board. Discussed patient managing health care at home and discussed discharge plans A and B; determined who would be helping patient at home with recovery: Vesta-spouse will help with recovery at home.

## 2023-07-15 NOTE — PROGRESS NOTES
[facility] Medicine  Progress Note    Patient Name: Manuel Tyler  MRN: [id number]  Patient Class: IP- Inpatient   Admission Date: 7/14/2023  Length of Stay: 1 days  Attending Physician: Vini Hayes MD  Primary Care Provider: Fatmata Vera MD        Subjective:     Principal Problem:COVID-19        HPI:  This is a 72-year-old male with a past medical history of metastatic pancreatic cancer (on oral chemo), HFpEF (EF: 65%), hypertension, hyperlipidemia, who presents with disorientation.     Patient reports that he was brought in to the hospital due to disorientation noted by his family. He was visited by his son and wife and they noted that he was confused. Patient returned to his baseline while in the hospital. He reports baseline fatigue due to his chemotherapy that has not gotten worse. He denied having chest pain, shortness of breath, chills or fevers. No other symptoms reported.     In the ED, the patient was febrile (T-max 38.2°), hypoxic (SpO2 87%, requiring 3 L of supplemental oxygen.  Labs were remarkable for leukocytosis (12.7), elevated lactic acid (2.7), and positive COVID-19.  Chest x-ray showed no acute process.  He received 1.5 L of NS, Tylenol 1 g p.o., dexamethasone 6 mg IV, and remdesivir.  He was admitted for further management.      Overview/Hospital Course:  Stage 4 Pancreatic cancer, admitted with COVID hypoxia.  Started on steroids and remdesivir.      Interval History:   NAEON.  No new issues.   Denies complaints.  All questions answered and updates on care given.       Review of Systems   Constitutional:  Positive for activity change, fatigue and fever. Negative for unexpected weight change.   HENT:  Positive for congestion and rhinorrhea. Negative for trouble swallowing and voice change.    Eyes:  Negative for photophobia and visual disturbance.   Respiratory:  Positive for cough and shortness of breath.    Cardiovascular:  Negative for chest pain, palpitations and leg  swelling.   Gastrointestinal:  Negative for abdominal distention, abdominal pain, diarrhea and vomiting.   Endocrine: Negative for polydipsia and polyuria.   Genitourinary:  Negative for difficulty urinating, dysuria and hematuria.   Musculoskeletal:  Negative for neck pain and neck stiffness.   Skin:  Negative for pallor and rash.   Allergic/Immunologic: Negative for food allergies and immunocompromised state.   Neurological:  Negative for seizures, syncope, facial asymmetry and weakness.   Psychiatric/Behavioral:  Negative for agitation, behavioral problems and confusion.        Objective:     Vital Signs (Most Recent):  Temp: 97.7 °F (36.5 °C) (07/15/23 0749)  Pulse: 69 (07/15/23 0749)  Resp: 19 (07/15/23 0749)  BP: 133/68 (07/15/23 0749)  SpO2: 98 % (07/15/23 0749) Vital Signs (24h Range):  Temp:  [97.7 °F (36.5 °C)-100.8 °F (38.2 °C)] 97.7 °F (36.5 °C)  Pulse:  [] 69  Resp:  [14-20] 19  SpO2:  [87 %-98 %] 98 %  BP: ()/(53-75) 133/68     Weight: 67 kg (147 lb 11.3 oz)  Body mass index is 23.13 kg/m².    Intake/Output Summary (Last 24 hours) at 7/15/2023 0845  Last data filed at 7/15/2023 0749  Gross per 24 hour   Intake 1740 ml   Output 2100 ml   Net -360 ml         Physical Exam  Vitals and nursing note reviewed.   Constitutional:       General: He is not in acute distress.     Appearance: He is well-developed. He is ill-appearing. He is not diaphoretic.   HENT:      Head: Normocephalic and atraumatic.      Nose: Congestion and rhinorrhea present.      Mouth/Throat:      Mouth: Mucous membranes are moist.      Pharynx: No oropharyngeal exudate.   Eyes:      General: No scleral icterus.     Pupils: Pupils are equal, round, and reactive to light.   Neck:      Thyroid: No thyromegaly.   Cardiovascular:      Rate and Rhythm: Normal rate and regular rhythm.      Heart sounds: No murmur heard.  Pulmonary:      Breath sounds: No stridor. Rales present. No wheezing.   Abdominal:      General: There is no  distension.      Palpations: Abdomen is soft.      Tenderness: There is no guarding.   Musculoskeletal:         General: No deformity. Normal range of motion.      Cervical back: Normal range of motion. No rigidity.      Right lower leg: No edema.      Left lower leg: No edema.   Skin:     General: Skin is warm.      Capillary Refill: Capillary refill takes less than 2 seconds.      Coloration: Skin is not jaundiced.      Findings: No bruising.   Neurological:      General: No focal deficit present.      Mental Status: He is alert.      Cranial Nerves: No cranial nerve deficit.   Psychiatric:         Mood and Affect: Mood normal.         Behavior: Behavior normal.               Recent Results (from the past 24 hour(s))   Blood culture x two cultures. Draw prior to antibiotics.    Collection Time: 07/14/23  7:22 PM    Specimen: Peripheral, Forearm, Left; Blood   Result Value Ref Range    Blood Culture, Routine No Growth to date    CBC auto differential    Collection Time: 07/14/23  7:22 PM   Result Value Ref Range    WBC 12.79 (H) 3.90 - 12.70 K/uL    RBC 4.73 4.60 - 6.20 M/uL    Hemoglobin 14.5 14.0 - 18.0 g/dL    Hematocrit 43.2 40.0 - 54.0 %    MCV 91 82 - 98 fL    MCH 30.7 27.0 - 31.0 pg    MCHC 33.6 32.0 - 36.0 g/dL    RDW 14.7 (H) 11.5 - 14.5 %    Platelets 482 (H) 150 - 450 K/uL    MPV 9.4 9.2 - 12.9 fL    Immature Granulocytes 0.6 (H) 0.0 - 0.5 %    Gran # (ANC) 8.4 (H) 1.8 - 7.7 K/uL    Immature Grans (Abs) 0.08 (H) 0.00 - 0.04 K/uL    Lymph # 1.6 1.0 - 4.8 K/uL    Mono # 2.4 (H) 0.3 - 1.0 K/uL    Eos # 0.2 0.0 - 0.5 K/uL    Baso # 0.11 0.00 - 0.20 K/uL    nRBC 0 0 /100 WBC    Gran % 65.4 38.0 - 73.0 %    Lymph % 12.5 (L) 18.0 - 48.0 %    Mono % 19.0 (H) 4.0 - 15.0 %    Eosinophil % 1.6 0.0 - 8.0 %    Basophil % 0.9 0.0 - 1.9 %    Differential Method Automated    Comprehensive metabolic panel    Collection Time: 07/14/23  7:22 PM   Result Value Ref Range    Sodium 132 (L) 136 - 145 mmol/L    Potassium 4.6  3.5 - 5.1 mmol/L    Chloride 100 95 - 110 mmol/L    CO2 24 23 - 29 mmol/L    Glucose 99 70 - 110 mg/dL    BUN 20 8 - 23 mg/dL    Creatinine 1.3 0.5 - 1.4 mg/dL    Calcium 9.7 8.7 - 10.5 mg/dL    Total Protein 7.9 6.0 - 8.4 g/dL    Albumin 3.4 (L) 3.5 - 5.2 g/dL    Total Bilirubin 0.3 0.1 - 1.0 mg/dL    Alkaline Phosphatase 134 55 - 135 U/L    AST 55 (H) 10 - 40 U/L    ALT 32 10 - 44 U/L    eGFR 59 (A) >60 mL/min/1.73 m^2    Anion Gap 8 8 - 16 mmol/L   Procalcitonin    Collection Time: 07/14/23  7:22 PM   Result Value Ref Range    Procalcitonin 0.07 <0.25 ng/mL   Brain natriuretic peptide    Collection Time: 07/14/23  7:22 PM   Result Value Ref Range    BNP 22 0 - 99 pg/mL   Troponin I    Collection Time: 07/14/23  7:22 PM   Result Value Ref Range    Troponin I <0.006 0.000 - 0.026 ng/mL   Lipase    Collection Time: 07/14/23  7:22 PM   Result Value Ref Range    Lipase 75 (H) 4 - 60 U/L   Lactic acid, plasma #1    Collection Time: 07/14/23  7:22 PM   Result Value Ref Range    Lactate (Lactic Acid) 2.7 (H) 0.5 - 2.2 mmol/L   POCT Influenza A/B Molecular    Collection Time: 07/14/23  7:50 PM   Result Value Ref Range    POC Molecular Influenza A Ag Negative Negative, Not Reported    POC Molecular Influenza B Ag Negative Negative, Not Reported     Acceptable Yes    POCT COVID-19 Rapid Screening    Collection Time: 07/14/23  7:50 PM   Result Value Ref Range    POC Rapid COVID Positive (A) Negative     Acceptable Yes    Blood culture x two cultures. Draw prior to antibiotics.    Collection Time: 07/14/23  8:05 PM    Specimen: Peripheral, Hand, Right; Blood   Result Value Ref Range    Blood Culture, Routine No Growth to date    Urinalysis, Reflex to Urine Culture Urine, Clean Catch    Collection Time: 07/14/23 11:15 PM    Specimen: Urine   Result Value Ref Range    Specimen UA Urine, Clean Catch     Color, UA Yellow Yellow, Straw, Lennie    Appearance, UA Clear Clear    pH, UA 7.0 5.0 - 8.0     Specific Gravity, UA 1.015 1.005 - 1.030    Protein, UA Trace (A) Negative    Glucose, UA Negative Negative    Ketones, UA Negative Negative    Bilirubin (UA) Negative Negative    Occult Blood UA Trace (A) Negative    Nitrite, UA Negative Negative    Urobilinogen, UA Negative <2.0 EU/dL    Leukocytes, UA Negative Negative   Lactic acid, plasma #2    Collection Time: 07/14/23 11:31 PM   Result Value Ref Range    Lactate (Lactic Acid) 1.2 0.5 - 2.2 mmol/L   POCT glucose    Collection Time: 07/15/23  7:49 AM   Result Value Ref Range    POCT Glucose 104 70 - 110 mg/dL       Microbiology Results (last 7 days)       Procedure Component Value Units Date/Time    Blood culture x two cultures. Draw prior to antibiotics. [[id number]] Collected: 07/14/23 2005    Order Status: Completed Specimen: Blood from Peripheral, Hand, Right Updated: 07/15/23 0312     Blood Culture, Routine No Growth to date    Narrative:      Aerobic and anaerobic    Blood culture x two cultures. Draw prior to antibiotics. [[id number]] Collected: 07/14/23 1922    Order Status: Completed Specimen: Blood from Peripheral, Forearm, Left Updated: 07/15/23 0312     Blood Culture, Routine No Growth to date    Narrative:      Aerobic and anaerobic             Imaging Results              X-Ray Chest AP Portable (Final result)  Result time 07/14/23 20:42:32      Final result by Chaim Blum MD (07/14/23 20:42:32)                   Impression:      No acute cardiopulmonary process identified.      Electronically signed by: Chaim Blum MD  Date:    07/14/2023  Time:    20:42               Narrative:    EXAMINATION:  XR CHEST AP PORTABLE    CLINICAL HISTORY:  Sepsis;    TECHNIQUE:  Single frontal view of the chest was performed.    COMPARISON:  01/25/2023.    FINDINGS:  Cardiac silhouette is normal in size.  Right-sided chest port is in stable position.  Lungs are symmetrically expanded.  No evidence of focal consolidative process, pneumothorax, or significant  pleural effusion.  No acute osseous abnormality identified.                                            Assessment/Plan:      * COVID-19  Patient is identified as Severe COVID-19 based on hypoxemia with O2 saturations <94% on room air or on ambulation   Initiate standard COVID protocols; COVID-19 testing ,Infection Control notification  and isolation- respiratory, contact and droplet per protocol    Diagnostics: Labs, imaging.     Management: Inhaled bronchodilators as needed for shortness of breath., decadron, Remdesivir.       Acute hypoxemic respiratory failure  Patient with Hypoxic Respiratory failure which is Acute.  he is not on home oxygen.   Secondary to Covid-19.     Neoplasm related pain  Resume PRN norco      ACP (advance care planning)  Advance Care Planning     Date: 07/14/2023    Code Status  In light of the patients advanced and life limiting illness,I engaged the the patient in a voluntary conversation about the patient's preferences for care  at the very end of life. The patient wishes to have a natural, peaceful death.  Along those lines, the patient does not wish to have CPR or other invasive treatments performed when his heart and/or breathing stops. I communicated to the patient that a DNR order would be placed in his medical record to reflect this preference.  I spent a total of 12 minutes engaging the patient in this advance care planning discussion.      Patient does not wish to be resuscitated, and does not want chest compressions, intubation or cardioversion.         (HFpEF) heart failure with preserved ejection fraction  Results for orders placed during the hospital encounter of 01/25/23    Echo Saline Bubble? No    Interpretation Summary  · The left ventricle is normal in size with concentric hypertrophy and normal systolic function.  · The estimated ejection fraction is 65%.  · Indeterminate left ventricular diastolic function.  · Normal right ventricular size with normal right  ventricular systolic function.  · Mild left atrial enlargement.  · Mild-to-moderate aortic regurgitation.  · Mild tricuspid regurgitation.  · Normal central venous pressure (3 mmHg).  · The estimated PA systolic pressure is 33 mmHg.  No acute issues.       Malignant neoplasm of head of pancreas  Resume oral chemotherapy   Outpatient follow up with oncology       Primary hypertension  Monitor BP        VTE Risk Mitigation (From admission, onward)         Ordered     enoxaparin injection 60 mg  2 times daily         07/14/23 2326     IP VTE HIGH RISK PATIENT  Once         07/14/23 2325     Place sequential compression device  Until discontinued         07/14/23 2325                Discharge Planning   MIKO:      Code Status: DNR   Is the patient medically ready for discharge?:     Reason for patient still in hospital (select all that apply): Patient trending condition and Treatment                     Vini Hayes MD  Department of Hospital Medicine   [facility] [place] [facility]

## 2023-07-15 NOTE — ED NOTES
Informed Patient of need for urine specimen, urinal placed at bedside. Pt instructed and verbalized understanding of how to utilize call bell when able to produce specimen.

## 2023-07-15 NOTE — SUBJECTIVE & OBJECTIVE
Interval History:   NAEON.  No new issues.   Denies complaints.  All questions answered and updates on care given.       Review of Systems   Constitutional:  Positive for activity change, fatigue and fever. Negative for unexpected weight change.   HENT:  Positive for congestion and rhinorrhea. Negative for trouble swallowing and voice change.    Eyes:  Negative for photophobia and visual disturbance.   Respiratory:  Positive for cough and shortness of breath.    Cardiovascular:  Negative for chest pain, palpitations and leg swelling.   Gastrointestinal:  Negative for abdominal distention, abdominal pain, diarrhea and vomiting.   Endocrine: Negative for polydipsia and polyuria.   Genitourinary:  Negative for difficulty urinating, dysuria and hematuria.   Musculoskeletal:  Negative for neck pain and neck stiffness.   Skin:  Negative for pallor and rash.   Allergic/Immunologic: Negative for food allergies and immunocompromised state.   Neurological:  Negative for seizures, syncope, facial asymmetry and weakness.   Psychiatric/Behavioral:  Negative for agitation, behavioral problems and confusion.        Objective:     Vital Signs (Most Recent):  Temp: 97.7 °F (36.5 °C) (07/15/23 0749)  Pulse: 69 (07/15/23 0749)  Resp: 19 (07/15/23 0749)  BP: 133/68 (07/15/23 0749)  SpO2: 98 % (07/15/23 0749) Vital Signs (24h Range):  Temp:  [97.7 °F (36.5 °C)-100.8 °F (38.2 °C)] 97.7 °F (36.5 °C)  Pulse:  [] 69  Resp:  [14-20] 19  SpO2:  [87 %-98 %] 98 %  BP: ()/(53-75) 133/68     Weight: 67 kg (147 lb 11.3 oz)  Body mass index is 23.13 kg/m².    Intake/Output Summary (Last 24 hours) at 7/15/2023 0820  Last data filed at 7/15/2023 0749  Gross per 24 hour   Intake 1740 ml   Output 2100 ml   Net -360 ml         Physical Exam  Vitals and nursing note reviewed.   Constitutional:       General: He is not in acute distress.     Appearance: He is well-developed. He is ill-appearing. He is not diaphoretic.   HENT:      Head:  Normocephalic and atraumatic.      Nose: Congestion and rhinorrhea present.      Mouth/Throat:      Mouth: Mucous membranes are moist.      Pharynx: No oropharyngeal exudate.   Eyes:      General: No scleral icterus.     Pupils: Pupils are equal, round, and reactive to light.   Neck:      Thyroid: No thyromegaly.   Cardiovascular:      Rate and Rhythm: Normal rate and regular rhythm.      Heart sounds: No murmur heard.  Pulmonary:      Breath sounds: No stridor. Rales present. No wheezing.   Abdominal:      General: There is no distension.      Palpations: Abdomen is soft.      Tenderness: There is no guarding.   Musculoskeletal:         General: No deformity. Normal range of motion.      Cervical back: Normal range of motion. No rigidity.      Right lower leg: No edema.      Left lower leg: No edema.   Skin:     General: Skin is warm.      Capillary Refill: Capillary refill takes less than 2 seconds.      Coloration: Skin is not jaundiced.      Findings: No bruising.   Neurological:      General: No focal deficit present.      Mental Status: He is alert.      Cranial Nerves: No cranial nerve deficit.   Psychiatric:         Mood and Affect: Mood normal.         Behavior: Behavior normal.               Recent Results (from the past 24 hour(s))   Blood culture x two cultures. Draw prior to antibiotics.    Collection Time: 07/14/23  7:22 PM    Specimen: Peripheral, Forearm, Left; Blood   Result Value Ref Range    Blood Culture, Routine No Growth to date    CBC auto differential    Collection Time: 07/14/23  7:22 PM   Result Value Ref Range    WBC 12.79 (H) 3.90 - 12.70 K/uL    RBC 4.73 4.60 - 6.20 M/uL    Hemoglobin 14.5 14.0 - 18.0 g/dL    Hematocrit 43.2 40.0 - 54.0 %    MCV 91 82 - 98 fL    MCH 30.7 27.0 - 31.0 pg    MCHC 33.6 32.0 - 36.0 g/dL    RDW 14.7 (H) 11.5 - 14.5 %    Platelets 482 (H) 150 - 450 K/uL    MPV 9.4 9.2 - 12.9 fL    Immature Granulocytes 0.6 (H) 0.0 - 0.5 %    Gran # (ANC) 8.4 (H) 1.8 - 7.7 K/uL     Immature Grans (Abs) 0.08 (H) 0.00 - 0.04 K/uL    Lymph # 1.6 1.0 - 4.8 K/uL    Mono # 2.4 (H) 0.3 - 1.0 K/uL    Eos # 0.2 0.0 - 0.5 K/uL    Baso # 0.11 0.00 - 0.20 K/uL    nRBC 0 0 /100 WBC    Gran % 65.4 38.0 - 73.0 %    Lymph % 12.5 (L) 18.0 - 48.0 %    Mono % 19.0 (H) 4.0 - 15.0 %    Eosinophil % 1.6 0.0 - 8.0 %    Basophil % 0.9 0.0 - 1.9 %    Differential Method Automated    Comprehensive metabolic panel    Collection Time: 07/14/23  7:22 PM   Result Value Ref Range    Sodium 132 (L) 136 - 145 mmol/L    Potassium 4.6 3.5 - 5.1 mmol/L    Chloride 100 95 - 110 mmol/L    CO2 24 23 - 29 mmol/L    Glucose 99 70 - 110 mg/dL    BUN 20 8 - 23 mg/dL    Creatinine 1.3 0.5 - 1.4 mg/dL    Calcium 9.7 8.7 - 10.5 mg/dL    Total Protein 7.9 6.0 - 8.4 g/dL    Albumin 3.4 (L) 3.5 - 5.2 g/dL    Total Bilirubin 0.3 0.1 - 1.0 mg/dL    Alkaline Phosphatase 134 55 - 135 U/L    AST 55 (H) 10 - 40 U/L    ALT 32 10 - 44 U/L    eGFR 59 (A) >60 mL/min/1.73 m^2    Anion Gap 8 8 - 16 mmol/L   Procalcitonin    Collection Time: 07/14/23  7:22 PM   Result Value Ref Range    Procalcitonin 0.07 <0.25 ng/mL   Brain natriuretic peptide    Collection Time: 07/14/23  7:22 PM   Result Value Ref Range    BNP 22 0 - 99 pg/mL   Troponin I    Collection Time: 07/14/23  7:22 PM   Result Value Ref Range    Troponin I <0.006 0.000 - 0.026 ng/mL   Lipase    Collection Time: 07/14/23  7:22 PM   Result Value Ref Range    Lipase 75 (H) 4 - 60 U/L   Lactic acid, plasma #1    Collection Time: 07/14/23  7:22 PM   Result Value Ref Range    Lactate (Lactic Acid) 2.7 (H) 0.5 - 2.2 mmol/L   POCT Influenza A/B Molecular    Collection Time: 07/14/23  7:50 PM   Result Value Ref Range    POC Molecular Influenza A Ag Negative Negative, Not Reported    POC Molecular Influenza B Ag Negative Negative, Not Reported     Acceptable Yes    POCT COVID-19 Rapid Screening    Collection Time: 07/14/23  7:50 PM   Result Value Ref Range    POC Rapid COVID Positive  (A) Negative     Acceptable Yes    Blood culture x two cultures. Draw prior to antibiotics.    Collection Time: 07/14/23  8:05 PM    Specimen: Peripheral, Hand, Right; Blood   Result Value Ref Range    Blood Culture, Routine No Growth to date    Urinalysis, Reflex to Urine Culture Urine, Clean Catch    Collection Time: 07/14/23 11:15 PM    Specimen: Urine   Result Value Ref Range    Specimen UA Urine, Clean Catch     Color, UA Yellow Yellow, Straw, Lennie    Appearance, UA Clear Clear    pH, UA 7.0 5.0 - 8.0    Specific Gravity, UA 1.015 1.005 - 1.030    Protein, UA Trace (A) Negative    Glucose, UA Negative Negative    Ketones, UA Negative Negative    Bilirubin (UA) Negative Negative    Occult Blood UA Trace (A) Negative    Nitrite, UA Negative Negative    Urobilinogen, UA Negative <2.0 EU/dL    Leukocytes, UA Negative Negative   Lactic acid, plasma #2    Collection Time: 07/14/23 11:31 PM   Result Value Ref Range    Lactate (Lactic Acid) 1.2 0.5 - 2.2 mmol/L   POCT glucose    Collection Time: 07/15/23  7:49 AM   Result Value Ref Range    POCT Glucose 104 70 - 110 mg/dL       Microbiology Results (last 7 days)       Procedure Component Value Units Date/Time    Blood culture x two cultures. Draw prior to antibiotics. [[id number]] Collected: 07/14/23 2005    Order Status: Completed Specimen: Blood from Peripheral, Hand, Right Updated: 07/15/23 0312     Blood Culture, Routine No Growth to date    Narrative:      Aerobic and anaerobic    Blood culture x two cultures. Draw prior to antibiotics. [[id number]] Collected: 07/14/23 1922    Order Status: Completed Specimen: Blood from Peripheral, Forearm, Left Updated: 07/15/23 0312     Blood Culture, Routine No Growth to date    Narrative:      Aerobic and anaerobic             Imaging Results              X-Ray Chest AP Portable (Final result)  Result time 07/14/23 20:42:32      Final result by Chaim Blum MD (07/14/23 20:42:32)                    Impression:      No acute cardiopulmonary process identified.      Electronically signed by: Chaim Blum MD  Date:    07/14/2023  Time:    20:42               Narrative:    EXAMINATION:  XR CHEST AP PORTABLE    CLINICAL HISTORY:  Sepsis;    TECHNIQUE:  Single frontal view of the chest was performed.    COMPARISON:  01/25/2023.    FINDINGS:  Cardiac silhouette is normal in size.  Right-sided chest port is in stable position.  Lungs are symmetrically expanded.  No evidence of focal consolidative process, pneumothorax, or significant pleural effusion.  No acute osseous abnormality identified.

## 2023-07-16 VITALS
WEIGHT: 147.69 LBS | RESPIRATION RATE: 19 BRPM | SYSTOLIC BLOOD PRESSURE: 157 MMHG | HEART RATE: 85 BPM | TEMPERATURE: 98 F | OXYGEN SATURATION: 94 % | HEIGHT: 67 IN | BODY MASS INDEX: 23.18 KG/M2 | DIASTOLIC BLOOD PRESSURE: 73 MMHG

## 2023-07-16 LAB
ALBUMIN SERPL BCP-MCNC: 2.9 G/DL (ref 3.5–5.2)
ALP SERPL-CCNC: 116 U/L (ref 55–135)
ALT SERPL W/O P-5'-P-CCNC: 40 U/L (ref 10–44)
ANION GAP SERPL CALC-SCNC: 7 MMOL/L (ref 8–16)
AST SERPL-CCNC: 90 U/L (ref 10–40)
BASOPHILS # BLD AUTO: 0.07 K/UL (ref 0–0.2)
BASOPHILS NFR BLD: 0.6 % (ref 0–1.9)
BILIRUB SERPL-MCNC: 0.2 MG/DL (ref 0.1–1)
BUN SERPL-MCNC: 20 MG/DL (ref 8–23)
CALCIUM SERPL-MCNC: 8.7 MG/DL (ref 8.7–10.5)
CHLORIDE SERPL-SCNC: 103 MMOL/L (ref 95–110)
CO2 SERPL-SCNC: 25 MMOL/L (ref 23–29)
CREAT SERPL-MCNC: 0.9 MG/DL (ref 0.5–1.4)
DIFFERENTIAL METHOD: ABNORMAL
EOSINOPHIL # BLD AUTO: 0.1 K/UL (ref 0–0.5)
EOSINOPHIL NFR BLD: 0.5 % (ref 0–8)
ERYTHROCYTE [DISTWIDTH] IN BLOOD BY AUTOMATED COUNT: 14.7 % (ref 11.5–14.5)
EST. GFR  (NO RACE VARIABLE): >60 ML/MIN/1.73 M^2
GLUCOSE SERPL-MCNC: 88 MG/DL (ref 70–110)
HCT VFR BLD AUTO: 43 % (ref 40–54)
HGB BLD-MCNC: 14.3 G/DL (ref 14–18)
IMM GRANULOCYTES # BLD AUTO: 0.05 K/UL (ref 0–0.04)
IMM GRANULOCYTES NFR BLD AUTO: 0.4 % (ref 0–0.5)
LYMPHOCYTES # BLD AUTO: 3.2 K/UL (ref 1–4.8)
LYMPHOCYTES NFR BLD: 27.1 % (ref 18–48)
MAGNESIUM SERPL-MCNC: 1.7 MG/DL (ref 1.6–2.6)
MCH RBC QN AUTO: 29.9 PG (ref 27–31)
MCHC RBC AUTO-ENTMCNC: 33.3 G/DL (ref 32–36)
MCV RBC AUTO: 90 FL (ref 82–98)
MONOCYTES # BLD AUTO: 2 K/UL (ref 0.3–1)
MONOCYTES NFR BLD: 16.7 % (ref 4–15)
NEUTROPHILS # BLD AUTO: 6.4 K/UL (ref 1.8–7.7)
NEUTROPHILS NFR BLD: 54.7 % (ref 38–73)
NRBC BLD-RTO: 0 /100 WBC
PHOSPHATE SERPL-MCNC: 2.3 MG/DL (ref 2.7–4.5)
PLATELET # BLD AUTO: 431 K/UL (ref 150–450)
PMV BLD AUTO: 9.1 FL (ref 9.2–12.9)
POCT GLUCOSE: 121 MG/DL (ref 70–110)
POCT GLUCOSE: 96 MG/DL (ref 70–110)
POTASSIUM SERPL-SCNC: 4.4 MMOL/L (ref 3.5–5.1)
PROT SERPL-MCNC: 6.9 G/DL (ref 6–8.4)
RBC # BLD AUTO: 4.78 M/UL (ref 4.6–6.2)
SODIUM SERPL-SCNC: 135 MMOL/L (ref 136–145)
WBC # BLD AUTO: 11.76 K/UL (ref 3.9–12.7)

## 2023-07-16 PROCEDURE — 63600175 PHARM REV CODE 636 W HCPCS: Performed by: STUDENT IN AN ORGANIZED HEALTH CARE EDUCATION/TRAINING PROGRAM

## 2023-07-16 PROCEDURE — 83735 ASSAY OF MAGNESIUM: CPT | Performed by: STUDENT IN AN ORGANIZED HEALTH CARE EDUCATION/TRAINING PROGRAM

## 2023-07-16 PROCEDURE — 84100 ASSAY OF PHOSPHORUS: CPT | Performed by: STUDENT IN AN ORGANIZED HEALTH CARE EDUCATION/TRAINING PROGRAM

## 2023-07-16 PROCEDURE — 36415 COLL VENOUS BLD VENIPUNCTURE: CPT | Performed by: STUDENT IN AN ORGANIZED HEALTH CARE EDUCATION/TRAINING PROGRAM

## 2023-07-16 PROCEDURE — 25000242 PHARM REV CODE 250 ALT 637 W/ HCPCS: Performed by: STUDENT IN AN ORGANIZED HEALTH CARE EDUCATION/TRAINING PROGRAM

## 2023-07-16 PROCEDURE — 25000003 PHARM REV CODE 250: Performed by: EMERGENCY MEDICINE

## 2023-07-16 PROCEDURE — 85025 COMPLETE CBC W/AUTO DIFF WBC: CPT | Performed by: STUDENT IN AN ORGANIZED HEALTH CARE EDUCATION/TRAINING PROGRAM

## 2023-07-16 PROCEDURE — 80053 COMPREHEN METABOLIC PANEL: CPT | Performed by: STUDENT IN AN ORGANIZED HEALTH CARE EDUCATION/TRAINING PROGRAM

## 2023-07-16 PROCEDURE — 63600175 PHARM REV CODE 636 W HCPCS: Mod: JZ,TB | Performed by: EMERGENCY MEDICINE

## 2023-07-16 PROCEDURE — 25000003 PHARM REV CODE 250: Performed by: STUDENT IN AN ORGANIZED HEALTH CARE EDUCATION/TRAINING PROGRAM

## 2023-07-16 RX ADMIN — THERA TABS 1 TABLET: TAB at 08:07

## 2023-07-16 RX ADMIN — DEXAMETHASONE 6 MG: 2 TABLET ORAL at 08:07

## 2023-07-16 RX ADMIN — MUPIROCIN: 20 OINTMENT TOPICAL at 10:07

## 2023-07-16 RX ADMIN — BUPROPION HYDROCHLORIDE 300 MG: 150 TABLET, FILM COATED, EXTENDED RELEASE ORAL at 08:07

## 2023-07-16 RX ADMIN — REMDESIVIR 100 MG: 100 INJECTION, POWDER, LYOPHILIZED, FOR SOLUTION INTRAVENOUS at 08:07

## 2023-07-16 RX ADMIN — POLYETHYLENE GLYCOL 3350 17 G: 17 POWDER, FOR SOLUTION ORAL at 08:07

## 2023-07-16 RX ADMIN — OXYCODONE HYDROCHLORIDE AND ACETAMINOPHEN 500 MG: 500 TABLET ORAL at 08:07

## 2023-07-16 RX ADMIN — ENOXAPARIN SODIUM 60 MG: 60 INJECTION SUBCUTANEOUS at 08:07

## 2023-07-16 NOTE — PLAN OF CARE
[facility] - Telemetry  Discharge Final Note    Patient is ready and clear for discharge from Case Managements perspective; informed patients nurse, Denice. Discussed and provided patient with written discharge instruction and education    Primary Care Provider: Fatmata Vera MD    Expected Discharge Date: 7/16/2023    Final Discharge Note (most recent)       Final Note - 07/16/23 1204          Final Note    Assessment Type Final Discharge Note (P)      Anticipated Discharge Disposition Home or Self Care (P)      Hospital Resources/Appts/Education Provided Provided patient/caregiver with written discharge plan information;Appointments scheduled by Navigator/Coordinator;Appointments scheduled and added to AVS;Provided education on problems/symptoms using teachback (P)         Post-Acute Status    Discharge Delays None known at this time (P)                      Important Message from Medicare             Contact Info       Fatmata Vera MD   Specialty: Internal Medicine, Wound Care   Relationship: PCP - General    [address] [place] [address]  [address]  [place] [id number]   Phone: [phone]       Next Steps: Schedule an appointment as soon as possible for a visit in 1 week(s)    Instructions: Follow up Appt.

## 2023-07-16 NOTE — PLAN OF CARE
Problem: Adult Inpatient Plan of Care  Goal: Optimal Comfort and Wellbeing  Outcome: Ongoing, Progressing     Problem: Infection  Goal: Absence of Infection Signs and Symptoms  Outcome: Ongoing, Progressing   No acute events to report. Pt denies pain or SOB.

## 2023-07-16 NOTE — NURSING
Reviewed all discharge instructions with patient, new medications, continued medications, follow-up appointments and signs/symptoms to report to PCP or seek emergency medical care. Patient verbalized understanding to all instructions and education. Patient denies any complaints or concerns at this time. Patient was given chemo drugs that were  (own formulary supply), wheeled off unit to car for discharge.

## 2023-07-16 NOTE — PLAN OF CARE
07/16/23 1201   Discharge Assessment   Assessment Type Final Discharge Note   Confirmed/corrected address, phone number and insurance Yes   Confirmed Demographics Correct on Facesheet   Source of Information patient   People in Home spouse   Facility Arrived From: Home   Do you expect to return to your current living situation? Yes   Prior to hospitilization cognitive status: Alert/Oriented   Current cognitive status: Alert/Oriented

## 2023-07-16 NOTE — NURSING
[facility]  Nurses Note -- 4 Eyes      7/15/2023       Skin assessed on: Q Shift      [x] No Pressure Injuries Present    []Prevention Measures Documented    [] Yes LDA  for Pressure Injury Previously documented     [] Yes New Pressure Injury Discovered   [] LDA for New Pressure Injury Added      Attending RN:  Darien Pascual, RN     Second RN:  Denise

## 2023-07-16 NOTE — DISCHARGE SUMMARY
[facility] Medicine  Discharge Summary      Patient Name: Manuel Tyler  MRN: [id number]  [facility]: [id number]  Patient Class: IP- Inpatient  Admission Date: 7/14/2023  Hospital Length of Stay: 2 days  Discharge Date and Time:  07/16/2023 11:38 AM  Attending Physician: Vini Hayes MD   Discharging Provider: Vini Hayes MD  Primary Care Provider: Fatmata Vera MD    Primary Care Team: Networked reference to record PCT     HPI:   This is a 72-year-old male with a past medical history of metastatic pancreatic cancer (on oral chemo), HFpEF (EF: 65%), hypertension, hyperlipidemia, who presents with disorientation.     Patient reports that he was brought in to the hospital due to disorientation noted by his family. He was visited by his son and wife and they noted that he was confused. Patient returned to his baseline while in the hospital. He reports baseline fatigue due to his chemotherapy that has not gotten worse. He denied having chest pain, shortness of breath, chills or fevers. No other symptoms reported.     In the ED, the patient was febrile (T-max 38.2°), hypoxic (SpO2 87%, requiring 3 L of supplemental oxygen.  Labs were remarkable for leukocytosis (12.7), elevated lactic acid (2.7), and positive COVID-19.  Chest x-ray showed no acute process.  He received 1.5 L of NS, Tylenol 1 g p.o., dexamethasone 6 mg IV, and remdesivir.  He was admitted for further management.      * No surgery found *      Hospital Course:   Stage 4 Pancreatic cancer, admitted with COVID hypoxia.  Started on steroids and remdesivir.  Not hypoxic on 6m walk, 96-98% with ambulation. Safe for d/c.    ROS:  General: Negative for fevers or chills.  Cardiac: Negative for chest pain or orthopnea   Pulmonary: Negative for dyspnea or wheezing.  GI: Negative for abdominal distention or pain     Vitals:    07/15/23 2329 07/16/23 0502 07/16/23 0734 07/16/23 1113   BP: (!) 142/76 138/76 (!) 142/74 (!) 157/73   Patient Position:        Pulse: 84 85 82 85   Resp: 18 18 19 19   Temp: 98.4 °F (36.9 °C) 97.9 °F (36.6 °C) 98.6 °F (37 °C) 98.1 °F (36.7 °C)   TempSrc: Oral Oral     SpO2: (!) 94% 96% (!) 92% (!) 94%   Weight:       Height:              Body mass index is 23.13 kg/m².      PHYSICAL EXAM:  GENERAL APPEARANCE: alert and cooperative, and appears to be in no acute distress.  HEENT:     HEAD: NC/AT     EYES: PERRL, EOMI.  Vision is grossly intact.  NECK: Neck supple, non-tender without LAD, masses or thyromegaly.  CARDIAC: There is no peripheral edema, cyanosis or pallor.   LUNGS: Clear to auscultation and percussion without rales or wheezing  ABDOMEN: Non-distended. No guarding.  MSK: No joint erythema or tenderness.   EXTREMITIES: No significant deformity or joint abnormality. No edema.   NEUROLOGICAL: CN II-XII grossly intact.   SKIN: Skin normal color, texture and turgor with no lesions or eruptions.  PSYCHIATRIC: Oriented. No tangential speech. No Hyperactive features.       Goals of Care Treatment Preferences:  Code Status: DNR    Living Will: Yes       Consults:     No new Assessment & Plan notes have been filed under this hospital service since the last note was generated.  Service: Hospital Medicine    Final Active Diagnoses:    Diagnosis Date Noted POA    PRINCIPAL PROBLEM:  COVID-19 [U07.1] 07/14/2023 Yes    Acute hypoxemic respiratory failure [J96.01] 07/14/2023 Yes    Neoplasm related pain [G89.3] 02/07/2023 Yes    ACP (advance care planning) [Z71.89] 01/29/2023 Not Applicable    (HFpEF) heart failure with preserved ejection fraction [I50.30] 01/25/2023 Yes    Malignant neoplasm of head of pancreas [C25.0] 01/18/2023 Yes    Primary hypertension [I10] 12/27/2022 Yes      Problems Resolved During this Admission:       Discharged Condition: good    Disposition: Home or Self Care    Follow Up:    Patient Instructions:      Ambulatory referral/consult to Internal Medicine   Standing Status: Future   Referral Priority: Routine  Referral Type: Consultation   Referral Reason: Specialty Services Required   Requested Specialty: Internal Medicine   Number of Visits Requested: 1       Recent Results (from the past 100 hour(s))   Blood culture x two cultures. Draw prior to antibiotics.    Collection Time: 07/14/23  7:22 PM    Specimen: Peripheral, Forearm, Left; Blood   Result Value Ref Range    Blood Culture, Routine No Growth to date     Blood Culture, Routine No Growth to date    CBC auto differential    Collection Time: 07/14/23  7:22 PM   Result Value Ref Range    WBC 12.79 (H) 3.90 - 12.70 K/uL    RBC 4.73 4.60 - 6.20 M/uL    Hemoglobin 14.5 14.0 - 18.0 g/dL    Hematocrit 43.2 40.0 - 54.0 %    MCV 91 82 - 98 fL    MCH 30.7 27.0 - 31.0 pg    MCHC 33.6 32.0 - 36.0 g/dL    RDW 14.7 (H) 11.5 - 14.5 %    Platelets 482 (H) 150 - 450 K/uL    MPV 9.4 9.2 - 12.9 fL    Immature Granulocytes 0.6 (H) 0.0 - 0.5 %    Gran # (ANC) 8.4 (H) 1.8 - 7.7 K/uL    Immature Grans (Abs) 0.08 (H) 0.00 - 0.04 K/uL    Lymph # 1.6 1.0 - 4.8 K/uL    Mono # 2.4 (H) 0.3 - 1.0 K/uL    Eos # 0.2 0.0 - 0.5 K/uL    Baso # 0.11 0.00 - 0.20 K/uL    nRBC 0 0 /100 WBC    Gran % 65.4 38.0 - 73.0 %    Lymph % 12.5 (L) 18.0 - 48.0 %    Mono % 19.0 (H) 4.0 - 15.0 %    Eosinophil % 1.6 0.0 - 8.0 %    Basophil % 0.9 0.0 - 1.9 %    Differential Method Automated    Comprehensive metabolic panel    Collection Time: 07/14/23  7:22 PM   Result Value Ref Range    Sodium 132 (L) 136 - 145 mmol/L    Potassium 4.6 3.5 - 5.1 mmol/L    Chloride 100 95 - 110 mmol/L    CO2 24 23 - 29 mmol/L    Glucose 99 70 - 110 mg/dL    BUN 20 8 - 23 mg/dL    Creatinine 1.3 0.5 - 1.4 mg/dL    Calcium 9.7 8.7 - 10.5 mg/dL    Total Protein 7.9 6.0 - 8.4 g/dL    Albumin 3.4 (L) 3.5 - 5.2 g/dL    Total Bilirubin 0.3 0.1 - 1.0 mg/dL    Alkaline Phosphatase 134 55 - 135 U/L    AST 55 (H) 10 - 40 U/L    ALT 32 10 - 44 U/L    eGFR 59 (A) >60 mL/min/1.73 m^2    Anion Gap 8 8 - 16 mmol/L   Procalcitonin    Collection Time:  07/14/23  7:22 PM   Result Value Ref Range    Procalcitonin 0.07 <0.25 ng/mL   Brain natriuretic peptide    Collection Time: 07/14/23  7:22 PM   Result Value Ref Range    BNP 22 0 - 99 pg/mL   Troponin I    Collection Time: 07/14/23  7:22 PM   Result Value Ref Range    Troponin I <0.006 0.000 - 0.026 ng/mL   Lipase    Collection Time: 07/14/23  7:22 PM   Result Value Ref Range    Lipase 75 (H) 4 - 60 U/L   Lactic acid, plasma #1    Collection Time: 07/14/23  7:22 PM   Result Value Ref Range    Lactate (Lactic Acid) 2.7 (H) 0.5 - 2.2 mmol/L   POCT Influenza A/B Molecular    Collection Time: 07/14/23  7:50 PM   Result Value Ref Range    POC Molecular Influenza A Ag Negative Negative, Not Reported    POC Molecular Influenza B Ag Negative Negative, Not Reported     Acceptable Yes    POCT COVID-19 Rapid Screening    Collection Time: 07/14/23  7:50 PM   Result Value Ref Range    POC Rapid COVID Positive (A) Negative     Acceptable Yes    Blood culture x two cultures. Draw prior to antibiotics.    Collection Time: 07/14/23  8:05 PM    Specimen: Peripheral, Hand, Right; Blood   Result Value Ref Range    Blood Culture, Routine No Growth to date     Blood Culture, Routine No Growth to date    Urinalysis, Reflex to Urine Culture Urine, Clean Catch    Collection Time: 07/14/23 11:15 PM    Specimen: Urine   Result Value Ref Range    Specimen UA Urine, Clean Catch     Color, UA Yellow Yellow, Straw, Lennie    Appearance, UA Clear Clear    pH, UA 7.0 5.0 - 8.0    Specific Gravity, UA 1.015 1.005 - 1.030    Protein, UA Trace (A) Negative    Glucose, UA Negative Negative    Ketones, UA Negative Negative    Bilirubin (UA) Negative Negative    Occult Blood UA Trace (A) Negative    Nitrite, UA Negative Negative    Urobilinogen, UA Negative <2.0 EU/dL    Leukocytes, UA Negative Negative   Lactic acid, plasma #2    Collection Time: 07/14/23 11:31 PM   Result Value Ref Range    Lactate (Lactic Acid) 1.2 0.5 -  2.2 mmol/L   POCT glucose    Collection Time: 07/15/23  7:49 AM   Result Value Ref Range    POCT Glucose 104 70 - 110 mg/dL   POCT glucose    Collection Time: 07/15/23 11:11 AM   Result Value Ref Range    POCT Glucose 127 (H) 70 - 110 mg/dL   POCT glucose    Collection Time: 07/15/23  4:31 PM   Result Value Ref Range    POCT Glucose 111 (H) 70 - 110 mg/dL   POCT glucose    Collection Time: 07/15/23  7:32 PM   Result Value Ref Range    POCT Glucose 116 (H) 70 - 110 mg/dL   Phosphorus    Collection Time: 07/16/23  5:41 AM   Result Value Ref Range    Phosphorus 2.3 (L) 2.7 - 4.5 mg/dL   Magnesium    Collection Time: 07/16/23  5:41 AM   Result Value Ref Range    Magnesium 1.7 1.6 - 2.6 mg/dL   Comprehensive Metabolic Panel    Collection Time: 07/16/23  5:41 AM   Result Value Ref Range    Sodium 135 (L) 136 - 145 mmol/L    Potassium 4.4 3.5 - 5.1 mmol/L    Chloride 103 95 - 110 mmol/L    CO2 25 23 - 29 mmol/L    Glucose 88 70 - 110 mg/dL    BUN 20 8 - 23 mg/dL    Creatinine 0.9 0.5 - 1.4 mg/dL    Calcium 8.7 8.7 - 10.5 mg/dL    Total Protein 6.9 6.0 - 8.4 g/dL    Albumin 2.9 (L) 3.5 - 5.2 g/dL    Total Bilirubin 0.2 0.1 - 1.0 mg/dL    Alkaline Phosphatase 116 55 - 135 U/L    AST 90 (H) 10 - 40 U/L    ALT 40 10 - 44 U/L    eGFR >60 >60 mL/min/1.73 m^2    Anion Gap 7 (L) 8 - 16 mmol/L   CBC Auto Differential    Collection Time: 07/16/23  5:42 AM   Result Value Ref Range    WBC 11.76 3.90 - 12.70 K/uL    RBC 4.78 4.60 - 6.20 M/uL    Hemoglobin 14.3 14.0 - 18.0 g/dL    Hematocrit 43.0 40.0 - 54.0 %    MCV 90 82 - 98 fL    MCH 29.9 27.0 - 31.0 pg    MCHC 33.3 32.0 - 36.0 g/dL    RDW 14.7 (H) 11.5 - 14.5 %    Platelets 431 150 - 450 K/uL    MPV 9.1 (L) 9.2 - 12.9 fL    Immature Granulocytes 0.4 0.0 - 0.5 %    Gran # (ANC) 6.4 1.8 - 7.7 K/uL    Immature Grans (Abs) 0.05 (H) 0.00 - 0.04 K/uL    Lymph # 3.2 1.0 - 4.8 K/uL    Mono # 2.0 (H) 0.3 - 1.0 K/uL    Eos # 0.1 0.0 - 0.5 K/uL    Baso # 0.07 0.00 - 0.20 K/uL    nRBC 0 0 /100  WBC    Gran % 54.7 38.0 - 73.0 %    Lymph % 27.1 18.0 - 48.0 %    Mono % 16.7 (H) 4.0 - 15.0 %    Eosinophil % 0.5 0.0 - 8.0 %    Basophil % 0.6 0.0 - 1.9 %    Differential Method Automated    POCT glucose    Collection Time: 07/16/23  7:33 AM   Result Value Ref Range    POCT Glucose 96 70 - 110 mg/dL   POCT glucose    Collection Time: 07/16/23 11:11 AM   Result Value Ref Range    POCT Glucose 121 (H) 70 - 110 mg/dL       Microbiology Results (last 7 days)     Procedure Component Value Units Date/Time    Blood culture x two cultures. Draw prior to antibiotics. [[id number]] Collected: 07/14/23 2005    Order Status: Completed Specimen: Blood from Peripheral, Hand, Right Updated: 07/15/23 2103     Blood Culture, Routine No Growth to date      No Growth to date    Narrative:      Aerobic and anaerobic    Blood culture x two cultures. Draw prior to antibiotics. [[id number]] Collected: 07/14/23 1922    Order Status: Completed Specimen: Blood from Peripheral, Forearm, Left Updated: 07/15/23 2103     Blood Culture, Routine No Growth to date      No Growth to date    Narrative:      Aerobic and anaerobic          Imaging Results          X-Ray Chest AP Portable (Final result)  Result time 07/14/23 20:42:32    Final result by Chaim Blum MD (07/14/23 20:42:32)                 Impression:      No acute cardiopulmonary process identified.      Electronically signed by: Chaim Blum MD  Date:    07/14/2023  Time:    20:42             Narrative:    EXAMINATION:  XR CHEST AP PORTABLE    CLINICAL HISTORY:  Sepsis;    TECHNIQUE:  Single frontal view of the chest was performed.    COMPARISON:  01/25/2023.    FINDINGS:  Cardiac silhouette is normal in size.  Right-sided chest port is in stable position.  Lungs are symmetrically expanded.  No evidence of focal consolidative process, pneumothorax, or significant pleural effusion.  No acute osseous abnormality identified.                                    Pending Diagnostic  Studies:     None         Medications:  Reconciled Home Medications:      Medication List      CONTINUE taking these medications    buPROPion 300 MG 24 hr tablet  Commonly known as: WELLBUTRIN XL  Take 1 tablet (300 mg total) by mouth once daily.     CENTRUM MEN ORAL  Take by mouth.     dabrafenib 50 mg Cap  Commonly known as: TAFINLAR  TAKE 3 CAPSULES (150 MG TOTAL) TWICE DAILY.     DULCOLAX (BISACODYL) ORAL  Take by mouth.     * HYDROcodone-acetaminophen 5-325 mg per tablet  Commonly known as: NORCO  Take 1 tablet by mouth every 6 (six) hours as needed for Pain.     * HYDROcodone-acetaminophen 5-325 mg per tablet  Commonly known as: NORCO  Take 1 tablet by mouth every 6 (six) hours as needed for Pain.     morphine 15 MG 12 hr tablet  Commonly known as: MS CONTIN  Take 1 tablet (15 mg total) by mouth every 12 (twelve) hours.     nicotine 21 mg/24 hr  Commonly known as: NICODERM CQ  Place 1 patch onto the skin once daily.     ondansetron 4 MG tablet  Commonly known as: ZOFRAN  Take 1 tablet (4 mg total) by mouth every 8 (eight) hours as needed for Nausea.     tiotropium bromide 2.5 mcg/actuation inhaler  Commonly known as: SPIRIVA RESPIMAT  Inhale 2 puffs into the lungs once daily. Controller     trametinib 2 mg Tab  Commonly known as: MEKINIST  TAKE 1 TABLET ONCE DAILY         * This list has 2 medication(s) that are the same as other medications prescribed for you. Read the directions carefully, and ask your doctor or other care provider to review them with you.                Indwelling Lines/Drains at time of discharge:   Lines/Drains/Airways     Central Venous Catheter Line  Duration           Port A Cath Single Lumen 01/24/23 right atrial 173 days                Time spent on the discharge of patient: 35 minutes         Vini Hayes MD  Department of Hospital Medicine  [facility]

## 2023-07-16 NOTE — NURSING
Received report from ORACIO Ledezma. Patient lying in bed resting, NAD noted. Safety Precautions maintained, Will Monitor. Airborne/Contact/Droplet precautions in progress.

## 2023-07-16 NOTE — NURSING
O2 Sats on RA at rest = 94    O2 sats on RA with exercise  = 96-97%    Patient denies SOB, dizziness during ambulation.

## 2023-07-16 NOTE — NURSING
[facility]  Nurses Note -- 4 Eyes      7/16/2023       Skin assessed on: Q Shift      [x] No Pressure Injuries Present    []Prevention Measures Documented    [] Yes LDA  for Pressure Injury Previously documented     [] Yes New Pressure Injury Discovered   [] LDA for New Pressure Injury Added      Attending RN:  Denice Leavitt RN     Second RN:  Darien Pascual RN

## 2023-07-17 ENCOUNTER — OFFICE VISIT (OUTPATIENT)
Dept: URGENT CARE | Facility: CLINIC | Age: 73
End: 2023-07-17
Payer: MEDICARE

## 2023-07-17 ENCOUNTER — TELEPHONE (OUTPATIENT)
Dept: SURGERY | Facility: CLINIC | Age: 73
End: 2023-07-17
Payer: MEDICARE

## 2023-07-17 ENCOUNTER — NURSE TRIAGE (OUTPATIENT)
Dept: ADMINISTRATIVE | Facility: CLINIC | Age: 73
End: 2023-07-17
Payer: MEDICARE

## 2023-07-17 ENCOUNTER — TELEPHONE (OUTPATIENT)
Dept: FAMILY MEDICINE | Facility: CLINIC | Age: 73
End: 2023-07-17
Payer: MEDICARE

## 2023-07-17 VITALS
TEMPERATURE: 98 F | WEIGHT: 142 LBS | HEIGHT: 67 IN | BODY MASS INDEX: 22.29 KG/M2 | DIASTOLIC BLOOD PRESSURE: 93 MMHG | RESPIRATION RATE: 12 BRPM | HEART RATE: 92 BPM | SYSTOLIC BLOOD PRESSURE: 121 MMHG | OXYGEN SATURATION: 95 %

## 2023-07-17 DIAGNOSIS — U07.1 COVID-19 VIRUS INFECTION: Primary | ICD-10-CM

## 2023-07-17 DIAGNOSIS — M79.10 PAIN IN THE MUSCLES: ICD-10-CM

## 2023-07-17 DIAGNOSIS — R82.71 BACTERIA IN URINE: ICD-10-CM

## 2023-07-17 PROBLEM — F17.200 TOBACCO USE DISORDER: Status: ACTIVE | Noted: 2023-07-17

## 2023-07-17 PROBLEM — F10.988 ALCOHOL USE, UNSPECIFIED WITH OTHER ALCOHOL-INDUCED DISORDER: Status: ACTIVE | Noted: 2023-07-17

## 2023-07-17 PROBLEM — F10.10 ALCOHOL ABUSE, UNCOMPLICATED: Status: ACTIVE | Noted: 2023-07-17

## 2023-07-17 LAB
BILIRUB UR QL STRIP: POSITIVE
GLUCOSE UR QL STRIP: POSITIVE
KETONES UR QL STRIP: POSITIVE
LEUKOCYTE ESTERASE UR QL STRIP: POSITIVE
PH, POC UA: 5.5
POC BLOOD, URINE: POSITIVE
POC NITRATES, URINE: POSITIVE
PROT UR QL STRIP: POSITIVE
SP GR UR STRIP: 1.01 (ref 1–1.03)
UROBILINOGEN UR STRIP-ACNC: 4 (ref 0.3–2.2)

## 2023-07-17 PROCEDURE — 99213 PR OFFICE/OUTPT VISIT, EST, LEVL III, 20-29 MIN: ICD-10-PCS | Mod: S$GLB,,, | Performed by: NURSE PRACTITIONER

## 2023-07-17 PROCEDURE — 81003 URINALYSIS AUTO W/O SCOPE: CPT | Mod: QW,S$GLB,, | Performed by: NURSE PRACTITIONER

## 2023-07-17 PROCEDURE — 81003 POCT URINALYSIS, DIPSTICK, AUTOMATED, W/O SCOPE: ICD-10-PCS | Mod: QW,S$GLB,, | Performed by: NURSE PRACTITIONER

## 2023-07-17 PROCEDURE — 99213 OFFICE O/P EST LOW 20 MIN: CPT | Mod: S$GLB,,, | Performed by: NURSE PRACTITIONER

## 2023-07-17 RX ORDER — NITROFURANTOIN 25; 75 MG/1; MG/1
100 CAPSULE ORAL 2 TIMES DAILY
Qty: 14 CAPSULE | Refills: 0 | Status: SHIPPED | OUTPATIENT
Start: 2023-07-17 | End: 2023-07-24

## 2023-07-17 NOTE — PATIENT INSTRUCTIONS
[facility]  Critical Care Medicine  History & Physical    Patient Name: Jenni Todd  MRN: [id number]  Admission Date: 7/6/2019  Hospital Length of Stay: 8 days  Code Status: Full Code  Attending Physician: Vadim Ngo MD   Primary Care Provider: Michael Tan Iii, MD   Principal Problem: Closed fracture of right tibial plateau    Subjective:     HPI:  Ms. Todd is a 51 y/o female with a PMH of ESRD (on HD), CAD,  DMII, HTN, HLD,and recurrent hypotension as an outpatient (on midodrine during HD) who presented to [facility] on 07/06/19 after an accidental fall from standing at home resulting in a right proximal tibial and fibular fractures.   She underwent an external fixation of her tibial plateau by Dr. Lopez on 07/06/19 and then an ORIF of the right tibia on 07/11/19 by Dr. Desai. Her hospital stay has been complicated by thrombosis of her dialysis access (present on admission) that has been a chronic problem for her (clots in bilateral IJs) and on 07/17/19 she was noted to have bilateral iliac and common femoral DVTs as well as a left femoral vein DVT. She had been refusing DVT prophylaxis this admission. That same morning, but prior to initiation of the heparin infusion, her Hb dropped from 7.9-->7.0 so she was transfused 1 unit of PRBCs with appropriate response. There was no clear source of bleeding. She has since been started on a heparin infusion for systemic anticoagulation. She has hypotensive as an outpatient and is able to tolerate hemodialysis by taking an additional midodrine. Blood cultures were drawn on 07/17/19 and are NGTD. Critical Care Medicine was consulted for hypotension and chest pain on 7/19.         Hospital/ICU Course:  Ms Todd presented to [facility] on 7/6 after a fall.  Shehad an external fixation of her tibial plateau by Dr. Lopez on 07/06/19 and then an ORIF of the right tibia on 07/11/19 by Dr. Desai. On 07/17/19 she was noted to have bilateral iliac and  Please return here or go to the Emergency Department for any concerns or worsening of condition.  If you were prescribed antibiotics, please take them to completion.  If you were prescribed a narcotic medication, do not drive or operate heavy equipment or machinery while taking these medications.  Please follow up with your primary care doctor or specialist as needed.    If you  smoke, please stop smoking.    common femoral DVTs and a left femoral DVT.  She was started on a heparin infusion for systemic anticoagulation on . Critical Care was consulted on  for hypotension however fluid was given and the patient seemed to improve. On , Critical Care Medicine was consulted for hypotension and chest pain on . The EKG appeared to have ST-T changes in the inferior leads. Cardiology was consulted for emergent evaluation. The team recommended a IABP be placed and that the patient be transferred to the CCU service.         Past Medical History:   Diagnosis Date    Abnormal finding on Pap smear, HPV DNA positive     Anemia associated with chronic renal failure     on Epogen    Blood type B+     Bulging discs - symptomatic      CAD (coronary artery disease)     Cardiomyopathy 3/13/2019    Diabetes mellitus, type 2     ESRD (end stage renal disease) 2004    FSGS (focal segmental glomerulosclerosis)     with collapsing    Hyperlipidemia     Hypertension     Neuropathy     NSTEMI (non-ST elevated myocardial infarction) 3/29/2019    Obesity     Secondary hyperparathyroidism, renal     TIA (transient ischemic attack)     Uterine fibroid     small uterine        Past Surgical History:   Procedure Laterality Date    ANGIOGRAM, CORONARY ARTERY N/A 4/15/2019    Performed by Roland Napier MD at [facility] CATH LAB    APPLICATION, EXTERNAL FIXATION DEVICE, LARGE, TIBIA- SYNTHES Right 2019    Performed by Corwin Lopez MD at [facility] OR 2ND FLR    CARDIAC CATHETERIZATION      PCI x 2     SECTION, CLASSIC      COLONOSCOPY N/A 2018    Performed by Rodrigue Russell MD at [facility] ENDO (2ND FLR)    DEBRIDEMENT-WOUND Left 2016    Performed by [facility]ressa Maddox MD at [facility] OR    DIALYSIS FISTULA CREATION      multiple fistulas and grafts before PD     EGD (ESOPHAGOGASTRODUODENOSCOPY) N/A 3/14/2019    Performed by Adolph Davila MD at [facility] ENDO    EGD  (ESOPHAGOGASTRODUODENOSCOPY) N/A 3/13/2019    Performed by Adolph Davila MD at [facility] ENDO    INCISION AND DRAINAGE (I&D), LABIAL N/A 4/17/2016    Performed by Harmony Fernandez MD at [facility] OR    INCISION AND DRAINAGE (I&D), LABIAL (ADD ON) Left 4/18/2016    Performed by Teressa Maddox MD at [facility] OR    INCISION AND DRAINAGE OF WOUND Left 2016    VULVAR ABCESS WITH NECROSIS    Insertion, Catheter, Central Venous, Hemodialysis N/A 3/28/2019    Performed by Kimo Weeks MD at [facility] CATH LAB    Insertion, Catheter, Central Venous, Hemodialysis N/A 3/18/2019    Performed by Kimo Weeks MD at [facility] CATH LAB    INSERTION, CATHETER, TUNNELED ABORTED Left 3/14/2019    Performed by Servando Solis MD at [facility] OR    INSERTION, GRAFT, ARTERIOVENOUS, RIGHT ARM Right 3/22/2019    Performed by BESSIE Wynn III, MD at [facility] OR 2ND FLR    INSERTION, INTRA-AORTIC BALLOON PUMP  4/15/2019    Performed by Roland Napier MD at [facility] CATH LAB    Left heart cath Left 4/15/2019    Performed by Roland Napier MD at [facility] CATH LAB    ORIF, FRACTURE, TIBIA, PLATEAU Right 7/11/2019    Performed by Dominick Desai MD at [facility] OR 2ND FLR    ORIF, HIP Left 9/13/2018    Performed by Tevin Grullon MD at [facility] OR    PERITONEAL CATHETER INSERTION      PERMCATH REWIRE- TUNNELED CATH REWIRE Left 11/13/2017    Performed by Baldev Munroe MD at [facility] CATH LAB    PERMCATH REWIRE- TUNNELED CATH REWIRE N/A 10/5/2017    Performed by Baldev Munroe MD at [facility] CATH LAB    REMOVAL, CATHETER, DIALYSIS, PERITONEAL N/A 3/14/2019    Performed by Servando Solis MD at [facility] OR    REMOVAL, EXTERNAL FIXATION DEVICE Right 7/11/2019    Performed by Dominick Desai MD at [facility] OR 2ND FLR    REPLACEMENT, CATHETER, DIALYSIS, OVER GUIDEWIRE, USING EXISTING VENOUS ACCESS N/A 6/27/2019    Performed by Kimo Weeks MD at [facility] CATH LAB    REPLACEMENT, CATHETER, DIALYSIS, OVER GUIDEWIRE, USING EXISTING VENOUS ACCESS  Left 5/24/2019    Performed by Baldev Munroe MD at [facility] CATH LAB    UMBILICAL HERNIA REPAIR      Venogram, possible intervention Right 3/20/2019    Performed by BESSIE Wynn III, MD at [facility] CATH LAB       Review of patient's allergies indicates:   Allergen Reactions    Flagyl [metronidazole hcl] Nausea And Vomiting    Clindamycin Diarrhea       Family History     Problem Relation (Age of Onset)    Cancer Maternal Grandmother, Paternal Grandfather    Diabetes Maternal Aunt, Paternal Aunt        Tobacco Use    Smoking status: Never Smoker    Smokeless tobacco: Never Used   Substance and Sexual Activity    Alcohol use: No     Comment: Pt reports some social use of about 1-2 drinks about every six months.    Drug use: No    Sexual activity: Not Currently     Partners: Male     Birth control/protection: None      Review of Systems   Unable to perform ROS: Acuity of condition     Objective:     Vital Signs (Most Recent):  Temp: 98.9 °F (37.2 °C) (07/19/19 0853)  Pulse: (!) 123 (07/19/19 0853)  Resp: (!) 34 (07/19/19 0853)  BP: (!) 70/47 (07/19/19 0853)  SpO2: 99 % (07/19/19 0820) Vital Signs (24h Range):  Temp:  [97.6 °F (36.4 °C)-98.9 °F (37.2 °C)] 98.9 °F (37.2 °C)  Pulse:  [] 123  Resp:  [16-34] 34  SpO2:  [94 %-100 %] 99 %  BP: ()/(33-66) 70/47   Weight: 90.3 kg (199 lb 1.2 oz)  Body mass index is 31.18 kg/m².      Intake/Output Summary (Last 24 hours) at 7/19/2019 0915  Last data filed at 7/19/2019 0600  Gross per 24 hour   Intake 1365.2 ml   Output --   Net 1365.2 ml       Physical Exam   Constitutional: She appears well-developed. She appears lethargic. She is cooperative. She appears distressed.   HENT:   Head: Normocephalic.   Eyes: Pupils are equal, round, and reactive to light.   Cardiovascular: Intact distal pulses.  Occasional extrasystoles are present. Tachycardia present.   Chest pain that feels like someone hit me in the chest   Pulmonary/Chest: Tachypnea noted. She has decreased  breath sounds in the right lower field and the left lower field.   Abdominal: She exhibits distension. There is tenderness.   Neurological: She appears lethargic.   Skin: Skin is warm and dry. Lesion noted.        Dressings on Right leg -see media   Nursing note and vitals reviewed.      Vents:     Lines/Drains/Airways     Central Venous Catheter Line                 Hemodialysis Catheter right femoral -- days         Percutaneous Central Line Insertion/Assessment - double lumen  06/27/19 1438 right femoral vein;right femoral 21 days          Drain                 Hemodialysis AV Fistula Left upper arm -- days         Hemodialysis AV Graft Right upper arm -- days          Peripheral Intravenous Line                 Peripheral IV - Single Lumen 07/06/19 0823 22 G Right Wrist 13 days         Peripheral IV - Double Lumen 07/11/19 1013 20 G Distal;Left;Posterior Forearm 7 days         Peripheral IV - Single Lumen 07/17/19 1203 20 G;1 3/4 in Right;Anterior Forearm 1 day              Significant Labs:    CBC/Anemia Profile:  Recent Labs   Lab 07/17/19  1836 07/18/19  0509 07/19/19  0819   WBC 19.26* 19.17* 16.64*   HGB 8.6* 9.0* 9.0*   HCT 28.1* 28.6* 29.8*    230 232   * 101* 102*   RDW 16.5* 16.9* 17.1*        Chemistries:  Recent Labs   Lab 07/18/19  0509 07/19/19  0819   *  --    K 3.7  --    CL 91*  --    CO2 19*  --    BUN 31*  --    CREATININE 4.5*  --    CALCIUM 11.2*  --    ALBUMIN 2.2*  --    MG 2.2 2.3   PHOS 3.9  --        All pertinent labs within the past 24 hours have been reviewed.    Significant Imaging: I have reviewed and interpreted all pertinent imaging results/findings within the past 24 hours.    Assessment/Plan:     Pulmonary  Acute hypoxemic respiratory failure  Intubated for worsening respiratory failure likely due to shock state  -- on mechanical ventilation  -- Stat CXR  -- Place NGT for meds  -- Trend ABGs prn      Cardiac/Vascular  Hypotension  --Discussed case with  Nephrology. They recommend transfer to a non-critical care unit that can perform CRRT if needed. She is euvolemic this admission and her exam is not consistent with volume overload (SpO2 100% on room air). There are no acute indications for RRT currently.   --Hypotension appears to be chronic (noted in chart, pt on midodrine prior to admission).   --No current symptoms consistent with blood loss. Recommend continuing heparin gtt and titrating infusion to therapeutic range for DVTs.  --Consider antibiotics if primary team is concerned for sepsis.     --I do not think transfer to the ICU will benefit this patient currently as this does not appear to be an acute process and Ms. Todd does not have an access point to deliver vasopressors (DVTs in bilateral common femoral veins and bilateral IJs).   --Please call back with any acute changes.     CAD (coronary artery disease)  Multiple thomboemboli  Concern for STEMI  - Trop >7  -- Continue Heparin drip  -- Discuss Aspirin and Plavix with Cards. No GIB since February (per patient)   -- repeat EKG    Renal/  ESRD (end stage renal disease) on dialysis  Will need CRRT today  --- Nephrology following  -- coordinated abut critical events and timing of dialysis    Anemia in chronic kidney disease, on chronic dialysis  T+S  Transfuse 1 unit PRBCs to hgb ~8 for cardiac ischemia    Hematology  Acute deep vein thrombosis (DVT) of both lower extremities  Noted on LE doppler    Acute deep vein thrombosis (DVT) of proximal vein of both lower extremities  Right thigh veins: There is acute thrombus in the right iliac vein and right common femoral vein.  The right femoral, popliteal and greater saphenous veins are patent.    Right calf veins: The visualized calf veins are patent.    Left thigh veins: There is acute thrombus in the left iliac vein, the left common femoral vein, and the left femoral vein.  The left popliteal vein is patent.    Left calf veins: The visualized calf veins  are patent.    Other  Shock  Patient has undifferentiated shock that is likely due to a cardiac cause.   - Stat EKG  -- Stat chemistries  -- Stat transfer to ICU  -- Levophed for hypotension for MAP.65  -- Morphine for pain x1  -- Stat consult to Cardiology  -- Stat echo  -- May need CTA/PE study when stable    Critical Care Time:  70 minutes  Critical secondary to Patient has a condition that poses threat to life and bodily function: Shock     Critical care was time spent personally by me on the following activities: development of treatment plan with patient or surrogate and bedside caregivers, discussions with consultants, evaluation of patient's response to treatment, examination of patient, ordering and performing treatments and interventions, ordering and review of laboratory studies, ordering and review of radiographic studies, pulse oximetry, re-evaluation of patient's condition. This critical care time did not overlap with that of any other provider or involve time for any procedures.     Fiona Winterbottom, APRN, CNS  Critical Care Medicine  [facility]

## 2023-07-17 NOTE — TELEPHONE ENCOUNTER
Spoke with patient. Reports that he tested positive for Covid on Friday. Will return call to reschedule. Dr Adame is coordinating with another discipline for patient's case.

## 2023-07-17 NOTE — PROGRESS NOTES
"Subjective:      Patient ID: Manuel Tyler is a 72 y.o. male.    Vitals:  height is 5' 7" (1.702 m) and weight is 64.4 kg (142 lb). His oral temperature is 98.4 °F (36.9 °C). His blood pressure is 121/93 (abnormal) and his pulse is 92. His respiration is 12 and oxygen saturation is 95%.     Chief Complaint: Leg Pain    Patient is here for bilateral leg pain started 3 days ago. No trauma or injury.  Patient diagnosed with covid 7/14/23. Patient states he feels the pain when start to sit or get up.     72-year-old male presents to clinic with complaints of left and right thigh pain with attempts to rise from a sitting position and muscle weakness. He reports three days ago 7/14/2023 he was diagnosed with covid in the ER treated with remdesivir 200 mg transferred to telemetry diagnosed with hypoxia, sepsis, covid 19 infection, malignant neoplasm of head of pancreas discharged yesterday 2:06 pm. History positive for encephalopathy, acute hypoxemic respiratory failure, heart failure, malignant neoplasm of head of pancrease, liver metastases     Leg Pain   The incident occurred 3 to 5 days ago. There was no injury mechanism. The pain is present in the left thigh and right thigh. The pain is at a severity of 4/10. The pain is mild. The pain has been Constant since onset. Associated symptoms include muscle weakness. Pertinent negatives include no inability to bear weight, loss of motion, loss of sensation, numbness or tingling. He reports no foreign bodies present. The symptoms are aggravated by movement and weight bearing. He has tried nothing for the symptoms. The treatment provided no relief.     Genitourinary:  Negative for dysuria, frequency, urgency, urine decreased, flank pain and hematuria.   Musculoskeletal:  Positive for pain and muscle ache. Negative for joint pain, joint swelling, abnormal ROM of joint and pain with walking.   Skin:  Negative for erythema.   Neurological:  Negative for numbness.    Objective: "     Physical Exam   Constitutional: He is oriented to person, place, and time. He appears well-developed.   HENT:   Head: Normocephalic and atraumatic. Head is without abrasion, without contusion and without laceration.   Ears:   Right Ear: External ear normal.   Left Ear: External ear normal.   Nose: Nose normal.   Mouth/Throat: Oropharynx is clear and moist and mucous membranes are normal.   Eyes: EOM and lids are normal. Extraocular movement intact   Neck: Trachea normal and phonation normal. Neck supple.   Cardiovascular: Normal rate, regular rhythm and normal heart sounds.   Pulmonary/Chest: Effort normal and breath sounds normal. No stridor. No respiratory distress.   Musculoskeletal: Normal range of motion.         General: Normal range of motion.      Right upper leg: He exhibits tenderness.      Left upper leg: He exhibits tenderness.        Legs:    Neurological: He is alert and oriented to person, place, and time.   Skin: Skin is warm, dry, intact and no rash. Capillary refill takes less than 2 seconds. No abrasion, No burn, No bruising, No erythema and No ecchymosis   Psychiatric: His speech is normal and behavior is normal. Judgment and thought content normal.   Nursing note and vitals reviewed.    Assessment:     1. COVID-19 virus infection    2. Pain in the muscles    3. Bacteria in urine      Results for orders placed or performed in visit on 07/17/23   POCT Urinalysis, Dipstick, Automated, W/O Scope   Result Value Ref Range    POC Blood, Urine Positive (A) Negative    POC Bilirubin, Urine Positive (A) Negative    POC Urobilinogen, Urine 4.0 (A) 0.3 - 2.2    POC Ketones, Urine Positive (A) Negative    POC Protein, Urine Positive (A) Negative    POC Nitrates, Urine Positive (A) Negative    POC Glucose, Urine Positive (A) Negative    pH, UA 5.5     POC Specific Gravity, Urine 1.010 1.003 - 1.029    POC Leukocytes, Urine Positive (A) Negative      Plan:       COVID-19 virus infection    Pain in the  muscles  -     POCT Urinalysis, Dipstick, Automated, W/O Scope    Bacteria in urine  -     nitrofurantoin, macrocrystal-monohydrate, (MACROBID) 100 MG capsule; Take 1 capsule (100 mg total) by mouth 2 (two) times daily. for 7 days  Dispense: 14 capsule; Refill: 0      I have reviewed the patients previous visits, labs to look for any trends or previous treatments.     I spoke with Dr. Alvarado to discuss the treatment options for the patient.     Patient Instructions   Please return here or go to the Emergency Department for any concerns or worsening of condition.  If you were prescribed antibiotics, please take them to completion.  If you were prescribed a narcotic medication, do not drive or operate heavy equipment or machinery while taking these medications.  Please follow up with your primary care doctor or specialist as needed.    If you  smoke, please stop smoking.

## 2023-07-17 NOTE — TELEPHONE ENCOUNTER
----- Message from Ulysses Powell RN sent at 7/16/2023 11:59 AM CDT -----  Please needs follow up Appt. Please call pt to schedule   Thanks

## 2023-07-17 NOTE — TELEPHONE ENCOUNTER
Reason for Disposition   Illness requiring prolonged bedrest in past month (e.g., immobilization, long hospital stay)    Additional Information   Negative: Followed a hip injury   Negative: Followed a knee injury   Negative: Followed an ankle or foot injury   Negative: Back pain radiating (shooting) into leg(s)   Negative: Foot pain is main symptom   Negative: Ankle pain is main symptom   Negative: Knee pain is main symptom   Negative: Leg swelling is main symptom   Negative: Chest pain   Negative: Difficulty breathing   Negative: Entire foot is cool or blue in comparison to other side   Negative: Unable to walk   Negative: Fever and red area (or area very tender to touch)   Negative: Swollen joint and fever   Negative: Thigh or calf pain in only one leg and present > 1 hour   Negative: Thigh, calf, or ankle swelling in only one leg   Negative: Thigh, calf, or ankle swelling in both legs, but one side is definitely more swollen   Negative: History of prior 'blood clot' in leg or lungs (i.e., deep vein thrombosis, pulmonary embolism)   Negative: History of inherited increased risk of blood clots (e.g., factor 5 Leiden, antithrombin 3, protein C or protein S deficiency, prothrombin mutation)   Negative: Major surgery in past month   Negative: Hip or leg fracture (broken bone) in past month (or had cast on leg or ankle in past month)    Protocols used: Leg Pain-A-[place]  Pt states he was recently hospitalized for COVID. States he has new pain in the posterior thighs. Pt advised per Triage protocol to see a Healthcare Provider within 2 hrs. No office appointments are available. Advised to go to Urgent Care for evaluation. Pt verbalized understanding.

## 2023-07-18 ENCOUNTER — PATIENT OUTREACH (OUTPATIENT)
Dept: ADMINISTRATIVE | Facility: CLINIC | Age: 73
End: 2023-07-18
Payer: MEDICARE

## 2023-07-18 LAB
BACTERIA BLD CULT: NORMAL
BACTERIA BLD CULT: NORMAL

## 2023-07-18 NOTE — PROGRESS NOTES
C3 nurse spoke with Manuel Tyler  for a TCC post hospital discharge follow up call. The patient has a scheduled HOSFU appointment with Fatmata Vera MD  on 8/9/23 @ 1040.

## 2023-07-20 ENCOUNTER — TELEPHONE (OUTPATIENT)
Dept: SURGERY | Facility: CLINIC | Age: 73
End: 2023-07-20
Payer: MEDICARE

## 2023-07-20 NOTE — TELEPHONE ENCOUNTER
Spoke with patient and informed him that he needs to be seen by an MD, scheduled patient for 8/14 at [facility] with Dr. Wang for 9:40.

## 2023-07-20 NOTE — TELEPHONE ENCOUNTER
Spoke with pt regarding appt with LYSSA Lackey in [facility]. Pt requires appt with MD and was offered 8/14 at 0940 at [facility]. Pt has recently tested positive for covid 07/14/23 and will need to wait 2 week period before appt. Pt verbalized understanding. Verbally confirmed time, date, and location. Denies questions or concerns.

## 2023-07-20 NOTE — PHYSICIAN QUERY
PT Name: Manuel Tyler  MR #: [id number]     DOCUMENTATION CLARIFICATION     CDS: Nona Ashton RN         Contact information:[email or web address] or (cell) [phone]     This form is a permanent document in the medical record.     Query Date: July 20, 2023    By submitting this query, we are merely seeking further clarification of documentation.  Please utilize your independent clinical judgment when addressing the question(s) below.  The Medical Record contains the following   Indicators   Supporting Clinical Findings Location in Medical Record   x Documentation of Respiratory Failure, ARDS Acute hypoxemic respiratory failure  Patient with Hypoxic Respiratory failure which is Acute.  he is not on home oxygen.   Secondary to Covid-19.   H&P 7/14   x Subjective Respiratory Signs/Symptoms:   SOB, SKAGGS, Cough, etc. Denies SOB, diarrhea, or other associated symptoms    Pulmonary/Chest: No stridor. No respiratory distress. He has no rhonchi.     Speaking in full sentences    Not hypoxic on 6m walk, 96-98% with ambulation   ED Note 7/14            DCS 7/16    Objective Respiratory Signs/Symptoms: Respiratory distress, Accessory muscle use, tachypnea, wheezing, etc.     x RR     ABGs     O2 sat 7/14 @ 1856: RR 20, SpO2 92%  7/14 @ 2046: RR 14, SpO2 95%  7/15: RR 19, SpO2 98%   Vital Signs    Hypoxia/Hypercapnia      BiPAP/Intubation/Mechanical Ventilation     x Supplemental O2 His ambulatory O2 sat is 87%- requiring 3 L of supplemental oxygen   ED Note 7/14    Home O2, Oxygen Dependence      Radiology findings     x Acute/Chronic Illness COVID-19  Patient is identified as Severe COVID-19 based on hypoxemia with O2 saturations <94% on room air or on ambulation   Initiate standard COVID protocols; COVID-19 testing ,Infection Control notification  and isolation- respiratory, contact and droplet per protocol   H&P 7/14    Treatment      Other         The noted clinical guidelines following a diagnosis are only system  guidelines and do not replace the providers clinical judgment.    Due to the conflicting clinical picture, please clinically validate the diagnosis of Acute Hypoxemic Respiratory Failure.      If validated, please provide additional clinical support for the diagnosis.     [    ] Above stated diagnosis is not confirmed and/or it has been ruled out     [    ] Above stated diagnosis is not confirmed and/or it has been ruled out, other diagnosis ruled in (please specify):_______________     [  x  ] Acute Respiratory Failure with Hypoxia (ABG pO2 < 60 mmHg or O2 sat of <91% on room air and respiratory symptoms documented) diagnosis is confirmed and additional clinical support/decision-making indicators for the diagnosis include (please specify):  Room Air Sat 87% documented on 07/14/23 @ 2150     [    ] Other clarification (please specify): ___________________           Reference:    XIAO Murray MD. (2020, March 13). Acute respiratory distress syndrome: Clinical features, diagnosis, and complications in adults ([id number] SNEHA Richards MD & [id number] COLEEN Dave MD, Eds.). Retrieved November 13, 2020, from https://[email or web address].com/contents/acute-respiratory-distress-syndrome-clinical-features-diagnosis-and-complications-in-adults?search=ards&source=search_result&selectedTitle=1~150&usage_type=default&display_rank=1  Form No. 58696

## 2023-07-24 DIAGNOSIS — C25.0 MALIGNANT NEOPLASM OF HEAD OF PANCREAS: ICD-10-CM

## 2023-07-27 ENCOUNTER — INFUSION (OUTPATIENT)
Dept: INFUSION THERAPY | Facility: HOSPITAL | Age: 73
End: 2023-07-27
Attending: INTERNAL MEDICINE
Payer: MEDICARE

## 2023-07-27 VITALS
RESPIRATION RATE: 18 BRPM | TEMPERATURE: 99 F | HEART RATE: 107 BPM | OXYGEN SATURATION: 99 % | SYSTOLIC BLOOD PRESSURE: 177 MMHG | DIASTOLIC BLOOD PRESSURE: 84 MMHG

## 2023-07-27 DIAGNOSIS — C25.0 MALIGNANT NEOPLASM OF HEAD OF PANCREAS: Primary | ICD-10-CM

## 2023-07-27 PROCEDURE — 96523 IRRIG DRUG DELIVERY DEVICE: CPT

## 2023-07-27 PROCEDURE — 63600175 PHARM REV CODE 636 W HCPCS: Performed by: INTERNAL MEDICINE

## 2023-07-27 RX ORDER — HEPARIN 100 UNIT/ML
500 SYRINGE INTRAVENOUS
Status: CANCELLED | OUTPATIENT
Start: 2023-07-27

## 2023-07-27 RX ORDER — SODIUM CHLORIDE 0.9 % (FLUSH) 0.9 %
10 SYRINGE (ML) INJECTION
Status: CANCELLED | OUTPATIENT
Start: 2023-07-27

## 2023-07-27 RX ORDER — SODIUM CHLORIDE 0.9 % (FLUSH) 0.9 %
10 SYRINGE (ML) INJECTION
Status: DISCONTINUED | OUTPATIENT
Start: 2023-07-27 | End: 2023-07-27 | Stop reason: HOSPADM

## 2023-07-27 RX ORDER — HEPARIN 100 UNIT/ML
500 SYRINGE INTRAVENOUS
Status: DISCONTINUED | OUTPATIENT
Start: 2023-07-27 | End: 2023-07-27 | Stop reason: HOSPADM

## 2023-07-27 RX ADMIN — HEPARIN 500 UNITS: 100 SYRINGE at 10:07

## 2023-08-04 ENCOUNTER — LAB VISIT (OUTPATIENT)
Dept: LAB | Facility: HOSPITAL | Age: 73
End: 2023-08-04
Attending: INTERNAL MEDICINE
Payer: MEDICARE

## 2023-08-04 DIAGNOSIS — C78.7 MALIGNANT NEOPLASM METASTATIC TO LIVER: ICD-10-CM

## 2023-08-04 DIAGNOSIS — C25.0 MALIGNANT NEOPLASM OF HEAD OF PANCREAS: ICD-10-CM

## 2023-08-04 LAB
ALBUMIN SERPL BCP-MCNC: 3.1 G/DL (ref 3.5–5.2)
ALP SERPL-CCNC: 139 U/L (ref 55–135)
ALT SERPL W/O P-5'-P-CCNC: 19 U/L (ref 10–44)
ANION GAP SERPL CALC-SCNC: 9 MMOL/L (ref 8–16)
AST SERPL-CCNC: 26 U/L (ref 10–40)
BASOPHILS # BLD AUTO: 0.12 K/UL (ref 0–0.2)
BASOPHILS NFR BLD: 0.7 % (ref 0–1.9)
BILIRUB SERPL-MCNC: 0.5 MG/DL (ref 0.1–1)
BUN SERPL-MCNC: 14 MG/DL (ref 8–23)
CALCIUM SERPL-MCNC: 10.1 MG/DL (ref 8.7–10.5)
CANCER AG19-9 SERPL-ACNC: 6129.3 U/ML (ref 0–40)
CHLORIDE SERPL-SCNC: 100 MMOL/L (ref 95–110)
CO2 SERPL-SCNC: 26 MMOL/L (ref 23–29)
CREAT SERPL-MCNC: 1 MG/DL (ref 0.5–1.4)
DIFFERENTIAL METHOD: ABNORMAL
EOSINOPHIL # BLD AUTO: 0.4 K/UL (ref 0–0.5)
EOSINOPHIL NFR BLD: 2.5 % (ref 0–8)
ERYTHROCYTE [DISTWIDTH] IN BLOOD BY AUTOMATED COUNT: 14.6 % (ref 11.5–14.5)
EST. GFR  (NO RACE VARIABLE): >60 ML/MIN/1.73 M^2
GLUCOSE SERPL-MCNC: 96 MG/DL (ref 70–110)
HCT VFR BLD AUTO: 42.8 % (ref 40–54)
HGB BLD-MCNC: 14.1 G/DL (ref 14–18)
IMM GRANULOCYTES # BLD AUTO: 0.12 K/UL (ref 0–0.04)
IMM GRANULOCYTES NFR BLD AUTO: 0.7 % (ref 0–0.5)
LYMPHOCYTES # BLD AUTO: 3.6 K/UL (ref 1–4.8)
LYMPHOCYTES NFR BLD: 20.2 % (ref 18–48)
MAGNESIUM SERPL-MCNC: 2 MG/DL (ref 1.6–2.6)
MCH RBC QN AUTO: 30.7 PG (ref 27–31)
MCHC RBC AUTO-ENTMCNC: 32.9 G/DL (ref 32–36)
MCV RBC AUTO: 93 FL (ref 82–98)
MONOCYTES # BLD AUTO: 1.8 K/UL (ref 0.3–1)
MONOCYTES NFR BLD: 10.1 % (ref 4–15)
NEUTROPHILS # BLD AUTO: 11.7 K/UL (ref 1.8–7.7)
NEUTROPHILS NFR BLD: 65.8 % (ref 38–73)
NRBC BLD-RTO: 0 /100 WBC
PLATELET # BLD AUTO: 592 K/UL (ref 150–450)
PMV BLD AUTO: 9 FL (ref 9.2–12.9)
POTASSIUM SERPL-SCNC: 4.6 MMOL/L (ref 3.5–5.1)
PROT SERPL-MCNC: 7.8 G/DL (ref 6–8.4)
RBC # BLD AUTO: 4.59 M/UL (ref 4.6–6.2)
SODIUM SERPL-SCNC: 135 MMOL/L (ref 136–145)
WBC # BLD AUTO: 17.81 K/UL (ref 3.9–12.7)

## 2023-08-04 PROCEDURE — 86301 IMMUNOASSAY TUMOR CA 19-9: CPT | Performed by: INTERNAL MEDICINE

## 2023-08-04 PROCEDURE — 80053 COMPREHEN METABOLIC PANEL: CPT | Performed by: INTERNAL MEDICINE

## 2023-08-04 PROCEDURE — 36415 COLL VENOUS BLD VENIPUNCTURE: CPT | Performed by: INTERNAL MEDICINE

## 2023-08-04 PROCEDURE — 83735 ASSAY OF MAGNESIUM: CPT | Performed by: INTERNAL MEDICINE

## 2023-08-04 PROCEDURE — 85025 COMPLETE CBC W/AUTO DIFF WBC: CPT | Performed by: INTERNAL MEDICINE

## 2023-08-06 ENCOUNTER — ANESTHESIA EVENT (OUTPATIENT)
Dept: ENDOSCOPY | Facility: HOSPITAL | Age: 73
End: 2023-08-06
Payer: MEDICARE

## 2023-08-06 RX ORDER — LIDOCAINE HYDROCHLORIDE 10 MG/ML
1 INJECTION, SOLUTION EPIDURAL; INFILTRATION; INTRACAUDAL; PERINEURAL ONCE
Status: CANCELLED | OUTPATIENT
Start: 2023-08-06 | End: 2023-08-06

## 2023-08-07 ENCOUNTER — HOSPITAL ENCOUNTER (OUTPATIENT)
Facility: HOSPITAL | Age: 73
Discharge: HOME | End: 2023-08-07
Attending: INTERNAL MEDICINE | Admitting: INTERNAL MEDICINE
Payer: MEDICARE

## 2023-08-07 ENCOUNTER — ANESTHESIA EVENT (OUTPATIENT)
Dept: ENDOSCOPY | Facility: HOSPITAL | Age: 73
End: 2023-08-07
Payer: MEDICARE

## 2023-08-07 ENCOUNTER — ANESTHESIA (OUTPATIENT)
Dept: ENDOSCOPY | Facility: HOSPITAL | Age: 73
End: 2023-08-07
Payer: MEDICARE

## 2023-08-07 VITALS
RESPIRATION RATE: 24 BRPM | OXYGEN SATURATION: 95 % | DIASTOLIC BLOOD PRESSURE: 68 MMHG | TEMPERATURE: 98 F | HEART RATE: 80 BPM | SYSTOLIC BLOOD PRESSURE: 131 MMHG

## 2023-08-07 DIAGNOSIS — Z12.11 SCREEN FOR COLON CANCER: Primary | ICD-10-CM

## 2023-08-07 PROCEDURE — 45378 PR COLONOSCOPY,DIAGNOSTIC: ICD-10-PCS | Mod: 53,,, | Performed by: INTERNAL MEDICINE

## 2023-08-07 PROCEDURE — 63600175 PHARM REV CODE 636 W HCPCS: Performed by: NURSE ANESTHETIST, CERTIFIED REGISTERED

## 2023-08-07 PROCEDURE — 25000003 PHARM REV CODE 250: Performed by: ANESTHESIOLOGY

## 2023-08-07 PROCEDURE — D9220A PRA ANESTHESIA: ICD-10-PCS | Mod: ANES,,, | Performed by: ANESTHESIOLOGY

## 2023-08-07 PROCEDURE — 37000009 HC ANESTHESIA EA ADD 15 MINS: Performed by: INTERNAL MEDICINE

## 2023-08-07 PROCEDURE — D9220A PRA ANESTHESIA: ICD-10-PCS | Mod: CRNA,,, | Performed by: NURSE ANESTHETIST, CERTIFIED REGISTERED

## 2023-08-07 PROCEDURE — D9220A PRA ANESTHESIA: Mod: CRNA,,, | Performed by: NURSE ANESTHETIST, CERTIFIED REGISTERED

## 2023-08-07 PROCEDURE — D9220A PRA ANESTHESIA: Mod: ANES,,, | Performed by: ANESTHESIOLOGY

## 2023-08-07 PROCEDURE — 25000003 PHARM REV CODE 250: Performed by: NURSE ANESTHETIST, CERTIFIED REGISTERED

## 2023-08-07 PROCEDURE — 45378 DIAGNOSTIC COLONOSCOPY: CPT | Mod: 53,,, | Performed by: INTERNAL MEDICINE

## 2023-08-07 PROCEDURE — 45378 DIAGNOSTIC COLONOSCOPY: CPT | Mod: 74 | Performed by: INTERNAL MEDICINE

## 2023-08-07 PROCEDURE — 37000008 HC ANESTHESIA 1ST 15 MINUTES: Performed by: INTERNAL MEDICINE

## 2023-08-07 RX ORDER — PHENYLEPHRINE HCL IN 0.9% NACL 1 MG/10 ML
SYRINGE (ML) INTRAVENOUS
Status: DISCONTINUED
Start: 2023-08-07 | End: 2023-08-07 | Stop reason: HOSPADM

## 2023-08-07 RX ORDER — SOD SULF/POT CHLORIDE/MAG SULF 1.479 G
12 TABLET ORAL DAILY
Qty: 12 TABLET | Refills: 0 | Status: SHIPPED | OUTPATIENT
Start: 2023-08-07 | End: 2023-08-08 | Stop reason: HOSPADM

## 2023-08-07 RX ORDER — PROPOFOL 10 MG/ML
INJECTION, EMULSION INTRAVENOUS
Status: DISCONTINUED
Start: 2023-08-07 | End: 2023-08-07 | Stop reason: HOSPADM

## 2023-08-07 RX ORDER — LIDOCAINE HYDROCHLORIDE 20 MG/ML
INJECTION INTRAVENOUS
Status: DISCONTINUED | OUTPATIENT
Start: 2023-08-07 | End: 2023-08-07

## 2023-08-07 RX ORDER — SODIUM CHLORIDE 9 MG/ML
INJECTION, SOLUTION INTRAVENOUS CONTINUOUS
Status: DISCONTINUED | OUTPATIENT
Start: 2023-08-07 | End: 2023-08-07 | Stop reason: HOSPADM

## 2023-08-07 RX ORDER — PHENYLEPHRINE HYDROCHLORIDE 10 MG/ML
INJECTION INTRAVENOUS
Status: DISCONTINUED | OUTPATIENT
Start: 2023-08-07 | End: 2023-08-07

## 2023-08-07 RX ORDER — PROPOFOL 10 MG/ML
VIAL (ML) INTRAVENOUS
Status: DISCONTINUED | OUTPATIENT
Start: 2023-08-07 | End: 2023-08-07

## 2023-08-07 RX ORDER — LIDOCAINE HYDROCHLORIDE 20 MG/ML
INJECTION, SOLUTION EPIDURAL; INFILTRATION; INTRACAUDAL; PERINEURAL
Status: DISCONTINUED
Start: 2023-08-07 | End: 2023-08-07 | Stop reason: HOSPADM

## 2023-08-07 RX ADMIN — SODIUM CHLORIDE: 0.9 INJECTION, SOLUTION INTRAVENOUS at 01:08

## 2023-08-07 RX ADMIN — LIDOCAINE HYDROCHLORIDE 100 MG: 20 INJECTION, SOLUTION INTRAVENOUS at 01:08

## 2023-08-07 RX ADMIN — PHENYLEPHRINE HYDROCHLORIDE 100 MCG: 10 INJECTION INTRAVENOUS at 01:08

## 2023-08-07 RX ADMIN — PROPOFOL 100 MG: 10 INJECTION, EMULSION INTRAVENOUS at 01:08

## 2023-08-07 NOTE — PLAN OF CARE
Procedure and recovery complete. Pt awake and alert. MD Estrella and wife at bedside, procedure findings and suggestions discussed. Discharge instructions given, pt verbalized understanding of instruction. Iv was D/c'd, inner cannula intact. No distress noted. No c/o pain. Gait steady, able to ambulate without assistance. Pt left via wheelchair accompanied by wife.

## 2023-08-07 NOTE — H&P
Short Stay Endoscopy History and Physical    PCP - Fatmata Vera MD    Procedure - Colonoscopy  ASA - per anesthesia  Mallampati - per anesthesia  History of Anesthesia problems - no  Family history Anesthesia problems - no   Plan of anesthesia - General, MAC    HPI:  This is a 72 y.o. male here for evaluation of : abnormal imaging      ROS:  Constitutional: No fevers, chills, No weight loss  CV: No chest pain  Pulm: No cough, No shortness of breath  GI: see HPI  Derm: No rash    Medical History:  has a past medical history of (HFpEF) heart failure with preserved ejection fraction (1/25/2023), Alcohol abuse (12/27/2022), Hyperlipidemia, Hypertension, Liver metastases (1/18/2023), Malignant neoplasm of head of pancreas (1/18/2023), Other specified noninfective gastroenteritis and colitis, Pancreatitis, and Personal history of colonic polyps.    Surgical History:  has a past surgical history that includes Colonoscopy; Foot surgery (Left); Tonsillectomy; Endoscopic ultrasound of upper gastrointestinal tract (Left, 12/30/2022); ERCP (Left, 12/30/2022); and Insertion of tunneled central venous catheter (CVC) with subcutaneous port (Bilateral, 1/19/2023).    Family History: family history includes Arthritis in his father and mother; Breast cancer in his mother; COPD in his father; Hypertension in his father and mother; Skin cancer (age of onset: 69) in his father; Stroke in his mother.. Otherwise no colon cancer, inflammatory bowel disease, or GI malignancies.    Social History:  reports that he has been smoking cigarettes. He has quit using smokeless tobacco.  His smokeless tobacco use included chew. He reports current alcohol use. He reports that he does not use drugs.    Review of patient's allergies indicates:   Allergen Reactions    Jakafi [ruxolitinib]        Medications:   Medications Prior to Admission   Medication Sig Dispense Refill Last Dose    buPROPion (WELLBUTRIN XL) 300 MG 24 hr tablet Take 1 tablet  (300 mg total) by mouth once daily. 90 tablet 1     dabrafenib (TAFINLAR) 50 mg Cap TAKE 3 CAPSULES (150 MG TOTAL) TWICE DAILY. 120 capsule 1     DULCOLAX, BISACODYL, ORAL Take by mouth.       HYDROcodone-acetaminophen (NORCO) 5-325 mg per tablet Take 1 tablet by mouth every 6 (six) hours as needed for Pain. (Patient not taking: Reported on 7/18/2023) 15 tablet 0     HYDROcodone-acetaminophen (NORCO) 5-325 mg per tablet Take 1 tablet by mouth every 6 (six) hours as needed for Pain. 15 tablet 0     morphine (MS CONTIN) 15 MG 12 hr tablet Take 1 tablet (15 mg total) by mouth every 12 (twelve) hours. 60 tablet 0     multivit-mins/iron/folic/lycop (CENTRUM MEN ORAL) Take by mouth.       nicotine (NICODERM CQ) 21 mg/24 hr Place 1 patch onto the skin once daily. (Patient not taking: Reported on 7/18/2023) 30 patch 3     ondansetron (ZOFRAN) 4 MG tablet Take 1 tablet (4 mg total) by mouth every 8 (eight) hours as needed for Nausea. 30 tablet 1     tiotropium bromide (SPIRIVA RESPIMAT) 2.5 mcg/actuation inhaler Inhale 2 puffs into the lungs once daily. Controller 4 g 3     trametinib (MEKINIST) 2 mg Tab TAKE 1 TABLET ONCE DAILY 60 tablet 1          Physical Exam:    Vital Signs: There were no vitals filed for this visit.    Gen: NAD, lying comfortably  HENT: NCAT, oropharynx clear  Eyes: anicteric sclerae, EOMI grossly  Neck: supple, no visible masses/goiter  Cardiac: RRR  Lungs: non-labored breathing  Abd: soft, NT/ND, normoactive BS  Ext: no LE edema, warm, well perfused  Skin: skin intact on exposed body parts, no visible rashes, lesions  Neuro: A&Ox4, neuro exam grossly intact, moves all extremities  Psych: appropriate mood, affect        Labs:  Lab Results   Component Value Date    WBC 17.81 (H) 08/04/2023    HGB 14.1 08/04/2023    HCT 42.8 08/04/2023     (H) 08/04/2023    CHOL 171 06/09/2023    TRIG 83 06/09/2023    HDL 46 06/09/2023    ALT 19 08/04/2023    AST 26 08/04/2023     (L) 08/04/2023    K 4.6  08/04/2023     08/04/2023    CREATININE 1.0 08/04/2023    BUN 14 08/04/2023    CO2 26 08/04/2023    TSH 1.868 12/27/2022    INR 1.1 12/27/2022       Plan:  Colonoscopy     I have explained the risks and benefits of endoscopy procedures to the patient including but not limited to bleeding, perforation, infection, and death.  The patient was asked if they understand and allowed to ask any further questions to their satisfaction.    Kong Estrella MD

## 2023-08-07 NOTE — TRANSFER OF CARE
Anesthesia Transfer of Care Note    Patient: Manuel Tyler    Procedure(s) Performed: Procedure(s) (LRB):  COLONOSCOPY (N/A)    Patient location: GI    Anesthesia Type: general    Transport from OR: Transported from OR on room air with adequate spontaneous ventilation    Post pain: adequate analgesia    Post assessment: no apparent anesthetic complications    Post vital signs: stable    Level of consciousness: sedated and responds to stimulation    Nausea/Vomiting: no nausea/vomiting    Complications: none    Transfer of care protocol was followed      Last vitals:   Visit Vitals  BP (!) 92/55 (BP Location: Right arm, Patient Position: Lying)   Pulse 85   Temp 36.4 °C (97.5 °F) (Oral)   Resp 20   SpO2 98%

## 2023-08-07 NOTE — ANESTHESIA POSTPROCEDURE EVALUATION
Anesthesia Post Evaluation    Patient: Manuel Tyler    Procedure(s) Performed: Procedure(s) (LRB):  COLONOSCOPY (N/A)    Final Anesthesia Type: general      Patient location during evaluation: GI PACU  Patient participation: Yes- Able to Participate  Level of consciousness: awake and alert and oriented  Post-procedure vital signs: reviewed and stable  Pain management: adequate  Airway patency: patent    PONV status at discharge: No PONV  Anesthetic complications: no      Cardiovascular status: blood pressure returned to baseline and hemodynamically stable  Respiratory status: unassisted, spontaneous ventilation and room air  Hydration status: euvolemic  Follow-up not needed.          Vitals Value Taken Time   BP 92/55 08/07/23 1354   Temp 36.4 °C (97.5 °F) 08/07/23 1354   Pulse 85 08/07/23 1354   Resp 20 08/07/23 1354   SpO2 98 % 08/07/23 1354         No case tracking events are documented in the log.      Pain/Prem Score: No data recorded

## 2023-08-07 NOTE — PROVATION PATIENT INSTRUCTIONS
Discharge Summary/Instructions after an Endoscopic Procedure  Patient Name: Manuel Tyler  Patient MRN: [id number]  Patient YOB: 1950 Monday, August 7, 2023  Kong Estrella MD  Dear patient,  As a result of recent federal legislation (The Federal Cures Act), you may   receive lab or pathology results from your procedure in your [facility]   account before your physician is able to contact you. Your physician or   their representative will relay the results to you with their   recommendations at their soonest availability.  Thank you,  RESTRICTIONS:  During your procedure today, you received medications for sedation.  These   medications may affect your judgment, balance and coordination.  Therefore,   for 24 hours, you have the following restrictions:   - DO NOT drive a car, operate machinery, make legal/financial decisions,   sign important papers or drink alcohol.    ACTIVITY:  Today: no heavy lifting, straining or running due to procedural   sedation/anesthesia.  The following day: return to full activity including work.  DIET:  Eat and drink normally unless instructed otherwise.     TREATMENT FOR COMMON SIDE EFFECTS:  - Mild abdominal pain, nausea, belching, bloating or excessive gas:  rest,   eat lightly and use a heating pad.  - Sore Throat: treat with throat lozenges and/or gargle with warm salt   water.  - Because air was used during the procedure, expelling large amounts of air   from your rectum or belching is normal.  - If a bowel prep was taken, you may not have a bowel movement for 1-3 days.    This is normal.  SYMPTOMS TO WATCH FOR AND REPORT TO YOUR PHYSICIAN:  1. Abdominal pain or bloating, other than gas cramps.  2. Chest pain.  3. Back pain.  4. Signs of infection such as: chills or fever occurring within 24 hours   after the procedure.  5. Rectal bleeding, which would show as bright red, maroon, or black stools.   (A tablespoon of blood from the rectum is not serious, especially if    hemorrhoids are present.)  6. Vomiting.  7. Weakness or dizziness.  GO DIRECTLY TO THE NEAREST EMERGENCY ROOM IF YOU HAVE ANY OF THE FOLLOWING:      Difficulty breathing              Chills and/or fever over 101 F   Persistent vomiting and/or vomiting blood   Severe abdominal pain   Severe chest pain   Black, tarry stools   Bleeding- more than one tablespoon   Any other symptom or condition that you feel may need urgent attention  Your doctor recommends these additional instructions:  If any biopsies were taken, your doctors clinic will contact you in 1 to 2   weeks with any results.  - Discharge patient to home.   - Resume previous diet.   - Continue present medications.   - Repeat colonoscopy at the next available appointment because the bowel   preparation was suboptimal.  For questions, problems or results please call your physician - Kong Estrella MD at Work:  [phone].  [facility] can be reached at [phone]     IF A COMPLICATION OR EMERGENCY SITUATION ARISES AND YOU ARE UNABLE TO REACH   YOUR PHYSICIAN - GO DIRECTLY TO THE EMERGENCY ROOM.  Kong Estrella MD  8/7/2023 1:50:19 PM  This report has been verified and signed electronically.  Dear patient,  As a result of recent federal legislation (The Federal Cures Act), you may   receive lab or pathology results from your procedure in your [facility]   account before your physician is able to contact you. Your physician or   their representative will relay the results to you with their   recommendations at their soonest availability.  Thank you,  PROVATION

## 2023-08-07 NOTE — ANESTHESIA PREPROCEDURE EVALUATION
08/07/2023    Pre-operative evaluation for Procedure(s) (LRB):  COLONOSCOPY (N/A)    Manuel Tyler is a 72 y.o. male     Patient Active Problem List   Diagnosis    Fracture, metatarsal, open    Primary hypertension    Hyperlipidemia    Alcohol abuse    Malignant neoplasm of head of pancreas    Liver metastases    (HFpEF) heart failure with preserved ejection fraction    Encephalopathy, toxic    ACP (advance care planning)    DNR (do not resuscitate)    Neoplasm related pain    COVID-19    Acute hypoxemic respiratory failure    Tobacco use disorder    Alcohol abuse, uncomplicated    Alcohol use, unspecified with other alcohol-induced disorder       Review of patient's allergies indicates:   Allergen Reactions    Jakafi [ruxolitinib]        No current facility-administered medications on file prior to encounter.     Current Outpatient Medications on File Prior to Encounter   Medication Sig Dispense Refill    DULCOLAX, BISACODYL, ORAL Take by mouth.      HYDROcodone-acetaminophen (NORCO) 5-325 mg per tablet Take 1 tablet by mouth every 6 (six) hours as needed for Pain. (Patient not taking: Reported on 7/18/2023) 15 tablet 0    HYDROcodone-acetaminophen (NORCO) 5-325 mg per tablet Take 1 tablet by mouth every 6 (six) hours as needed for Pain. 15 tablet 0    morphine (MS CONTIN) 15 MG 12 hr tablet Take 1 tablet (15 mg total) by mouth every 12 (twelve) hours. 60 tablet 0    multivit-mins/iron/folic/lycop (CENTRUM MEN ORAL) Take by mouth.      nicotine (NICODERM CQ) 21 mg/24 hr Place 1 patch onto the skin once daily. (Patient not taking: Reported on 7/18/2023) 30 patch 3    ondansetron (ZOFRAN) 4 MG tablet Take 1 tablet (4 mg total) by mouth every 8 (eight) hours as needed for Nausea. 30 tablet 1    tiotropium bromide (SPIRIVA RESPIMAT) 2.5 mcg/actuation inhaler Inhale 2 puffs into the lungs  once daily. Controller 4 g 3    trametinib (MEKINIST) 2 mg Tab TAKE 1 TABLET ONCE DAILY 60 tablet 1       Past Surgical History:   Procedure Laterality Date    COLONOSCOPY      ENDOSCOPIC ULTRASOUND OF UPPER GASTROINTESTINAL TRACT Left 12/30/2022    Procedure: ULTRASOUND, UPPER GI TRACT, ENDOSCOPIC;  Surgeon: Gregory Cristina MD;  Location: [address]);  Service: Endoscopy;  Laterality: Left;    ERCP Left 12/30/2022    Procedure: ERCP (ENDOSCOPIC RETROGRADE CHOLANGIOPANCREATOGRAPHY);  Surgeon: Gregory Cristina MD;  Location: [address]);  Service: Endoscopy;  Laterality: Left;    FOOT SURGERY Left     INSERTION OF TUNNELED CENTRAL VENOUS CATHETER (CVC) WITH SUBCUTANEOUS PORT Bilateral 1/19/2023    Procedure: SUJWMEPUT-OENZ-L-CATH;  Surgeon: Amador Castellon MD;  Location: [facility];  Service: General;  Laterality: Bilateral;  Right v Left PORTACATH. needs ultrasound and fluoro  RN PREOP 01/18/2023    TONSILLECTOMY           VITALS  Vitals:    08/07/23 1252   BP: 123/72   Pulse: 100   Resp: 19   Temp: 36.9 °C (98.4 °F)       LABS  CBC:   Recent Labs     08/04/23  0950   WBC 17.81*   RBC 4.59*   HGB 14.1   HCT 42.8   *   MCV 93   MCH 30.7   MCHC 32.9       CMP:   Recent Labs     08/04/23  0950   *   K 4.6      CO2 26   BUN 14   CREATININE 1.0   GLU 96   MG 2.0   CALCIUM 10.1   ALBUMIN 3.1*   PROT 7.8   ALKPHOS 139*   ALT 19   AST 26   BILITOT 0.5         Pre-op Assessment    I have reviewed the Patient Summary Reports.     I have reviewed the Nursing Notes. I have reviewed the NPO Status.   I have reviewed the Medications.     Review of Systems  Anesthesia Hx:  No problems with previous Anesthesia  Denies Family Hx of Anesthesia complications.   Denies Personal Hx of Anesthesia complications.   Social:  No Alcohol Use, Smoker    Hematology/Oncology:  Hematology Normal      Current/Recent Cancer. Oncology Comments: pancreatic cancer w hepatic involvement, stage 4     EENT/Dental:EENT/Dental Normal   Cardiovascular:   Exercise tolerance: good Hypertension Denies MI.   CHF (diastolic dysfuntion; EF 65%) hyperlipidemia    Pulmonary:  Pulmonary Normal    Renal/:  Renal/ Normal     Hepatic/GI:   Liver Disease, Liver metastases   Musculoskeletal:  Musculoskeletal Normal    Neurological:  Neurology Normal    Endocrine:  Endocrine Normal    Dermatological:  Skin Normal    Psych:  Psychiatric Normal           Physical Exam  General: Well nourished    Airway:  Mallampati: II   Mouth Opening: Normal  Tongue: Normal  Neck ROM: Normal ROM    Dental:  Intact    Chest/Lungs:  Normal Respiratory Rate        Anesthesia Plan  Type of Anesthesia, risks & benefits discussed:    Anesthesia Type: Gen Natural Airway  Intra-op Monitoring Plan: Standard ASA Monitors  Induction:  IV  Informed Consent: Informed consent signed with the Patient and all parties understand the risks and agree with anesthesia plan.  All questions answered.   ASA Score: 3    Ready For Surgery From Anesthesia Perspective.     .

## 2023-08-08 ENCOUNTER — TELEPHONE (OUTPATIENT)
Dept: ENDOSCOPY | Facility: HOSPITAL | Age: 73
End: 2023-08-08
Payer: MEDICARE

## 2023-08-08 ENCOUNTER — OFFICE VISIT (OUTPATIENT)
Dept: HEMATOLOGY/ONCOLOGY | Facility: CLINIC | Age: 73
End: 2023-08-08
Payer: MEDICARE

## 2023-08-08 ENCOUNTER — ANESTHESIA (OUTPATIENT)
Dept: ENDOSCOPY | Facility: HOSPITAL | Age: 73
End: 2023-08-08
Payer: MEDICARE

## 2023-08-08 ENCOUNTER — HOSPITAL ENCOUNTER (OUTPATIENT)
Facility: HOSPITAL | Age: 73
Discharge: HOME | End: 2023-08-08
Attending: INTERNAL MEDICINE | Admitting: INTERNAL MEDICINE
Payer: MEDICARE

## 2023-08-08 VITALS
HEART RATE: 98 BPM | TEMPERATURE: 98 F | DIASTOLIC BLOOD PRESSURE: 73 MMHG | OXYGEN SATURATION: 98 % | SYSTOLIC BLOOD PRESSURE: 142 MMHG | RESPIRATION RATE: 18 BRPM

## 2023-08-08 VITALS
OXYGEN SATURATION: 99 % | WEIGHT: 140.63 LBS | DIASTOLIC BLOOD PRESSURE: 89 MMHG | HEIGHT: 68 IN | BODY MASS INDEX: 21.31 KG/M2 | SYSTOLIC BLOOD PRESSURE: 145 MMHG | HEART RATE: 120 BPM | TEMPERATURE: 98 F

## 2023-08-08 DIAGNOSIS — Z12.11 COLON CANCER SCREENING: ICD-10-CM

## 2023-08-08 DIAGNOSIS — K63.9 COLON WALL THICKENING: ICD-10-CM

## 2023-08-08 DIAGNOSIS — C25.0 MALIGNANT NEOPLASM OF HEAD OF PANCREAS: Primary | ICD-10-CM

## 2023-08-08 DIAGNOSIS — R93.5 ABNORMAL CT OF THE ABDOMEN: ICD-10-CM

## 2023-08-08 DIAGNOSIS — D75.838 REACTIVE THROMBOCYTOSIS: ICD-10-CM

## 2023-08-08 DIAGNOSIS — C78.7 MALIGNANT NEOPLASM METASTATIC TO LIVER: ICD-10-CM

## 2023-08-08 DIAGNOSIS — G89.3 NEOPLASM RELATED PAIN: ICD-10-CM

## 2023-08-08 PROCEDURE — 99999 PR PBB SHADOW E&M-EST. PATIENT-LVL IV: ICD-10-PCS | Mod: PBBFAC,,, | Performed by: INTERNAL MEDICINE

## 2023-08-08 PROCEDURE — 99215 PR OFFICE/OUTPT VISIT, EST, LEVL V, 40-54 MIN: ICD-10-PCS | Mod: S$PBB,,, | Performed by: INTERNAL MEDICINE

## 2023-08-08 PROCEDURE — D9220A PRA ANESTHESIA: ICD-10-PCS | Mod: CRNA,,, | Performed by: STUDENT IN AN ORGANIZED HEALTH CARE EDUCATION/TRAINING PROGRAM

## 2023-08-08 PROCEDURE — D9220A PRA ANESTHESIA: Mod: ANES,,, | Performed by: ANESTHESIOLOGY

## 2023-08-08 PROCEDURE — 25000003 PHARM REV CODE 250: Performed by: STUDENT IN AN ORGANIZED HEALTH CARE EDUCATION/TRAINING PROGRAM

## 2023-08-08 PROCEDURE — 45378 DIAGNOSTIC COLONOSCOPY: CPT | Mod: 74 | Performed by: INTERNAL MEDICINE

## 2023-08-08 PROCEDURE — D9220A PRA ANESTHESIA: ICD-10-PCS | Mod: ANES,,, | Performed by: ANESTHESIOLOGY

## 2023-08-08 PROCEDURE — 99999 PR PBB SHADOW E&M-EST. PATIENT-LVL IV: CPT | Mod: PBBFAC,,, | Performed by: INTERNAL MEDICINE

## 2023-08-08 PROCEDURE — 63600175 PHARM REV CODE 636 W HCPCS: Performed by: STUDENT IN AN ORGANIZED HEALTH CARE EDUCATION/TRAINING PROGRAM

## 2023-08-08 PROCEDURE — 37000008 HC ANESTHESIA 1ST 15 MINUTES: Performed by: INTERNAL MEDICINE

## 2023-08-08 PROCEDURE — 45378 DIAGNOSTIC COLONOSCOPY: CPT | Mod: 53,,, | Performed by: INTERNAL MEDICINE

## 2023-08-08 PROCEDURE — 37000009 HC ANESTHESIA EA ADD 15 MINS: Performed by: INTERNAL MEDICINE

## 2023-08-08 PROCEDURE — 45378 PR COLONOSCOPY,DIAGNOSTIC: ICD-10-PCS | Mod: 53,,, | Performed by: INTERNAL MEDICINE

## 2023-08-08 PROCEDURE — 99215 OFFICE O/P EST HI 40 MIN: CPT | Mod: S$PBB,,, | Performed by: INTERNAL MEDICINE

## 2023-08-08 PROCEDURE — D9220A PRA ANESTHESIA: Mod: CRNA,,, | Performed by: STUDENT IN AN ORGANIZED HEALTH CARE EDUCATION/TRAINING PROGRAM

## 2023-08-08 PROCEDURE — 99214 OFFICE O/P EST MOD 30 MIN: CPT | Mod: PBBFAC,25 | Performed by: INTERNAL MEDICINE

## 2023-08-08 RX ORDER — FENTANYL CITRATE 50 UG/ML
INJECTION, SOLUTION INTRAMUSCULAR; INTRAVENOUS
Status: DISCONTINUED
Start: 2023-08-08 | End: 2023-08-08 | Stop reason: WASHOUT

## 2023-08-08 RX ORDER — SODIUM CHLORIDE 9 MG/ML
INJECTION, SOLUTION INTRAVENOUS CONTINUOUS
Status: DISCONTINUED | OUTPATIENT
Start: 2023-08-08 | End: 2023-08-08 | Stop reason: HOSPADM

## 2023-08-08 RX ORDER — PROPOFOL 10 MG/ML
VIAL (ML) INTRAVENOUS
Status: DISCONTINUED | OUTPATIENT
Start: 2023-08-08 | End: 2023-08-08

## 2023-08-08 RX ORDER — LIDOCAINE HYDROCHLORIDE 20 MG/ML
INJECTION, SOLUTION EPIDURAL; INFILTRATION; INTRACAUDAL; PERINEURAL
Status: DISCONTINUED
Start: 2023-08-08 | End: 2023-08-08 | Stop reason: HOSPADM

## 2023-08-08 RX ORDER — ONDANSETRON 2 MG/ML
INJECTION INTRAMUSCULAR; INTRAVENOUS
Status: DISCONTINUED
Start: 2023-08-08 | End: 2023-08-08 | Stop reason: HOSPADM

## 2023-08-08 RX ORDER — SUCCINYLCHOLINE CHLORIDE 20 MG/ML
INJECTION INTRAMUSCULAR; INTRAVENOUS
Status: DISCONTINUED
Start: 2023-08-08 | End: 2023-08-08 | Stop reason: WASHOUT

## 2023-08-08 RX ORDER — LIDOCAINE HYDROCHLORIDE 20 MG/ML
INJECTION INTRAVENOUS
Status: DISCONTINUED | OUTPATIENT
Start: 2023-08-08 | End: 2023-08-08

## 2023-08-08 RX ORDER — PROPOFOL 10 MG/ML
INJECTION, EMULSION INTRAVENOUS
Status: DISCONTINUED
Start: 2023-08-08 | End: 2023-08-08 | Stop reason: HOSPADM

## 2023-08-08 RX ADMIN — PROPOFOL 20 MG: 10 INJECTION, EMULSION INTRAVENOUS at 11:08

## 2023-08-08 RX ADMIN — PROPOFOL 40 MG: 10 INJECTION, EMULSION INTRAVENOUS at 11:08

## 2023-08-08 RX ADMIN — SODIUM CHLORIDE: 0.9 INJECTION, SOLUTION INTRAVENOUS at 10:08

## 2023-08-08 RX ADMIN — PROPOFOL 10 MG: 10 INJECTION, EMULSION INTRAVENOUS at 11:08

## 2023-08-08 RX ADMIN — LIDOCAINE HYDROCHLORIDE 100 MG: 20 INJECTION, SOLUTION INTRAVENOUS at 11:08

## 2023-08-08 NOTE — TELEPHONE ENCOUNTER
Called pt to schedule Colonoscopy.  Offered a date to pt and he states that he will speak with Dr. Carver and will call after speaking her to schedule. Provided with Endoscopy Scheduling #.

## 2023-08-08 NOTE — TRANSFER OF CARE
Anesthesia Transfer of Care Note    Patient: Manuel Tyler    Procedure(s) Performed: Procedure(s) (LRB):  COLONOSCOPY (N/A)    Patient location: GI    Anesthesia Type: general    Transport from OR: Transported from OR on room air with adequate spontaneous ventilation    Post pain: adequate analgesia    Post assessment: no apparent anesthetic complications and tolerated procedure well    Post vital signs: stable    Level of consciousness: awake and alert    Nausea/Vomiting: no nausea/vomiting    Complications: none    Transfer of care protocol was followed      Last vitals:   Visit Vitals  /64   Pulse 92   Temp 36.7 °C (98 °F) (Oral)   Resp 18   SpO2 (!) 94%

## 2023-08-08 NOTE — TELEPHONE ENCOUNTER
"----- Message -----   From: Mariia Luevano MD   Sent: 2023  11:29 AM CDT   To: [facility]   Subject: Needs repeat colonoscopy ASAP with extended *     Procedure: Colonoscopy     Diagnosis: Abnormal finding on GI tract imaging     Procedure Timin-4 weeks     *If within 4 weeks selected, please ernestine as high priority*     *If greater than 12 weeks, please select "5-12 weeks" and delay sending until 2 months prior to requested date*     Provider: Any GI provider     Location: [facility]     Additional Scheduling Information: No scheduling concerns     Prep Specifications:Extended/Constipation prep     Have you attached a patient to this message: yes   "

## 2023-08-08 NOTE — PROVATION PATIENT INSTRUCTIONS
Discharge Summary/Instructions after an Endoscopic Procedure  Patient Name: Manuel Tyler  Patient MRN: [id number]  Patient YOB: 1950 Tuesday, August 8, 2023  Mariia Luevano MD  Dear patient,  As a result of recent federal legislation (The Federal Cures Act), you may   receive lab or pathology results from your procedure in your [facility]   account before your physician is able to contact you. Your physician or   their representative will relay the results to you with their   recommendations at their soonest availability.  Thank you,  RESTRICTIONS:  During your procedure today, you received medications for sedation.  These   medications may affect your judgment, balance and coordination.  Therefore,   for 24 hours, you have the following restrictions:   - DO NOT drive a car, operate machinery, make legal/financial decisions,   sign important papers or drink alcohol.    ACTIVITY:  Today: no heavy lifting, straining or running due to procedural   sedation/anesthesia.  The following day: return to full activity including work.  DIET:  Eat and drink normally unless instructed otherwise.     TREATMENT FOR COMMON SIDE EFFECTS:  - Mild abdominal pain, nausea, belching, bloating or excessive gas:  rest,   eat lightly and use a heating pad.  - Sore Throat: treat with throat lozenges and/or gargle with warm salt   water.  - Because air was used during the procedure, expelling large amounts of air   from your rectum or belching is normal.  - If a bowel prep was taken, you may not have a bowel movement for 1-3 days.    This is normal.  SYMPTOMS TO WATCH FOR AND REPORT TO YOUR PHYSICIAN:  1. Abdominal pain or bloating, other than gas cramps.  2. Chest pain.  3. Back pain.  4. Signs of infection such as: chills or fever occurring within 24 hours   after the procedure.  5. Rectal bleeding, which would show as bright red, maroon, or black stools.   (A tablespoon of blood from the rectum is not serious, especially if    hemorrhoids are present.)  6. Vomiting.  7. Weakness or dizziness.  GO DIRECTLY TO THE NEAREST EMERGENCY ROOM IF YOU HAVE ANY OF THE FOLLOWING:      Difficulty breathing              Chills and/or fever over 101 F   Persistent vomiting and/or vomiting blood   Severe abdominal pain   Severe chest pain   Black, tarry stools   Bleeding- more than one tablespoon   Any other symptom or condition that you feel may need urgent attention  Your doctor recommends these additional instructions:  If any biopsies were taken, your doctors clinic will contact you in 1 to 2   weeks with any results.  - Discharge patient to home.   - Resume previous diet.   - Continue present medications.   - Repeat colonoscopy at appointment to be scheduled because the bowel   preparation was poor.  For questions, problems or results please call your physician - Mariia Luevano MD at Work:  [phone].  [facility] can be reached at [phone]     IF A COMPLICATION OR EMERGENCY SITUATION ARISES AND YOU ARE UNABLE TO REACH   YOUR PHYSICIAN - GO DIRECTLY TO THE EMERGENCY ROOM.  Mariia Luevano MD  8/8/2023 11:28:27 AM  This report has been verified and signed electronically.  Dear patient,  As a result of recent federal legislation (The Federal Cures Act), you may   receive lab or pathology results from your procedure in your [facility]   account before your physician is able to contact you. Your physician or   their representative will relay the results to you with their   recommendations at their soonest availability.  Thank you,  PROVATION

## 2023-08-08 NOTE — ANESTHESIA POSTPROCEDURE EVALUATION
Anesthesia Post Evaluation    Patient: Manuel Tyler    Procedure(s) Performed: Procedure(s) (LRB):  COLONOSCOPY (N/A)    Final Anesthesia Type: general      Patient location during evaluation: GI PACU  Patient participation: Yes- Able to Participate  Level of consciousness: awake and alert and oriented  Post-procedure vital signs: reviewed and stable  Pain management: adequate  Airway patency: patent    PONV status at discharge: No PONV  Anesthetic complications: no      Cardiovascular status: blood pressure returned to baseline and hemodynamically stable  Respiratory status: unassisted, spontaneous ventilation and room air  Hydration status: euvolemic  Follow-up not needed.          Vitals Value Taken Time   /73 08/08/23 1158   Temp 36.7 °C (98 °F) 08/08/23 1128   Pulse 98 08/08/23 1158   Resp 18 08/08/23 1158   SpO2 98 % 08/08/23 1158         Event Time   Out of Recovery 12:00:00         Pain/Prem Score: Prem Score: 10 (8/8/2023 11:58 AM)

## 2023-08-08 NOTE — PROGRESS NOTES
Subjective:       Patient ID: Manuel Tyler is a 72 y.o. male.    Chief Complaint: No chief complaint on file.  Diagnosis: Stage IV pancreatic CA  with liver mets diagnosed 12/30/22    ANDREA, BRAF V600E, JAK2 V617F  Therapy: Lytle/abraxane (dose reduced) 1/24/23-2/7/23   DABRENIB AND TRAMETINIB  3/29/23-present  HPI 72 y.o male with medical diagnoses listed seen today for Stage IV pancreatic adenocarcinoma with liver metastases.He was  admitted with pancreatitis likely secondary to ETOH abuse. C-diff negative. CT a/p w/contrast 12/27/22 shows Mild wall thickening involving the sigmoid colon with abnormal appearance seen within the left lower pelvis along the left lateral aspect of the sigmoid colon.  Uncertain if findings may in part relate to sigmoid tortuosity, however potential colocolic fistula not excluded given appearance.  No acute inflammatory changes are seen.  No prior studies are available for comparison.  Colonoscopy follow-up may be warranted if not previously/recently obtained.   Distended gallbladder. US abd limited 12/28/22 shows Biliary sludge with evidence of acute cholecystitis including gallbladder distension and a positive sonographic Hahn sign.CBD dilatation up to 13 mm, which could be secondary to cholecystitis, choledocholithiasis, or other obstruction.Ill-defined 2.6 cm lesion in the region of the pancreatic head, which could represent a lymph node or pancreatic neoplasm.  GI performed EUS and ERCP on 12/30/22 - suspicious for pancreatic CA- stent placed. Upper EUS 12/30/22 shows A single metastatic lesion was found in the left lobe of the liver.   Fine needle aspiration performed. A mass was identified in the pancreatic head. Fine needle biopsy performed  Pancreas, head, mass, EUS guided fine-needle aspiration: Positive for malignancy, adenocarcinoma, see comment.   Liver, EUS guided fine-needle aspiration: - Positive for malignancy, adenocarcinoma,He is taking OTC acetaminophen for abd pain  which intermittently radiates to back.  He rates pain 3/10 in intensity. He has intermittent nausea. He is reports changes in bowel habits He needs assistance with some daily activities.No falls. He reports mild SKAGGS. No CP/hemoptysis. He is accompanied by family members.   S/p C1D8 gem/abraxane ( dose reduced) on 2/7/23    admitted on 1/25/23 with AMS. He was found to be dehydrated, hypotensive and concerns for infection/septic shock. H  He was switched to 2nd line therapy with  DABRENIB AND TRAMETINIB on 3/29/23 2/2 lack of tolerability with first line therapy and subsequent hospitalizations    Interval Hx: Accompanied by fam member  He  remains on  DABRENIB AND TRAMETINIB ( started on 3/29/23)  CT imaging 6/1/23 shows  ill-defined infiltrating pancreatic head mass.  This is difficult to measure but probably 2.8 cm in diameter.Bile duct stent, pancreatic stent with pneumobilia.Coronary calcifications, and bilateral renal cysts. abnormal thickening at the rectosigmoid junction worrisome for neoplasm.  This could be a 2nd neoplasm/colon cancer.  He is undergoing bowel prep for repeat colonoscopy due to poor bowel prep  Pain well controlled  Mild fatigue   Intermittent nausea 2/2 bowel prep  No SOB/CPcough  He has 1-2 ensures daily  He was f/b palliative care during hospitalization  According to chart pt DNR  laPost not completed prior to discharge      Guardant 360 biopsy resulted  Details: ANDREA, BRAF V600E, JAK2 V617F       Past Medical History:   Diagnosis Date    (HFpEF) heart failure with preserved ejection fraction 1/25/2023    Alcohol abuse 12/27/2022    Hyperlipidemia     Hypertension     Liver metastases 1/18/2023    Malignant neoplasm of head of pancreas 1/18/2023    Other specified noninfective gastroenteritis and colitis     Pancreatitis     Personal history of colonic polyps        Past Surgical History:   Procedure Laterality Date    COLONOSCOPY      COLONOSCOPY N/A 8/7/2023    Procedure: COLONOSCOPY;   "Surgeon: Kong Estrella MD;  Location: [facility] ENDO;  Service: Endoscopy;  Laterality: N/A;  6/22 Instructions sent Via portal    ENDOSCOPIC ULTRASOUND OF UPPER GASTROINTESTINAL TRACT Left 12/30/2022    Procedure: ULTRASOUND, UPPER GI TRACT, ENDOSCOPIC;  Surgeon: Gregory Cristina MD;  Location: [facility] ENDO (2ND FLR);  Service: Endoscopy;  Laterality: Left;    ERCP Left 12/30/2022    Procedure: ERCP (ENDOSCOPIC RETROGRADE CHOLANGIOPANCREATOGRAPHY);  Surgeon: Gregory Cristina MD;  Location: [facility] ENDO (2ND FLR);  Service: Endoscopy;  Laterality: Left;    FOOT SURGERY Left     INSERTION OF TUNNELED CENTRAL VENOUS CATHETER (CVC) WITH SUBCUTANEOUS PORT Bilateral 1/19/2023    Procedure: [id number];  Surgeon: Amador Castellon MD;  Location: [facility] OR;  Service: General;  Laterality: Bilateral;  Right v Left PORTACATH. needs ultrasound and fluoro  RN PREOP 01/18/2023    TONSILLECTOMY          Review of Systems   Constitutional:  Positive for appetite change, fatigue and unexpected weight change. Negative for fever.   HENT:  Negative for mouth sores.    Eyes:  Negative for visual disturbance.   Respiratory:  Negative for cough and shortness of breath.    Cardiovascular:  Negative for chest pain.   Gastrointestinal:  Negative for abdominal pain, diarrhea and nausea.   Genitourinary:  Negative for frequency.   Musculoskeletal:  Negative for back pain.   Integumentary:  Negative for rash.   Neurological:  Negative for headaches.   Hematological:  Negative for adenopathy.   Psychiatric/Behavioral:  The patient is not nervous/anxious.          Objective:     ECOG 1      Vitals:    08/08/23 0909   BP: (!) 145/89   Pulse: (!) 120   Temp: 97.5 °F (36.4 °C)   SpO2: 99%   Weight: 63.8 kg (140 lb 10.5 oz)   Height: 5' 8" (1.727 m)             Physical Exam  Constitutional:       Appearance: He is underweight.   HENT:      Head: Normocephalic.      Mouth/Throat:      Pharynx: No oropharyngeal exudate.   Eyes:      General: No scleral " icterus.        Right eye: No discharge.         Left eye: No discharge.      Conjunctiva/sclera: Conjunctivae normal.   Neck:      Thyroid: No thyromegaly.   Cardiovascular:      Rate and Rhythm: Regular rhythm. Tachycardia present.      Heart sounds: No murmur heard.  Pulmonary:      Effort: Pulmonary effort is normal.      Breath sounds: Normal breath sounds. No wheezing or rales.   Abdominal:      General: Bowel sounds are normal.      Palpations: Abdomen is soft.      Tenderness: There is no abdominal tenderness. There is no guarding or rebound.   Musculoskeletal:         General: No swelling. Normal range of motion.      Cervical back: Normal range of motion and neck supple.   Lymphadenopathy:      Cervical: No cervical adenopathy.      Upper Body:      Right upper body: No supraclavicular adenopathy.      Left upper body: No supraclavicular adenopathy.   Neurological:      Mental Status: He is alert and oriented to person, place, and time.      Cranial Nerves: No cranial nerve deficit.   Psychiatric:         Mood and Affect: Mood normal.         Behavior: Behavior normal.             CT a/p w/contrast 12/27/22  Impression:     1. Mild wall thickening involving the sigmoid colon with abnormal appearance seen within the left lower pelvis along the left lateral aspect of the sigmoid colon.  Uncertain if findings may in part relate to sigmoid tortuosity, however potential colocolic fistula not excluded given appearance.  No acute inflammatory changes are seen.  No prior studies are available for comparison.  Colonoscopy follow-up may be warranted if not previously/recently obtained.  Otherwise consider future fluoroscopic Gastrografin enema for further evaluation.  2. Distended gallbladder.  Consider gallbladder ultrasound follow-up if clinically indicated.  3. Additional findings as detailed above.     US abd limited 12/28/22  Impression:     Biliary sludge with evidence of acute cholecystitis including  gallbladder distension and a positive sonographic Hahn sign.     CBD dilatation up to 13 mm, which could be secondary to cholecystitis, choledocholithiasis, or other obstruction.     Ill-defined 2.6 cm lesion in the region of the pancreatic head, which could represent a lymph node or pancreatic neoplasm.     RECOMMENDATIONS:  Contrast enhanced MRI/MRCP for further evaluation of the above findings    ERCP 12/30/22  - The major papilla appeared congested.                          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                          - A pancreatic sphincterotomy was performed.                          - One plastic stent was placed into the ventral                          pancreatic duct.                          - A single severe biliary stricture was found in                          the lower third of the main bile duct. The                          stricture was malignant appearing.                          - A biliary sphincterotomy was performed.                          - One covered metal stent was placed into the                          common bile duct.   Recommendation:        - Return patient to hospital [facility] for ongoing care.                          - EUS findings concerning for metastatic                          pancreatic cancer (see separate EUS report  Upper EUS 12/30/22  Impression:            - A single metastatic lesion was found in the left                          lobe of the liver. Cytology results are pending.                          However, the endosonographic appearance is                          suggestive of metastatic pancreatic                          adenocarcinoma. Fine needle aspiration performed.                          - A mass was identified in the pancreatic head.                          Fine needle biopsy performed  Pathology 12/30/22    1. Pancreas, head, mass, EUS guided fine-needle aspiration:   - Positive for malignancy, adenocarcinoma, see comment.    2. Liver, EUS guided fine-needle aspiration:   - Positive for malignancy, adenocarcinoma, see comment.   COMMENT:  Immunostain for cytokeratin performed on specimen 1 shows an   infiltrative epithelial process.  Parts 1 and 2 of this case are reviewed by   Dr. XIOA Gan who agrees with the above diagnoses.  Appropriate positive   controls are examined.   Immunohistochemistry (IHC) Testing for Mismatch Repair (MMR) Proteins   performed on specimen 1:   MLH1 - Intact nuclear expression   MSH2 - Intact nuclear expression   MSH6 - Intact nuclear expression   PMS2 - Intact nuclear expression   Background nonneoplastic tissue/internal control with intact nuclear   expression   IHC Interpretation   No loss of nuclear expression of MMR proteins: low probability of   microsatellite instability   There are exceptions to the above IHC interpretations. These results should   not be considered in isolation, and clinical correlation with genetic   counseling is recommended to assess the need for germline testing.      Comment: Interp By Colleen Olmos MD, Signed on 01/10/2023 at 09:30      Latest Reference Range & Units Most Recent   CA 19-9 0.0 - 40.0 U/mL 5348.6 (H)  1/11/23 15:22   (H): Data is abnormally high      2- D echo 1/25/23   The left ventricle is normal in size with concentric hypertrophy and normal systolic function.  The estimated ejection fraction is 65%.  Indeterminate left ventricular diastolic function.  Normal right ventricular size with normal right ventricular systolic function.  Mild left atrial enlargement.  Mild-to-moderate aortic regurgitation.  Mild tricuspid regurgitation.  Normal central venous pressure (3 mmHg).  The estimated PA systolic pressure is 33 mmHg.                 CT c/a/p w/contrast 6/1/23  Impression:     There is a ill-defined infiltrating pancreatic head mass.  This is difficult to measure but probably 2.8 cm in diameter.     Bile duct stent, pancreatic stent with pneumobilia.      Coronary calcifications, and bilateral renal cysts.     There is abnormal thickening at the rectosigmoid junction worrisome for neoplasm.  This could be a 2nd neoplasm/colon cancer.         Component      Latest Ref Rng 1/11/2023 4/13/2023 5/5/2023 6/9/2023   CA 19-9      0.0 - 40.0 U/mL 5348.6 (H)  3368.0 (H)  53106.2 (H)  >67540.0 (H)      Component      Latest Ref Rng 7/10/2023 8/4/2023   CA 19-9      0.0 - 40.0 U/mL 56407.2 (H)  6129.3 (H)       Legend:  (H) High        Assessment:         Diagnoses and all orders for this visit:    Malignant neoplasm of head of pancreas  Diagnosed 12/30/22  Pancreas, head, mass, EUS guided fine-needle aspiration: Positive for malignancy, adenocarcinoma  Liver, EUS guided fine-needle aspiration: - Positive for malignancy, adenocarcinoma  CA 19-9 5348U/mL on 1/11/23  Plan was for OP PANC NAB-PACLITAXEL + GEMCITABINE Q4W  S/p C1D8 gem/abraxane ( dose reduced) on 1/24/23    admitted on 12/5/23 with AMS.   Guardant 360 Details: ANDREA, BRAF V600E, JAK2 V617F  Plan switch therapy to DABRENIB AND TRAMETINIB   (with BRAF V600E mutation): DABRAFENIB Oral: 150 mg twice daily, approximately every 12 hours (in combination with trametinib); continue until disease progression or unacceptable toxicity.   BRAF V600E mutation: Oral: trametinib.  2 mg once daily (in combination with dabrafenib) until disease progression or unacceptable toxicity.   Pt tolerating therapy    CT imaging shows ill-defined infiltrating pancreatic head mass.  This is difficult to measure but probably 2.8 cm in diameter.abnormal thickening at the rectosigmoid junction worrisome for neoplasm.  This could be a 2nd neoplasm/colon cancer.  Follow up  with GI for repeat colonoscopy as planned   Labs reviewed  Follow up in 1 month with labs   Relevant Orders   CANCER ANTIGEN 19-9     Liver metastases    Neoplasm related pain  Currently well controlled  Cont current regimen      Abnormal CT of the abdomen/Colon wall  thickening  Colon wall thickening  CT imaging shows abnormal thickening at the rectosigmoid junction worrisome for neoplasm  Repeat colonoscopy planned today   Follow up with CRS     Reactive thrombocytosis  Cont to monitor         Follow-up:       Repeat Colonoscopy planned   Cbc,cmp, CA 19-9  prior to f/u in 1 month      I spent a total of  40  minutes on the day of the visit.This includes face to face time and non-face to face time preparing to see the patient (eg, review of tests), obtaining and/or reviewing separately obtained history, documenting clinical information in the electronic or other health record, independently interpreting results and communicating results to the patient/family/caregiver, and coordinating care.          Latesha Carver MD  [facility] [place]

## 2023-08-08 NOTE — ANESTHESIA PREPROCEDURE EVALUATION
08/08/2023    Pre-operative evaluation for Procedure(s) (LRB):  COLONOSCOPY (N/A)    Manuel Tyler is a 72 y.o. male     Patient Active Problem List   Diagnosis    Fracture, metatarsal, open    Primary hypertension    Hyperlipidemia    Alcohol abuse    Malignant neoplasm of head of pancreas    Liver metastases    (HFpEF) heart failure with preserved ejection fraction    Encephalopathy, toxic    ACP (advance care planning)    DNR (do not resuscitate)    Neoplasm related pain    COVID-19    Acute hypoxemic respiratory failure    Tobacco use disorder    Alcohol abuse, uncomplicated    Alcohol use, unspecified with other alcohol-induced disorder       Review of patient's allergies indicates:   Allergen Reactions    Jakafi [ruxolitinib]        No current facility-administered medications on file prior to encounter.     Current Outpatient Medications on File Prior to Encounter   Medication Sig Dispense Refill    buPROPion (WELLBUTRIN XL) 300 MG 24 hr tablet Take 1 tablet (300 mg total) by mouth once daily. 90 tablet 1    dabrafenib (TAFINLAR) 50 mg Cap TAKE 3 CAPSULES (150 MG TOTAL) TWICE DAILY. 120 capsule 1    DULCOLAX, BISACODYL, ORAL Take by mouth.      HYDROcodone-acetaminophen (NORCO) 5-325 mg per tablet Take 1 tablet by mouth every 6 (six) hours as needed for Pain. (Patient not taking: Reported on 7/18/2023) 15 tablet 0    HYDROcodone-acetaminophen (NORCO) 5-325 mg per tablet Take 1 tablet by mouth every 6 (six) hours as needed for Pain. 15 tablet 0    morphine (MS CONTIN) 15 MG 12 hr tablet Take 1 tablet (15 mg total) by mouth every 12 (twelve) hours. 60 tablet 0    multivit-mins/iron/folic/lycop (CENTRUM MEN ORAL) Take by mouth.      nicotine (NICODERM CQ) 21 mg/24 hr Place 1 patch onto the skin once daily. (Patient not taking: Reported on 7/18/2023) 30 patch 3    ondansetron  (ZOFRAN) 4 MG tablet Take 1 tablet (4 mg total) by mouth every 8 (eight) hours as needed for Nausea. 30 tablet 1    tiotropium bromide (SPIRIVA RESPIMAT) 2.5 mcg/actuation inhaler Inhale 2 puffs into the lungs once daily. Controller 4 g 3    trametinib (MEKINIST) 2 mg Tab TAKE 1 TABLET ONCE DAILY 60 tablet 1       Past Surgical History:   Procedure Laterality Date    COLONOSCOPY      COLONOSCOPY N/A 8/7/2023    Procedure: COLONOSCOPY;  Surgeon: Kong Estrella MD;  Location: [facility] ENDO;  Service: Endoscopy;  Laterality: N/A;  6/22 Instructions sent Via portal    ENDOSCOPIC ULTRASOUND OF UPPER GASTROINTESTINAL TRACT Left 12/30/2022    Procedure: ULTRASOUND, UPPER GI TRACT, ENDOSCOPIC;  Surgeon: Gregory Cristina MD;  Location: [facility] (2ND FLR);  Service: Endoscopy;  Laterality: Left;    ERCP Left 12/30/2022    Procedure: ERCP (ENDOSCOPIC RETROGRADE CHOLANGIOPANCREATOGRAPHY);  Surgeon: Gregory Cristina MD;  Location: [facility] (2ND FLR);  Service: Endoscopy;  Laterality: Left;    FOOT SURGERY Left     INSERTION OF TUNNELED CENTRAL VENOUS CATHETER (CVC) WITH SUBCUTANEOUS PORT Bilateral 1/19/2023    Procedure: [id number]-[id number];  Surgeon: Amador Castellon MD;  Location: [facility] OR;  Service: General;  Laterality: Bilateral;  Right v Left PORTACATH. needs ultrasound and fluoro  RN PREOP 01/18/2023    TONSILLECTOMY             Pre-op Assessment    I have reviewed the Patient Summary Reports.    I have reviewed the NPO Status.   I have reviewed the Medications.     Review of Systems  Anesthesia Hx:  No problems with previous Anesthesia  Denies Family Hx of Anesthesia complications.   Denies Personal Hx of Anesthesia complications.   Hematology/Oncology:  Hematology Normal   Oncology Normal     EENT/Dental:EENT/Dental Normal   Cardiovascular:   Hypertension    Pulmonary:  Pulmonary Normal    Renal/:  Renal/ Normal     Hepatic/GI:   Liver Disease,    Musculoskeletal:  Musculoskeletal Normal     Neurological:  Neurology Normal    Endocrine:  Endocrine Normal    Dermatological:  Skin Normal    Psych:  Psychiatric Normal           Physical Exam  General: Well nourished, Cooperative and Alert    Airway:  Mallampati: II   Mouth Opening: Normal  TM Distance: Normal  Tongue: Normal  Neck ROM: Normal ROM    Chest/Lungs:  Normal Respiratory Rate    Heart:  Rate: Normal  Rhythm: Regular Rhythm  Sounds: Normal        Anesthesia Plan  Type of Anesthesia, risks & benefits discussed:    Anesthesia Type: Gen ETT  Intra-op Monitoring Plan: Standard ASA Monitors  Induction:  IV  Informed Consent: Informed consent signed with the Patient and all parties understand the risks and agree with anesthesia plan.  All questions answered. Patient consented to blood products? No  ASA Score: 2    Ready For Surgery From Anesthesia Perspective.     .

## 2023-08-08 NOTE — OR NURSING
PROCEDURE AND RECOVERY COMPLETED. DR. HEDRICK DISCUSSED FINDINGS AND PLAN OF CARE; DISCHARGE INSTRUCTIONS GIVEN; UNDERSTANDING  VERBALIZED; ASSISTED UP WITHOUT UNTOWARD EFFECTS; UPON DISCHARGING PATIENT SPOUSE NOTED A WOUND TO PATIENT'S LEFT POSTERIOR SHOULDER AREA WHICH IS ABOUT 3CM X 4CM AND CRATER WITH EDGES CHIN AND CRUSTING; NO DRAINAGE; PATIENT AFEBRILE; PATIENT STATED THIS HAS BEEN THERE AND THE SCAB FELL OFF; IT DOES NOT HURT; DR. HEDRICK AWARE OKAY TO DISCHARGE; PATIENT WILL BE FOLLOWING UP WITH PCP TOMORROW; INSTRUCTED TO NOTIFY HER OF THIS WOUND ON THE VISIT; MEPILEX BOARDER  PLACED TO AREA. OKAY TO DISCHARGE HOME

## 2023-08-08 NOTE — H&P
Short Stay Endoscopy History and Physical    PCP - Fatmata Vera MD    Procedure - Colonoscopy  ASA - per anesthesia  Mallampati - per anesthesia  History of Anesthesia problems - no  Family history Anesthesia problems - no   Plan of anesthesia - General    HPI:  This is a 72 y.o. male here for evaluation of : abnormal imaging of the colon.      ROS:  Constitutional: No fevers, chills, No weight loss  CV: No chest pain  Pulm: No cough, No shortness of breath  GI: see HPI  Derm: No rash    Medical History:  has a past medical history of (HFpEF) heart failure with preserved ejection fraction (1/25/2023), Alcohol abuse (12/27/2022), Hyperlipidemia, Hypertension, Liver metastases (1/18/2023), Malignant neoplasm of head of pancreas (1/18/2023), Other specified noninfective gastroenteritis and colitis, Pancreatitis, and Personal history of colonic polyps.    Surgical History:  has a past surgical history that includes Colonoscopy; Foot surgery (Left); Tonsillectomy; Endoscopic ultrasound of upper gastrointestinal tract (Left, 12/30/2022); ERCP (Left, 12/30/2022); Insertion of tunneled central venous catheter (CVC) with subcutaneous port (Bilateral, 1/19/2023); and Colonoscopy (N/A, 8/7/2023).    Family History: family history includes Arthritis in his father and mother; Breast cancer in his mother; COPD in his father; Hypertension in his father and mother; Skin cancer (age of onset: 69) in his father; Stroke in his mother.. Otherwise no colon cancer, inflammatory bowel disease, or GI malignancies.    Social History:  reports that he has been smoking cigarettes. He has quit using smokeless tobacco.  His smokeless tobacco use included chew. He reports current alcohol use. He reports that he does not use drugs.    Review of patient's allergies indicates:   Allergen Reactions    Jakafi [ruxolitinib]        Medications:   Medications Prior to Admission   Medication Sig Dispense Refill Last Dose    buPROPion (WELLBUTRIN  XL) 300 MG 24 hr tablet Take 1 tablet (300 mg total) by mouth once daily. 90 tablet 1 Past Week    dabrafenib (TAFINLAR) 50 mg Cap TAKE 3 CAPSULES (150 MG TOTAL) TWICE DAILY. 120 capsule 1 8/7/2023    HYDROcodone-acetaminophen (NORCO) 5-325 mg per tablet Take 1 tablet by mouth every 6 (six) hours as needed for Pain. 15 tablet 0 Past Week    HYDROcodone-acetaminophen (NORCO) 5-325 mg per tablet Take 1 tablet by mouth every 6 (six) hours as needed for Pain. 15 tablet 0 Past Week    morphine (MS CONTIN) 15 MG 12 hr tablet Take 1 tablet (15 mg total) by mouth every 12 (twelve) hours. 60 tablet 0 8/7/2023    multivit-mins/iron/folic/lycop (CENTRUM MEN ORAL) Take by mouth.   Past Week    ondansetron (ZOFRAN) 4 MG tablet Take 1 tablet (4 mg total) by mouth every 8 (eight) hours as needed for Nausea. 30 tablet 1 8/7/2023    tiotropium bromide (SPIRIVA RESPIMAT) 2.5 mcg/actuation inhaler Inhale 2 puffs into the lungs once daily. Controller 4 g 3 Past Week    trametinib (MEKINIST) 2 mg Tab TAKE 1 TABLET ONCE DAILY 60 tablet 1 8/7/2023    DULCOLAX, BISACODYL, ORAL Take by mouth.       nicotine (NICODERM CQ) 21 mg/24 hr Place 1 patch onto the skin once daily. (Patient not taking: Reported on 7/18/2023) 30 patch 3          Physical Exam:    Vital Signs: There were no vitals filed for this visit.    Gen: NAD, lying comfortably  HENT: NCAT, oropharynx clear  Eyes: anicteric sclerae, EOMI grossly  Neck: supple, no visible masses/goiter  Cardiac: RRR  Lungs: non-labored breathing  Abd: soft, NT/ND, normoactive BS  Ext: no LE edema, warm, well perfused  Skin: skin intact on exposed body parts, no visible rashes, lesions  Neuro: A&Ox4, neuro exam grossly intact, moves all extremities  Psych: appropriate mood, affect        Labs:  Lab Results   Component Value Date    WBC 17.81 (H) 08/04/2023    HGB 14.1 08/04/2023    HCT 42.8 08/04/2023     (H) 08/04/2023    CHOL 171 06/09/2023    TRIG 83 06/09/2023    HDL 46 06/09/2023    ALT  19 08/04/2023    AST 26 08/04/2023     (L) 08/04/2023    K 4.6 08/04/2023     08/04/2023    CREATININE 1.0 08/04/2023    BUN 14 08/04/2023    CO2 26 08/04/2023    TSH 1.868 12/27/2022    INR 1.1 12/27/2022       Plan:  Colonoscopy for abnormal imaging of the colon.    I have explained the risks and benefits of endoscopy procedures to the patient including but not limited to bleeding, perforation, infection, and death.  The patient was asked if they understand and allowed to ask any further questions to their satisfaction.    Mariia Luevano MD

## 2023-08-09 ENCOUNTER — OFFICE VISIT (OUTPATIENT)
Dept: FAMILY MEDICINE | Facility: CLINIC | Age: 73
End: 2023-08-09
Payer: MEDICARE

## 2023-08-09 VITALS
TEMPERATURE: 99 F | SYSTOLIC BLOOD PRESSURE: 120 MMHG | DIASTOLIC BLOOD PRESSURE: 78 MMHG | HEART RATE: 96 BPM | BODY MASS INDEX: 21.72 KG/M2 | HEIGHT: 68 IN | WEIGHT: 143.31 LBS | OXYGEN SATURATION: 95 %

## 2023-08-09 DIAGNOSIS — J96.01 ACUTE HYPOXEMIC RESPIRATORY FAILURE: ICD-10-CM

## 2023-08-09 DIAGNOSIS — U07.1 COVID-19: Primary | ICD-10-CM

## 2023-08-09 DIAGNOSIS — F17.200 TOBACCO USE DISORDER: ICD-10-CM

## 2023-08-09 DIAGNOSIS — L02.414: ICD-10-CM

## 2023-08-09 PROBLEM — F10.988 ALCOHOL USE, UNSPECIFIED WITH OTHER ALCOHOL-INDUCED DISORDER: Status: RESOLVED | Noted: 2023-07-17 | Resolved: 2023-08-09

## 2023-08-09 PROCEDURE — 99999 PR PBB SHADOW E&M-EST. PATIENT-LVL III: CPT | Mod: PBBFAC,,, | Performed by: INTERNAL MEDICINE

## 2023-08-09 PROCEDURE — 99213 OFFICE O/P EST LOW 20 MIN: CPT | Mod: PBBFAC,PO | Performed by: INTERNAL MEDICINE

## 2023-08-09 PROCEDURE — 99214 OFFICE O/P EST MOD 30 MIN: CPT | Mod: S$PBB,,, | Performed by: INTERNAL MEDICINE

## 2023-08-09 PROCEDURE — 99999 PR PBB SHADOW E&M-EST. PATIENT-LVL III: ICD-10-PCS | Mod: PBBFAC,,, | Performed by: INTERNAL MEDICINE

## 2023-08-09 PROCEDURE — 99214 PR OFFICE/OUTPT VISIT, EST, LEVL IV, 30-39 MIN: ICD-10-PCS | Mod: S$PBB,,, | Performed by: INTERNAL MEDICINE

## 2023-08-09 RX ORDER — DOXYCYCLINE HYCLATE 100 MG
100 TABLET ORAL 2 TIMES DAILY
Qty: 14 TABLET | Refills: 0 | Status: SHIPPED | OUTPATIENT
Start: 2023-08-09 | End: 2023-08-16

## 2023-08-09 NOTE — PROGRESS NOTES
SUBJECTIVE     Chief Complaint   Patient presents with    Hospital Follow Up       HPI  Manuel Tyler is a 72 y.o. male with multiple medical diagnoses as listed in the medical history and problem list that presents for follow-up after hospital discharge for COVID19. Pt presented with confusion and was found to be COVID19 positive. He was treated with steroids and Remdisivir with mentation clearing while in the hospital. Pt has been doing okay since discharge. He denies any further confusion or SOB. His dietary habits are hit and miss and he has recently failed 2 attempts at completing the C-scope 2/2 N/V with bowel prep. Pt is now maintained on Miralax and he has a BM daily. He continues on pain pills, which are effective at controlling pain. Pt denies any fever, chills, or night sweats.     PAST MEDICAL HISTORY:  Past Medical History:   Diagnosis Date    (HFpEF) heart failure with preserved ejection fraction 1/25/2023    Alcohol abuse 12/27/2022    Hyperlipidemia     Hypertension     Liver metastases 1/18/2023    Malignant neoplasm of head of pancreas 1/18/2023    Other specified noninfective gastroenteritis and colitis     Pancreatitis     Personal history of colonic polyps        PAST SURGICAL HISTORY:  Past Surgical History:   Procedure Laterality Date    COLONOSCOPY      COLONOSCOPY N/A 08/07/2023    Procedure: COLONOSCOPY;  Surgeon: Kong Estrella MD;  Location: [facility];  Service: Endoscopy;  Laterality: N/A;  6/22 Instructions sent Via portal    COLONOSCOPY N/A 08/08/2023    Procedure: COLONOSCOPY;  Surgeon: Mariia Luevano MD;  Location: [facility];  Service: Endoscopy;  Laterality: N/A;    ENDOSCOPIC ULTRASOUND OF UPPER GASTROINTESTINAL TRACT Left 12/30/2022    Procedure: ULTRASOUND, UPPER GI TRACT, ENDOSCOPIC;  Surgeon: Gregory Cristina MD;  Location: [facility] ([address] [place] [address]);  Service: Endoscopy;  Laterality: Left;    ERCP Left 12/30/2022    Procedure: ERCP (ENDOSCOPIC RETROGRADE CHOLANGIOPANCREATOGRAPHY);   Surgeon: Gregory Cristina MD;  Location: [facility] (2ND FLR);  Service: Endoscopy;  Laterality: Left;    FOOT SURGERY Left     FRACTURE SURGERY  10/23/1998    INSERTION OF TUNNELED CENTRAL VENOUS CATHETER (CVC) WITH SUBCUTANEOUS PORT Bilateral 01/19/2023    Procedure: [id number];  Surgeon: Amador Castellon MD;  Location: [facility] OR;  Service: General;  Laterality: Bilateral;  Right v Left PORTACATH. needs ultrasound and fluoro  RN PREOP 01/18/2023    TONSILLECTOMY      VASECTOMY  10/25/1983       SOCIAL HISTORY:  Social History     Socioeconomic History    Marital status:    Tobacco Use    Smoking status: Every Day     Current packs/day: 0.50     Average packs/day: 0.5 packs/day for 55.6 years (27.8 ttl pk-yrs)     Types: Cigarettes     Start date: 1/15/1968    Smokeless tobacco: Former     Types: Chew   Substance and Sexual Activity    Alcohol use: Yes     Alcohol/week: 14.0 standard drinks of alcohol     Types: 14 Cans of beer per week     Comment: last drink 1-2 months ago    Drug use: Not Currently     Types: Marijuana    Sexual activity: Yes     Partners: Female     Birth control/protection: Coitus interruptus, Condom, Partner-Vasectomy     Social Determinants of Health     Financial Resource Strain: Medium Risk (7/15/2023)    Overall Financial Resource Strain (CARDIA)     Difficulty of Paying Living Expenses: Somewhat hard   Food Insecurity: No Food Insecurity (7/15/2023)    Hunger Vital Sign     Worried About Running Out of Food in the Last Year: Never true     Ran Out of Food in the Last Year: Never true   Transportation Needs: No Transportation Needs (7/15/2023)    PRAPARE - Transportation     Lack of Transportation (Medical): No     Lack of Transportation (Non-Medical): No   Physical Activity: Insufficiently Active (7/15/2023)    Exercise Vital Sign     Days of Exercise per Week: 2 days     Minutes of Exercise per Session: 10 min   Stress: No Stress Concern Present (7/15/2023)    [place]  [employer] - Occupational Stress Questionnaire     Feeling of Stress : Only a little   Social Connections: Unknown (7/15/2023)    Social Connection and Isolation Panel [NHANES]     Frequency of Communication with Friends and Family: Once a week     Frequency of Social Gatherings with Friends and Family: Once a week     Active Member of Clubs or Organizations: Yes     Attends Club or Organization Meetings: More than 4 times per year     Marital Status:    Housing Stability: Low Risk  (7/15/2023)    Housing Stability Vital Sign     Unable to Pay for Housing in the Last Year: No     Number of Places Lived in the Last Year: 1     Unstable Housing in the Last Year: No       FAMILY HISTORY:  Family History   Problem Relation Age of Onset    Stroke Mother     Breast cancer Mother     Hypertension Mother     Arthritis Mother     Skin cancer Father 69    COPD Father     Arthritis Father     Hypertension Father     Cancer Father         Spleen    Drug abuse Brother        ALLERGIES AND MEDICATIONS: updated and reviewed.  Review of patient's allergies indicates:   Allergen Reactions    Jakafi [ruxolitinib]      Current Outpatient Medications   Medication Sig Dispense Refill    buPROPion (WELLBUTRIN XL) 300 MG 24 hr tablet Take 1 tablet (300 mg total) by mouth once daily. 90 tablet 1    dabrafenib (TAFINLAR) 50 mg Cap TAKE 3 CAPSULES (150 MG TOTAL) TWICE DAILY. 120 capsule 1    DULCOLAX, BISACODYL, ORAL Take by mouth.      morphine (MS CONTIN) 15 MG 12 hr tablet Take 1 tablet (15 mg total) by mouth every 12 (twelve) hours. 60 tablet 0    multivit-mins/iron/folic/lycop (CENTRUM MEN ORAL) Take by mouth.      ondansetron (ZOFRAN) 4 MG tablet Take 1 tablet (4 mg total) by mouth every 8 (eight) hours as needed for Nausea. 30 tablet 1    tiotropium bromide (SPIRIVA RESPIMAT) 2.5 mcg/actuation inhaler Inhale 2 puffs into the lungs once daily. Controller 4 g 3    trametinib (MEKINIST) 2 mg Tab TAKE 1 TABLET  "ONCE DAILY 60 tablet 1    doxycycline (VIBRA-TABS) 100 MG tablet Take 1 tablet (100 mg total) by mouth 2 (two) times daily. for 7 days 14 tablet 0     No current facility-administered medications for this visit.       ROS  Review of Systems   Constitutional:  Negative for chills and fever.   HENT:  Negative for hearing loss and sore throat.    Eyes:  Negative for visual disturbance.   Respiratory:  Negative for cough and shortness of breath.    Cardiovascular:  Negative for chest pain, palpitations and leg swelling.   Gastrointestinal:  Positive for abdominal pain, nausea and vomiting. Negative for constipation and diarrhea.   Genitourinary:  Negative for dysuria, frequency and urgency.   Musculoskeletal:  Negative for arthralgias, joint swelling and myalgias.   Skin:  Positive for wound (L posterior shoulder). Negative for rash.   Neurological:  Negative for headaches.   Psychiatric/Behavioral:  Negative for agitation and confusion. The patient is not nervous/anxious.          OBJECTIVE     Physical Exam  Vitals:    08/09/23 1049   BP: 120/78   Pulse: 96   Temp: 98.9 °F (37.2 °C)    Body mass index is 21.79 kg/m².  Weight: 65 kg (143 lb 4.8 oz)   Height: 5' 8" (172.7 cm)     Physical Exam  Constitutional:       General: He is not in acute distress.     Appearance: He is well-developed.   HENT:      Head: Normocephalic and atraumatic.      Right Ear: External ear normal.      Left Ear: External ear normal.      Nose: Nose normal.   Eyes:      General: No scleral icterus.        Right eye: No discharge.         Left eye: No discharge.      Conjunctiva/sclera: Conjunctivae normal.   Neck:      Vascular: No JVD.      Trachea: No tracheal deviation.   Cardiovascular:      Rate and Rhythm: Normal rate and regular rhythm.      Heart sounds: Normal heart sounds. No murmur heard.     No friction rub. No gallop.   Pulmonary:      Effort: Pulmonary effort is normal. No respiratory distress.      Breath sounds: Normal breath " sounds. No wheezing.   Abdominal:      General: Bowel sounds are normal. There is no distension.      Palpations: Abdomen is soft. There is no mass.      Tenderness: There is no abdominal tenderness. There is no guarding or rebound.   Musculoskeletal:         General: No tenderness or deformity. Normal range of motion.      Cervical back: Normal range of motion and neck supple.   Skin:     General: Skin is warm and dry.      Findings: No erythema or rash.      Comments: L posterior shoulder open wound with 100% slough and surrounding erythema; no drainage appreciated   Neurological:      Mental Status: He is alert and oriented to person, place, and time.      Motor: No abnormal muscle tone.      Coordination: Coordination normal.   Psychiatric:         Behavior: Behavior normal.         Thought Content: Thought content normal.         Judgment: Judgment normal.           Health Maintenance         Date Due Completion Date    Shingles Vaccine (1 of 2) Never done ---    Pneumococcal Vaccines (Age 65+) (2 - PPSV23 or PCV20) 06/26/2018 5/1/2018    COVID-19 Vaccine (1) 10/23/2021 10/23/2021    Influenza Vaccine (1) 09/01/2023 10/23/2021    LDCT Lung Screen 01/25/2024 1/25/2023    TETANUS VACCINE 08/19/2024 8/19/2014    Lipid Panel 06/09/2028 6/9/2023    Colorectal Cancer Screening 08/08/2033 8/8/2023              ASSESSMENT     72 y.o. male with     1. COVID-19    2. Acute hypoxemic respiratory failure    3. Cutaneous abscess of left shoulder    4. Tobacco use disorder        PLAN:     1. COVID-19  - Resolved  - Independently reviewed hospital labs/imaging  - No further treatment required    2. Acute hypoxemic respiratory failure  - Resolved; as above    3. Cutaneous abscess of left shoulder  - Pt advised to take Abx to completion  - doxycycline (VIBRA-TABS) 100 MG tablet; Take 1 tablet (100 mg total) by mouth 2 (two) times daily. for 7 days  Dispense: 14 tablet; Refill: 0  - Seek medical attention for any worsening  erythema with red streaking, edema, drainage, pain/tenderness, fever, chills, or night sweats    4. Tobacco use disorder  - Pt to contact smoking cessation program as Wellbutrin ineffective; pt has also previously failed Chantix and Nicotine patches        RTC in 1-2 weeks as needed for any acute worsening of current condition or failure to improve     Transitional Care Note    Family and/or Caretaker present at visit?  Yes.  Diagnostic tests reviewed/disposition: No diagnosic tests pending after this hospitalization.  Disease/illness education: Provided during visit  Home health/community services discussion/referrals: Patient has home health established at [facility] .   Establishment or re-establishment of referral orders for community resources: No other necessary community resources.   Discussion with other health care providers: No discussion with other health care providers necessary.             Fatmata Vera MD  08/09/2023 11:02 AM        No follow-ups on file.

## 2023-08-10 ENCOUNTER — TELEPHONE (OUTPATIENT)
Dept: FAMILY MEDICINE | Facility: CLINIC | Age: 73
End: 2023-08-10
Payer: MEDICARE

## 2023-08-10 DIAGNOSIS — C25.0 MALIGNANT NEOPLASM OF HEAD OF PANCREAS: ICD-10-CM

## 2023-08-10 NOTE — TELEPHONE ENCOUNTER
----- Message from Grey Xiong sent at 8/10/2023  1:27 PM CDT -----  Regarding: Home Health Nurse [phone]  Type: Patient Call Back    Who called:  Home Health Nurse     What is the request in detail: called to get an order to treat a wound on the pt's back. Would like a call back.     Can the clinic reply by [facility]? No     Would the patient rather a call back or a response via [facility]? Call back     Best call back number: [phone]    Additional Information: fax # [phone]      Thank you.

## 2023-08-10 NOTE — TELEPHONE ENCOUNTER
Return call to Home Health Nurse, and informed that she can fax it to the office a requisition/care plan treatment for wound care. The provider will review it and sign off. Fax number given to her. She acknowledged understanding.

## 2023-08-11 RX ORDER — MORPHINE SULFATE 15 MG/1
15 TABLET, FILM COATED, EXTENDED RELEASE ORAL EVERY 12 HOURS
Qty: 60 TABLET | Refills: 0 | Status: SHIPPED | OUTPATIENT
Start: 2023-08-11

## 2023-08-16 ENCOUNTER — TELEPHONE (OUTPATIENT)
Dept: FAMILY MEDICINE | Facility: CLINIC | Age: 73
End: 2023-08-16
Payer: MEDICARE

## 2023-08-16 ENCOUNTER — EXTERNAL HOME HEALTH (OUTPATIENT)
Dept: HOME HEALTH SERVICES | Facility: HOSPITAL | Age: 73
End: 2023-08-16
Payer: MEDICARE

## 2023-08-16 ENCOUNTER — TELEPHONE (OUTPATIENT)
Dept: GASTROENTEROLOGY | Facility: CLINIC | Age: 73
End: 2023-08-16
Payer: MEDICARE

## 2023-08-16 NOTE — TELEPHONE ENCOUNTER
----- Message from Grey Tyrese sent at 8/16/2023 12:22 PM CDT -----  Regarding: [facility] ([place]) [phone]  Type: Patient Call Back    Who called: [facility] ([place])    What is the request in detail: called to get updated wound care orders for the pt.     Can the clinic reply by [facility]? No     Would the patient rather a call back or a response via [facility]? Call back     Best call back number: [phone]    Additional Information: fax # [phone]    Thank you.

## 2023-08-16 NOTE — TELEPHONE ENCOUNTER
"----- Message from Jennifer Martinez sent at 8/16/2023  3:40 PM CDT -----  Regarding: call back  "Type:  Patient Call Back    Who Called:Pt    What is the reqeust in detail:Pt requesting call back has some questions about Colonoscopy. Please advise    Can the clinic reply by [facility]?no    Best Call Back Number:[phone]      Additional Information:            "

## 2023-08-16 NOTE — TELEPHONE ENCOUNTER
Return call to [facility], and she was asking about orders that was sent over for the patient for wound care. Office fax number confirmed. She stated that she will refax it.

## 2023-08-20 DIAGNOSIS — C25.0 MALIGNANT NEOPLASM OF HEAD OF PANCREAS: ICD-10-CM

## 2023-08-21 ENCOUNTER — TELEPHONE (OUTPATIENT)
Dept: ENDOSCOPY | Facility: HOSPITAL | Age: 73
End: 2023-08-21
Payer: MEDICARE

## 2023-08-21 ENCOUNTER — PATIENT MESSAGE (OUTPATIENT)
Dept: ENDOSCOPY | Facility: HOSPITAL | Age: 73
End: 2023-08-21
Payer: MEDICARE

## 2023-08-21 ENCOUNTER — OFFICE VISIT (OUTPATIENT)
Dept: FAMILY MEDICINE | Facility: CLINIC | Age: 73
End: 2023-08-21
Payer: MEDICARE

## 2023-08-21 ENCOUNTER — TELEPHONE (OUTPATIENT)
Dept: FAMILY MEDICINE | Facility: CLINIC | Age: 73
End: 2023-08-21
Payer: MEDICARE

## 2023-08-21 VITALS
HEART RATE: 87 BPM | WEIGHT: 142 LBS | OXYGEN SATURATION: 95 % | TEMPERATURE: 99 F | SYSTOLIC BLOOD PRESSURE: 114 MMHG | BODY MASS INDEX: 21.52 KG/M2 | DIASTOLIC BLOOD PRESSURE: 68 MMHG | HEIGHT: 68 IN

## 2023-08-21 DIAGNOSIS — A49.01 STAPH AUREUS INFECTION: ICD-10-CM

## 2023-08-21 DIAGNOSIS — L29.9 ITCH OF SKIN: ICD-10-CM

## 2023-08-21 DIAGNOSIS — L02.01 FACIAL ABSCESS: Primary | ICD-10-CM

## 2023-08-21 DIAGNOSIS — C25.0 MALIGNANT NEOPLASM OF HEAD OF PANCREAS: ICD-10-CM

## 2023-08-21 PROCEDURE — 99214 OFFICE O/P EST MOD 30 MIN: CPT | Mod: PBBFAC,PO | Performed by: INTERNAL MEDICINE

## 2023-08-21 PROCEDURE — 99214 PR OFFICE/OUTPT VISIT, EST, LEVL IV, 30-39 MIN: ICD-10-PCS | Mod: S$PBB,,, | Performed by: INTERNAL MEDICINE

## 2023-08-21 PROCEDURE — 99999 PR PBB SHADOW E&M-EST. PATIENT-LVL IV: ICD-10-PCS | Mod: PBBFAC,,, | Performed by: INTERNAL MEDICINE

## 2023-08-21 PROCEDURE — 99999 PR PBB SHADOW E&M-EST. PATIENT-LVL IV: CPT | Mod: PBBFAC,,, | Performed by: INTERNAL MEDICINE

## 2023-08-21 PROCEDURE — 99214 OFFICE O/P EST MOD 30 MIN: CPT | Mod: S$PBB,,, | Performed by: INTERNAL MEDICINE

## 2023-08-21 RX ORDER — DOXYCYCLINE HYCLATE 100 MG
100 TABLET ORAL 2 TIMES DAILY
Qty: 14 TABLET | Refills: 0 | Status: SHIPPED | OUTPATIENT
Start: 2023-08-21 | End: 2023-08-28

## 2023-08-21 RX ORDER — DABRAFENIB 50 MG/1
CAPSULE ORAL
Qty: 90 CAPSULE | Refills: 1 | Status: SHIPPED | OUTPATIENT
Start: 2023-08-21

## 2023-08-21 RX ORDER — MUPIROCIN 20 MG/G
OINTMENT TOPICAL 3 TIMES DAILY
Qty: 22 G | Refills: 0 | Status: SHIPPED | OUTPATIENT
Start: 2023-08-21 | End: 2023-08-28

## 2023-08-21 RX ORDER — HYDROXYZINE HYDROCHLORIDE 25 MG/1
25 TABLET, FILM COATED ORAL 3 TIMES DAILY PRN
Qty: 30 TABLET | Refills: 0 | Status: SHIPPED | OUTPATIENT
Start: 2023-08-21 | End: 2023-09-12 | Stop reason: SDUPTHER

## 2023-08-21 NOTE — TELEPHONE ENCOUNTER
"----- Message from Kong Estrella MD sent at 2023 12:58 PM CDT -----  Procedure: Colonoscopy    Diagnosis: Abnormal finding on GI tract imaging    Procedure Timin-4 weeks    #If within 4 weeks selected, please ernestine as high priority#    #If greater than 12 weeks, please select "5-12 weeks" and delay sending until 2 months prior to requested date#     Provider: Any GI provider    Location: [facility]    Additional Scheduling Information: No scheduling concerns    Prep Specifications:Extended/Constipation prep    Is the patient taking a GLP-1 Agonist:no    Have you attached a patient to this message: yes     "

## 2023-08-21 NOTE — TELEPHONE ENCOUNTER
Spoke to patient, procedure scheduled, patient is presently using Miralax daily with good results so wishes to use the Miralax/Ducolax prep. Patient will start prep on Saturday.

## 2023-08-21 NOTE — PROGRESS NOTES
SUBJECTIVE     Chief Complaint   Patient presents with    Mass       HPI  Manuel Tyelr is a 72 y.o. male with multiple medical diagnoses as listed in the medical history and problem list that presents for evaluation of a R facial abscess x 1 week. Pt reports an area of erythema, edema, increased warmth to touch, and TTP. Pt applied a warm compress and it started draining pus this past Saturday. Denies any fever, chills, or night sweats. Pt reports he's having recurrent abscesses.    PAST MEDICAL HISTORY:  Past Medical History:   Diagnosis Date    (HFpEF) heart failure with preserved ejection fraction 1/25/2023    Alcohol abuse 12/27/2022    Hyperlipidemia     Hypertension     Liver metastases 1/18/2023    Malignant neoplasm of head of pancreas 1/18/2023    Other specified noninfective gastroenteritis and colitis     Pancreatitis     Personal history of colonic polyps        PAST SURGICAL HISTORY:  Past Surgical History:   Procedure Laterality Date    COLONOSCOPY      COLONOSCOPY N/A 08/07/2023    Procedure: COLONOSCOPY;  Surgeon: Kong Estrella MD;  Location: [facility];  Service: Endoscopy;  Laterality: N/A;  6/22 Instructions sent Via portal    COLONOSCOPY N/A 08/08/2023    Procedure: COLONOSCOPY;  Surgeon: Mariia Luevano MD;  Location: [facility];  Service: Endoscopy;  Laterality: N/A;    ENDOSCOPIC ULTRASOUND OF UPPER GASTROINTESTINAL TRACT Left 12/30/2022    Procedure: ULTRASOUND, UPPER GI TRACT, ENDOSCOPIC;  Surgeon: Gregory Cristina MD;  Location: [facility] ([address] [place] [address]);  Service: Endoscopy;  Laterality: Left;    ERCP Left 12/30/2022    Procedure: ERCP (ENDOSCOPIC RETROGRADE CHOLANGIOPANCREATOGRAPHY);  Surgeon: Gregory Cristina MD;  [facility]  Service: Endoscopy;  Laterality: Left;    FOOT SURGERY Left     FRACTURE SURGERY  10/23/1998    INSERTION OF TUNNELED CENTRAL VENOUS CATHETER (CVC) WITH SUBCUTANEOUS PORT Bilateral 01/19/2023    Procedure: [id number];  Surgeon: Amador Castellon  MD;  Location: [facility] OR;  Service: General;  Laterality: Bilateral;  Right v Left PORTACATH. needs ultrasound and fluoro  RN PREOP 01/18/2023    TONSILLECTOMY      VASECTOMY  10/25/1983       SOCIAL HISTORY:  Social History     Socioeconomic History    Marital status:    Tobacco Use    Smoking status: Every Day     Current packs/day: 0.50     Average packs/day: 0.5 packs/day for 55.6 years (27.8 ttl pk-yrs)     Types: Cigarettes     Start date: 1/15/1968    Smokeless tobacco: Former     Types: Chew   Substance and Sexual Activity    Alcohol use: Yes     Alcohol/week: 14.0 standard drinks of alcohol     Types: 14 Cans of beer per week     Comment: last drink 1-2 months ago    Drug use: Not Currently     Types: Marijuana    Sexual activity: Yes     Partners: Female     Birth control/protection: Coitus interruptus, Condom, Partner-Vasectomy     Social Determinants of Health     Financial Resource Strain: Medium Risk (7/15/2023)    Overall Financial Resource Strain (CARDIA)     Difficulty of Paying Living Expenses: Somewhat hard   Food Insecurity: No Food Insecurity (7/15/2023)    Hunger Vital Sign     Worried About Running Out of Food in the Last Year: Never true     Ran Out of Food in the Last Year: Never true   Transportation Needs: No Transportation Needs (7/15/2023)    PRAPARE - Transportation     Lack of Transportation (Medical): No     Lack of Transportation (Non-Medical): No   Physical Activity: Insufficiently Active (7/15/2023)    Exercise Vital Sign     Days of Exercise per Week: 2 days     Minutes of Exercise per Session: 10 min   Stress: No Stress Concern Present (7/15/2023)    [employer] - Occupational Stress Questionnaire     Feeling of Stress : Only a little   Social Connections: Unknown (7/15/2023)    Social Connection and Isolation Panel [NHANES]     Frequency of Communication with Friends and Family: Once a week     Frequency of Social Gatherings with Friends and  Family: Once a week     Active Member of Clubs or Organizations: Yes     Attends Club or Organization Meetings: More than 4 times per year     Marital Status:    Housing Stability: Low Risk  (7/15/2023)    Housing Stability Vital Sign     Unable to Pay for Housing in the Last Year: No     Number of Places Lived in the Last Year: 1     Unstable Housing in the Last Year: No       FAMILY HISTORY:  Family History   Problem Relation Age of Onset    Stroke Mother     Breast cancer Mother     Hypertension Mother     Arthritis Mother     Skin cancer Father 69    COPD Father     Arthritis Father     Hypertension Father     Cancer Father         Spleen    Drug abuse Brother        ALLERGIES AND MEDICATIONS: updated and reviewed.  Review of patient's allergies indicates:   Allergen Reactions    Jakafi [ruxolitinib]      Current Outpatient Medications   Medication Sig Dispense Refill    buPROPion (WELLBUTRIN XL) 300 MG 24 hr tablet Take 1 tablet (300 mg total) by mouth once daily. 90 tablet 1    dabrafenib (TAFINLAR) 50 mg Cap TAKE 3 CAPSULES (150 MG TOTAL) TWICE DAILY. 120 capsule 1    DULCOLAX, BISACODYL, ORAL Take by mouth.      morphine (MS CONTIN) 15 MG 12 hr tablet Take 1 tablet (15 mg total) by mouth every 12 (twelve) hours. 60 tablet 0    multivit-mins/iron/folic/lycop (CENTRUM MEN ORAL) Take by mouth.      tiotropium bromide (SPIRIVA RESPIMAT) 2.5 mcg/actuation inhaler Inhale 2 puffs into the lungs once daily. Controller 4 g 3    trametinib (MEKINIST) 2 mg Tab TAKE 1 TABLET ONCE DAILY 60 tablet 1    doxycycline (VIBRA-TABS) 100 MG tablet Take 1 tablet (100 mg total) by mouth 2 (two) times daily. for 7 days 14 tablet 0    hydrOXYzine HCL (ATARAX) 25 MG tablet Take 1 tablet (25 mg total) by mouth 3 (three) times daily as needed for Itching. 30 tablet 0    mupirocin (BACTROBAN) 2 % ointment Apply topically 3 (three) times daily. for 7 days 22 g 0    ondansetron (ZOFRAN) 4 MG tablet Take 1 tablet (4 mg total) by  "mouth every 8 (eight) hours as needed for Nausea. (Patient not taking: Reported on 8/21/2023) 30 tablet 1     No current facility-administered medications for this visit.       ROS  Review of Systems   Constitutional:  Positive for unexpected weight change. Negative for activity change.   HENT:  Negative for hearing loss, rhinorrhea and trouble swallowing.    Eyes:  Negative for discharge and visual disturbance.   Respiratory:  Negative for chest tightness and wheezing.    Cardiovascular:  Negative for chest pain and palpitations.   Gastrointestinal:  Positive for constipation. Negative for blood in stool, diarrhea and vomiting.   Endocrine: Negative for polydipsia and polyuria.   Genitourinary:  Positive for urgency. Negative for difficulty urinating and hematuria.   Musculoskeletal:  Negative for arthralgias, joint swelling and neck pain.   Skin:  Positive for wound (L posterior shoulder and R facial abscess). Negative for rash.   Neurological:  Negative for weakness and headaches.   Psychiatric/Behavioral:  Positive for confusion. Negative for dysphoric mood.          OBJECTIVE     Physical Exam  Vitals:    08/21/23 0846   BP: 114/68   Pulse: 87   Temp: 98.7 °F (37.1 °C)    Body mass index is 21.59 kg/m².  Weight: 64.4 kg (141 lb 15.6 oz)   Height: 5' 8" (172.7 cm)     Physical Exam  Constitutional:       General: He is not in acute distress.     Appearance: He is well-developed.   HENT:      Head: Normocephalic and atraumatic.      Right Ear: External ear normal.      Left Ear: External ear normal.      Nose: Nose normal.   Eyes:      General: No scleral icterus.        Right eye: No discharge.         Left eye: No discharge.      Conjunctiva/sclera: Conjunctivae normal.   Neck:      Vascular: No JVD.      Trachea: No tracheal deviation.   Pulmonary:      Effort: Pulmonary effort is normal. No respiratory distress.   Musculoskeletal:         General: No tenderness or deformity. Normal range of motion.      " Cervical back: Normal range of motion and neck supple.   Skin:     General: Skin is warm and dry.      Findings: Erythema present. No rash.      Comments: R facial abscess over lateral face with erythema and covered by a small eschar; +serosanguinous drainage    L posterior shoulder wound covered by eschar; no surrounding erythema, increased warmth to touch, TTP, or drainage appreciated   Neurological:      Mental Status: He is alert and oriented to person, place, and time.      Motor: No abnormal muscle tone.      Coordination: Coordination normal.   Psychiatric:         Behavior: Behavior normal.         Thought Content: Thought content normal.         Judgment: Judgment normal.           Health Maintenance         Date Due Completion Date    Shingles Vaccine (1 of 2) Never done ---    Pneumococcal Vaccines (Age 65+) (2 - PPSV23 or PCV20) 06/26/2018 5/1/2018    COVID-19 Vaccine (1) 10/23/2021 10/23/2021    Influenza Vaccine (1) 09/01/2023 10/23/2021    LDCT Lung Screen 01/25/2024 1/25/2023    TETANUS VACCINE 08/19/2024 8/19/2014    Lipid Panel 06/09/2028 6/9/2023    Colorectal Cancer Screening 08/08/2033 8/8/2023              ASSESSMENT     72 y.o. male with     1. Facial abscess    2. Staph aureus infection    3. Itch of skin        PLAN:     1. Facial abscess  - Pt advised to take Abx to completion  - doxycycline (VIBRA-TABS) 100 MG tablet; Take 1 tablet (100 mg total) by mouth 2 (two) times daily. for 7 days  Dispense: 14 tablet; Refill: 0  - Seek medical attention for any worsening erythema with red streaking, edema, drainage, pain/tenderness, fever, chills, or night sweats    2. Staph aureus infection  - Will start nasal Bactroban  - mupirocin (BACTROBAN) 2 % ointment; Apply topically 3 (three) times daily. for 7 days  Dispense: 22 g; Refill: 0    3. Itch of skin  - Pt advised to speak with Heme/Onc to see if this is a side effect of chemo  - hydrOXYzine HCL (ATARAX) 25 MG tablet; Take 1 tablet (25 mg total)  by mouth 3 (three) times daily as needed for Itching.  Dispense: 30 tablet; Refill: 0        RTC in 1-2 weeks as needed for any acute worsening of current condition or failure to improve       Fatmata Vera MD  08/21/2023 8:57 AM        No follow-ups on file.

## 2023-08-23 ENCOUNTER — INFUSION (OUTPATIENT)
Dept: INFUSION THERAPY | Facility: HOSPITAL | Age: 73
End: 2023-08-23
Attending: INTERNAL MEDICINE
Payer: MEDICARE

## 2023-08-23 VITALS
SYSTOLIC BLOOD PRESSURE: 120 MMHG | OXYGEN SATURATION: 96 % | TEMPERATURE: 99 F | DIASTOLIC BLOOD PRESSURE: 59 MMHG | RESPIRATION RATE: 18 BRPM | HEART RATE: 85 BPM

## 2023-08-23 DIAGNOSIS — C25.0 MALIGNANT NEOPLASM OF HEAD OF PANCREAS: Primary | ICD-10-CM

## 2023-08-23 PROCEDURE — A4216 STERILE WATER/SALINE, 10 ML: HCPCS | Performed by: INTERNAL MEDICINE

## 2023-08-23 PROCEDURE — 63600175 PHARM REV CODE 636 W HCPCS: Performed by: INTERNAL MEDICINE

## 2023-08-23 PROCEDURE — 96523 IRRIG DRUG DELIVERY DEVICE: CPT

## 2023-08-23 PROCEDURE — 25000003 PHARM REV CODE 250: Performed by: INTERNAL MEDICINE

## 2023-08-23 RX ORDER — SODIUM CHLORIDE 0.9 % (FLUSH) 0.9 %
10 SYRINGE (ML) INJECTION
Status: CANCELLED | OUTPATIENT
Start: 2023-08-23

## 2023-08-23 RX ORDER — SODIUM CHLORIDE 0.9 % (FLUSH) 0.9 %
10 SYRINGE (ML) INJECTION
Status: DISCONTINUED | OUTPATIENT
Start: 2023-08-23 | End: 2023-08-23 | Stop reason: HOSPADM

## 2023-08-23 RX ORDER — HEPARIN 100 UNIT/ML
500 SYRINGE INTRAVENOUS
Status: CANCELLED | OUTPATIENT
Start: 2023-08-23

## 2023-08-23 RX ORDER — HEPARIN 100 UNIT/ML
500 SYRINGE INTRAVENOUS
Status: DISCONTINUED | OUTPATIENT
Start: 2023-08-23 | End: 2023-08-23 | Stop reason: HOSPADM

## 2023-08-23 RX ADMIN — HEPARIN 500 UNITS: 100 SYRINGE at 10:08

## 2023-08-23 RX ADMIN — Medication 10 ML: at 10:08

## 2023-08-23 NOTE — PLAN OF CARE
Patient presented to unit for maintenance port flush. VSS. Port accessed, flushed, blood return noted and heparin locked. Needle removed. Needle intact. No new or worsening symptoms reported during visit. Next appt. reviewed. Patient verbalized understanding.Patient discharged in NAD.

## 2023-09-03 ENCOUNTER — HOSPITAL ENCOUNTER (INPATIENT)
Facility: HOSPITAL | Age: 73
LOS: 3 days | Discharge: HOME HEALTH | DRG: 872 | End: 2023-09-06
Attending: EMERGENCY MEDICINE | Admitting: HOSPITALIST
Payer: MEDICARE

## 2023-09-03 DIAGNOSIS — R00.0 TACHYCARDIA: ICD-10-CM

## 2023-09-03 DIAGNOSIS — E86.0 DEHYDRATION: ICD-10-CM

## 2023-09-03 DIAGNOSIS — C78.7 MALIGNANT NEOPLASM METASTATIC TO LIVER: ICD-10-CM

## 2023-09-03 DIAGNOSIS — R07.9 CHEST PAIN: ICD-10-CM

## 2023-09-03 DIAGNOSIS — R53.1 WEAKNESS: ICD-10-CM

## 2023-09-03 DIAGNOSIS — A41.9 SEPSIS, DUE TO UNSPECIFIED ORGANISM, UNSPECIFIED WHETHER ACUTE ORGAN DYSFUNCTION PRESENT: Primary | ICD-10-CM

## 2023-09-03 PROBLEM — G92.9 ENCEPHALOPATHY, TOXIC: Status: RESOLVED | Noted: 2023-01-25 | Resolved: 2023-09-03

## 2023-09-03 PROBLEM — J96.01 ACUTE HYPOXEMIC RESPIRATORY FAILURE: Status: RESOLVED | Noted: 2023-07-14 | Resolved: 2023-09-03

## 2023-09-03 PROBLEM — E87.20 LACTIC ACIDOSIS: Status: ACTIVE | Noted: 2023-09-03

## 2023-09-03 PROBLEM — E87.1 HYPONATREMIA: Status: ACTIVE | Noted: 2023-09-03

## 2023-09-03 LAB
ALBUMIN SERPL BCP-MCNC: 2.9 G/DL (ref 3.5–5.2)
ALLENS TEST: ABNORMAL
ALP SERPL-CCNC: 166 U/L (ref 55–135)
ALT SERPL W/O P-5'-P-CCNC: 35 U/L (ref 10–44)
AMORPH CRY URNS QL MICRO: ABNORMAL
ANION GAP SERPL CALC-SCNC: 11 MMOL/L (ref 8–16)
AST SERPL-CCNC: 83 U/L (ref 10–40)
BACTERIA #/AREA URNS HPF: ABNORMAL /HPF
BASOPHILS # BLD AUTO: 0.12 K/UL (ref 0–0.2)
BASOPHILS NFR BLD: 0.7 % (ref 0–1.9)
BILIRUB SERPL-MCNC: 0.6 MG/DL (ref 0.1–1)
BILIRUB UR QL STRIP: NEGATIVE
BUN SERPL-MCNC: 25 MG/DL (ref 8–23)
CALCIUM SERPL-MCNC: 9.1 MG/DL (ref 8.7–10.5)
CHLORIDE SERPL-SCNC: 100 MMOL/L (ref 95–110)
CLARITY UR: ABNORMAL
CO2 SERPL-SCNC: 20 MMOL/L (ref 23–29)
COLOR UR: YELLOW
CREAT SERPL-MCNC: 1.4 MG/DL (ref 0.5–1.4)
CTP QC/QA: YES
CTP QC/QA: YES
DIFFERENTIAL METHOD: ABNORMAL
EOSINOPHIL # BLD AUTO: 0.1 K/UL (ref 0–0.5)
EOSINOPHIL NFR BLD: 0.3 % (ref 0–8)
ERYTHROCYTE [DISTWIDTH] IN BLOOD BY AUTOMATED COUNT: 15 % (ref 11.5–14.5)
EST. GFR  (NO RACE VARIABLE): 54 ML/MIN/1.73 M^2
GLUCOSE SERPL-MCNC: 133 MG/DL (ref 70–110)
GLUCOSE UR QL STRIP: ABNORMAL
GRAN CASTS #/AREA URNS LPF: 26 /LPF
HCT VFR BLD AUTO: 45.1 % (ref 40–54)
HGB BLD-MCNC: 15.3 G/DL (ref 14–18)
HGB UR QL STRIP: ABNORMAL
HYALINE CASTS #/AREA URNS LPF: 7 /LPF
IMM GRANULOCYTES # BLD AUTO: 0.1 K/UL (ref 0–0.04)
IMM GRANULOCYTES NFR BLD AUTO: 0.6 % (ref 0–0.5)
KETONES UR QL STRIP: NEGATIVE
LACTATE SERPL-SCNC: 2.3 MMOL/L (ref 0.5–2.2)
LDH SERPL L TO P-CCNC: 4.12 MMOL/L (ref 0.5–2.2)
LEUKOCYTE ESTERASE UR QL STRIP: NEGATIVE
LIPASE SERPL-CCNC: 15 U/L (ref 4–60)
LYMPHOCYTES # BLD AUTO: 1.1 K/UL (ref 1–4.8)
LYMPHOCYTES NFR BLD: 6.1 % (ref 18–48)
MAGNESIUM SERPL-MCNC: 2.2 MG/DL (ref 1.6–2.6)
MCH RBC QN AUTO: 30 PG (ref 27–31)
MCHC RBC AUTO-ENTMCNC: 33.9 G/DL (ref 32–36)
MCV RBC AUTO: 88 FL (ref 82–98)
MICROSCOPIC COMMENT: ABNORMAL
MONOCYTES # BLD AUTO: 2.4 K/UL (ref 0.3–1)
MONOCYTES NFR BLD: 13.7 % (ref 4–15)
NEUTROPHILS # BLD AUTO: 13.5 K/UL (ref 1.8–7.7)
NEUTROPHILS NFR BLD: 78.6 % (ref 38–73)
NITRITE UR QL STRIP: NEGATIVE
NRBC BLD-RTO: 0 /100 WBC
PH UR STRIP: 6 [PH] (ref 5–8)
PHOSPHATE SERPL-MCNC: 3.1 MG/DL (ref 2.7–4.5)
PLATELET # BLD AUTO: 400 K/UL (ref 150–450)
PLATELET BLD QL SMEAR: ABNORMAL
PMV BLD AUTO: 8.9 FL (ref 9.2–12.9)
POC MOLECULAR INFLUENZA A AGN: NEGATIVE
POC MOLECULAR INFLUENZA B AGN: NEGATIVE
POTASSIUM SERPL-SCNC: 4.2 MMOL/L (ref 3.5–5.1)
PROCALCITONIN SERPL IA-MCNC: 0.47 NG/ML
PROT SERPL-MCNC: 8 G/DL (ref 6–8.4)
PROT UR QL STRIP: ABNORMAL
RBC # BLD AUTO: 5.1 M/UL (ref 4.6–6.2)
RBC #/AREA URNS HPF: 2 /HPF (ref 0–4)
SAMPLE: ABNORMAL
SARS-COV-2 RDRP RESP QL NAA+PROBE: NEGATIVE
SITE: ABNORMAL
SODIUM SERPL-SCNC: 131 MMOL/L (ref 136–145)
SP GR UR STRIP: 1.02 (ref 1–1.03)
TROPONIN I SERPL DL<=0.01 NG/ML-MCNC: 0.02 NG/ML (ref 0–0.03)
URN SPEC COLLECT METH UR: ABNORMAL
UROBILINOGEN UR STRIP-ACNC: NEGATIVE EU/DL
WBC # BLD AUTO: 17.18 K/UL (ref 3.9–12.7)
WBC #/AREA URNS HPF: 2 /HPF (ref 0–5)

## 2023-09-03 PROCEDURE — 87635 SARS-COV-2 COVID-19 AMP PRB: CPT | Performed by: EMERGENCY MEDICINE

## 2023-09-03 PROCEDURE — 94761 N-INVAS EAR/PLS OXIMETRY MLT: CPT

## 2023-09-03 PROCEDURE — 84100 ASSAY OF PHOSPHORUS: CPT | Performed by: EMERGENCY MEDICINE

## 2023-09-03 PROCEDURE — 11000001 HC ACUTE MED/SURG PRIVATE ROOM

## 2023-09-03 PROCEDURE — 87449 NOS EACH ORGANISM AG IA: CPT | Performed by: HOSPITALIST

## 2023-09-03 PROCEDURE — 81000 URINALYSIS NONAUTO W/SCOPE: CPT | Performed by: EMERGENCY MEDICINE

## 2023-09-03 PROCEDURE — 63600175 PHARM REV CODE 636 W HCPCS: Performed by: EMERGENCY MEDICINE

## 2023-09-03 PROCEDURE — 87040 BLOOD CULTURE FOR BACTERIA: CPT | Performed by: EMERGENCY MEDICINE

## 2023-09-03 PROCEDURE — 96375 TX/PRO/DX INJ NEW DRUG ADDON: CPT | Mod: 59

## 2023-09-03 PROCEDURE — 84145 PROCALCITONIN (PCT): CPT | Performed by: EMERGENCY MEDICINE

## 2023-09-03 PROCEDURE — 25000003 PHARM REV CODE 250: Performed by: EMERGENCY MEDICINE

## 2023-09-03 PROCEDURE — 93005 ELECTROCARDIOGRAM TRACING: CPT

## 2023-09-03 PROCEDURE — 80053 COMPREHEN METABOLIC PANEL: CPT | Performed by: EMERGENCY MEDICINE

## 2023-09-03 PROCEDURE — 25000242 PHARM REV CODE 250 ALT 637 W/ HCPCS: Performed by: HOSPITALIST

## 2023-09-03 PROCEDURE — 83690 ASSAY OF LIPASE: CPT | Performed by: EMERGENCY MEDICINE

## 2023-09-03 PROCEDURE — 87502 INFLUENZA DNA AMP PROBE: CPT

## 2023-09-03 PROCEDURE — 99900035 HC TECH TIME PER 15 MIN (STAT)

## 2023-09-03 PROCEDURE — 93010 ELECTROCARDIOGRAM REPORT: CPT | Mod: ,,, | Performed by: INTERNAL MEDICINE

## 2023-09-03 PROCEDURE — 85025 COMPLETE CBC W/AUTO DIFF WBC: CPT | Performed by: EMERGENCY MEDICINE

## 2023-09-03 PROCEDURE — 83605 ASSAY OF LACTIC ACID: CPT | Performed by: EMERGENCY MEDICINE

## 2023-09-03 PROCEDURE — 63600175 PHARM REV CODE 636 W HCPCS: Performed by: HOSPITALIST

## 2023-09-03 PROCEDURE — 27000207 HC ISOLATION

## 2023-09-03 PROCEDURE — S5010 5% DEXTROSE AND 0.45% SALINE: HCPCS | Performed by: HOSPITALIST

## 2023-09-03 PROCEDURE — 96365 THER/PROPH/DIAG IV INF INIT: CPT

## 2023-09-03 PROCEDURE — 84484 ASSAY OF TROPONIN QUANT: CPT | Performed by: EMERGENCY MEDICINE

## 2023-09-03 PROCEDURE — 93010 EKG 12-LEAD: ICD-10-PCS | Mod: ,,, | Performed by: INTERNAL MEDICINE

## 2023-09-03 PROCEDURE — 83605 ASSAY OF LACTIC ACID: CPT

## 2023-09-03 PROCEDURE — 94640 AIRWAY INHALATION TREATMENT: CPT

## 2023-09-03 PROCEDURE — 99285 EMERGENCY DEPT VISIT HI MDM: CPT | Mod: 25

## 2023-09-03 PROCEDURE — 83735 ASSAY OF MAGNESIUM: CPT | Performed by: EMERGENCY MEDICINE

## 2023-09-03 PROCEDURE — 96361 HYDRATE IV INFUSION ADD-ON: CPT | Mod: 59

## 2023-09-03 PROCEDURE — 25000003 PHARM REV CODE 250: Performed by: HOSPITALIST

## 2023-09-03 RX ORDER — POLYETHYLENE GLYCOL 3350 17 G/17G
17 POWDER, FOR SOLUTION ORAL 2 TIMES DAILY PRN
Status: DISCONTINUED | OUTPATIENT
Start: 2023-09-03 | End: 2023-09-06 | Stop reason: HOSPADM

## 2023-09-03 RX ORDER — MORPHINE SULFATE 15 MG/1
15 TABLET, FILM COATED, EXTENDED RELEASE ORAL EVERY 12 HOURS
Status: DISCONTINUED | OUTPATIENT
Start: 2023-09-03 | End: 2023-09-04

## 2023-09-03 RX ORDER — MORPHINE SULFATE 4 MG/ML
2 INJECTION, SOLUTION INTRAMUSCULAR; INTRAVENOUS EVERY 4 HOURS PRN
Status: DISCONTINUED | OUTPATIENT
Start: 2023-09-03 | End: 2023-09-03

## 2023-09-03 RX ORDER — TALC
6 POWDER (GRAM) TOPICAL NIGHTLY PRN
Status: DISCONTINUED | OUTPATIENT
Start: 2023-09-03 | End: 2023-09-04

## 2023-09-03 RX ORDER — IPRATROPIUM BROMIDE 0.5 MG/2.5ML
0.5 SOLUTION RESPIRATORY (INHALATION) EVERY 6 HOURS
Status: DISCONTINUED | OUTPATIENT
Start: 2023-09-03 | End: 2023-09-04

## 2023-09-03 RX ORDER — HYDROXYZINE HYDROCHLORIDE 25 MG/1
25 TABLET, FILM COATED ORAL 3 TIMES DAILY PRN
Status: DISCONTINUED | OUTPATIENT
Start: 2023-09-03 | End: 2023-09-04

## 2023-09-03 RX ORDER — DEXTROSE MONOHYDRATE AND SODIUM CHLORIDE 5; .45 G/100ML; G/100ML
INJECTION, SOLUTION INTRAVENOUS CONTINUOUS
Status: DISCONTINUED | OUTPATIENT
Start: 2023-09-03 | End: 2023-09-03

## 2023-09-03 RX ORDER — ACETAMINOPHEN 325 MG/1
650 TABLET ORAL EVERY 4 HOURS PRN
Status: DISCONTINUED | OUTPATIENT
Start: 2023-09-03 | End: 2023-09-06 | Stop reason: HOSPADM

## 2023-09-03 RX ORDER — NALOXONE HCL 0.4 MG/ML
0.02 VIAL (ML) INJECTION
Status: DISCONTINUED | OUTPATIENT
Start: 2023-09-03 | End: 2023-09-06 | Stop reason: HOSPADM

## 2023-09-03 RX ORDER — MORPHINE SULFATE 4 MG/ML
4 INJECTION, SOLUTION INTRAMUSCULAR; INTRAVENOUS EVERY 4 HOURS PRN
Status: DISCONTINUED | OUTPATIENT
Start: 2023-09-03 | End: 2023-09-06 | Stop reason: HOSPADM

## 2023-09-03 RX ORDER — DEXTROSE MONOHYDRATE AND SODIUM CHLORIDE 5; .45 G/100ML; G/100ML
INJECTION, SOLUTION INTRAVENOUS CONTINUOUS
Status: DISCONTINUED | OUTPATIENT
Start: 2023-09-03 | End: 2023-09-04

## 2023-09-03 RX ORDER — BUPROPION HYDROCHLORIDE 150 MG/1
300 TABLET ORAL DAILY
Status: DISCONTINUED | OUTPATIENT
Start: 2023-09-03 | End: 2023-09-06 | Stop reason: HOSPADM

## 2023-09-03 RX ORDER — ONDANSETRON 2 MG/ML
4 INJECTION INTRAMUSCULAR; INTRAVENOUS
Status: COMPLETED | OUTPATIENT
Start: 2023-09-03 | End: 2023-09-03

## 2023-09-03 RX ORDER — SODIUM CHLORIDE 0.9 % (FLUSH) 0.9 %
10 SYRINGE (ML) INJECTION EVERY 12 HOURS PRN
Status: DISCONTINUED | OUTPATIENT
Start: 2023-09-03 | End: 2023-09-06 | Stop reason: HOSPADM

## 2023-09-03 RX ORDER — ENOXAPARIN SODIUM 100 MG/ML
40 INJECTION SUBCUTANEOUS EVERY 24 HOURS
Status: DISCONTINUED | OUTPATIENT
Start: 2023-09-03 | End: 2023-09-06 | Stop reason: HOSPADM

## 2023-09-03 RX ORDER — ONDANSETRON 2 MG/ML
8 INJECTION INTRAMUSCULAR; INTRAVENOUS EVERY 6 HOURS PRN
Status: DISCONTINUED | OUTPATIENT
Start: 2023-09-03 | End: 2023-09-06 | Stop reason: HOSPADM

## 2023-09-03 RX ADMIN — BUPROPION HYDROCHLORIDE 300 MG: 150 TABLET, FILM COATED, EXTENDED RELEASE ORAL at 04:09

## 2023-09-03 RX ADMIN — ENOXAPARIN SODIUM 40 MG: 40 INJECTION SUBCUTANEOUS at 04:09

## 2023-09-03 RX ADMIN — SODIUM CHLORIDE, SODIUM LACTATE, POTASSIUM CHLORIDE, AND CALCIUM CHLORIDE 1000 ML: .6; .31; .03; .02 INJECTION, SOLUTION INTRAVENOUS at 11:09

## 2023-09-03 RX ADMIN — CEFEPIME 1 G: 1 INJECTION, POWDER, FOR SOLUTION INTRAMUSCULAR; INTRAVENOUS at 04:09

## 2023-09-03 RX ADMIN — ONDANSETRON 4 MG: 2 INJECTION INTRAMUSCULAR; INTRAVENOUS at 10:09

## 2023-09-03 RX ADMIN — IPRATROPIUM BROMIDE 0.5 MG: 0.5 SOLUTION RESPIRATORY (INHALATION) at 02:09

## 2023-09-03 RX ADMIN — SODIUM CHLORIDE, POTASSIUM CHLORIDE, SODIUM LACTATE AND CALCIUM CHLORIDE 1000 ML: 600; 310; 30; 20 INJECTION, SOLUTION INTRAVENOUS at 10:09

## 2023-09-03 RX ADMIN — DEXTROSE AND SODIUM CHLORIDE: 5; 450 INJECTION, SOLUTION INTRAVENOUS at 03:09

## 2023-09-03 RX ADMIN — IPRATROPIUM BROMIDE 0.5 MG: 0.5 SOLUTION RESPIRATORY (INHALATION) at 07:09

## 2023-09-03 RX ADMIN — PIPERACILLIN AND TAZOBACTAM 4.5 G: 4; .5 INJECTION, POWDER, LYOPHILIZED, FOR SOLUTION INTRAVENOUS; PARENTERAL at 10:09

## 2023-09-03 RX ADMIN — CEFEPIME 1 G: 1 INJECTION, POWDER, FOR SOLUTION INTRAMUSCULAR; INTRAVENOUS at 09:09

## 2023-09-03 RX ADMIN — MORPHINE SULFATE 15 MG: 15 TABLET, EXTENDED RELEASE ORAL at 08:09

## 2023-09-03 RX ADMIN — VANCOMYCIN HYDROCHLORIDE 1000 MG: 1 INJECTION, POWDER, LYOPHILIZED, FOR SOLUTION INTRAVENOUS at 12:09

## 2023-09-03 NOTE — ED PROVIDER NOTES
Encounter Date: 9/3/2023       History     Chief Complaint   Patient presents with    Diarrhea     Pt BIB [facility] with reports of generalized weakness and diarrhea since 3am this morning. Pt denies N/V. Pt also reports a trip and fall onto his knees PTA, denies LOC or hitting his head. Pt with hx of pancreatic cancer.    Weakness     Chief complaint:  Diarrhea     History of present illness:  72-year-old male with a history of a pancreatic cancer and secondary liver malignancy with hyperlipidemia, alcohol abuse, heart failure with preserved ejection fraction, pancreatitis, who presents emergency department with diarrhea and nausea and vomiting since this morning.  Patient reports he has no abdominal pain.  No chest pain or shortness of breath.  He has generalized weakness.  Patient reports being followed by Dr. Carver as his primary oncologist.  He states he is getting chemotherapy for his pancreatic cancer.  He is denying regular alcohol use.      Review of patient's allergies indicates:   Allergen Reactions    Jakafi [ruxolitinib]      Past Medical History:   Diagnosis Date    (HFpEF) heart failure with preserved ejection fraction 1/25/2023    Alcohol abuse 12/27/2022    Hyperlipidemia     Hypertension     Liver metastases 1/18/2023    Malignant neoplasm of head of pancreas 1/18/2023    Other specified noninfective gastroenteritis and colitis     Pancreatitis     Personal history of colonic polyps      Past Surgical History:   Procedure Laterality Date    COLONOSCOPY      COLONOSCOPY N/A 08/07/2023    Procedure: COLONOSCOPY;  Surgeon: Kong Estrella MD;  Location: [facility];  Service: Endoscopy;  Laterality: N/A;  6/22 Instructions sent Via portal    COLONOSCOPY N/A 08/08/2023    Procedure: COLONOSCOPY;  Surgeon: Mariia Luevano MD;  Location: [facility];  Service: Endoscopy;  Laterality: N/A;    ENDOSCOPIC ULTRASOUND OF UPPER GASTROINTESTINAL TRACT Left 12/30/2022    Procedure: ULTRASOUND, UPPER GI  TRACT, ENDOSCOPIC;  Surgeon: Gregory Cristina MD;  Location: [facility] ENDO (2ND FLR);  Service: Endoscopy;  Laterality: Left;    ERCP Left 12/30/2022    Procedure: ERCP (ENDOSCOPIC RETROGRADE CHOLANGIOPANCREATOGRAPHY);  Surgeon: Gregory Cristina MD;  Location: [facility] ENDO (2ND FLR);  Service: Endoscopy;  Laterality: Left;    FOOT SURGERY Left     FRACTURE SURGERY  10/23/1998    INSERTION OF TUNNELED CENTRAL VENOUS CATHETER (CVC) WITH SUBCUTANEOUS PORT Bilateral 01/19/2023    Procedure: DYNNOHETY-ZPRS-H-CATH;  Surgeon: Amador Castellon MD;  Location: [facility] OR;  Service: General;  Laterality: Bilateral;  Right v Left PORTACATH. needs ultrasound and fluoro  RN PREOP 01/18/2023    TONSILLECTOMY      VASECTOMY  10/25/1983     Family History   Problem Relation Age of Onset    Stroke Mother     Breast cancer Mother     Hypertension Mother     Arthritis Mother     Skin cancer Father 69    COPD Father     Arthritis Father     Hypertension Father     Cancer Father 69        Spleen    Drug abuse Brother      Social History     Tobacco Use    Smoking status: Every Day     Current packs/day: 0.50     Average packs/day: 0.5 packs/day for 55.6 years (27.8 ttl pk-yrs)     Types: Cigarettes     Start date: 1/15/1968    Smokeless tobacco: Former     Types: Chew   Substance Use Topics    Alcohol use: Yes     Alcohol/week: 14.0 standard drinks of alcohol     Types: 14 Cans of beer per week     Comment: last drink 1-2 months ago    Drug use: Not Currently     Types: Marijuana     Review of Systems   Constitutional:  Negative for fever.   HENT:  Negative for sore throat.    Respiratory:  Negative for shortness of breath.    Cardiovascular:  Negative for chest pain.   Gastrointestinal:  Positive for abdominal pain and diarrhea. Negative for nausea.   Genitourinary:  Negative for dysuria.   Musculoskeletal:  Negative for back pain.   Skin:  Negative for rash.   Neurological:  Negative for weakness.   Hematological:  Does not bruise/bleed easily.        Physical Exam     Initial Vitals [09/03/23 1018]   BP Pulse Resp Temp SpO2   116/63 87 20 99.1 °F (37.3 °C) 95 %      MAP       --         Physical Exam    Nursing note and vitals reviewed.  Constitutional: He is not diaphoretic. No distress.   Patient appears tremulous.  He is cachectic.   HENT:   Head: Normocephalic and atraumatic.   Right Ear: External ear normal.   Left Ear: External ear normal.   Nose: Nose normal.   Mouth/Throat: Oropharynx is clear and moist.   Eyes: Conjunctivae and EOM are normal. Pupils are equal, round, and reactive to light. Right eye exhibits no discharge. Left eye exhibits no discharge. No scleral icterus.   Neck: Neck supple. No JVD present.   Normal range of motion.  Cardiovascular:  Normal rate, regular rhythm, normal heart sounds and intact distal pulses.     Exam reveals no gallop and no friction rub.       No murmur heard.  Pulmonary/Chest: Breath sounds normal. No stridor. No respiratory distress. He has no wheezes. He has no rhonchi. He has no rales. He exhibits no tenderness.   Abdominal: Abdomen is soft. Bowel sounds are normal. He exhibits no distension and no mass. There is no abdominal tenderness.   Abdomen is soft and nondistended nontender.  No hernias or masses.  Normal bowel sounds. There is no rebound and no guarding.   Musculoskeletal:         General: No tenderness or edema. Normal range of motion.      Cervical back: Normal range of motion and neck supple.     Neurological: He is alert and oriented to person, place, and time. He has normal strength. No cranial nerve deficit or sensory deficit.   Patient has a long latency of response and it is difficult to get succinct answers from him.  He has generalized tremors of his extremities.  He has tongue fasciculations.   Skin: Skin is warm and dry. No rash noted. No erythema. No pallor.   Psychiatric: He has a normal mood and affect. His behavior is normal. Judgment and thought content normal.         ED Course    Critical Care    Date/Time: 9/3/2023 1:37 PM    Performed by: Jose Mendez Jr., MD  Authorized by: Cholo Vallejo MD  Direct patient critical care time: 15 minutes  Additional history critical care time: 10 minutes  Ordering / reviewing critical care time: 5 minutes  Documentation critical care time: 5 minutes  Consulting other physicians critical care time: 5 minutes  Consult with family critical care time: 5 minutes  Total critical care time (exclusive of procedural time) : 45 minutes  Critical care was necessary to treat or prevent imminent or life-threatening deterioration of the following conditions: shock, dehydration and sepsis.  Critical care was time spent personally by me on the following activities: development of treatment plan with patient or surrogate, discussions with consultants, interpretation of cardiac output measurements, evaluation of patient's response to treatment, review of old charts, re-evaluation of patient's condition, ordering and review of laboratory studies, ordering and performing treatments and interventions, obtaining history from patient or surrogate, examination of patient, ordering and review of radiographic studies and pulse oximetry.        Labs Reviewed   CBC W/ AUTO DIFFERENTIAL - Abnormal; Notable for the following components:       Result Value    WBC 17.18 (*)     RDW 15.0 (*)     MPV 8.9 (*)     Immature Granulocytes 0.6 (*)     Gran # (ANC) 13.5 (*)     Immature Grans (Abs) 0.10 (*)     Mono # 2.4 (*)     Gran % 78.6 (*)     Lymph % 6.1 (*)     All other components within normal limits   COMPREHENSIVE METABOLIC PANEL - Abnormal; Notable for the following components:    Sodium 131 (*)     CO2 20 (*)     Glucose 133 (*)     BUN 25 (*)     Albumin 2.9 (*)     Alkaline Phosphatase 166 (*)     AST 83 (*)     eGFR 54 (*)     All other components within normal limits   URINALYSIS, REFLEX TO URINE CULTURE - Abnormal; Notable for the following components:     Appearance, UA Hazy (*)     Protein, UA 2+ (*)     Glucose, UA Trace (*)     Occult Blood UA 3+ (*)     All other components within normal limits    Narrative:     Specimen Source->Urine   PROCALCITONIN - Abnormal; Notable for the following components:    Procalcitonin 0.47 (*)     All other components within normal limits   URINALYSIS MICROSCOPIC - Abnormal; Notable for the following components:    Hyaline Casts, UA 7 (*)     Granular Casts, UA 26 (*)     All other components within normal limits    Narrative:     Specimen Source->Urine   ISTAT LACTATE - Abnormal; Notable for the following components:    POC Lactate 4.12 (*)     All other components within normal limits   CULTURE, BLOOD   CULTURE, BLOOD   MAGNESIUM   PHOSPHORUS   LIPASE   TROPONIN I   LACTIC ACID, PLASMA   POCT INFLUENZA A/B MOLECULAR   SARS-COV-2 RDRP GENE          Imaging Results              CT Head Without Contrast (Final result)  Result time 09/03/23 11:47:13      Final result by Rad Mei MD (09/03/23 11:47:13)                   Impression:      No CT evidence of acute intracranial abnormality, allowing for artifact limitations.    Generalized cerebral volume loss and chronic ischemic changes, similar when compared with 01/25/2023.      Electronically signed by: Rad Mei MD  Date:    09/03/2023  Time:    11:47               Narrative:    EXAMINATION:  CT HEAD WITHOUT CONTRAST    CLINICAL HISTORY:  Mental status change, unknown cause;    TECHNIQUE:  Low dose axial images were obtained through the head.  Coronal and sagittal reformations were also performed. Contrast was not administered.    COMPARISON:  CT, 01/25/2023.    FINDINGS:  Exam quality is limited by motion and external beam hardening artifact related to overlapping support devices.    Generalized cerebral volume loss with ex vacuo dilation of the ventricles and sulci, similar to prior study.  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   Periventricular white matter hypoattenuation suggestive of mild chronic microvascular ischemic change.    No evidence of acute territorial infarct, hemorrhage, mass effect, or midline shift.    No displaced calvarial fracture.    Minimal mucosal thickening in the ethmoid air cells.  No air-fluid levels.  Mastoid air cells are essentially clear.                                       X-Ray Chest AP Portable (Final result)  Result time 09/03/23 10:53:15      Final result by Marcia Issa MD (09/03/23 10:53:15)                   Impression:      As above.      Electronically signed by: Marcia Issa MD  Date:    09/03/2023  Time:    10:53               Narrative:    EXAMINATION:  XR CHEST AP PORTABLE    CLINICAL HISTORY:  Sepsis;    TECHNIQUE:  Single frontal view of the chest was performed.    COMPARISON:  07/14/2023    FINDINGS:  The lungs are hyperexpanded with flattening of the hemidiaphragms.  Small bilateral pleural effusions, right greater than left.  No focal airspace consolidation is seen.  The heart is normal in size.  Calcified atheromatous disease affects the aorta.  Right-sided Port-A-Cath has its tip in the region of the distal SVC.  Age-appropriate degenerative changes affect the skeleton.                                       Medications   vancomycin (VANCOCIN) 1,000 mg in dextrose 5 % (D5W) 250 mL IVPB (Vial-Mate) (1,000 mg Intravenous New Bag 9/3/23 1229)   ondansetron injection 4 mg (4 mg Intravenous Given 9/3/23 1057)   piperacillin-tazobactam (ZOSYN) 4.5 g in dextrose 5 % in water (D5W) 100 mL IVPB (MB+) (0 g Intravenous Stopped 9/3/23 1200)   lactated ringers bolus 1,000 mL (0 mLs Intravenous Stopped 9/3/23 1230)   lactated ringers bolus 1,000 mL (0 mLs Intravenous Stopped 9/3/23 1230)     Medical Decision Making  This is the emergent evaluation of a 72-year-old male who presents emergency department for evaluation of nausea and vomiting and diarrhea that he reports started this morning.   Also generalized weakness.  Differential diagnosis at the time of initial evaluation included, but was not limited to:  Sepsis, pneumonia, urinary tract infection, bacteremia, chemotherapy related reaction, alcohol withdrawal, dehydration, metabolic derangement.       This patient arrived with a sinus tachycardia and decreased level of consciousness.  Head CT revealed no evidence of acute intracranial process.  Chest x-ray without evidence of infectious process.  No evidence of urinary tract infection.  Abdomen is soft and nontender to palpation.  Patient does have a lactic acidosis with a leukocytosis.  There is mild hyponatremia and mild acute kidney injury.  Patient received broad-spectrum antibiotics and blood cultures were sent.  This may be infection but in his also possibly decreased perfusion secondary to dehydration.  This has happened to the patient in the past.  The patient is also on immunotherapy.  I discussed the possibility of colitis related to immunotherapy and giving the patient a dose of steroids but admitting hospitalist would like to see the patient first.  I discussed this case with Dr. Vallejo.    After IV fluids, the patient's heart rate is normal.  He is normotensive.  He is not hypoxemic.  Repeat lactate is pending.  He does appear to be showing better perfusion based on my follow-up examination.    Amount and/or Complexity of Data Reviewed  Labs: ordered. Decision-making details documented in ED Course.  Radiology: ordered and independent interpretation performed. Decision-making details documented in ED Course.  ECG/medicine tests: ordered and independent interpretation performed. Decision-making details documented in ED Course.    Risk  Prescription drug management.  Decision regarding hospitalization.                               Clinical Impression:   Final diagnoses:  [R00.0] Tachycardia  [E86.0] Dehydration  [A41.9] Sepsis, due to unspecified organism, unspecified whether acute organ  dysfunction present (Primary)  [R53.1] Weakness        ED Disposition Condition    Admit Stable                Jose Mendez Jr., MD  09/03/23 2092

## 2023-09-03 NOTE — ASSESSMENT & PLAN NOTE
Patient has hyponatremia which is uncontrolled,We will aim to correct the sodium by 4-6mEq in 24 hours. We will monitor sodium Daily. The hyponatremia is due to Dehydration/hypovolemia.  We will treat the hyponatremia with IV fluids. The patient's sodium results have been reviewed and are listed below.  Recent Labs   Lab 09/03/23  1043   *

## 2023-09-03 NOTE — NURSING
[facility]  Nurses Note -- 4 Eyes      9/3/2023       Skin assessed on: Admit      [x] No Pressure Injuries Present    []Prevention Measures Documented    [] Yes LDA  for Pressure Injury Previously documented     [] Yes New Pressure Injury Discovered   [] LDA for New Pressure Injury Added      Attending RN:  Bernadine Perez RN     Second RN:  Veronica Kahn RN

## 2023-09-03 NOTE — DISCHARGE INSTRUCTIONS
Starting tomorrow morning take doxycycline and cefdinir both twice daily for 4 more days  Follow-up with your PCP and oncologist  Come back to the hospital if you have any fevers or started feeling unwell/worsening of symptoms  Will set up  for you    Thank you for trusting [facility] and me with your care.  We are honored that you entrusted us with your healthcare needs. Your satisfaction is very important to us and we hope you have been very pleased with your experience at [facility]. After your discharge you may receive a survey asking you to rate your hospital experience. We encourage you to take the time to complete the survey as your feedback allows us to identify areas for improvement as well as recognize our staff for their care. We hope that you have received the very best care possible during your hospitalization at [facility], as your satisfaction is our top priority.    Let me know if there is anything more I can do!!        Vini Hayes MD  Internal Medicine Staff        PATIENT GENERAL DISCHARGE INFORMATION   Things that YOU are responsible for to Manage Your Care At Home:  1. Getting your prescriptions filled.  2. Taking you medications as directed. (DO NOT MISS ANY DOSES!)  3. Going to your follow-up doctor appointments.                 *This is important because it allows the doctor to monitor your progress and make changes.      If you are unable to make your follow up appointments, please call the number listed and reschedule this appointment.   After discharge, if you need assistance, you can call [facility] On Call Nurse Care Line for 24/7 assistance at [phone]  If you are experience any signs or symptoms, Call your doctor or Call 911 and come to your nearest Emergency Room.    You should receive a call from [facility] Discharge [facility] within 48-72 hours to help manage your care after discharge.   Please try to make sure that you answer your phone for this  important phone call.

## 2023-09-03 NOTE — ASSESSMENT & PLAN NOTE
He presents to ER with nausea, vomiting and diarrhea.  Tachycardic with leukocytosis and lactic acidosis.  No obvious infectious source.  Tachycardia and leukocytosis could be related to severe dehydration.  Immunocompromised patient and will be started on empiric broad spectrum ABX's.  Diarrhea with recent use of ABX's.  Check stool for Cdiff.  Unremarkable CXR and UA.  Check BCx as patient with port in place.  Consider CT of abdomen.  Aggressive IVF hydration.  Repeat lactate.

## 2023-09-03 NOTE — CLINICAL REVIEW
IP Sepsis Screen (most recent)       Sepsis Screen (IP) - 09/03/23 8152       Is the patient's history or complaint suggestive of a possible infection? Yes  -    Are there at least two of the following signs and symptoms present? Yes  -    Sepsis signs/symptoms - Tachycardia Tachycardia     >90  -    Sepsis signs/symptoms - WBC WBC < 4,000 or WBC > 12,000  -    Are any of the following organ dysfunction criteria present and not considered to be due to a chronic condition? Yes  -    Organ Dysfunction Criteria Lactate > 2.0  -    Initiate Sepsis Protocol No  -JM    Reason sepsis not considered Pt. receiving appropriate management  -              User Key  (r) = Recorded By, (t) = Taken By, (c) = Cosigned By      Initials Name    Gisel Montez RN

## 2023-09-03 NOTE — ED TRIAGE NOTES
Pt arrives via [facility] for diarrhea and generalized weakness since 3am this morning, also reports n/v. Denies black stools or vomit. Currently on chemotherapy for pancreatic cancer. Last Tx was yesterday morning.

## 2023-09-03 NOTE — PLAN OF CARE
Case Management Assessment     PCP: Fatmata Vera  Pharmacy: [facility] [address]   Patient Arrived From: home  Existing Help at Home: spouse    Barriers to Discharge: none    Discharge Plan:    A. Resume Home Health   B. Home with family      SW completed initial assessment and discussed discharge planning with patient at his bedside. Patient stated that he lives with his spouse who is his main support. Patient currently receiving Home Health services through [facility] and would like to resume upon discharge. Patient's spouse will provide transportation for him to get home when discharge from the hospital.       09/03/23 1520   Discharge Assessment   Assessment Type Discharge Planning Assessment   Confirmed/corrected address, phone number and insurance Yes   Confirmed Demographics Correct on Facesheet   Source of Information patient   Communicated MIKO with patient/caregiver Date not available/Unable to determine   Reason For Admission Sepsis   People in Home spouse   Do you expect to return to your current living situation? Yes   Do you have help at home or someone to help you manage your care at home? Yes   Who are your caregiver(s) and their phone number(s)? Vesta Tyler (Spouse)   [phone] (Mobile)   Prior to hospitilization cognitive status: Alert/Oriented   Current cognitive status: Alert/Oriented   Equipment Currently Used at Home walker, rolling   Readmission within 30 days? No   Patient currently being followed by outpatient case management? No   Do you currently have service(s) that help you manage your care at home? Yes   How Many hours does patient receive services 2   Name and Contact number of agency [facility] [phone]   Is the pt/caregiver preference to resume services with current agency Yes   Do you take prescription medications? Yes   Do you have prescription coverage? Yes   Coverage Medicare   Do you have any problems affording any of your prescribed medications? No   Is the  patient taking medications as prescribed? yes   Who is going to help you get home at discharge? Vesta Tyler (Spouse)   [phone] (Mobile)   How do you get to doctors appointments? car, drives self;family or friend will provide   Are you on dialysis? No   Do you take coumadin? No   DME Needed Upon Discharge  none   Discharge Plan discussed with: Patient   Transition of Care Barriers None   Discharge Plan A Home Health   Discharge Plan B Home with family   OTHER   Name(s) of People in Home Vesta Tyler (Spouse)   [phone] (Mobile)

## 2023-09-03 NOTE — NURSING
Received pt from ER, wheeled via stretcher. Vital signs taken and recorded. On RA, not in distress. Oriented x4. With 20g on L AC and R forearm, saline locked. Started D5 0.45 NACL at 125ml/hr on his R forearm, infusing well. Quick assessment done. Bed in low position. On L4. Call light within reach. Will continue to monitor.

## 2023-09-03 NOTE — PROGRESS NOTES
Pharmacokinetic Initial Assessment: IV Vancomycin    Assessment/Plan:    Initiate intravenous vancomycin with loading dose of 1000 mg once followed by a maintenance dose of vancomycin 1250 mg IV every 24 hours  Desired empiric serum trough concentration is 10 to 20 mcg/mL  Draw vancomycin trough level 60 min prior to third dose on 9/5/23 at approximately 1130  Pharmacy will continue to follow and monitor vancomycin.      Please contact pharmacy at extension [phone] with any questions regarding this assessment.     Thank you for the consult,   Finesse Villaseñor       Patient brief summary:  Manuel Tyler is a 72 y.o. male initiated on antimicrobial therapy with IV Vancomycin for treatment of suspected bacteremia    Drug Allergies:   Review of patient's allergies indicates:   Allergen Reactions    Jakafi [ruxolitinib]        Actual Body Weight:   71.2 kg    Renal Function:   Estimated Creatinine Clearance: 46.8 mL/min (based on SCr of 1.4 mg/dL).,     Dialysis Method (if applicable):  N/A    CBC (last 72 hours):  Recent Labs   Lab Result Units 09/01/23  1212 09/03/23  1043   WBC K/uL 15.27* 17.18*   Hemoglobin g/dL 14.3 15.3   Hematocrit % 42.3 45.1   Platelets K/uL 447 400   Gran % % 62.0 78.6*   Lymph % % 15.9* 6.1*   Mono % % 17.4* 13.7   Eosinophil % % 3.2 0.3   Basophil % % 1.0 0.7   Differential Method  Automated Automated       Metabolic Panel (last 72 hours):  Recent Labs   Lab Result Units 09/01/23  1212 09/03/23  1043 09/03/23  1258   Sodium mmol/L 135* 131*  --    Potassium mmol/L 4.8 4.2  --    Chloride mmol/L 100 100  --    CO2 mmol/L 27 20*  --    Glucose mg/dL 114* 133*  --    Glucose, UA   --   --  Trace*   BUN mg/dL 23 25*  --    Creatinine mg/dL 0.9 1.4  --    Albumin g/dL 2.8* 2.9*  --    Total Bilirubin mg/dL 0.4 0.6  --    Alkaline Phosphatase U/L 155* 166*  --    AST U/L 23 83*  --    ALT U/L 18 35  --    Magnesium mg/dL  --  2.2  --    Phosphorus mg/dL  --  3.1  --        Drug levels (last 3  "results):  No results for input(s): "VANCOMYCINRA", "VANCORANDOM", "VANCOMYCINPE", "VANCOPEAK", "VANCOMYCINTR", "VANCOTROUGH" in the last 72 hours.    Microbiologic Results:  Microbiology Results (last 7 days)       Procedure Component Value Units Date/Time    Clostridium difficile EIA [[id number]]     Order Status: No result Specimen: Stool     Blood culture x two cultures. Draw prior to antibiotics. [[id number]] Collected: 09/03/23 1043    Order Status: Sent Specimen: Blood from Peripheral, Antecubital, Left Updated: 09/03/23 1049    Blood culture x two cultures. Draw prior to antibiotics. [[id number]] Collected: 09/03/23 1044    Order Status: Sent Specimen: Blood from Peripheral, Forearm, Right Updated: 09/03/23 1049            "

## 2023-09-03 NOTE — ASSESSMENT & PLAN NOTE
Unsure if secondary to infection or fluid depletion.  Started on IVF's and empiric ABX's.  Repeat lactate later.

## 2023-09-03 NOTE — H&P
[facility] Emergency Dept  [facility] Medicine  History & Physical    Patient Name: Manuel Tyler  MRN: [id number]  Patient Class: IP- Inpatient  Admission Date: 9/3/2023  Attending Physician: Cholo Vallejo MD   Primary Care Provider: Fatmata Vera MD         Patient information was obtained from patient and ER records.     Subjective:     Principal Problem:Sepsis    Chief Complaint:   Chief Complaint   Patient presents with    Diarrhea     Pt BIB [facility] with reports of generalized weakness and diarrhea since 3am this morning. Pt denies N/V. Pt also reports a trip and fall onto his knees PTA, denies LOC or hitting his head. Pt with hx of pancreatic cancer.    Weakness        HPI: 71 y/o male with a history of a pancreatic cancer with liver mets on who presents to ER with severe weakness, nausea, vomiting and diarrhea.  Patient started having around 3 episodes of loose stool a day 2 days ago.  He then started having nausea and vomiting last night.  Poor oral intake and appetite over past couple of days.  Very weak and tired this morning.  Patient being treated for cancer with Dabrenib and Trametinib.  Patient reports he has no abdominal pain.  No alleviating or aggravating factors.  He also denies any fevers.  Patient recently completed course of Doxycycline for facial abscess.  He presents to ER where labs show leukocytosis and elevated lactate.  No other complaints.      Past Medical History:   Diagnosis Date    (HFpEF) heart failure with preserved ejection fraction 1/25/2023    Alcohol abuse 12/27/2022    Hyperlipidemia     Hypertension     Liver metastases 1/18/2023    Malignant neoplasm of head of pancreas 1/18/2023    Other specified noninfective gastroenteritis and colitis     Pancreatitis     Personal history of colonic polyps        Past Surgical History:   Procedure Laterality Date    COLONOSCOPY      COLONOSCOPY N/A 08/07/2023    Procedure: COLONOSCOPY;  Surgeon: Kong Estrella MD;   Location: [facility] ENDO;  Service: Endoscopy;  Laterality: N/A;  6/22 Instructions sent Via portal    COLONOSCOPY N/A 08/08/2023    Procedure: COLONOSCOPY;  Surgeon: Mariia Luevano MD;  Location: [facility] ENDO;  Service: Endoscopy;  Laterality: N/A;    ENDOSCOPIC ULTRASOUND OF UPPER GASTROINTESTINAL TRACT Left 12/30/2022    Procedure: ULTRASOUND, UPPER GI TRACT, ENDOSCOPIC;  Surgeon: Gregory Cristina MD;  Location: [facility] ENDO (2ND FLR);  Service: Endoscopy;  Laterality: Left;    ERCP Left 12/30/2022    Procedure: ERCP (ENDOSCOPIC RETROGRADE CHOLANGIOPANCREATOGRAPHY);  Surgeon: Gregory Cristina MD;  Location: [facility] ENDO (2ND FLR);  Service: Endoscopy;  Laterality: Left;    FOOT SURGERY Left     FRACTURE SURGERY  10/23/1998    INSERTION OF TUNNELED CENTRAL VENOUS CATHETER (CVC) WITH SUBCUTANEOUS PORT Bilateral 01/19/2023    Procedure: [id number];  Surgeon: Amador Castellon MD;  Location: [facility] OR;  Service: General;  Laterality: Bilateral;  Right v Left PORTACATH. needs ultrasound and fluoro  RN PREOP 01/18/2023    TONSILLECTOMY      VASECTOMY  10/25/1983       Review of patient's allergies indicates:   Allergen Reactions    Jakafi [ruxolitinib]        No current facility-administered medications on file prior to encounter.     Current Outpatient Medications on File Prior to Encounter   Medication Sig    buPROPion (WELLBUTRIN XL) 300 MG 24 hr tablet Take 1 tablet (300 mg total) by mouth once daily.    dabrafenib (TAFINLAR) 50 mg Cap TAKE 3 CAPSULES (150 MG TOTAL) TWICE DAILY    DULCOLAX, BISACODYL, ORAL Take by mouth.    hydrOXYzine HCL (ATARAX) 25 MG tablet Take 1 tablet (25 mg total) by mouth 3 (three) times daily as needed for Itching.    morphine (MS CONTIN) 15 MG 12 hr tablet Take 1 tablet (15 mg total) by mouth every 12 (twelve) hours.    multivit-mins/iron/folic/lycop (CENTRUM MEN ORAL) Take by mouth.    ondansetron (ZOFRAN) 4 MG tablet Take 1 tablet (4 mg total) by mouth every 8 (eight) hours  as needed for Nausea. (Patient not taking: Reported on 8/21/2023)    tiotropium bromide (SPIRIVA RESPIMAT) 2.5 mcg/actuation inhaler Inhale 2 puffs into the lungs once daily. Controller    trametinib (MEKINIST) 2 mg Tab TAKE 1 TABLET ONCE DAILY     Family History       Problem Relation (Age of Onset)    Arthritis Mother, Father    Breast cancer Mother    COPD Father    Cancer Father (69)    Drug abuse Brother    Hypertension Mother, Father    Skin cancer Father (69)    Stroke Mother          Tobacco Use    Smoking status: Every Day     Current packs/day: 0.50     Average packs/day: 0.5 packs/day for 55.6 years (27.8 ttl pk-yrs)     Types: Cigarettes     Start date: 1/15/1968    Smokeless tobacco: Former     Types: Chew   Substance and Sexual Activity    Alcohol use: Yes     Alcohol/week: 14.0 standard drinks of alcohol     Types: 14 Cans of beer per week     Comment: last drink 1-2 months ago    Drug use: Not Currently     Types: Marijuana    Sexual activity: Yes     Partners: Female     Birth control/protection: Coitus interruptus, Condom, Partner-Vasectomy     Review of Systems   Constitutional:  Positive for fatigue. Negative for fever.   HENT:  Negative for ear discharge and ear pain.    Eyes:  Negative for discharge and itching.   Respiratory:  Negative for cough and shortness of breath.    Cardiovascular:  Negative for chest pain and palpitations.   Gastrointestinal:  Positive for diarrhea and vomiting.   Endocrine: Negative for cold intolerance and heat intolerance.   Genitourinary:  Negative for difficulty urinating and dysuria.   Musculoskeletal:  Negative for neck pain and neck stiffness.   Skin:  Negative for rash and wound.   Neurological:  Negative for seizures and syncope.   Psychiatric/Behavioral:  Negative for agitation and hallucinations.      Objective:     Vital Signs (Most Recent):  Temp: 99.1 °F (37.3 °C) (09/03/23 1018)  Pulse: 100 (09/03/23 1418)  Resp: (!) 24 (09/03/23 1418)  BP: (!)  125/59 (09/03/23 1232)  SpO2: 96 % (09/03/23 1418) Vital Signs (24h Range):  Temp:  [99.1 °F (37.3 °C)] 99.1 °F (37.3 °C)  Pulse:  [] 100  Resp:  [20-24] 24  SpO2:  [95 %-99 %] 96 %  BP: (116-134)/(59-64) 125/59     Weight: 71.2 kg (157 lb)  Body mass index is 23.87 kg/m².     Physical Exam  Constitutional:       Appearance: He is ill-appearing. He is not diaphoretic.   HENT:      Head: Normocephalic and atraumatic.      Mouth/Throat:      Mouth: Mucous membranes are dry.      Pharynx: No oropharyngeal exudate or posterior oropharyngeal erythema.   Cardiovascular:      Rate and Rhythm: Regular rhythm. Tachycardia present.   Pulmonary:      Effort: No respiratory distress.      Breath sounds: Normal breath sounds.   Abdominal:      General: Bowel sounds are normal.      Palpations: Abdomen is soft.   Musculoskeletal:         General: No deformity or signs of injury.   Skin:     General: Skin is warm and dry.   Neurological:      General: No focal deficit present.      Cranial Nerves: No cranial nerve deficit.                Significant Labs: All pertinent labs within the past 24 hours have been reviewed.  BMP:   Recent Labs   Lab 09/03/23  1043   *   *   K 4.2      CO2 20*   BUN 25*   CREATININE 1.4   CALCIUM 9.1   MG 2.2     CBC:   Recent Labs   Lab 09/03/23  1043   WBC 17.18*   HGB 15.3   HCT 45.1          Significant Imaging: I have reviewed all pertinent imaging results/findings within the past 24 hours.    Assessment/Plan:     * Sepsis  He presents to ER with nausea, vomiting and diarrhea.  Tachycardic with leukocytosis and lactic acidosis.  No obvious infectious source.  Tachycardia and leukocytosis could be related to severe dehydration.  Immunocompromised patient and will be started on empiric broad spectrum ABX's.  Diarrhea with recent use of ABX's.  Check stool for Cdiff.  Unremarkable CXR and UA.  Check BCx as patient with port in place.  Consider CT of abdomen.  Aggressive  IVF hydration.  Repeat lactate.    Lactic acidosis  Unsure if secondary to infection or fluid depletion.  Started on IVF's and empiric ABX's.  Repeat lactate later.    Dehydration  Secondary to vomiting, diarrhea and poor oral intake.  IVF's.      Hyponatremia  Patient has hyponatremia which is uncontrolled,We will aim to correct the sodium by 4-6mEq in 24 hours. We will monitor sodium Daily. The hyponatremia is due to Dehydration/hypovolemia.  We will treat the hyponatremia with IV fluids. The patient's sodium results have been reviewed and are listed below.  Recent Labs   Lab 09/03/23  1043   *       Neoplasm related pain  Resume home pain medications.      (HFpEF) heart failure with preserved ejection fraction  Currently fluid depleted.  IVF hydration over next 24 hours.      Malignant neoplasm of head of pancreas  Metastatic to liver.  Follows with Oncology.  Seems to have failed first line treatment secondary to side effects.  Currently Dabrafenib and Trametinib.  Patient is DNR.  Continue supportive care.        VTE Risk Mitigation (From admission, onward)         Ordered     enoxaparin injection 40 mg  Daily         09/03/23 1342     IP VTE HIGH RISK PATIENT  Once         09/03/23 1342     Place sequential compression device  Until discontinued         09/03/23 1342                           Cholo Vallejo MD  Department of Hospital Medicine  [facility] [place] - Emergency Dept

## 2023-09-03 NOTE — ED NOTES
Assumed care of PT, currently receiving meds, resting in bed, urinal provided and encouraged to void. No new complaints at this time

## 2023-09-03 NOTE — Clinical Note
Diagnosis: Dehydration [276.51.ICD-9-CM]   Admitting Provider:: ELIANE SANCHEZ JR [[id number]]   Future Attending Provider: RUPERTO RODRIGUEZ [[id number]]   Requested Bed Type: Standard [1]   Reason for IP Medical Treatment  (Clinical interventions that can only be accomplished in the IP setting? ) :: IV fluids, IV antibiotics   I certify that Inpatient services for greater than or equal to 2 midnights are medically necessary:: Yes   Plans for Post-Acute care--if anticipated (pick the single best option):: A. No post acute care anticipated at this time   Special Needs:: No Special Needs [1]

## 2023-09-03 NOTE — ASSESSMENT & PLAN NOTE
Metastatic to liver.  Follows with Oncology.  Seems to have failed first line treatment secondary to side effects.  Currently Dabrafenib and Trametinib.  Patient is DNR.  Continue supportive care.

## 2023-09-03 NOTE — HPI
71 y/o male with a history of a pancreatic cancer with liver mets on who presents to ER with severe weakness, nausea, vomiting and diarrhea.  Patient started having around 3 episodes of loose stool a day 2 days ago.  He then started having nausea and vomiting last night.  Poor oral intake and appetite over past couple of days.  Very weak and tired this morning.  Patient being treated for cancer with Dabrenib and Trametinib.  Patient reports he has no abdominal pain.  No alleviating or aggravating factors.  He also denies any fevers.  Patient recently completed course of Doxycycline for facial abscess.  He presents to ER where labs show leukocytosis and elevated lactate.  No other complaints.

## 2023-09-03 NOTE — SUBJECTIVE & OBJECTIVE
Past Medical History:   Diagnosis Date    (HFpEF) heart failure with preserved ejection fraction 1/25/2023    Alcohol abuse 12/27/2022    Hyperlipidemia     Hypertension     Liver metastases 1/18/2023    Malignant neoplasm of head of pancreas 1/18/2023    Other specified noninfective gastroenteritis and colitis     Pancreatitis     Personal history of colonic polyps        Past Surgical History:   Procedure Laterality Date    COLONOSCOPY      COLONOSCOPY N/A 08/07/2023    Procedure: COLONOSCOPY;  Surgeon: Kong Estrella MD;  Location: [facility] ENDO;  Service: Endoscopy;  Laterality: N/A;  6/22 Instructions sent Via portal    COLONOSCOPY N/A 08/08/2023    Procedure: COLONOSCOPY;  Surgeon: Mariia Luevano MD;  Location: [facility] ENDO;  Service: Endoscopy;  Laterality: N/A;    ENDOSCOPIC ULTRASOUND OF UPPER GASTROINTESTINAL TRACT Left 12/30/2022    Procedure: ULTRASOUND, UPPER GI TRACT, ENDOSCOPIC;  Surgeon: Gregory Cristina MD;  Location: [facility] ENDO (2ND FLR);  Service: Endoscopy;  Laterality: Left;    ERCP Left 12/30/2022    Procedure: ERCP (ENDOSCOPIC RETROGRADE CHOLANGIOPANCREATOGRAPHY);  Surgeon: Gregory Cristina MD;  Location: [facility] ENDO (2ND FLR);  Service: Endoscopy;  Laterality: Left;    FOOT SURGERY Left     FRACTURE SURGERY  10/23/1998    INSERTION OF TUNNELED CENTRAL VENOUS CATHETER (CVC) WITH SUBCUTANEOUS PORT Bilateral 01/19/2023    Procedure: [id number];  Surgeon: Amador Castellon MD;  Location: [facility] OR;  Service: General;  Laterality: Bilateral;  Right v Left PORTACATH. needs ultrasound and fluoro  RN PREOP 01/18/2023    TONSILLECTOMY      VASECTOMY  10/25/1983       Review of patient's allergies indicates:   Allergen Reactions    Jakafi [ruxolitinib]        No current facility-administered medications on file prior to encounter.     Current Outpatient Medications on File Prior to Encounter   Medication Sig    buPROPion (WELLBUTRIN XL) 300 MG 24 hr tablet Take 1 tablet (300 mg total) by mouth once daily.     dabrafenib (TAFINLAR) 50 mg Cap TAKE 3 CAPSULES (150 MG TOTAL) TWICE DAILY    DULCOLAX, BISACODYL, ORAL Take by mouth.    hydrOXYzine HCL (ATARAX) 25 MG tablet Take 1 tablet (25 mg total) by mouth 3 (three) times daily as needed for Itching.    morphine (MS CONTIN) 15 MG 12 hr tablet Take 1 tablet (15 mg total) by mouth every 12 (twelve) hours.    multivit-mins/iron/folic/lycop (CENTRUM MEN ORAL) Take by mouth.    ondansetron (ZOFRAN) 4 MG tablet Take 1 tablet (4 mg total) by mouth every 8 (eight) hours as needed for Nausea. (Patient not taking: Reported on 8/21/2023)    tiotropium bromide (SPIRIVA RESPIMAT) 2.5 mcg/actuation inhaler Inhale 2 puffs into the lungs once daily. Controller    trametinib (MEKINIST) 2 mg Tab TAKE 1 TABLET ONCE DAILY     Family History       Problem Relation (Age of Onset)    Arthritis Mother, Father    Breast cancer Mother    COPD Father    Cancer Father (69)    Drug abuse Brother    Hypertension Mother, Father    Skin cancer Father (69)    Stroke Mother          Tobacco Use    Smoking status: Every Day     Current packs/day: 0.50     Average packs/day: 0.5 packs/day for 55.6 years (27.8 ttl pk-yrs)     Types: Cigarettes     Start date: 1/15/1968    Smokeless tobacco: Former     Types: Chew   Substance and Sexual Activity    Alcohol use: Yes     Alcohol/week: 14.0 standard drinks of alcohol     Types: 14 Cans of beer per week     Comment: last drink 1-2 months ago    Drug use: Not Currently     Types: Marijuana    Sexual activity: Yes     Partners: Female     Birth control/protection: Coitus interruptus, Condom, Partner-Vasectomy     Review of Systems   Constitutional:  Positive for fatigue. Negative for fever.   HENT:  Negative for ear discharge and ear pain.    Eyes:  Negative for discharge and itching.   Respiratory:  Negative for cough and shortness of breath.    Cardiovascular:  Negative for chest pain and palpitations.   Gastrointestinal:  Positive for diarrhea and vomiting.    Endocrine: Negative for cold intolerance and heat intolerance.   Genitourinary:  Negative for difficulty urinating and dysuria.   Musculoskeletal:  Negative for neck pain and neck stiffness.   Skin:  Negative for rash and wound.   Neurological:  Negative for seizures and syncope.   Psychiatric/Behavioral:  Negative for agitation and hallucinations.      Objective:     Vital Signs (Most Recent):  Temp: 99.1 °F (37.3 °C) (09/03/23 1018)  Pulse: 100 (09/03/23 1418)  Resp: (!) 24 (09/03/23 1418)  BP: (!) 125/59 (09/03/23 1232)  SpO2: 96 % (09/03/23 1418) Vital Signs (24h Range):  Temp:  [99.1 °F (37.3 °C)] 99.1 °F (37.3 °C)  Pulse:  [] 100  Resp:  [20-24] 24  SpO2:  [95 %-99 %] 96 %  BP: (116-134)/(59-64) 125/59     Weight: 71.2 kg (157 lb)  Body mass index is 23.87 kg/m².     Physical Exam  Constitutional:       Appearance: He is ill-appearing. He is not diaphoretic.   HENT:      Head: Normocephalic and atraumatic.      Mouth/Throat:      Mouth: Mucous membranes are dry.      Pharynx: No oropharyngeal exudate or posterior oropharyngeal erythema.   Cardiovascular:      Rate and Rhythm: Regular rhythm. Tachycardia present.   Pulmonary:      Effort: No respiratory distress.      Breath sounds: Normal breath sounds.   Abdominal:      General: Bowel sounds are normal.      Palpations: Abdomen is soft.   Musculoskeletal:         General: No deformity or signs of injury.   Skin:     General: Skin is warm and dry.   Neurological:      General: No focal deficit present.      Cranial Nerves: No cranial nerve deficit.                Significant Labs: All pertinent labs within the past 24 hours have been reviewed.  BMP:   Recent Labs   Lab 09/03/23  1043   *   *   K 4.2      CO2 20*   BUN 25*   CREATININE 1.4   CALCIUM 9.1   MG 2.2     CBC:   Recent Labs   Lab 09/03/23  1043   WBC 17.18*   HGB 15.3   HCT 45.1          Significant Imaging: I have reviewed all pertinent imaging results/findings  within the past 24 hours.

## 2023-09-04 LAB
ALBUMIN SERPL BCP-MCNC: 2.4 G/DL (ref 3.5–5.2)
ALP SERPL-CCNC: 114 U/L (ref 55–135)
ALT SERPL W/O P-5'-P-CCNC: 61 U/L (ref 10–44)
ANION GAP SERPL CALC-SCNC: 11 MMOL/L (ref 8–16)
AST SERPL-CCNC: 204 U/L (ref 10–40)
BASOPHILS # BLD AUTO: ABNORMAL K/UL (ref 0–0.2)
BASOPHILS NFR BLD: 0 % (ref 0–1.9)
BILIRUB SERPL-MCNC: 0.5 MG/DL (ref 0.1–1)
BUN SERPL-MCNC: 14 MG/DL (ref 8–23)
C DIFF GDH STL QL: NEGATIVE
C DIFF TOX A+B STL QL IA: NEGATIVE
CALCIUM SERPL-MCNC: 8.1 MG/DL (ref 8.7–10.5)
CHLORIDE SERPL-SCNC: 100 MMOL/L (ref 95–110)
CO2 SERPL-SCNC: 17 MMOL/L (ref 23–29)
CREAT SERPL-MCNC: 0.8 MG/DL (ref 0.5–1.4)
DIFFERENTIAL METHOD: ABNORMAL
EOSINOPHIL # BLD AUTO: ABNORMAL K/UL (ref 0–0.5)
EOSINOPHIL NFR BLD: 0 % (ref 0–8)
ERYTHROCYTE [DISTWIDTH] IN BLOOD BY AUTOMATED COUNT: 14.6 % (ref 11.5–14.5)
EST. GFR  (NO RACE VARIABLE): >60 ML/MIN/1.73 M^2
GLUCOSE SERPL-MCNC: 113 MG/DL (ref 70–110)
HCT VFR BLD AUTO: 39.2 % (ref 40–54)
HGB BLD-MCNC: 13.4 G/DL (ref 14–18)
IMM GRANULOCYTES # BLD AUTO: ABNORMAL K/UL (ref 0–0.04)
IMM GRANULOCYTES NFR BLD AUTO: ABNORMAL % (ref 0–0.5)
LACTATE SERPL-SCNC: 2.4 MMOL/L (ref 0.5–2.2)
LYMPHOCYTES # BLD AUTO: ABNORMAL K/UL (ref 1–4.8)
LYMPHOCYTES NFR BLD: 5 % (ref 18–48)
MAGNESIUM SERPL-MCNC: 1.7 MG/DL (ref 1.6–2.6)
MCH RBC QN AUTO: 30.1 PG (ref 27–31)
MCHC RBC AUTO-ENTMCNC: 34.2 G/DL (ref 32–36)
MCV RBC AUTO: 88 FL (ref 82–98)
MONOCYTES # BLD AUTO: ABNORMAL K/UL (ref 0.3–1)
MONOCYTES NFR BLD: 11 % (ref 4–15)
NEUTROPHILS NFR BLD: 80 % (ref 38–73)
NEUTS BAND NFR BLD MANUAL: 4 %
NRBC BLD-RTO: 0 /100 WBC
PHOSPHATE SERPL-MCNC: 2.1 MG/DL (ref 2.7–4.5)
PLATELET # BLD AUTO: 324 K/UL (ref 150–450)
PMV BLD AUTO: 8.9 FL (ref 9.2–12.9)
POTASSIUM SERPL-SCNC: 4.1 MMOL/L (ref 3.5–5.1)
PROT SERPL-MCNC: 6.4 G/DL (ref 6–8.4)
RBC # BLD AUTO: 4.45 M/UL (ref 4.6–6.2)
SODIUM SERPL-SCNC: 128 MMOL/L (ref 136–145)
WBC # BLD AUTO: 14.91 K/UL (ref 3.9–12.7)

## 2023-09-04 PROCEDURE — 11000001 HC ACUTE MED/SURG PRIVATE ROOM

## 2023-09-04 PROCEDURE — S5010 5% DEXTROSE AND 0.45% SALINE: HCPCS | Performed by: HOSPITALIST

## 2023-09-04 PROCEDURE — 94640 AIRWAY INHALATION TREATMENT: CPT

## 2023-09-04 PROCEDURE — 36415 COLL VENOUS BLD VENIPUNCTURE: CPT | Performed by: HOSPITALIST

## 2023-09-04 PROCEDURE — 83735 ASSAY OF MAGNESIUM: CPT | Performed by: HOSPITALIST

## 2023-09-04 PROCEDURE — 25000242 PHARM REV CODE 250 ALT 637 W/ HCPCS: Performed by: HOSPITALIST

## 2023-09-04 PROCEDURE — 94761 N-INVAS EAR/PLS OXIMETRY MLT: CPT

## 2023-09-04 PROCEDURE — 85025 COMPLETE CBC W/AUTO DIFF WBC: CPT | Performed by: HOSPITALIST

## 2023-09-04 PROCEDURE — 63600175 PHARM REV CODE 636 W HCPCS: Performed by: HOSPITALIST

## 2023-09-04 PROCEDURE — 25000003 PHARM REV CODE 250: Performed by: HOSPITALIST

## 2023-09-04 PROCEDURE — 80053 COMPREHEN METABOLIC PANEL: CPT | Performed by: HOSPITALIST

## 2023-09-04 PROCEDURE — 83605 ASSAY OF LACTIC ACID: CPT | Performed by: HOSPITALIST

## 2023-09-04 PROCEDURE — 84100 ASSAY OF PHOSPHORUS: CPT | Performed by: HOSPITALIST

## 2023-09-04 RX ORDER — IPRATROPIUM BROMIDE 0.5 MG/2.5ML
0.5 SOLUTION RESPIRATORY (INHALATION) EVERY 6 HOURS PRN
Status: DISCONTINUED | OUTPATIENT
Start: 2023-09-04 | End: 2023-09-06 | Stop reason: HOSPADM

## 2023-09-04 RX ORDER — TALC
6 POWDER (GRAM) TOPICAL NIGHTLY
Status: DISCONTINUED | OUTPATIENT
Start: 2023-09-04 | End: 2023-09-06 | Stop reason: HOSPADM

## 2023-09-04 RX ORDER — MUPIROCIN 20 MG/G
OINTMENT TOPICAL 2 TIMES DAILY
Status: DISCONTINUED | OUTPATIENT
Start: 2023-09-04 | End: 2023-09-06 | Stop reason: HOSPADM

## 2023-09-04 RX ORDER — DEXTROSE MONOHYDRATE 50 MG/ML
INJECTION, SOLUTION INTRAVENOUS CONTINUOUS
Status: DISCONTINUED | OUTPATIENT
Start: 2023-09-04 | End: 2023-09-04

## 2023-09-04 RX ORDER — DEXTROSE MONOHYDRATE 50 MG/ML
INJECTION, SOLUTION INTRAVENOUS CONTINUOUS
Status: ACTIVE | OUTPATIENT
Start: 2023-09-04 | End: 2023-09-05

## 2023-09-04 RX ORDER — HYDROXYZINE HYDROCHLORIDE 25 MG/1
25 TABLET, FILM COATED ORAL 3 TIMES DAILY PRN
Status: DISCONTINUED | OUTPATIENT
Start: 2023-09-04 | End: 2023-09-06 | Stop reason: HOSPADM

## 2023-09-04 RX ORDER — LOPERAMIDE HYDROCHLORIDE 2 MG/1
2 CAPSULE ORAL 4 TIMES DAILY PRN
Status: DISCONTINUED | OUTPATIENT
Start: 2023-09-04 | End: 2023-09-06 | Stop reason: HOSPADM

## 2023-09-04 RX ADMIN — CEFEPIME 1 G: 1 INJECTION, POWDER, FOR SOLUTION INTRAMUSCULAR; INTRAVENOUS at 06:09

## 2023-09-04 RX ADMIN — IPRATROPIUM BROMIDE 0.5 MG: 0.5 SOLUTION RESPIRATORY (INHALATION) at 07:09

## 2023-09-04 RX ADMIN — MUPIROCIN: 20 OINTMENT TOPICAL at 09:09

## 2023-09-04 RX ADMIN — VANCOMYCIN HYDROCHLORIDE 1250 MG: 1.25 INJECTION, POWDER, LYOPHILIZED, FOR SOLUTION INTRAVENOUS at 12:09

## 2023-09-04 RX ADMIN — IPRATROPIUM BROMIDE 0.5 MG: 0.5 SOLUTION RESPIRATORY (INHALATION) at 12:09

## 2023-09-04 RX ADMIN — CEFEPIME 1 G: 1 INJECTION, POWDER, FOR SOLUTION INTRAMUSCULAR; INTRAVENOUS at 04:09

## 2023-09-04 RX ADMIN — ENOXAPARIN SODIUM 40 MG: 40 INJECTION SUBCUTANEOUS at 04:09

## 2023-09-04 RX ADMIN — DEXTROSE MONOHYDRATE: 50 INJECTION, SOLUTION INTRAVENOUS at 02:09

## 2023-09-04 RX ADMIN — DEXTROSE AND SODIUM CHLORIDE: 5; 450 INJECTION, SOLUTION INTRAVENOUS at 12:09

## 2023-09-04 RX ADMIN — MORPHINE SULFATE 15 MG: 15 TABLET, EXTENDED RELEASE ORAL at 08:09

## 2023-09-04 RX ADMIN — BUPROPION HYDROCHLORIDE 300 MG: 150 TABLET, FILM COATED, EXTENDED RELEASE ORAL at 08:09

## 2023-09-04 RX ADMIN — CEFEPIME 1 G: 1 INJECTION, POWDER, FOR SOLUTION INTRAMUSCULAR; INTRAVENOUS at 09:09

## 2023-09-04 RX ADMIN — MELATONIN TAB 3 MG 6 MG: 3 TAB at 09:09

## 2023-09-04 NOTE — ASSESSMENT & PLAN NOTE
Increasing LFT's since presentation.  Normal T Bili.  Possible acute viral infection.  If higher tomorrow, would check RUQ US.

## 2023-09-04 NOTE — PROGRESS NOTES
[facility] Medicine  Progress Note    Patient Name: Manuel Tyler  MRN: [id number]  Patient Class: IP- Inpatient   Admission Date: 9/3/2023  Length of Stay: 1 days  Attending Physician: Cholo Vallejo MD  Primary Care Provider: Fatmata Vera MD        Subjective:     Principal Problem:Sepsis        HPI:  71 y/o male with a history of a pancreatic cancer with liver mets on who presents to ER with severe weakness, nausea, vomiting and diarrhea.  Patient started having around 3 episodes of loose stool a day 2 days ago.  He then started having nausea and vomiting last night.  Poor oral intake and appetite over past couple of days.  Very weak and tired this morning.  Patient being treated for cancer with Dabrenib and Trametinib.  Patient reports he has no abdominal pain.  No alleviating or aggravating factors.  He also denies any fevers.  Patient recently completed course of Doxycycline for facial abscess.  He presents to ER where labs show leukocytosis and elevated lactate.  No other complaints.      Overview/Hospital Course:  71 y/o male presents with weakness, nausea, vomiting and diarrhea.  Tachycardic with leukocytosis on presentation.  Metastatic pancreatic cancer on 2nd line treatment.  Empirically started on broad spectrum ABx's.  Dehydrated and started on IVF's.  Stool sent for Cdiff.      Interval History: slightly confused and very fidgety.    Review of Systems   HENT:  Negative for ear discharge and ear pain.    Eyes:  Negative for discharge and itching.   Endocrine: Negative for cold intolerance and heat intolerance.   Neurological:  Negative for seizures and syncope.     Objective:     Vital Signs (Most Recent):  Temp: 98.3 °F (36.8 °C) (09/04/23 1143)  Pulse: 92 (09/04/23 1226)  Resp: 20 (09/04/23 1226)  BP: (!) 147/75 (09/04/23 1143)  SpO2: 97 % (09/04/23 1226) Vital Signs (24h Range):  Temp:  [98.3 °F (36.8 °C)-99.8 °F (37.7 °C)] 98.3 °F (36.8 °C)  Pulse:  [] 92  Resp:   [17-25] 20  SpO2:  [92 %-98 %] 97 %  BP: (117-172)/(58-88) 147/75     Weight: 61.3 kg (135 lb 2.3 oz)  Body mass index is 20.55 kg/m².    Intake/Output Summary (Last 24 hours) at 9/4/2023 1437  Last data filed at 9/4/2023 1120  Gross per 24 hour   Intake 917.14 ml   Output 326 ml   Net 591.14 ml         Physical Exam  Constitutional:       Appearance: He is ill-appearing. He is not diaphoretic.   HENT:      Head: Normocephalic and atraumatic.      Mouth/Throat:      Mouth: Mucous membranes are dry.      Pharynx: No oropharyngeal exudate or posterior oropharyngeal erythema.   Cardiovascular:      Rate and Rhythm: Normal rate and regular rhythm.   Pulmonary:      Effort: No respiratory distress.      Breath sounds: Normal breath sounds.   Abdominal:      General: Bowel sounds are normal.      Palpations: Abdomen is soft.   Musculoskeletal:         General: No deformity or signs of injury.   Skin:     General: Skin is warm and dry.   Neurological:      General: No focal deficit present.      Cranial Nerves: No cranial nerve deficit.      Comments: Slightly confused.   Psychiatric:         Mood and Affect: Mood is anxious.         Behavior: Behavior is hyperactive.             Significant Labs: All pertinent labs within the past 24 hours have been reviewed.  BMP:   Recent Labs   Lab 09/04/23  0450   *   *   K 4.1      CO2 17*   BUN 14   CREATININE 0.8   CALCIUM 8.1*   MG 1.7     CBC:   Recent Labs   Lab 09/03/23  1043 09/04/23  0450   WBC 17.18* 14.91*   HGB 15.3 13.4*   HCT 45.1 39.2*    324       Significant Imaging: I have reviewed all pertinent imaging results/findings within the past 24 hours.      Assessment/Plan:      * Sepsis  He presents to ER with nausea, vomiting and diarrhea.  Tachycardic with leukocytosis and lactic acidosis.  No obvious infectious source.  Tachycardia and leukocytosis could be related to severe dehydration.  Immunocompromised patient and started on empiric broad  spectrum ABX's.  Diarrhea with recent use of ABX's.  Negative for Cdiff.  Unremarkable CXR and UA.  Check BCx as patient with port in place.  BCx negative so far.  Aggressive IVF hydration.  Sepsis possibly related to viral gastroenteritis.  Continue supportive care.  Home soon if continued improvement.    Lactic acidosis  Unsure if secondary to infection or fluid depletion.  Started on IVF's and empiric ABX's.    Dehydration  Secondary to vomiting, diarrhea and poor oral intake.  IVF's.      Hyponatremia  Patient has hyponatremia which is uncontrolled,We will aim to correct the sodium by 4-6mEq in 24 hours. We will monitor sodium Daily. The hyponatremia is due to Dehydration/hypovolemia.  We will treat the hyponatremia with IV fluids. The patient's sodium results have been reviewed and are listed below.  Recent Labs   Lab 09/04/23  0450   *   Change fluids to D5.    Neoplasm related pain  Resume home pain medications.      (HFpEF) heart failure with preserved ejection fraction  Currently fluid depleted.  IVF hydration over next 24 hours.      Malignant neoplasm of head of pancreas  Metastatic to liver.  Follows with Oncology.  Seems to have failed first line treatment secondary to side effects.  Currently Dabrafenib and Trametinib.  Patient is DNR.  Continue supportive care.      Elevated LFTs  Increasing LFT's since presentation.  Normal T Bili.  Possible acute viral infection.  If higher tomorrow, would check RUQ US.        VTE Risk Mitigation (From admission, onward)         Ordered     enoxaparin injection 40 mg  Daily         09/03/23 1342     IP VTE HIGH RISK PATIENT  Once         09/03/23 1342     Place sequential compression device  Until discontinued         09/03/23 1342                Discharge Planning   MIKO:      Code Status: DNR   Is the patient medically ready for discharge?:     Reason for patient still in hospital (select all that apply): Patient trending condition  Discharge Plan A: Home  Health                  Cholo Vallejo MD  Department of Hospital Medicine   [facility] - [place] - Med Surg

## 2023-09-04 NOTE — NURSING
[facility] MEWS TRIGGER FOLLOW UP       MEWS Monitoring, Score is:  Indication for review:   no concerns verbalized at this time, instructed to call [phone] for further concerns or assistance..

## 2023-09-04 NOTE — SUBJECTIVE & OBJECTIVE
Interval History: slightly confused and very fidgety.    Review of Systems   HENT:  Negative for ear discharge and ear pain.    Eyes:  Negative for discharge and itching.   Endocrine: Negative for cold intolerance and heat intolerance.   Neurological:  Negative for seizures and syncope.     Objective:     Vital Signs (Most Recent):  Temp: 98.3 °F (36.8 °C) (09/04/23 1143)  Pulse: 92 (09/04/23 1226)  Resp: 20 (09/04/23 1226)  BP: (!) 147/75 (09/04/23 1143)  SpO2: 97 % (09/04/23 1226) Vital Signs (24h Range):  Temp:  [98.3 °F (36.8 °C)-99.8 °F (37.7 °C)] 98.3 °F (36.8 °C)  Pulse:  [] 92  Resp:  [17-25] 20  SpO2:  [92 %-98 %] 97 %  BP: (117-172)/(58-88) 147/75     Weight: 61.3 kg (135 lb 2.3 oz)  Body mass index is 20.55 kg/m².    Intake/Output Summary (Last 24 hours) at 9/4/2023 1437  Last data filed at 9/4/2023 1120  Gross per 24 hour   Intake 917.14 ml   Output 326 ml   Net 591.14 ml         Physical Exam  Constitutional:       Appearance: He is ill-appearing. He is not diaphoretic.   HENT:      Head: Normocephalic and atraumatic.      Mouth/Throat:      Mouth: Mucous membranes are dry.      Pharynx: No oropharyngeal exudate or posterior oropharyngeal erythema.   Cardiovascular:      Rate and Rhythm: Normal rate and regular rhythm.   Pulmonary:      Effort: No respiratory distress.      Breath sounds: Normal breath sounds.   Abdominal:      General: Bowel sounds are normal.      Palpations: Abdomen is soft.   Musculoskeletal:         General: No deformity or signs of injury.   Skin:     General: Skin is warm and dry.   Neurological:      General: No focal deficit present.      Cranial Nerves: No cranial nerve deficit.      Comments: Slightly confused.   Psychiatric:         Mood and Affect: Mood is anxious.         Behavior: Behavior is hyperactive.             Significant Labs: All pertinent labs within the past 24 hours have been reviewed.  BMP:   Recent Labs   Lab 09/04/23  0450   *   *   K 4.1       CO2 17*   BUN 14   CREATININE 0.8   CALCIUM 8.1*   MG 1.7     CBC:   Recent Labs   Lab 09/03/23  1043 09/04/23  0450   WBC 17.18* 14.91*   HGB 15.3 13.4*   HCT 45.1 39.2*    324       Significant Imaging: I have reviewed all pertinent imaging results/findings within the past 24 hours.

## 2023-09-04 NOTE — NURSING
Scheduled medications given. Cont IVF. IV antibiotic infusing. Tele #[id number]. Pt remains free from fall/injury. Pt on room air; no distress noted. Pt is alert and confused. Tele sitter at bedside. Contact precautions maintained. Safety measures maintained. Will cont to monitor

## 2023-09-04 NOTE — ASSESSMENT & PLAN NOTE
Patient has hyponatremia which is uncontrolled,We will aim to correct the sodium by 4-6mEq in 24 hours. We will monitor sodium Daily. The hyponatremia is due to Dehydration/hypovolemia.  We will treat the hyponatremia with IV fluids. The patient's sodium results have been reviewed and are listed below.  Recent Labs   Lab 09/04/23  0450   *   Change fluids to D5.

## 2023-09-04 NOTE — HOSPITAL COURSE
73 y/o male presents with weakness, nausea, vomiting and diarrhea.  Tachycardic with leukocytosis on presentation.  Metastatic pancreatic cancer on 2nd line treatment.  Empirically started on broad spectrum ABx's.  Dehydrated and started on IVF's.  Stool sent for Cdiff.

## 2023-09-04 NOTE — NURSING
[facility]  Nurses Note -- 4 Eyes      9/4/2023       Skin assessed on: Q Shift      [x] No Pressure Injuries Present    []Prevention Measures Documented    [] Yes LDA  for Pressure Injury Previously documented     [] Yes New Pressure Injury Discovered   [] LDA for New Pressure Injury Added      Attending RN:  Dotty Gillis RN     Second RN:  Kumar Tierney LPN

## 2023-09-04 NOTE — ASSESSMENT & PLAN NOTE
He presents to ER with nausea, vomiting and diarrhea.  Tachycardic with leukocytosis and lactic acidosis.  No obvious infectious source.  Tachycardia and leukocytosis could be related to severe dehydration.  Immunocompromised patient and started on empiric broad spectrum ABX's.  Diarrhea with recent use of ABX's.  Negative for Cdiff.  Unremarkable CXR and UA.  Check BCx as patient with port in place.  BCx negative so far.  Aggressive IVF hydration.  Sepsis possibly related to viral gastroenteritis.  Continue supportive care.  Home soon if continued improvement.

## 2023-09-04 NOTE — CLINICAL REVIEW
IP Sepsis Screen (most recent)       Sepsis Screen (IP) - 09/04/23 5715       Is the patient's history or complaint suggestive of a possible infection? Yes  -LW    Are there at least two of the following signs and symptoms present? Yes  -LW    Sepsis signs/symptoms - Tachycardia Tachycardia     >90  -LW    Sepsis signs/symptoms - WBC WBC < 4,000 or WBC > 12,000  -LW    Are any of the following organ dysfunction criteria present and not considered to be due to a chronic condition? Yes  -LW    Organ Dysfunction Criteria Lactate > 2.0  -LW    Initiate Sepsis Protocol No  -LW    Reason sepsis not considered Pt. receiving appropriate management  -LW              User Key  (r) = Recorded By, (t) = Taken By, (c) = Cosigned By      Initials Name    Mariaa Vee RN

## 2023-09-05 ENCOUNTER — TELEPHONE (OUTPATIENT)
Dept: HEMATOLOGY/ONCOLOGY | Facility: CLINIC | Age: 73
End: 2023-09-05
Payer: MEDICARE

## 2023-09-05 LAB
ALBUMIN SERPL BCP-MCNC: 2.2 G/DL (ref 3.5–5.2)
ALP SERPL-CCNC: 150 U/L (ref 55–135)
ALT SERPL W/O P-5'-P-CCNC: 78 U/L (ref 10–44)
ANION GAP SERPL CALC-SCNC: 9 MMOL/L (ref 8–16)
AST SERPL-CCNC: 229 U/L (ref 10–40)
BASOPHILS # BLD AUTO: 0.06 K/UL (ref 0–0.2)
BASOPHILS NFR BLD: 0.7 % (ref 0–1.9)
BILIRUB SERPL-MCNC: 0.5 MG/DL (ref 0.1–1)
BNP SERPL-MCNC: 89 PG/ML (ref 0–99)
BUN SERPL-MCNC: 10 MG/DL (ref 8–23)
CALCIUM SERPL-MCNC: 8 MG/DL (ref 8.7–10.5)
CHLORIDE SERPL-SCNC: 97 MMOL/L (ref 95–110)
CO2 SERPL-SCNC: 20 MMOL/L (ref 23–29)
CREAT SERPL-MCNC: 0.7 MG/DL (ref 0.5–1.4)
DIFFERENTIAL METHOD: ABNORMAL
EOSINOPHIL # BLD AUTO: 0 K/UL (ref 0–0.5)
EOSINOPHIL NFR BLD: 0.2 % (ref 0–8)
ERYTHROCYTE [DISTWIDTH] IN BLOOD BY AUTOMATED COUNT: 14.6 % (ref 11.5–14.5)
EST. GFR  (NO RACE VARIABLE): >60 ML/MIN/1.73 M^2
GLUCOSE SERPL-MCNC: 108 MG/DL (ref 70–110)
HCT VFR BLD AUTO: 37.1 % (ref 40–54)
HGB BLD-MCNC: 12.5 G/DL (ref 14–18)
IMM GRANULOCYTES # BLD AUTO: 0.05 K/UL (ref 0–0.04)
IMM GRANULOCYTES NFR BLD AUTO: 0.6 % (ref 0–0.5)
LYMPHOCYTES # BLD AUTO: 0.6 K/UL (ref 1–4.8)
LYMPHOCYTES NFR BLD: 6.9 % (ref 18–48)
MAGNESIUM SERPL-MCNC: 1.7 MG/DL (ref 1.6–2.6)
MCH RBC QN AUTO: 29.8 PG (ref 27–31)
MCHC RBC AUTO-ENTMCNC: 33.7 G/DL (ref 32–36)
MCV RBC AUTO: 89 FL (ref 82–98)
MONOCYTES # BLD AUTO: 1.2 K/UL (ref 0.3–1)
MONOCYTES NFR BLD: 13.8 % (ref 4–15)
NEUTROPHILS # BLD AUTO: 6.5 K/UL (ref 1.8–7.7)
NEUTROPHILS NFR BLD: 77.8 % (ref 38–73)
NRBC BLD-RTO: 0 /100 WBC
OSMOLALITY SERPL: 272 MOSM/KG (ref 280–300)
OSMOLALITY UR: 276 MOSM/KG (ref 50–1200)
PHOSPHATE SERPL-MCNC: 1.8 MG/DL (ref 2.7–4.5)
PLATELET # BLD AUTO: 365 K/UL (ref 150–450)
PMV BLD AUTO: 9.6 FL (ref 9.2–12.9)
POTASSIUM SERPL-SCNC: 3.5 MMOL/L (ref 3.5–5.1)
PROT SERPL-MCNC: 6.1 G/DL (ref 6–8.4)
RBC # BLD AUTO: 4.19 M/UL (ref 4.6–6.2)
SODIUM SERPL-SCNC: 126 MMOL/L (ref 136–145)
SODIUM UR-SCNC: 26 MMOL/L (ref 20–250)
VANCOMYCIN TROUGH SERPL-MCNC: 7.8 UG/ML (ref 10–22)
WBC # BLD AUTO: 8.32 K/UL (ref 3.9–12.7)

## 2023-09-05 PROCEDURE — 83930 ASSAY OF BLOOD OSMOLALITY: CPT | Performed by: HOSPITALIST

## 2023-09-05 PROCEDURE — 25000003 PHARM REV CODE 250: Performed by: STUDENT IN AN ORGANIZED HEALTH CARE EDUCATION/TRAINING PROGRAM

## 2023-09-05 PROCEDURE — 36415 COLL VENOUS BLD VENIPUNCTURE: CPT | Performed by: HOSPITALIST

## 2023-09-05 PROCEDURE — 11000001 HC ACUTE MED/SURG PRIVATE ROOM

## 2023-09-05 PROCEDURE — 83935 ASSAY OF URINE OSMOLALITY: CPT | Performed by: STUDENT IN AN ORGANIZED HEALTH CARE EDUCATION/TRAINING PROGRAM

## 2023-09-05 PROCEDURE — 83880 ASSAY OF NATRIURETIC PEPTIDE: CPT | Performed by: STUDENT IN AN ORGANIZED HEALTH CARE EDUCATION/TRAINING PROGRAM

## 2023-09-05 PROCEDURE — 97535 SELF CARE MNGMENT TRAINING: CPT

## 2023-09-05 PROCEDURE — 80053 COMPREHEN METABOLIC PANEL: CPT | Performed by: HOSPITALIST

## 2023-09-05 PROCEDURE — 97165 OT EVAL LOW COMPLEX 30 MIN: CPT

## 2023-09-05 PROCEDURE — 97110 THERAPEUTIC EXERCISES: CPT

## 2023-09-05 PROCEDURE — 80202 ASSAY OF VANCOMYCIN: CPT | Performed by: HOSPITALIST

## 2023-09-05 PROCEDURE — 25000003 PHARM REV CODE 250: Performed by: HOSPITALIST

## 2023-09-05 PROCEDURE — 97161 PT EVAL LOW COMPLEX 20 MIN: CPT

## 2023-09-05 PROCEDURE — 85025 COMPLETE CBC W/AUTO DIFF WBC: CPT | Performed by: HOSPITALIST

## 2023-09-05 PROCEDURE — 84300 ASSAY OF URINE SODIUM: CPT | Performed by: STUDENT IN AN ORGANIZED HEALTH CARE EDUCATION/TRAINING PROGRAM

## 2023-09-05 PROCEDURE — 84100 ASSAY OF PHOSPHORUS: CPT | Performed by: HOSPITALIST

## 2023-09-05 PROCEDURE — 63600175 PHARM REV CODE 636 W HCPCS: Performed by: HOSPITALIST

## 2023-09-05 PROCEDURE — 83735 ASSAY OF MAGNESIUM: CPT | Performed by: HOSPITALIST

## 2023-09-05 PROCEDURE — 36415 COLL VENOUS BLD VENIPUNCTURE: CPT | Performed by: STUDENT IN AN ORGANIZED HEALTH CARE EDUCATION/TRAINING PROGRAM

## 2023-09-05 RX ORDER — SODIUM CHLORIDE 9 MG/ML
INJECTION, SOLUTION INTRAVENOUS CONTINUOUS
Status: DISCONTINUED | OUTPATIENT
Start: 2023-09-05 | End: 2023-09-06

## 2023-09-05 RX ORDER — CEFDINIR 300 MG/1
300 CAPSULE ORAL 2 TIMES DAILY
Qty: 8 CAPSULE | Refills: 0 | Status: SHIPPED | OUTPATIENT
Start: 2023-09-06 | End: 2023-09-10

## 2023-09-05 RX ORDER — DOXYCYCLINE 100 MG/1
100 CAPSULE ORAL 2 TIMES DAILY
Qty: 8 CAPSULE | Refills: 0 | Status: SHIPPED | OUTPATIENT
Start: 2023-09-06 | End: 2023-09-10

## 2023-09-05 RX ADMIN — MUPIROCIN: 20 OINTMENT TOPICAL at 09:09

## 2023-09-05 RX ADMIN — ENOXAPARIN SODIUM 40 MG: 40 INJECTION SUBCUTANEOUS at 04:09

## 2023-09-05 RX ADMIN — CEFEPIME 1 G: 1 INJECTION, POWDER, FOR SOLUTION INTRAMUSCULAR; INTRAVENOUS at 09:09

## 2023-09-05 RX ADMIN — SODIUM PHOSPHATE, MONOBASIC, MONOHYDRATE AND SODIUM PHOSPHATE, DIBASIC, ANHYDROUS 39.99 MMOL: 142; 276 INJECTION, SOLUTION INTRAVENOUS at 09:09

## 2023-09-05 RX ADMIN — CEFEPIME 1 G: 1 INJECTION, POWDER, FOR SOLUTION INTRAMUSCULAR; INTRAVENOUS at 06:09

## 2023-09-05 RX ADMIN — MELATONIN TAB 3 MG 6 MG: 3 TAB at 09:09

## 2023-09-05 RX ADMIN — SODIUM CHLORIDE: 9 INJECTION, SOLUTION INTRAVENOUS at 10:09

## 2023-09-05 RX ADMIN — VANCOMYCIN HYDROCHLORIDE 1250 MG: 1.25 INJECTION, POWDER, LYOPHILIZED, FOR SOLUTION INTRAVENOUS at 12:09

## 2023-09-05 RX ADMIN — SODIUM CHLORIDE: 9 INJECTION, SOLUTION INTRAVENOUS at 02:09

## 2023-09-05 RX ADMIN — CEFEPIME 1 G: 1 INJECTION, POWDER, FOR SOLUTION INTRAMUSCULAR; INTRAVENOUS at 02:09

## 2023-09-05 RX ADMIN — ACETAMINOPHEN 650 MG: 325 TABLET ORAL at 04:09

## 2023-09-05 RX ADMIN — BUPROPION HYDROCHLORIDE 300 MG: 150 TABLET, FILM COATED, EXTENDED RELEASE ORAL at 08:09

## 2023-09-05 RX ADMIN — MUPIROCIN: 20 OINTMENT TOPICAL at 08:09

## 2023-09-05 NOTE — PLAN OF CARE
Problem: Adult Inpatient Plan of Care  Goal: Plan of Care Review  Outcome: Ongoing, Progressing  Goal: Patient-Specific Goal (Individualized)  Outcome: Ongoing, Progressing  Goal: Absence of Hospital-Acquired Illness or Injury  Outcome: Ongoing, Progressing  Goal: Optimal Comfort and Wellbeing  Outcome: Ongoing, Progressing  Goal: Readiness for Transition of Care  Outcome: Ongoing, Progressing     Problem: Infection  Goal: Absence of Infection Signs and Symptoms  Outcome: Ongoing, Progressing     Problem: Impaired Wound Healing  Goal: Optimal Wound Healing  Outcome: Ongoing, Progressing     Problem: Adjustment to Illness (Sepsis/Septic Shock)  Goal: Optimal Coping  Outcome: Ongoing, Progressing     Problem: Bleeding (Sepsis/Septic Shock)  Goal: Absence of Bleeding  Outcome: Ongoing, Progressing     Problem: Glycemic Control Impaired (Sepsis/Septic Shock)  Goal: Blood Glucose Level Within Desired Range  Outcome: Ongoing, Progressing     Problem: Infection Progression (Sepsis/Septic Shock)  Goal: Absence of Infection Signs and Symptoms  Outcome: Ongoing, Progressing     Problem: Nutrition Impaired (Sepsis/Septic Shock)  Goal: Optimal Nutrition Intake  Outcome: Ongoing, Progressing     Problem: Skin Injury Risk Increased  Goal: Skin Health and Integrity  Outcome: Ongoing, Progressing     Problem: Coping Ineffective  Goal: Effective Coping  Outcome: Ongoing, Progressing

## 2023-09-05 NOTE — PT/OT/SLP EVAL
Occupational Therapy Evaluation       Name: Manuel Tyler  MRN: [id number]  Admitting Diagnosis: Sepsis  Recent Surgery: * No surgery found *      Recommendations:     Discharge Recommendations: home health OT  Level of Assistance Recommended: Intermittent assistance and Intermittent supervision  Discharge Equipment Recommendations: bedside commode  Barriers to discharge: None    Assessment:     Manuel Tyler is a 72 y.o. male with a medical diagnosis of Sepsis. He presents with performance deficits affecting function including weakness, impaired endurance, impaired self care skills, impaired functional mobility, gait instability, impaired balance, decreased safety awareness, decreased lower extremity function, decreased upper extremity function, decreased ROM. Patient sitting in bed with HOB elevated.     Rehab Prognosis: Good; patient would benefit from acute OT services to address these deficits and reach maximum level of function.    Plan:     Patient to be seen 3 x/week to address the above listed problems via self-care/home management, therapeutic activities, therapeutic exercises  Plan of Care Expires: 09/12/23  Plan of Care Reviewed with: patient    Subjective     Chief Complaint: weakness   Patient Comments/Goals: patient has home health RN, but may agree to HHOT, also   Pain/Comfort:  Pain Rating 1: 0/10    Patients cultural, spiritual, Moravian conflicts given the current situation: no    Social History:  Living Environment: Patient lives with their spouse in a single story home with number of outside stair(s): 1  Prior Level of Function: Prior to admission, patient was modified independent  Roles and Routines: Patient was driving and retired prior to admission.  Equipment Used at Home: shower chair, walker, rolling  DME owned (not currently used): none  Assistance Upon Discharge: family    Objective:     Communicated with RNNina, prior to session. Patient found HOB elevated with peripheral IV, telemetry  upon OT entry to room.    General Precautions: Standard,     Orthopedic Precautions: N/A   Braces: N/A    Respiratory Status: Room air    Occupational Performance: ,       Gait belt applied - Yes    Bed Mobility:   Scooting to HOB in supine: supervision  Scooting anteriorly to EOB to have both feet planted on floor: supervision  Supine to sit from right side of bed with supervision  Sit to Supine with supervision on Right side of bed    Functional Mobility/Transfers:  Sit <> Stand Transfer with supervision with rolling walker  Toilet Transfer Step Transfer technique with supervision with rolling walker  Functional Mobility: Patient walked with RW from bed to toilet with supervision due to unsteady gait while walking and for occupational therapy to roll IV pole.     Activities of Daily Living:  Grooming: set up assistance  Upper Body Dressing: minimum assistance with donning   Toileting: supervision with toilet t/f on and off toilet due to unsteady balance     Cognitive/Visual Perceptual:  Cognitive/Psychosocial Skills:    -     Oriented to: Person, Place, Time, Situation  -     Follows Commands/attention: Follows multistep  commands  -     Communication: clear/fluent  -     Memory: No Deficits noted  -     Safety awareness/insight to disability: impaired; pt. Needed supervision at all times while walking to not lose balance and to keep up with IV pole   -     Mood/Affect/Coping skills/emotional control: Appropriate to situation  Visual/Perceptual:    -     Intact    Physical Exam:  Balance:    -     Sitting: supervision  -     Standing: contact guard assistance  Postural examination/scapula alignment:    -       No postural abnormalities identified  Skin integrity: Visible skin intact  Edema:  None noted  Sensation:    -       Intact  Motor Planning: Intact  Dominant hand: Right  Upper Extremity Range of Motion:     -       Right Upper Extremity: WNL  -       Left Upper Extremity: WNL  Upper  Extremity Strength:    -       Right Upper Extremity: WFL  -       Left Upper Extremity: WFL   Strength:    -       Right Upper Extremity: WFL  -       Left Upper Extremity: WFL  Fine Motor Coordination:    -       Intact  Gross motor coordination:   WFL    AMPAC 6 Click ADL:  AMPAC Total Score: 19    Treatment & Education:  Therapist provided facilitation and instruction of energy conservation and fall prevention strategies during tasks listed above due to unsteady balance during ambulation from bed to toilet when walking backwards.   Patient educated on role of OT, POC, and goals for therapy  Patient educated on importance of OOB activities with staff member assistance and sitting OOB majority of the day  Patient educated about needing HHOT at home to gain UB strengh and for home safety     Patient clear to ambulate to/from bathroom with RN/PCT, assist x1 .    Patient left HOB elevated with all lines intact, call button in reach, RN notified, and bed alarm on.    GOALS:   Multidisciplinary Problems       Occupational Therapy Goals          Problem: Occupational Therapy    Goal Priority Disciplines Outcome Interventions   Occupational Therapy Goal     OT, PT/OT Ongoing, Progressing    Description: Goals to be met by: 9/12/23     Patient will increase functional independence with ADLs by performing:    UE Dressing with [place].  LE Dressing with Modified [place].  Grooming while standing at sink with Modified [place].  Toileting from toilet with Modified [place] for hygiene and clothing management.   Sitting at edge of bed x30 minutes with Modified [place].  Toilet transfer to toilet with Modified [place].  Increased functional strength to 4/5 for B upper extremity .  Upper extremity exercise program x10  reps per handout, with independence.                         History:     Past Medical History:   Diagnosis Date    (HFpEF) heart failure with preserved ejection fraction 1/25/2023     Alcohol abuse 12/27/2022    Hyperlipidemia     Hypertension     Liver metastases 1/18/2023    Malignant neoplasm of head of pancreas 1/18/2023    Other specified noninfective gastroenteritis and colitis     Pancreatitis     Personal history of colonic polyps          Past Surgical History:   Procedure Laterality Date    COLONOSCOPY      COLONOSCOPY N/A 08/07/2023    Procedure: COLONOSCOPY;  Surgeon: Kong Estrella MD;  Location: [facility] ENDO;  Service: Endoscopy;  Laterality: N/A;  6/22 Instructions sent Via portal    COLONOSCOPY N/A 08/08/2023    Procedure: COLONOSCOPY;  Surgeon: Mariia Luevano MD;  Location: [facility] ENDO;  Service: Endoscopy;  Laterality: N/A;    ENDOSCOPIC ULTRASOUND OF UPPER GASTROINTESTINAL TRACT Left 12/30/2022    Procedure: ULTRASOUND, UPPER GI TRACT, ENDOSCOPIC;  Surgeon: Gregory Cristina MD;  Location: [facility] ENDO (2ND FLR);  Service: Endoscopy;  Laterality: Left;    ERCP Left 12/30/2022    Procedure: ERCP (ENDOSCOPIC RETROGRADE CHOLANGIOPANCREATOGRAPHY);  Surgeon: Gregory Cristina MD;  Location: [facility] ENDO (2ND FLR);  Service: Endoscopy;  Laterality: Left;    FOOT SURGERY Left     FRACTURE SURGERY  10/23/1998    INSERTION OF TUNNELED CENTRAL VENOUS CATHETER (CVC) WITH SUBCUTANEOUS PORT Bilateral 01/19/2023    Procedure: DABESAOLF-BAFO-U-CATH;  Surgeon: Amador Castellon MD;  Location: [facility] OR;  Service: General;  Laterality: Bilateral;  Right v Left PORTACATH. needs ultrasound and fluoro  RN PREOP 01/18/2023    TONSILLECTOMY      VASECTOMY  10/25/1983       Time Tracking:     OT Date of Treatment: 09/05/23  OT Start Time: 0930  OT Stop Time: 1000  OT Total Time (min): 30 min    Billable Minutes: Evaluation 15 and Self Care/Home Management 15    9/5/2023

## 2023-09-05 NOTE — NURSING
[facility]  Nurses Note -- 4 Eyes      9/5/2023       Skin assessed on: Q Shift      [x] No Pressure Injuries Present    [x]Prevention Measures Documented    [] Yes LDA  for Pressure Injury Previously documented     [] Yes New Pressure Injury Discovered   [] LDA for New Pressure Injury Added      Attending RN:  ORACIO Wood/Cecilia Barbosa RN     Second RN:  ORACIO Dunham

## 2023-09-05 NOTE — PLAN OF CARE
Discharge removed because wife reported to nurse that patient was confused yesterday. Per nurse and Dr. Hayes, patient is no longer confused.        09/05/23 1201   Discharge Reassessment   Assessment Type Discharge Planning Reassessment   Did the patient's condition or plan change since previous assessment? Yes   Communicated MIKO with patient/caregiver Date not available/Unable to determine   Discharge Plan A Home with family   Discharge Plan B Home with family   DME Needed Upon Discharge  none   Transition of Care Barriers None   Post-Acute Status   Post-Acute Authorization Other   Coverage Medicare   Other Status No Post-Acute Service Needs   Discharge Delays None known at this time

## 2023-09-05 NOTE — PLAN OF CARE
Chart check complete, pt resting, confused at times.  Urinal in reach for use, fluids and antibiotics infused as orders.  Tele box monitored.  Telesitter at the bedside for safety.  Pt is DNR.  No acute distress noted, pt free from falls or injury this shift.  Bed in low position, wheels locked, bed alarm on, call light in reach for assistance, will continue to monitor.        Problem: Adult Inpatient Plan of Care  Goal: Plan of Care Review  Outcome: Ongoing, Progressing     Problem: Adult Inpatient Plan of Care  Goal: Patient-Specific Goal (Individualized)  Outcome: Ongoing, Progressing     Problem: Adult Inpatient Plan of Care  Goal: Absence of Hospital-Acquired Illness or Injury  Outcome: Ongoing, Progressing     Problem: Adult Inpatient Plan of Care  Goal: Optimal Comfort and Wellbeing  Outcome: Ongoing, Progressing     Problem: Infection  Goal: Absence of Infection Signs and Symptoms  Outcome: Ongoing, Progressing     Problem: Impaired Wound Healing  Goal: Optimal Wound Healing  Outcome: Ongoing, Progressing     Problem: Infection Progression (Sepsis/Septic Shock)  Goal: Absence of Infection Signs and Symptoms  Outcome: Ongoing, Progressing     Problem: Skin Injury Risk Increased  Goal: Skin Health and Integrity  Outcome: Ongoing, Progressing

## 2023-09-05 NOTE — PROGRESS NOTES
Pharmacokinetic Assessment Follow Up: IV Vancomycin    Vancomycin serum concentration assessment(s):    The trough level was drawn correctly and can be used to guide therapy at this time. The measurement is below the desired definitive target range of 10 to 20 mcg/mL.    Vancomycin Regimen Plan:    Change regimen to Vancomycin 1500 mg IV every 24 hours with next serum trough concentration measured at 1130 prior to 3rd  dose on 9-8 -23.    Drug levels (last 3 results):  Recent Labs   Lab Result Units 09/05/23  1134   Vancomycin-Trough ug/mL 7.8*       Pharmacy will continue to follow and monitor vancomycin.    Please contact pharmacy at extension [phone] for questions regarding this assessment.    Thank you for the consult,   Radha Peralta       Patient brief summary:  Manuel Tyler is a 72 y.o. male initiated on antimicrobial therapy with IV Vancomycin for treatment of bacteremia    The patient's current regimen is Vancomycin 1500mg IV q24h.    Drug Allergies:   Review of patient's allergies indicates:   Allergen Reactions    Jakafi [ruxolitinib]        Actual Body Weight:   61.3 kg    Renal Function:   Estimated Creatinine Clearance: 83.9 mL/min (based on SCr of 0.7 mg/dL).,     Dialysis Method (if applicable):  N/A    CBC (last 72 hours):  Recent Labs   Lab Result Units 09/03/23  1043 09/04/23  0450 09/05/23  0334   WBC K/uL 17.18* 14.91* 8.32   Hemoglobin g/dL 15.3 13.4* 12.5*   Hematocrit % 45.1 39.2* 37.1*   Platelets K/uL 400 324 365   Gran % % 78.6* 80.0* 77.8*   Lymph % % 6.1* 5.0* 6.9*   Mono % % 13.7 11.0 13.8   Eosinophil % % 0.3 0.0 0.2   Basophil % % 0.7 0.0 0.7   Differential Method  Automated Automated Automated       Metabolic Panel (last 72 hours):  Recent Labs   Lab Result Units 09/03/23  1043 09/03/23  1258 09/04/23  0450 09/05/23  0334 09/05/23  1210   Sodium mmol/L 131*  --  128* 126*  --    Sodium, Urine mmol/L  --   --   --   --  26   Potassium mmol/L 4.2  --  4.1 3.5  --    Chloride mmol/L  100  --  100 97  --    CO2 mmol/L 20*  --  17* 20*  --    Glucose mg/dL 133*  --  113* 108  --    Glucose, UA   --  Trace*  --   --   --    BUN mg/dL 25*  --  14 10  --    Creatinine mg/dL 1.4  --  0.8 0.7  --    Albumin g/dL 2.9*  --  2.4* 2.2*  --    Total Bilirubin mg/dL 0.6  --  0.5 0.5  --    Alkaline Phosphatase U/L 166*  --  114 150*  --    AST U/L 83*  --  204* 229*  --    ALT U/L 35  --  61* 78*  --    Magnesium mg/dL 2.2  --  1.7 1.7  --    Phosphorus mg/dL 3.1  --  2.1* 1.8*  --        Vancomycin Administrations:  vancomycin given in the last 96 hours                     vancomycin 1,250 mg in dextrose 5 % (D5W) 250 mL IVPB (Vial-Mate) (mg) 1,250 mg New Bag 09/05/23 1225     1,250 mg New Bag 09/04/23 1225    vancomycin (VANCOCIN) 1,000 mg in dextrose 5 % (D5W) 250 mL IVPB (Vial-Mate) (mg) 1,000 mg New Bag 09/03/23 1229                    Microbiologic Results:  Microbiology Results (last 7 days)       Procedure Component Value Units Date/Time    Blood culture x two cultures. Draw prior to antibiotics. [[id number]] Collected: 09/03/23 1043    Order Status: Completed Specimen: Blood from Peripheral, Antecubital, Left Updated: 09/05/23 1303     Blood Culture, Routine No Growth to date      No Growth to date      No Growth to date    Narrative:      Aerobic and anaerobic    Blood culture x two cultures. Draw prior to antibiotics. [[id number]] Collected: 09/03/23 1044    Order Status: Completed Specimen: Blood from Peripheral, Forearm, Right Updated: 09/05/23 1303     Blood Culture, Routine No Growth to date      No Growth to date      No Growth to date    Narrative:      Aerobic and anaerobic    Clostridium difficile EIA [[id number]] Collected: 09/03/23 9518    Order Status: Completed Specimen: Stool Updated: 09/04/23 1339     C. diff Antigen Negative     C difficile Toxins A+B, EIA Negative     Comment: Testing not recommended for children <24 months old.

## 2023-09-05 NOTE — PLAN OF CARE
ARLEEN notified nurse Nina that patient is ready for discharge from case management standpoint.        09/05/23 1048   Final Note   Assessment Type Final Discharge Note   Anticipated Discharge Disposition Home   What phone number can be called within the next 1-3 days to see how you are doing after discharge? [phone]   Hospital Resources/Appts/Education Provided   (Clinic will call patient to schedule appointment.)   Post-Acute Status   Post-Acute Authorization Other   Coverage Medicare   Other Status No Post-Acute Service Needs   Discharge Delays None known at this time

## 2023-09-05 NOTE — PLAN OF CARE
Problem: Physical Therapy  Goal: Physical Therapy Goal  Description: Goals to be met by: 23     Patient will increase functional independence with mobility by performin. Supine to sit with Supervision  2. Rolling to Left and Right with Supervision  3. Sit to stand transfer with Supervision using RW   4. Bed to chair transfer with Supervision using Rolling Walker  5. Gait x500 feet with Supervision using Rolling Walker   6. Lower extremity exercise program 2 sets x15 reps per handout, with supervision    Outcome: Ongoing, Progressing     Pt ambulated ~200 ft with CGA using RW.

## 2023-09-05 NOTE — DISCHARGE SUMMARY
[facility]  Discharge Summary      Patient Name: Manuel Tyler  MRN: [id number]  [facility]: [id number]  Patient Class: IP- Inpatient  Admission Date: 9/3/2023  Hospital Length of Stay: 2 days  Discharge Date and Time:  09/06/2023 10:18 AM  Attending Physician: Vini Hayes MD   Discharging Provider: Vini Hayes MD  Primary Care Provider: Fatmata Vera MD    Primary Care Team: Networked reference to record PCT     HPI:   73 y/o male with a history of a pancreatic cancer with liver mets on who presents to ER with severe weakness, nausea, vomiting and diarrhea.  Patient started having around 3 episodes of loose stool a day 2 days ago.  He then started having nausea and vomiting last night.  Poor oral intake and appetite over past couple of days.  Very weak and tired this morning.  Patient being treated for cancer with Dabrenib and Trametinib.  Patient reports he has no abdominal pain.  No alleviating or aggravating factors.  He also denies any fevers.  Patient recently completed course of Doxycycline for facial abscess.  He presents to ER where labs show leukocytosis and elevated lactate.  No other complaints.      * No surgery found *      Hospital Course:   73 y/o male presents with weakness, nausea, vomiting and diarrhea.  Tachycardic with leukocytosis on presentation.  Metastatic pancreatic cancer on 2nd line treatment.  Empirically started on broad spectrum ABx's.  Dehydrated and started on IVF's.  Stool sent for Cdiff.     No signs of infection. No wounds. Urine clean. Denies new URI symptoms.   WBC normalized. Will give 4 more days (7 total) of abx- doxy and cefdinir.  Ok to d/c. F/u PCP and Dr. Carver    Will replace Mg, phos and K before discharge  Palliative care discussion prior to d/c  HH set up      Starting tomorrow morning take doxycycline and cefdinir both twice daily for 4 more days  Follow-up with your PCP and oncologist  Come back to the hospital if you have any fevers or  started feeling unwell/worsening of symptoms  Will set up HH for you    Thank you for trusting [facility] and me with your care.  We are honored that you entrusted us with your healthcare needs. Your satisfaction is very important to us and we hope you have been very pleased with your experience at [facility]. After your discharge you may receive a survey asking you to rate your hospital experience. We encourage you to take the time to complete the survey as your feedback allows us to identify areas for improvement as well as recognize our staff for their care. We hope that you have received the very best care possible during your hospitalization at [facility], as your satisfaction is our top priority.    Let me know if there is anything more I can do!!        Vini Hayes MD  Internal Medicine Staff      ROS:  General: Negative for fevers or chills.  Cardiac: Negative for chest pain or orthopnea   Pulmonary: Negative for dyspnea or wheezing.  GI: Negative for abdominal distention or pain     Vitals:    09/04/23 1941 09/04/23 2257 09/05/23 0503 09/05/23 0803   BP: (!) 122/57 (!) 140/69 (!) 142/67 (!) 164/74   BP Location: Left arm  Right arm Right arm   Patient Position: Sitting Lying Lying Lying   Pulse: 92 82 76 78   Resp: 20 19 19 20   Temp: 98.2 °F (36.8 °C) 98.6 °F (37 °C) 97.7 °F (36.5 °C) 98.1 °F (36.7 °C)   TempSrc: Oral Oral Oral Oral   SpO2: 95% (!) 91% (!) 94% (!) 94%   Weight:       Height:              Body mass index is 20.55 kg/m².      PHYSICAL EXAM:  GENERAL APPEARANCE: alert and cooperative, and appears to be in no acute distress.  HEENT:     HEAD: NC/AT     EYES: PERRL, EOMI.  Vision is grossly intact.  NECK: Neck supple, non-tender without LAD, masses or thyromegaly.  CARDIAC: There is no peripheral edema, cyanosis or pallor.   LUNGS: Clear to auscultation and percussion without rales or wheezing  ABDOMEN: Non-distended. No guarding.  MSK: No joint erythema or  tenderness.   EXTREMITIES: No significant deformity or joint abnormality. No edema.   NEUROLOGICAL: CN II-XII grossly intact.   SKIN: Skin normal color, texture and turgor with no lesions or eruptions.  PSYCHIATRIC: Oriented. No tangential speech. No Hyperactive features.        Goals of Care Treatment Preferences:  Code Status: DNR    Living Will: Yes            Consults:   Consults (From admission, onward)          Status Ordering Provider     Pharmacy to dose Vancomycin consult  Once        Provider:  (Not yet assigned)   See [facility] for full Linked Orders Report.    Acknowledged RUPERTO RODRIGUEZ            No new Assessment & Plan notes have been filed under this hospital service since the last note was generated.  Service: Hospital Medicine    Final Active Diagnoses:    Diagnosis Date Noted POA    PRINCIPAL PROBLEM:  Sepsis [A41.9] 12/27/2022 Yes    Hyponatremia [E87.1] 09/03/2023 Yes    Dehydration [E86.0] 09/03/2023 Yes    Lactic acidosis [E87.20] 09/03/2023 Yes    Neoplasm related pain [G89.3] 02/07/2023 Yes    (HFpEF) heart failure with preserved ejection fraction [I50.30] 01/25/2023 Yes    Malignant neoplasm of head of pancreas [C25.0] 01/18/2023 Yes    Elevated LFTs [R79.89] 12/27/2022 Yes      Problems Resolved During this Admission:       Discharged Condition: good    Disposition: Home or Self Care    Follow Up:    Patient Instructions:      Ambulatory referral/consult to Internal Medicine   Standing Status: Future   Referral Priority: Routine Referral Type: Consultation   Referral Reason: Specialty Services Required   Requested Specialty: Internal Medicine   Number of Visits Requested: 1     Ambulatory referral/consult to Oncology   Standing Status: Future   Referral Priority: Urgent Referral Type: Consultation   Referral Reason: Specialty Services Required   Requested Specialty: Oncology   Number of Visits Requested: 1         Recent Results (from the past 100 hour(s))   CBC Auto Differential     Collection Time: 09/01/23 12:12 PM   Result Value Ref Range    WBC 15.27 (H) 3.90 - 12.70 K/uL    RBC 4.71 4.60 - 6.20 M/uL    Hemoglobin 14.3 14.0 - 18.0 g/dL    Hematocrit 42.3 40.0 - 54.0 %    MCV 90 82 - 98 fL    MCH 30.4 27.0 - 31.0 pg    MCHC 33.8 32.0 - 36.0 g/dL    RDW 15.1 (H) 11.5 - 14.5 %    Platelets 447 150 - 450 K/uL    MPV 8.9 (L) 9.2 - 12.9 fL    Immature Granulocytes 0.5 0.0 - 0.5 %    Gran # (ANC) 9.5 (H) 1.8 - 7.7 K/uL    Immature Grans (Abs) 0.07 (H) 0.00 - 0.04 K/uL    Lymph # 2.4 1.0 - 4.8 K/uL    Mono # 2.7 (H) 0.3 - 1.0 K/uL    Eos # 0.5 0.0 - 0.5 K/uL    Baso # 0.15 0.00 - 0.20 K/uL    nRBC 0 0 /100 WBC    Gran % 62.0 38.0 - 73.0 %    Lymph % 15.9 (L) 18.0 - 48.0 %    Mono % 17.4 (H) 4.0 - 15.0 %    Eosinophil % 3.2 0.0 - 8.0 %    Basophil % 1.0 0.0 - 1.9 %    Differential Method Automated    Comprehensive Metabolic Panel    Collection Time: 09/01/23 12:12 PM   Result Value Ref Range    Sodium 135 (L) 136 - 145 mmol/L    Potassium 4.8 3.5 - 5.1 mmol/L    Chloride 100 95 - 110 mmol/L    CO2 27 23 - 29 mmol/L    Glucose 114 (H) 70 - 110 mg/dL    BUN 23 8 - 23 mg/dL    Creatinine 0.9 0.5 - 1.4 mg/dL    Calcium 9.4 8.7 - 10.5 mg/dL    Total Protein 7.6 6.0 - 8.4 g/dL    Albumin 2.8 (L) 3.5 - 5.2 g/dL    Total Bilirubin 0.4 0.1 - 1.0 mg/dL    Alkaline Phosphatase 155 (H) 55 - 135 U/L    AST 23 10 - 40 U/L    ALT 18 10 - 44 U/L    eGFR >60 >60 mL/min/1.73 m^2    Anion Gap 8 8 - 16 mmol/L   CANCER ANTIGEN 19-9    Collection Time: 09/01/23 12:12 PM   Result Value Ref Range    CA 19-9 7769.4 (H) 0.0 - 40.0 U/mL   Blood culture x two cultures. Draw prior to antibiotics.    Collection Time: 09/03/23 10:43 AM    Specimen: Peripheral, Antecubital, Left; Blood   Result Value Ref Range    Blood Culture, Routine No Growth to date     Blood Culture, Routine No Growth to date    CBC auto differential    Collection Time: 09/03/23 10:43 AM   Result Value Ref Range    WBC 17.18 (H) 3.90 - 12.70 K/uL    RBC 5.10  4.60 - 6.20 M/uL    Hemoglobin 15.3 14.0 - 18.0 g/dL    Hematocrit 45.1 40.0 - 54.0 %    MCV 88 82 - 98 fL    MCH 30.0 27.0 - 31.0 pg    MCHC 33.9 32.0 - 36.0 g/dL    RDW 15.0 (H) 11.5 - 14.5 %    Platelets 400 150 - 450 K/uL    MPV 8.9 (L) 9.2 - 12.9 fL    Immature Granulocytes 0.6 (H) 0.0 - 0.5 %    Gran # (ANC) 13.5 (H) 1.8 - 7.7 K/uL    Immature Grans (Abs) 0.10 (H) 0.00 - 0.04 K/uL    Lymph # 1.1 1.0 - 4.8 K/uL    Mono # 2.4 (H) 0.3 - 1.0 K/uL    Eos # 0.1 0.0 - 0.5 K/uL    Baso # 0.12 0.00 - 0.20 K/uL    nRBC 0 0 /100 WBC    Gran % 78.6 (H) 38.0 - 73.0 %    Lymph % 6.1 (L) 18.0 - 48.0 %    Mono % 13.7 4.0 - 15.0 %    Eosinophil % 0.3 0.0 - 8.0 %    Basophil % 0.7 0.0 - 1.9 %    Platelet Estimate Appears normal     Differential Method Automated    Comprehensive metabolic panel    Collection Time: 09/03/23 10:43 AM   Result Value Ref Range    Sodium 131 (L) 136 - 145 mmol/L    Potassium 4.2 3.5 - 5.1 mmol/L    Chloride 100 95 - 110 mmol/L    CO2 20 (L) 23 - 29 mmol/L    Glucose 133 (H) 70 - 110 mg/dL    BUN 25 (H) 8 - 23 mg/dL    Creatinine 1.4 0.5 - 1.4 mg/dL    Calcium 9.1 8.7 - 10.5 mg/dL    Total Protein 8.0 6.0 - 8.4 g/dL    Albumin 2.9 (L) 3.5 - 5.2 g/dL    Total Bilirubin 0.6 0.1 - 1.0 mg/dL    Alkaline Phosphatase 166 (H) 55 - 135 U/L    AST 83 (H) 10 - 40 U/L    ALT 35 10 - 44 U/L    eGFR 54 (A) >60 mL/min/1.73 m^2    Anion Gap 11 8 - 16 mmol/L   Magnesium    Collection Time: 09/03/23 10:43 AM   Result Value Ref Range    Magnesium 2.2 1.6 - 2.6 mg/dL   Phosphorus    Collection Time: 09/03/23 10:43 AM   Result Value Ref Range    Phosphorus 3.1 2.7 - 4.5 mg/dL   Lipase    Collection Time: 09/03/23 10:43 AM   Result Value Ref Range    Lipase 15 4 - 60 U/L   Troponin I    Collection Time: 09/03/23 10:43 AM   Result Value Ref Range    Troponin I 0.016 0.000 - 0.026 ng/mL   Procalcitonin    Collection Time: 09/03/23 10:43 AM   Result Value Ref Range    Procalcitonin 0.47 (H) <0.25 ng/mL   Blood culture x two  cultures. Draw prior to antibiotics.    Collection Time: 09/03/23 10:44 AM    Specimen: Peripheral, Forearm, Right; Blood   Result Value Ref Range    Blood Culture, Routine No Growth to date     Blood Culture, Routine No Growth to date    ISTAT Lactate    Collection Time: 09/03/23 10:49 AM   Result Value Ref Range    POC Lactate 4.12 (HH) 0.5 - 2.2 mmol/L    Sample VENOUS     [facility]     Allens Test N/A    POCT COVID-19 Rapid Screening    Collection Time: 09/03/23 11:20 AM   Result Value Ref Range    POC Rapid COVID Negative Negative     Acceptable Yes    POCT Influenza A/B Molecular    Collection Time: 09/03/23 11:36 AM   Result Value Ref Range    POC Molecular Influenza A Ag Negative Negative, Not Reported    POC Molecular Influenza B Ag Negative Negative, Not Reported     Acceptable Yes    Urinalysis, Reflex to Urine Culture Urine, Clean Catch    Collection Time: 09/03/23 12:58 PM    Specimen: Urine   Result Value Ref Range    Specimen UA Urine, Clean Catch     Color, UA Yellow Yellow, Straw, Lennie    Appearance, UA Hazy (A) Clear    pH, UA 6.0 5.0 - 8.0    Specific Gravity, UA 1.025 1.005 - 1.030    Protein, UA 2+ (A) Negative    Glucose, UA Trace (A) Negative    Ketones, UA Negative Negative    Bilirubin (UA) Negative Negative    Occult Blood UA 3+ (A) Negative    Nitrite, UA Negative Negative    Urobilinogen, UA Negative <2.0 EU/dL    Leukocytes, UA Negative Negative   Urinalysis Microscopic    Collection Time: 09/03/23 12:58 PM   Result Value Ref Range    RBC, UA 2 0 - 4 /hpf    WBC, UA 2 0 - 5 /hpf    Bacteria None None-Occ /hpf    Hyaline Casts, UA 7 (A) 0-1/lpf /lpf    Granular Casts, UA 26 (A) None /lpf    Amorphous, UA Rare None-Moderate    Microscopic Comment SEE COMMENT    Lactic acid, plasma #2    Collection Time: 09/03/23  2:14 PM   Result Value Ref Range    Lactate (Lactic Acid) 2.3 (H) 0.5 - 2.2 mmol/L   Clostridium difficile EIA    Collection Time: 09/03/23   5:58 PM    Specimen: Stool   Result Value Ref Range    C. diff Antigen Negative Negative    C difficile Toxins A+B, EIA Negative Negative   Lactic acid, plasma    Collection Time: 09/04/23  4:50 AM   Result Value Ref Range    Lactate (Lactic Acid) 2.4 (H) 0.5 - 2.2 mmol/L   Comprehensive Metabolic Panel (CMP)    Collection Time: 09/04/23  4:50 AM   Result Value Ref Range    Sodium 128 (L) 136 - 145 mmol/L    Potassium 4.1 3.5 - 5.1 mmol/L    Chloride 100 95 - 110 mmol/L    CO2 17 (L) 23 - 29 mmol/L    Glucose 113 (H) 70 - 110 mg/dL    BUN 14 8 - 23 mg/dL    Creatinine 0.8 0.5 - 1.4 mg/dL    Calcium 8.1 (L) 8.7 - 10.5 mg/dL    Total Protein 6.4 6.0 - 8.4 g/dL    Albumin 2.4 (L) 3.5 - 5.2 g/dL    Total Bilirubin 0.5 0.1 - 1.0 mg/dL    Alkaline Phosphatase 114 55 - 135 U/L     (H) 10 - 40 U/L    ALT 61 (H) 10 - 44 U/L    eGFR >60 >60 mL/min/1.73 m^2    Anion Gap 11 8 - 16 mmol/L   Magnesium    Collection Time: 09/04/23  4:50 AM   Result Value Ref Range    Magnesium 1.7 1.6 - 2.6 mg/dL   Phosphorus    Collection Time: 09/04/23  4:50 AM   Result Value Ref Range    Phosphorus 2.1 (L) 2.7 - 4.5 mg/dL   CBC with Automated Differential    Collection Time: 09/04/23  4:50 AM   Result Value Ref Range    WBC 14.91 (H) 3.90 - 12.70 K/uL    RBC 4.45 (L) 4.60 - 6.20 M/uL    Hemoglobin 13.4 (L) 14.0 - 18.0 g/dL    Hematocrit 39.2 (L) 40.0 - 54.0 %    MCV 88 82 - 98 fL    MCH 30.1 27.0 - 31.0 pg    MCHC 34.2 32.0 - 36.0 g/dL    RDW 14.6 (H) 11.5 - 14.5 %    Platelets 324 150 - 450 K/uL    MPV 8.9 (L) 9.2 - 12.9 fL    Immature Granulocytes CANCELED 0.0 - 0.5 %    Immature Grans (Abs) CANCELED 0.00 - 0.04 K/uL    Lymph # CANCELED 1.0 - 4.8 K/uL    Mono # CANCELED 0.3 - 1.0 K/uL    Eos # CANCELED 0.0 - 0.5 K/uL    Baso # CANCELED 0.00 - 0.20 K/uL    nRBC 0 0 /100 WBC    Gran % 80.0 (H) 38.0 - 73.0 %    Lymph % 5.0 (L) 18.0 - 48.0 %    Mono % 11.0 4.0 - 15.0 %    Eosinophil % 0.0 0.0 - 8.0 %    Basophil % 0.0 0.0 - 1.9 %    Bands  4.0 %    Differential Method Automated    Comprehensive Metabolic Panel (CMP)    Collection Time: 09/05/23  3:34 AM   Result Value Ref Range    Sodium 126 (L) 136 - 145 mmol/L    Potassium 3.5 3.5 - 5.1 mmol/L    Chloride 97 95 - 110 mmol/L    CO2 20 (L) 23 - 29 mmol/L    Glucose 108 70 - 110 mg/dL    BUN 10 8 - 23 mg/dL    Creatinine 0.7 0.5 - 1.4 mg/dL    Calcium 8.0 (L) 8.7 - 10.5 mg/dL    Total Protein 6.1 6.0 - 8.4 g/dL    Albumin 2.2 (L) 3.5 - 5.2 g/dL    Total Bilirubin 0.5 0.1 - 1.0 mg/dL    Alkaline Phosphatase 150 (H) 55 - 135 U/L     (H) 10 - 40 U/L    ALT 78 (H) 10 - 44 U/L    eGFR >60 >60 mL/min/1.73 m^2    Anion Gap 9 8 - 16 mmol/L   Magnesium    Collection Time: 09/05/23  3:34 AM   Result Value Ref Range    Magnesium 1.7 1.6 - 2.6 mg/dL   Phosphorus    Collection Time: 09/05/23  3:34 AM   Result Value Ref Range    Phosphorus 1.8 (L) 2.7 - 4.5 mg/dL   CBC with Automated Differential    Collection Time: 09/05/23  3:34 AM   Result Value Ref Range    WBC 8.32 3.90 - 12.70 K/uL    RBC 4.19 (L) 4.60 - 6.20 M/uL    Hemoglobin 12.5 (L) 14.0 - 18.0 g/dL    Hematocrit 37.1 (L) 40.0 - 54.0 %    MCV 89 82 - 98 fL    MCH 29.8 27.0 - 31.0 pg    MCHC 33.7 32.0 - 36.0 g/dL    RDW 14.6 (H) 11.5 - 14.5 %    Platelets 365 150 - 450 K/uL    MPV 9.6 9.2 - 12.9 fL    Immature Granulocytes 0.6 (H) 0.0 - 0.5 %    Gran # (ANC) 6.5 1.8 - 7.7 K/uL    Immature Grans (Abs) 0.05 (H) 0.00 - 0.04 K/uL    Lymph # 0.6 (L) 1.0 - 4.8 K/uL    Mono # 1.2 (H) 0.3 - 1.0 K/uL    Eos # 0.0 0.0 - 0.5 K/uL    Baso # 0.06 0.00 - 0.20 K/uL    nRBC 0 0 /100 WBC    Gran % 77.8 (H) 38.0 - 73.0 %    Lymph % 6.9 (L) 18.0 - 48.0 %    Mono % 13.8 4.0 - 15.0 %    Eosinophil % 0.2 0.0 - 8.0 %    Basophil % 0.7 0.0 - 1.9 %    Differential Method Automated        Microbiology Results (last 7 days)       Procedure Component Value Units Date/Time    Clostridium difficile EIA [[id number]] Collected: 09/03/23 0133    Order Status: Completed Specimen:  Stool Updated: 09/04/23 1339     C. diff Antigen Negative     C difficile Toxins A+B, EIA Negative     Comment: Testing not recommended for children <24 months old.       Blood culture x two cultures. Draw prior to antibiotics. [[id number]] Collected: 09/03/23 1044    Order Status: Completed Specimen: Blood from Peripheral, Forearm, Right Updated: 09/04/23 1303     Blood Culture, Routine No Growth to date      No Growth to date    Narrative:      Aerobic and anaerobic    Blood culture x two cultures. Draw prior to antibiotics. [[id number]] Collected: 09/03/23 1043    Order Status: Completed Specimen: Blood from Peripheral, Antecubital, Left Updated: 09/04/23 1303     Blood Culture, Routine No Growth to date      No Growth to date    Narrative:      Aerobic and anaerobic            Imaging Results              X-Ray Abdomen AP 1 View (Final result)  Result time 09/03/23 14:23:54      Final result by Taye Poole MD (09/03/23 14:23:54)                   Impression:      As above.      Electronically signed by: Taye Poole MD  Date:    09/03/2023  Time:    14:23               Narrative:    EXAMINATION:  XR ABDOMEN AP 1 VIEW    CLINICAL HISTORY:  nausea/vomiting/diarrhea;    TECHNIQUE:  Single AP View of the abdomen was performed.    COMPARISON:  06/01/2023    FINDINGS:  Metallic stent seen in the central abdomen.  There are no suspicious calcifications.  Bowel gas pattern is non-obstructive.  No evidence for pneumoperitoneum.  Regional osseous structures are unremarkable.                                       CT Head Without Contrast (Final result)  Result time 09/03/23 11:47:13      Final result by Rad Mei MD (09/03/23 11:47:13)                   Impression:      No CT evidence of acute intracranial abnormality, allowing for artifact limitations.    Generalized cerebral volume loss and chronic ischemic changes, similar when compared with 01/25/2023.      Electronically signed by: Rad Mei  MD  Date:    09/03/2023  Time:    11:47               Narrative:    EXAMINATION:  CT HEAD WITHOUT CONTRAST    CLINICAL HISTORY:  Mental status change, unknown cause;    TECHNIQUE:  Low dose axial images were obtained through the head.  Coronal and sagittal reformations were also performed. Contrast was not administered.    COMPARISON:  CT, 01/25/2023.    FINDINGS:  Exam quality is limited by motion and external beam hardening artifact related to overlapping support devices.    Generalized cerebral volume loss with ex vacuo dilation of the ventricles and sulci, similar to prior study.  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  Periventricular white matter hypoattenuation suggestive of mild chronic microvascular ischemic change.    No evidence of acute territorial infarct, hemorrhage, mass effect, or midline shift.    No displaced calvarial fracture.    Minimal mucosal thickening in the ethmoid air cells.  No air-fluid levels.  Mastoid air cells are essentially clear.                                       X-Ray Chest AP Portable (Final result)  Result time 09/03/23 10:53:15      Final result by Marcia Issa MD (09/03/23 10:53:15)                   Impression:      As above.      Electronically signed by: Marcia Issa MD  Date:    09/03/2023  Time:    10:53               Narrative:    EXAMINATION:  XR CHEST AP PORTABLE    CLINICAL HISTORY:  Sepsis;    TECHNIQUE:  Single frontal view of the chest was performed.    COMPARISON:  07/14/2023    FINDINGS:  The lungs are hyperexpanded with flattening of the hemidiaphragms.  Small bilateral pleural effusions, right greater than left.  No focal airspace consolidation is seen.  The heart is normal in size.  Calcified atheromatous disease affects the aorta.  Right-sided Port-A-Cath has its tip in the region of the distal SVC.  Age-appropriate degenerative changes affect the skeleton.                                          Pending Diagnostic  Studies:       Procedure Component Value Units Date/Time    VANCOMYCIN, TROUGH [[id number]]     Order Status: Sent Lab Status: No result     Specimen: Blood            Medications:  Reconciled Home Medications:      Medication List        START taking these medications      cefdinir 300 MG capsule  Commonly known as: OMNICEF  Take 1 capsule (300 mg total) by mouth 2 (two) times daily. for 4 days  Start taking on: September 6, 2023     doxycycline 100 MG Cap  Commonly known as: VIBRAMYCIN  Take 1 capsule (100 mg total) by mouth 2 (two) times daily. for 4 days  Start taking on: September 6, 2023            CONTINUE taking these medications      buPROPion 300 MG 24 hr tablet  Commonly known as: WELLBUTRIN XL  Take 1 tablet (300 mg total) by mouth once daily.     CENTRUM MEN ORAL  Take by mouth.     DULCOLAX (BISACODYL) ORAL  Take by mouth.     hydrOXYzine HCL 25 MG tablet  Commonly known as: ATARAX  Take 1 tablet (25 mg total) by mouth 3 (three) times daily as needed for Itching.     morphine 15 MG 12 hr tablet  Commonly known as: MS CONTIN  Take 1 tablet (15 mg total) by mouth every 12 (twelve) hours.     ondansetron 4 MG tablet  Commonly known as: ZOFRAN  Take 1 tablet (4 mg total) by mouth every 8 (eight) hours as needed for Nausea.     TAFINLAR 50 mg Cap  Generic drug: dabrafenib  TAKE 3 CAPSULES (150 MG TOTAL) TWICE DAILY     tiotropium bromide 2.5 mcg/actuation inhaler  Commonly known as: SPIRIVA RESPIMAT  Inhale 2 puffs into the lungs once daily. Controller     trametinib 2 mg Tab  Commonly known as: MEKINIST  TAKE 1 TABLET ONCE DAILY              Indwelling Lines/Drains at time of discharge:   Lines/Drains/Airways       Central Venous Catheter Line  Duration             Port A Cath Single Lumen 01/24/23 right atrial 224 days                    Time spent on the discharge of patient: 35 minutes         Vini Hayes MD  Department of Hospital Medicine  [facility]

## 2023-09-05 NOTE — PLAN OF CARE
ARLEEN sent message to Dr. Carver's office requesting a follow up appointment. Office will reach out to patient to schedule appointment.

## 2023-09-05 NOTE — PT/OT/SLP EVAL
Physical Therapy Evaluation    Patient Name:  Manuel Tyler   MRN:  [id number]    Recommendations:     Discharge Recommendations:  (resume HHPT with caregiver support)   Discharge Equipment Recommendations: none   Barriers to discharge: None    Assessment:     Manuel Tyler is a 72 y.o. male admitted with a medical diagnosis of Sepsis.  He presents with the following impairments/functional limitations: weakness, impaired endurance, impaired self care skills, impaired functional mobility, gait instability, impaired balance, impaired cognition, decreased upper extremity function, decreased lower extremity function, decreased safety awareness.    Rehab Prognosis: Fair+/Fair; patient would benefit from acute skilled PT services to address these deficits and reach maximum level of function.    Recent Surgery: * No surgery found *      Plan:     During this hospitalization, patient to be seen 3 x/week to address the identified rehab impairments via gait training, therapeutic activities, therapeutic exercises and progress toward the following goals:    Plan of Care Expires:  09/19/23    Subjective     Chief Complaint: N/A  Patient/Family Comments/goals: Pt agreeable to ambulation in the hallway.   Pain/Comfort:  Pain Rating 1: 0/10    Living Environment:  Pt lives with spouse in a [facility] with 1 IRKI at entry.   Prior to admission, patients level of function was independent and driving.  Equipment at home: cane, straight, walker, rolling, shower chair.  Upon discharge, patient will have assistance from spouse.  Pt has a son close by and another son in [place].     Objective:     Patient found HOB elevated with bed alarm, peripheral IV, telemetry ([employer] monitor)  upon PT entry to room.    General Precautions: Standard, fall  Orthopedic Precautions:N/A   Braces: N/A  Respiratory Status: Room air    Exams:  Cognitive Exam:  Patient was able to follow 2 step commands.   Gross Motor Coordination:  WFL  Postural Exam:  Patient presented  with the following abnormalities:    -       No postural abnormalities identified  Sensation:    -       Intact  light/touch BLE  Skin Integrity/Edema:      -       Skin integrity: Visible skin intact  -       Edema: None noted BLE  BLE ROM: WFL  BLE Strength: WFL    Functional Mobility:  Pt pleasantly confused and cooperative with therapy, minimal fidgety however able to be redirected to focus and engage in activities.  Spouse present in room, will provide supervision once pt D/C'ed home.    Bed Mobility:     Scooting: stand by assistance and contact guard assistance  Supine to Sit: stand by assistance and contact guard assistance  Transfers:     Sit to Stand:  contact guard assistance with rolling walker  Bed to Chair: contact guard assistance with  rolling walker  using  Step Transfer  Gait: Pt ambulated ~200 ft with CGA using RW.  Pt with decreased step length and navarro.  Pt with mild unsteady gait, no LOB.  Pt required min VC's for safety during ambulation.    Balance: Pt with fair dynamic standing balance.       AM-PAC 6 CLICK MOBILITY  Total Score:20       Treatment & Education:  BLE seated therex x15 reps: AP, LAQ, pillow squeezes, and hip flex    Pt educated on acute skilled PT services and goals.  Pt to call for nursing assistance with OOB activities.  Pt/spouse verbalized good understanding.     Patient left up in chair reclined on seat cushion with BLE elevated on pillow with all lines intact, call button in reach, nurse Nina notified, and Kacy monitor and spouse present.  Tray table in front.     GOALS:   Multidisciplinary Problems       Physical Therapy Goals          Problem: Physical Therapy    Goal Priority Disciplines Outcome Goal Variances Interventions   Physical Therapy Goal     PT, PT/OT Ongoing, Progressing     Description: Goals to be met by: 23     Patient will increase functional independence with mobility by performin. Supine to sit with Supervision  2. Rolling to Left and  Right with Supervision  3. Sit to stand transfer with Supervision using RW   4. Bed to chair transfer with Supervision using Rolling Walker  5. Gait x500 feet with Supervision using Rolling Walker   6. Lower extremity exercise program 2 sets x15 reps per handout, with supervision                         History:     Past Medical History:   Diagnosis Date    (HFpEF) heart failure with preserved ejection fraction 1/25/2023    Alcohol abuse 12/27/2022    Hyperlipidemia     Hypertension     Liver metastases 1/18/2023    Malignant neoplasm of head of pancreas 1/18/2023    Other specified noninfective gastroenteritis and colitis     Pancreatitis     Personal history of colonic polyps        Past Surgical History:   Procedure Laterality Date    COLONOSCOPY      COLONOSCOPY N/A 08/07/2023    Procedure: COLONOSCOPY;  Surgeon: Kong Estrella MD;  Location: [facility] ENDO;  Service: Endoscopy;  Laterality: N/A;  6/22 Instructions sent Via portal    COLONOSCOPY N/A 08/08/2023    Procedure: COLONOSCOPY;  Surgeon: Mariia Luevano MD;  Location: [facility] ENDO;  Service: Endoscopy;  Laterality: N/A;    ENDOSCOPIC ULTRASOUND OF UPPER GASTROINTESTINAL TRACT Left 12/30/2022    Procedure: ULTRASOUND, UPPER GI TRACT, ENDOSCOPIC;  Surgeon: Gregory Cristina MD;  Location: [facility] ENDO (2ND FLR);  Service: Endoscopy;  Laterality: Left;    ERCP Left 12/30/2022    Procedure: ERCP (ENDOSCOPIC RETROGRADE CHOLANGIOPANCREATOGRAPHY);  Surgeon: Gregory Cristina MD;  Location: [facility] ENDO (2ND FLR);  Service: Endoscopy;  Laterality: Left;    FOOT SURGERY Left     FRACTURE SURGERY  10/23/1998    INSERTION OF TUNNELED CENTRAL VENOUS CATHETER (CVC) WITH SUBCUTANEOUS PORT Bilateral 01/19/2023    Procedure: [id number];  Surgeon: Amador Castellon MD;  Location: [facility] OR;  Service: General;  Laterality: Bilateral;  Right v Left PORTACATH. needs ultrasound and fluoro  RN PREOP 01/18/2023    TONSILLECTOMY      VASECTOMY  10/25/1983       Time Tracking:     PT  Received On: 09/05/23  PT Start Time: 1048     PT Stop Time: 1113  PT Total Time (min): 25 min     Billable Minutes: Evaluation 15 min and Therapeutic Exercise 10 min      09/05/2023

## 2023-09-05 NOTE — PROGRESS NOTES
[facility]  Progress Note    Patient Name: Manuel Tyler  MRN: [id number]  Patient Class: IP- Inpatient   Admission Date: 9/3/2023  Length of Stay: 2 days  Attending Physician: Vini Hayes MD  Primary Care Provider: Fatmata Vera MD        Subjective:     Principal Problem:Sepsis        HPI:  71 y/o male with a history of a pancreatic cancer with liver mets on who presents to ER with severe weakness, nausea, vomiting and diarrhea.  Patient started having around 3 episodes of loose stool a day 2 days ago.  He then started having nausea and vomiting last night.  Poor oral intake and appetite over past couple of days.  Very weak and tired this morning.  Patient being treated for cancer with Dabrenib and Trametinib.  Patient reports he has no abdominal pain.  No alleviating or aggravating factors.  He also denies any fevers.  Patient recently completed course of Doxycycline for facial abscess.  He presents to ER where labs show leukocytosis and elevated lactate.  No other complaints.      Overview/Hospital Course:  71 y/o male presents with weakness, nausea, vomiting and diarrhea.  Tachycardic with leukocytosis on presentation.  Metastatic pancreatic cancer on 2nd line treatment.  Empirically started on broad spectrum ABx's.  Dehydrated and started on IVF's.  Stool sent for Cdiff.      Interval History:  NAEON.  No new issues.   Denies complaints.  Speaking fluently and no AMS on my exam.   Son and wife very concerned about him going home.  Weak per PT.   All questions answered and updates on care given.       ROS:  General: Negative for fevers or chills.  Cardiac: Negative for chest pain or orthopnea   Pulmonary: Negative for dyspnea or wheezing.  GI: Negative for abdominal distention or pain     Vitals:    09/04/23 2257 09/05/23 0503 09/05/23 0803 09/05/23 1220   BP: (!) 140/69 (!) 142/67 (!) 164/74 (!) 141/68   BP Location:  Right arm Right arm Right arm   Patient Position: Lying Lying  Lying Sitting   Pulse: 82 76 78 86   Resp: 19 19 20 19   Temp: 98.6 °F (37 °C) 97.7 °F (36.5 °C) 98.1 °F (36.7 °C) 98.7 °F (37.1 °C)   TempSrc: Oral Oral Oral Oral   SpO2: (!) 91% (!) 94% (!) 94% (!) 94%   Weight:       Height:              Body mass index is 20.55 kg/m².      PHYSICAL EXAM:  GENERAL APPEARANCE: alert and cooperative, and appears to be in no acute distress.  HEENT:     HEAD: NC/AT     EYES: PERRL, EOMI.  Vision is grossly intact.  NECK: Neck supple, non-tender without LAD, masses or thyromegaly.  CARDIAC: There is no peripheral edema, cyanosis or pallor.   LUNGS: Clear to auscultation and percussion without rales or wheezing  ABDOMEN: Non-distended. No guarding.  MSK: No joint erythema or tenderness.   EXTREMITIES: No significant deformity or joint abnormality. No edema.   NEUROLOGICAL: CN II-XII grossly intact.   SKIN: Skin normal color, texture and turgor with no lesions or eruptions.  PSYCHIATRIC: Oriented. No tangential speech. No Hyperactive features.        Recent Results (from the past 24 hour(s))   Comprehensive Metabolic Panel (CMP)    Collection Time: 09/05/23  3:34 AM   Result Value Ref Range    Sodium 126 (L) 136 - 145 mmol/L    Potassium 3.5 3.5 - 5.1 mmol/L    Chloride 97 95 - 110 mmol/L    CO2 20 (L) 23 - 29 mmol/L    Glucose 108 70 - 110 mg/dL    BUN 10 8 - 23 mg/dL    Creatinine 0.7 0.5 - 1.4 mg/dL    Calcium 8.0 (L) 8.7 - 10.5 mg/dL    Total Protein 6.1 6.0 - 8.4 g/dL    Albumin 2.2 (L) 3.5 - 5.2 g/dL    Total Bilirubin 0.5 0.1 - 1.0 mg/dL    Alkaline Phosphatase 150 (H) 55 - 135 U/L     (H) 10 - 40 U/L    ALT 78 (H) 10 - 44 U/L    eGFR >60 >60 mL/min/1.73 m^2    Anion Gap 9 8 - 16 mmol/L   Magnesium    Collection Time: 09/05/23  3:34 AM   Result Value Ref Range    Magnesium 1.7 1.6 - 2.6 mg/dL   Phosphorus    Collection Time: 09/05/23  3:34 AM   Result Value Ref Range    Phosphorus 1.8 (L) 2.7 - 4.5 mg/dL   CBC with Automated Differential    Collection Time: 09/05/23  3:34  AM   Result Value Ref Range    WBC 8.32 3.90 - 12.70 K/uL    RBC 4.19 (L) 4.60 - 6.20 M/uL    Hemoglobin 12.5 (L) 14.0 - 18.0 g/dL    Hematocrit 37.1 (L) 40.0 - 54.0 %    MCV 89 82 - 98 fL    MCH 29.8 27.0 - 31.0 pg    MCHC 33.7 32.0 - 36.0 g/dL    RDW 14.6 (H) 11.5 - 14.5 %    Platelets 365 150 - 450 K/uL    MPV 9.6 9.2 - 12.9 fL    Immature Granulocytes 0.6 (H) 0.0 - 0.5 %    Gran # (ANC) 6.5 1.8 - 7.7 K/uL    Immature Grans (Abs) 0.05 (H) 0.00 - 0.04 K/uL    Lymph # 0.6 (L) 1.0 - 4.8 K/uL    Mono # 1.2 (H) 0.3 - 1.0 K/uL    Eos # 0.0 0.0 - 0.5 K/uL    Baso # 0.06 0.00 - 0.20 K/uL    nRBC 0 0 /100 WBC    Gran % 77.8 (H) 38.0 - 73.0 %    Lymph % 6.9 (L) 18.0 - 48.0 %    Mono % 13.8 4.0 - 15.0 %    Eosinophil % 0.2 0.0 - 8.0 %    Basophil % 0.7 0.0 - 1.9 %    Differential Method Automated    VANCOMYCIN, TROUGH    Collection Time: 09/05/23 11:34 AM   Result Value Ref Range    Vancomycin-Trough 7.8 (L) 10.0 - 22.0 ug/mL   Sodium, Random Urine    Collection Time: 09/05/23 12:10 PM   Result Value Ref Range    Sodium, Urine 26 20 - 250 mmol/L   BNP    Collection Time: 09/05/23 12:35 PM   Result Value Ref Range    BNP 89 0 - 99 pg/mL       Microbiology Results (last 7 days)       Procedure Component Value Units Date/Time    Blood culture x two cultures. Draw prior to antibiotics. [[id number]] Collected: 09/03/23 1043    Order Status: Completed Specimen: Blood from Peripheral, Antecubital, Left Updated: 09/05/23 1303     Blood Culture, Routine No Growth to date      No Growth to date      No Growth to date    Narrative:      Aerobic and anaerobic    Blood culture x two cultures. Draw prior to antibiotics. [[id number]] Collected: 09/03/23 1044    Order Status: Completed Specimen: Blood from Peripheral, Forearm, Right Updated: 09/05/23 1303     Blood Culture, Routine No Growth to date      No Growth to date      No Growth to date    Narrative:      Aerobic and anaerobic    Clostridium difficile EIA [[id number]] Collected:  09/03/23 1758    Order Status: Completed Specimen: Stool Updated: 09/04/23 1339     C. diff Antigen Negative     C difficile Toxins A+B, EIA Negative     Comment: Testing not recommended for children <24 months old.                Imaging Results              X-Ray Abdomen AP 1 View (Final result)  Result time 09/03/23 14:23:54      Final result by Taye Poole MD (09/03/23 14:23:54)                   Impression:      As above.      Electronically signed by: Taye Poole MD  Date:    09/03/2023  Time:    14:23               Narrative:    EXAMINATION:  XR ABDOMEN AP 1 VIEW    CLINICAL HISTORY:  nausea/vomiting/diarrhea;    TECHNIQUE:  Single AP View of the abdomen was performed.    COMPARISON:  06/01/2023    FINDINGS:  Metallic stent seen in the central abdomen.  There are no suspicious calcifications.  Bowel gas pattern is non-obstructive.  No evidence for pneumoperitoneum.  Regional osseous structures are unremarkable.                                       CT Head Without Contrast (Final result)  Result time 09/03/23 11:47:13      Final result by Rad Mei MD (09/03/23 11:47:13)                   Impression:      No CT evidence of acute intracranial abnormality, allowing for artifact limitations.    Generalized cerebral volume loss and chronic ischemic changes, similar when compared with 01/25/2023.      Electronically signed by: Rad Mei MD  Date:    09/03/2023  Time:    11:47               Narrative:    EXAMINATION:  CT HEAD WITHOUT CONTRAST    CLINICAL HISTORY:  Mental status change, unknown cause;    TECHNIQUE:  Low dose axial images were obtained through the head.  Coronal and sagittal reformations were also performed. Contrast was not administered.    COMPARISON:  CT, 01/25/2023.    FINDINGS:  Exam quality is limited by motion and external beam hardening artifact related to overlapping support devices.    Generalized cerebral volume loss with ex vacuo dilation of the ventricles and  sulci, similar to prior study.  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  Periventricular white matter hypoattenuation suggestive of mild chronic microvascular ischemic change.    No evidence of acute territorial infarct, hemorrhage, mass effect, or midline shift.    No displaced calvarial fracture.    Minimal mucosal thickening in the ethmoid air cells.  No air-fluid levels.  Mastoid air cells are essentially clear.                                       X-Ray Chest AP Portable (Final result)  Result time 09/03/23 10:53:15      Final result by Marcia Issa MD (09/03/23 10:53:15)                   Impression:      As above.      Electronically signed by: Marcia Issa MD  Date:    09/03/2023  Time:    10:53               Narrative:    EXAMINATION:  XR CHEST AP PORTABLE    CLINICAL HISTORY:  Sepsis;    TECHNIQUE:  Single frontal view of the chest was performed.    COMPARISON:  07/14/2023    FINDINGS:  The lungs are hyperexpanded with flattening of the hemidiaphragms.  Small bilateral pleural effusions, right greater than left.  No focal airspace consolidation is seen.  The heart is normal in size.  Calcified atheromatous disease affects the aorta.  Right-sided Port-A-Cath has its tip in the region of the distal SVC.  Age-appropriate degenerative changes affect the skeleton.                                             Assessment/Plan:      * Sepsis  He presents to ER with nausea, vomiting and diarrhea.  Tachycardic with leukocytosis and lactic acidosis.  No obvious infectious source.  Tachycardia and leukocytosis could be related to severe dehydration.  Immunocompromised patient and started on empiric broad spectrum ABX's.  Diarrhea with recent use of ABX's.  Negative for Cdiff.  Unremarkable CXR and UA.  Check BCx as patient with port in place.  BCx negative so far.  Aggressive IVF hydration.  Sepsis possibly related to viral gastroenteritis.  Continue supportive care.  Home soon  if continued improvement.    Lactic acidosis  Unsure if secondary to infection or fluid depletion.  Started on IVF's and empiric ABX's.    Dehydration  Secondary to vomiting, diarrhea and poor oral intake.  IVF's.      Hyponatremia  Patient has hyponatremia which is uncontrolled,We will aim to correct the sodium by 4-6mEq in 24 hours. We will monitor sodium Daily. The hyponatremia is due to Dehydration/hypovolemia.  We will treat the hyponatremia with IV fluids. The patient's sodium results have been reviewed and are listed below.  Recent Labs   Lab 09/04/23  0450   *   Change fluids to D5.    Neoplasm related pain  Resume home pain medications.      (HFpEF) heart failure with preserved ejection fraction  Currently fluid depleted.  IVF hydration over next 24 hours.      Malignant neoplasm of head of pancreas  Metastatic to liver.  Follows with Oncology.  Seems to have failed first line treatment secondary to side effects.  Currently Dabrafenib and Trametinib.  Patient is DNR.  Continue supportive care.      Elevated LFTs  Increasing LFT's since presentation.  Normal T Bili.  Possible acute viral infection.  If higher tomorrow, would check RUQ US.        VTE Risk Mitigation (From admission, onward)           Ordered     enoxaparin injection 40 mg  Daily         09/03/23 1342     IP VTE HIGH RISK PATIENT  Once         09/03/23 1342     Place sequential compression device  Until discontinued         09/03/23 1342                    Discharge Planning   MIKO: 9/5/2023     Code Status: DNR   Is the patient medically ready for discharge?:     Reason for patient still in hospital (select all that apply): Patient trending condition  Discharge Plan A: Home with family   Discharge Delays: None known at this time              Vini Hayes MD  Department of Hospital Medicine   [facility]

## 2023-09-05 NOTE — NURSING
Pt pulled out IV, new IV started and fluids continued.  Pt denies pain, Brief in place, urinal in reach.  No distress noted, bed alarm on.      [facility] [facility] [place]      9/4/2023       Skin assessed on: Q Shift      [x] No Pressure Injuries Present    [x]Prevention Measures Documented    [] Yes LDA  for Pressure Injury Previously documented     [] Yes New Pressure Injury Discovered   [] LDA for New Pressure Injury Added      Attending RN:  Gayla Holden RN     Second RN:  JASMIN Carmona

## 2023-09-05 NOTE — PLAN OF CARE
Problem: Occupational Therapy  Goal: Occupational Therapy Goal  Description: Goals to be met by: 9/12/23     Patient will increase functional independence with ADLs by performing:    UE Dressing with Lamb.  LE Dressing with Modified Lamb.  Grooming while standing at sink with Modified Lamb.  Toileting from toilet with Modified Lamb for hygiene and clothing management.   Sitting at edge of bed x30 minutes with Modified Lamb.  Toilet transfer to toilet with Modified Lamb.  Increased functional strength to 4/5 for B upper extremity .  Upper extremity exercise program x10  reps per handout, with independence.    Outcome: Established    Patient seen by occupational therapy for  initial evaluation, so see notes for more information. Patient is having some balance issues when walking backwards and is shaky on his feet. He is also having upper extremity weakness, so he would benefit from HHOT. He may need raised toilet or grab bars at home to help with toilet t/f due to unsteadiness on feet.

## 2023-09-06 VITALS
DIASTOLIC BLOOD PRESSURE: 74 MMHG | SYSTOLIC BLOOD PRESSURE: 159 MMHG | HEIGHT: 68 IN | BODY MASS INDEX: 20.48 KG/M2 | HEART RATE: 90 BPM | WEIGHT: 135.13 LBS | OXYGEN SATURATION: 95 % | TEMPERATURE: 97 F | RESPIRATION RATE: 20 BRPM

## 2023-09-06 LAB
ALBUMIN SERPL BCP-MCNC: 2.2 G/DL (ref 3.5–5.2)
ALP SERPL-CCNC: 229 U/L (ref 55–135)
ALT SERPL W/O P-5'-P-CCNC: 88 U/L (ref 10–44)
ANION GAP SERPL CALC-SCNC: 9 MMOL/L (ref 8–16)
AST SERPL-CCNC: 212 U/L (ref 10–40)
BILIRUB SERPL-MCNC: 0.6 MG/DL (ref 0.1–1)
BUN SERPL-MCNC: 7 MG/DL (ref 8–23)
CALCIUM SERPL-MCNC: 8.1 MG/DL (ref 8.7–10.5)
CHLORIDE SERPL-SCNC: 102 MMOL/L (ref 95–110)
CO2 SERPL-SCNC: 23 MMOL/L (ref 23–29)
CREAT SERPL-MCNC: 0.7 MG/DL (ref 0.5–1.4)
EST. GFR  (NO RACE VARIABLE): >60 ML/MIN/1.73 M^2
GLUCOSE SERPL-MCNC: 124 MG/DL (ref 70–110)
MAGNESIUM SERPL-MCNC: 1.7 MG/DL (ref 1.6–2.6)
PHOSPHATE SERPL-MCNC: 1.9 MG/DL (ref 2.7–4.5)
POTASSIUM SERPL-SCNC: 3 MMOL/L (ref 3.5–5.1)
PROT SERPL-MCNC: 6.2 G/DL (ref 6–8.4)
SODIUM SERPL-SCNC: 134 MMOL/L (ref 136–145)

## 2023-09-06 PROCEDURE — 25000003 PHARM REV CODE 250: Performed by: HOSPITALIST

## 2023-09-06 PROCEDURE — 63600175 PHARM REV CODE 636 W HCPCS: Performed by: HOSPITALIST

## 2023-09-06 PROCEDURE — 63600175 PHARM REV CODE 636 W HCPCS: Performed by: STUDENT IN AN ORGANIZED HEALTH CARE EDUCATION/TRAINING PROGRAM

## 2023-09-06 PROCEDURE — 97535 SELF CARE MNGMENT TRAINING: CPT

## 2023-09-06 PROCEDURE — 25000003 PHARM REV CODE 250: Performed by: STUDENT IN AN ORGANIZED HEALTH CARE EDUCATION/TRAINING PROGRAM

## 2023-09-06 PROCEDURE — 99900035 HC TECH TIME PER 15 MIN (STAT)

## 2023-09-06 PROCEDURE — 99497 ADVNCD CARE PLAN 30 MIN: CPT | Mod: 25,,, | Performed by: NURSE PRACTITIONER

## 2023-09-06 PROCEDURE — 83735 ASSAY OF MAGNESIUM: CPT | Performed by: HOSPITALIST

## 2023-09-06 PROCEDURE — 97530 THERAPEUTIC ACTIVITIES: CPT

## 2023-09-06 PROCEDURE — 80053 COMPREHEN METABOLIC PANEL: CPT | Performed by: HOSPITALIST

## 2023-09-06 PROCEDURE — 99223 PR INITIAL HOSPITAL CARE,LEVL III: ICD-10-PCS | Mod: ,,, | Performed by: NURSE PRACTITIONER

## 2023-09-06 PROCEDURE — 94761 N-INVAS EAR/PLS OXIMETRY MLT: CPT

## 2023-09-06 PROCEDURE — 36415 COLL VENOUS BLD VENIPUNCTURE: CPT | Performed by: HOSPITALIST

## 2023-09-06 PROCEDURE — 99223 1ST HOSP IP/OBS HIGH 75: CPT | Mod: ,,, | Performed by: NURSE PRACTITIONER

## 2023-09-06 PROCEDURE — 99497 PR ADVNCD CARE PLAN 30 MIN: ICD-10-PCS | Mod: 25,,, | Performed by: NURSE PRACTITIONER

## 2023-09-06 PROCEDURE — 84100 ASSAY OF PHOSPHORUS: CPT | Performed by: HOSPITALIST

## 2023-09-06 RX ORDER — POTASSIUM CHLORIDE 20 MEQ/1
40 TABLET, EXTENDED RELEASE ORAL EVERY 4 HOURS
Status: COMPLETED | OUTPATIENT
Start: 2023-09-06 | End: 2023-09-06

## 2023-09-06 RX ORDER — POTASSIUM CHLORIDE 7.45 MG/ML
10 INJECTION INTRAVENOUS
Status: COMPLETED | OUTPATIENT
Start: 2023-09-06 | End: 2023-09-06

## 2023-09-06 RX ORDER — MAGNESIUM SULFATE HEPTAHYDRATE 40 MG/ML
2 INJECTION, SOLUTION INTRAVENOUS ONCE
Status: COMPLETED | OUTPATIENT
Start: 2023-09-06 | End: 2023-09-06

## 2023-09-06 RX ADMIN — SODIUM CHLORIDE: 9 INJECTION, SOLUTION INTRAVENOUS at 05:09

## 2023-09-06 RX ADMIN — SODIUM PHOSPHATE, MONOBASIC, MONOHYDRATE AND SODIUM PHOSPHATE, DIBASIC, ANHYDROUS 39.99 MMOL: 142; 276 INJECTION, SOLUTION INTRAVENOUS at 10:09

## 2023-09-06 RX ADMIN — POTASSIUM CHLORIDE 10 MEQ: 7.46 INJECTION, SOLUTION INTRAVENOUS at 03:09

## 2023-09-06 RX ADMIN — POTASSIUM CHLORIDE 10 MEQ: 7.46 INJECTION, SOLUTION INTRAVENOUS at 02:09

## 2023-09-06 RX ADMIN — POTASSIUM CHLORIDE 40 MEQ: 1500 TABLET, EXTENDED RELEASE ORAL at 01:09

## 2023-09-06 RX ADMIN — POTASSIUM CHLORIDE 40 MEQ: 1500 TABLET, EXTENDED RELEASE ORAL at 08:09

## 2023-09-06 RX ADMIN — MUPIROCIN: 20 OINTMENT TOPICAL at 08:09

## 2023-09-06 RX ADMIN — MAGNESIUM SULFATE HEPTAHYDRATE 2 G: 40 INJECTION, SOLUTION INTRAVENOUS at 08:09

## 2023-09-06 RX ADMIN — CEFEPIME 1 G: 1 INJECTION, POWDER, FOR SOLUTION INTRAMUSCULAR; INTRAVENOUS at 05:09

## 2023-09-06 RX ADMIN — BUPROPION HYDROCHLORIDE 300 MG: 150 TABLET, FILM COATED, EXTENDED RELEASE ORAL at 08:09

## 2023-09-06 NOTE — NURSING
Received report care assumed. Patient lying in bed resting, NAD noted. Safety precautions maintained.     [facility] [facility] [place]      9/5/2023       Skin assessed on: Q Shift      [x] No Pressure Injuries Present    [x]Prevention Measures Documented    [] Yes LDA  for Pressure Injury Previously documented     [] Yes New Pressure Injury Discovered   [] LDA for New Pressure Injury Added      Attending RN:  Laura Decker LPN    Second RN:  Cecilia Barbosa RN

## 2023-09-06 NOTE — CONSULTS
[facility]  Palliative Medicine  Consult Note    Patient Name: Manuel Tyler  MRN: [id number]  Admission Date: 9/3/2023  Hospital Length of Stay: 3 days  Code Status: DNR   Attending Provider: Vini Hayes MD  Consulting Provider: Jael Perez NP  Primary Care Physician: Fatmata Vera MD  Principal Problem:Sepsis    Patient information was obtained from patient, past medical records, ER records and primary team.      Inpatient consult to Palliative Care  Consult performed by: Jael Perez NP  Consult ordered by: Vini Hayes MD  Reason for consult: [facility]        Assessment/Plan:   Palliative encounter:  Advance Care Planning   -Palliative consult received for advanced cancer patient who is known to me.  -discussed with primary Dr hayes  -discussed with Oncology Dr carver who requested I f/u with patient.    -met with patient who is just completing therapy session. He will be dcd today. He remembers me from a previous visit.  He denies N/V, has on a brief since he has had diarrhea.his pain is controlled with current home pain medication regimen  -we discussed his current clinical condition, infection, hospitalization, performance status and nutritional status need to be maintained in order to continue palliative chemo. He did participate with therapy and ambulated independently.    -confirmed DNR  -addressed all questions and concerns  -emotional support provided  -updated Dr Carver oncology        Neoplasm related pain  on pain medications; reports pain adequately controlled       Malignant neoplasm of head of pancreas  -Metastatic to liver.  -Follows with Oncology Dr Carver; f/u tomorrow outpatient  -Palliative chemo; second line  -wants to continue current tx plan       Sepsis  -presents to ER with nausea, vomiting and diarrhea.  Tachycardic with leukocytosis and lactic acidosis.   No obvious infectious source. on empiric broad spectrum ABX's.  Diarrhea with recent use of ABX's.  Negative  for Cdiff.       ? viral gastroenteritis.         -mgmt per primary       -dc home today     Lactic acidosis  See above     Dehydration  -IV fluids given per primary      Hyponatremia  -noted  -mgmt per primary    (HFpEF) heart failure with preserved ejection fraction   -noted    Elevated LFTs  -mgmt per primary    Thank you for your consult.     Subjective:     HPI:   71 y/o male with a history of a pancreatic cancer with liver mets on who presents to ER with severe weakness, nausea, vomiting and diarrhea.  Patient started having around 3 episodes of loose stool a day 2 days ago.  He then started having nausea and vomiting last night.  Poor oral intake and appetite over past couple of days.  Very weak and tired this morning.  Patient being treated for cancer with Dabrenib and Trametinib.  Patient reports he has no abdominal pain.  No alleviating or aggravating factors.  He also denies any fevers.  Patient recently completed course of Doxycycline for facial abscess.  He presents to ER where labs show leukocytosis and elevated lactate.  No other complaints.        Overview/Hospital Course:  71 y/o male presents with weakness, nausea, vomiting and diarrhea.  Tachycardic with leukocytosis on presentation.  Metastatic pancreatic cancer on 2nd line treatment.  Empirically started on broad spectrum ABx's.  Dehydrated and started on IVF's.  Stool sent for Cdiff.            Hospital Course:  No notes on file        Past Medical History:   Diagnosis Date    (HFpEF) heart failure with preserved ejection fraction 1/25/2023    Alcohol abuse 12/27/2022    Hyperlipidemia     Hypertension     Liver metastases 1/18/2023    Malignant neoplasm of head of pancreas 1/18/2023    Other specified noninfective gastroenteritis and colitis     Pancreatitis     Personal history of colonic polyps        Past Surgical History:   Procedure Laterality Date    COLONOSCOPY      COLONOSCOPY N/A 08/07/2023    Procedure: COLONOSCOPY;  Surgeon: Delores  Kong BERNAL MD;  Location: [facility] ENDO;  Service: Endoscopy;  Laterality: N/A;  6/22 Instructions sent Via portal    COLONOSCOPY N/A 08/08/2023    Procedure: COLONOSCOPY;  Surgeon: Mariia Luevano MD;  Location: [facility] ENDO;  Service: Endoscopy;  Laterality: N/A;    ENDOSCOPIC ULTRASOUND OF UPPER GASTROINTESTINAL TRACT Left 12/30/2022    Procedure: ULTRASOUND, UPPER GI TRACT, ENDOSCOPIC;  Surgeon: Gregory Cristina MD;  Location: [facility] ENDO (2ND FLR);  Service: Endoscopy;  Laterality: Left;    ERCP Left 12/30/2022    Procedure: ERCP (ENDOSCOPIC RETROGRADE CHOLANGIOPANCREATOGRAPHY);  Surgeon: Gregory Cristina MD;  Location: [facility] ENDO (2ND FLR);  Service: Endoscopy;  Laterality: Left;    FOOT SURGERY Left     FRACTURE SURGERY  10/23/1998    INSERTION OF TUNNELED CENTRAL VENOUS CATHETER (CVC) WITH SUBCUTANEOUS PORT Bilateral 01/19/2023    Procedure: [id number];  Surgeon: Amador Castellon MD;  Location: [facility] OR;  Service: General;  Laterality: Bilateral;  Right v Left PORTACATH. needs ultrasound and fluoro  RN PREOP 01/18/2023    TONSILLECTOMY      VASECTOMY  10/25/1983       Review of patient's allergies indicates:   Allergen Reactions    Jakafi [ruxolitinib]        Medications:  Continuous Infusions:  Scheduled Meds:   buPROPion  300 mg Oral Daily    ceFEPime (MAXIPIME) IVPB  1 g Intravenous Q8H    enoxparin  40 mg Subcutaneous Daily    magnesium sulfate IVPB  2 g Intravenous Once    melatonin  6 mg Oral Nightly    mupirocin   Nasal BID    potassium chloride  40 mEq Oral Q4H    sodium phosphate 39.99 mmol in dextrose 5 % (D5W) 250 mL IVPB  39.99 mmol Intravenous Once    vancomycin (VANCOCIN) IV (PEDS and ADULTS)  1,500 mg Intravenous Q24H     PRN Meds:acetaminophen, hydrOXYzine HCL, ipratropium, loperamide, morphine, naloxone, ondansetron, polyethylene glycol, sodium chloride 0.9%, Pharmacy to dose Vancomycin consult **AND** vancomycin - pharmacy to dose    Family History       Problem Relation (Age of Onset)     Arthritis Mother, Father    Breast cancer Mother    COPD Father    Cancer Father (69)    Drug abuse Brother    Hypertension Mother, Father    Skin cancer Father (69)    Stroke Mother          Tobacco Use    Smoking status: Every Day     Current packs/day: 0.50     Average packs/day: 0.5 packs/day for 55.6 years (27.8 ttl pk-yrs)     Types: Cigarettes     Start date: 1/15/1968    Smokeless tobacco: Former     Types: Chew   Substance and Sexual Activity    Alcohol use: Yes     Alcohol/week: 14.0 standard drinks of alcohol     Types: 14 Cans of beer per week     Comment: last drink 1-2 months ago    Drug use: Not Currently     Types: Marijuana    Sexual activity: Yes     Partners: Female     Birth control/protection: Coitus interruptus, Condom, Partner-Vasectomy       Review of Systems   Constitutional:  Positive for activity change and appetite change.   Respiratory:  Negative for shortness of breath.    Cardiovascular:  Negative for chest pain and leg swelling.   Gastrointestinal:  Positive for diarrhea. Negative for abdominal pain, nausea and vomiting.   Genitourinary: Negative.    Musculoskeletal:  Positive for myalgias.   Skin: Negative.    Neurological: Negative.    Psychiatric/Behavioral: Negative.       Objective:     Vital Signs (Most Recent):  Temp: 98.6 °F (37 °C) (09/06/23 0735)  Pulse: 81 (09/06/23 0735)  Resp: 20 (09/06/23 0735)  BP: 131/67 (09/06/23 0735)  SpO2: 95 % (09/06/23 0735) Vital Signs (24h Range):  Temp:  [97.8 °F (36.6 °C)-98.7 °F (37.1 °C)] 98.6 °F (37 °C)  Pulse:  [] 81  Resp:  [18-20] 20  SpO2:  [92 %-95 %] 95 %  BP: (127-164)/(62-78) 131/67     Weight: 61.3 kg (135 lb 2.3 oz)  Body mass index is 20.55 kg/m².       Physical Exam  Vitals and nursing note reviewed.   Constitutional:       General: He is not in acute distress.     Appearance: He is underweight. He is ill-appearing. He is not toxic-appearing or diaphoretic.   HENT:      Head: Normocephalic and atraumatic.      Right Ear:  External ear normal.      Left Ear: External ear normal.      Nose: Nose normal.   Eyes:      General:         Right eye: No discharge.         Left eye: No discharge.   Cardiovascular:      Rate and Rhythm: Normal rate and regular rhythm.   Pulmonary:      Effort: Pulmonary effort is normal.   Abdominal:      General: There is no distension.      Palpations: Abdomen is soft.      Tenderness: There is no abdominal tenderness.   Musculoskeletal:      Right lower leg: No edema.      Left lower leg: No edema.   Skin:     General: Skin is warm and dry.   Neurological:      Mental Status: He is alert and oriented to person, place, and time.   Psychiatric:         Mood and Affect: Mood normal.         Behavior: Behavior normal.                Advance Care Planning  Advance Directives:   Goals of Care: The patient endorses that what is most important right now is to focus on spending time at home, remaining as independent as possible, and quality of life, even if it means sacrificing a little time    Accordingly, we have decided that the best plan to meet the patient's goals includes continuing with treatment           Significant Labs: All pertinent labs within the past 24 hours have been reviewed.  CBC:   Recent Labs   Lab 09/05/23  0334   WBC 8.32   HGB 12.5*   HCT 37.1*   MCV 89        BMP:  Recent Labs   Lab 09/06/23  0343   *   *   K 3.0*      CO2 23   BUN 7*   CREATININE 0.7   CALCIUM 8.1*   MG 1.7     LFT:  Lab Results   Component Value Date     (H) 09/06/2023    ALKPHOS 229 (H) 09/06/2023    BILITOT 0.6 09/06/2023     Albumin:   Albumin   Date Value Ref Range Status   09/06/2023 2.2 (L) 3.5 - 5.2 g/dL Final     Protein:   Total Protein   Date Value Ref Range Status   09/06/2023 6.2 6.0 - 8.4 g/dL Final     Lactic acid:   Lab Results   Component Value Date    LACTATE 2.4 (H) 09/04/2023    LACTATE 2.3 (H) 09/03/2023       Significant Imaging: I have reviewed all pertinent imaging  results/findings within the past 24 hours.CT head, CXR        I spent a total of 90 minutes on the day of the visit. This includes face to face time in discussion of goals of care, symptom assessment, coordination of care and emotional support.  This also includes non-face to face time preparing to see the patient (eg, review of tests/imaging), obtaining and/or reviewing separately obtained history, documenting clinical information in the electronic or other health record, independently interpreting results and communicating results to the patient/family/caregiver, or care coordinator.    Jael Perez, NP  Palliative Medicine  [facility] - Med Surg

## 2023-09-06 NOTE — PLAN OF CARE
[facility] - Med Surg  Discharge Final Note    All CM needs met. Patient to resume [facility] home [facility], updates sent via care port. Reviewed follow up appointments with pt, listed on avs. Pt stated family will provide transportation home.     Primary Care Provider: Fatmata Vera MD    Expected Discharge Date: 9/6/2023    Final Discharge Note (most recent)       Final Note - 09/06/23 1043          Final Note    Assessment Type Final Discharge Note     Anticipated Discharge Disposition Home-Health Care [facility]     Hospital Resources/Appts/Education Provided Provided patient/caregiver with written discharge plan information;Appointments scheduled and added to AVS        Post-Acute Status    Post-Acute Authorization Home Health     Discharge Delays None known at this time                     Important Message from Medicare  Important Message from Medicare regarding Discharge Appeal Rights: Given to patient/caregiver, Explained to patient/caregiver, Signed/date by patient/caregiver     Date IMM was signed: 09/06/23  Time IMM was signed: 0905    Contact Info       Latesha Carver MD   Specialty: Hematology and Oncology, Hematology    [address]  [address] [place] [address]   Phone: [phone]       Next Steps: Follow up    Instructions: Message sent to provider requesting appointment., Clinic will call you to schedule appointment.

## 2023-09-06 NOTE — NURSING
In preparation for discharge, removal of patient's saline lock and heart monitor per policy. No distress noted.

## 2023-09-06 NOTE — SUBJECTIVE & OBJECTIVE
Past Medical History:   Diagnosis Date    (HFpEF) heart failure with preserved ejection fraction 1/25/2023    Alcohol abuse 12/27/2022    Hyperlipidemia     Hypertension     Liver metastases 1/18/2023    Malignant neoplasm of head of pancreas 1/18/2023    Other specified noninfective gastroenteritis and colitis     Pancreatitis     Personal history of colonic polyps        Past Surgical History:   Procedure Laterality Date    COLONOSCOPY      COLONOSCOPY N/A 08/07/2023    Procedure: COLONOSCOPY;  Surgeon: Kong Estrella MD;  Location: [facility] ENDO;  Service: Endoscopy;  Laterality: N/A;  6/22 Instructions sent Via portal    COLONOSCOPY N/A 08/08/2023    Procedure: COLONOSCOPY;  Surgeon: Mariia Luevano MD;  Location: [facility] ENDO;  Service: Endoscopy;  Laterality: N/A;    ENDOSCOPIC ULTRASOUND OF UPPER GASTROINTESTINAL TRACT Left 12/30/2022    Procedure: ULTRASOUND, UPPER GI TRACT, ENDOSCOPIC;  Surgeon: Gregory Cristina MD;  Location: [facility] ENDO (2ND FLR);  Service: Endoscopy;  Laterality: Left;    ERCP Left 12/30/2022    Procedure: ERCP (ENDOSCOPIC RETROGRADE CHOLANGIOPANCREATOGRAPHY);  Surgeon: Gregory Cristina MD;  Location: [facility] ENDO (2ND FLR);  Service: Endoscopy;  Laterality: Left;    FOOT SURGERY Left     FRACTURE SURGERY  10/23/1998    INSERTION OF TUNNELED CENTRAL VENOUS CATHETER (CVC) WITH SUBCUTANEOUS PORT Bilateral 01/19/2023    Procedure: [id number];  Surgeon: Amador Castellon MD;  Location: [facility] OR;  Service: General;  Laterality: Bilateral;  Right v Left PORTACATH. needs ultrasound and fluoro  RN PREOP 01/18/2023    TONSILLECTOMY      VASECTOMY  10/25/1983       Review of patient's allergies indicates:   Allergen Reactions    Jakafi [ruxolitinib]        Medications:  Continuous Infusions:  Scheduled Meds:   buPROPion  300 mg Oral Daily    ceFEPime (MAXIPIME) IVPB  1 g Intravenous Q8H    enoxparin  40 mg Subcutaneous Daily    magnesium sulfate IVPB  2 g Intravenous Once    melatonin  6 mg Oral  Nightly    mupirocin   Nasal BID    potassium chloride  40 mEq Oral Q4H    sodium phosphate 39.99 mmol in dextrose 5 % (D5W) 250 mL IVPB  39.99 mmol Intravenous Once    vancomycin (VANCOCIN) IV (PEDS and ADULTS)  1,500 mg Intravenous Q24H     PRN Meds:acetaminophen, hydrOXYzine HCL, ipratropium, loperamide, morphine, naloxone, ondansetron, polyethylene glycol, sodium chloride 0.9%, Pharmacy to dose Vancomycin consult **AND** vancomycin - pharmacy to dose    Family History       Problem Relation (Age of Onset)    Arthritis Mother, Father    Breast cancer Mother    COPD Father    Cancer Father (69)    Drug abuse Brother    Hypertension Mother, Father    Skin cancer Father (69)    Stroke Mother          Tobacco Use    Smoking status: Every Day     Current packs/day: 0.50     Average packs/day: 0.5 packs/day for 55.6 years (27.8 ttl pk-yrs)     Types: Cigarettes     Start date: 1/15/1968    Smokeless tobacco: Former     Types: Chew   Substance and Sexual Activity    Alcohol use: Yes     Alcohol/week: 14.0 standard drinks of alcohol     Types: 14 Cans of beer per week     Comment: last drink 1-2 months ago    Drug use: Not Currently     Types: Marijuana    Sexual activity: Yes     Partners: Female     Birth control/protection: Coitus interruptus, Condom, Partner-Vasectomy       Review of Systems   Constitutional:  Positive for activity change and appetite change.   Respiratory:  Negative for shortness of breath.    Cardiovascular:  Negative for chest pain and leg swelling.   Gastrointestinal:  Positive for diarrhea. Negative for abdominal pain, nausea and vomiting.   Genitourinary: Negative.    Musculoskeletal:  Positive for myalgias.   Skin: Negative.    Neurological: Negative.    Psychiatric/Behavioral: Negative.       Objective:     Vital Signs (Most Recent):  Temp: 98.6 °F (37 °C) (09/06/23 0735)  Pulse: 81 (09/06/23 0735)  Resp: 20 (09/06/23 0735)  BP: 131/67 (09/06/23 0735)  SpO2: 95 % (09/06/23 0735) Vital Signs  (24h Range):  Temp:  [97.8 °F (36.6 °C)-98.7 °F (37.1 °C)] 98.6 °F (37 °C)  Pulse:  [] 81  Resp:  [18-20] 20  SpO2:  [92 %-95 %] 95 %  BP: (127-164)/(62-78) 131/67     Weight: 61.3 kg (135 lb 2.3 oz)  Body mass index is 20.55 kg/m².       Physical Exam  Vitals and nursing note reviewed.   Constitutional:       General: He is not in acute distress.     Appearance: He is underweight. He is ill-appearing. He is not toxic-appearing or diaphoretic.   HENT:      Head: Normocephalic and atraumatic.      Right Ear: External ear normal.      Left Ear: External ear normal.      Nose: Nose normal.   Eyes:      General:         Right eye: No discharge.         Left eye: No discharge.   Cardiovascular:      Rate and Rhythm: Normal rate and regular rhythm.   Pulmonary:      Effort: Pulmonary effort is normal.   Abdominal:      General: There is no distension.      Palpations: Abdomen is soft.      Tenderness: There is no abdominal tenderness.   Musculoskeletal:      Right lower leg: No edema.      Left lower leg: No edema.   Skin:     General: Skin is warm and dry.   Neurological:      Mental Status: He is alert and oriented to person, place, and time.   Psychiatric:         Mood and Affect: Mood normal.         Behavior: Behavior normal.                Advance Care Planning   Advance Directives:   Goals of Care: The patient endorses that what is most important right now is to focus on spending time at home, remaining as independent as possible, and quality of life, even if it means sacrificing a little time    Accordingly, we have decided that the best plan to meet the patient's goals includes continuing with treatment           Significant Labs: All pertinent labs within the past 24 hours have been reviewed.  CBC:   Recent Labs   Lab 09/05/23  0334   WBC 8.32   HGB 12.5*   HCT 37.1*   MCV 89        BMP:  Recent Labs   Lab 09/06/23  0343   *   *   K 3.0*      CO2 23   BUN 7*   CREATININE 0.7    CALCIUM 8.1*   MG 1.7     LFT:  Lab Results   Component Value Date     (H) 09/06/2023    ALKPHOS 229 (H) 09/06/2023    BILITOT 0.6 09/06/2023     Albumin:   Albumin   Date Value Ref Range Status   09/06/2023 2.2 (L) 3.5 - 5.2 g/dL Final     Protein:   Total Protein   Date Value Ref Range Status   09/06/2023 6.2 6.0 - 8.4 g/dL Final     Lactic acid:   Lab Results   Component Value Date    LACTATE 2.4 (H) 09/04/2023    LACTATE 2.3 (H) 09/03/2023       Significant Imaging: I have reviewed all pertinent imaging results/findings within the past 24 hours.CT head, CXR

## 2023-09-06 NOTE — PROGRESS NOTES
Vancomycin consult follow-up:    Patient reviewed, renal function stable, no new levels, continue current therapy; Next levels due: trough due 9/8/2023 at 1130

## 2023-09-06 NOTE — PLAN OF CARE
Problem: Adult Inpatient Plan of Care  Goal: Plan of Care Review  Outcome: Ongoing, Progressing  Flowsheets (Taken 9/6/2023 0815)  Plan of Care Reviewed With: patient  Goal: Patient-Specific Goal (Individualized)  Outcome: Ongoing, Progressing     Problem: Adult Inpatient Plan of Care  Goal: Plan of Care Review  Outcome: Ongoing, Progressing  Flowsheets (Taken 9/6/2023 0815)  Plan of Care Reviewed With: patient     Problem: Adult Inpatient Plan of Care  Goal: Plan of Care Review  Outcome: Ongoing, Progressing  Flowsheets (Taken 9/6/2023 0815)  Plan of Care Reviewed With: patient     Problem: Adult Inpatient Plan of Care  Goal: Patient-Specific Goal (Individualized)  Outcome: Ongoing, Progressing     Problem: Adult Inpatient Plan of Care  Goal: Patient-Specific Goal (Individualized)  Outcome: Ongoing, Progressing

## 2023-09-06 NOTE — NURSING
[facility] [place]  Nurses Note -- 4 Eyes      9/6/2023       Skin assessed on: Q Shift      [x] No Pressure Injuries Present    []Prevention Measures Documented    [] Yes LDA  for Pressure Injury Previously documented     [] Yes New Pressure Injury Discovered   [] LDA for New Pressure Injury Added      Attending RN:  Nina Villalobos RN     Second LPN: Laura

## 2023-09-06 NOTE — PLAN OF CARE
09/06/23 0905   Medicare Message   Important Message from Medicare regarding Discharge Appeal Rights Given to patient/caregiver;Explained to patient/caregiver;Signed/date by patient/caregiver   Date IMM was signed 09/06/23   Time IMM was signed 0905

## 2023-09-06 NOTE — PLAN OF CARE
No acute distress noted, patient free from falls or injury this shift.  Bed in low position, wheels locked, call light in reach for assistance, plan of care continued.       Problem: Skin Injury Risk Increased  Goal: Skin Health and Integrity  Outcome: Ongoing, Progressing  Intervention: Optimize Skin Protection  Flowsheets (Taken 9/6/2023 0559)  Pressure Reduction Techniques: frequent weight shift encouraged  Skin Protection: incontinence pads utilized  Head of Bed (HOB) Positioning: HOB elevated     Problem: Coping Ineffective  Goal: Effective Coping  Outcome: Ongoing, Progressing  Intervention: Support and Enhance Coping Strategies  Flowsheets (Taken 9/6/2023 0559)  Supportive Measures: active listening utilized  Environmental Support: calm environment promoted

## 2023-09-06 NOTE — PT/OT/SLP PROGRESS
Physical Therapy      Patient Name:  Manuel Tyler   MRN:  [id number]    Patient not seen at this time for PT tx secondary to (Pt was unavailable 2* multidisciplinary rounding this am.). Will follow-up.

## 2023-09-06 NOTE — NURSING
AVS virtually reviewed with patient and wife in its entirety with emphasis on medications, follow-up appointments and reasons to return to the ED or contact the [facility] On Call Nurse Care Line. Patient also encouraged to utilize their patient portal. Ease and convenience of use reiterated. Education complete and patient voiced understanding. All questions answered. Discharge teaching complete.

## 2023-09-06 NOTE — PLAN OF CARE
[facility] [place] [facility]      HOME HEALTH ORDERS  FACE TO FACE ENCOUNTER    Patient Name: Manuel Tyler  YOB: 1950    PCP: Fatmata Vera MD   PCP Address: [address] [place] [address] [address] [place] [address]  PCP Phone Number: [phone]  PCP Fax: [phone]    Encounter Date: 9/3/23    Admit to Home Health    Diagnoses:  Active Hospital Problems    Diagnosis  POA    *Sepsis [A41.9]  Yes    Hyponatremia [E87.1]  Yes    Dehydration [E86.0]  Yes    Lactic acidosis [E87.20]  Yes    Neoplasm related pain [G89.3]  Yes    (HFpEF) heart failure with preserved ejection fraction [I50.30]  Yes    Malignant neoplasm of head of pancreas [C25.0]  Yes    Elevated LFTs [R79.89]  Yes      Resolved Hospital Problems   No resolved problems to display.       Follow Up Appointments:  Future Appointments   Date Time Provider Department Center   9/7/2023  1:40 PM Latesha Carver MD [facility] HEM ONC [facility]   9/12/2023  2:00 PM Darcie Cottrell NP [facility] MED Rosemarie Simon   9/21/2023 10:15 AM CHAIR 01 [facility] CHEMO [facility]   10/19/2023 10:15 AM CHAIR 01 [facility]   11/16/2023 10:15 AM CHAIR 01 [facility]   12/14/2023 10:15 AM CHAIR 01 [facility]   1/11/2024 10:15 AM CHAIR 01 [facility]   2/8/2024 10:15 AM CHAIR 01 [facility]       Allergies:  Review of patient's allergies indicates:   Allergen Reactions    Jakafi [ruxolitinib]        Medications: Review discharge medications with patient and family and provide education.    Current Facility-Administered Medications   Medication Dose Route Frequency Provider Last Rate Last Admin    acetaminophen tablet 650 mg  650 mg Oral Q4H PRN Cholo Vallejo MD   650 mg at 09/05/23 1657    buPROPion TB24 tablet 300 mg  300 mg Oral Daily Cholo Vallejo MD   300 mg at 09/06/23 0837    ceFEPIme (MAXIPIME) 1 g in dextrose 5 % in water (D5W) 100 mL IVPB (MB+)  1 g Intravenous  Q8H Cholo Vallejo MD   Stopped at 09/06/23 0627    enoxaparin injection 40 mg  40 mg Subcutaneous Daily Cholo Vallejo MD   40 mg at 09/05/23 1653    hydrOXYzine HCL tablet 25 mg  25 mg Oral TID PRN Cholo Vallejo MD        ipratropium 0.02 % nebulizer solution 0.5 mg  0.5 mg Nebulization Q6H PRN Cholo Vallejo MD        loperamide capsule 2 mg  2 mg Oral QID PRN Cholo Vallejo MD        melatonin tablet 6 mg  6 mg Oral Nightly Cholo Vallejo MD   6 mg at 09/05/23 2156    morphine injection 4 mg  4 mg Intravenous Q4H PRN Cholo Vallejo MD        mupirocin 2 % ointment   Nasal BID Cholo Vallejo MD   Given at 09/06/23 0836    naloxone 0.4 mg/mL injection 0.02 mg  0.02 mg Intravenous PRN Cholo Vallejo MD        ondansetron injection 8 mg  8 mg Intravenous Q6H PRN Cholo Vallejo MD        polyethylene glycol packet 17 g  17 g Oral BID PRN Cholo Vallejo MD        sodium chloride 0.9% flush 10 mL  10 mL Intravenous Q12H PRN Cholo Vallejo MD        vancomycin - pharmacy to dose   Intravenous pharmacy to manage frequency Cholo Vallejo MD        vancomycin 1,500 mg in dextrose 5 % (D5W) 250 mL IVPB (Vial-Mate)  1,500 mg Intravenous Q24H Vini Hayes MD         Current Discharge Medication List        START taking these medications    Details   cefdinir (OMNICEF) 300 MG capsule Take 1 capsule (300 mg total) by mouth 2 (two) times daily. for 4 days  Qty: 8 capsule, Refills: 0      doxycycline (VIBRAMYCIN) 100 MG Cap Take 1 capsule (100 mg total) by mouth 2 (two) times daily. for 4 days  Qty: 8 capsule, Refills: 0           CONTINUE these medications which have NOT CHANGED    Details   buPROPion (WELLBUTRIN XL) 300 MG 24 hr tablet Take 1 tablet (300 mg total) by mouth once daily.  Qty: 90 tablet, Refills: 1    Associated Diagnoses: Tobacco use; Depressed mood      dabrafenib (TAFINLAR) 50 mg Cap TAKE 3 CAPSULES (150 MG TOTAL) TWICE DAILY  Qty: 90 capsule, Refills: 1     Associated Diagnoses: Malignant neoplasm of head of pancreas      DULCOLAX, BISACODYL, ORAL Take by mouth.      hydrOXYzine HCL (ATARAX) 25 MG tablet Take 1 tablet (25 mg total) by mouth 3 (three) times daily as needed for Itching.  Qty: 30 tablet, Refills: 0    Associated Diagnoses: Itch of skin      morphine (MS CONTIN) 15 MG 12 hr tablet Take 1 tablet (15 mg total) by mouth every 12 (twelve) hours.  Qty: 60 tablet, Refills: 0    Comments: Quantity prescribed more than 7 day supply? Yes, quantity medically necessary  Associated Diagnoses: Malignant neoplasm of head of pancreas      multivit-mins/iron/folic/lycop (CENTRUM MEN ORAL) Take by mouth.      ondansetron (ZOFRAN) 4 MG tablet Take 1 tablet (4 mg total) by mouth every 8 (eight) hours as needed for Nausea.  Qty: 30 tablet, Refills: 1    Associated Diagnoses: Malignant neoplasm of head of pancreas      tiotropium bromide (SPIRIVA RESPIMAT) 2.5 mcg/actuation inhaler Inhale 2 puffs into the lungs once daily. Controller  Qty: 4 g, Refills: 3      trametinib (MEKINIST) 2 mg Tab TAKE 1 TABLET ONCE DAILY  Qty: 30 tablet, Refills: 1    Associated Diagnoses: Malignant neoplasm of head of pancreas               I have seen and examined this patient within the last 30 days. My clinical findings that support the need for the home health skilled services and home bound status are the following:no   Weakness/numbness causing balance and gait disturbance due to Infection and Weakness/Debility making it taxing to leave home.  Requiring assistive device to leave home due to unsteady gait caused by  Infection and Weakness/Debility.     Diet:   regular diet    Labs:  Send any results to PCP    Referrals/ Consults  Physical Therapy to evaluate and treat. Evaluate for home safety and equipment needs; Establish/upgrade home exercise program. Perform / instruct on therapeutic exercises, gait training, transfer training, and Range of Motion.  Occupational Therapy to evaluate and  treat. Evaluate home environment for safety and equipment needs. Perform/Instruct on transfers, ADL training, ROM, and therapeutic exercises.   to evaluate for community resources/long-range planning.  Aide to provide assistance with personal care, ADLs, and vital signs.    Activities:   activity as tolerated    Nursing:   Agency to admit patient within 24 hours of hospital discharge unless specified on physician order or at patient request    SN to complete comprehensive assessment including routine vital signs. Instruct on disease process and s/s of complications to report to MD. Review/verify medication list sent home with the patient at time of discharge  and instruct patient/caregiver as needed. Frequency may be adjusted depending on start of care date.     Skilled nurse to perform up to 3 visits PRN for symptoms related to diagnosis    Notify MD if SBP > 160 or < 90; DBP > 90 or < 50; HR > 120 or < 50; Temp > 101; O2 < 88%;     Ok to schedule additional visits based on staff availability and patient request on consecutive days within the home health episode.    When multiple disciplines ordered:    Start of Care occurs on Sunday - Wednesday schedule remaining discipline evaluations as ordered on separate consecutive days following the start of care.    Thursday SOC -schedule subsequent evaluations Friday and Monday the following week.     Friday - Saturday SOC - schedule subsequent discipline evaluations on consecutive days starting Monday of the following week.      For all post-discharge communication, lab results, vitals, and subsequent orders- please contact patient's PCP.    If unable to get ahold of PCP, and question is about blood pressure, diuresis, or arrhythmia attempt to contact cardiologist.  If unable to get ahold of PCP, and question is about cancer please attempt to contact oncologist.  If unable to get ahold of PCP or the above physicians in a reasonable time, please contact   on-call for clarification.    Home Health Aide:  Nursing Twice weekly  Physical Therapy Three times weekly  Occupational Therapy Three times weekly  Medical Social Work Weekly  Home Health Aide Twice weekly    Wound Care Orders  no     I certify that this patient is confined to her home and needs intermittent skilled nursing care, physical therapy, and occupational therapy.        Vini Hayes MD  Internal Medicine Staff

## 2023-09-06 NOTE — HPI
73 y/o male with a history of a pancreatic cancer with liver mets on who presents to ER with severe weakness, nausea, vomiting and diarrhea.  Patient started having around 3 episodes of loose stool a day 2 days ago.  He then started having nausea and vomiting last night.  Poor oral intake and appetite over past couple of days.  Very weak and tired this morning.  Patient being treated for cancer with Dabrenib and Trametinib.  Patient reports he has no abdominal pain.  No alleviating or aggravating factors.  He also denies any fevers.  Patient recently completed course of Doxycycline for facial abscess.  He presents to ER where labs show leukocytosis and elevated lactate.  No other complaints.        Overview/Hospital Course:  73 y/o male presents with weakness, nausea, vomiting and diarrhea.  Tachycardic with leukocytosis on presentation.  Metastatic pancreatic cancer on 2nd line treatment.  Empirically started on broad spectrum ABx's.  Dehydrated and started on IVF's.  Stool sent for Cdiff.

## 2023-09-06 NOTE — PT/OT/SLP PROGRESS
Occupational Therapy   Treatment    Name: Manuel Tyler  MRN: [id number]  Admitting Diagnosis:  Sepsis       Recommendations:     Discharge Recommendations: home health OT  Discharge Equipment Recommendations:  none (pt. says he already has one.)  Barriers to discharge:  None    Assessment:     Manuel Tyler is a 72 y.o. male with a medical diagnosis of Sepsis.  He presents with HOB elevated and no pain noted. Performance deficits affecting function are weakness, impaired endurance, impaired self care skills, impaired functional mobility, gait instability, impaired balance, decreased lower extremity function.     Rehab Prognosis:  Good; patient would benefit from acute skilled OT services to address these deficits and reach maximum level of function.       Plan:     Patient to be seen 3 x/week to address the above listed problems via self-care/home management, therapeutic activities, therapeutic exercises  Plan of Care Expires: 09/12/23  Plan of Care Reviewed with: patient    Subjective     Chief Complaint: none   Patient/Family Comments/goals: he said he is leaving later today to return home with home health   Pain/Comfort:  Pain Rating 1: 0/10    Objective:     Communicated with: ORACIO Wood,  prior to session.  Patient found HOB elevated with peripheral IV, telemetry upon OT entry to room.    General Precautions: Standard, fall    Orthopedic Precautions:N/A  Braces: N/A  Respiratory Status: Room air     Occupational Performance:     Bed Mobility:    Patient completed Rolling/Turning to Right with modified independence; he used the bed rail to assist him from supine to sit and scooting.   Patient completed Scooting/Bridging with modified independence  Patient completed Supine to Sit with supervision     Functional Mobility/Transfers:  Patient completed Sit <> Stand Transfer with supervision  with  rolling walker   Patient completed Bed <> Chair Transfer using Step Transfer technique with supervision with rolling  walker  Functional Mobility: Patient walked from bed to bathroom and sink and then to door and out into hallway about 10' for approx. 20-30' total with RW and supervision due to having IV pole and then sat in chair.     Activities of Daily Living:  Upper Body Dressing: minimum assistance to   Lower Body Dressing: minimum assistance to don pull up brief while seated and standing       [facility] 6 Click ADL: 20    Treatment & Education:  Patient and occupational therapy discussed DME needs at home and he said he has a chair that he uses a shower chair that he can use as a BSC over toilet.   He demonstrated use of yellow theraband with overhead shoulder exercises x 10 reps x 1 set of UB therex with 93% on room air and HR of 92.   He did not need any safety cues during ambulation today and did not have loss of balance.       Patient left up in chair with all lines intact, call button in reach, and RN notified   GOALS:   Multidisciplinary Problems       Occupational Therapy Goals          Problem: Occupational Therapy    Goal Priority Disciplines Outcome Interventions   Occupational Therapy Goal     OT, PT/OT Ongoing, Progressing    Description: Goals to be met by: 9/12/23     Patient will increase functional independence with ADLs by performing:    UE Dressing with [place].  LE Dressing with Modified [place].  Grooming while standing at sink with Modified [place].  Toileting from toilet with Modified [place] for hygiene and clothing management.   Sitting at edge of bed x30 minutes with Modified [place].  Toilet transfer to toilet with Modified [place].  Increased functional strength to 4/5 for B upper extremity .  Upper extremity exercise program x10  reps per handout, with independence.                         Time Tracking:     OT Date of Treatment: 09/06/23  OT Start Time: 0946  OT Stop Time: 1011  OT Total Time (min): 25 min    Billable Minutes:Self Care/Home Management 15   Therapeutic  Exercise 10     OT/LYUBOV: OT          9/6/2023

## 2023-09-06 NOTE — PLAN OF CARE
Problem: Occupational Therapy  Goal: Occupational Therapy Goal  Description: Goals to be met by: 9/12/23     Patient will increase functional independence with ADLs by performing:    UE Dressing with [place].  LE Dressing with Modified [place].  Grooming while standing at sink with Modified [place].  Toileting from toilet with Modified [place] for hygiene and clothing management.   Sitting at edge of bed x30 minutes with Modified [place].  Toilet transfer to toilet with Modified [place].  Increased functional strength to 4/5 for B upper extremity .  Upper extremity exercise program x10  reps per handout, with independence.    Outcome: Ongoing, Progressing   Patient walked from bed to sink and to bathroom in room with supervision with RW with increased safety awareness noted today. He completed 1 set of 10 reps of theraband exercises with yellow theraband.

## 2023-09-07 ENCOUNTER — PATIENT OUTREACH (OUTPATIENT)
Dept: ADMINISTRATIVE | Facility: CLINIC | Age: 73
End: 2023-09-07
Payer: MEDICARE

## 2023-09-07 ENCOUNTER — LAB VISIT (OUTPATIENT)
Dept: LAB | Facility: HOSPITAL | Age: 73
End: 2023-09-07
Attending: INTERNAL MEDICINE
Payer: MEDICARE

## 2023-09-07 ENCOUNTER — OFFICE VISIT (OUTPATIENT)
Dept: HEMATOLOGY/ONCOLOGY | Facility: CLINIC | Age: 73
End: 2023-09-07
Payer: MEDICARE

## 2023-09-07 VITALS
BODY MASS INDEX: 21.39 KG/M2 | HEIGHT: 68 IN | TEMPERATURE: 98 F | SYSTOLIC BLOOD PRESSURE: 152 MMHG | HEART RATE: 109 BPM | WEIGHT: 141.13 LBS | OXYGEN SATURATION: 97 % | DIASTOLIC BLOOD PRESSURE: 82 MMHG

## 2023-09-07 DIAGNOSIS — C25.0 MALIGNANT NEOPLASM OF HEAD OF PANCREAS: Primary | ICD-10-CM

## 2023-09-07 DIAGNOSIS — C78.7 MALIGNANT NEOPLASM METASTATIC TO LIVER: ICD-10-CM

## 2023-09-07 DIAGNOSIS — G89.3 NEOPLASM RELATED PAIN: ICD-10-CM

## 2023-09-07 DIAGNOSIS — R63.0 ANOREXIA: ICD-10-CM

## 2023-09-07 DIAGNOSIS — C25.0 MALIGNANT NEOPLASM OF HEAD OF PANCREAS: ICD-10-CM

## 2023-09-07 DIAGNOSIS — R93.5 ABNORMAL CT OF THE ABDOMEN: ICD-10-CM

## 2023-09-07 LAB
ALBUMIN SERPL BCP-MCNC: 2.7 G/DL (ref 3.5–5.2)
ALP SERPL-CCNC: 346 U/L (ref 55–135)
ALT SERPL W/O P-5'-P-CCNC: 112 U/L (ref 10–44)
ANION GAP SERPL CALC-SCNC: 11 MMOL/L (ref 8–16)
AST SERPL-CCNC: 214 U/L (ref 10–40)
BACTERIA BLD CULT: NORMAL
BACTERIA BLD CULT: NORMAL
BASOPHILS # BLD AUTO: 0.11 K/UL (ref 0–0.2)
BASOPHILS NFR BLD: 1.4 % (ref 0–1.9)
BILIRUB SERPL-MCNC: 0.6 MG/DL (ref 0.1–1)
BUN SERPL-MCNC: 10 MG/DL (ref 8–23)
CALCIUM SERPL-MCNC: 9.1 MG/DL (ref 8.7–10.5)
CHLORIDE SERPL-SCNC: 102 MMOL/L (ref 95–110)
CO2 SERPL-SCNC: 26 MMOL/L (ref 23–29)
CREAT SERPL-MCNC: 0.8 MG/DL (ref 0.5–1.4)
DIFFERENTIAL METHOD: ABNORMAL
EOSINOPHIL # BLD AUTO: 0.2 K/UL (ref 0–0.5)
EOSINOPHIL NFR BLD: 2 % (ref 0–8)
ERYTHROCYTE [DISTWIDTH] IN BLOOD BY AUTOMATED COUNT: 15 % (ref 11.5–14.5)
EST. GFR  (NO RACE VARIABLE): >60 ML/MIN/1.73 M^2
GLUCOSE SERPL-MCNC: 90 MG/DL (ref 70–110)
HCT VFR BLD AUTO: 40.3 % (ref 40–54)
HGB BLD-MCNC: 13.1 G/DL (ref 14–18)
IMM GRANULOCYTES # BLD AUTO: 0.04 K/UL (ref 0–0.04)
IMM GRANULOCYTES NFR BLD AUTO: 0.5 % (ref 0–0.5)
LYMPHOCYTES # BLD AUTO: 0.9 K/UL (ref 1–4.8)
LYMPHOCYTES NFR BLD: 11.8 % (ref 18–48)
MAGNESIUM SERPL-MCNC: 1.9 MG/DL (ref 1.6–2.6)
MCH RBC QN AUTO: 29.4 PG (ref 27–31)
MCHC RBC AUTO-ENTMCNC: 32.5 G/DL (ref 32–36)
MCV RBC AUTO: 90 FL (ref 82–98)
MONOCYTES # BLD AUTO: 1 K/UL (ref 0.3–1)
MONOCYTES NFR BLD: 12.5 % (ref 4–15)
NEUTROPHILS # BLD AUTO: 5.7 K/UL (ref 1.8–7.7)
NEUTROPHILS NFR BLD: 71.8 % (ref 38–73)
NRBC BLD-RTO: 0 /100 WBC
PHOSPHATE SERPL-MCNC: 2.2 MG/DL (ref 2.7–4.5)
PLATELET # BLD AUTO: 422 K/UL (ref 150–450)
PMV BLD AUTO: 8.9 FL (ref 9.2–12.9)
POTASSIUM SERPL-SCNC: 3.5 MMOL/L (ref 3.5–5.1)
PROT SERPL-MCNC: 7.2 G/DL (ref 6–8.4)
RBC # BLD AUTO: 4.46 M/UL (ref 4.6–6.2)
SODIUM SERPL-SCNC: 139 MMOL/L (ref 136–145)
URATE SERPL-MCNC: 3.3 MG/DL (ref 3.4–7)
WBC # BLD AUTO: 7.98 K/UL (ref 3.9–12.7)

## 2023-09-07 PROCEDURE — 83735 ASSAY OF MAGNESIUM: CPT | Performed by: INTERNAL MEDICINE

## 2023-09-07 PROCEDURE — 84550 ASSAY OF BLOOD/URIC ACID: CPT | Performed by: INTERNAL MEDICINE

## 2023-09-07 PROCEDURE — 99999 PR PBB SHADOW E&M-EST. PATIENT-LVL V: ICD-10-PCS | Mod: PBBFAC,,, | Performed by: INTERNAL MEDICINE

## 2023-09-07 PROCEDURE — 99215 OFFICE O/P EST HI 40 MIN: CPT | Mod: PBBFAC | Performed by: INTERNAL MEDICINE

## 2023-09-07 PROCEDURE — 85025 COMPLETE CBC W/AUTO DIFF WBC: CPT | Performed by: INTERNAL MEDICINE

## 2023-09-07 PROCEDURE — 80053 COMPREHEN METABOLIC PANEL: CPT | Performed by: INTERNAL MEDICINE

## 2023-09-07 PROCEDURE — 84100 ASSAY OF PHOSPHORUS: CPT | Performed by: INTERNAL MEDICINE

## 2023-09-07 PROCEDURE — 99214 PR OFFICE/OUTPT VISIT, EST, LEVL IV, 30-39 MIN: ICD-10-PCS | Mod: S$PBB,,, | Performed by: INTERNAL MEDICINE

## 2023-09-07 PROCEDURE — 99214 OFFICE O/P EST MOD 30 MIN: CPT | Mod: S$PBB,,, | Performed by: INTERNAL MEDICINE

## 2023-09-07 PROCEDURE — 36415 COLL VENOUS BLD VENIPUNCTURE: CPT | Performed by: INTERNAL MEDICINE

## 2023-09-07 PROCEDURE — 86704 HEP B CORE ANTIBODY TOTAL: CPT | Performed by: INTERNAL MEDICINE

## 2023-09-07 PROCEDURE — 99999 PR PBB SHADOW E&M-EST. PATIENT-LVL V: CPT | Mod: PBBFAC,,, | Performed by: INTERNAL MEDICINE

## 2023-09-07 RX ORDER — DRONABINOL 2.5 MG/1
2.5 CAPSULE ORAL 2 TIMES DAILY PRN
Qty: 60 CAPSULE | Refills: 0 | Status: SHIPPED | OUTPATIENT
Start: 2023-09-07 | End: 2023-10-07

## 2023-09-07 NOTE — PROGRESS NOTES
Subjective:       Patient ID: Manuel Tyler is a 72 y.o. male.    Chief Complaint: Follow-up  Diagnosis: Stage IV pancreatic CA  with liver mets diagnosed 12/30/22    ANDREA, BRAF V600E, JAK2 V617F  Therapy: [place]/abraxane (dose reduced) 1/24/23-2/7/23   DABRENIB AND TRAMETINIB  3/29/23-present  HPI 72 y.o male with medical diagnoses listed seen today for Stage IV pancreatic adenocarcinoma with liver metastases.He was  admitted with pancreatitis likely secondary to ETOH abuse. C-diff negative. CT a/p w/contrast 12/27/22 shows Mild wall thickening involving the sigmoid colon with abnormal appearance seen within the left lower pelvis along the left lateral aspect of the sigmoid colon.  Uncertain if findings may in part relate to sigmoid tortuosity, however potential colocolic fistula not excluded given appearance.  No acute inflammatory changes are seen.  No prior studies are available for comparison.  Colonoscopy follow-up may be warranted if not previously/recently obtained.   Distended gallbladder. US abd limited 12/28/22 shows Biliary sludge with evidence of acute cholecystitis including gallbladder distension and a positive sonographic Hahn sign.CBD dilatation up to 13 mm, which could be secondary to cholecystitis, choledocholithiasis, or other obstruction.Ill-defined 2.6 cm lesion in the region of the pancreatic head, which could represent a lymph node or pancreatic neoplasm.  GI performed EUS and ERCP on 12/30/22 - suspicious for pancreatic CA- stent placed. Upper EUS 12/30/22 shows A single metastatic lesion was found in the left lobe of the liver.   Fine needle aspiration performed. A mass was identified in the pancreatic head. Fine needle biopsy performed  Pancreas, head, mass, EUS guided fine-needle aspiration: Positive for malignancy, adenocarcinoma, see comment.   Liver, EUS guided fine-needle aspiration: - Positive for malignancy, adenocarcinoma,He is taking OTC acetaminophen for abd pain which  intermittently radiates to back.  He rates pain 3/10 in intensity. He has intermittent nausea. He is reports changes in bowel habits He needs assistance with some daily activities.No falls. He reports mild SKAGGS. No CP/hemoptysis. He is accompanied by family members.   S/p C1D8 gem/abraxane ( dose reduced) on 2/7/23    admitted on 1/25/23 with AMS. He was found to be dehydrated, hypotensive and concerns for infection/septic shock. H  He was switched to 2nd line therapy with  DABRENIB AND TRAMETINIB on 3/29/23 2/2 lack of tolerability with first line therapy and subsequent hospitalizations    Interval Hx: Accompanied by fam member  He  remains on  DABRENIB AND TRAMETINIB ( started on 3/29/23)  CT imaging 6/1/23 shows  ill-defined infiltrating pancreatic head mass.  This is difficult to measure but probably 2.8 cm in diameter.Bile duct stent, pancreatic stent with pneumobilia.Coronary calcifications, and bilateral renal cysts. abnormal thickening at the rectosigmoid junction worrisome for neoplasm.  This could be a 2nd neoplasm/colon cancer.  He is undergoing bowel prep for repeat colonoscopy due to poor bowel prep    Admitted on 9/3/23 with sepsis . He presented with weakness, nausea, vomiting and diarrhea.  Tachycardic with leukocytosis on presentation.  Diarrhea with recent use of ABX's.  Negative for Cdiff.  Unremarkable CXR and UA.   BCx neg  Sepsis possibly related to viral gastroenteritis.   Doing better since hosp discharge  No N/V   Appetite improving  No SOB/CPcough  He has 1-2 ensures daily  He was f/b palliative care during hospitalization  According to chart pt DNR  laPost not completed prior to discharge      Pain free  Mod fatigue  No fevers  He was drinking 2-3 cokes/day           Guardant 360 biopsy resulted  Details: ANDREA, BRAF V600E, JAK2 V617F       Past Medical History:   Diagnosis Date    (HFpEF) heart failure with preserved ejection fraction 1/25/2023    Alcohol abuse 12/27/2022    Hyperlipidemia      Hypertension     Liver metastases 1/18/2023    Malignant neoplasm of head of pancreas 1/18/2023    Other specified noninfective gastroenteritis and colitis     Pancreatitis     Personal history of colonic polyps        Past Surgical History:   Procedure Laterality Date    COLONOSCOPY      COLONOSCOPY N/A 08/07/2023    Procedure: COLONOSCOPY;  Surgeon: Kong Estrella MD;  Location: [facility] ENDO;  Service: Endoscopy;  Laterality: N/A;  6/22 Instructions sent Via portal    COLONOSCOPY N/A 08/08/2023    Procedure: COLONOSCOPY;  Surgeon: Mariia Luevano MD;  Location: [facility] ENDO;  Service: Endoscopy;  Laterality: N/A;    ENDOSCOPIC ULTRASOUND OF UPPER GASTROINTESTINAL TRACT Left 12/30/2022    Procedure: ULTRASOUND, UPPER GI TRACT, ENDOSCOPIC;  Surgeon: Gregory Cristina MD;  Location: [facility] ENDO (2ND FLR);  Service: Endoscopy;  Laterality: Left;    ERCP Left 12/30/2022    Procedure: ERCP (ENDOSCOPIC RETROGRADE CHOLANGIOPANCREATOGRAPHY);  Surgeon: Gregory Cristina MD;  Location: [facility] ENDO (2ND FLR);  Service: Endoscopy;  Laterality: Left;    FOOT SURGERY Left     FRACTURE SURGERY  10/23/1998    INSERTION OF TUNNELED CENTRAL VENOUS CATHETER (CVC) WITH SUBCUTANEOUS PORT Bilateral 01/19/2023    Procedure: [id number]-CATH;  Surgeon: Amador Castellon MD;  Location: [facility] OR;  Service: General;  Laterality: Bilateral;  Right v Left PORTACATH. needs ultrasound and fluoro  RN PREOP 01/18/2023    TONSILLECTOMY      VASECTOMY  10/25/1983        Review of Systems   Constitutional:  Positive for appetite change, fatigue and unexpected weight change. Negative for fever.   HENT:  Negative for mouth sores.    Eyes:  Negative for visual disturbance.   Respiratory:  Negative for cough and shortness of breath.    Cardiovascular:  Negative for chest pain.   Gastrointestinal:  Negative for abdominal pain, diarrhea and nausea.   Genitourinary:  Negative for frequency.   Musculoskeletal:  Negative for back pain.   Integumentary:  Negative for  "rash.   Neurological:  Positive for weakness (improved). Negative for headaches.   Hematological:  Negative for adenopathy.   Psychiatric/Behavioral:  The patient is not nervous/anxious.          Objective:     ECOG 1      Vitals:    09/07/23 1338   BP: (!) 152/82   BP Location: Right arm   Patient Position: Sitting   BP Method: Medium (Automatic)   Pulse: 109   Temp: 98.4 °F (36.9 °C)   TempSrc: Oral   SpO2: 97%   Weight: 64 kg (141 lb 1.5 oz)   Height: 5' 8" (1.727 m)               Physical Exam  Constitutional:       Appearance: He is underweight.   HENT:      Head: Normocephalic.      Mouth/Throat:      Pharynx: No oropharyngeal exudate.   Eyes:      General: No scleral icterus.        Right eye: No discharge.         Left eye: No discharge.      Conjunctiva/sclera: Conjunctivae normal.   Neck:      Thyroid: No thyromegaly.   Cardiovascular:      Rate and Rhythm: Regular rhythm. Tachycardia present.      Heart sounds: No murmur heard.  Pulmonary:      Effort: Pulmonary effort is normal.      Breath sounds: Normal breath sounds. No wheezing or rales.   Abdominal:      General: Bowel sounds are normal.      Palpations: Abdomen is soft.      Tenderness: There is no abdominal tenderness. There is no guarding or rebound.   Musculoskeletal:         General: No swelling. Normal range of motion.      Cervical back: Normal range of motion and neck supple.   Lymphadenopathy:      Cervical: No cervical adenopathy.      Upper Body:      Right upper body: No supraclavicular adenopathy.      Left upper body: No supraclavicular adenopathy.   Neurological:      Mental Status: He is alert and oriented to person, place, and time.      Cranial Nerves: No cranial nerve deficit.   Psychiatric:         Mood and Affect: Mood normal.         Behavior: Behavior normal.             CT a/p w/contrast 12/27/22  Impression:     1. Mild wall thickening involving the sigmoid colon with abnormal appearance seen within the left lower pelvis " along the left lateral aspect of the sigmoid colon.  Uncertain if findings may in part relate to sigmoid tortuosity, however potential colocolic fistula not excluded given appearance.  No acute inflammatory changes are seen.  No prior studies are available for comparison.  Colonoscopy follow-up may be warranted if not previously/recently obtained.  Otherwise consider future fluoroscopic Gastrografin enema for further evaluation.  2. Distended gallbladder.  Consider gallbladder ultrasound follow-up if clinically indicated.  3. Additional findings as detailed above.     US abd limited 12/28/22  Impression:     Biliary sludge with evidence of acute cholecystitis including gallbladder distension and a positive sonographic Hahn sign.     CBD dilatation up to 13 mm, which could be secondary to cholecystitis, choledocholithiasis, or other obstruction.     Ill-defined 2.6 cm lesion in the region of the pancreatic head, which could represent a lymph node or pancreatic neoplasm.     RECOMMENDATIONS:  Contrast enhanced MRI/MRCP for further evaluation of the above findings    ERCP 12/30/22  - The major papilla appeared congested.                          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                          - A pancreatic sphincterotomy was performed.                          - One plastic stent was placed into the ventral                          pancreatic duct.                          - A single severe biliary stricture was found in                          the lower third of the main bile duct. The                          stricture was malignant appearing.                          - A biliary sphincterotomy was performed.                          - One covered metal stent was placed into the                          common bile duct.   Recommendation:        - Return patient to [facility] for ongoing care.                          - EUS findings concerning for metastatic                           pancreatic cancer (see separate EUS report  Upper EUS 12/30/22  Impression:            - A single metastatic lesion was found in the left                          lobe of the liver. Cytology results are pending.                          However, the endosonographic appearance is                          suggestive of metastatic pancreatic                          adenocarcinoma. Fine needle aspiration performed.                          - A mass was identified in the pancreatic head.                          Fine needle biopsy performed  Pathology 12/30/22    1. Pancreas, head, mass, EUS guided fine-needle aspiration:   - Positive for malignancy, adenocarcinoma, see comment.   2. Liver, EUS guided fine-needle aspiration:   - Positive for malignancy, adenocarcinoma, see comment.   COMMENT:  Immunostain for cytokeratin performed on specimen 1 shows an   infiltrative epithelial process.  Parts 1 and 2 of this case are reviewed by   Dr. XIAO Gan who agrees with the above diagnoses.  Appropriate positive   controls are examined.   Immunohistochemistry (IHC) Testing for Mismatch Repair (MMR) Proteins   performed on specimen 1:   MLH1 - Intact nuclear expression   MSH2 - Intact nuclear expression   MSH6 - Intact nuclear expression   PMS2 - Intact nuclear expression   Background nonneoplastic tissue/internal control with intact nuclear   expression   IHC Interpretation   No loss of nuclear expression of MMR proteins: low probability of   microsatellite instability   There are exceptions to the above IHC interpretations. These results should   not be considered in isolation, and clinical correlation with genetic   counseling is recommended to assess the need for germline testing.      Comment: Interp By Colleen Olmos MD, Signed on 01/10/2023 at 09:30      Latest Reference Range & Units Most Recent   CA 19-9 0.0 - 40.0 U/mL 5348.6 (H)  1/11/23 15:22   (H): Data is abnormally high      2- D echo 1/25/23   The left  ventricle is normal in size with concentric hypertrophy and normal systolic function.  The estimated ejection fraction is 65%.  Indeterminate left ventricular diastolic function.  Normal right ventricular size with normal right ventricular systolic function.  Mild left atrial enlargement.  Mild-to-moderate aortic regurgitation.  Mild tricuspid regurgitation.  Normal central venous pressure (3 mmHg).  The estimated PA systolic pressure is 33 mmHg.                 CT c/a/p w/contrast 6/1/23  Impression:     There is a ill-defined infiltrating pancreatic head mass.  This is difficult to measure but probably 2.8 cm in diameter.     Bile duct stent, pancreatic stent with pneumobilia.     Coronary calcifications, and bilateral renal cysts.     There is abnormal thickening at the rectosigmoid junction worrisome for neoplasm.  This could be a 2nd neoplasm/colon cancer.         Component      Latest Ref Rng 1/11/2023 4/13/2023 5/5/2023 6/9/2023   CA 19-9      0.0 - 40.0 U/mL 5348.6 (H)  3368.0 (H)  28236.2 (H)  >40157.0 (H)      Component      Latest Ref Rng 7/10/2023 8/4/2023   CA 19-9      0.0 - 40.0 U/mL 04357.2 (H)  6129.3 (H)       Legend:  (H) High        Assessment:         Diagnoses and all orders for this visit:    Malignant neoplasm of head of pancreas  Diagnosed 12/30/22  Pancreas, head, mass, EUS guided fine-needle aspiration: Positive for malignancy, adenocarcinoma  Liver, EUS guided fine-needle aspiration: - Positive for malignancy, adenocarcinoma  CA 19-9 5348U/mL on 1/11/23  Plan was for OP PANC NAB-PACLITAXEL + GEMCITABINE Q4W  S/p C1D8 gem/abraxane ( dose reduced) on 1/24/23    admitted on 12/5/23 with AMS.   Guardant 360 Details: ANDREA, BRAF V600E, JAK2 V617F  Plan switch therapy to DABRENIB AND TRAMETINIB   (with BRAF V600E mutation): DABRAFENIB Oral: 150 mg twice daily, approximately every 12 hours (in combination with trametinib); continue until disease progression or unacceptable toxicity.   BRAF V600E  mutation: Oral: trametinib.  2 mg once daily (in combination with dabrafenib) until disease progression or unacceptable toxicity.   Pt tolerating therapy    CT imaging shows ill-defined infiltrating pancreatic head mass.  This is difficult to measure but probably 2.8 cm in diameter.abnormal thickening at the rectosigmoid junction worrisome for neoplasm.  This could be a 2nd neoplasm/colon cancer.  Follow up  with GI for repeat colonoscopy as planned   Labs reviewed  Follow up in 1 month with labs   Relevant Orders   CANCER ANTIGEN 19-9     Liver metastases    Neoplasm related pain  Currently well controlled  Cont current regimen      Abnormal CT of the abdomen/Colon wall thickening  Colon wall thickening  CT imaging shows abnormal thickening at the rectosigmoid junction worrisome for neoplasm  Repeat colonoscopy planned today   Follow up with CRS   Plan follow up imaging to assess disease status  Reactive thrombocytosis  Cont to monitor         Follow-up:       CBC,CMP, MAG,PHOS, HEP CORE AB , URIC ACID TODAY  CT PRIOR TO F/U 3 WKS  CBC,CMP, MAG , CA 19-9 PRIOR TO F/U 3 WKS      I spent a total of  40  minutes on the day of the visit.This includes face to face time and non-face to face time preparing to see the patient (eg, review of tests), obtaining and/or reviewing separately obtained history, documenting clinical information in the electronic or other health record, independently interpreting results and communicating results to the patient/family/caregiver, and coordinating care.          Latesha Carver MD  [facility]

## 2023-09-07 NOTE — Clinical Note
CBC,CMP, MAG,PHOS, HEP CORE AB , URIC ACID TODAY CT PRIOR TO F/U 3 WKS CBC,CMP, MAG , CA 19-9 PRIOR TO F/U 3 WKS

## 2023-09-08 LAB — HBV CORE AB SERPL QL IA: NORMAL

## 2023-09-08 NOTE — PHYSICIAN QUERY
PT Name: Manuel Tyler  MR #: [id number]    DOCUMENTATION CLARIFICATION     CDS: Nona Ashton RN         Contact information:[email or web address] or (cell) [phone]   This form is a permanent document in the medical record.     Query Date: September 8, 2023    By submitting this query, we are merely seeking further clarification of documentation.  Please utilize your independent clinical judgment when addressing the question(s) below.      The Medical Record reflects the following:  Clinical Information Location in Medical Record   Patient appears tremulous.  He is cachectic    71 y/o male with a history of a pancreatic cancer with liver mets on who presents to ER with severe weakness, nausea, vomiting and diarrhea.  Patient started having around 3 episodes of loose stool a day 2 days ago.  He then started having nausea and vomiting last night.  Poor oral intake and appetite over past couple of days.  Very weak and tired this morning    Weight: 61.3 kg (135 lb 2.3 oz)  Body mass index is 20.55 kg/m². ED Note 9/3    H&P 9/3          Anthropometrics     Please clarify/confirm the Emergency Medicine diagnosis of Cachexia?     [ x  ] Diagnosis ruled in     [   ] Diagnosis ruled out     [   ] Other diagnosis (please specify): _____________     [  ] Clinically undetermined

## 2023-09-08 NOTE — PHYSICIAN QUERY
PT Name: Manuel Tyler  MR #: [id number]     Documentation Clarification      CDS: Nona Ashton RN        Contact information:[email or web address] or (cell) [phone]     This form is a permanent document in the medical record.     Query Date: September 8, 2023    By submitting this query, we are merely seeking further clarification of documentation. Please utilize your independent clinical judgment when addressing the question(s) below.    The Medical Record reflects the following:    Clinical Findings Location in Medical Records   Sepsis  He presents to ER with nausea, vomiting and diarrhea.  Tachycardic with leukocytosis and lactic acidosis.  No obvious infectious source.  Tachycardia and leukocytosis could be related to severe dehydration.  Immunocompromised patient and will be started on empiric broad spectrum ABX's.  Diarrhea with recent use of ABX's.  Check stool for Cdiff.  Unremarkable CXR and UA.  Check BCx as patient with port in place.    Lactic acidosis  Unsure if secondary to infection or fluid depletion.    Dehydration  Secondary to vomiting, diarrhea and poor oral intake.   H&P 9/3   No signs of infection. No wounds. Urine clean. Denies new URI symptoms.   WBC normalized. Will give 4 more days (7 total) of abx- doxy and cefdinir. DCS 9/5   9/3 @ 1018: Temp 99.1, HR 87. RR 24, /63  9/3 @ 1418: Temp 99.1, , RR 24, /59   Vitals   Blood Cx's 9/3- NGTD  C. Diff negative      09/03/23 10:43   Procalcitonin 0.47 (H)      09/03/23 14:14 09/04/23 04:50   Lactate, Walter 2.3 (H) 2.4 (H)      09/03/23 10:43 09/04/23 04:50 09/05/23 03:34 09/07/23 14:34   WBC 17.18 (H) 14.91 (H) 8.32 7.98      Micro        Labs     Due to the conflicting clinical picture, please clinically validate the diagnosis of Sepsis.      If validated, please provide additional clinical support for the diagnosis.       [   ] Above stated diagnosis is not confirmed and/or it has been ruled out     [   ] Above stated  diagnosis is not confirmed and/or it has been ruled out, other diagnosis ruled in (please          specify):_______________     [ x  ] Above stated diagnosis is confirmed.   Tachycardia, tachypnea, leukocytosis, elevated procal and elevated lactic acid in immunocompromised patient     [   ] Other clarification (please specify): ___________________           Form No. [id number]

## 2023-09-12 ENCOUNTER — OFFICE VISIT (OUTPATIENT)
Dept: FAMILY MEDICINE | Facility: CLINIC | Age: 73
End: 2023-09-12
Payer: MEDICARE

## 2023-09-12 VITALS
DIASTOLIC BLOOD PRESSURE: 80 MMHG | SYSTOLIC BLOOD PRESSURE: 116 MMHG | HEIGHT: 68 IN | RESPIRATION RATE: 16 BRPM | BODY MASS INDEX: 20.92 KG/M2 | OXYGEN SATURATION: 97 % | TEMPERATURE: 98 F | HEART RATE: 115 BPM | WEIGHT: 138 LBS

## 2023-09-12 DIAGNOSIS — L29.9 ITCH OF SKIN: ICD-10-CM

## 2023-09-12 DIAGNOSIS — R00.0 TACHYCARDIA: Primary | ICD-10-CM

## 2023-09-12 DIAGNOSIS — Z09 HOSPITAL DISCHARGE FOLLOW-UP: ICD-10-CM

## 2023-09-12 PROBLEM — E87.20 LACTIC ACIDOSIS: Status: RESOLVED | Noted: 2023-09-03 | Resolved: 2023-09-12

## 2023-09-12 PROBLEM — A41.9 SEPSIS: Status: RESOLVED | Noted: 2022-12-27 | Resolved: 2023-09-12

## 2023-09-12 PROBLEM — E86.0 DEHYDRATION: Status: RESOLVED | Noted: 2023-09-03 | Resolved: 2023-09-12

## 2023-09-12 PROBLEM — Z86.16 HISTORY OF COVID-19: Status: ACTIVE | Noted: 2023-07-14

## 2023-09-12 PROBLEM — E87.1 HYPONATREMIA: Status: RESOLVED | Noted: 2023-09-03 | Resolved: 2023-09-12

## 2023-09-12 PROCEDURE — 99214 OFFICE O/P EST MOD 30 MIN: CPT | Mod: S$PBB,,, | Performed by: NURSE PRACTITIONER

## 2023-09-12 PROCEDURE — 99214 PR OFFICE/OUTPT VISIT, EST, LEVL IV, 30-39 MIN: ICD-10-PCS | Mod: S$PBB,,, | Performed by: NURSE PRACTITIONER

## 2023-09-12 PROCEDURE — 99215 OFFICE O/P EST HI 40 MIN: CPT | Mod: PBBFAC,PO | Performed by: NURSE PRACTITIONER

## 2023-09-12 PROCEDURE — 99999 PR PBB SHADOW E&M-EST. PATIENT-LVL V: ICD-10-PCS | Mod: PBBFAC,,, | Performed by: NURSE PRACTITIONER

## 2023-09-12 PROCEDURE — 99999 PR PBB SHADOW E&M-EST. PATIENT-LVL V: CPT | Mod: PBBFAC,,, | Performed by: NURSE PRACTITIONER

## 2023-09-12 RX ORDER — HYDROXYZINE HYDROCHLORIDE 25 MG/1
25 TABLET, FILM COATED ORAL 3 TIMES DAILY PRN
Qty: 90 TABLET | Refills: 1 | Status: SHIPPED | OUTPATIENT
Start: 2023-09-12

## 2023-09-15 PROBLEM — F10.10 ALCOHOL ABUSE: Status: RESOLVED | Noted: 2022-12-27 | Resolved: 2023-09-15

## 2023-09-15 NOTE — PROGRESS NOTES
Subjective:      Patient ID: Manuel Tyler is a 72 y.o. male.  New to me but seen previously in clinic by a fellow provider. Pt presents to clinic for hospital f/u, see course below. Was admitted for acute GI symptoms, dehydration improved with IV rehydration, also found to be septic of unknown origin, treated broad spectrum and has completed x4 days of oral cefinir and doxycycline. States he is increasing his fluid intake, drinking water, gatorade and ensure, is also having normal BM. Today reports GI symptoms have resolved though has extreme fatigue. Of note he is currently being treated for metastatic pancreatic cancer with oral chemo and is also attempting to quit smoking, is chewing nicotine gum while decreasing cigarette use. He is receiving weekly skilled nurse visit via [facility]. Denies any acute complaints today.       Admission Date: 9/3/2023  Hospital Length of Stay: 2 days  Discharge Date and Time:  09/06/2023 10:18 AM    HPI:   73 y/o male with a history of a pancreatic cancer with liver mets on who presents to ER with severe weakness, nausea, vomiting and diarrhea.  Patient started having around 3 episodes of loose stool a day 2 days ago.  He then started having nausea and vomiting last night.  Poor oral intake and appetite over past couple of days.  Very weak and tired this morning.  Patient being treated for cancer with Dabrenib and Trametinib.  Patient reports he has no abdominal pain.  No alleviating or aggravating factors.  He also denies any fevers.  Patient recently completed course of Doxycycline for facial abscess.  He presents to ER where labs show leukocytosis and elevated lactate.  No other complaints.    Hospital Course:   73 y/o male presents with weakness, nausea, vomiting and diarrhea.  Tachycardic with leukocytosis on presentation.  Metastatic pancreatic cancer on 2nd line treatment.  Empirically started on broad spectrum ABx's.  Dehydrated and started on IVF's.  Stool sent for  "Cdiff.     No signs of infection. No wounds. Urine clean. Denies new URI symptoms.   WBC normalized. Will give 4 more days (7 total) of abx- doxy and cefdinir.  Ok to d/c. F/u PCP and Dr. Carver     Will replace Mg, phos and K before discharge  Palliative care discussion prior to d/c  HH set up     · Starting tomorrow morning take doxycycline and cefdinir both twice daily for 4 more days  · Follow-up with your PCP and oncologist  · Come back to the hospital if you have any fevers or started feeling unwell/worsening of symptoms  · Will set up HH for you      Review of Systems   Constitutional:  Positive for fatigue. Negative for activity change, appetite change, fever and unexpected weight change.   HENT:  Negative for nasal congestion, ear pain, postnasal drip, rhinorrhea, sneezing, sore throat and voice change.    Eyes:  Negative for pain and visual disturbance.   Respiratory:  Negative for cough, chest tightness and shortness of breath.    Cardiovascular:  Negative for chest pain, palpitations and leg swelling.   Gastrointestinal:  Negative for abdominal pain, change in bowel habit, constipation, diarrhea, nausea, vomiting and change in bowel habit.   Endocrine: Negative.    Genitourinary:  Negative for difficulty urinating.   Musculoskeletal:  Positive for arthralgias, back pain and myalgias. Negative for gait problem.        Cancer related chronic pain treated with MS Contin as prescribed by hematology    Integumentary:  Positive for pallor. Negative for rash.   Allergic/Immunologic: Negative.    Neurological:  Negative for speech difficulty and headaches.   Hematological:  Bruises/bleeds easily.   Psychiatric/Behavioral: Negative.     All other systems reviewed and are negative.        Objective:     Vitals:    09/12/23 1334 09/12/23 1337   BP: 116/80    Pulse: (!) 124 (!) 115   Resp: 16    Temp: 98 °F (36.7 °C)    TempSrc: Oral    SpO2: 97%    Weight: 62.6 kg (138 lb 0.1 oz)    Height: 5' 8" (1.727 m)  "     Physical Exam  Vitals and nursing note reviewed.   Constitutional:       General: He is not in acute distress.     Appearance: Normal appearance. He is well-developed and well-groomed. He is not ill-appearing.   HENT:      Head: Normocephalic and atraumatic.      Right Ear: External ear normal.      Left Ear: External ear normal.      Nose: Nose normal.      Mouth/Throat:      Lips: Pink.      Mouth: Mucous membranes are moist.   Eyes:      General: Lids are normal. Vision grossly intact. Gaze aligned appropriately.      Conjunctiva/sclera: Conjunctivae normal.      Pupils: Pupils are equal, round, and reactive to light.   Neck:      Trachea: Phonation normal.   Cardiovascular:      Rate and Rhythm: Regular rhythm. Tachycardia present.      Heart sounds: Normal heart sounds.   Pulmonary:      Effort: Pulmonary effort is normal. No accessory muscle usage or respiratory distress.      Breath sounds: Normal breath sounds and air entry. No wheezing or rales.   Abdominal:      General: Abdomen is flat. There is no distension.      Palpations: Abdomen is soft.      Tenderness: There is no abdominal tenderness.   Musculoskeletal:      Cervical back: Neck supple.      Right lower leg: No edema.      Left lower leg: No edema.   Skin:     General: Skin is warm and dry.      Coloration: Skin is pale.      Findings: Bruising (bilateral arms, 2/2 multiple IV sticks while admitted in hospital) present. No rash.   Neurological:      General: No focal deficit present.      Mental Status: He is alert and oriented to person, place, and time. Mental status is at baseline.      Sensory: Sensation is intact.      Motor: Motor function is intact.      Coordination: Coordination is intact.      Gait: Gait is intact.   Psychiatric:         Attention and Perception: Attention and perception normal.         Mood and Affect: Mood and affect normal.         Speech: Speech normal.         Behavior: Behavior normal. Behavior is cooperative.          Thought Content: Thought content normal.         Cognition and Memory: Cognition and memory normal.         Judgment: Judgment normal.       Assessment and Plan:     1. Tachycardia  Pt asymptomatic, likely due to increasing nicotine use with gum and cigarettes, discussed need to decrease use and monitor HR, f/u with cards if fails to improve or worsen     2. Itch of skin  Chronic without acute rash or wounds   - hydrOXYzine HCL (ATARAX) 25 MG tablet; Take 1 tablet (25 mg total) by mouth 3 (three) times daily as needed for Itching.  Dispense: 90 tablet; Refill: 1    3. Hospital discharge follow-up  Symptoms improving and appropriate specialist f/u done           CHUCK Damon, FNP-C  Family/Internal Medicine  [facility]

## 2023-09-19 ENCOUNTER — HOSPITAL ENCOUNTER (OUTPATIENT)
Dept: RADIOLOGY | Facility: HOSPITAL | Age: 73
Discharge: HOME | End: 2023-09-19
Attending: INTERNAL MEDICINE
Payer: MEDICARE

## 2023-09-19 DIAGNOSIS — C78.7 MALIGNANT NEOPLASM METASTATIC TO LIVER: ICD-10-CM

## 2023-09-19 DIAGNOSIS — C25.0 MALIGNANT NEOPLASM OF HEAD OF PANCREAS: ICD-10-CM

## 2023-09-19 PROCEDURE — 74177 CT ABD & PELVIS W/CONTRAST: CPT | Mod: 26,,, | Performed by: RADIOLOGY

## 2023-09-19 PROCEDURE — 71260 CT CHEST ABDOMEN PELVIS WITH CONTRAST (XPD): ICD-10-PCS | Mod: 26,,, | Performed by: RADIOLOGY

## 2023-09-19 PROCEDURE — 25500020 PHARM REV CODE 255: Performed by: INTERNAL MEDICINE

## 2023-09-19 PROCEDURE — 74177 CT CHEST ABDOMEN PELVIS WITH CONTRAST (XPD): ICD-10-PCS | Mod: 26,,, | Performed by: RADIOLOGY

## 2023-09-19 PROCEDURE — 71260 CT THORAX DX C+: CPT | Mod: TC

## 2023-09-19 PROCEDURE — 71260 CT THORAX DX C+: CPT | Mod: 26,,, | Performed by: RADIOLOGY

## 2023-09-19 RX ADMIN — IOHEXOL 15 ML: 300 INJECTION, SOLUTION INTRAVENOUS at 09:09

## 2023-09-19 RX ADMIN — IOHEXOL 75 ML: 350 INJECTION, SOLUTION INTRAVENOUS at 09:09

## 2023-09-20 NOTE — TELEPHONE ENCOUNTER
New RX received for Tafinlar and Mekinist. Dose appropriate for Pancreatic cancer. Test claim ran and claim rejecting stating pharmacy not contracted with plan. Outgoing call to plan to confirm which [facility] pt has to fill at. Per Rep Ravi, pt is locked into [facility]. Outgoing call to pt to inform and provide with phone number ([phone]). Pt voiced understanding. Routing to [facility]. Message sent to [facility] to inform. Closing out at [facility].   
I concur with the Admission Order and I certify that services are provided in accordance with Section 42 CFR § 412.3

## 2023-09-21 ENCOUNTER — INFUSION (OUTPATIENT)
Dept: INFUSION THERAPY | Facility: HOSPITAL | Age: 73
End: 2023-09-21
Attending: INTERNAL MEDICINE
Payer: MEDICARE

## 2023-09-21 VITALS
RESPIRATION RATE: 18 BRPM | TEMPERATURE: 98 F | HEART RATE: 92 BPM | SYSTOLIC BLOOD PRESSURE: 162 MMHG | OXYGEN SATURATION: 99 % | DIASTOLIC BLOOD PRESSURE: 71 MMHG

## 2023-09-21 DIAGNOSIS — C25.0 MALIGNANT NEOPLASM OF HEAD OF PANCREAS: Primary | ICD-10-CM

## 2023-09-21 PROCEDURE — 96523 IRRIG DRUG DELIVERY DEVICE: CPT

## 2023-09-21 PROCEDURE — 63600175 PHARM REV CODE 636 W HCPCS: Performed by: INTERNAL MEDICINE

## 2023-09-21 RX ORDER — SODIUM CHLORIDE 0.9 % (FLUSH) 0.9 %
10 SYRINGE (ML) INJECTION
Status: CANCELLED | OUTPATIENT
Start: 2023-09-21

## 2023-09-21 RX ORDER — HEPARIN 100 UNIT/ML
500 SYRINGE INTRAVENOUS
Status: DISCONTINUED | OUTPATIENT
Start: 2023-09-21 | End: 2023-09-21 | Stop reason: HOSPADM

## 2023-09-21 RX ORDER — HEPARIN 100 UNIT/ML
500 SYRINGE INTRAVENOUS
Status: CANCELLED | OUTPATIENT
Start: 2023-09-21

## 2023-09-21 RX ORDER — SODIUM CHLORIDE 0.9 % (FLUSH) 0.9 %
10 SYRINGE (ML) INJECTION
Status: DISCONTINUED | OUTPATIENT
Start: 2023-09-21 | End: 2023-09-21 | Stop reason: HOSPADM

## 2023-09-21 RX ADMIN — HEPARIN 500 UNITS: 100 SYRINGE at 10:09

## 2023-09-21 NOTE — PLAN OF CARE
Patient in NAD ambulated to unit with cane for port flush. Tolerated well. Patient ambulated off unit in NAD w/ following appointment in hand.    Problem: Adult Inpatient Plan of Care  Goal: Plan of Care Review  Outcome: Met  Goal: Patient-Specific Goal (Individualized)  Outcome: Met  Goal: Absence of Hospital-Acquired Illness or Injury  Outcome: Met  Goal: Optimal Comfort and Wellbeing  Outcome: Met  Goal: Readiness for Transition of Care  Outcome: Met     Problem: Fall Injury Risk  Goal: Absence of Fall and Fall-Related Injury  Outcome: Met

## 2023-09-26 ENCOUNTER — DOCUMENT SCAN (OUTPATIENT)
Dept: HOME HEALTH SERVICES | Facility: HOSPITAL | Age: 73
End: 2023-09-26
Payer: MEDICARE

## 2023-09-27 ENCOUNTER — LAB VISIT (OUTPATIENT)
Dept: LAB | Facility: HOSPITAL | Age: 73
End: 2023-09-27
Attending: INTERNAL MEDICINE
Payer: MEDICARE

## 2023-09-27 DIAGNOSIS — C78.7 MALIGNANT NEOPLASM METASTATIC TO LIVER: ICD-10-CM

## 2023-09-27 DIAGNOSIS — C25.0 MALIGNANT NEOPLASM OF HEAD OF PANCREAS: ICD-10-CM

## 2023-09-27 LAB
ALBUMIN SERPL BCP-MCNC: 3.3 G/DL (ref 3.5–5.2)
ALP SERPL-CCNC: 121 U/L (ref 55–135)
ALT SERPL W/O P-5'-P-CCNC: 20 U/L (ref 10–44)
ANION GAP SERPL CALC-SCNC: 13 MMOL/L (ref 8–16)
AST SERPL-CCNC: 29 U/L (ref 10–40)
BASOPHILS # BLD AUTO: 0.22 K/UL (ref 0–0.2)
BASOPHILS NFR BLD: 1.4 % (ref 0–1.9)
BILIRUB SERPL-MCNC: 0.3 MG/DL (ref 0.1–1)
BUN SERPL-MCNC: 22 MG/DL (ref 8–23)
CALCIUM SERPL-MCNC: 10 MG/DL (ref 8.7–10.5)
CHLORIDE SERPL-SCNC: 105 MMOL/L (ref 95–110)
CO2 SERPL-SCNC: 23 MMOL/L (ref 23–29)
CREAT SERPL-MCNC: 1.2 MG/DL (ref 0.5–1.4)
DIFFERENTIAL METHOD: ABNORMAL
EOSINOPHIL # BLD AUTO: 2.5 K/UL (ref 0–0.5)
EOSINOPHIL NFR BLD: 16.6 % (ref 0–8)
ERYTHROCYTE [DISTWIDTH] IN BLOOD BY AUTOMATED COUNT: 16.3 % (ref 11.5–14.5)
EST. GFR  (NO RACE VARIABLE): >60 ML/MIN/1.73 M^2
GLUCOSE SERPL-MCNC: 83 MG/DL (ref 70–110)
HCT VFR BLD AUTO: 45.3 % (ref 40–54)
HGB BLD-MCNC: 14.1 G/DL (ref 14–18)
IMM GRANULOCYTES # BLD AUTO: 0.04 K/UL (ref 0–0.04)
IMM GRANULOCYTES NFR BLD AUTO: 0.3 % (ref 0–0.5)
LYMPHOCYTES # BLD AUTO: 2.7 K/UL (ref 1–4.8)
LYMPHOCYTES NFR BLD: 17.6 % (ref 18–48)
MAGNESIUM SERPL-MCNC: 2.1 MG/DL (ref 1.6–2.6)
MCH RBC QN AUTO: 29.9 PG (ref 27–31)
MCHC RBC AUTO-ENTMCNC: 31.1 G/DL (ref 32–36)
MCV RBC AUTO: 96 FL (ref 82–98)
MONOCYTES # BLD AUTO: 1.6 K/UL (ref 0.3–1)
MONOCYTES NFR BLD: 10.3 % (ref 4–15)
NEUTROPHILS # BLD AUTO: 8.2 K/UL (ref 1.8–7.7)
NEUTROPHILS NFR BLD: 53.8 % (ref 38–73)
NRBC BLD-RTO: 0 /100 WBC
PLATELET # BLD AUTO: 506 K/UL (ref 150–450)
PMV BLD AUTO: 9.9 FL (ref 9.2–12.9)
POTASSIUM SERPL-SCNC: 4.7 MMOL/L (ref 3.5–5.1)
PROT SERPL-MCNC: 8 G/DL (ref 6–8.4)
RBC # BLD AUTO: 4.71 M/UL (ref 4.6–6.2)
SODIUM SERPL-SCNC: 141 MMOL/L (ref 136–145)
WBC # BLD AUTO: 15.31 K/UL (ref 3.9–12.7)

## 2023-09-27 PROCEDURE — 85025 COMPLETE CBC W/AUTO DIFF WBC: CPT | Performed by: INTERNAL MEDICINE

## 2023-09-27 PROCEDURE — 86301 IMMUNOASSAY TUMOR CA 19-9: CPT | Performed by: INTERNAL MEDICINE

## 2023-09-27 PROCEDURE — 83735 ASSAY OF MAGNESIUM: CPT | Performed by: INTERNAL MEDICINE

## 2023-09-27 PROCEDURE — 80053 COMPREHEN METABOLIC PANEL: CPT | Performed by: INTERNAL MEDICINE

## 2023-09-27 PROCEDURE — 36415 COLL VENOUS BLD VENIPUNCTURE: CPT | Mod: PO | Performed by: INTERNAL MEDICINE

## 2023-09-28 ENCOUNTER — OFFICE VISIT (OUTPATIENT)
Dept: HEMATOLOGY/ONCOLOGY | Facility: CLINIC | Age: 73
End: 2023-09-28
Payer: MEDICARE

## 2023-09-28 VITALS
BODY MASS INDEX: 21.05 KG/M2 | HEART RATE: 105 BPM | DIASTOLIC BLOOD PRESSURE: 83 MMHG | WEIGHT: 138.88 LBS | HEIGHT: 68 IN | SYSTOLIC BLOOD PRESSURE: 124 MMHG | OXYGEN SATURATION: 99 %

## 2023-09-28 DIAGNOSIS — D75.838 REACTIVE THROMBOCYTOSIS: ICD-10-CM

## 2023-09-28 DIAGNOSIS — C25.0 MALIGNANT NEOPLASM OF HEAD OF PANCREAS: Primary | ICD-10-CM

## 2023-09-28 DIAGNOSIS — C78.7 MALIGNANT NEOPLASM METASTATIC TO LIVER: ICD-10-CM

## 2023-09-28 DIAGNOSIS — G89.3 NEOPLASM RELATED PAIN: ICD-10-CM

## 2023-09-28 DIAGNOSIS — K63.9 COLON WALL THICKENING: ICD-10-CM

## 2023-09-28 LAB — CANCER AG19-9 SERPL-ACNC: ABNORMAL U/ML (ref 0–40)

## 2023-09-28 PROCEDURE — 99999 PR PBB SHADOW E&M-EST. PATIENT-LVL IV: CPT | Mod: PBBFAC,,, | Performed by: INTERNAL MEDICINE

## 2023-09-28 PROCEDURE — 99214 PR OFFICE/OUTPT VISIT, EST, LEVL IV, 30-39 MIN: ICD-10-PCS | Mod: S$PBB,,, | Performed by: INTERNAL MEDICINE

## 2023-09-28 PROCEDURE — 99214 OFFICE O/P EST MOD 30 MIN: CPT | Mod: S$PBB,,, | Performed by: INTERNAL MEDICINE

## 2023-09-28 PROCEDURE — 99214 OFFICE O/P EST MOD 30 MIN: CPT | Mod: PBBFAC | Performed by: INTERNAL MEDICINE

## 2023-09-28 PROCEDURE — 99999 PR PBB SHADOW E&M-EST. PATIENT-LVL IV: ICD-10-PCS | Mod: PBBFAC,,, | Performed by: INTERNAL MEDICINE

## 2023-09-28 NOTE — PROGRESS NOTES
Subjective:       Patient ID: Manuel Tyler is a 72 y.o. male.    Chief Complaint: Follow-up  Diagnosis: Stage IV pancreatic CA  with liver mets diagnosed 12/30/22    ANDREA, BRAF V600E, JAK2 V617F  Therapy: [place]/abraxane (dose reduced) 1/24/23-2/7/23   DABRENIB AND TRAMETINIB  3/29/23-present  HPI 72 y.o male with medical diagnoses listed seen today for Stage IV pancreatic adenocarcinoma with liver metastases.He was  admitted with pancreatitis likely secondary to ETOH abuse. C-diff negative. CT a/p w/contrast 12/27/22 shows Mild wall thickening involving the sigmoid colon with abnormal appearance seen within the left lower pelvis along the left lateral aspect of the sigmoid colon.  Uncertain if findings may in part relate to sigmoid tortuosity, however potential colocolic fistula not excluded given appearance.  No acute inflammatory changes are seen.  No prior studies are available for comparison.  Colonoscopy follow-up may be warranted if not previously/recently obtained.   Distended gallbladder. US abd limited 12/28/22 shows Biliary sludge with evidence of acute cholecystitis including gallbladder distension and a positive sonographic Hahn sign.CBD dilatation up to 13 mm, which could be secondary to cholecystitis, choledocholithiasis, or other obstruction.Ill-defined 2.6 cm lesion in the region of the pancreatic head, which could represent a lymph node or pancreatic neoplasm.  GI performed EUS and ERCP on 12/30/22 - suspicious for pancreatic CA- stent placed. Upper EUS 12/30/22 shows A single metastatic lesion was found in the left lobe of the liver.   Fine needle aspiration performed. A mass was identified in the pancreatic head. Fine needle biopsy performed  Pancreas, head, mass, EUS guided fine-needle aspiration: Positive for malignancy, adenocarcinoma, see comment.   Liver, EUS guided fine-needle aspiration: - Positive for malignancy, adenocarcinoma,He is taking OTC acetaminophen for abd pain which  intermittently radiates to back.  He rates pain 3/10 in intensity. He has intermittent nausea. He is reports changes in bowel habits He needs assistance with some daily activities.No falls. He reports mild SKAGGS. No CP/hemoptysis. He is accompanied by family members.   S/p C1D8 gem/abraxane ( dose reduced) on 2/7/23    admitted on 1/25/23 with AMS. He was found to be dehydrated, hypotensive and concerns for infection/septic shock. H  He was switched to 2nd line therapy with  DABRENIB AND TRAMETINIB on 3/29/23 2/2 lack of tolerability with first line therapy and subsequent hospitalizations    Interval Hx: Accompanied by wife  He  remains on  DABRENIB AND TRAMETINIB ( started on 3/29/23)  CT imaging 6/1/23 shows  ill-defined infiltrating pancreatic head mass.  This is difficult to measure but probably 2.8 cm in diameter.Bile duct stent, pancreatic stent with pneumobilia.Coronary calcifications, and bilateral renal cysts. abnormal thickening at the rectosigmoid junction worrisome for neoplasm.  This could be a 2nd neoplasm/colon cancer.  repeat colonoscopy planned due to poor bowel prep    Admitted on 9/3/23 with sepsis . He presented with weakness, nausea, vomiting and diarrhea.  Tachycardic with leukocytosis on presentation.  Diarrhea with recent use of ABX's.  Negative for Cdiff.  Unremarkable CXR and UA.   BCx neg  Sepsis possibly related to viral gastroenteritis.   Doing better since hosp discharge  Mild nausea   Appetite stable  No SOB/CPcough  He has 1-2 ensures daily  He was f/b palliative care during hospitalization  According to chart pt DNR  laPost not completed prior to discharge  Pain well controlled with current pain regimen  Mod fatigue  No fevers  He reports reflux sx's        Guardant 360 biopsy resulted  Details: ANDREA, BRAF V600E, JAK2 V617F       Past Medical History:   Diagnosis Date    (HFpEF) heart failure with preserved ejection fraction 1/25/2023    Alcohol abuse 12/27/2022    Hyperlipidemia      Hypertension     Liver metastases 1/18/2023    Malignant neoplasm of head of pancreas 1/18/2023    Other specified noninfective gastroenteritis and colitis     Pancreatitis     Personal history of colonic polyps        Past Surgical History:   Procedure Laterality Date    COLONOSCOPY      COLONOSCOPY N/A 08/07/2023    Procedure: COLONOSCOPY;  Surgeon: Kong Estrella MD;  Location: [facility] ENDO;  Service: Endoscopy;  Laterality: N/A;  6/22 Instructions sent Via portal    COLONOSCOPY N/A 08/08/2023    Procedure: COLONOSCOPY;  Surgeon: Mariia Luevano MD;  Location: [facility] ENDO;  Service: Endoscopy;  Laterality: N/A;    ENDOSCOPIC ULTRASOUND OF UPPER GASTROINTESTINAL TRACT Left 12/30/2022    Procedure: ULTRASOUND, UPPER GI TRACT, ENDOSCOPIC;  Surgeon: Gregory Cristina MD;  Location: [facility] ENDO (2ND FLR);  Service: Endoscopy;  Laterality: Left;    ERCP Left 12/30/2022    Procedure: ERCP (ENDOSCOPIC RETROGRADE CHOLANGIOPANCREATOGRAPHY);  Surgeon: Gregory Cristina MD;  Location: [facility] ENDO (2ND FLR);  Service: Endoscopy;  Laterality: Left;    FOOT SURGERY Left     FRACTURE SURGERY  10/23/1998    INSERTION OF TUNNELED CENTRAL VENOUS CATHETER (CVC) WITH SUBCUTANEOUS PORT Bilateral 01/19/2023    Procedure: [id number]-CATH;  Surgeon: Amador Castellon MD;  Location: [facility] OR;  Service: General;  Laterality: Bilateral;  Right v Left PORTACATH. needs ultrasound and fluoro  RN PREOP 01/18/2023    TONSILLECTOMY      VASECTOMY  10/25/1983        Review of Systems   Constitutional:  Positive for fatigue. Negative for appetite change, fever and unexpected weight change.   HENT:  Negative for mouth sores.    Eyes:  Negative for visual disturbance.   Respiratory:  Negative for cough and shortness of breath.    Cardiovascular:  Negative for chest pain.   Gastrointestinal:  Positive for reflux. Negative for abdominal pain, diarrhea and nausea.   Genitourinary:  Negative for frequency.   Musculoskeletal:  Negative for back pain.  "  Integumentary:  Negative for rash.   Neurological:  Negative for weakness and headaches.   Hematological:  Negative for adenopathy.   Psychiatric/Behavioral:  The patient is not nervous/anxious.          Objective:     ECOG 1      Vitals:    09/28/23 0828   BP: 124/83   Pulse: 105   SpO2: 99%   Weight: 63 kg (138 lb 14.2 oz)   Height: 5' 8" (1.727 m)               Physical Exam  Constitutional:       Appearance: He is underweight.   HENT:      Head: Normocephalic.      Mouth/Throat:      Pharynx: No oropharyngeal exudate.   Eyes:      General: No scleral icterus.        Right eye: No discharge.         Left eye: No discharge.      Conjunctiva/sclera: Conjunctivae normal.   Neck:      Thyroid: No thyromegaly.   Cardiovascular:      Rate and Rhythm: Regular rhythm. Tachycardia present.      Heart sounds: No murmur heard.  Pulmonary:      Effort: Pulmonary effort is normal.      Breath sounds: Normal breath sounds. No wheezing or rales.   Abdominal:      General: Bowel sounds are normal.      Palpations: Abdomen is soft.      Tenderness: There is no abdominal tenderness. There is no guarding or rebound.   Musculoskeletal:         General: No swelling. Normal range of motion.      Cervical back: Normal range of motion and neck supple.   Lymphadenopathy:      Cervical: No cervical adenopathy.      Upper Body:      Right upper body: No supraclavicular adenopathy.      Left upper body: No supraclavicular adenopathy.   Neurological:      Mental Status: He is alert and oriented to person, place, and time.      Cranial Nerves: No cranial nerve deficit.   Psychiatric:         Mood and Affect: Mood normal.         Behavior: Behavior normal.           CT a/p w/contrast 12/27/22  Impression:     1. Mild wall thickening involving the sigmoid colon with abnormal appearance seen within the left lower pelvis along the left lateral aspect of the sigmoid colon.  Uncertain if findings may in part relate to sigmoid tortuosity, however " potential colocolic fistula not excluded given appearance.  No acute inflammatory changes are seen.  No prior studies are available for comparison.  Colonoscopy follow-up may be warranted if not previously/recently obtained.  Otherwise consider future fluoroscopic Gastrografin enema for further evaluation.  2. Distended gallbladder.  Consider gallbladder ultrasound follow-up if clinically indicated.  3. Additional findings as detailed above.     US abd limited 12/28/22  Impression:     Biliary sludge with evidence of acute cholecystitis including gallbladder distension and a positive sonographic Hahn sign.     CBD dilatation up to 13 mm, which could be secondary to cholecystitis, choledocholithiasis, or other obstruction.     Ill-defined 2.6 cm lesion in the region of the pancreatic head, which could represent a lymph node or pancreatic neoplasm.     RECOMMENDATIONS:  Contrast enhanced MRI/MRCP for further evaluation of the above findings    ERCP 12/30/22  - The major papilla appeared congested.                          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                          - A pancreatic sphincterotomy was performed.                          - One plastic stent was placed into the ventral                          pancreatic duct.                          - A single severe biliary stricture was found in                          the lower third of the main bile duct. The                          stricture was malignant appearing.                          - A biliary sphincterotomy was performed.                          - One covered metal stent was placed into the                          common bile duct.   Recommendation:        - Return patient to hospital jon for ongoing care.                          - EUS findings concerning for metastatic                          pancreatic cancer (see separate EUS report  Upper EUS 12/30/22  Impression:            - A single metastatic lesion was found in the  left                          lobe of the liver. Cytology results are pending.                          However, the endosonographic appearance is                          suggestive of metastatic pancreatic                          adenocarcinoma. Fine needle aspiration performed.                          - A mass was identified in the pancreatic head.                          Fine needle biopsy performed  Pathology 12/30/22    1. Pancreas, head, mass, EUS guided fine-needle aspiration:   - Positive for malignancy, adenocarcinoma, see comment.   2. Liver, EUS guided fine-needle aspiration:   - Positive for malignancy, adenocarcinoma, see comment.   COMMENT:  Immunostain for cytokeratin performed on specimen 1 shows an   infiltrative epithelial process.  Parts 1 and 2 of this case are reviewed by   Dr. XIAO Gan who agrees with the above diagnoses.  Appropriate positive   controls are examined.   Immunohistochemistry (IHC) Testing for Mismatch Repair (MMR) Proteins   performed on specimen 1:   MLH1 - Intact nuclear expression   MSH2 - Intact nuclear expression   MSH6 - Intact nuclear expression   PMS2 - Intact nuclear expression   Background nonneoplastic tissue/internal control with intact nuclear   expression   IHC Interpretation   No loss of nuclear expression of MMR proteins: low probability of   microsatellite instability   There are exceptions to the above IHC interpretations. These results should   not be considered in isolation, and clinical correlation with genetic   counseling is recommended to assess the need for germline testing.      Comment: Interp By Colleen Olmos MD, Signed on 01/10/2023 at 09:30      Latest Reference Range & Units Most Recent   CA 19-9 0.0 - 40.0 U/mL 5348.6 (H)  1/11/23 15:22   (H): Data is abnormally high      2- D echo 1/25/23   The left ventricle is normal in size with concentric hypertrophy and normal systolic function.  The estimated ejection fraction is  65%.  Indeterminate left ventricular diastolic function.  Normal right ventricular size with normal right ventricular systolic function.  Mild left atrial enlargement.  Mild-to-moderate aortic regurgitation.  Mild tricuspid regurgitation.  Normal central venous pressure (3 mmHg).  The estimated PA systolic pressure is 33 mmHg.                 CT c/a/p w/contrast 6/1/23  Impression:     There is a ill-defined infiltrating pancreatic head mass.  This is difficult to measure but probably 2.8 cm in diameter.     Bile duct stent, pancreatic stent with pneumobilia.     Coronary calcifications, and bilateral renal cysts.     There is abnormal thickening at the rectosigmoid junction worrisome for neoplasm.  This could be a 2nd neoplasm/colon cancer.         Component      Latest Ref Rng 1/11/2023 4/13/2023 5/5/2023 6/9/2023   CA 19-9      0.0 - 40.0 U/mL 5348.6 (H)  3368.0 (H)  52432.2 (H)  >64657.0 (H)      Component      Latest Ref Rng 7/10/2023 8/4/2023   CA 19-9      0.0 - 40.0 U/mL 82471.2 (H)  6129.3 (H)          Latest Reference Range & Units 08/04/23 09:50 09/01/23 12:12   CA 19-9 0.0 - 40.0 U/mL 6129.3 (H) 7769.4 (H)   (H): Data is abnormally high        CT c/a/p w/contrast 9/20/23   Impression:     1. Ill-defined, hypodense pancreatic head nodule is difficult to accurately assess/measure but not significantly changed.  2. Irregular, eccentric soft tissue thickening in the region of the rectosigmoid, not significantly changed.  3. Nodular interstitial coarsening at the left lung apex and 0.5-cm groundglass nodule in the right middle lobe, not significantly changed.         Assessment:         Diagnoses and all orders for this visit:    Malignant neoplasm of head of pancreas  Diagnosed 12/30/22  Pancreas, head, mass, EUS guided fine-needle aspiration: Positive for malignancy, adenocarcinoma  Liver, EUS guided fine-needle aspiration: - Positive for malignancy, adenocarcinoma  CA 19-9 5348U/mL on 1/11/23  Plan was for  OP PANC NAB-PACLITAXEL + GEMCITABINE Q4W  S/p C1D8 gem/abraxane ( dose reduced) on 1/24/23    admitted on 12/5/23 with AMS.   Guardant 360 Details: ANDREA, BRAF V600E, JAK2 V617F  Plan switch therapy to DABRENIB AND TRAMETINIB   (with BRAF V600E mutation): DABRAFENIB Oral: 150 mg twice daily, approximately every 12 hours (in combination with trametinib); continue until disease progression or unacceptable toxicity.   BRAF V600E mutation: Oral: trametinib.  2 mg once daily (in combination with dabrafenib) until disease progression or unacceptable toxicity.   Pt tolerating therapy    CT imaging shows ill-defined infiltrating pancreatic head mass.  This is difficult to measure but probably 2.8 cm in diameter.abnormal thickening at the rectosigmoid junction worrisome for neoplasm.  This could be a 2nd neoplasm/colon cancer.  Follow up  with GI for repeat colonoscopy as planned   Labs reviewed  CT imaging 9/20/23 reviewed-stable findings  Cont current tx   Follow up in 1 month with labs   Relevant Orders   CANCER ANTIGEN 19-9     Liver metastases    Neoplasm related pain  Currently well controlled  Cont current regimen      Abnormal CT of the abdomen/Colon wall thickening  Colon wall thickening  CT imaging shows abnormal thickening at the rectosigmoid junction worrisome for neoplasm  Repeat colonoscopy planned in near future   Follow up with CR[facility]   Plan follow up imaging to assess disease status  Reactive thrombocytosis  Cont to monitor         Follow-up:       CBC,CMP, MAG , CA 19-9 PRIOR TO F/U 4 WKS      I spent a total of  40  minutes on the day of the visit.This includes face to face time and non-face to face time preparing to see the patient (eg, review of tests), obtaining and/or reviewing separately obtained history, documenting clinical information in the electronic or other health record, independently interpreting results and communicating results to the patient/family/caregiver, and coordinating care.           Latesha Carver MD  Heme/Onc [facility]

## 2023-10-04 ENCOUNTER — PATIENT MESSAGE (OUTPATIENT)
Dept: HEMATOLOGY/ONCOLOGY | Facility: CLINIC | Age: 73
End: 2023-10-04
Payer: MEDICARE

## 2023-10-11 ENCOUNTER — OFFICE VISIT (OUTPATIENT)
Dept: HEMATOLOGY/ONCOLOGY | Facility: CLINIC | Age: 73
DRG: 189 | End: 2023-10-11
Payer: MEDICARE

## 2023-10-11 VITALS
WEIGHT: 140.19 LBS | HEART RATE: 123 BPM | SYSTOLIC BLOOD PRESSURE: 120 MMHG | OXYGEN SATURATION: 97 % | HEIGHT: 68 IN | DIASTOLIC BLOOD PRESSURE: 72 MMHG | BODY MASS INDEX: 21.25 KG/M2

## 2023-10-11 DIAGNOSIS — G89.3 NEOPLASM RELATED PAIN: ICD-10-CM

## 2023-10-11 DIAGNOSIS — E83.52 HYPERCALCEMIA: ICD-10-CM

## 2023-10-11 DIAGNOSIS — K63.9 COLON WALL THICKENING: ICD-10-CM

## 2023-10-11 DIAGNOSIS — K21.9 GASTROESOPHAGEAL REFLUX DISEASE, UNSPECIFIED WHETHER ESOPHAGITIS PRESENT: ICD-10-CM

## 2023-10-11 DIAGNOSIS — C25.0 MALIGNANT NEOPLASM OF HEAD OF PANCREAS: Primary | ICD-10-CM

## 2023-10-11 DIAGNOSIS — C78.7 MALIGNANT NEOPLASM METASTATIC TO LIVER: ICD-10-CM

## 2023-10-11 DIAGNOSIS — L27.0 DRUG-INDUCED SKIN RASH: ICD-10-CM

## 2023-10-11 DIAGNOSIS — R11.0 NAUSEA: ICD-10-CM

## 2023-10-11 DIAGNOSIS — R73.9 HYPERGLYCEMIA: ICD-10-CM

## 2023-10-11 PROCEDURE — 99215 PR OFFICE/OUTPT VISIT, EST, LEVL V, 40-54 MIN: ICD-10-PCS | Mod: S$PBB,,, | Performed by: INTERNAL MEDICINE

## 2023-10-11 PROCEDURE — 99213 OFFICE O/P EST LOW 20 MIN: CPT | Mod: PBBFAC,25 | Performed by: INTERNAL MEDICINE

## 2023-10-11 PROCEDURE — 99999 PR PBB SHADOW E&M-EST. PATIENT-LVL III: CPT | Mod: PBBFAC,,, | Performed by: INTERNAL MEDICINE

## 2023-10-11 PROCEDURE — 99999 PR PBB SHADOW E&M-EST. PATIENT-LVL III: ICD-10-PCS | Mod: PBBFAC,,, | Performed by: INTERNAL MEDICINE

## 2023-10-11 PROCEDURE — 99215 OFFICE O/P EST HI 40 MIN: CPT | Mod: S$PBB,,, | Performed by: INTERNAL MEDICINE

## 2023-10-11 RX ORDER — OMEPRAZOLE 20 MG/1
20 TABLET, DELAYED RELEASE ORAL DAILY
Qty: 28 TABLET | Refills: 1 | Status: SHIPPED | OUTPATIENT
Start: 2023-10-11 | End: 2024-10-10

## 2023-10-11 RX ORDER — PREDNISONE 20 MG/1
20 TABLET ORAL DAILY
Qty: 5 TABLET | Refills: 0 | Status: ON HOLD | OUTPATIENT
Start: 2023-10-11 | End: 2023-10-18 | Stop reason: HOSPADM

## 2023-10-11 RX ORDER — PROCHLORPERAZINE MALEATE 10 MG
10 TABLET ORAL EVERY 6 HOURS PRN
Qty: 30 TABLET | Refills: 1 | Status: ON HOLD | OUTPATIENT
Start: 2023-10-11 | End: 2023-10-18 | Stop reason: HOSPADM

## 2023-10-11 RX ORDER — TRIAMCINOLONE ACETONIDE 1 MG/G
OINTMENT TOPICAL 2 TIMES DAILY PRN
Qty: 30 G | Refills: 1 | Status: SHIPPED | OUTPATIENT
Start: 2023-10-11 | End: 2023-11-10

## 2023-10-11 NOTE — Clinical Note
CBC,CMP, MAG TODAY  PLAN  IVF AND DECADRON 20MG AND IV COMPAZINE IV IN INFUSION CTR TOMORROW  FOLLOW UP AS PLANNED   RX

## 2023-10-11 NOTE — Clinical Note
Add HbA1c to follow up labs  Check w/ zyra and follow up on colonoscopy sheduling? Pt had it scheduled thsi week?

## 2023-10-11 NOTE — PROGRESS NOTES
Subjective:       Patient ID: Manuel Tyler is a 72 y.o. male.    Chief Complaint: Follow-up  Diagnosis: Stage IV pancreatic CA  with liver mets diagnosed 12/30/22    ANDREA, BRAF V600E, JAK2 V617F  Therapy: Hernando/abraxane (dose reduced) 1/24/23-2/7/23   DABRENIB AND TRAMETINIB  3/29/23-present  HPI 72 y.o male with medical diagnoses listed seen today for Stage IV pancreatic adenocarcinoma with liver metastases.He was  admitted with pancreatitis likely secondary to ETOH abuse. C-diff negative. CT a/p w/contrast 12/27/22 shows Mild wall thickening involving the sigmoid colon with abnormal appearance seen within the left lower pelvis along the left lateral aspect of the sigmoid colon.  Uncertain if findings may in part relate to sigmoid tortuosity, however potential colocolic fistula not excluded given appearance.  No acute inflammatory changes are seen.  No prior studies are available for comparison.  Colonoscopy follow-up may be warranted if not previously/recently obtained.   Distended gallbladder. US abd limited 12/28/22 shows Biliary sludge with evidence of acute cholecystitis including gallbladder distension and a positive sonographic Hahn sign.CBD dilatation up to 13 mm, which could be secondary to cholecystitis, choledocholithiasis, or other obstruction.Ill-defined 2.6 cm lesion in the region of the pancreatic head, which could represent a lymph node or pancreatic neoplasm.  GI performed EUS and ERCP on 12/30/22 - suspicious for pancreatic CA- stent placed. Upper EUS 12/30/22 shows A single metastatic lesion was found in the left lobe of the liver.   Fine needle aspiration performed. A mass was identified in the pancreatic head. Fine needle biopsy performed  Pancreas, head, mass, EUS guided fine-needle aspiration: Positive for malignancy, adenocarcinoma, see comment.   Liver, EUS guided fine-needle aspiration: - Positive for malignancy, adenocarcinoma,He is taking OTC acetaminophen for abd pain which  intermittently radiates to back.  He rates pain 3/10 in intensity. He has intermittent nausea. He is reports changes in bowel habits He needs assistance with some daily activities.No falls. He reports mild SKAGGS. No CP/hemoptysis. He is accompanied by family members.   S/p C1D8 gem/abraxane ( dose reduced) on 2/7/23    admitted on 1/25/23 with AMS. He was found to be dehydrated, hypotensive and concerns for infection/septic shock. H  He was switched to 2nd line therapy with  DABRENIB AND TRAMETINIB on 3/29/23 2/2 lack of tolerability with first line therapy and subsequent hospitalizations    Interval Hx: Accompanied by wife  He  remains on  DABRENIB AND TRAMETINIB ( started on 3/29/23)  CT imaging 6/1/23 shows  ill-defined infiltrating pancreatic head mass.  This is difficult to measure but probably 2.8 cm in diameter.Bile duct stent, pancreatic stent with pneumobilia.Coronary calcifications, and bilateral renal cysts. abnormal thickening at the rectosigmoid junction worrisome for neoplasm.  This could be a 2nd neoplasm/colon cancer.  repeat colonoscopy planned due to poor bowel prep  Pt has not undergone colonoscopy as planned   Admitted on 9/3/23 with sepsis . It was determined Sepsis possibly related to viral gastroenteritis.     Presents today for diffuse rash x 2 weeks  Rash on extremities assoc w/pruritus   OTC antihistamine ineffective   Prescribed hydroxyzine by PCP  No assoc pain or soreness   No peeling    He reports nausea and 2 episodes vomiting x 2 days  No diarrhea, melena, fevers, cramping    Reflux sx's worse , unrelieved w/pepcic      No SOB/CPcough  He has 1-2 ensures daily  He was f/b palliative care during hospitalization  According to chart pt DNR  laPost not completed prior to discharge          Guardant 360 biopsy resulted  Details: ANDREA, BRAF V600E, JAK2 V617F       Past Medical History:   Diagnosis Date    (HFpEF) heart failure with preserved ejection fraction 1/25/2023    Alcohol abuse  12/27/2022    Hyperlipidemia     Hypertension     Liver metastases 1/18/2023    Malignant neoplasm of head of pancreas 1/18/2023    Other specified noninfective gastroenteritis and colitis     Pancreatitis     Personal history of colonic polyps        Past Surgical History:   Procedure Laterality Date    COLONOSCOPY      COLONOSCOPY N/A 08/07/2023    Procedure: COLONOSCOPY;  Surgeon: Kong Estrella MD;  Location: [facility] ENDO;  Service: Endoscopy;  Laterality: N/A;  6/22 Instructions sent Via portal    COLONOSCOPY N/A 08/08/2023    Procedure: COLONOSCOPY;  Surgeon: Mariia Luevano MD;  Location: [facility] ENDO;  Service: Endoscopy;  Laterality: N/A;    ENDOSCOPIC ULTRASOUND OF UPPER GASTROINTESTINAL TRACT Left 12/30/2022    Procedure: ULTRASOUND, UPPER GI TRACT, ENDOSCOPIC;  Surgeon: Gregory Cristina MD;  Location: [facility] ENDO (2ND FLR);  Service: Endoscopy;  Laterality: Left;    ERCP Left 12/30/2022    Procedure: ERCP (ENDOSCOPIC RETROGRADE CHOLANGIOPANCREATOGRAPHY);  Surgeon: Gregory Cristina MD;  Location: [facility] (2ND FLR);  Service: Endoscopy;  Laterality: Left;    FOOT SURGERY Left     FRACTURE SURGERY  10/23/1998    INSERTION OF TUNNELED CENTRAL VENOUS CATHETER (CVC) WITH SUBCUTANEOUS PORT Bilateral 01/19/2023    Procedure: [id number]-[id number];  Surgeon: Amador Castellon MD;  Location: [facility] OR;  Service: General;  Laterality: Bilateral;  Right v Left PORTACATH. needs ultrasound and fluoro  RN PREOP 01/18/2023    TONSILLECTOMY      VASECTOMY  10/25/1983        Review of Systems   Constitutional:  Positive for fatigue. Negative for appetite change, fever and unexpected weight change.   HENT:  Negative for mouth sores.    Eyes:  Negative for visual disturbance.   Respiratory:  Negative for cough and shortness of breath.    Cardiovascular:  Negative for chest pain.   Gastrointestinal:  Positive for nausea, vomiting and reflux. Negative for abdominal pain and diarrhea.   Genitourinary:  Negative for frequency.  "  Musculoskeletal:  Negative for back pain.   Integumentary:  Positive for rash.   Allergic/Immunologic:        Pruritus   Neurological:  Negative for weakness and headaches.   Hematological:  Negative for adenopathy.   Psychiatric/Behavioral:  The patient is not nervous/anxious.          Objective:     ECOG 1    Vitals:    10/11/23 0903   BP: 120/72   Pulse: (!) 123   SpO2: 97%   Weight: 63.6 kg (140 lb 3.4 oz)   Height: 5' 8" (1.727 m)           Physical Exam  Constitutional:       Appearance: He is underweight.   HENT:      Head: Normocephalic.      Mouth/Throat:      Pharynx: No oropharyngeal exudate.   Eyes:      General: No scleral icterus.        Right eye: No discharge.         Left eye: No discharge.      Conjunctiva/sclera: Conjunctivae normal.   Neck:      Thyroid: No thyromegaly.   Cardiovascular:      Rate and Rhythm: Regular rhythm. Tachycardia present.      Heart sounds: No murmur heard.  Pulmonary:      Effort: Pulmonary effort is normal.      Breath sounds: Normal breath sounds. No wheezing or rales.   Abdominal:      General: Bowel sounds are normal.      Palpations: Abdomen is soft.      Tenderness: There is no abdominal tenderness. There is no guarding or rebound.   Musculoskeletal:         General: No swelling. Normal range of motion.      Cervical back: Normal range of motion and neck supple.   Lymphadenopathy:      Cervical: No cervical adenopathy.      Upper Body:      Right upper body: No supraclavicular adenopathy.      Left upper body: No supraclavicular adenopathy.   Skin:     Findings: Rash present.   Neurological:      Mental Status: He is alert and oriented to person, place, and time.      Cranial Nerves: No cranial nerve deficit.   Psychiatric:         Mood and Affect: Mood normal.         Behavior: Behavior normal.             CT a/p w/contrast 12/27/22  Impression:     1. Mild wall thickening involving the sigmoid colon with abnormal appearance seen within the left lower pelvis " along the left lateral aspect of the sigmoid colon.  Uncertain if findings may in part relate to sigmoid tortuosity, however potential colocolic fistula not excluded given appearance.  No acute inflammatory changes are seen.  No prior studies are available for comparison.  Colonoscopy follow-up may be warranted if not previously/recently obtained.  Otherwise consider future fluoroscopic Gastrografin enema for further evaluation.  2. Distended gallbladder.  Consider gallbladder ultrasound follow-up if clinically indicated.  3. Additional findings as detailed above.     US abd limited 12/28/22  Impression:     Biliary sludge with evidence of acute cholecystitis including gallbladder distension and a positive sonographic Hahn sign.     CBD dilatation up to 13 mm, which could be secondary to cholecystitis, choledocholithiasis, or other obstruction.     Ill-defined 2.6 cm lesion in the region of the pancreatic head, which could represent a lymph node or pancreatic neoplasm.     RECOMMENDATIONS:  Contrast enhanced MRI/MRCP for further evaluation of the above findings    ERCP 12/30/22  - The major papilla appeared congested.                          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                          - A pancreatic sphincterotomy was performed.                          - One plastic stent was placed into the ventral                          pancreatic duct.                          - A single severe biliary stricture was found in                          the lower third of the main bile duct. The                          stricture was malignant appearing.                          - A biliary sphincterotomy was performed.                          - One covered metal stent was placed into the                          common bile duct.   Recommendation:        - Return patient to [facility] for ongoing care.                          - EUS findings concerning for metastatic                           pancreatic cancer (see separate EUS report  Upper EUS 12/30/22  Impression:            - A single metastatic lesion was found in the left                          lobe of the liver. Cytology results are pending.                          However, the endosonographic appearance is                          suggestive of metastatic pancreatic                          adenocarcinoma. Fine needle aspiration performed.                          - A mass was identified in the pancreatic head.                          Fine needle biopsy performed  Pathology 12/30/22    1. Pancreas, head, mass, EUS guided fine-needle aspiration:   - Positive for malignancy, adenocarcinoma, see comment.   2. Liver, EUS guided fine-needle aspiration:   - Positive for malignancy, adenocarcinoma, see comment.   COMMENT:  Immunostain for cytokeratin performed on specimen 1 shows an   infiltrative epithelial process.  Parts 1 and 2 of this case are reviewed by   Dr. XIAO Gan who agrees with the above diagnoses.  Appropriate positive   controls are examined.   Immunohistochemistry (IHC) Testing for Mismatch Repair (MMR) Proteins   performed on specimen 1:   MLH1 - Intact nuclear expression   MSH2 - Intact nuclear expression   MSH6 - Intact nuclear expression   PMS2 - Intact nuclear expression   Background nonneoplastic tissue/internal control with intact nuclear   expression   IHC Interpretation   No loss of nuclear expression of MMR proteins: low probability of   microsatellite instability   There are exceptions to the above IHC interpretations. These results should   not be considered in isolation, and clinical correlation with genetic   counseling is recommended to assess the need for germline testing.      Comment: Interp By Colleen Olmos MD, Signed on 01/10/2023 at 09:30      Latest Reference Range & Units Most Recent   CA 19-9 0.0 - 40.0 U/mL 5348.6 (H)  1/11/23 15:22   (H): Data is abnormally high      2- D echo 1/25/23   The left  ventricle is normal in size with concentric hypertrophy and normal systolic function.  The estimated ejection fraction is 65%.  Indeterminate left ventricular diastolic function.  Normal right ventricular size with normal right ventricular systolic function.  Mild left atrial enlargement.  Mild-to-moderate aortic regurgitation.  Mild tricuspid regurgitation.  Normal central venous pressure (3 mmHg).  The estimated PA systolic pressure is 33 mmHg.                 CT c/a/p w/contrast 6/1/23  Impression:     There is a ill-defined infiltrating pancreatic head mass.  This is difficult to measure but probably 2.8 cm in diameter.     Bile duct stent, pancreatic stent with pneumobilia.     Coronary calcifications, and bilateral renal cysts.     There is abnormal thickening at the rectosigmoid junction worrisome for neoplasm.  This could be a 2nd neoplasm/colon cancer.                   Component      Latest Ref Rng 4/13/2023 5/5/2023 6/9/2023 7/10/2023 8/4/2023 9/1/2023   CA 19-9      0.0 - 40.0 U/mL 3368.0 (H)  54781.2 (H)  >23945.0 (H)  96462.2 (H)  6129.3 (H)  7769.4 (H)      Component      Latest Ref Rng 9/27/2023   CA 19-9      0.0 - 40.0 U/mL 73807.3 (H)       Legend:  (H) High      CT c/a/p w/contrast 9/20/23   Impression:     1. Ill-defined, hypodense pancreatic head nodule is difficult to accurately assess/measure but not significantly changed.  2. Irregular, eccentric soft tissue thickening in the region of the rectosigmoid, not significantly changed.  3. Nodular interstitial coarsening at the left lung apex and 0.5-cm groundglass nodule in the right middle lobe, not significantly changed.         Assessment:         Diagnoses and all orders for this visit:    Malignant neoplasm of head of pancreas  Diagnosed 12/30/22  Pancreas, head, mass, EUS guided fine-needle aspiration: Positive for malignancy, adenocarcinoma  Liver, EUS guided fine-needle aspiration: - Positive for malignancy, adenocarcinoma  CA 19-9  5348U/mL on 1/11/23  Plan was for OP PANC NAB-PACLITAXEL + GEMCITABINE Q4W  S/p C1D8 gem/abraxane ( dose reduced) on 1/24/23    admitted on 12/5/23 with AMS.   Guardant 360 Details: ANDREA, BRAF V600E, JAK2 V617F  Plan switch therapy to DABRENIB AND TRAMETINIB   (with BRAF V600E mutation): DABRAFENIB Oral: 150 mg twice daily, approximately every 12 hours (in combination with trametinib); continue until disease progression or unacceptable toxicity.   BRAF V600E mutation: Oral: trametinib.  2 mg once daily (in combination with dabrafenib) until disease progression or unacceptable toxicity.   Pt tolerating therapy    CT imaging shows ill-defined infiltrating pancreatic head mass.  This is difficult to measure but probably 2.8 cm in diameter.abnormal thickening at the rectosigmoid junction worrisome for neoplasm.  This could be a 2nd neoplasm/colon cancer.  Follow up  with GI for repeat colonoscopy as planned   Labs reviewed  CT imaging 9/20/23 reviewed-stable findings  Cont current tx   Follow up in 1 month with labs   Relevant Orders   CANCER ANTIGEN 19-9     Liver metastases    Neoplasm related pain  Currently well controlled  Cont current regimen      Abnormal CT of the abdomen/Colon wall thickening  Colon wall thickening  CT imaging shows abnormal thickening at the rectosigmoid junction worrisome for neoplasm  Repeat colonoscopy planned in near future ?  Follow up with [facility]      Reactive thrombocytosis  Cont to monitor    Drug induced rash  Cont hydroxyzine prn  Rx for triamcinolone oint   Rx for short course prednisone    Reflux  Rx provided for PPI    Hypercalcemia  IVF in infusion ctr      Therapy induced nausea and vomiting  IVF hydration in infusion ctr  IV Dex 20mg x 1  Rx for alternate antiemetics, compazine provided     Hyperglycemia  Asymptomatic  Likely 2/2 therapy   Check HbA1c         Follow-up:       CBC,CMP, MAG TODAY   PLAN  IVF AND DECADRON 20MG AND IV COMPAZINE IV IN INFUSION CTR TOMORROW   FOLLOW UP  AS PLANNED    RX      I spent a total of  50 minutes on the day of the visit.This includes face to face time and non-face to face time preparing to see the patient (eg, review of tests), obtaining and/or reviewing separately obtained history, documenting clinical information in the electronic or other health record, independently interpreting results and communicating results to the patient/family/caregiver, and coordinating care.          Latesha Escobedo/[facility]

## 2023-10-12 ENCOUNTER — INFUSION (OUTPATIENT)
Dept: INFUSION THERAPY | Facility: HOSPITAL | Age: 73
DRG: 189 | End: 2023-10-12
Attending: INTERNAL MEDICINE
Payer: MEDICARE

## 2023-10-12 VITALS — SYSTOLIC BLOOD PRESSURE: 171 MMHG | OXYGEN SATURATION: 97 % | DIASTOLIC BLOOD PRESSURE: 79 MMHG | HEART RATE: 101 BPM

## 2023-10-12 DIAGNOSIS — C78.7 MALIGNANT NEOPLASM METASTATIC TO LIVER: ICD-10-CM

## 2023-10-12 DIAGNOSIS — C25.0 MALIGNANT NEOPLASM OF HEAD OF PANCREAS: Primary | ICD-10-CM

## 2023-10-12 DIAGNOSIS — Z79.899 HIGH RISK MEDICATION USE: ICD-10-CM

## 2023-10-12 PROCEDURE — 25000003 PHARM REV CODE 250: Performed by: INTERNAL MEDICINE

## 2023-10-12 PROCEDURE — 96374 THER/PROPH/DIAG INJ IV PUSH: CPT

## 2023-10-12 PROCEDURE — A4216 STERILE WATER/SALINE, 10 ML: HCPCS | Performed by: INTERNAL MEDICINE

## 2023-10-12 PROCEDURE — 96361 HYDRATE IV INFUSION ADD-ON: CPT

## 2023-10-12 PROCEDURE — 63600175 PHARM REV CODE 636 W HCPCS: Performed by: INTERNAL MEDICINE

## 2023-10-12 RX ORDER — DEXAMETHASONE SODIUM PHOSPHATE 4 MG/ML
8 INJECTION, SOLUTION INTRA-ARTICULAR; INTRALESIONAL; INTRAMUSCULAR; INTRAVENOUS; SOFT TISSUE
Status: COMPLETED | OUTPATIENT
Start: 2023-10-12 | End: 2023-10-12

## 2023-10-12 RX ORDER — SODIUM CHLORIDE 0.9 % (FLUSH) 0.9 %
10 SYRINGE (ML) INJECTION
Status: DISCONTINUED | OUTPATIENT
Start: 2023-10-12 | End: 2023-12-22 | Stop reason: HOSPADM

## 2023-10-12 RX ORDER — SODIUM CHLORIDE 0.9 % (FLUSH) 0.9 %
10 SYRINGE (ML) INJECTION
Status: CANCELLED | OUTPATIENT
Start: 2023-10-12

## 2023-10-12 RX ORDER — HEPARIN 100 UNIT/ML
500 SYRINGE INTRAVENOUS
Status: DISCONTINUED | OUTPATIENT
Start: 2023-10-12 | End: 2023-12-22 | Stop reason: HOSPADM

## 2023-10-12 RX ORDER — HEPARIN 100 UNIT/ML
500 SYRINGE INTRAVENOUS
Status: CANCELLED | OUTPATIENT
Start: 2023-10-12

## 2023-10-12 RX ADMIN — HEPARIN 500 UNITS: 100 SYRINGE at 02:10

## 2023-10-12 RX ADMIN — Medication 10 ML: at 02:10

## 2023-10-12 RX ADMIN — DEXAMETHASONE SODIUM PHOSPHATE 8 MG: 4 INJECTION INTRA-ARTICULAR; INTRALESIONAL; INTRAMUSCULAR; INTRAVENOUS; SOFT TISSUE at 02:10

## 2023-10-12 RX ADMIN — SODIUM CHLORIDE 1000 ML: 9 INJECTION, SOLUTION INTRAVENOUS at 02:10

## 2023-10-13 ENCOUNTER — TELEPHONE (OUTPATIENT)
Dept: HEMATOLOGY/ONCOLOGY | Facility: CLINIC | Age: 73
End: 2023-10-13
Payer: MEDICARE

## 2023-10-13 ENCOUNTER — HOSPITAL ENCOUNTER (INPATIENT)
Facility: HOSPITAL | Age: 73
LOS: 4 days | Discharge: HOME HEALTH | DRG: 189 | End: 2023-10-18
Attending: EMERGENCY MEDICINE | Admitting: HOSPITALIST
Payer: MEDICARE

## 2023-10-13 DIAGNOSIS — C78.7 MALIGNANT NEOPLASM METASTATIC TO LIVER: Primary | ICD-10-CM

## 2023-10-13 DIAGNOSIS — C25.0 MALIGNANT NEOPLASM OF HEAD OF PANCREAS: ICD-10-CM

## 2023-10-13 DIAGNOSIS — J18.9 PNEUMONIA OF LEFT LOWER LOBE DUE TO INFECTIOUS ORGANISM: ICD-10-CM

## 2023-10-13 DIAGNOSIS — R07.9 CHEST PAIN: ICD-10-CM

## 2023-10-13 DIAGNOSIS — R11.10 EMESIS: ICD-10-CM

## 2023-10-13 DIAGNOSIS — R11.10 INTRACTABLE VOMITING: ICD-10-CM

## 2023-10-13 PROBLEM — N17.9 AKI (ACUTE KIDNEY INJURY): Status: ACTIVE | Noted: 2023-10-13

## 2023-10-13 PROBLEM — E87.3 METABOLIC ALKALOSIS: Status: ACTIVE | Noted: 2023-10-13

## 2023-10-13 PROBLEM — R09.02 HYPOXIA: Status: ACTIVE | Noted: 2023-10-13

## 2023-10-13 PROBLEM — R09.02 HYPOXIA: Status: RESOLVED | Noted: 2023-10-13 | Resolved: 2023-10-13

## 2023-10-13 PROBLEM — Z72.0 TOBACCO ABUSE: Status: ACTIVE | Noted: 2023-07-17

## 2023-10-13 PROBLEM — E83.52 HYPERCALCEMIA: Status: ACTIVE | Noted: 2023-10-13

## 2023-10-13 LAB
ALBUMIN SERPL BCP-MCNC: 3.9 G/DL (ref 3.5–5.2)
ALLENS TEST: ABNORMAL
ALLENS TEST: ABNORMAL
ALP SERPL-CCNC: 127 U/L (ref 55–135)
ALT SERPL W/O P-5'-P-CCNC: 27 U/L (ref 10–44)
ANION GAP SERPL CALC-SCNC: 18 MMOL/L (ref 8–16)
ANION GAP SERPL CALC-SCNC: ABNORMAL MMOL/L
AST SERPL-CCNC: 37 U/L (ref 10–40)
BASOPHILS # BLD AUTO: 0.12 K/UL (ref 0–0.2)
BASOPHILS NFR BLD: 0.4 % (ref 0–1.9)
BILIRUB SERPL-MCNC: 0.5 MG/DL (ref 0.1–1)
BNP SERPL-MCNC: 149 PG/ML (ref 0–99)
BUN SERPL-MCNC: 29 MG/DL (ref 8–23)
BUN SERPL-MCNC: 42 MG/DL (ref 6–30)
CALCIUM SERPL-MCNC: 12 MG/DL (ref 8.7–10.5)
CHLORIDE SERPL-SCNC: 84 MMOL/L (ref 95–110)
CHLORIDE SERPL-SCNC: 86 MMOL/L (ref 95–110)
CO2 SERPL-SCNC: 41 MMOL/L (ref 23–29)
CREAT SERPL-MCNC: 1.7 MG/DL (ref 0.5–1.4)
CREAT SERPL-MCNC: 1.9 MG/DL (ref 0.5–1.4)
CREAT UR-MCNC: 61.4 MG/DL (ref 23–375)
CTP QC/QA: YES
CTP QC/QA: YES
DELSYS: ABNORMAL
DIFFERENTIAL METHOD: ABNORMAL
EOSINOPHIL # BLD AUTO: 0 K/UL (ref 0–0.5)
EOSINOPHIL NFR BLD: 0 % (ref 0–8)
ERYTHROCYTE [DISTWIDTH] IN BLOOD BY AUTOMATED COUNT: 16 % (ref 11.5–14.5)
EST. GFR  (NO RACE VARIABLE): 37 ML/MIN/1.73 M^2
GLUCOSE SERPL-MCNC: 163 MG/DL (ref 70–110)
GLUCOSE SERPL-MCNC: 164 MG/DL (ref 70–110)
HCO3 UR-SCNC: 51.4 MMOL/L (ref 24–28)
HCT VFR BLD AUTO: 45.3 % (ref 40–54)
HCT VFR BLD CALC: 49 %PCV (ref 36–54)
HGB BLD-MCNC: 14.8 G/DL (ref 14–18)
IMM GRANULOCYTES # BLD AUTO: 0.19 K/UL (ref 0–0.04)
IMM GRANULOCYTES NFR BLD AUTO: 0.7 % (ref 0–0.5)
LIPASE SERPL-CCNC: 17 U/L (ref 4–60)
LYMPHOCYTES # BLD AUTO: 2.1 K/UL (ref 1–4.8)
LYMPHOCYTES NFR BLD: 7.3 % (ref 18–48)
MCH RBC QN AUTO: 29.8 PG (ref 27–31)
MCHC RBC AUTO-ENTMCNC: 32.7 G/DL (ref 32–36)
MCV RBC AUTO: 91 FL (ref 82–98)
MONOCYTES # BLD AUTO: 3.6 K/UL (ref 0.3–1)
MONOCYTES NFR BLD: 12.3 % (ref 4–15)
NEUTROPHILS # BLD AUTO: 23 K/UL (ref 1.8–7.7)
NEUTROPHILS NFR BLD: 79.3 % (ref 38–73)
NRBC BLD-RTO: 0 /100 WBC
PCO2 BLDA: 63.1 MMHG (ref 35–45)
PH SMN: 7.52 [PH] (ref 7.35–7.45)
PLATELET # BLD AUTO: 557 K/UL (ref 150–450)
PMV BLD AUTO: 9.4 FL (ref 9.2–12.9)
PO2 BLDA: 13 MMHG (ref 40–60)
POC BE: 24 MMOL/L
POC IONIZED CALCIUM: 1.24 MMOL/L (ref 1.06–1.42)
POC MOLECULAR INFLUENZA A AGN: NEGATIVE
POC MOLECULAR INFLUENZA B AGN: NEGATIVE
POC SATURATED O2: 17 % (ref 95–100)
POC TCO2 (MEASURED): >50 MMOL/L (ref 23–29)
POC TCO2: >50 MMOL/L (ref 24–29)
POTASSIUM BLD-SCNC: 3.5 MMOL/L (ref 3.5–5.1)
POTASSIUM SERPL-SCNC: 4.1 MMOL/L (ref 3.5–5.1)
PROT SERPL-MCNC: 9.2 G/DL (ref 6–8.4)
PTH-INTACT SERPL-MCNC: 20.9 PG/ML (ref 9–77)
RBC # BLD AUTO: 4.96 M/UL (ref 4.6–6.2)
SAMPLE: ABNORMAL
SAMPLE: ABNORMAL
SARS-COV-2 RDRP RESP QL NAA+PROBE: NEGATIVE
SITE: ABNORMAL
SITE: ABNORMAL
SODIUM BLD-SCNC: 141 MMOL/L (ref 136–145)
SODIUM SERPL-SCNC: 145 MMOL/L (ref 136–145)
SODIUM UR-SCNC: 94 MMOL/L (ref 20–250)
TROPONIN I SERPL DL<=0.01 NG/ML-MCNC: 0.01 NG/ML (ref 0–0.03)
TROPONIN I SERPL DL<=0.01 NG/ML-MCNC: 0.01 NG/ML (ref 0–0.03)
WBC # BLD AUTO: 28.95 K/UL (ref 3.9–12.7)

## 2023-10-13 PROCEDURE — 93005 ELECTROCARDIOGRAM TRACING: CPT

## 2023-10-13 PROCEDURE — 87635 SARS-COV-2 COVID-19 AMP PRB: CPT | Performed by: HOSPITALIST

## 2023-10-13 PROCEDURE — 93010 ELECTROCARDIOGRAM REPORT: CPT | Mod: ,,, | Performed by: INTERNAL MEDICINE

## 2023-10-13 PROCEDURE — 82330 ASSAY OF CALCIUM: CPT

## 2023-10-13 PROCEDURE — 63600175 PHARM REV CODE 636 W HCPCS: Performed by: STUDENT IN AN ORGANIZED HEALTH CARE EDUCATION/TRAINING PROGRAM

## 2023-10-13 PROCEDURE — 82565 ASSAY OF CREATININE: CPT

## 2023-10-13 PROCEDURE — 84295 ASSAY OF SERUM SODIUM: CPT

## 2023-10-13 PROCEDURE — G0378 HOSPITAL OBSERVATION PER HR: HCPCS

## 2023-10-13 PROCEDURE — 25000003 PHARM REV CODE 250: Performed by: STUDENT IN AN ORGANIZED HEALTH CARE EDUCATION/TRAINING PROGRAM

## 2023-10-13 PROCEDURE — 93010 EKG 12-LEAD: ICD-10-PCS | Mod: ,,, | Performed by: INTERNAL MEDICINE

## 2023-10-13 PROCEDURE — 25000242 PHARM REV CODE 250 ALT 637 W/ HCPCS: Performed by: HOSPITALIST

## 2023-10-13 PROCEDURE — 82570 ASSAY OF URINE CREATININE: CPT | Performed by: PHYSICIAN ASSISTANT

## 2023-10-13 PROCEDURE — 83970 ASSAY OF PARATHORMONE: CPT | Performed by: STUDENT IN AN ORGANIZED HEALTH CARE EDUCATION/TRAINING PROGRAM

## 2023-10-13 PROCEDURE — 82803 BLOOD GASES ANY COMBINATION: CPT

## 2023-10-13 PROCEDURE — 83690 ASSAY OF LIPASE: CPT | Performed by: STUDENT IN AN ORGANIZED HEALTH CARE EDUCATION/TRAINING PROGRAM

## 2023-10-13 PROCEDURE — 83880 ASSAY OF NATRIURETIC PEPTIDE: CPT | Performed by: PHYSICIAN ASSISTANT

## 2023-10-13 PROCEDURE — 99215 OFFICE O/P EST HI 40 MIN: CPT | Mod: ,,, | Performed by: STUDENT IN AN ORGANIZED HEALTH CARE EDUCATION/TRAINING PROGRAM

## 2023-10-13 PROCEDURE — 99900035 HC TECH TIME PER 15 MIN (STAT)

## 2023-10-13 PROCEDURE — 84484 ASSAY OF TROPONIN QUANT: CPT | Mod: 91 | Performed by: PHYSICIAN ASSISTANT

## 2023-10-13 PROCEDURE — 84300 ASSAY OF URINE SODIUM: CPT | Performed by: PHYSICIAN ASSISTANT

## 2023-10-13 PROCEDURE — 85025 COMPLETE CBC W/AUTO DIFF WBC: CPT | Performed by: STUDENT IN AN ORGANIZED HEALTH CARE EDUCATION/TRAINING PROGRAM

## 2023-10-13 PROCEDURE — 94640 AIRWAY INHALATION TREATMENT: CPT

## 2023-10-13 PROCEDURE — 85014 HEMATOCRIT: CPT

## 2023-10-13 PROCEDURE — 84132 ASSAY OF SERUM POTASSIUM: CPT

## 2023-10-13 PROCEDURE — 63600175 PHARM REV CODE 636 W HCPCS: Performed by: PHYSICIAN ASSISTANT

## 2023-10-13 PROCEDURE — 94760 N-INVAS EAR/PLS OXIMETRY 1: CPT

## 2023-10-13 PROCEDURE — 80053 COMPREHEN METABOLIC PANEL: CPT | Performed by: STUDENT IN AN ORGANIZED HEALTH CARE EDUCATION/TRAINING PROGRAM

## 2023-10-13 PROCEDURE — 99215 PR OFFICE/OUTPT VISIT, EST, LEVL V, 40-54 MIN: ICD-10-PCS | Mod: ,,, | Performed by: STUDENT IN AN ORGANIZED HEALTH CARE EDUCATION/TRAINING PROGRAM

## 2023-10-13 RX ORDER — LIDOCAINE HYDROCHLORIDE 20 MG/ML
15 SOLUTION OROPHARYNGEAL ONCE
Status: COMPLETED | OUTPATIENT
Start: 2023-10-13 | End: 2023-10-13

## 2023-10-13 RX ORDER — METHYLPREDNISOLONE SOD SUCC 125 MG
125 VIAL (EA) INJECTION EVERY 12 HOURS
Status: DISCONTINUED | OUTPATIENT
Start: 2023-10-13 | End: 2023-10-15

## 2023-10-13 RX ORDER — SODIUM CHLORIDE, SODIUM LACTATE, POTASSIUM CHLORIDE, CALCIUM CHLORIDE 600; 310; 30; 20 MG/100ML; MG/100ML; MG/100ML; MG/100ML
INJECTION, SOLUTION INTRAVENOUS CONTINUOUS
Status: DISCONTINUED | OUTPATIENT
Start: 2023-10-13 | End: 2023-10-15

## 2023-10-13 RX ORDER — NALOXONE HCL 0.4 MG/ML
0.02 VIAL (ML) INJECTION
Status: DISCONTINUED | OUTPATIENT
Start: 2023-10-13 | End: 2023-10-18 | Stop reason: HOSPADM

## 2023-10-13 RX ORDER — IPRATROPIUM BROMIDE AND ALBUTEROL SULFATE 2.5; .5 MG/3ML; MG/3ML
3 SOLUTION RESPIRATORY (INHALATION) EVERY 6 HOURS
Status: DISCONTINUED | OUTPATIENT
Start: 2023-10-13 | End: 2023-10-15

## 2023-10-13 RX ORDER — LANOLIN ALCOHOL/MO/W.PET/CERES
800 CREAM (GRAM) TOPICAL
Status: DISCONTINUED | OUTPATIENT
Start: 2023-10-13 | End: 2023-10-15

## 2023-10-13 RX ORDER — SODIUM CHLORIDE 0.9 % (FLUSH) 0.9 %
10 SYRINGE (ML) INJECTION EVERY 8 HOURS
Status: DISCONTINUED | OUTPATIENT
Start: 2023-10-13 | End: 2023-10-13

## 2023-10-13 RX ORDER — IPRATROPIUM BROMIDE AND ALBUTEROL SULFATE 2.5; .5 MG/3ML; MG/3ML
3 SOLUTION RESPIRATORY (INHALATION) EVERY 6 HOURS
Status: DISCONTINUED | OUTPATIENT
Start: 2023-10-13 | End: 2023-10-13

## 2023-10-13 RX ORDER — IBUPROFEN 200 MG
24 TABLET ORAL
Status: DISCONTINUED | OUTPATIENT
Start: 2023-10-13 | End: 2023-10-18 | Stop reason: HOSPADM

## 2023-10-13 RX ORDER — GLUCAGON 1 MG
1 KIT INJECTION
Status: DISCONTINUED | OUTPATIENT
Start: 2023-10-13 | End: 2023-10-18 | Stop reason: HOSPADM

## 2023-10-13 RX ORDER — MAG HYDROX/ALUMINUM HYD/SIMETH 200-200-20
30 SUSPENSION, ORAL (FINAL DOSE FORM) ORAL ONCE
Status: COMPLETED | OUTPATIENT
Start: 2023-10-13 | End: 2023-10-13

## 2023-10-13 RX ORDER — IBUPROFEN 200 MG
16 TABLET ORAL
Status: DISCONTINUED | OUTPATIENT
Start: 2023-10-13 | End: 2023-10-18 | Stop reason: HOSPADM

## 2023-10-13 RX ORDER — TALC
6 POWDER (GRAM) TOPICAL NIGHTLY PRN
Status: DISCONTINUED | OUTPATIENT
Start: 2023-10-13 | End: 2023-10-18 | Stop reason: HOSPADM

## 2023-10-13 RX ORDER — ONDANSETRON 2 MG/ML
4 INJECTION INTRAMUSCULAR; INTRAVENOUS
Status: COMPLETED | OUTPATIENT
Start: 2023-10-13 | End: 2023-10-13

## 2023-10-13 RX ORDER — ENOXAPARIN SODIUM 100 MG/ML
40 INJECTION SUBCUTANEOUS EVERY 24 HOURS
Status: DISCONTINUED | OUTPATIENT
Start: 2023-10-14 | End: 2023-10-18 | Stop reason: HOSPADM

## 2023-10-13 RX ORDER — AMOXICILLIN 250 MG
1 CAPSULE ORAL DAILY PRN
Status: DISCONTINUED | OUTPATIENT
Start: 2023-10-13 | End: 2023-10-18 | Stop reason: HOSPADM

## 2023-10-13 RX ORDER — SODIUM CHLORIDE 9 MG/ML
1000 INJECTION, SOLUTION INTRAVENOUS
Status: COMPLETED | OUTPATIENT
Start: 2023-10-13 | End: 2023-10-13

## 2023-10-13 RX ORDER — ONDANSETRON 2 MG/ML
4 INJECTION INTRAMUSCULAR; INTRAVENOUS EVERY 6 HOURS PRN
Status: DISCONTINUED | OUTPATIENT
Start: 2023-10-13 | End: 2023-10-18 | Stop reason: HOSPADM

## 2023-10-13 RX ORDER — ACETAMINOPHEN 325 MG/1
650 TABLET ORAL EVERY 4 HOURS PRN
Status: DISCONTINUED | OUTPATIENT
Start: 2023-10-13 | End: 2023-10-18 | Stop reason: HOSPADM

## 2023-10-13 RX ORDER — SODIUM CHLORIDE 0.9 % (FLUSH) 0.9 %
10 SYRINGE (ML) INJECTION
Status: DISCONTINUED | OUTPATIENT
Start: 2023-10-13 | End: 2023-10-18 | Stop reason: HOSPADM

## 2023-10-13 RX ADMIN — ONDANSETRON 4 MG: 2 INJECTION INTRAMUSCULAR; INTRAVENOUS at 12:10

## 2023-10-13 RX ADMIN — IPRATROPIUM BROMIDE AND ALBUTEROL SULFATE 3 ML: 2.5; .5 SOLUTION RESPIRATORY (INHALATION) at 04:10

## 2023-10-13 RX ADMIN — SODIUM CHLORIDE 1000 ML: 9 INJECTION, SOLUTION INTRAVENOUS at 02:10

## 2023-10-13 RX ADMIN — METHYLPREDNISOLONE SODIUM SUCCINATE 125 MG: 125 INJECTION, POWDER, FOR SOLUTION INTRAMUSCULAR; INTRAVENOUS at 04:10

## 2023-10-13 RX ADMIN — SODIUM CHLORIDE, POTASSIUM CHLORIDE, SODIUM LACTATE AND CALCIUM CHLORIDE: 600; 310; 30; 20 INJECTION, SOLUTION INTRAVENOUS at 10:10

## 2023-10-13 RX ADMIN — ALUMINUM HYDROXIDE, MAGNESIUM HYDROXIDE, AND DIMETHICONE 30 ML: 200; 20; 200 SUSPENSION ORAL at 02:10

## 2023-10-13 RX ADMIN — SODIUM CHLORIDE, POTASSIUM CHLORIDE, SODIUM LACTATE AND CALCIUM CHLORIDE: 600; 310; 30; 20 INJECTION, SOLUTION INTRAVENOUS at 06:10

## 2023-10-13 RX ADMIN — SODIUM CHLORIDE, SODIUM LACTATE, POTASSIUM CHLORIDE, AND CALCIUM CHLORIDE 1000 ML: .6; .31; .03; .02 INJECTION, SOLUTION INTRAVENOUS at 11:10

## 2023-10-13 RX ADMIN — LIDOCAINE HYDROCHLORIDE 15 ML: 20 SOLUTION ORAL at 02:10

## 2023-10-13 NOTE — ASSESSMENT & PLAN NOTE
Presents with pH of 7.519 and bicarb of 51 on VBG. Suspect related to continued vomiting.   Started on IVF as above  Repeat CMP

## 2023-10-13 NOTE — ASSESSMENT & PLAN NOTE
Advance Care Planning     Date: 10/13/2023    Code Status  In light of the patients advanced and life limiting illness,I engaged the the patient in a voluntary conversation about the patient's preferences for care  at the very end of life. The patient wishes to have a natural, peaceful death.  Along those lines, the patient does not wish to have CPR or other invasive treatments performed when his heart and/or breathing stops. I communicated to the patient that a DNR order would be placed in his medical record to reflect this preference.  I spent a total of 16 minutes engaging the patient in this advance care planning discussion.

## 2023-10-13 NOTE — ED TRIAGE NOTES
Pt presents to the ED for CC of vomiting x1 week. Pt was recently seen in the ED for the same complaint and was given fluids. Pt was also given fluids on Wens. Pt has pancreatic cancer and is currently taking chemo pills. Pt has not taken the chemo pills since Monday bc of the vomiting.  Pt also complains of acid reflux. Pt denies diarrhea, fever, chills, SOB, chest pain, cough, sore throat. Pt is aoox and in no apparent distress at this time.

## 2023-10-13 NOTE — CONSULTS
Hematology- Oncology Consult Note :     10/13/2023    Reason for consult: pancreatic cancer with intractable N/V    HPI    Pt presented to the  ED due to vomiting of 1 week duration. Of note pt has been seen in past for similar issues. Pt has pancreatic cancer and follows with Dr. Carver and is currently taking chemo pills (trametinib) but he has not taken the them since Monday due to the vomiting. Pt presented to the ED for fluids and nausea control.  Oncology consulted for further recs.                  Active Problem List with Overview Notes    Diagnosis Date Noted    Metabolic alkalosis 10/13/2023    Hypercalcemia 10/13/2023    VINCENT (acute kidney injury) 10/13/2023    Tobacco abuse 07/17/2023    Alcohol abuse, uncomplicated 07/17/2023    History of COVID-19 07/14/2023    Acute hypoxemic respiratory failure 07/14/2023    Neoplasm related pain 02/07/2023    DNR (do not resuscitate) 01/30/2023     Per patient is a DNR and was prior to admit      ACP (advance care planning) 01/29/2023    (HFpEF) heart failure with preserved ejection fraction 01/25/2023    Malignant neoplasm of head of pancreas 01/18/2023    Liver metastases 01/18/2023     IMO Regualtory Update 4/1/23      Primary hypertension 12/27/2022    Hyperlipidemia 12/27/2022    Elevated LFTs 12/27/2022       Patient Active Problem List    Diagnosis Date Noted    Metabolic alkalosis 10/13/2023    Hypercalcemia 10/13/2023    VINCENT (acute kidney injury) 10/13/2023    Tobacco abuse 07/17/2023    Alcohol abuse, uncomplicated 07/17/2023    History of COVID-19 07/14/2023    Acute hypoxemic respiratory failure 07/14/2023    Neoplasm related pain 02/07/2023    DNR (do not resuscitate) 01/30/2023    ACP (advance care planning) 01/29/2023    (HFpEF) heart failure with preserved ejection fraction 01/25/2023    Malignant neoplasm of head of pancreas 01/18/2023    Liver metastases 01/18/2023    Primary hypertension 12/27/2022    Hyperlipidemia 12/27/2022    Elevated LFTs  12/27/2022     Past Medical History:   Diagnosis Date    (HFpEF) heart failure with preserved ejection fraction 1/25/2023    Alcohol abuse 12/27/2022    Hyperlipidemia     Hypertension     Liver metastases 1/18/2023    Malignant neoplasm of head of pancreas 1/18/2023    Other specified noninfective gastroenteritis and colitis     Pancreatitis     Personal history of colonic polyps       Past Surgical History:   Procedure Laterality Date    COLONOSCOPY      COLONOSCOPY N/A 08/07/2023    Procedure: COLONOSCOPY;  Surgeon: Kong Estrella MD;  Location: [facility] ENDO;  Service: Endoscopy;  Laterality: N/A;  6/22 Instructions sent Via portal    COLONOSCOPY N/A 08/08/2023    Procedure: COLONOSCOPY;  Surgeon: Mariia Luevano MD;  Location: [facility] ENDO;  Service: Endoscopy;  Laterality: N/A;    ENDOSCOPIC ULTRASOUND OF UPPER GASTROINTESTINAL TRACT Left 12/30/2022    Procedure: ULTRASOUND, UPPER GI TRACT, ENDOSCOPIC;  Surgeon: Gregory Cristina MD;  Location: [facility] ENDO (2ND FLR);  Service: Endoscopy;  Laterality: Left;    ERCP Left 12/30/2022    Procedure: ERCP (ENDOSCOPIC RETROGRADE CHOLANGIOPANCREATOGRAPHY);  Surgeon: Gregory Cristina MD;  Location: [facility] ENDO (2ND FLR);  Service: Endoscopy;  Laterality: Left;    FOOT SURGERY Left     FRACTURE SURGERY  10/23/1998    INSERTION OF TUNNELED CENTRAL VENOUS CATHETER (CVC) WITH SUBCUTANEOUS PORT Bilateral 01/19/2023    Procedure: [id number];  Surgeon: Amador Castellon MD;  Location: [facility] OR;  Service: General;  Laterality: Bilateral;  Right v Left PORTACATH. needs ultrasound and fluoro  RN PREOP 01/18/2023    TONSILLECTOMY      VASECTOMY  10/25/1983      (Not in a hospital admission)    Review of patient's allergies indicates:   Allergen Reactions    Jakafi [ruxolitinib]       Social History     Tobacco Use    Smoking status: Every Day     Current packs/day: 0.50     Average packs/day: 0.5 packs/day for 55.7 years (27.9 ttl pk-yrs)     Types: Cigarettes     Start date: 1/15/1968     Smokeless tobacco: Former     Types: Chew   Substance Use Topics    Alcohol use: Yes     Alcohol/week: 14.0 standard drinks of alcohol     Types: 14 Cans of beer per week     Comment: last drink 1-2 months ago      Family History   Problem Relation Age of Onset    Stroke Mother     Breast cancer Mother     Hypertension Mother     Arthritis Mother     Skin cancer Father 69    COPD Father     Arthritis Father     Hypertension Father     Cancer Father 69        Spleen    Drug abuse Brother         Review of Systems :  Review of Systems   Constitutional:  Positive for malaise/fatigue. Negative for chills, fever and weight loss.   HENT:  Negative for congestion and hearing loss.    Eyes:  Negative for blurred vision and double vision.   Respiratory:  Positive for shortness of breath. Negative for cough and sputum production.    Cardiovascular:  Negative for chest pain and leg swelling.   Gastrointestinal:  Positive for abdominal pain, heartburn, nausea and vomiting. Negative for blood in stool, constipation, diarrhea and melena.   Genitourinary:  Negative for dysuria and hematuria.   Musculoskeletal:  Negative for joint pain and myalgias.   Skin:  Negative for itching and rash.   Neurological:  Negative for dizziness.   Endo/Heme/Allergies:  Does not bruise/bleed easily.   Psychiatric/Behavioral:  Negative for depression. The patient is not nervous/anxious.        Physical Exam :  Wt Readings from Last 3 Encounters:   10/13/23 62.6 kg (138 lb)   10/11/23 63.5 kg (140 lb)   10/11/23 63.6 kg (140 lb 3.4 oz)     Temp Readings from Last 3 Encounters:   10/13/23 98 °F (36.7 °C)   10/11/23 98.5 °F (36.9 °C) (Oral)   09/21/23 97.7 °F (36.5 °C)     BP Readings from Last 3 Encounters:   10/13/23 (!) 194/91   10/12/23 (!) 171/79   10/11/23 (!) 144/76     Pulse Readings from Last 3 Encounters:   10/13/23 86   10/12/23 101   10/11/23 90     Body mass index is 20.98 kg/m².    Physical Exam  Vitals reviewed.   Constitutional:        General: He is not in acute distress.     Appearance: He is ill-appearing.   HENT:      Head: Normocephalic and atraumatic.      Comments: NCO2     Mouth/Throat:      Mouth: Mucous membranes are moist.   Eyes:      Pupils: Pupils are equal, round, and reactive to light.   Cardiovascular:      Rate and Rhythm: Normal rate and regular rhythm.      Heart sounds: Normal heart sounds.   Pulmonary:      Effort: Pulmonary effort is normal.   Abdominal:      General: Abdomen is flat.   Musculoskeletal:         General: Normal range of motion.      Cervical back: Normal range of motion and neck supple.   Skin:     General: Skin is warm and dry.   Neurological:      Mental Status: He is alert. Mental status is at baseline.   Psychiatric:         Mood and Affect: Mood normal.         Behavior: Behavior normal.           Pertinent Diagnostic studies:    Recent Results (from the past 24 hour(s))   CBC W/ AUTO DIFFERENTIAL    Collection Time: 10/13/23 11:50 AM   Result Value Ref Range    WBC 28.95 (H) 3.90 - 12.70 K/uL    RBC 4.96 4.60 - 6.20 M/uL    Hemoglobin 14.8 14.0 - 18.0 g/dL    Hematocrit 45.3 40.0 - 54.0 %    MCV 91 82 - 98 fL    MCH 29.8 27.0 - 31.0 pg    MCHC 32.7 32.0 - 36.0 g/dL    RDW 16.0 (H) 11.5 - 14.5 %    Platelets 557 (H) 150 - 450 K/uL    MPV 9.4 9.2 - 12.9 fL    Immature Granulocytes 0.7 (H) 0.0 - 0.5 %    Gran # (ANC) 23.0 (H) 1.8 - 7.7 K/uL    Immature Grans (Abs) 0.19 (H) 0.00 - 0.04 K/uL    Lymph # 2.1 1.0 - 4.8 K/uL    Mono # 3.6 (H) 0.3 - 1.0 K/uL    Eos # 0.0 0.0 - 0.5 K/uL    Baso # 0.12 0.00 - 0.20 K/uL    nRBC 0 0 /100 WBC    Gran % 79.3 (H) 38.0 - 73.0 %    Lymph % 7.3 (L) 18.0 - 48.0 %    Mono % 12.3 4.0 - 15.0 %    Eosinophil % 0.0 0.0 - 8.0 %    Basophil % 0.4 0.0 - 1.9 %    Differential Method Automated    Comp. Metabolic Panel    Collection Time: 10/13/23 11:50 AM   Result Value Ref Range    Sodium 145 136 - 145 mmol/L    Potassium 4.1 3.5 - 5.1 mmol/L    Chloride 86 (L) 95 - 110 mmol/L     CO2 41 (HH) 23 - 29 mmol/L    Glucose 163 (H) 70 - 110 mg/dL    BUN 29 (H) 8 - 23 mg/dL    Creatinine 1.9 (H) 0.5 - 1.4 mg/dL    Calcium 12.0 (H) 8.7 - 10.5 mg/dL    Total Protein 9.2 (H) 6.0 - 8.4 g/dL    Albumin 3.9 3.5 - 5.2 g/dL    Total Bilirubin 0.5 0.1 - 1.0 mg/dL    Alkaline Phosphatase 127 55 - 135 U/L    AST 37 10 - 40 U/L    ALT 27 10 - 44 U/L    eGFR 37 (A) >60 mL/min/1.73 m^2    Anion Gap 18 (H) 8 - 16 mmol/L   Lipase    Collection Time: 10/13/23 11:50 AM   Result Value Ref Range    Lipase 17 4 - 60 U/L   ISTAT PROCEDURE    Collection Time: 10/13/23 11:50 AM   Result Value Ref Range    POC Glucose 164 (H) 70 - 110 mg/dL    POC BUN 42 (H) 6 - 30 mg/dL    POC Creatinine 1.7 (H) 0.5 - 1.4 mg/dL    POC Sodium 141 136 - 145 mmol/L    POC Potassium 3.5 3.5 - 5.1 mmol/L    POC Chloride 84 (L) 95 - 110 mmol/L    POC TCO2 (MEASURED) >50 (H) 23 - 29 mmol/L    POC Anion Gap UNAVAILABLE mmol/L    POC Ionized Calcium 1.24 1.06 - 1.42 mmol/L    POC Hematocrit 49 36 - 54 %PCV    Sample VENOUS     Site Other     Allens Test N/A    ISTAT PROCEDURE    Collection Time: 10/13/23  1:13 PM   Result Value Ref Range    POC PH 7.519 (H) 7.35 - 7.45    POC PCO2 63.1 (H) 35 - 45 mmHg    POC PO2 13 (LL) 40 - 60 mmHg    POC HCO3 51.4 (H) 24 - 28 mmol/L    POC BE 24 -2 to 2 mmol/L    POC SATURATED O2 17 95 - 100 %    POC TCO2 >50 (H) 24 - 29 mmol/L    Sample VENOUS     Site Other     Allens Test N/A     DelSys Room Air        Assessment/Recs :       Pancreatic cancer pt and intractable N/V  -Ok to hold chemo pills (trametinib) for now  -Continue supportive care with nausea meds and fluids  -Start with IV zofran and or phenergan and transition to PO nausea meds as his condition improves  -Continue O2 PRN  -If N/V not improved by tomorrow consider GI consult  -Pt has oncology follow up on 10/26/23          Please contact Oncology clinic once discharged from the hospital to ensure follow up.      Time spent on case: 30  minutes      Thank you for this consult, please contact us for any questions or concerns.      Keisha Tobar MD   Hematology/oncology, [facility] [place]

## 2023-10-13 NOTE — TELEPHONE ENCOUNTER
C'd from spouse of pt stating he has been having vomiting X 4-5 days and excessive belching.  He was seen by Dr Carver 2 days ago and sent to infusion for IVF's etc  The vomiting has not stopped She denies fever no cough no other symptoms excessive above   Above discussed w/ Dr Tobar  Advise pt to present to Er for an evaluation   Pt's spouse advised of such She will present now w/ pt

## 2023-10-13 NOTE — ASSESSMENT & PLAN NOTE
"Presents with calcium of 12.0. suspect related to volume depletion from intractable vomiting. Previous normal 2 weeks ago.   Started on IVF  Will check PTH    The patient has hypercalcemia that is currently uncontrolled. The patient has the following symptoms due to their hypercalcemia: None (asymptomatic). The hypercalcemia is likely due to Malignancy and intractable vomiting. We will obtain the following labs to work up the hypercalcemia: PTH No results found for: "PTH" . We will treat the hypercalcemia with: IV fluids ordered at a rate of 100 ml/hr. Their latest calcium has been reviewed and is listed below.  No results found for: "CAION"  "

## 2023-10-13 NOTE — ASSESSMENT & PLAN NOTE
See above. Followed by oncology and on oral chemo outpatient.   Home oral tafinlar/mekinist held due to inability to tolerate PO

## 2023-10-13 NOTE — ASSESSMENT & PLAN NOTE
Found to be hypoxic to 83% on RA in the ED, despite multiple probe changes and moving probe to forehead hypoxia remained. Continues to smoke and previous CT with emphysema. Possibly component of baseline hypoxia. Po2 low on VBG as well.  Will start steroids/duo-nebs for possible COPD exacerbation  Will continue IVF as above for VINCENT and consider CTA if no improvement in hypoxia  Check BNP/troponin  Wean supplemental oxygen  Check COVID/Flu      Patient with Hypoxic Respiratory failure which is Acute.  he is not on home oxygen. Supplemental oxygen was provided and noted-      .   Signs/symptoms of respiratory failure include- lethargy. Contributing diagnoses includes - COPD Labs and images were reviewed. Patient Has not had a recent ABG. Will treat underlying causes and adjust management of respiratory failure as follows- started on steroids/duo-nebs for possible COPD exacerbation

## 2023-10-13 NOTE — ASSESSMENT & PLAN NOTE
Followed by oncology outpatient. History of Stage IV cancer on oral chemo. LFTs within normal limits today.   Home oral chemo held due to inability to tolerate PO

## 2023-10-13 NOTE — ASSESSMENT & PLAN NOTE
Presents with Cr of 1.9 baseline of 0.9 to 1.3. suspect related to intractable nausea and vomiting.   Started on IVF as above  Will check UA and Fena  Renal dose medications, avoid nephrotoxic drugs, monitor intake and output      Patient with acute kidney injury/acute renal failure likely due to pre-renal azotemia due to dehydration VINCENT is currently stable. Baseline creatinine 0.9 to 1.3 - Labs reviewed- Renal function/electrolytes with Estimated Creatinine Clearance: 31.6 mL/min (A) (based on SCr of 1.9 mg/dL (H)). according to latest data. Monitor urine output and serial BMP and adjust therapy as needed. Avoid nephrotoxins and renally dose meds for GFR listed above.

## 2023-10-13 NOTE — ASSESSMENT & PLAN NOTE
Echo 1/2023 with EF of 65% and indeterminate diastolic dysfunction.   On IVF for VINCENT as above  Daily weights/monitor intake and output/telemetry

## 2023-10-13 NOTE — ED NOTES
Called to bedside by tech. SPO2 noted to be 83% on RA, SPO2 monitoring correlating with good waveform. Pt denies CP, SOB. O2 2L/NC applied SPO2 increased to 89%, increased to 3L/NC spo2 increased to 91%. O2 increasedto 4L/NC and spo2 now 93-94%. CHRISTIANO Braxton notified and at BS for eval.

## 2023-10-13 NOTE — ASSESSMENT & PLAN NOTE
Smokes 1/2ppd greater than 3 minutes spent counseling patient on dangers of continued tobacco abuse. Will provide tobacco cessation education prior to discharge

## 2023-10-13 NOTE — HPI
Manuel Tyler 72 y.o. male with pancreatic cancer on chemo tobacco abuse presents to the hospital with a chief complaint of intractable vomiting.  He reports 4 days of intractable nausea and vomiting with associated burning chest pain.  He attempted treatment home with anti acid medication without improvement.  He was seen in the infusion center yesterday and received IV steroids and IV fluids without improvement.  Reports he has been unable to take his oral chemo due to intractable vomiting.  He smokes 1/2 pack per day.  He denies fever leg swelling melena hematuria hematemesis dizziness syncope shortness breath and cough.  He reports chronic upper abdominal pain that he attributes to his pancreatic cancer.    In the ED, hypoxic to 83% despite changing oxygen probes and proper location.  Chest x-ray without acute process, metabolic alkalosis with pH, creatinine 7.5 on VBG CO2 40 white blood cell count 28 point calcium 12.0.   No assistance needed

## 2023-10-13 NOTE — ASSESSMENT & PLAN NOTE
Found to be hypoxic to 83% on RA in the ED, despite multiple probe changes and moving probe to forehead hypoxia remained. Continues to smoke and previous CT with emphysema. Possibly component of baseline hypoxia. Po2 low on VBG as well. Aspiration pneumonitis possible given vomiting, will hold starting antibiotics as no infiltrate on x-ray  Continue steroids/duo-nebs for possible COPD exacerbation  Continue IVF as above for VINCENT and consider CTA if no improvement in hypoxia  BNP/troponin 149/0.008 respectively  Wean supplemental oxygen  COVID/Flu negative      Patient with Hypoxic Respiratory failure which is Acute.  he is not on home oxygen. Supplemental oxygen was provided and noted-  currently on 3L NC O2 w/SpO2 88-96% in the last 24°.     Signs/symptoms of respiratory failure include- lethargy. Contributing diagnoses includes - COPD Labs and images were reviewed. Patient Has not had a recent ABG. Will treat underlying causes and adjust management of respiratory failure as follows- started on steroids/duo-nebs for possible COPD exacerbation    -Symptoms seem to have improved overnight.  Will attempt to wean O2, 6 min walk test to assess need for home O2.

## 2023-10-13 NOTE — PLAN OF CARE
Case Management Assessment     PCP: Fatmata Vera  Pharmacy: [facility] on [address]    Patient Arrived From: home  Existing Help at Home: spouse    Barriers to Discharge: none    Discharge Plan:    A. Resume Home Health   B. Home with family      SW completed brief assessment and discussed discharge planning with patient and his spouse at his bedside. Patient is currently receiving  services through [employer] and would like to resume upon discharge. Patient's spouse will provide transportation for him to get home when discharge from the hospital.      10/13/23 1516   Discharge Planning   Assessment Type Discharge Planning Brief Assessment   Resource/Environmental Concerns none   Support Systems Spouse/significant other   Equipment Currently Used at Home walker, rolling   Current Living Arrangements home   Patient/Family Anticipates Transition to home with help/services   Patient/Family Anticipated Services at Transition none;home health care   DME Needed Upon Discharge  none   Discharge Plan A Home Health   Discharge Plan B Home with family

## 2023-10-13 NOTE — H&P
[facility]  [facility]  History & Physical    Patient Name: Manuel Tyler  MRN: [id number]  Patient Class: OP- Observation  Admission Date: 10/13/2023  Attending Physician: Jessica Richmond MD   Primary Care Provider: Fatmata Vera MD         Patient information was obtained from patient, past medical records and ER records.     Subjective:     Principal Problem:Acute hypoxemic respiratory failure    Chief Complaint:   Chief Complaint   Patient presents with    Vomiting     Patient comes in with N/V that has been ongoing this week, current treatment for pancreatic Ca, chemo pills, has been unable to keep hem down this week.         HPI: Manuel Tyler 72 y.o. male with pancreatic cancer on chemo tobacco abuse presents to the hospital with a chief complaint of intractable vomiting.  He reports 4 days of intractable nausea and vomiting with associated burning chest pain.  He attempted treatment home with anti acid medication without improvement.  He was seen in the infusion center yesterday and received IV steroids and IV fluids without improvement.  Reports he has been unable to take his oral chemo due to intractable vomiting.  He smokes 1/2 pack per day.  He denies fever leg swelling melena hematuria hematemesis dizziness syncope shortness breath and cough.  He reports chronic upper abdominal pain that he attributes to his pancreatic cancer.    In the ED, hypoxic to 83% despite changing oxygen probes and proper location.  Chest x-ray without acute process, metabolic alkalosis with pH, creatinine 7.5 on VBG CO2 40 white blood cell count 28 point calcium 12.0.      Past Medical History:   Diagnosis Date    (HFpEF) heart failure with preserved ejection fraction 1/25/2023    Alcohol abuse 12/27/2022    Hyperlipidemia     Hypertension     Liver metastases 1/18/2023    Malignant neoplasm of head of pancreas 1/18/2023    Other specified noninfective gastroenteritis and colitis     Pancreatitis      Personal history of colonic polyps        Past Surgical History:   Procedure Laterality Date    COLONOSCOPY      COLONOSCOPY N/A 08/07/2023    Procedure: COLONOSCOPY;  Surgeon: Kong Estrella MD;  Location: [facility] ENDO;  Service: Endoscopy;  Laterality: N/A;  6/22 Instructions sent Via portal    COLONOSCOPY N/A 08/08/2023    Procedure: COLONOSCOPY;  Surgeon: Mariia Luevano MD;  Location: [facility] ENDO;  Service: Endoscopy;  Laterality: N/A;    ENDOSCOPIC ULTRASOUND OF UPPER GASTROINTESTINAL TRACT Left 12/30/2022    Procedure: ULTRASOUND, UPPER GI TRACT, ENDOSCOPIC;  Surgeon: Gregory Cristina MD;  Location: [facility] ENDO (2ND FLR);  Service: Endoscopy;  Laterality: Left;    ERCP Left 12/30/2022    Procedure: ERCP (ENDOSCOPIC RETROGRADE CHOLANGIOPANCREATOGRAPHY);  Surgeon: Gregory Cristina MD;  Location: [facility] ENDO (2ND FLR);  Service: Endoscopy;  Laterality: Left;    FOOT SURGERY Left     FRACTURE SURGERY  10/23/1998    INSERTION OF TUNNELED CENTRAL VENOUS CATHETER (CVC) WITH SUBCUTANEOUS PORT Bilateral 01/19/2023    Procedure: [id number]-CATH;  Surgeon: Amador Castellon MD;  Location: [facility] OR;  Service: General;  Laterality: Bilateral;  Right v Left PORTACATH. needs ultrasound and fluoro  RN PREOP 01/18/2023    TONSILLECTOMY      VASECTOMY  10/25/1983       Review of patient's allergies indicates:   Allergen Reactions    Jakafi [ruxolitinib]        Current Facility-Administered Medications on File Prior to Encounter   Medication    alteplase injection 2 mg    heparin, porcine (PF) 100 unit/mL injection flush 500 Units    heparin, porcine (PF) 100 unit/mL injection flush 500 Units    [COMPLETED] sodium chloride 0.9% bolus 1,000 mL 1,000 mL    sodium chloride 0.9% flush 10 mL    sodium chloride 0.9% flush 10 mL     Current Outpatient Medications on File Prior to Encounter   Medication Sig    buPROPion (WELLBUTRIN XL) 300 MG 24 hr tablet Take 1 tablet (300 mg total) by mouth once daily.    dabrafenib (TAFINLAR) 50 mg Cap TAKE  3 CAPSULES (150 MG TOTAL) TWICE DAILY    DULCOLAX, BISACODYL, ORAL Take by mouth.    hydrOXYzine HCL (ATARAX) 25 MG tablet Take 1 tablet (25 mg total) by mouth 3 (three) times daily as needed for Itching.    morphine (MS CONTIN) 15 MG 12 hr tablet Take 1 tablet (15 mg total) by mouth every 12 (twelve) hours.    multivit-mins/iron/folic/lycop (CENTRUM MEN ORAL) Take 1 tablet by mouth once daily.    tiotropium bromide (SPIRIVA RESPIMAT) 2.5 mcg/actuation inhaler Inhale 2 puffs into the lungs once daily. Controller    trametinib (MEKINIST) 2 mg Tab TAKE 1 TABLET ONCE DAILY    triamcinolone acetonide 0.1% (KENALOG) 0.1 % ointment Apply topically 2 (two) times daily as needed (for rash).    omeprazole (PRILOSEC OTC) 20 MG tablet Take 1 tablet (20 mg total) by mouth once daily.    ondansetron (ZOFRAN-ODT) 4 MG TbDL Take 1 tablet (4 mg total) by mouth every 6 (six) hours as needed (nausea and vomiting).    predniSONE (DELTASONE) 20 MG tablet Take 1 tablet (20 mg total) by mouth once daily. for 5 days    prochlorperazine (COMPAZINE) 10 MG tablet Take 1 tablet (10 mg total) by mouth every 6 (six) hours as needed (NAUSEA).    [DISCONTINUED] ondansetron (ZOFRAN) 4 MG tablet Take 1 tablet (4 mg total) by mouth every 8 (eight) hours as needed for Nausea.     Family History       Problem Relation (Age of Onset)    Arthritis Mother, Father    Breast cancer Mother    COPD Father    Cancer Father (69)    Drug abuse Brother    Hypertension Mother, Father    Skin cancer Father (69)    Stroke Mother          Tobacco Use    Smoking status: Every Day     Current packs/day: 0.50     Average packs/day: 0.5 packs/day for 55.7 years (27.9 ttl pk-yrs)     Types: Cigarettes     Start date: 1/15/1968    Smokeless tobacco: Former     Types: Chew   Substance and Sexual Activity    Alcohol use: Yes     Alcohol/week: 14.0 standard drinks of alcohol     Types: 14 Cans of beer per week     Comment: last drink 1-2 months ago    Drug use: Not Currently      Types: Marijuana    Sexual activity: Yes     Partners: Female     Birth control/protection: Coitus interruptus, Condom, Partner-Vasectomy     Review of Systems  Objective:     Vital Signs (Most Recent):  Temp: 98 °F (36.7 °C) (10/13/23 1312)  Pulse: 72 (10/13/23 1314)  Resp: 16 (10/13/23 1314)  BP: (!) 165/97 (10/13/23 1314)  SpO2: 95 % (10/13/23 1314) Vital Signs (24h Range):  Temp:  [98 °F (36.7 °C)] 98 °F (36.7 °C)  Pulse:  [] 72  Resp:  [16-18] 16  SpO2:  [90 %-95 %] 95 %  BP: (155-165)/(59-97) 165/97     Weight: 62.6 kg (138 lb)  Body mass index is 20.98 kg/m².     Physical Exam           Significant Labs: CBC:   Recent Labs   Lab 10/11/23  1856 10/11/23  1915 10/13/23  1150 10/13/23  1150   WBC 22.71*  --  28.95*  --    HGB 14.6  --  14.8  --    HCT 44.1 53 45.3 49   *  --  557*  --      CMP:   Recent Labs   Lab 10/11/23  1856 10/13/23  1150    145   K 3.7 4.1   CL 97 86*   CO2 32* 41*   * 163*   BUN 26* 29*   CREATININE 1.4 1.9*   CALCIUM 10.9* 12.0*   PROT 8.7* 9.2*   ALBUMIN 3.8 3.9   BILITOT 0.4 0.5   ALKPHOS 133 127   AST 26 37   ALT 19 27   ANIONGAP 14 18*     Lipase:   Recent Labs   Lab 10/11/23  1856 10/13/23  1150   LIPASE 14 17     Urine Studies:   Recent Labs   Lab 10/11/23  2159   COLORU Yellow   APPEARANCEUA Hazy*   PHUR >8.0*   SPECGRAV 1.015   PROTEINUA 1+*   GLUCUA Negative   KETONESU Negative   BILIRUBINUA Negative   OCCULTUA Negative   NITRITE Negative   UROBILINOGEN Negative   LEUKOCYTESUR Negative   RBCUA 0   WBCUA 0   BACTERIA Rare   HYALINECASTS 0       Significant Imaging:   Imaging Results              X-Ray Chest AP Portable (Final result)  Result time 10/13/23 13:29:45      Final result by Lucio George MD (10/13/23 13:29:45)                   Impression:      1. Bandlike atelectasis projects over the left lower lung zone, no large focal consolidation.      Electronically signed by: Lucio George MD  Date:    10/13/2023  Time:    13:29                Narrative:    EXAMINATION:  XR CHEST AP PORTABLE    CLINICAL HISTORY:  Vomiting, unspecified    TECHNIQUE:  Single frontal view of the chest was performed.    COMPARISON:  09/03/2023    FINDINGS:  Right chest wall port catheter tip projects over the distal SVC.  The cardiomediastinal silhouette is not enlarged noting calcification of the aorta..  There is no pleural effusion.  The trachea is midline.  The lungs are symmetrically expanded bilaterally with bandlike atelectasis projected over the left lower lung zone..  No large focal consolidation seen.  There is no pneumothorax.  The osseous structures are remarkable for degenerative change..                                       US Abdomen Limited (Final result)  Result time 10/13/23 13:30:14      Final result by Sundeep Vasquez Jr., MD (10/13/23 13:30:14)                   Impression:      Pancreatic mass in this patient with known malignancy.  Pneumobilia related to previous instrumentation.      Electronically signed by: Sundeep Yousif Jr  Date:    10/13/2023  Time:    13:30               Narrative:    EXAMINATION:  US ABDOMEN LIMITED    CLINICAL HISTORY:  emesis;    TECHNIQUE:  Limited ultrasound of the right upper quadrant of the abdomen (including pancreas, liver, gallbladder, common bile duct, and spleen) was performed.    COMPARISON:  01/25/2023 ultrasound and CT abdomen and pelvis 09/19/2023    FINDINGS:  Liver: Normal in size . Homogeneous echotexture. No focal hepatic lesions.    Gallbladder: The gallbladder is surgically absent.    Biliary system: The common duct is normal caliber.  Intrahepatic pneumobilia is present.    Spleen: Normal in size and echotexture.    Pancreas: partially obscured by bowel gas.  Vague hypo echoic mass in the pancreatic head.                                        Assessment/Plan:     * Acute hypoxemic respiratory failure  Found to be hypoxic to 83% on RA in the ED, despite multiple probe changes and moving probe  to forehead hypoxia remained. Continues to smoke and previous CT with emphysema. Possibly component of baseline hypoxia. Po2 low on VBG as well. Aspiration pneumonitis possible given vomiting, will hold starting antibiotics as no infiltrate on x-ray  Will start steroids/duo-nebs for possible COPD exacerbation  Will continue IVF as above for VINCENT and consider CTA if no improvement in hypoxia  Check BNP/troponin  Wean supplemental oxygen  Check COVID/Flu      Patient with Hypoxic Respiratory failure which is Acute.  he is not on home oxygen. Supplemental oxygen was provided and noted-      .   Signs/symptoms of respiratory failure include- lethargy. Contributing diagnoses includes - COPD Labs and images were reviewed. Patient Has not had a recent ABG. Will treat underlying causes and adjust management of respiratory failure as follows- started on steroids/duo-nebs for possible COPD exacerbation    VINCENT (acute kidney injury)  Presents with Cr of 1.9 baseline of 0.9 to 1.3. suspect related to intractable nausea and vomiting.   Started on IVF as above  Will check UA and Fena  Renal dose medications, avoid nephrotoxic drugs, monitor intake and output      Patient with acute kidney injury/acute renal failure likely due to pre-renal azotemia due to dehydration VINCENT is currently stable. Baseline creatinine 0.9 to 1.3 - Labs reviewed- Renal function/electrolytes with Estimated Creatinine Clearance: 31.6 mL/min (A) (based on SCr of 1.9 mg/dL (H)). according to latest data. Monitor urine output and serial BMP and adjust therapy as needed. Avoid nephrotoxins and renally dose meds for GFR listed above.    Hypercalcemia  Presents with calcium of 12.0. suspect related to volume depletion from intractable vomiting. Previous normal 2 weeks ago.   Started on IVF  Will check PTH    The patient has hypercalcemia that is currently uncontrolled. The patient has the following symptoms due to their hypercalcemia: None (asymptomatic). The  "hypercalcemia is likely due to Malignancy and intractable vomiting. We will obtain the following labs to work up the hypercalcemia: PTH No results found for: "PTH" . We will treat the hypercalcemia with: IV fluids ordered at a rate of 100 ml/hr. Their latest calcium has been reviewed and is listed below.  No results found for: "CAION"    Metabolic alkalosis  Presents with pH of 7.519 and bicarb of 51 on VBG. Suspect related to continued vomiting.   Started on IVF as above  Repeat CMP    Tobacco abuse  Smokes 1/2ppd greater than 3 minutes spent counseling patient on dangers of continued tobacco abuse. Will provide tobacco cessation education prior to discharge    ACP (advance care planning)  Advance Care Planning     Date: 10/13/2023    Code Status  In light of the patients advanced and life limiting illness,I engaged the the patient in a voluntary conversation about the patient's preferences for care  at the very end of life. The patient wishes to have a natural, peaceful death.  Along those lines, the patient does not wish to have CPR or other invasive treatments performed when his heart and/or breathing stops. I communicated to the patient that a DNR order would be placed in his medical record to reflect this preference.  I spent a total of 16 minutes engaging the patient in this advance care planning discussion.             (HFpEF) heart failure with preserved ejection fraction  Echo 1/2023 with EF of 65% and indeterminate diastolic dysfunction.   On IVF for VINCENT as above  Daily weights/monitor intake and output/telemetry    Liver metastases  See above. Followed by oncology and on oral chemo outpatient.   Home oral tafinlar/mekinist held due to inability to tolerate PO    Malignant neoplasm of head of pancreas  Followed by oncology outpatient. History of Stage IV cancer on oral chemo. LFTs within normal limits today.   Home oral chemo held due to inability to tolerate PO    Hyperlipidemia  Continue home " statin      VTE Risk Mitigation (From admission, onward)           Ordered     IP VTE HIGH RISK PATIENT  Once         10/13/23 1515     Place sequential compression device  Until discontinued         10/13/23 1515     Place FRANCES hose  Until discontinued         10/13/23 1515                     Discussed with ED physician.    VTE:   Code: DNR  Diet: NPO  Dispo: pending improvement in VINCENT and respiratory status  On 10/13/2023, patient should be placed in hospital observation services under my care in collaboration with Jsesica Richmond MD.      Christiano Braxton PA-C  Department of Hospital Medicine  [facility] [place] - Emergency Dept

## 2023-10-13 NOTE — ASSESSMENT & PLAN NOTE
Found to be hypoxic to 83% on RA in the ED, despite multiple probe changes and moving probe to forehead hypoxia remained. Continues to smoke and previous CT with emphysema. Possibly component of baseline hypoxia. Po2 low on VBG as well.  Will start steroids/duo-nebs for possible COPD exacerbation  Will continue IVF as above for VINCENT and consider CTA if no improvement in hypoxia  Check BNP/troponin

## 2023-10-13 NOTE — ED PROVIDER NOTES
Encounter Date: 10/13/2023    SCRIBE #1 NOTE: I, Yasmeen Boyd, am scribing for, and in the presence of,  Denice Arias DO. I have scribed the following portions of the note - Other sections scribed: HPI, ROS, PE, MDM.       History     Chief Complaint   Patient presents with    Vomiting     Patient comes in with N/V that has been ongoing this week, current treatment for pancreatic Ca, chemo pills, has been unable to keep hem down this week.      Manuel Tyler is a 72 y.o. male, with a past medical history of pancreatic cancer with liver metastases on (chemotherapy p.o. treatment and under the care of Dr. Carver), heart failure (EF 65% per echo on 1/25/23), HTN, HLD, and alcohol abuse, who presents to the ED with vomiting that began 1 week ago. Patient was seen here on 10/11/23 for the same symptoms and given fluids and Zofran (without relief, unable to keep it down). Patient reports he got an infusion the day after being seen in ED with no relief. Patient reports he has not taken is chemotherapy pills since Monday due to vomiting. Patient spoke with on call oncologist and was advised to come to ED. Patient has associated symptoms of intermittent hiccups, weakness, chest pain secondary to vomiting, acid reflux, and upper abdominal pain. Patient denies diarrhea, fever, chills, body aches, headaches, shortness of breath, loss of consciousness, urinary issues, or other associated symptoms. Patient has allergy to Jakafi.     The history is provided by the patient. No  was used.     Review of patient's allergies indicates:   Allergen Reactions    Jakafi [ruxolitinib]      Past Medical History:   Diagnosis Date    (HFpEF) heart failure with preserved ejection fraction 1/25/2023    Alcohol abuse 12/27/2022    Hyperlipidemia     Hypertension     Liver metastases 1/18/2023    Malignant neoplasm of head of pancreas 1/18/2023    Other specified noninfective gastroenteritis and colitis      Pancreatitis     Personal history of colonic polyps      Past Surgical History:   Procedure Laterality Date    COLONOSCOPY      COLONOSCOPY N/A 08/07/2023    Procedure: COLONOSCOPY;  Surgeon: Kong Estrella MD;  Location: [facility] ENDO;  Service: Endoscopy;  Laterality: N/A;  6/22 Instructions sent Via portal    COLONOSCOPY N/A 08/08/2023    Procedure: COLONOSCOPY;  Surgeon: Mariia Luevano MD;  Location: [facility] ENDO;  Service: Endoscopy;  Laterality: N/A;    ENDOSCOPIC ULTRASOUND OF UPPER GASTROINTESTINAL TRACT Left 12/30/2022    Procedure: ULTRASOUND, UPPER GI TRACT, ENDOSCOPIC;  Surgeon: Gregory Cristina MD;  Location: [facility] ENDO (2ND FLR);  Service: Endoscopy;  Laterality: Left;    ERCP Left 12/30/2022    Procedure: ERCP (ENDOSCOPIC RETROGRADE CHOLANGIOPANCREATOGRAPHY);  Surgeon: Gregory Cristina MD;  Location: [facility] ENDO (2ND FLR);  Service: Endoscopy;  Laterality: Left;    FOOT SURGERY Left     FRACTURE SURGERY  10/23/1998    INSERTION OF TUNNELED CENTRAL VENOUS CATHETER (CVC) WITH SUBCUTANEOUS PORT Bilateral 01/19/2023    Procedure: [id number];  Surgeon: Amador Castellon MD;  Location: [facility] OR;  Service: General;  Laterality: Bilateral;  Right v Left PORTACATH. needs ultrasound and fluoro  RN PREOP 01/18/2023    TONSILLECTOMY      VASECTOMY  10/25/1983     Family History   Problem Relation Age of Onset    Stroke Mother     Breast cancer Mother     Hypertension Mother     Arthritis Mother     Skin cancer Father 69    COPD Father     Arthritis Father     Hypertension Father     Cancer Father 69        Spleen    Drug abuse Brother      Social History     Tobacco Use    Smoking status: Every Day     Current packs/day: 0.50     Average packs/day: 0.5 packs/day for 55.7 years (27.9 ttl pk-yrs)     Types: Cigarettes     Start date: 1/15/1968    Smokeless tobacco: Former     Types: Chew   Substance Use Topics    Alcohol use: Yes     Alcohol/week: 14.0 standard drinks of alcohol     Types: 14 Cans of beer per week      Comment: last drink 1-2 months ago    Drug use: Not Currently     Types: Marijuana     Review of Systems   Constitutional:  Negative for chills and fever.   HENT:  Negative for drooling, facial swelling and trouble swallowing.    Eyes:  Negative for redness and visual disturbance.   Respiratory:  Negative for cough, shortness of breath and stridor.         (+) hiccups   Cardiovascular:  Positive for chest pain (secondary to vomiting).   Gastrointestinal:  Positive for abdominal pain (upper) and vomiting. Negative for diarrhea and nausea.        (+) acid reflux   Genitourinary:  Negative for difficulty urinating, dysuria, hematuria and urgency.   Musculoskeletal:  Negative for gait problem and neck stiffness.   Skin:  Negative for pallor and wound.   Neurological:  Positive for weakness. Negative for syncope, facial asymmetry, speech difficulty and headaches.   Psychiatric/Behavioral:  Negative for confusion.    All other systems reviewed and are negative.      Physical Exam     Initial Vitals   BP Pulse Resp Temp SpO2   10/13/23 1027 10/13/23 1027 10/13/23 1027 10/13/23 1312 10/13/23 1027   (!) 155/59 109 18 98 °F (36.7 °C) (!) 90 %      MAP       --                Physical Exam    Nursing note and vitals reviewed.  Constitutional: Vital signs are normal. He appears well-developed. He is not diaphoretic.  Non-toxic appearance.   Intermittently hiccupping.   HENT:   Head: Normocephalic and atraumatic.   Mouth/Throat: Uvula is midline and oropharynx is clear and moist. Mucous membranes are dry.   Eyes: Conjunctivae and EOM are normal. Pupils are equal, round, and reactive to light.   Neck: Neck supple.   Normal range of motion.  Cardiovascular:  Regular rhythm.   Tachycardia present.         Pulmonary/Chest: Effort normal and breath sounds normal.   Abdominal: Abdomen is soft. Bowel sounds are normal. There is abdominal tenderness (mild) in the epigastric area. There is no guarding.   Musculoskeletal:      Cervical  back: Normal range of motion and neck supple. No rigidity. No spinous process tenderness.     Neurological: He is alert. He has normal strength. No sensory deficit. He displays a negative Romberg sign.         ED Course   Critical Care    Date/Time: 10/13/2023 3:42 PM    Performed by: Denice Arias DO  Authorized by: Denice Arias DO  Direct patient critical care time: 30 minutes  Additional history critical care time: 10 minutes  Ordering / reviewing critical care time: 10 minutes  Documentation critical care time: 10 minutes  Consulting other physicians critical care time: 5 minutes  Consult with family critical care time: 10 minutes  Total critical care time (exclusive of procedural time) : 75 minutes  Critical care time was exclusive of separately billable procedures and treating other patients and teaching time.  Critical care was necessary to treat or prevent imminent or life-threatening deterioration of the following conditions: metabolic crisis.  Critical care was time spent personally by me on the following activities: development of treatment plan with patient or surrogate, discussions with consultants, evaluation of patient's response to treatment, examination of patient, obtaining history from patient or surrogate, ordering and review of laboratory studies, ordering and performing treatments and interventions, ordering and review of radiographic studies, pulse oximetry, re-evaluation of patient's condition and review of old charts.        Labs Reviewed   CBC W/ AUTO DIFFERENTIAL - Abnormal; Notable for the following components:       Result Value    WBC 28.95 (*)     RDW 16.0 (*)     Platelets 557 (*)     Immature Granulocytes 0.7 (*)     Gran # (ANC) 23.0 (*)     Immature Grans (Abs) 0.19 (*)     Mono # 3.6 (*)     Gran % 79.3 (*)     Lymph % 7.3 (*)     All other components within normal limits   COMPREHENSIVE METABOLIC PANEL - Abnormal; Notable for the following components:     Chloride 86 (*)     CO2 41 (*)     Glucose 163 (*)     BUN 29 (*)     Creatinine 1.9 (*)     Calcium 12.0 (*)     Total Protein 9.2 (*)     eGFR 37 (*)     Anion Gap 18 (*)     All other components within normal limits    Narrative:     CO2 critical result(s) called and verbal readback obtained from SARAH GUTIERREZ by [facility] 10/13/2023 12:26   B-TYPE NATRIURETIC PEPTIDE - Abnormal; Notable for the following components:     (*)     All other components within normal limits   ISTAT PROCEDURE - Abnormal; Notable for the following components:    POC Glucose 164 (*)     POC BUN 42 (*)     POC Creatinine 1.7 (*)     POC Chloride 84 (*)     POC TCO2 (MEASURED) >50 (*)     All other components within normal limits   ISTAT PROCEDURE - Abnormal; Notable for the following components:    POC PH 7.519 (*)     POC PCO2 63.1 (*)     POC PO2 13 (*)     POC HCO3 51.4 (*)     POC TCO2 >50 (*)     All other components within normal limits   LIPASE   PTH, INTACT   TROPONIN I   PTH, INTACT   TROPONIN I   SARS-COV-2 RDRP GENE   POCT INFLUENZA A/B MOLECULAR   ISTAT CHEM8     EKG Readings: (Independently Interpreted)   Sinus rhythm with a rate of 90 beats per minute, left axis deviation, low-voltage QRS, no obvious acute ST segment changes.  EKG grossly unchanged when compared to previous EKG from 10/11/2023.     ECG Results              EKG 12-lead (Final result)  Result time 10/13/23 16:10:29      Final result by Interface, Lab In [facility] (10/13/23 16:10:29)                   Narrative:    Test Reason : R11.10,    Vent. Rate : 090 BPM     Atrial Rate : 090 BPM     P-R Int : 178 ms          QRS Dur : 068 ms      QT Int : 406 ms       P-R-T Axes : 071 -62 078 degrees     QTc Int : 496 ms    Sinus rhythm with Premature atrial complexes  Left axis deviation  Possible Inferior infarct ,age undetermined  Anterior infarct ,age undetermined  Abnormal ECG  When compared with ECG of 11-OCT-2023 19:22,  Significant changes have  occurred  Confirmed by Parker Saucedo MD ([id number]) on 10/13/2023 4:10:16 PM    Referred By: [email or web address]   SELF           Confirmed By:Parker Saucedo MD                                     EKG 12-LEAD (Final result)  Result time 10/13/23 11:54:58      Final result by Unknown User (10/13/23 11:54:58)                                         EKG 12-LEAD (Final result)  Result time 10/15/23 12:10:18      Final result by Unknown User (10/15/23 12:10:18)                                      Imaging Results              X-Ray Chest AP Portable (Final result)  Result time 10/13/23 13:29:45      Final result by Lucio George MD (10/13/23 13:29:45)                   Impression:      1. Bandlike atelectasis projects over the left lower lung zone, no large focal consolidation.      Electronically signed by: Lucio George MD  Date:    10/13/2023  Time:    13:29               Narrative:    EXAMINATION:  XR CHEST AP PORTABLE    CLINICAL HISTORY:  Vomiting, unspecified    TECHNIQUE:  Single frontal view of the chest was performed.    COMPARISON:  09/03/2023    FINDINGS:  Right chest wall port catheter tip projects over the distal SVC.  The cardiomediastinal silhouette is not enlarged noting calcification of the aorta..  There is no pleural effusion.  The trachea is midline.  The lungs are symmetrically expanded bilaterally with bandlike atelectasis projected over the left lower lung zone..  No large focal consolidation seen.  There is no pneumothorax.  The osseous structures are remarkable for degenerative change..                                       US Abdomen Limited (Final result)  Result time 10/13/23 13:30:14      Final result by Sundeep Vasquez Jr., MD (10/13/23 13:30:14)                   Impression:      Pancreatic mass in this patient with known malignancy.  Pneumobilia related to previous instrumentation.      Electronically signed by: Sundeep Yousif Jr  Date:    10/13/2023  Time:    13:30                Narrative:    EXAMINATION:  US ABDOMEN LIMITED    CLINICAL HISTORY:  emesis;    TECHNIQUE:  Limited ultrasound of the right upper quadrant of the abdomen (including pancreas, liver, gallbladder, common bile duct, and spleen) was performed.    COMPARISON:  01/25/2023 ultrasound and CT abdomen and pelvis 09/19/2023    FINDINGS:  Liver: Normal in size . Homogeneous echotexture. No focal hepatic lesions.    Gallbladder: The gallbladder is surgically absent.    Biliary system: The common duct is normal caliber.  Intrahepatic pneumobilia is present.    Spleen: Normal in size and echotexture.    Pancreas: partially obscured by bowel gas.  Vague hypo echoic mass in the pancreatic head.                                       Medications   melatonin tablet 6 mg (has no administration in time range)   senna-docusate 8.6-50 mg per tablet 1 tablet (has no administration in time range)   acetaminophen tablet 650 mg (has no administration in time range)   naloxone 0.4 mg/mL injection 0.02 mg (has no administration in time range)   glucose chewable tablet 16 g (has no administration in time range)   glucose chewable tablet 24 g (has no administration in time range)   glucagon (human recombinant) injection 1 mg (has no administration in time range)   sodium chloride 0.9% flush 10 mL (has no administration in time range)   enoxaparin injection 40 mg (40 mg Subcutaneous Given 10/14/23 1611)   ondansetron injection 4 mg (has no administration in time range)   pantoprazole EC tablet 40 mg (40 mg Oral Given 10/15/23 0814)   aluminum-magnesium hydroxide-simethicone 200-200-20 mg/5 mL suspension 30 mL (has no administration in time range)   mupirocin 2 % ointment ( Nasal Given 10/15/23 1421)   predniSONE tablet 40 mg (40 mg Oral Given 10/15/23 1152)   sucralfate 100 mg/mL suspension 1 g (1 g Oral Given 10/15/23 1152)   potassium chloride 10 mEq in 100 mL IVPB (10 mEq Intravenous New Bag 10/15/23 1522)   hydrALAZINE injection 15 mg (has no  administration in time range)   albuterol-ipratropium 2.5 mg-0.5 mg/3 mL nebulizer solution 3 mL (has no administration in time range)   nicotine 14 mg/24 hr 1 patch (1 patch Transdermal Patch Applied 10/15/23 1522)   0.45% NaCl infusion ( Intravenous New Bag 10/15/23 1520)   lactated ringers bolus 1,000 mL (0 mLs Intravenous Stopped 10/13/23 1245)   ondansetron injection 4 mg (4 mg Intravenous Given 10/13/23 1206)   aluminum-magnesium hydroxide-simethicone 200-200-20 mg/5 mL suspension 30 mL (30 mLs Oral Given 10/13/23 1432)     And   LIDOcaine HCl 2% oral solution 15 mL (15 mLs Oral Given 10/13/23 1432)   0.9%  NaCl infusion (1,000 mLs Intravenous New Bag 10/13/23 1450)   potassium chloride SA CR tablet 40 mEq (40 mEq Oral Given 10/14/23 0618)   potassium chloride 10 mEq in 100 mL IVPB (10 mEq Intravenous New Bag 10/14/23 1233)   potassium chloride SA CR tablet 40 mEq (40 mEq Oral Given 10/15/23 1420)   iohexoL (OMNIPAQUE 350) injection 75 mL (75 mLs Intravenous Given 10/15/23 1422)     Medical Decision Making   MDM  This is an emergent evaluation of a 72 y.o. male, with a past medical history of pancreatic cancer with liver metastases on (chemotherapy p.o. treatment and under the care of Dr. Carver), heart failure (EF 65% per echo on 1/25/23), HTN, HLD, and alcohol abuse, who presents to the ED with vomiting that began 1 week ago. Initial vitals in the ED 10/13/23 1027]  BP: (!) 155/59  Pulse: 109  Resp: 18  Temp: n/a  SpO2: (!) 90 % .     Physical exam noted above. DDx includes but is not limited to chemotherapy induced hyperemesis, electrolyte imbalance, dehydration, VINCENT, cholecystitis, choledocholithiasis. Also considered but clinically less likely to be bowel obstruction, appendicitis. Will obtain labs and imaging including CBC, CMP, lipase, UA, and abdominal ultrasound. Will also provide IV antiemetics, IV fluids. Will continue to monitor and frequently reassess pending results of labs, treatments and final  disposition.    Patient is aware of plan and is amenable.     Denice Arias D.O  EMERGENCY MEDICINE  11:35 AM 10/13/2023    UPDATE  Labs reveal bicarb of 41, BUN of 29 with a creatinine 1.9.  A leukocytosis with a white blood cell count of 28 with a platelet count of 557, which leukocytosis appears somewhat increased when compared to most recent labs from October 11.  Platelets stable.  VBG were obtained and showed a metabolic alkalosis, with a pH of 7.5. Remainder of labs unremarkable.  Chest x-ray reveals bandlike atelectasis of the left or lungs sound with no obvious focal consolidation.  Also obtain a right upper quadrant ultrasound which reveals a pancreatic mass which is consistent with patient's known history of malignancy.  Gallbladder appears surgically absent despite patient denying history of cholecystectomy.  There is also intrahepatic pneumobilia present.  On reassessment, patient states that his symptoms were mildly improved.  He was then treated with a GI cocktail.  He was able to tolerate the GI cocktail.  However given history and presentation in the setting of known pancreatic malignancy with a metabolic alkalosis at this time, I feel patient would benefit from hospital observation.  Patient was consulted to oncology and case was discussed with Dr. Tobar, who agree with patient being placed in observation.  Patient was discussed with Hospital Medicine Service.  He was placed on the observation service.  Patient and his wife is aware and agreeable to plan.    Denice Arias D.O  EMERGENCY MEDICINE   3:52 PM 10/13/2023       This chart was completed using dictation software, as a result there may be some transcription errors.    Amount and/or Complexity of Data Reviewed  Labs: ordered.  Radiology: ordered.  ECG/medicine tests: ordered and independent interpretation performed.     Details: EKG independently interpreted by Dr. Arias, see above.     Risk  OTC  drugs.  Prescription drug management.           Scribe Attestation:   Scribe #1: I performed the above scribed service and the documentation accurately describes the services I performed. I attest to the accuracy of the note.                      I, Denice Arias DO, personally performed the services described in this documentation.  All medical record entries made by the scribe were at my direction and in my presence.  I have reviewed the chart and agree that the record reflects my personal performance and is accurate and complete.  Clinical Impression:   Final diagnoses:  [R11.10] Emesis  [R11.10] Intractable vomiting  [R07.9] Chest pain        ED Disposition Condition    Observation Stable                Denice Arias DO  10/15/23 1548

## 2023-10-13 NOTE — SUBJECTIVE & OBJECTIVE
Past Medical History:   Diagnosis Date    (HFpEF) heart failure with preserved ejection fraction 1/25/2023    Alcohol abuse 12/27/2022    Hyperlipidemia     Hypertension     Liver metastases 1/18/2023    Malignant neoplasm of head of pancreas 1/18/2023    Other specified noninfective gastroenteritis and colitis     Pancreatitis     Personal history of colonic polyps        Past Surgical History:   Procedure Laterality Date    COLONOSCOPY      COLONOSCOPY N/A 08/07/2023    Procedure: COLONOSCOPY;  Surgeon: Kong Estrella MD;  Location: [facility] ENDO;  Service: Endoscopy;  Laterality: N/A;  6/22 Instructions sent Via portal    COLONOSCOPY N/A 08/08/2023    Procedure: COLONOSCOPY;  Surgeon: Mariia Luevano MD;  Location: [facility] ENDO;  Service: Endoscopy;  Laterality: N/A;    ENDOSCOPIC ULTRASOUND OF UPPER GASTROINTESTINAL TRACT Left 12/30/2022    Procedure: ULTRASOUND, UPPER GI TRACT, ENDOSCOPIC;  Surgeon: Gregory Cristina MD;  Location: [facility] ENDO (2ND FLR);  Service: Endoscopy;  Laterality: Left;    ERCP Left 12/30/2022    Procedure: ERCP (ENDOSCOPIC RETROGRADE CHOLANGIOPANCREATOGRAPHY);  Surgeon: Gregory Cristina MD;  Location: [facility] ENDO (2ND FLR);  Service: Endoscopy;  Laterality: Left;    FOOT SURGERY Left     FRACTURE SURGERY  10/23/1998    INSERTION OF TUNNELED CENTRAL VENOUS CATHETER (CVC) WITH SUBCUTANEOUS PORT Bilateral 01/19/2023    Procedure: [id number];  Surgeon: Amador Castellon MD;  Location: [facility] OR;  Service: General;  Laterality: Bilateral;  Right v Left PORTACATH. needs ultrasound and fluoro  RN PREOP 01/18/2023    TONSILLECTOMY      VASECTOMY  10/25/1983       Review of patient's allergies indicates:   Allergen Reactions    Jakafi [ruxolitinib]        Current Facility-Administered Medications on File Prior to Encounter   Medication    alteplase injection 2 mg    heparin, porcine (PF) 100 unit/mL injection flush 500 Units    heparin, porcine (PF) 100 unit/mL injection flush 500 Units    [COMPLETED]  sodium chloride 0.9% bolus 1,000 mL 1,000 mL    sodium chloride 0.9% flush 10 mL    sodium chloride 0.9% flush 10 mL     Current Outpatient Medications on File Prior to Encounter   Medication Sig    buPROPion (WELLBUTRIN XL) 300 MG 24 hr tablet Take 1 tablet (300 mg total) by mouth once daily.    dabrafenib (TAFINLAR) 50 mg Cap TAKE 3 CAPSULES (150 MG TOTAL) TWICE DAILY    DULCOLAX, BISACODYL, ORAL Take by mouth.    hydrOXYzine HCL (ATARAX) 25 MG tablet Take 1 tablet (25 mg total) by mouth 3 (three) times daily as needed for Itching.    morphine (MS CONTIN) 15 MG 12 hr tablet Take 1 tablet (15 mg total) by mouth every 12 (twelve) hours.    multivit-mins/iron/folic/lycop (CENTRUM MEN ORAL) Take 1 tablet by mouth once daily.    tiotropium bromide (SPIRIVA RESPIMAT) 2.5 mcg/actuation inhaler Inhale 2 puffs into the lungs once daily. Controller    trametinib (MEKINIST) 2 mg Tab TAKE 1 TABLET ONCE DAILY    triamcinolone acetonide 0.1% (KENALOG) 0.1 % ointment Apply topically 2 (two) times daily as needed (for rash).    omeprazole (PRILOSEC OTC) 20 MG tablet Take 1 tablet (20 mg total) by mouth once daily.    ondansetron (ZOFRAN-ODT) 4 MG TbDL Take 1 tablet (4 mg total) by mouth every 6 (six) hours as needed (nausea and vomiting).    predniSONE (DELTASONE) 20 MG tablet Take 1 tablet (20 mg total) by mouth once daily. for 5 days    prochlorperazine (COMPAZINE) 10 MG tablet Take 1 tablet (10 mg total) by mouth every 6 (six) hours as needed (NAUSEA).    [DISCONTINUED] ondansetron (ZOFRAN) 4 MG tablet Take 1 tablet (4 mg total) by mouth every 8 (eight) hours as needed for Nausea.     Family History       Problem Relation (Age of Onset)    Arthritis Mother, Father    Breast cancer Mother    COPD Father    Cancer Father (69)    Drug abuse Brother    Hypertension Mother, Father    Skin cancer Father (69)    Stroke Mother          Tobacco Use    Smoking status: Every Day     Current packs/day: 0.50     Average packs/day: 0.5  packs/day for 55.7 years (27.9 ttl pk-yrs)     Types: Cigarettes     Start date: 1/15/1968    Smokeless tobacco: Former     Types: Chew   Substance and Sexual Activity    Alcohol use: Yes     Alcohol/week: 14.0 standard drinks of alcohol     Types: 14 Cans of beer per week     Comment: last drink 1-2 months ago    Drug use: Not Currently     Types: Marijuana    Sexual activity: Yes     Partners: Female     Birth control/protection: Coitus interruptus, Condom, Partner-Vasectomy     Review of Systems  Objective:     Vital Signs (Most Recent):  Temp: 98 °F (36.7 °C) (10/13/23 1312)  Pulse: 72 (10/13/23 1314)  Resp: 16 (10/13/23 1314)  BP: (!) 165/97 (10/13/23 1314)  SpO2: 95 % (10/13/23 1314) Vital Signs (24h Range):  Temp:  [98 °F (36.7 °C)] 98 °F (36.7 °C)  Pulse:  [] 72  Resp:  [16-18] 16  SpO2:  [90 %-95 %] 95 %  BP: (155-165)/(59-97) 165/97     Weight: 62.6 kg (138 lb)  Body mass index is 20.98 kg/m².     Physical Exam           Significant Labs: CBC:   Recent Labs   Lab 10/11/23  1856 10/11/23  1915 10/13/23  1150 10/13/23  1150   WBC 22.71*  --  28.95*  --    HGB 14.6  --  14.8  --    HCT 44.1 53 45.3 49   *  --  557*  --      CMP:   Recent Labs   Lab 10/11/23  1856 10/13/23  1150    145   K 3.7 4.1   CL 97 86*   CO2 32* 41*   * 163*   BUN 26* 29*   CREATININE 1.4 1.9*   CALCIUM 10.9* 12.0*   PROT 8.7* 9.2*   ALBUMIN 3.8 3.9   BILITOT 0.4 0.5   ALKPHOS 133 127   AST 26 37   ALT 19 27   ANIONGAP 14 18*     Lipase:   Recent Labs   Lab 10/11/23  1856 10/13/23  1150   LIPASE 14 17     Urine Studies:   Recent Labs   Lab 10/11/23  5905   COLORU Yellow   APPEARANCEUA Hazy*   PHUR >8.0*   SPECGRAV 1.015   PROTEINUA 1+*   GLUCUA Negative   KETONESU Negative   BILIRUBINUA Negative   OCCULTUA Negative   NITRITE Negative   UROBILINOGEN Negative   LEUKOCYTESUR Negative   RBCUA 0   WBCUA 0   BACTERIA Rare   HYALINECASTS 0       Significant Imaging:   Imaging Results              X-Ray Chest AP  Portable (Final result)  Result time 10/13/23 13:29:45      Final result by Lucio George MD (10/13/23 13:29:45)                   Impression:      1. Bandlike atelectasis projects over the left lower lung zone, no large focal consolidation.      Electronically signed by: Lucio George MD  Date:    10/13/2023  Time:    13:29               Narrative:    EXAMINATION:  XR CHEST AP PORTABLE    CLINICAL HISTORY:  Vomiting, unspecified    TECHNIQUE:  Single frontal view of the chest was performed.    COMPARISON:  09/03/2023    FINDINGS:  Right chest wall port catheter tip projects over the distal SVC.  The cardiomediastinal silhouette is not enlarged noting calcification of the aorta..  There is no pleural effusion.  The trachea is midline.  The lungs are symmetrically expanded bilaterally with bandlike atelectasis projected over the left lower lung zone..  No large focal consolidation seen.  There is no pneumothorax.  The osseous structures are remarkable for degenerative change..                                       US Abdomen Limited (Final result)  Result time 10/13/23 13:30:14      Final result by Sundeep Vasquez Jr., MD (10/13/23 13:30:14)                   Impression:      Pancreatic mass in this patient with known malignancy.  Pneumobilia related to previous instrumentation.      Electronically signed by: Sundeep Yousif Jr  Date:    10/13/2023  Time:    13:30               Narrative:    EXAMINATION:  US ABDOMEN LIMITED    CLINICAL HISTORY:  emesis;    TECHNIQUE:  Limited ultrasound of the right upper quadrant of the abdomen (including pancreas, liver, gallbladder, common bile duct, and spleen) was performed.    COMPARISON:  01/25/2023 ultrasound and CT abdomen and pelvis 09/19/2023    FINDINGS:  Liver: Normal in size . Homogeneous echotexture. No focal hepatic lesions.    Gallbladder: The gallbladder is surgically absent.    Biliary system: The common duct is normal caliber.  Intrahepatic  pneumobilia is present.    Spleen: Normal in size and echotexture.    Pancreas: partially obscured by bowel gas.  Vague hypo echoic mass in the pancreatic head.

## 2023-10-14 PROBLEM — R11.2 NAUSEA & VOMITING: Status: ACTIVE | Noted: 2023-10-14

## 2023-10-14 LAB
ALBUMIN SERPL BCP-MCNC: 3 G/DL (ref 3.5–5.2)
ALP SERPL-CCNC: 104 U/L (ref 55–135)
ALT SERPL W/O P-5'-P-CCNC: 21 U/L (ref 10–44)
ANION GAP SERPL CALC-SCNC: 11 MMOL/L (ref 8–16)
AST SERPL-CCNC: 25 U/L (ref 10–40)
BASOPHILS # BLD AUTO: 0.09 K/UL (ref 0–0.2)
BASOPHILS NFR BLD: 0.4 % (ref 0–1.9)
BILIRUB SERPL-MCNC: 0.6 MG/DL (ref 0.1–1)
BUN SERPL-MCNC: 23 MG/DL (ref 8–23)
CALCIUM SERPL-MCNC: 9.5 MG/DL (ref 8.7–10.5)
CHLORIDE SERPL-SCNC: 90 MMOL/L (ref 95–110)
CO2 SERPL-SCNC: 38 MMOL/L (ref 23–29)
CREAT SERPL-MCNC: 1.1 MG/DL (ref 0.5–1.4)
DIFFERENTIAL METHOD: ABNORMAL
EOSINOPHIL # BLD AUTO: 0.1 K/UL (ref 0–0.5)
EOSINOPHIL NFR BLD: 0.4 % (ref 0–8)
ERYTHROCYTE [DISTWIDTH] IN BLOOD BY AUTOMATED COUNT: 15.5 % (ref 11.5–14.5)
EST. GFR  (NO RACE VARIABLE): >60 ML/MIN/1.73 M^2
GLUCOSE SERPL-MCNC: 120 MG/DL (ref 70–110)
HCT VFR BLD AUTO: 39.5 % (ref 40–54)
HGB BLD-MCNC: 12.7 G/DL (ref 14–18)
IMM GRANULOCYTES # BLD AUTO: 0.14 K/UL (ref 0–0.04)
IMM GRANULOCYTES NFR BLD AUTO: 0.6 % (ref 0–0.5)
LYMPHOCYTES # BLD AUTO: 2 K/UL (ref 1–4.8)
LYMPHOCYTES NFR BLD: 8.4 % (ref 18–48)
MCH RBC QN AUTO: 29.8 PG (ref 27–31)
MCHC RBC AUTO-ENTMCNC: 32.2 G/DL (ref 32–36)
MCV RBC AUTO: 93 FL (ref 82–98)
MONOCYTES # BLD AUTO: 2.9 K/UL (ref 0.3–1)
MONOCYTES NFR BLD: 12.4 % (ref 4–15)
NEUTROPHILS # BLD AUTO: 18.2 K/UL (ref 1.8–7.7)
NEUTROPHILS NFR BLD: 77.8 % (ref 38–73)
NRBC BLD-RTO: 0 /100 WBC
PLATELET # BLD AUTO: 395 K/UL (ref 150–450)
PMV BLD AUTO: 9.7 FL (ref 9.2–12.9)
POTASSIUM SERPL-SCNC: 2.3 MMOL/L (ref 3.5–5.1)
PROT SERPL-MCNC: 6.8 G/DL (ref 6–8.4)
RBC # BLD AUTO: 4.26 M/UL (ref 4.6–6.2)
SODIUM SERPL-SCNC: 139 MMOL/L (ref 136–145)
WBC # BLD AUTO: 23.34 K/UL (ref 3.9–12.7)

## 2023-10-14 PROCEDURE — 63600175 PHARM REV CODE 636 W HCPCS: Performed by: INTERNAL MEDICINE

## 2023-10-14 PROCEDURE — 85025 COMPLETE CBC W/AUTO DIFF WBC: CPT | Performed by: PHYSICIAN ASSISTANT

## 2023-10-14 PROCEDURE — 25000003 PHARM REV CODE 250: Performed by: NURSE PRACTITIONER

## 2023-10-14 PROCEDURE — 25000003 PHARM REV CODE 250: Performed by: INTERNAL MEDICINE

## 2023-10-14 PROCEDURE — 94640 AIRWAY INHALATION TREATMENT: CPT

## 2023-10-14 PROCEDURE — 80053 COMPREHEN METABOLIC PANEL: CPT | Performed by: PHYSICIAN ASSISTANT

## 2023-10-14 PROCEDURE — 27000221 HC OXYGEN, UP TO 24 HOURS

## 2023-10-14 PROCEDURE — 36415 COLL VENOUS BLD VENIPUNCTURE: CPT | Performed by: PHYSICIAN ASSISTANT

## 2023-10-14 PROCEDURE — 94761 N-INVAS EAR/PLS OXIMETRY MLT: CPT

## 2023-10-14 PROCEDURE — 63600175 PHARM REV CODE 636 W HCPCS: Performed by: PHYSICIAN ASSISTANT

## 2023-10-14 PROCEDURE — 21400001 HC TELEMETRY ROOM

## 2023-10-14 PROCEDURE — 25000242 PHARM REV CODE 250 ALT 637 W/ HCPCS: Performed by: HOSPITALIST

## 2023-10-14 PROCEDURE — 99900035 HC TECH TIME PER 15 MIN (STAT)

## 2023-10-14 PROCEDURE — 94760 N-INVAS EAR/PLS OXIMETRY 1: CPT

## 2023-10-14 RX ORDER — POTASSIUM CHLORIDE 20 MEQ/1
40 TABLET, EXTENDED RELEASE ORAL ONCE
Status: COMPLETED | OUTPATIENT
Start: 2023-10-14 | End: 2023-10-14

## 2023-10-14 RX ORDER — PANTOPRAZOLE SODIUM 40 MG/1
40 TABLET, DELAYED RELEASE ORAL DAILY
Status: DISCONTINUED | OUTPATIENT
Start: 2023-10-14 | End: 2023-10-18 | Stop reason: HOSPADM

## 2023-10-14 RX ORDER — POTASSIUM CHLORIDE 7.45 MG/ML
10 INJECTION INTRAVENOUS
Status: COMPLETED | OUTPATIENT
Start: 2023-10-14 | End: 2023-10-14

## 2023-10-14 RX ORDER — MAG HYDROX/ALUMINUM HYD/SIMETH 200-200-20
30 SUSPENSION, ORAL (FINAL DOSE FORM) ORAL EVERY 6 HOURS PRN
Status: DISCONTINUED | OUTPATIENT
Start: 2023-10-14 | End: 2023-10-18 | Stop reason: HOSPADM

## 2023-10-14 RX ADMIN — IPRATROPIUM BROMIDE AND ALBUTEROL SULFATE 3 ML: 2.5; .5 SOLUTION RESPIRATORY (INHALATION) at 12:10

## 2023-10-14 RX ADMIN — METHYLPREDNISOLONE SODIUM SUCCINATE 125 MG: 125 INJECTION, POWDER, FOR SOLUTION INTRAMUSCULAR; INTRAVENOUS at 08:10

## 2023-10-14 RX ADMIN — POTASSIUM CHLORIDE 10 MEQ: 7.46 INJECTION, SOLUTION INTRAVENOUS at 10:10

## 2023-10-14 RX ADMIN — IPRATROPIUM BROMIDE AND ALBUTEROL SULFATE 3 ML: 2.5; .5 SOLUTION RESPIRATORY (INHALATION) at 07:10

## 2023-10-14 RX ADMIN — IPRATROPIUM BROMIDE AND ALBUTEROL SULFATE 3 ML: 2.5; .5 SOLUTION RESPIRATORY (INHALATION) at 06:10

## 2023-10-14 RX ADMIN — POTASSIUM CHLORIDE 10 MEQ: 7.46 INJECTION, SOLUTION INTRAVENOUS at 11:10

## 2023-10-14 RX ADMIN — POTASSIUM CHLORIDE 40 MEQ: 1500 TABLET, EXTENDED RELEASE ORAL at 06:10

## 2023-10-14 RX ADMIN — PANTOPRAZOLE SODIUM 40 MG: 40 TABLET, DELAYED RELEASE ORAL at 04:10

## 2023-10-14 RX ADMIN — SODIUM CHLORIDE, POTASSIUM CHLORIDE, SODIUM LACTATE AND CALCIUM CHLORIDE: 600; 310; 30; 20 INJECTION, SOLUTION INTRAVENOUS at 08:10

## 2023-10-14 RX ADMIN — IPRATROPIUM BROMIDE AND ALBUTEROL SULFATE 3 ML: 2.5; .5 SOLUTION RESPIRATORY (INHALATION) at 02:10

## 2023-10-14 RX ADMIN — SODIUM CHLORIDE, POTASSIUM CHLORIDE, SODIUM LACTATE AND CALCIUM CHLORIDE: 600; 310; 30; 20 INJECTION, SOLUTION INTRAVENOUS at 11:10

## 2023-10-14 RX ADMIN — ENOXAPARIN SODIUM 40 MG: 40 INJECTION SUBCUTANEOUS at 04:10

## 2023-10-14 RX ADMIN — POTASSIUM CHLORIDE 10 MEQ: 7.46 INJECTION, SOLUTION INTRAVENOUS at 06:10

## 2023-10-14 RX ADMIN — POTASSIUM CHLORIDE 10 MEQ: 7.46 INJECTION, SOLUTION INTRAVENOUS at 08:10

## 2023-10-14 RX ADMIN — POTASSIUM CHLORIDE 10 MEQ: 7.46 INJECTION, SOLUTION INTRAVENOUS at 12:10

## 2023-10-14 RX ADMIN — POTASSIUM CHLORIDE 10 MEQ: 7.46 INJECTION, SOLUTION INTRAVENOUS at 09:10

## 2023-10-14 NOTE — NURSING
[facility] [place]  Nurses Note -- 4 Eyes      10/14/2023       Skin assessed on: Admit      [x] No Pressure Injuries Present    [x]Prevention Measures Documented    [] Yes LDA  for Pressure Injury Previously documented     [] Yes New Pressure Injury Discovered   [] LDA for New Pressure Injury Added      Attending RN:  Wally Perkins RN     Second RN:  ORACIO Tovar

## 2023-10-14 NOTE — HOSPITAL COURSE
72 year old man with Stage IV pancreatic cancer, tobacco abuse and HFpEF who presented for evaluation of intractable nausea and vomiting.  He was admitted to inpatient status and diagnosed with acute hypoxic respiratory failure, VINCENT, hypokalemia, hypercalcemia and metabolic alkalosis.  His respiratory failure was initially attributed to COPD exacerbation and was treated with iv steroids and scheduled nebulizers.  However, wheezing has resolved and still quite hypoxic on room air.  CTA shows consolidation in LLL with air broncograms so pneumonia appears more likely.  Checked sputum culture and blood cultures and added vanc and cefepime today.  Good chance will require home O2 at discharge.  Etiology of his nausea and vomiting unclear.  CTA chest did show a significantly distended stomach but he is tolerating diet, having bowel movements and no further nausea and vomiting.  His VINCENT, alkalosis and hypercalcemia have resolved with IV fluids.  His hypokalemia has required large doses, but is finally starting to improve.       Apparent PNA on CT (see imaging below). WBC increased to 29k, likely steroids, but will increase cefepime to 2g q8h given immunocompromised with good renal fxn. CrCl 81. Unknown organisms, NGTD cx's. Tolerating diet. Improving overall.

## 2023-10-14 NOTE — ASSESSMENT & PLAN NOTE
Advance Care Planning     Date: 10/13/2023    Code Status  In light of the patients advanced and life limiting illness,I engaged the the patient in a voluntary conversation about the patient's preferences for care  at the very end of life. The patient wishes to have a natural, peaceful death.  Along those lines, the patient does not wish to have CPR or other invasive treatments performed when his heart and/or breathing stops. I communicated to the patient that a DNR order would be placed in his medical record to reflect this preference.  -Discussion conducted by admitting provider

## 2023-10-14 NOTE — ASSESSMENT & PLAN NOTE
Smokes 1/2ppd greater than 3 minutes spent counseling patient on dangers of continued tobacco abuse on admission.   Continue smoking cessation education prior to discharge.

## 2023-10-14 NOTE — ASSESSMENT & PLAN NOTE
Followed by oncology outpatient. History of Stage IV cancer on oral chemo. LFTs within normal limits today.   Home oral chemo held due to inability to tolerate PO on admission.  -Currently w/o N/V.  Attempting clear liquid diet.  If the patient is able to tolerate PO, will attempt to resume chemo med.

## 2023-10-14 NOTE — PROGRESS NOTES
[facility] Medicine  Progress Note    Patient Name: Manuel Tyler  MRN: [id number]  Patient Class: OP- Observation   Admission Date: 10/13/2023  Length of Stay: 0 days  Attending Physician: Jessica Richmond MD  Primary Care Provider: Fatmata Vera MD        Subjective:     Principal Problem:Acute hypoxemic respiratory failure    HPI:  Manuel Tyler 72 y.o. male with pancreatic cancer on chemo tobacco abuse presents to the hospital with a chief complaint of intractable vomiting.  He reports 4 days of intractable nausea and vomiting with associated burning chest pain.  He attempted treatment home with anti acid medication without improvement.  He was seen in the infusion center yesterday and received IV steroids and IV fluids without improvement.  Reports he has been unable to take his oral chemo due to intractable vomiting.  He smokes 1/2 pack per day.  He denies fever leg swelling melena hematuria hematemesis dizziness syncope shortness breath and cough.  He reports chronic upper abdominal pain that he attributes to his pancreatic cancer.    In the ED, hypoxic to 83% despite changing oxygen probes and proper location.  Chest x-ray without acute process, metabolic alkalosis with pH, creatinine 7.5 on VBG CO2 40 white blood cell count 28 point calcium 12.0.      Overview/Hospital Course:  10/14/2023 NAEON.  K+ 2.3 this am, repleated.  Patient currently states he is not having N/V and has not vomited since admission. Okay with trial of clear liquid diet, advance as tolerated.      Interval History:  NAEON.  K+ 2.3 this am, repleated.  Patient currently states he is not having N/V and has not vomited since admission. Okay with trial of clear liquid diet, advance as tolerated.    Review of Systems   Constitutional:  Negative for chills, fatigue and fever.   HENT:  Negative for drooling, facial swelling and trouble swallowing.    Eyes:  Negative for photophobia, pain and visual disturbance.    Respiratory:  Negative for cough, shortness of breath and wheezing.    Cardiovascular:  Negative for chest pain, palpitations and leg swelling.   Gastrointestinal:  Negative for abdominal pain, constipation, diarrhea, nausea and vomiting.   Endocrine: Negative for cold intolerance and heat intolerance.   Genitourinary:  Negative for dysuria and hematuria.   Musculoskeletal:  Negative for neck pain and neck stiffness.   Skin:  Negative for rash and wound.   Allergic/Immunologic: Negative for environmental allergies and food allergies.   Neurological:  Negative for dizziness, tremors, weakness, light-headedness, numbness and headaches.   Hematological:  Negative for adenopathy. Does not bruise/bleed easily.   Psychiatric/Behavioral:  Negative for agitation, confusion and hallucinations.      Objective:     Vital Signs (Most Recent):  Temp: 97.7 °F (36.5 °C) (10/14/23 0726)  Pulse: 80 (10/14/23 0800)  Resp: 16 (10/14/23 0744)  BP: (!) 162/70 (10/14/23 0800)  SpO2: (!) 91 % (10/14/23 0744) Vital Signs (24h Range):  Temp:  [97.7 °F (36.5 °C)-98.9 °F (37.2 °C)] 97.7 °F (36.5 °C)  Pulse:  [72-88] 80  Resp:  [16-29] 16  SpO2:  [88 %-96 %] 91 %  BP: (131-194)/(62-97) 162/70     Weight: 65.7 kg (144 lb 13.5 oz)  Body mass index is 22.02 kg/m².    Intake/Output Summary (Last 24 hours) at 10/14/2023 1119  Last data filed at 10/14/2023 1032  Gross per 24 hour   Intake 999 ml   Output 1050 ml   Net -51 ml         Physical Exam  Vitals and nursing note reviewed.   Constitutional:       General: He is not in acute distress.     Appearance: He is well-developed and underweight. He is not diaphoretic.   HENT:      Head: Normocephalic and atraumatic.      Right Ear: External ear normal.      Left Ear: External ear normal.      Nose: Nose normal.      Mouth/Throat:      Mouth: Mucous membranes are moist.   Eyes:      General: No scleral icterus.        Right eye: No discharge.         Left eye: No discharge.   Cardiovascular:       Rate and Rhythm: Normal rate and regular rhythm.   Pulmonary:      Effort: Pulmonary effort is normal. No respiratory distress.   Abdominal:      General: There is no distension.      Palpations: Abdomen is soft.      Tenderness: There is no abdominal tenderness.   Musculoskeletal:      Cervical back: Normal range of motion and neck supple.   Skin:     General: Skin is warm and dry.      Capillary Refill: Capillary refill takes less than 2 seconds.   Neurological:      Mental Status: He is alert and oriented to person, place, and time.   Psychiatric:         Mood and Affect: Mood normal.         Behavior: Behavior normal.             Significant Labs: All pertinent labs within the past 24 hours have been reviewed.  CBC:   Recent Labs   Lab 10/13/23  1150 10/13/23  1150 10/14/23  0355   WBC 28.95*  --  23.34*   HGB 14.8  --  12.7*   HCT 45.3 49 39.5*   *  --  395     CMP:   Recent Labs   Lab 10/13/23  1150 10/14/23  0355    139   K 4.1 2.3*   CL 86* 90*   CO2 41* 38*   * 120*   BUN 29* 23   CREATININE 1.9* 1.1   CALCIUM 12.0* 9.5   PROT 9.2* 6.8   ALBUMIN 3.9 3.0*   BILITOT 0.5 0.6   ALKPHOS 127 104   AST 37 25   ALT 27 21   ANIONGAP 18* 11       Significant Imaging: I have reviewed all pertinent imaging results/findings within the past 24 hours.    X-Ray Chest AP Portable   Final Result      1. Bandlike atelectasis projects over the left lower lung zone, no large focal consolidation.         Electronically signed by: Lucio George MD   Date:    10/13/2023   Time:    13:29      US Abdomen Limited   Final Result      Pancreatic mass in this patient with known malignancy.  Pneumobilia related to previous instrumentation.         Electronically signed by: Sundeep Yousif Jr   Date:    10/13/2023   Time:    13:30              Assessment/Plan:      * Acute hypoxemic respiratory failure  Found to be hypoxic to 83% on RA in the ED, despite multiple probe changes and moving probe to forehead hypoxia  remained. Continues to smoke and previous CT with emphysema. Possibly component of baseline hypoxia. Po2 low on VBG as well. Aspiration pneumonitis possible given vomiting, will hold starting antibiotics as no infiltrate on x-ray  Continue steroids/duo-nebs for possible COPD exacerbation  Continue IVF as above for VINCENT and consider CTA if no improvement in hypoxia  BNP/troponin 149/0.008 respectively  Wean supplemental oxygen  COVID/Flu negative      Patient with Hypoxic Respiratory failure which is Acute.  he is not on home oxygen. Supplemental oxygen was provided and noted-  currently on 3L NC O2 w/SpO2 88-96% in the last 24°.     Signs/symptoms of respiratory failure include- lethargy. Contributing diagnoses includes - COPD Labs and images were reviewed. Patient Has not had a recent ABG. Will treat underlying causes and adjust management of respiratory failure as follows- started on steroids/duo-nebs for possible COPD exacerbation    -Symptoms seem to have improved overnight.  Will attempt to wean O2, 6 min walk test to assess need for home O2.    Nausea & vomiting  -Patient presented for intractable N/V despite IVFs and antiemetics.  -Currently denies any N/V and states he has not vomited since presenting to the ED.  -Clear liquid diet, advance as tolerated.        VINCENT (acute kidney injury)  Presents with Cr of 1.9 baseline of 0.9 to 1.3. suspect related to intractable nausea and vomiting.   Started on IVF as above  UA w/pH >8.0 and Urine Na 94  Renal dose medications, avoid nephrotoxic drugs, monitor intake and output      Patient with acute kidney injury/acute renal failure likely due to pre-renal azotemia due to dehydration VINCENT is currently stable. Baseline creatinine 0.9 to 1.3 - Labs reviewed- Renal function/electrolytes with Estimated Creatinine Clearance: 57.2 mL/min (based on SCr of 1.1 mg/dL). according to latest data. Monitor urine output and serial BMP and adjust therapy as needed. Avoid nephrotoxins  "and renally dose meds for GFR listed above.    Hypercalcemia  Presents with calcium of 12.0. suspect related to volume depletion from intractable vomiting. Previous normal 2 weeks ago.   Started on IVF  PTH  20.9  Ca 9.5<12.0 this am    The patient has hypercalcemia that is currently controlled. The patient has the following symptoms due to their hypercalcemia: None (asymptomatic). The hypercalcemia is likely due to Malignancy and intractable vomiting. We will obtain the following labs to work up the hypercalcemia: PTH   PTH, Intact   Date Value Ref Range Status   10/13/2023 20.9 9.0 - 77.0 pg/mL Final    . We will treat the hypercalcemia with: IV fluids ordered at a rate of 100 ml/hr. Their latest calcium has been reviewed and is listed below.  No results found for: "CAION"    Metabolic alkalosis  Presents with pH of 7.519 and bicarb of 51 on VBG. Suspect related to continued vomiting.   Started on IVF as above  Repeat CMP this am with signs of continued metabolic alkalosis now w/hypokalemia.  K+ repleated    Tobacco abuse  Smokes 1/2ppd greater than 3 minutes spent counseling patient on dangers of continued tobacco abuse on admission.   Continue smoking cessation education prior to discharge.      ACP (advance care planning)  Advance Care Planning     Date: 10/13/2023    Code Status  In light of the patients advanced and life limiting illness,I engaged the the patient in a voluntary conversation about the patient's preferences for care  at the very end of life. The patient wishes to have a natural, peaceful death.  Along those lines, the patient does not wish to have CPR or other invasive treatments performed when his heart and/or breathing stops. I communicated to the patient that a DNR order would be placed in his medical record to reflect this preference.  -Discussion conducted by admitting provider           (HFpEF) heart failure with preserved ejection fraction  Echo 1/2023 with EF of 65% and indeterminate " diastolic dysfunction.   On IVF for VINCENT as above  Daily weights/monitor intake and output/telemetry  24° I&O +649  No s/s of volume overload at this time.    Liver metastases  See above. Followed by oncology and on oral chemo outpatient.       Malignant neoplasm of head of pancreas  Followed by oncology outpatient. History of Stage IV cancer on oral chemo. LFTs within normal limits today.   Home oral chemo held due to inability to tolerate PO on admission.  -Currently w/o N/V.  Attempting clear liquid diet.  If the patient is able to tolerate PO, will attempt to resume chemo med.    Hyperlipidemia  Continue home statin      VTE Risk Mitigation (From admission, onward)         Ordered     enoxaparin injection 40 mg  Every 24 hours         10/13/23 1601     IP VTE HIGH RISK PATIENT  Once         10/13/23 1515     Place sequential compression device  Until discontinued         10/13/23 1515     Place FRANCES hose  Until discontinued         10/13/23 1515                Discharge Planning   MIKO:      Code Status: DNR   Is the patient medically ready for discharge?:     Reason for patient still in hospital (select all that apply): Other (specify) assessing ability to tolerate PO, need for home O2  Discharge Plan A: Home Health            Nurys Walsh, GAYLE, NP  Department of Hospital Medicine   [facility] - Telemetry

## 2023-10-14 NOTE — NURSING
[facility]  Nurses Note -- 4 Eyes      10/14/2023       Skin assessed on: Q Shift      [x] No Pressure Injuries Present    []Prevention Measures Documented    [] Yes LDA  for Pressure Injury Previously documented     [] Yes New Pressure Injury Discovered   [] LDA for New Pressure Injury Added      Attending RN:  Hermelinda Abraham RN     Second RN:  Ewelina Perkins RN        Continue management per primary team.

## 2023-10-14 NOTE — ASSESSMENT & PLAN NOTE
Echo 1/2023 with EF of 65% and indeterminate diastolic dysfunction.   On IVF for VINCENT as above  Daily weights/monitor intake and output/telemetry  24° I&O +649  No s/s of volume overload at this time.

## 2023-10-14 NOTE — ASSESSMENT & PLAN NOTE
Presents with pH of 7.519 and bicarb of 51 on VBG. Suspect related to continued vomiting.   Started on IVF as above  Repeat CMP this am with signs of continued metabolic alkalosis now w/hypokalemia.  K+ repleated

## 2023-10-14 NOTE — ASSESSMENT & PLAN NOTE
Presents with Cr of 1.9 baseline of 0.9 to 1.3. suspect related to intractable nausea and vomiting.   Started on IVF as above  UA w/pH >8.0 and Urine Na 94  Renal dose medications, avoid nephrotoxic drugs, monitor intake and output      Patient with acute kidney injury/acute renal failure likely due to pre-renal azotemia due to dehydration VINCENT is currently stable. Baseline creatinine 0.9 to 1.3 - Labs reviewed- Renal function/electrolytes with Estimated Creatinine Clearance: 57.2 mL/min (based on SCr of 1.1 mg/dL). according to latest data. Monitor urine output and serial BMP and adjust therapy as needed. Avoid nephrotoxins and renally dose meds for GFR listed above.

## 2023-10-14 NOTE — SUBJECTIVE & OBJECTIVE
Interval History:  GRECIAON.  K+ 2.3 this am, repleated.  Patient currently states he is not having N/V and has not vomited since admission. Okay with trial of clear liquid diet, advance as tolerated.    Review of Systems   Constitutional:  Negative for chills, fatigue and fever.   HENT:  Negative for drooling, facial swelling and trouble swallowing.    Eyes:  Negative for photophobia, pain and visual disturbance.   Respiratory:  Negative for cough, shortness of breath and wheezing.    Cardiovascular:  Negative for chest pain, palpitations and leg swelling.   Gastrointestinal:  Negative for abdominal pain, constipation, diarrhea, nausea and vomiting.   Endocrine: Negative for cold intolerance and heat intolerance.   Genitourinary:  Negative for dysuria and hematuria.   Musculoskeletal:  Negative for neck pain and neck stiffness.   Skin:  Negative for rash and wound.   Allergic/Immunologic: Negative for environmental allergies and food allergies.   Neurological:  Negative for dizziness, tremors, weakness, light-headedness, numbness and headaches.   Hematological:  Negative for adenopathy. Does not bruise/bleed easily.   Psychiatric/Behavioral:  Negative for agitation, confusion and hallucinations.      Objective:     Vital Signs (Most Recent):  Temp: 97.7 °F (36.5 °C) (10/14/23 0726)  Pulse: 80 (10/14/23 0800)  Resp: 16 (10/14/23 0744)  BP: (!) 162/70 (10/14/23 0800)  SpO2: (!) 91 % (10/14/23 0744) Vital Signs (24h Range):  Temp:  [97.7 °F (36.5 °C)-98.9 °F (37.2 °C)] 97.7 °F (36.5 °C)  Pulse:  [72-88] 80  Resp:  [16-29] 16  SpO2:  [88 %-96 %] 91 %  BP: (131-194)/(62-97) 162/70     Weight: 65.7 kg (144 lb 13.5 oz)  Body mass index is 22.02 kg/m².    Intake/Output Summary (Last 24 hours) at 10/14/2023 1119  Last data filed at 10/14/2023 1032  Gross per 24 hour   Intake 999 ml   Output 1050 ml   Net -51 ml         Physical Exam  Vitals and nursing note reviewed.   Constitutional:       General: He is not in acute  distress.     Appearance: He is well-developed and underweight. He is not diaphoretic.   HENT:      Head: Normocephalic and atraumatic.      Right Ear: External ear normal.      Left Ear: External ear normal.      Nose: Nose normal.      Mouth/Throat:      Mouth: Mucous membranes are moist.   Eyes:      General: No scleral icterus.        Right eye: No discharge.         Left eye: No discharge.   Cardiovascular:      Rate and Rhythm: Normal rate and regular rhythm.   Pulmonary:      Effort: Pulmonary effort is normal. No respiratory distress.   Abdominal:      General: There is no distension.      Palpations: Abdomen is soft.      Tenderness: There is no abdominal tenderness.   Musculoskeletal:      Cervical back: Normal range of motion and neck supple.   Skin:     General: Skin is warm and dry.      Capillary Refill: Capillary refill takes less than 2 seconds.   Neurological:      Mental Status: He is alert and oriented to person, place, and time.   Psychiatric:         Mood and Affect: Mood normal.         Behavior: Behavior normal.             Significant Labs: All pertinent labs within the past 24 hours have been reviewed.  CBC:   Recent Labs   Lab 10/13/23  1150 10/13/23  1150 10/14/23  0355   WBC 28.95*  --  23.34*   HGB 14.8  --  12.7*   HCT 45.3 49 39.5*   *  --  395     CMP:   Recent Labs   Lab 10/13/23  1150 10/14/23  0355    139   K 4.1 2.3*   CL 86* 90*   CO2 41* 38*   * 120*   BUN 29* 23   CREATININE 1.9* 1.1   CALCIUM 12.0* 9.5   PROT 9.2* 6.8   ALBUMIN 3.9 3.0*   BILITOT 0.5 0.6   ALKPHOS 127 104   AST 37 25   ALT 27 21   ANIONGAP 18* 11       Significant Imaging: I have reviewed all pertinent imaging results/findings within the past 24 hours.    X-Ray Chest AP Portable   Final Result      1. Bandlike atelectasis projects over the left lower lung zone, no large focal consolidation.         Electronically signed by: Lucio George MD   Date:    10/13/2023   Time:    13:29        Abdomen Limited   Final Result      Pancreatic mass in this patient with known malignancy.  Pneumobilia related to previous instrumentation.         Electronically signed by: Sundeep Yousif Jr   Date:    10/13/2023   Time:    13:30

## 2023-10-14 NOTE — ASSESSMENT & PLAN NOTE
"Presents with calcium of 12.0. suspect related to volume depletion from intractable vomiting. Previous normal 2 weeks ago.   Started on IVF  PTH  20.9  Ca 9.5<12.0 this am    The patient has hypercalcemia that is currently controlled. The patient has the following symptoms due to their hypercalcemia: None (asymptomatic). The hypercalcemia is likely due to Malignancy and intractable vomiting. We will obtain the following labs to work up the hypercalcemia: PTH   PTH, Intact   Date Value Ref Range Status   10/13/2023 20.9 9.0 - 77.0 pg/mL Final    . We will treat the hypercalcemia with: IV fluids ordered at a rate of 100 ml/hr. Their latest calcium has been reviewed and is listed below.  No results found for: "LANDRYON"  "

## 2023-10-14 NOTE — ASSESSMENT & PLAN NOTE
-Patient presented for intractable N/V despite IVFs and antiemetics.  -Currently denies any N/V and states he has not vomited since presenting to the ED.  -Clear liquid diet, advance as tolerated.

## 2023-10-15 PROBLEM — E87.6 HYPOKALEMIA: Status: ACTIVE | Noted: 2023-10-15

## 2023-10-15 PROBLEM — J44.1 ACUTE EXACERBATION OF CHRONIC OBSTRUCTIVE PULMONARY DISEASE (COPD): Status: ACTIVE | Noted: 2023-10-15

## 2023-10-15 LAB
ALBUMIN SERPL BCP-MCNC: 3.3 G/DL (ref 3.5–5.2)
ALP SERPL-CCNC: 102 U/L (ref 55–135)
ALT SERPL W/O P-5'-P-CCNC: 43 U/L (ref 10–44)
ANION GAP SERPL CALC-SCNC: 15 MMOL/L (ref 8–16)
AST SERPL-CCNC: 39 U/L (ref 10–40)
BILIRUB SERPL-MCNC: 0.7 MG/DL (ref 0.1–1)
BUN SERPL-MCNC: 18 MG/DL (ref 8–23)
CALCIUM SERPL-MCNC: 9.2 MG/DL (ref 8.7–10.5)
CHLORIDE SERPL-SCNC: 94 MMOL/L (ref 95–110)
CO2 SERPL-SCNC: 26 MMOL/L (ref 23–29)
CREAT SERPL-MCNC: 0.8 MG/DL (ref 0.5–1.4)
EST. GFR  (NO RACE VARIABLE): >60 ML/MIN/1.73 M^2
GLUCOSE SERPL-MCNC: 133 MG/DL (ref 70–110)
MAGNESIUM SERPL-MCNC: 1.8 MG/DL (ref 1.6–2.6)
POTASSIUM SERPL-SCNC: 2.8 MMOL/L (ref 3.5–5.1)
PROT SERPL-MCNC: 7.5 G/DL (ref 6–8.4)
SODIUM SERPL-SCNC: 135 MMOL/L (ref 136–145)

## 2023-10-15 PROCEDURE — 27000221 HC OXYGEN, UP TO 24 HOURS

## 2023-10-15 PROCEDURE — 85379 FIBRIN DEGRADATION QUANT: CPT | Performed by: HOSPITALIST

## 2023-10-15 PROCEDURE — 94640 AIRWAY INHALATION TREATMENT: CPT

## 2023-10-15 PROCEDURE — 25000003 PHARM REV CODE 250: Performed by: NURSE PRACTITIONER

## 2023-10-15 PROCEDURE — 97535 SELF CARE MNGMENT TRAINING: CPT

## 2023-10-15 PROCEDURE — 25500020 PHARM REV CODE 255: Performed by: HOSPITALIST

## 2023-10-15 PROCEDURE — 63600175 PHARM REV CODE 636 W HCPCS: Performed by: PHYSICIAN ASSISTANT

## 2023-10-15 PROCEDURE — 63600175 PHARM REV CODE 636 W HCPCS: Performed by: HOSPITALIST

## 2023-10-15 PROCEDURE — 25000242 PHARM REV CODE 250 ALT 637 W/ HCPCS: Performed by: HOSPITALIST

## 2023-10-15 PROCEDURE — 80053 COMPREHEN METABOLIC PANEL: CPT | Performed by: HOSPITALIST

## 2023-10-15 PROCEDURE — 25000003 PHARM REV CODE 250: Performed by: HOSPITALIST

## 2023-10-15 PROCEDURE — 85025 COMPLETE CBC W/AUTO DIFF WBC: CPT | Performed by: HOSPITALIST

## 2023-10-15 PROCEDURE — 21400001 HC TELEMETRY ROOM

## 2023-10-15 PROCEDURE — 99900035 HC TECH TIME PER 15 MIN (STAT)

## 2023-10-15 PROCEDURE — 97165 OT EVAL LOW COMPLEX 30 MIN: CPT

## 2023-10-15 PROCEDURE — 97162 PT EVAL MOD COMPLEX 30 MIN: CPT | Performed by: PHYSICAL THERAPIST

## 2023-10-15 PROCEDURE — 97110 THERAPEUTIC EXERCISES: CPT | Performed by: PHYSICAL THERAPIST

## 2023-10-15 PROCEDURE — 94761 N-INVAS EAR/PLS OXIMETRY MLT: CPT

## 2023-10-15 PROCEDURE — 36415 COLL VENOUS BLD VENIPUNCTURE: CPT | Performed by: HOSPITALIST

## 2023-10-15 PROCEDURE — 83735 ASSAY OF MAGNESIUM: CPT | Performed by: HOSPITALIST

## 2023-10-15 PROCEDURE — S4991 NICOTINE PATCH NONLEGEND: HCPCS | Performed by: HOSPITALIST

## 2023-10-15 RX ORDER — MUPIROCIN 20 MG/G
OINTMENT TOPICAL 2 TIMES DAILY
Status: DISCONTINUED | OUTPATIENT
Start: 2023-10-15 | End: 2023-10-15 | Stop reason: SDUPTHER

## 2023-10-15 RX ORDER — PREDNISONE 20 MG/1
40 TABLET ORAL DAILY
Status: DISCONTINUED | OUTPATIENT
Start: 2023-10-15 | End: 2023-10-18

## 2023-10-15 RX ORDER — HYDRALAZINE HYDROCHLORIDE 20 MG/ML
15 INJECTION INTRAMUSCULAR; INTRAVENOUS EVERY 4 HOURS PRN
Status: DISCONTINUED | OUTPATIENT
Start: 2023-10-15 | End: 2023-10-18 | Stop reason: HOSPADM

## 2023-10-15 RX ORDER — POTASSIUM CHLORIDE 20 MEQ/1
40 TABLET, EXTENDED RELEASE ORAL ONCE
Status: COMPLETED | OUTPATIENT
Start: 2023-10-15 | End: 2023-10-15

## 2023-10-15 RX ORDER — SODIUM CHLORIDE 450 MG/100ML
INJECTION, SOLUTION INTRAVENOUS CONTINUOUS
Status: DISCONTINUED | OUTPATIENT
Start: 2023-10-15 | End: 2023-10-16

## 2023-10-15 RX ORDER — SUCRALFATE 1 G/10ML
1 SUSPENSION ORAL
Status: DISCONTINUED | OUTPATIENT
Start: 2023-10-15 | End: 2023-10-18 | Stop reason: HOSPADM

## 2023-10-15 RX ORDER — MUPIROCIN 20 MG/G
OINTMENT TOPICAL 2 TIMES DAILY
Status: DISCONTINUED | OUTPATIENT
Start: 2023-10-15 | End: 2023-10-18 | Stop reason: HOSPADM

## 2023-10-15 RX ORDER — IPRATROPIUM BROMIDE AND ALBUTEROL SULFATE 2.5; .5 MG/3ML; MG/3ML
3 SOLUTION RESPIRATORY (INHALATION)
Status: DISCONTINUED | OUTPATIENT
Start: 2023-10-15 | End: 2023-10-18 | Stop reason: HOSPADM

## 2023-10-15 RX ORDER — IBUPROFEN 200 MG
1 TABLET ORAL DAILY
Status: DISCONTINUED | OUTPATIENT
Start: 2023-10-15 | End: 2023-10-18 | Stop reason: HOSPADM

## 2023-10-15 RX ORDER — POTASSIUM CHLORIDE 7.45 MG/ML
10 INJECTION INTRAVENOUS
Status: COMPLETED | OUTPATIENT
Start: 2023-10-15 | End: 2023-10-15

## 2023-10-15 RX ADMIN — SODIUM CHLORIDE, POTASSIUM CHLORIDE, SODIUM LACTATE AND CALCIUM CHLORIDE: 600; 310; 30; 20 INJECTION, SOLUTION INTRAVENOUS at 07:10

## 2023-10-15 RX ADMIN — POTASSIUM CHLORIDE 40 MEQ: 1500 TABLET, EXTENDED RELEASE ORAL at 02:10

## 2023-10-15 RX ADMIN — SUCRALFATE 1 G: 1 SUSPENSION ORAL at 11:10

## 2023-10-15 RX ADMIN — METHYLPREDNISOLONE SODIUM SUCCINATE 125 MG: 125 INJECTION, POWDER, FOR SOLUTION INTRAMUSCULAR; INTRAVENOUS at 08:10

## 2023-10-15 RX ADMIN — SODIUM CHLORIDE: 4.5 INJECTION, SOLUTION INTRAVENOUS at 03:10

## 2023-10-15 RX ADMIN — POTASSIUM CHLORIDE 10 MEQ: 7.46 INJECTION, SOLUTION INTRAVENOUS at 03:10

## 2023-10-15 RX ADMIN — IPRATROPIUM BROMIDE AND ALBUTEROL SULFATE 3 ML: 2.5; .5 SOLUTION RESPIRATORY (INHALATION) at 07:10

## 2023-10-15 RX ADMIN — SUCRALFATE 1 G: 1 SUSPENSION ORAL at 08:10

## 2023-10-15 RX ADMIN — POTASSIUM CHLORIDE 10 MEQ: 7.46 INJECTION, SOLUTION INTRAVENOUS at 04:10

## 2023-10-15 RX ADMIN — MUPIROCIN: 20 OINTMENT TOPICAL at 02:10

## 2023-10-15 RX ADMIN — POTASSIUM CHLORIDE 10 MEQ: 7.46 INJECTION, SOLUTION INTRAVENOUS at 05:10

## 2023-10-15 RX ADMIN — POTASSIUM CHLORIDE 10 MEQ: 7.46 INJECTION, SOLUTION INTRAVENOUS at 02:10

## 2023-10-15 RX ADMIN — ENOXAPARIN SODIUM 40 MG: 40 INJECTION SUBCUTANEOUS at 04:10

## 2023-10-15 RX ADMIN — PREDNISONE 40 MG: 20 TABLET ORAL at 11:10

## 2023-10-15 RX ADMIN — SUCRALFATE 1 G: 1 SUSPENSION ORAL at 04:10

## 2023-10-15 RX ADMIN — IOHEXOL 75 ML: 350 INJECTION, SOLUTION INTRAVENOUS at 02:10

## 2023-10-15 RX ADMIN — NICOTINE 1 PATCH: 14 PATCH TRANSDERMAL at 03:10

## 2023-10-15 RX ADMIN — IPRATROPIUM BROMIDE AND ALBUTEROL SULFATE 3 ML: 2.5; .5 SOLUTION RESPIRATORY (INHALATION) at 01:10

## 2023-10-15 RX ADMIN — PANTOPRAZOLE SODIUM 40 MG: 40 TABLET, DELAYED RELEASE ORAL at 08:10

## 2023-10-15 RX ADMIN — MUPIROCIN: 20 OINTMENT TOPICAL at 08:10

## 2023-10-15 NOTE — ASSESSMENT & PLAN NOTE
-Baseline Cr 0.9-1.3  -On admit Cr 1.9 - suspected due to pre-renal azotemia from dehydration due to nausea and vomiting  -Noted metabolic alkalosis on admission  -Avoid nephrotoxic agents and renally dose meds  -Treated with IV fluids and improving - Cr now 0.8.  -Advance diet and continue fluids for now  -Repeat labs in AM.

## 2023-10-15 NOTE — PT/OT/SLP EVAL
Occupational Therapy Evaluation and Treatment    Name: Manuel Tyler  MRN: [id number]  Admitting Diagnosis: Acute hypoxemic respiratory failure  Recent Surgery: * No surgery found *      Recommendations:     Discharge Recommendations: home (with caregiver support)  Level of Assistance Recommended: Intermittent assistance  Discharge Equipment Recommendations: bath bench  Barriers to discharge: None    Assessment:     Manuel Tyler is a 72 y.o. male with a medical diagnosis of Acute hypoxemic respiratory failure. He presents with performance deficits affecting function including weakness, impaired endurance, impaired self care skills, impaired functional mobility, decreased safety awareness, impaired cardiopulmonary response to activity, decreased upper extremity function, gait instability, impaired balance.   The patient was able to sit on the EOB and amb to the chair with SBA and assist to manage the O2 and IV lines. The patient was 88% on 3L NC while sitting on the EOB and increased slowly to 93-97% with VC to perform PLB, after amb to the chair was 88-89% (nurseLedy was notified) The patient and spouse were educated re: PLB verbally, via demo and handout.    Rehab Prognosis: Good; patient would benefit from acute OT services to address these deficits and reach maximum level of function.    Plan:     Patient to be seen 3 x/week to address the above listed problems via self-care/home management, therapeutic activities, therapeutic exercises  Plan of Care Expires: 10/29/23  Plan of Care Reviewed with: patient, spouse    Subjective     Chief Complaint: none  Patient Comments/Goals: agreeable to sit in the chair  Pain/Comfort:  Pain Rating 1: 0/10    Patients cultural, spiritual, Yazidism conflicts given the current situation: no    Social History:  Living Environment: Patient lives with their spouse in a single story home with number of outside stair(s): 1  Prior Level of Function: Prior to admission, patient was  modified independent with ADLs using straight cane for mobility  Roles and Routines: Patient was driving prior to admission.  Equipment Used at Home: walker, rolling, cane, straight, shower chair  DME owned (not currently used): none  Assistance Upon Discharge:  spouse    Objective:     Communicated with nurseLedy prior to session. Patient found HOB elevated with peripheral IV, telemetry, oxygen upon OT entry to room.    General Precautions: Standard, fall, respiratory   Orthopedic Precautions: N/A   Braces: N/A    Respiratory Status: Nasal cannula, flow 3 L/min    Occupational Performance    Gait belt applied - Yes    Bed Mobility:   Scooting anteriorly to EOB to have both feet planted on floor: stand by assistance  Supine to sit from right side of bed with stand by assistance    Functional Mobility/Transfers:  Sit <> Stand Transfer with stand by assistance with no AD  Functional Mobility: The patient stepped from bed to chair ~7' with SBA and assist to manage O2 and IV lines    Activities of Daily Living:  Upper Body Dressing: contact guard assistance  Lower Body Dressing: modified independence and supervision    Cognitive/Visual Perceptual:  Cognitive/Psychosocial Skills:    -     Oriented to: Person, Place, Time, Situation  -     Follows Commands/attention: Follows two-step commands  -     Communication: clear/fluent  -     Memory: No Deficits noted  -     Safety awareness/insight to disability: impaired  -     Mood/Affect/Coping skills/emotional control: Appropriate to situation    Physical Exam:  Balance:    -     Sitting: supervision  -     Standing: stand by assistance  Postural examination/scapula alignment:    -       Forward head  Skin integrity: Visible skin intact  Edema:  None noted  Sensation:    -       Intact  Dominant hand: Right  Upper Extremity Range of Motion:     -       Right Upper Extremity: WFL  -       Left Upper Extremity: WFL  Upper Extremity Strength:    -       Right Upper  Extremity: WFL  -       Left Upper Extremity: WFL   Strength:    -       Right Upper Extremity: WFL  -       Left Upper Extremity: WFL    AMPAC 6 Click ADL:  AMPAC Total Score: 22    Treatment & Education:  Patient educated on role of OT, POC, and goals for therapy  Patient educated on importance of OOB activities with staff member assistance   Monitored O2 sats: sitting on the EOB 88% on 3L NC and increased slowly to 93-97% with VC to perform PLB, after amb to the chair was 88-89% (nurseLedy was notified)   The patient and spouse were educated re: PLB verbally, via demo and handout.  Patient sat on the EOB with Mod I/(S) to monitor O2 sats  Educated the patient re: need to wear O2 as ordered 2* low O2 sats and it is not safe to remove O2 to amb to the bathroom. Urinal was place next to the patient.    Patient clear to ambulate to/from bathroom with RN/PCT, assist x1 with SBA/CGA and usie of oxygen .    Patient left up in chair with all lines intact, call button in reach, RN notified, and spouse present.    GOALS:   Multidisciplinary Problems       Occupational Therapy Goals          Problem: Occupational Therapy    Goal Priority Disciplines Outcome Interventions   Occupational Therapy Goal     OT, PT/OT Ongoing, Progressing    Description: Goals to be met by: 10/29/2023       Patient will increase functional independence with ADLs by performing:    UE Dressing with Modified [place].  LE Dressing with Modified [place].  Grooming while standing at sink with Modified [place] and Assistive Devices as needed.  Toileting from toilet with Modified [place] and Assistive Devices as needed for hygiene and clothing management.   Supine to sit with Modified [place].  Step transfer with Modified [place]  Toilet transfer to toilet with Modified [place].  Upper extremity exercise program x15 reps per handout, with independence.  The patient will demo (I) with pursed lipped breathing                        History:     Past Medical History:   Diagnosis Date    (HFpEF) heart failure with preserved ejection fraction 1/25/2023    Alcohol abuse 12/27/2022    Hyperlipidemia     Hypertension     Liver metastases 1/18/2023    Malignant neoplasm of head of pancreas 1/18/2023    Other specified noninfective gastroenteritis and colitis     Pancreatitis     Personal history of colonic polyps          Past Surgical History:   Procedure Laterality Date    COLONOSCOPY      COLONOSCOPY N/A 08/07/2023    Procedure: COLONOSCOPY;  Surgeon: Kong Estrella MD;  Location: [facility] ENDO;  Service: Endoscopy;  Laterality: N/A;  6/22 Instructions sent Via portal    COLONOSCOPY N/A 08/08/2023    Procedure: COLONOSCOPY;  Surgeon: Mariia Luevnao MD;  Location: [facility] ENDO;  Service: Endoscopy;  Laterality: N/A;    ENDOSCOPIC ULTRASOUND OF UPPER GASTROINTESTINAL TRACT Left 12/30/2022    Procedure: ULTRASOUND, UPPER GI TRACT, ENDOSCOPIC;  Surgeon: Gregory Cristina MD;  Location: [facility] ENDO (2ND FLR);  Service: Endoscopy;  Laterality: Left;    ERCP Left 12/30/2022    Procedure: ERCP (ENDOSCOPIC RETROGRADE CHOLANGIOPANCREATOGRAPHY);  Surgeon: Gregory Cristina MD;  Location: [facility] ENDO (2ND FLR);  Service: Endoscopy;  Laterality: Left;    FOOT SURGERY Left     FRACTURE SURGERY  10/23/1998    INSERTION OF TUNNELED CENTRAL VENOUS CATHETER (CVC) WITH SUBCUTANEOUS PORT Bilateral 01/19/2023    Procedure: [id number];  Surgeon: Amador Castellon MD;  Location: [facility] OR;  Service: General;  Laterality: Bilateral;  Right v Left PORTACATH. needs ultrasound and fluoro  RN PREOP 01/18/2023    TONSILLECTOMY      VASECTOMY  10/25/1983       Time Tracking:     OT Date of Treatment: 10/15/23  OT Start Time: 1502  OT Stop Time: 1533  OT Total Time (min): 31 min    Billable Minutes: Evaluation 15 and Self Care/Home Management 16    10/15/2023

## 2023-10-15 NOTE — NURSING
[facility], [facility] [place]  Nurses Note -- 4 Eyes      10/14/2023       Skin assessed on: Q Shift      [x] No Pressure Injuries Present    [x]Prevention Measures Documented    [] Yes LDA  for Pressure Injury Previously documented     [] Yes New Pressure Injury Discovered   [] LDA for New Pressure Injury Added      Attending RN:  Jeanna Coombs, RN     Second RN:  Hermelinda SANTACRUZ RN

## 2023-10-15 NOTE — PLAN OF CARE
Problem: Physical Therapy  Goal: Physical Therapy Goal  Description: Goals to be met by:  10/28/2023    Patient will increase functional independence with mobility by performin. Sit to stand transfer with [place]  4. Gait  x 400 feet with [place].    Recommend: Outpatient PT and TTB at time of discharge to home with family.         Outcome: Ongoing, Progressing

## 2023-10-15 NOTE — ASSESSMENT & PLAN NOTE
-On admit noted CO2 of 41 and VBG showing pH 7.5 and HCO3 51  -Due to vomiting at home  -Treating with IV fluids and CO2 has normalized.  -Repeat labs in AM

## 2023-10-15 NOTE — NURSING
[facility], [facility] [place]  Nurses Note -- 4 Eyes      10/15/2023       Skin assessed on: Q Shift      [x] No Pressure Injuries Present    [x]Prevention Measures Documented    [] Yes LDA  for Pressure Injury Previously documented     [] Yes New Pressure Injury Discovered   [] LDA for New Pressure Injury Added      Attending RN:  Ledy Pleitez, RN     Second RN:  Jeanna SANTACRUZ RN

## 2023-10-15 NOTE — NURSING
Per handoff received from Ledy BERNAL RN. Patient care assumed. Patients overall condition assessed and patient appears to be in NAD with no complaints of pain. 20g LFA and 22g LW PIV's are clean, dry, and intact. 0.45 NS infusing at 75cc/hr. Call light in reach and patient instructed to inform the nurse if anything is needed. Patient stable and will continue to be monitored.

## 2023-10-15 NOTE — ASSESSMENT & PLAN NOTE
-Echo 1/2023 with EF of 65% and indeterminate diastolic dysfunction.   -BNP on admit only 149 and he was dehydrated  -Strict ins/outs and daily weights  -Appears euvolemic on exam now.  -Will decrease rate of IV fluids today

## 2023-10-15 NOTE — PT/OT/SLP EVAL
Physical Therapy Evaluation    Patient Name:  Manuel Tyler   MRN:  [id number]    Recommendations:     Discharge Recommendations: outpatient PT   Discharge Equipment Recommendations: bath bench   Barriers to discharge: None    Assessment:     Manuel Tyler is a 72 y.o. male admitted with a medical diagnosis of Acute hypoxemic respiratory failure.  He presents with the following impairments/functional limitations: impaired endurance, gait instability, decreased safety awareness, impaired cardiopulmonary response to activity.    Rehab Prognosis: Good; patient would benefit from acute skilled PT services to address these deficits and reach maximum level of function.    Recent Surgery: * No surgery found *      Plan:     During this hospitalization, patient to be seen 3 x/week to address the identified rehab impairments via therapeutic activities, therapeutic exercises, gait training and progress toward the following goals:    Plan of Care Expires:  10/28/23    Subjective     Chief Complaint: hard to breathe with this congestion  Patient/Family Comments/goals: to return to PLOF  Pain/Comfort:  Pain Rating 1: 0/10 (- just some trouble breathing with this congestion.)    Patients cultural, spiritual, Baptist conflicts given the current situation: no    Living Environment:  Pt lives with spouse in a [facility] with [facility].   Prior to admission, patients level of function was Mod I.  Equipment used at home: walker, rolling, cane, straight.  DME owned (not currently used): none.  Upon discharge, patient will have assistance from Spouse and Self.    Objective:     Communicated with Nurse prior to session.  Patient found supine with peripheral IV, telemetry, oxygen  upon PT entry to room.    General Precautions: Standard, fall  Orthopedic Precautions:Full weight bearing   Braces: N/A  Respiratory Status: on Room air upon entering, but O2 sat @ 81% - returned to 3.0 Lpm of O2 via [place]     Exams:  Cognitive Exam:  Patient is oriented  to Person, Place, and Situation  Gross Motor Coordination:  WFL  Postural Exam:  Patient presented with the following abnormalities:    -       Rounded shoulders  -       Forward head  Skin Integrity/Edema:      -       Skin integrity: Visible skin intact  -       Edema: None noted .  RLE ROM: WFL  RLE Strength: WFL  LLE ROM: WFL  LLE Strength: WFL    Functional Mobility:  Bed Mobility:     Supine to Sit: contact guard assistance  Sit to Supine: contact guard assistance  Transfers:     Sit to Stand:  contact guard assistance with hand-held assist  Gait: 12' around EOB with 3.0 Lpm of O2 via N.[place] and HHA       AM-PAC 6 CLICK MOBILITY  Total Score:20       Treatment & Education:  Lower Extremity Exercises.    Patient educated on: Purpose and Benefits of Therapeutic Exercise(s).    Patient verbalized acceptance/understanding of instruction(s), expectation(s), and limitation(s) (for safety).  Patient performed: 2 sets of 10 reps (each) of B LE Ther Ex: AP, LAQ, Hip abd/add, Hip flexion while sitting up on EOB.       Patient required still requires verbal cues/tactile cues to ensure correct sequence, to maintain proper form, and to allow for self-correction.  Pt reported no complaints or problems with exercise activity.     Patient required increase Reaction Time to initiate movements and a Sitting Rest Break between set(s) to recover.       Patient left supine with all lines intact, call button in reach, and Spouse present.    GOALS:   Multidisciplinary Problems       Physical Therapy Goals          Problem: Physical Therapy    Goal Priority Disciplines Outcome Goal Variances Interventions   Physical Therapy Goal     PT, PT/OT Ongoing, Progressing     Description: Goals to be met by:  10/28/2023    Patient will increase functional independence with mobility by performin. Sit to stand transfer with [place]  4. Gait  x 400 feet with [place].    Recommend: Outpatient PT at time of discharge to home with  family.                              History:     Past Medical History:   Diagnosis Date    (HFpEF) heart failure with preserved ejection fraction 1/25/2023    Alcohol abuse 12/27/2022    Hyperlipidemia     Hypertension     Liver metastases 1/18/2023    Malignant neoplasm of head of pancreas 1/18/2023    Other specified noninfective gastroenteritis and colitis     Pancreatitis     Personal history of colonic polyps        Past Surgical History:   Procedure Laterality Date    COLONOSCOPY      COLONOSCOPY N/A 08/07/2023    Procedure: COLONOSCOPY;  Surgeon: Kong Estrella MD;  Location: [facility] ENDO;  Service: Endoscopy;  Laterality: N/A;  6/22 Instructions sent Via portal    COLONOSCOPY N/A 08/08/2023    Procedure: COLONOSCOPY;  Surgeon: Mariia Luevano MD;  Location: [facility] ENDO;  Service: Endoscopy;  Laterality: N/A;    ENDOSCOPIC ULTRASOUND OF UPPER GASTROINTESTINAL TRACT Left 12/30/2022    Procedure: ULTRASOUND, UPPER GI TRACT, ENDOSCOPIC;  Surgeon: Gregory Cristina MD;  Location: [facility] ENDO (2ND FLR);  Service: Endoscopy;  Laterality: Left;    ERCP Left 12/30/2022    Procedure: ERCP (ENDOSCOPIC RETROGRADE CHOLANGIOPANCREATOGRAPHY);  Surgeon: Gregory Cristina MD;  Location: [facility] ENDO (2ND FLR);  Service: Endoscopy;  Laterality: Left;    FOOT SURGERY Left     FRACTURE SURGERY  10/23/1998    INSERTION OF TUNNELED CENTRAL VENOUS CATHETER (CVC) WITH SUBCUTANEOUS PORT Bilateral 01/19/2023    Procedure: [id number];  Surgeon: Amador Castellon MD;  Location: [facility] OR;  Service: General;  Laterality: Bilateral;  Right v Left PORTACATH. needs ultrasound and fluoro  RN PREOP 01/18/2023    TONSILLECTOMY      VASECTOMY  10/25/1983       Time Tracking:     PT Received On: 10/15/23  PT Start Time: 1045     PT Stop Time: 1115  PT Total Time (min): 30 min     Billable Minutes: Evaluation 15 min and Therapeutic Exercise 15 min    Ezra Babcock, PT  10/15/2023

## 2023-10-15 NOTE — ASSESSMENT & PLAN NOTE
-On admit Ca 12.0 - corrected to 12.5  -PTH 20.9 (normal)  -Treated with IV fluids and Calcium has normalized  -This was due to dehydration  -Repeat labs in AM.

## 2023-10-15 NOTE — ASSESSMENT & PLAN NOTE
-Presenting complaint was intractable N/V   -Lipase normal  -Abdominal US showed Pancreatic mass in with known malignancy  -Etiology unclear but has improved and now tolerating clear liquids  -Advance to solid diet today  -Add carafate prior to meals and continue protonix  -Anti-emetics available

## 2023-10-15 NOTE — ASSESSMENT & PLAN NOTE
-Admitted to inpatient status  -Presented for nausea and vomiting and found to be hypoxic to 83% on RA in the ED with significant wheezing  -CXR showed no focal consolidation or pulmonary edema  -BNP only 149 and troponin negative.  Covid and flu swabs negative.  -This is acute hypoxic respiratory failure.  He is not on home O2.  -Suspect due to COPD exacerbation or aspiration pneumonitis given vomiting.  -Treated with IV steroids and scheduled duo-nebs and lungs are clear without wheezes today, but he remains significantly hypoxic on room air (80% at rest).  Given his malignancy I am concerned for possible PE.  -Check CTA chest  -Change IV steroids to PO prednisone  -Continue schduled duo-nebs  -Suspect he will need home o2 at discharge.

## 2023-10-15 NOTE — SUBJECTIVE & OBJECTIVE
Interval History: No acute events overnight.  Overall is feeling much better.  Denies SOB but when on room air desats to 80% at rest.  Tolerating clear liquid diet without n/v.  Wife at bedside.  All questions answered and patient had no further complaints.    Objective:     Vital Signs (Most Recent):  Temp: 97.5 °F (36.4 °C) (10/15/23 1212)  Pulse: 93 (10/15/23 1334)  Resp: 17 (10/15/23 1334)  BP: (!) 178/84 (10/15/23 1254)  SpO2: (!) 92 % (10/15/23 1334) Vital Signs (24h Range):  Temp:  [97.5 °F (36.4 °C)-98.5 °F (36.9 °C)] 97.5 °F (36.4 °C)  Pulse:  [] 93  Resp:  [15-20] 17  SpO2:  [90 %-97 %] 92 %  BP: (144-187)/(74-88) 178/84     Weight: 65.7 kg (144 lb 13.5 oz)  Body mass index is 22.02 kg/m².    Intake/Output Summary (Last 24 hours) at 10/15/2023 1436  Last data filed at 10/15/2023 0809  Gross per 24 hour   Intake 840 ml   Output 1400 ml   Net -560 ml         Physical Exam  Vitals and nursing note reviewed.   Constitutional:       General: He is not in acute distress.     Appearance: He is well-developed and underweight. He is ill-appearing. He is not toxic-appearing or diaphoretic.   HENT:      Head: Normocephalic and atraumatic.      Right Ear: External ear normal.      Left Ear: External ear normal.      Nose: Nose normal.      Mouth/Throat:      Mouth: Mucous membranes are moist.   Eyes:      General: No scleral icterus.        Right eye: No discharge.         Left eye: No discharge.   Cardiovascular:      Rate and Rhythm: Normal rate and regular rhythm.   Pulmonary:      Effort: Pulmonary effort is normal. No respiratory distress.      Breath sounds: No wheezing or rales.   Abdominal:      General: There is no distension.      Palpations: Abdomen is soft.      Tenderness: There is no abdominal tenderness.   Musculoskeletal:      Cervical back: Normal range of motion and neck supple.      Right lower leg: No edema.      Left lower leg: No edema.   Skin:     General: Skin is warm and dry.       Capillary Refill: Capillary refill takes less than 2 seconds.   Neurological:      Mental Status: He is alert and oriented to person, place, and time.   Psychiatric:         Mood and Affect: Mood normal.         Behavior: Behavior normal.             Significant Labs: All pertinent labs within the past 24 hours have been reviewed.    Significant Imaging: I have reviewed all pertinent imaging results/findings within the past 24 hours.

## 2023-10-15 NOTE — NURSING
Date Performed:      1)         Patient's Home O2 Sat on room air, while at rest: 81%                               If O2 sats on room air at rest are 88% or below, patient qualifies. No additional testing needed. Document N/A in steps 2 and 3. If 89% or above, complete steps 2.        2)         Patient's O2 Sat on room air while exercising: N/A                               If O2 sats on room air while exercising remain 89% or above patient does not qualify, no further testing needed Document N/A in step 3. If O2 sats on room air while exercising are 88% or below, continue to step 3.        3)         Patient's O2 Sat while exercising on O2: N/A                                           (Must show improvement from #2 for patients to qualify)     If O2 sats improve on oxygen, patient qualifies for portable oxygen. If not, the patient does not qualify.

## 2023-10-15 NOTE — NURSING
Per handoff received from Hermelinda SANTACRUZ RN. Patient care assumed. Patients overall condition assessed and patient appears to be in NAD with no complaints of pain. 20g LFA PIV is clean, dry, and intact. Call light in reach and patient instructed to inform the nurse if anything is needed. Patient stable and will continue to be monitored.

## 2023-10-15 NOTE — PROGRESS NOTES
[facility] Medicine  Progress Note    Patient Name: Manuel Tyler  MRN: [id number]  Patient Class: IP- Inpatient   Admission Date: 10/13/2023  Length of Stay: 1 days  Attending Physician: Hardy Ervin MD  Primary Care Provider: Fatmata Vera MD        Subjective:     Principal Problem:Acute hypoxemic respiratory failure        HPI:  Manuel Tyler 72 y.o. male with pancreatic cancer on chemo tobacco abuse presents to the hospital with a chief complaint of intractable vomiting.  He reports 4 days of intractable nausea and vomiting with associated burning chest pain.  He attempted treatment home with anti acid medication without improvement.  He was seen in the infusion center yesterday and received IV steroids and IV fluids without improvement.  Reports he has been unable to take his oral chemo due to intractable vomiting.  He smokes 1/2 pack per day.  He denies fever leg swelling melena hematuria hematemesis dizziness syncope shortness breath and cough.  He reports chronic upper abdominal pain that he attributes to his pancreatic cancer.    In the ED, hypoxic to 83% despite changing oxygen probes and proper location.  Chest x-ray without acute process, metabolic alkalosis with pH, creatinine 7.5 on VBG CO2 40 white blood cell count 28 point calcium 12.0.      Overview/Hospital Course:  10/14/2023 NAEON.  K+ 2.3 this am, repleated.  Patient currently states he is not having N/V and has not vomited since admission. Okay with trial of clear liquid diet, advance as tolerated.      Interval History: No acute events overnight.  Overall is feeling much better.  Denies SOB but when on room air desats to 80% at rest.  Tolerating clear liquid diet without n/v.  Wife at bedside.  All questions answered and patient had no further complaints.    Objective:     Vital Signs (Most Recent):  Temp: 97.5 °F (36.4 °C) (10/15/23 1212)  Pulse: 93 (10/15/23 1334)  Resp: 17 (10/15/23 1334)  BP: (!) 178/84 (10/15/23  1254)  SpO2: (!) 92 % (10/15/23 1334) Vital Signs (24h Range):  Temp:  [97.5 °F (36.4 °C)-98.5 °F (36.9 °C)] 97.5 °F (36.4 °C)  Pulse:  [] 93  Resp:  [15-20] 17  SpO2:  [90 %-97 %] 92 %  BP: (144-187)/(74-88) 178/84     Weight: 65.7 kg (144 lb 13.5 oz)  Body mass index is 22.02 kg/m².    Intake/Output Summary (Last 24 hours) at 10/15/2023 1436  Last data filed at 10/15/2023 0809  Gross per 24 hour   Intake 840 ml   Output 1400 ml   Net -560 ml         Physical Exam  Vitals and nursing note reviewed.   Constitutional:       General: He is not in acute distress.     Appearance: He is well-developed and underweight. He is ill-appearing. He is not toxic-appearing or diaphoretic.   HENT:      Head: Normocephalic and atraumatic.      Right Ear: External ear normal.      Left Ear: External ear normal.      Nose: Nose normal.      Mouth/Throat:      Mouth: Mucous membranes are moist.   Eyes:      General: No scleral icterus.        Right eye: No discharge.         Left eye: No discharge.   Cardiovascular:      Rate and Rhythm: Normal rate and regular rhythm.   Pulmonary:      Effort: Pulmonary effort is normal. No respiratory distress.      Breath sounds: No wheezing or rales.   Abdominal:      General: There is no distension.      Palpations: Abdomen is soft.      Tenderness: There is no abdominal tenderness.   Musculoskeletal:      Cervical back: Normal range of motion and neck supple.      Right lower leg: No edema.      Left lower leg: No edema.   Skin:     General: Skin is warm and dry.      Capillary Refill: Capillary refill takes less than 2 seconds.   Neurological:      Mental Status: He is alert and oriented to person, place, and time.   Psychiatric:         Mood and Affect: Mood normal.         Behavior: Behavior normal.             Significant Labs: All pertinent labs within the past 24 hours have been reviewed.    Significant Imaging: I have reviewed all pertinent imaging results/findings within the past  24 hours.      Assessment/Plan:      * Acute hypoxemic respiratory failure  -Admitted to inpatient status  -Presented for nausea and vomiting and found to be hypoxic to 83% on RA in the ED with significant wheezing  -CXR showed no focal consolidation or pulmonary edema  -BNP only 149 and troponin negative.  Covid and flu swabs negative.  -This is acute hypoxic respiratory failure.  He is not on home O2.  -Suspect due to COPD exacerbation or aspiration pneumonitis given vomiting.  -Treated with IV steroids and scheduled duo-nebs and lungs are clear without wheezes today, but he remains significantly hypoxic on room air (80% at rest).  Given his malignancy I am concerned for possible PE.  -Check CTA chest  -Change IV steroids to PO prednisone  -Continue schduled duo-nebs  -Suspect he will need home o2 at discharge.    Acute exacerbation of chronic obstructive pulmonary disease (COPD)  -Treatment as above.    Nausea & vomiting  -Presenting complaint was intractable N/V   -Lipase normal  -Abdominal US showed Pancreatic mass in with known malignancy  -Etiology unclear but has improved and now tolerating clear liquids  -Advance to solid diet today  -Add carafate prior to meals and continue protonix  -Anti-emetics available    VINCENT (acute kidney injury)  -Baseline Cr 0.9-1.3  -On admit Cr 1.9 - suspected due to pre-renal azotemia from dehydration due to nausea and vomiting  -Noted metabolic alkalosis on admission  -Avoid nephrotoxic agents and renally dose meds  -Treated with IV fluids and improving - Cr now 0.8.  -Advance diet and continue fluids for now  -Repeat labs in AM.    Hypercalcemia  -On admit Ca 12.0 - corrected to 12.5  -PTH 20.9 (normal)  -Treated with IV fluids and Calcium has normalized  -This was due to dehydration  -Repeat labs in AM.    Metabolic alkalosis  -On admit noted CO2 of 41 and VBG showing pH 7.5 and HCO3 51  -Due to vomiting at home  -Treating with IV fluids and CO2 has normalized.  -Repeat labs  in AM    (HFpEF) heart failure with preserved ejection fraction  -Echo 1/2023 with EF of 65% and indeterminate diastolic dysfunction.   -BNP on admit only 149 and he was dehydrated  -Strict ins/outs and daily weights  -Appears euvolemic on exam now.  -Will decrease rate of IV fluids today    Malignant neoplasm of head of pancreas  -Follows with Dr. Carver  -Noted Stage Iv pancreatic cancer with mets to liver - on oral palliative chemotherapy.    -He is DNR.  -Holding chemotherapy  -Follow up with Dr. Carver.    Liver metastases  -Treatment as above.    Tobacco abuse  -Counselled on cessation 3 minutes.  -NRT ordered.    Hypokalemia  -Replace potassium today.  -Check BMP and Mag in AM.    ACP (advance care planning)  Advance Care Planning  Date: 10/13/2023 Code Status In light of the patients advanced and life limiting illness,I engaged the the patient in a voluntary conversation about the patient's preferences for care  at the very end of life. The patient wishes to have a natural, peaceful death.  Along those lines, the patient does not wish to have CPR or other invasive treatments performed when his heart and/or breathing stops. I communicated to the patient that a DNR order would be placed in his medical record to reflect this preference. -Discussion conducted by admitting provider     Hyperlipidemia  Continue home statin      VTE Risk Mitigation (From admission, onward)         Ordered     enoxaparin injection 40 mg  Every 24 hours         10/13/23 1601     IP VTE HIGH RISK PATIENT  Once         10/13/23 1515     Place sequential compression device  Until discontinued         10/13/23 1515     Place FRANCES hose  Until discontinued         10/13/23 1515                Discharge Planning   MIKO:      Code Status: DNR   Is the patient medically ready for discharge?:     Reason for patient still in hospital (select all that apply): Treatment  Discharge Plan A: Home Health                  Hardy Ervin  MD  Department of Hospital Medicine   [facility] - Telemetry

## 2023-10-15 NOTE — PLAN OF CARE
Problem: Adult Inpatient Plan of Care  Goal: Plan of Care Review  Outcome: Ongoing, Progressing  Goal: Patient-Specific Goal (Individualized)  Outcome: Ongoing, Progressing  Goal: Absence of Hospital-Acquired Illness or Injury  Outcome: Ongoing, Progressing  Goal: Optimal Comfort and Wellbeing  Outcome: Ongoing, Progressing  Goal: Readiness for Transition of Care  Outcome: Ongoing, Progressing     Problem: Skin Injury Risk Increased  Goal: Skin Health and Integrity  Outcome: Ongoing, Progressing     Problem: Infection  Goal: Absence of Infection Signs and Symptoms  Outcome: Ongoing, Progressing     Problem: Fluid and Electrolyte Imbalance (Acute Kidney Injury/Impairment)  Goal: Fluid and Electrolyte Balance  Outcome: Ongoing, Progressing     Problem: Oral Intake Inadequate (Acute Kidney Injury/Impairment)  Goal: Optimal Nutrition Intake  Outcome: Ongoing, Progressing     Problem: Renal Function Impairment (Acute Kidney Injury/Impairment)  Goal: Effective Renal Function  Outcome: Ongoing, Progressing

## 2023-10-15 NOTE — ASSESSMENT & PLAN NOTE
-Follows with Dr. Carver  -Noted Stage Iv pancreatic cancer with mets to liver - on oral palliative chemotherapy.    -He is DNR.  -Holding chemotherapy  -Follow up with Dr. Carver.

## 2023-10-15 NOTE — PLAN OF CARE
Problem: Occupational Therapy  Goal: Occupational Therapy Goal  Description: Goals to be met by: 10/29/2023       Patient will increase functional independence with ADLs by performing:    UE Dressing with Modified [place].  LE Dressing with Modified [place].  Grooming while standing at sink with Modified [place] and Assistive Devices as needed.  Toileting from toilet with Modified [place] and Assistive Devices as needed for hygiene and clothing management.   Supine to sit with Modified [place].  Step transfer with Modified [place]  Toilet transfer to toilet with Modified [place].  Upper extremity exercise program x15 reps per handout, with independence.  The patient will demo (I) with pursed lipped breathing  Outcome: Ongoing, Progressing

## 2023-10-15 NOTE — PLAN OF CARE
Problem: Adult Inpatient Plan of Care  Goal: Plan of Care Review  Flowsheets (Taken 10/15/2023 0053)  Plan of Care Reviewed With: patient  Goal: Absence of Hospital-Acquired Illness or Injury  Intervention: Identify and Manage Fall Risk  Flowsheets (Taken 10/15/2023 0053)  Safety Promotion/Fall Prevention: nonskid shoes/socks when out of bed  Intervention: Prevent Skin Injury  Flowsheets (Taken 10/15/2023 0053)  Body Position: position changed independently  Intervention: Prevent and Manage VTE (Venous Thromboembolism) Risk  Flowsheets (Taken 10/15/2023 0053)  VTE Prevention/Management: ROM (active) performed  Range of Motion: active ROM (range of motion) encouraged  Goal: Optimal Comfort and Wellbeing  Intervention: Monitor Pain and Promote Comfort  Flowsheets (Taken 10/15/2023 0053)  Pain Management Interventions: pain management plan reviewed with patient/caregiver  Intervention: Provide Person-Centered Care  Flowsheets (Taken 10/15/2023 0053)  Trust Relationship/Rapport:   care explained   questions answered   questions encouraged   reassurance provided   thoughts/feelings acknowledged     Problem: Fluid and Electrolyte Imbalance (Acute Kidney Injury/Impairment)  Goal: Fluid and Electrolyte Balance  Intervention: Monitor and Manage Fluid and Electrolyte Balance  Flowsheets (Taken 10/15/2023 0053)  Fluid/Electrolyte Management: intravenous fluid replacement initiated

## 2023-10-16 LAB
ALBUMIN SERPL BCP-MCNC: 2.9 G/DL (ref 3.5–5.2)
ALP SERPL-CCNC: 86 U/L (ref 55–135)
ALT SERPL W/O P-5'-P-CCNC: 49 U/L (ref 10–44)
ANION GAP SERPL CALC-SCNC: 10 MMOL/L (ref 8–16)
AST SERPL-CCNC: 36 U/L (ref 10–40)
BASOPHILS # BLD AUTO: 0.03 K/UL (ref 0–0.2)
BASOPHILS # BLD AUTO: 0.05 K/UL (ref 0–0.2)
BASOPHILS NFR BLD: 0.1 % (ref 0–1.9)
BASOPHILS NFR BLD: 0.2 % (ref 0–1.9)
BILIRUB SERPL-MCNC: 0.5 MG/DL (ref 0.1–1)
BUN SERPL-MCNC: 24 MG/DL (ref 8–23)
CALCIUM SERPL-MCNC: 8.1 MG/DL (ref 8.7–10.5)
CHLORIDE SERPL-SCNC: 95 MMOL/L (ref 95–110)
CO2 SERPL-SCNC: 28 MMOL/L (ref 23–29)
CREAT SERPL-MCNC: 0.7 MG/DL (ref 0.5–1.4)
D DIMER PPP IA.FEU-MCNC: 2.02 MG/L FEU
DIFFERENTIAL METHOD: ABNORMAL
DIFFERENTIAL METHOD: ABNORMAL
EOSINOPHIL # BLD AUTO: 0 K/UL (ref 0–0.5)
EOSINOPHIL # BLD AUTO: 1.5 K/UL (ref 0–0.5)
EOSINOPHIL NFR BLD: 0 % (ref 0–8)
EOSINOPHIL NFR BLD: 5.5 % (ref 0–8)
ERYTHROCYTE [DISTWIDTH] IN BLOOD BY AUTOMATED COUNT: 15.3 % (ref 11.5–14.5)
ERYTHROCYTE [DISTWIDTH] IN BLOOD BY AUTOMATED COUNT: 16 % (ref 11.5–14.5)
EST. GFR  (NO RACE VARIABLE): >60 ML/MIN/1.73 M^2
FERRITIN SERPL-MCNC: 105 NG/ML (ref 20–300)
FOLATE SERPL-MCNC: 10.7 NG/ML (ref 4–24)
GLUCOSE SERPL-MCNC: 129 MG/DL (ref 70–110)
HCT VFR BLD AUTO: 31.8 % (ref 40–54)
HCT VFR BLD AUTO: 46.3 % (ref 40–54)
HGB BLD-MCNC: 10.7 G/DL (ref 14–18)
HGB BLD-MCNC: 14.2 G/DL (ref 14–18)
IMM GRANULOCYTES # BLD AUTO: 0.17 K/UL (ref 0–0.04)
IMM GRANULOCYTES # BLD AUTO: 0.23 K/UL (ref 0–0.04)
IMM GRANULOCYTES NFR BLD AUTO: 0.6 % (ref 0–0.5)
IMM GRANULOCYTES NFR BLD AUTO: 0.9 % (ref 0–0.5)
IRON SERPL-MCNC: 55 UG/DL (ref 45–160)
LACTATE SERPL-SCNC: 1.6 MMOL/L (ref 0.5–2.2)
LYMPHOCYTES # BLD AUTO: 0.9 K/UL (ref 1–4.8)
LYMPHOCYTES # BLD AUTO: 1.9 K/UL (ref 1–4.8)
LYMPHOCYTES NFR BLD: 3.3 % (ref 18–48)
LYMPHOCYTES NFR BLD: 7.9 % (ref 18–48)
MAGNESIUM SERPL-MCNC: 1.7 MG/DL (ref 1.6–2.6)
MCH RBC QN AUTO: 30.1 PG (ref 27–31)
MCH RBC QN AUTO: 30.1 PG (ref 27–31)
MCHC RBC AUTO-ENTMCNC: 30.7 G/DL (ref 32–36)
MCHC RBC AUTO-ENTMCNC: 33.6 G/DL (ref 32–36)
MCV RBC AUTO: 89 FL (ref 82–98)
MCV RBC AUTO: 98 FL (ref 82–98)
MONOCYTES # BLD AUTO: 1.5 K/UL (ref 0.3–1)
MONOCYTES # BLD AUTO: 2.3 K/UL (ref 0.3–1)
MONOCYTES NFR BLD: 5.6 % (ref 4–15)
MONOCYTES NFR BLD: 9.6 % (ref 4–15)
NEUTROPHILS # BLD AUTO: 19.8 K/UL (ref 1.8–7.7)
NEUTROPHILS # BLD AUTO: 22.8 K/UL (ref 1.8–7.7)
NEUTROPHILS NFR BLD: 81.5 % (ref 38–73)
NEUTROPHILS NFR BLD: 84.8 % (ref 38–73)
NRBC BLD-RTO: 0 /100 WBC
NRBC BLD-RTO: 0 /100 WBC
PLATELET # BLD AUTO: 451 K/UL (ref 150–450)
PLATELET # BLD AUTO: 490 K/UL (ref 150–450)
PMV BLD AUTO: 10.7 FL (ref 9.2–12.9)
PMV BLD AUTO: 9.8 FL (ref 9.2–12.9)
POTASSIUM SERPL-SCNC: 2.7 MMOL/L (ref 3.5–5.1)
POTASSIUM SERPL-SCNC: 3.4 MMOL/L (ref 3.5–5.1)
PROCALCITONIN SERPL IA-MCNC: 0.07 NG/ML
PROT SERPL-MCNC: 6.3 G/DL (ref 6–8.4)
RBC # BLD AUTO: 3.56 M/UL (ref 4.6–6.2)
RBC # BLD AUTO: 4.72 M/UL (ref 4.6–6.2)
SATURATED IRON: 21 % (ref 20–50)
SODIUM SERPL-SCNC: 133 MMOL/L (ref 136–145)
TOTAL IRON BINDING CAPACITY: 262 UG/DL (ref 250–450)
TRANSFERRIN SERPL-MCNC: 177 MG/DL (ref 200–375)
VIT B12 SERPL-MCNC: 642 PG/ML (ref 210–950)
WBC # BLD AUTO: 24.3 K/UL (ref 3.9–12.7)
WBC # BLD AUTO: 26.92 K/UL (ref 3.9–12.7)

## 2023-10-16 PROCEDURE — 85025 COMPLETE CBC W/AUTO DIFF WBC: CPT | Performed by: HOSPITALIST

## 2023-10-16 PROCEDURE — 25000003 PHARM REV CODE 250: Performed by: HOSPITALIST

## 2023-10-16 PROCEDURE — 27000221 HC OXYGEN, UP TO 24 HOURS

## 2023-10-16 PROCEDURE — 99900035 HC TECH TIME PER 15 MIN (STAT)

## 2023-10-16 PROCEDURE — 36415 COLL VENOUS BLD VENIPUNCTURE: CPT | Performed by: HOSPITALIST

## 2023-10-16 PROCEDURE — 25000242 PHARM REV CODE 250 ALT 637 W/ HCPCS: Performed by: HOSPITALIST

## 2023-10-16 PROCEDURE — 94799 UNLISTED PULMONARY SVC/PX: CPT

## 2023-10-16 PROCEDURE — 25000003 PHARM REV CODE 250: Performed by: NURSE PRACTITIONER

## 2023-10-16 PROCEDURE — 63600175 PHARM REV CODE 636 W HCPCS: Performed by: PHYSICIAN ASSISTANT

## 2023-10-16 PROCEDURE — 87070 CULTURE OTHR SPECIMN AEROBIC: CPT | Performed by: HOSPITALIST

## 2023-10-16 PROCEDURE — 83735 ASSAY OF MAGNESIUM: CPT | Performed by: HOSPITALIST

## 2023-10-16 PROCEDURE — 94761 N-INVAS EAR/PLS OXIMETRY MLT: CPT

## 2023-10-16 PROCEDURE — 80053 COMPREHEN METABOLIC PANEL: CPT | Performed by: HOSPITALIST

## 2023-10-16 PROCEDURE — 84145 PROCALCITONIN (PCT): CPT | Performed by: HOSPITALIST

## 2023-10-16 PROCEDURE — 84132 ASSAY OF SERUM POTASSIUM: CPT | Performed by: HOSPITALIST

## 2023-10-16 PROCEDURE — 83605 ASSAY OF LACTIC ACID: CPT | Performed by: HOSPITALIST

## 2023-10-16 PROCEDURE — 82746 ASSAY OF FOLIC ACID SERUM: CPT | Performed by: HOSPITALIST

## 2023-10-16 PROCEDURE — S4991 NICOTINE PATCH NONLEGEND: HCPCS | Performed by: HOSPITALIST

## 2023-10-16 PROCEDURE — 97116 GAIT TRAINING THERAPY: CPT | Mod: CQ

## 2023-10-16 PROCEDURE — 94640 AIRWAY INHALATION TREATMENT: CPT

## 2023-10-16 PROCEDURE — 83540 ASSAY OF IRON: CPT | Performed by: HOSPITALIST

## 2023-10-16 PROCEDURE — 82607 VITAMIN B-12: CPT | Performed by: HOSPITALIST

## 2023-10-16 PROCEDURE — 97110 THERAPEUTIC EXERCISES: CPT | Mod: CQ

## 2023-10-16 PROCEDURE — 87040 BLOOD CULTURE FOR BACTERIA: CPT | Mod: 59 | Performed by: HOSPITALIST

## 2023-10-16 PROCEDURE — 82728 ASSAY OF FERRITIN: CPT | Performed by: HOSPITALIST

## 2023-10-16 PROCEDURE — 84466 ASSAY OF TRANSFERRIN: CPT | Performed by: HOSPITALIST

## 2023-10-16 PROCEDURE — 63600175 PHARM REV CODE 636 W HCPCS: Performed by: HOSPITALIST

## 2023-10-16 PROCEDURE — 21400001 HC TELEMETRY ROOM

## 2023-10-16 PROCEDURE — 87205 SMEAR GRAM STAIN: CPT | Performed by: HOSPITALIST

## 2023-10-16 RX ORDER — BENZONATATE 100 MG/1
100 CAPSULE ORAL 3 TIMES DAILY PRN
Status: DISCONTINUED | OUTPATIENT
Start: 2023-10-16 | End: 2023-10-18 | Stop reason: HOSPADM

## 2023-10-16 RX ORDER — POTASSIUM CHLORIDE 7.45 MG/ML
10 INJECTION INTRAVENOUS
Status: COMPLETED | OUTPATIENT
Start: 2023-10-16 | End: 2023-10-16

## 2023-10-16 RX ORDER — POTASSIUM CHLORIDE 20 MEQ/1
40 TABLET, EXTENDED RELEASE ORAL ONCE
Status: DISCONTINUED | OUTPATIENT
Start: 2023-10-16 | End: 2023-10-16

## 2023-10-16 RX ORDER — POTASSIUM CHLORIDE 20 MEQ/1
40 TABLET, EXTENDED RELEASE ORAL ONCE
Status: COMPLETED | OUTPATIENT
Start: 2023-10-16 | End: 2023-10-16

## 2023-10-16 RX ORDER — GUAIFENESIN 600 MG/1
1200 TABLET, EXTENDED RELEASE ORAL 2 TIMES DAILY
Status: DISCONTINUED | OUTPATIENT
Start: 2023-10-16 | End: 2023-10-18 | Stop reason: HOSPADM

## 2023-10-16 RX ORDER — MAGNESIUM SULFATE HEPTAHYDRATE 40 MG/ML
2 INJECTION, SOLUTION INTRAVENOUS ONCE
Status: COMPLETED | OUTPATIENT
Start: 2023-10-16 | End: 2023-10-16

## 2023-10-16 RX ADMIN — GUAIFENESIN 1200 MG: 600 TABLET, EXTENDED RELEASE ORAL at 02:10

## 2023-10-16 RX ADMIN — ENOXAPARIN SODIUM 40 MG: 40 INJECTION SUBCUTANEOUS at 05:10

## 2023-10-16 RX ADMIN — CEFEPIME 1 G: 1 INJECTION, POWDER, FOR SOLUTION INTRAMUSCULAR; INTRAVENOUS at 08:10

## 2023-10-16 RX ADMIN — POTASSIUM CHLORIDE 40 MEQ: 1500 TABLET, EXTENDED RELEASE ORAL at 06:10

## 2023-10-16 RX ADMIN — NICOTINE 1 PATCH: 14 PATCH TRANSDERMAL at 08:10

## 2023-10-16 RX ADMIN — VANCOMYCIN HYDROCHLORIDE 1000 MG: 1 INJECTION, POWDER, LYOPHILIZED, FOR SOLUTION INTRAVENOUS at 09:10

## 2023-10-16 RX ADMIN — POTASSIUM CHLORIDE 10 MEQ: 7.46 INJECTION, SOLUTION INTRAVENOUS at 11:10

## 2023-10-16 RX ADMIN — IPRATROPIUM BROMIDE AND ALBUTEROL SULFATE 3 ML: 2.5; .5 SOLUTION RESPIRATORY (INHALATION) at 07:10

## 2023-10-16 RX ADMIN — MUPIROCIN: 20 OINTMENT TOPICAL at 08:10

## 2023-10-16 RX ADMIN — POTASSIUM CHLORIDE 10 MEQ: 7.46 INJECTION, SOLUTION INTRAVENOUS at 08:10

## 2023-10-16 RX ADMIN — SODIUM CHLORIDE: 4.5 INJECTION, SOLUTION INTRAVENOUS at 02:10

## 2023-10-16 RX ADMIN — CEFEPIME 1 G: 1 INJECTION, POWDER, FOR SOLUTION INTRAMUSCULAR; INTRAVENOUS at 05:10

## 2023-10-16 RX ADMIN — SUCRALFATE 1 G: 1 SUSPENSION ORAL at 06:10

## 2023-10-16 RX ADMIN — CEFEPIME 1 G: 1 INJECTION, POWDER, FOR SOLUTION INTRAMUSCULAR; INTRAVENOUS at 11:10

## 2023-10-16 RX ADMIN — MAGNESIUM SULFATE HEPTAHYDRATE 2 G: 40 INJECTION, SOLUTION INTRAVENOUS at 08:10

## 2023-10-16 RX ADMIN — GUAIFENESIN 1200 MG: 600 TABLET, EXTENDED RELEASE ORAL at 08:10

## 2023-10-16 RX ADMIN — SUCRALFATE 1 G: 1 SUSPENSION ORAL at 11:10

## 2023-10-16 RX ADMIN — SUCRALFATE 1 G: 1 SUSPENSION ORAL at 05:10

## 2023-10-16 RX ADMIN — VANCOMYCIN HYDROCHLORIDE 1500 MG: 1.5 INJECTION, POWDER, LYOPHILIZED, FOR SOLUTION INTRAVENOUS at 08:10

## 2023-10-16 RX ADMIN — POTASSIUM CHLORIDE 40 MEQ: 1500 TABLET, EXTENDED RELEASE ORAL at 05:10

## 2023-10-16 RX ADMIN — IPRATROPIUM BROMIDE AND ALBUTEROL SULFATE 3 ML: 2.5; .5 SOLUTION RESPIRATORY (INHALATION) at 02:10

## 2023-10-16 RX ADMIN — SUCRALFATE 1 G: 1 SUSPENSION ORAL at 08:10

## 2023-10-16 RX ADMIN — POTASSIUM CHLORIDE 10 MEQ: 7.46 INJECTION, SOLUTION INTRAVENOUS at 10:10

## 2023-10-16 RX ADMIN — PANTOPRAZOLE SODIUM 40 MG: 40 TABLET, DELAYED RELEASE ORAL at 08:10

## 2023-10-16 RX ADMIN — PREDNISONE 40 MG: 20 TABLET ORAL at 08:10

## 2023-10-16 NOTE — ASSESSMENT & PLAN NOTE
-Presenting complaint was intractable N/V   -Lipase normal  -Abdominal US showed Pancreatic mass in with known malignancy  -CTA Chest showed significantly distended stomach.  -Etiology unclear but has resolved.  He is tolerating regular consistency diet, has bowel sounds and is having bowel movement with no abdominal or GI symptoms  -Continue solid diet , carafate prior to meals and protonix  -Anti-emetics available

## 2023-10-16 NOTE — PLAN OF CARE
Problem: Adult Inpatient Plan of Care  Goal: Optimal Comfort and Wellbeing  Outcome: Ongoing, Progressing  Intervention: Provide Person-Centered Care  Flowsheets (Taken 10/16/2023 1718)  Trust Relationship/Rapport:   care explained   questions answered     Problem: Skin Injury Risk Increased  Goal: Skin Health and Integrity  Outcome: Ongoing, Progressing  Intervention: Optimize Skin Protection  Flowsheets (Taken 10/16/2023 1718)  Pressure Reduction Techniques: frequent weight shift encouraged  Head of Bed (HOB) Positioning: HOB elevated

## 2023-10-16 NOTE — ASSESSMENT & PLAN NOTE
-Admitted to inpatient status  -Presented for nausea and vomiting and found to be hypoxic to 83% on RA in the ED with significant wheezing  -CXR showed no focal consolidation or pulmonary edema  -BNP only 149 and troponin negative.  Covid and flu swabs negative.  -CTA chest showed no PE, small bilateral pleural effusions with associated compressive atelectasis of the bilateral lower lobes, bandlike atelectasis/consolidation within the left lower lobe, new since the previous exam, with air bronchograms in the region, pulmonary nodules and diffuse distention of the stomach  -This is acute hypoxic respiratory failure.  He is not on home O2.  -Initially suspected due to COPD exacerbation but no wheezing at all on 10/15 and still quite hypoxic on room air.  More likely due to aspiration pneumonia given vomiting.  -Treated with IV steroids and scheduled duo-nebs initially and changed to po steroids 10/15.  -Continue PO prednisone to complete 5 day course  -Continue schduled duo-nebs.  Add guaifenesin and tessalon  -Check sputum culture and start vancomycin and cefepime - adjust based on cultures.  -Continue supplemental O2.  Suspect he will need home O2 at discharge.

## 2023-10-16 NOTE — ASSESSMENT & PLAN NOTE
-Replace potassium and magnesium more aggressively today  -Repeat K level this afternoon.  -Check BMP and Mag in AM.

## 2023-10-16 NOTE — PLAN OF CARE
Problem: Physical Therapy  Goal: Physical Therapy Goal  Description: Goals to be met by:  10/28/2023    Patient will increase functional independence with mobility by performin. Sit to stand transfer with [place]  4. Gait  x 400 feet with [place].    Recommend: Outpatient PT at time of discharge to home with family.         Outcome: Ongoing, Progressing         Pt in bed with HOB elevated upon arrival. Pt progressed in ambulation today.

## 2023-10-16 NOTE — ASSESSMENT & PLAN NOTE
-Echo 1/2023 with EF of 65% and indeterminate diastolic dysfunction.   -BNP on admit only 149 and he was dehydrated  -Strict ins/outs and daily weights  -Appears euvolemic on exam now and tolerating diet.  -Stop Iv fluids today

## 2023-10-16 NOTE — PROGRESS NOTES
Pharmacokinetic Initial Assessment: IV Vancomycin    Assessment/Plan:    Initiate intravenous vancomycin with loading dose of 1500 mg once followed by a maintenance dose of vancomycin 1000 mg IV every 12 hours  Desired empiric serum trough concentration is 10 to 20 mcg/mL  Draw vancomycin trough level 60 min prior to fourth dose on 10/17/23 at approximately 20:00  Pharmacy will continue to follow and monitor vancomycin.      Please contact pharmacy at extension [phone] with any questions regarding this assessment.     Thank you for the consult,   Gypsy Tong       Patient brief summary:  Manuel Tyler is a 72 y.o. male initiated on antimicrobial therapy with IV Vancomycin for treatment of suspected  pneumonia.    Drug Allergies:   Review of patient's allergies indicates:   Allergen Reactions    Jakafi [ruxolitinib]        Actual Body Weight:   65.7 kg    Renal Function:   Estimated Creatinine Clearance: 89.9 mL/min (based on SCr of 0.7 mg/dL).,     Dialysis Method (if applicable):  N/A    CBC (last 72 hours):  Recent Labs   Lab Result Units 10/13/23  1150 10/14/23  0355 10/15/23  1114 10/16/23  0341   WBC K/uL 28.95* 23.34* 26.92* 24.30*   Hemoglobin g/dL 14.8 12.7* 14.2 10.7*   Hematocrit % 45.3 39.5* 46.3 31.8*   Platelets K/uL 557* 395 490* 451*   Gran % % 79.3* 77.8* 84.8* 81.5*   Lymph % % 7.3* 8.4* 3.3* 7.9*   Mono % % 12.3 12.4 5.6 9.6   Eosinophil % % 0.0 0.4 5.5 0.0   Basophil % % 0.4 0.4 0.2 0.1   Differential Method  Automated Automated Automated Automated       Metabolic Panel (last 72 hours):  Recent Labs   Lab Result Units 10/13/23  1150 10/13/23  2330 10/14/23  0355 10/15/23  1027 10/16/23  0341   Sodium mmol/L 145  --  139 135* 133*   Sodium, Urine mmol/L  --  94  --   --   --    Potassium mmol/L 4.1  --  2.3* 2.8* 2.7*   Chloride mmol/L 86*  --  90* 94* 95   CO2 mmol/L 41*  --  38* 26 28   Glucose mg/dL 163*  --  120* 133* 129*   BUN mg/dL 29*  --  23 18 24*   Creatinine mg/dL 1.9*  --  1.1 0.8 0.7  "  Creatinine, Urine mg/dL  --  61.4  --   --   --    Albumin g/dL 3.9  --  3.0* 3.3* 2.9*   Total Bilirubin mg/dL 0.5  --  0.6 0.7 0.5   Alkaline Phosphatase U/L 127  --  104 102 86   AST U/L 37  --  25 39 36   ALT U/L 27  --  21 43 49*   Magnesium mg/dL  --   --   --  1.8 1.7       Drug levels (last 3 results):  No results for input(s): "VANCOMYCINRA", "VANCORANDOM", "VANCOMYCINPE", "VANCOPEAK", "VANCOMYCINTR", "VANCOTROUGH" in the last 72 hours.    Microbiologic Results:  Microbiology Results (last 7 days)       Procedure Component Value Units Date/Time    Blood culture [[id number]] Collected: 10/16/23 0802    Order Status: Sent Specimen: Blood from Peripheral, Right Arm Updated: 10/16/23 0818    Blood culture [[id number]] Collected: 10/16/23 0803    Order Status: Sent Specimen: Blood from Peripheral, Right Hand Updated: 10/16/23 0818    Culture, Respiratory with Gram Stain [[id number]]     Order Status: No result Specimen: Respiratory     Influenza A & B by Molecular [[id number]]     Order Status: Canceled Specimen: Nasopharyngeal Swab             "

## 2023-10-16 NOTE — ASSESSMENT & PLAN NOTE
-Baseline Cr 0.9-1.3  -On admit Cr 1.9 - suspected due to pre-renal azotemia from dehydration due to nausea and vomiting  -Noted metabolic alkalosis on admission  -Avoid nephrotoxic agents and renally dose meds  -Treated with IV fluids and improving - Cr now 0.7.  -Continue diet and stop IV fluids today  -Repeat labs in AM.

## 2023-10-16 NOTE — CLINICAL REVIEW
IP Sepsis Screen (most recent)       Sepsis Screen (IP) - 10/16/23 0753       Is the patient's history or complaint suggestive of a possible infection? Yes  -    Are there at least two of the following signs and symptoms present? Yes  -    Sepsis signs/symptoms - Tachycardia Tachycardia     >90  -    Sepsis signs/symptoms - WBC WBC < 4,000 or WBC > 12,000  -    Are any of the following organ dysfunction criteria present and not considered to be due to a chronic condition? Yes  -    Organ Dysfunction Criteria - O2 O2 Saturation < 95% on room air  -    Initiate Sepsis Protocol No  -    Reason sepsis not considered Pt. receiving appropriate management  -              User Key  (r) = Recorded By, (t) = Taken By, (c) = Cosigned By      Initials Name    Fozia Rust RN                 Protocol /order panel initiated by MD bradshaw am.

## 2023-10-16 NOTE — PLAN OF CARE
Problem: Adult Inpatient Plan of Care  Goal: Plan of Care Review  Flowsheets (Taken 10/15/2023 2346)  Plan of Care Reviewed With: patient     Problem: Fluid and Electrolyte Imbalance (Acute Kidney Injury/Impairment)  Goal: Fluid and Electrolyte Balance  Intervention: Monitor and Manage Fluid and Electrolyte Balance  Flowsheets (Taken 10/15/2023 2346)  Fluid/Electrolyte Management: intravenous fluid replacement initiated

## 2023-10-16 NOTE — PROGRESS NOTES
[facility]  Progress Note    Patient Name: Manuel Tyler  MRN: [id number]  Patient Class: IP- Inpatient   Admission Date: 10/13/2023  Length of Stay: 2 days  Attending Physician: Hardy Ervin MD  Primary Care Provider: Fatmata Vera MD        Subjective:     Principal Problem:Acute hypoxemic respiratory failure        HPI:  Manuel Tyler 72 y.o. male with pancreatic cancer on chemo tobacco abuse presents to the hospital with a chief complaint of intractable vomiting.  He reports 4 days of intractable nausea and vomiting with associated burning chest pain.  He attempted treatment home with anti acid medication without improvement.  He was seen in the infusion center yesterday and received IV steroids and IV fluids without improvement.  Reports he has been unable to take his oral chemo due to intractable vomiting.  He smokes 1/2 pack per day.  He denies fever leg swelling melena hematuria hematemesis dizziness syncope shortness breath and cough.  He reports chronic upper abdominal pain that he attributes to his pancreatic cancer.    In the ED, hypoxic to 83% despite changing oxygen probes and proper location.  Chest x-ray without acute process, metabolic alkalosis with pH, creatinine 7.5 on VBG CO2 40 white blood cell count 28 point calcium 12.0.      Overview/Hospital Course:  10/14/2023 NAEON.  K+ 2.3 this am, repleated.  Patient currently states he is not having N/V and has not vomited since admission. Okay with trial of clear liquid diet, advance as tolerated.      Interval History: No acute events overnight.  Noted sepsis risk alert this morning.  Despite this, he looks very good.  Notes he tolerating his diet without nausea or vomiting and had BM last night.  Has a slight cough productive of minimal sputum.  Denies pain and sob, but quickly desats on room air.  Wife at bedside.  All questions answered and patient had no further complaints.    Objective:     Vital Signs (Most  Recent):  Temp: 97.8 °F (36.6 °C) (10/16/23 1132)  Pulse: 100 (10/16/23 1132)  Resp: 19 (10/16/23 1132)  BP: 131/76 (10/16/23 1132)  SpO2: (!) 94 % (10/16/23 1132) Vital Signs (24h Range):  Temp:  [97.8 °F (36.6 °C)-98.5 °F (36.9 °C)] 97.8 °F (36.6 °C)  Pulse:  [] 100  Resp:  [16-20] 19  SpO2:  [90 %-95 %] 94 %  BP: (131-176)/(76-99) 131/76     Weight: 65.7 kg (144 lb 13.5 oz)  Body mass index is 22.02 kg/m².    Intake/Output Summary (Last 24 hours) at 10/16/2023 1307  Last data filed at 10/16/2023 1200  Gross per 24 hour   Intake 1440 ml   Output 2180 ml   Net -740 ml           Physical Exam  Vitals and nursing note reviewed.   Constitutional:       General: He is not in acute distress.     Appearance: He is well-developed and underweight. He is not ill-appearing, toxic-appearing or diaphoretic.   HENT:      Head: Normocephalic and atraumatic.      Right Ear: External ear normal.      Left Ear: External ear normal.      Nose: Nose normal.      Mouth/Throat:      Mouth: Mucous membranes are moist.   Eyes:      General: No scleral icterus.        Right eye: No discharge.         Left eye: No discharge.   Cardiovascular:      Rate and Rhythm: Normal rate and regular rhythm.   Pulmonary:      Effort: Pulmonary effort is normal. No respiratory distress.      Breath sounds: No wheezing or rales.      Comments: No wheezing or crackles - good air movement  Abdominal:      General: There is no distension.      Palpations: Abdomen is soft.      Tenderness: There is no abdominal tenderness.   Musculoskeletal:      Cervical back: Normal range of motion and neck supple.      Right lower leg: No edema.      Left lower leg: No edema.   Skin:     General: Skin is warm and dry.      Capillary Refill: Capillary refill takes less than 2 seconds.   Neurological:      Mental Status: He is alert and oriented to person, place, and time.   Psychiatric:         Mood and Affect: Mood normal.         Behavior: Behavior normal.              Significant Labs: All pertinent labs within the past 24 hours have been reviewed.    Significant Imaging: I have reviewed all pertinent imaging results/findings within the past 24 hours.      Assessment/Plan:      * Acute hypoxemic respiratory failure  -Admitted to inpatient status  -Presented for nausea and vomiting and found to be hypoxic to 83% on RA in the ED with significant wheezing  -CXR showed no focal consolidation or pulmonary edema  -BNP only 149 and troponin negative.  Covid and flu swabs negative.  -CTA chest showed no PE, small bilateral pleural effusions with associated compressive atelectasis of the bilateral lower lobes, bandlike atelectasis/consolidation within the left lower lobe, new since the previous exam, with air bronchograms in the region, pulmonary nodules and diffuse distention of the stomach  -This is acute hypoxic respiratory failure.  He is not on home O2.  -Initially suspected due to COPD exacerbation but no wheezing at all on 10/15 and still quite hypoxic on room air.  More likely due to aspiration pneumonia given vomiting.  -Treated with IV steroids and scheduled duo-nebs initially and changed to po steroids 10/15.  -Continue PO prednisone to complete 5 day course  -Continue schduled duo-nebs.  Add guaifenesin and tessalon  -Check sputum culture and start vancomycin and cefepime - adjust based on cultures.  -Continue supplemental O2.  Suspect he will need home O2 at discharge.    Acute exacerbation of chronic obstructive pulmonary disease (COPD)  -Treatment as above.    Nausea & vomiting  -Presenting complaint was intractable N/V   -Lipase normal  -Abdominal US showed Pancreatic mass in with known malignancy  -CTA Chest showed significantly distended stomach.  -Etiology unclear but has resolved.  He is tolerating regular consistency diet, has bowel sounds and is having bowel movement with no abdominal or GI symptoms  -Continue solid diet , carafate prior to meals and  protonix  -Anti-emetics available    VINCENT (acute kidney injury)  -Baseline Cr 0.9-1.3  -On admit Cr 1.9 - suspected due to pre-renal azotemia from dehydration due to nausea and vomiting  -Noted metabolic alkalosis on admission  -Avoid nephrotoxic agents and renally dose meds  -Treated with IV fluids and improving - Cr now 0.7.  -Continue diet and stop IV fluids today  -Repeat labs in AM.    Hypercalcemia  -On admit Ca 12.0 - corrected to 12.5  -PTH 20.9 (normal)  -Treated with IV fluids and Calcium has normalized  -This was due to dehydration  -Repeat labs in AM.    Metabolic alkalosis  -On admit noted CO2 of 41 and VBG showing pH 7.5 and HCO3 51  -Due to vomiting at home  -Treating with IV fluids and CO2 has normalized.  -Repeat labs in AM    (HFpEF) heart failure with preserved ejection fraction  -Echo 1/2023 with EF of 65% and indeterminate diastolic dysfunction.   -BNP on admit only 149 and he was dehydrated  -Strict ins/outs and daily weights  -Appears euvolemic on exam now and tolerating diet.  -Stop Iv fluids today    Malignant neoplasm of head of pancreas  -Follows with Dr. Carver  -Noted Stage Iv pancreatic cancer with mets to liver - on oral palliative chemotherapy.    -He is DNR.  -Holding chemotherapy  -Follow up with Dr. Carver.    Liver metastases  -Treatment as above.    Tobacco abuse  -Counselled on cessation 3 minutes.  -NRT ordered.    Hypokalemia  -Replace potassium and magnesium more aggressively today  -Repeat K level this afternoon.  -Check BMP and Mag in AM.    ACP (advance care planning)  Advance Care Planning  Date: 10/13/2023 Code Status In light of the patients advanced and life limiting illness,I engaged the the patient in a voluntary conversation about the patient's preferences for care  at the very end of life. The patient wishes to have a natural, peaceful death.  Along those lines, the patient does not wish to have CPR or other invasive treatments performed when his heart and/or  breathing stops. I communicated to the patient that a DNR order would be placed in his medical record to reflect this preference. -Discussion conducted by admitting provider     Hyperlipidemia  Continue home statin      VTE Risk Mitigation (From admission, onward)         Ordered     enoxaparin injection 40 mg  Every 24 hours         10/13/23 1601     IP VTE HIGH RISK PATIENT  Once         10/13/23 1515     Place sequential compression device  Until discontinued         10/13/23 1515     Place FRANCES hose  Until discontinued         10/13/23 1515                Discharge Planning   MIKO:      Code Status: DNR   Is the patient medically ready for discharge?:     Reason for patient still in hospital (select all that apply): Treatment  Discharge Plan A: Home Health                  Hardy Ervin MD  [facility]   [facility]

## 2023-10-16 NOTE — PT/OT/SLP PROGRESS
Physical Therapy Treatment    Patient Name:  Manuel Tyler   MRN:  [id number]    Recommendations:     Discharge Recommendations:  outpatient PT   Discharge Equipment Recommendations: bath bench  Barriers to discharge:  Required increase O2    Assessment:     Manuel Tyler is a 72 y.o. male admitted with a medical diagnosis of Acute hypoxemic respiratory failure.  He presents with the following impairments/functional limitations: impaired endurance, gait instability, decreased safety awareness, impaired cardiopulmonary response to activity.    Pt in bed with HOB elevated upon arrival. Pt progressed in ambulation today.    Rehab Prognosis: Good; patient would benefit from acute skilled PT services to address these deficits and reach maximum level of function.    Recent Surgery: * No surgery found *      Plan:     During this hospitalization, patient to be seen 3 x/week to address the identified rehab impairments via therapeutic activities, therapeutic exercises, gait training and progress toward the following goals:    Plan of Care Expires:  10/28/23    Subjective     Chief Complaint: None at this time  Patient/Family Comments/goals: Pt agreed to therapy session  Pain/Comfort:  Pain Rating 1: 0/10      Objective:     Communicated with Nurse April prior to session.  Patient found HOB elevated with oxygen, peripheral IV, telemetry upon PT entry to room.     General Precautions: Standard, fall  Orthopedic Precautions: Full weight bearing  Braces: N/A  Respiratory Status: Nasal cannula, flow 3 L/min     Functional Mobility: Pt O2 monitored during treatment session today. Once EOB O2 recorded at 91-93% and  bpm. After ambulation and seated in [place], pt O2 92-94%, Nursing notified.  Bed Mobility:     Rolling Right: stand by assistance  Scooting: stand by assistance to scoot anteriorly towards EOB and posteriorly in [place].  Supine to Sit: stand by assistance  Transfers: Gait belt donned for safety; x3 trials, one EOB  and two from [place] requiring SBA using RW. Pt required cueing for hand placement to push up from chair rather then RW.   Sit to Stand: stand by assistance with rolling walker  Gait: Pt ambulated ~30ft using RW and SBA/CGA x1 person. Pt required cueing for RW management and safety awareness. Pt demo decreased navarro, decreased step length, and decreased weight shifting during ambulation. Pt required Mod v/c for turning in RW and RW management during turns.  Balance: Seated Good, Standing dynamic/static Fair      AM-PAC 6 CLICK MOBILITY  Turning over in bed (including adjusting bedclothes, sheets and blankets)?: 4  Sitting down on and standing up from a chair with arms (e.g., wheelchair, bedside commode, etc.): 4  Moving from lying on back to sitting on the side of the bed?: 4  Moving to and from a bed to a chair (including a wheelchair)?: 4  Need to walk in hospital room?: 3  Climbing 3-5 steps with a railing?: 3  Basic Mobility Total Score: 22       Treatment & Education:  Pt instructed to perform at EOB, LAQ, hip abd/add, AP, and hip flexion, 2x10 BLE requiring cueing for proper technique of exercises and bring extremities through full ROM.    Patient left up in chair with all lines intact, call button in reach, and Nurse April notified..    GOALS:   Multidisciplinary Problems       Physical Therapy Goals          Problem: Physical Therapy    Goal Priority Disciplines Outcome Goal Variances Interventions   Physical Therapy Goal     PT, PT/OT Ongoing, Progressing     Description: Goals to be met by:  10/28/2023    Patient will increase functional independence with mobility by performin. Sit to stand transfer with [place]  4. Gait  x 400 feet with [place].    Recommend: Outpatient PT at time of discharge to home with family.                              Time Tracking:     PT Received On: 10/16/23  PT Start Time: 1100     PT Stop Time: 1125  PT Total Time (min): 25 min     Billable Minutes: Gait  Training 15 and Therapeutic Exercise 10    Treatment Type: Treatment  PT/PTA: PTA     Number of PTA visits since last PT visit: 1     10/16/2023

## 2023-10-16 NOTE — NURSING
[facility] [place]  Nurses Note -- 4 Eyes      10/15/2023       Skin assessed on: Q Shift      [x] No Pressure Injuries Present    [x]Prevention Measures Documented    [] Yes LDA  for Pressure Injury Previously documented     [] Yes New Pressure Injury Discovered   [] LDA for New Pressure Injury Added      Attending RN:  Jeanna Coombs, RN     Second RN:  Ledy BERNAL RN

## 2023-10-16 NOTE — SUBJECTIVE & OBJECTIVE
Interval History: No acute events overnight.  Noted sepsis risk alert this morning.  Despite this, he looks very good.  Notes he tolerating his diet without nausea or vomiting and had BM last night.  Has a slight cough productive of minimal sputum.  Denies pain and sob, but quickly desats on room air.  Wife at bedside.  All questions answered and patient had no further complaints.    Objective:     Vital Signs (Most Recent):  Temp: 97.8 °F (36.6 °C) (10/16/23 1132)  Pulse: 100 (10/16/23 1132)  Resp: 19 (10/16/23 1132)  BP: 131/76 (10/16/23 1132)  SpO2: (!) 94 % (10/16/23 1132) Vital Signs (24h Range):  Temp:  [97.8 °F (36.6 °C)-98.5 °F (36.9 °C)] 97.8 °F (36.6 °C)  Pulse:  [] 100  Resp:  [16-20] 19  SpO2:  [90 %-95 %] 94 %  BP: (131-176)/(76-99) 131/76     Weight: 65.7 kg (144 lb 13.5 oz)  Body mass index is 22.02 kg/m².    Intake/Output Summary (Last 24 hours) at 10/16/2023 1307  Last data filed at 10/16/2023 1200  Gross per 24 hour   Intake 1440 ml   Output 2180 ml   Net -740 ml           Physical Exam  Vitals and nursing note reviewed.   Constitutional:       General: He is not in acute distress.     Appearance: He is well-developed and underweight. He is not ill-appearing, toxic-appearing or diaphoretic.   HENT:      Head: Normocephalic and atraumatic.      Right Ear: External ear normal.      Left Ear: External ear normal.      Nose: Nose normal.      Mouth/Throat:      Mouth: Mucous membranes are moist.   Eyes:      General: No scleral icterus.        Right eye: No discharge.         Left eye: No discharge.   Cardiovascular:      Rate and Rhythm: Normal rate and regular rhythm.   Pulmonary:      Effort: Pulmonary effort is normal. No respiratory distress.      Breath sounds: No wheezing or rales.      Comments: No wheezing or crackles - good air movement  Abdominal:      General: There is no distension.      Palpations: Abdomen is soft.      Tenderness: There is no abdominal tenderness.    Musculoskeletal:      Cervical back: Normal range of motion and neck supple.      Right lower leg: No edema.      Left lower leg: No edema.   Skin:     General: Skin is warm and dry.      Capillary Refill: Capillary refill takes less than 2 seconds.   Neurological:      Mental Status: He is alert and oriented to person, place, and time.   Psychiatric:         Mood and Affect: Mood normal.         Behavior: Behavior normal.             Significant Labs: All pertinent labs within the past 24 hours have been reviewed.    Significant Imaging: I have reviewed all pertinent imaging results/findings within the past 24 hours.

## 2023-10-16 NOTE — NURSING
[facility] [place]  Nurses Note -- 4 Eyes      10/16/2023       Skin assessed on: Q Shift      [x] No Pressure Injuries Present    []Prevention Measures Documented    [] Yes LDA  for Pressure Injury Previously documented     [] Yes New Pressure Injury Discovered   [] LDA for New Pressure Injury Added      Attending RN:  Chetna Hernandez RN     Second RN:  ORACIO Meeks

## 2023-10-17 LAB
ALBUMIN SERPL BCP-MCNC: 2.9 G/DL (ref 3.5–5.2)
ALP SERPL-CCNC: 72 U/L (ref 55–135)
ALT SERPL W/O P-5'-P-CCNC: 55 U/L (ref 10–44)
ANION GAP SERPL CALC-SCNC: 9 MMOL/L (ref 8–16)
AST SERPL-CCNC: 36 U/L (ref 10–40)
BASOPHILS # BLD AUTO: 0.05 K/UL (ref 0–0.2)
BASOPHILS NFR BLD: 0.2 % (ref 0–1.9)
BILIRUB SERPL-MCNC: 0.5 MG/DL (ref 0.1–1)
BUN SERPL-MCNC: 23 MG/DL (ref 8–23)
CALCIUM SERPL-MCNC: 8.1 MG/DL (ref 8.7–10.5)
CHLORIDE SERPL-SCNC: 97 MMOL/L (ref 95–110)
CO2 SERPL-SCNC: 25 MMOL/L (ref 23–29)
CREAT SERPL-MCNC: 0.8 MG/DL (ref 0.5–1.4)
DIFFERENTIAL METHOD: ABNORMAL
EOSINOPHIL # BLD AUTO: 0 K/UL (ref 0–0.5)
EOSINOPHIL NFR BLD: 0.1 % (ref 0–8)
ERYTHROCYTE [DISTWIDTH] IN BLOOD BY AUTOMATED COUNT: 15.2 % (ref 11.5–14.5)
EST. GFR  (NO RACE VARIABLE): >60 ML/MIN/1.73 M^2
GLUCOSE SERPL-MCNC: 124 MG/DL (ref 70–110)
HCT VFR BLD AUTO: 32.5 % (ref 40–54)
HGB BLD-MCNC: 10.9 G/DL (ref 14–18)
IMM GRANULOCYTES # BLD AUTO: 0.2 K/UL (ref 0–0.04)
IMM GRANULOCYTES NFR BLD AUTO: 0.7 % (ref 0–0.5)
LYMPHOCYTES # BLD AUTO: 2.2 K/UL (ref 1–4.8)
LYMPHOCYTES NFR BLD: 7.4 % (ref 18–48)
MAGNESIUM SERPL-MCNC: 1.8 MG/DL (ref 1.6–2.6)
MCH RBC QN AUTO: 30.4 PG (ref 27–31)
MCHC RBC AUTO-ENTMCNC: 33.5 G/DL (ref 32–36)
MCV RBC AUTO: 91 FL (ref 82–98)
MONOCYTES # BLD AUTO: 3.3 K/UL (ref 0.3–1)
MONOCYTES NFR BLD: 11.4 % (ref 4–15)
NEUTROPHILS # BLD AUTO: 23.4 K/UL (ref 1.8–7.7)
NEUTROPHILS NFR BLD: 80.2 % (ref 38–73)
NRBC BLD-RTO: 0 /100 WBC
PLATELET # BLD AUTO: 503 K/UL (ref 150–450)
PMV BLD AUTO: 10 FL (ref 9.2–12.9)
POTASSIUM SERPL-SCNC: 3.7 MMOL/L (ref 3.5–5.1)
PROT SERPL-MCNC: 6.4 G/DL (ref 6–8.4)
RBC # BLD AUTO: 3.58 M/UL (ref 4.6–6.2)
SODIUM SERPL-SCNC: 131 MMOL/L (ref 136–145)
VANCOMYCIN TROUGH SERPL-MCNC: 16.2 UG/ML (ref 10–22)
WBC # BLD AUTO: 29.12 K/UL (ref 3.9–12.7)

## 2023-10-17 PROCEDURE — 94761 N-INVAS EAR/PLS OXIMETRY MLT: CPT

## 2023-10-17 PROCEDURE — 25000242 PHARM REV CODE 250 ALT 637 W/ HCPCS: Performed by: HOSPITALIST

## 2023-10-17 PROCEDURE — 36415 COLL VENOUS BLD VENIPUNCTURE: CPT | Performed by: HOSPITALIST

## 2023-10-17 PROCEDURE — 25000003 PHARM REV CODE 250: Performed by: STUDENT IN AN ORGANIZED HEALTH CARE EDUCATION/TRAINING PROGRAM

## 2023-10-17 PROCEDURE — 97116 GAIT TRAINING THERAPY: CPT | Mod: CQ

## 2023-10-17 PROCEDURE — 21400001 HC TELEMETRY ROOM

## 2023-10-17 PROCEDURE — 27000221 HC OXYGEN, UP TO 24 HOURS

## 2023-10-17 PROCEDURE — 63600175 PHARM REV CODE 636 W HCPCS: Performed by: PHYSICIAN ASSISTANT

## 2023-10-17 PROCEDURE — 63600175 PHARM REV CODE 636 W HCPCS: Performed by: STUDENT IN AN ORGANIZED HEALTH CARE EDUCATION/TRAINING PROGRAM

## 2023-10-17 PROCEDURE — 85025 COMPLETE CBC W/AUTO DIFF WBC: CPT | Performed by: HOSPITALIST

## 2023-10-17 PROCEDURE — 97535 SELF CARE MNGMENT TRAINING: CPT

## 2023-10-17 PROCEDURE — 94760 N-INVAS EAR/PLS OXIMETRY 1: CPT

## 2023-10-17 PROCEDURE — 63600175 PHARM REV CODE 636 W HCPCS: Performed by: HOSPITALIST

## 2023-10-17 PROCEDURE — 97110 THERAPEUTIC EXERCISES: CPT | Mod: CQ

## 2023-10-17 PROCEDURE — 97110 THERAPEUTIC EXERCISES: CPT

## 2023-10-17 PROCEDURE — S4991 NICOTINE PATCH NONLEGEND: HCPCS | Performed by: HOSPITALIST

## 2023-10-17 PROCEDURE — 80053 COMPREHEN METABOLIC PANEL: CPT | Performed by: HOSPITALIST

## 2023-10-17 PROCEDURE — 25000003 PHARM REV CODE 250: Performed by: PHYSICIAN ASSISTANT

## 2023-10-17 PROCEDURE — 94640 AIRWAY INHALATION TREATMENT: CPT

## 2023-10-17 PROCEDURE — 25000003 PHARM REV CODE 250: Performed by: HOSPITALIST

## 2023-10-17 PROCEDURE — 80202 ASSAY OF VANCOMYCIN: CPT | Performed by: HOSPITALIST

## 2023-10-17 PROCEDURE — 25000003 PHARM REV CODE 250: Performed by: NURSE PRACTITIONER

## 2023-10-17 PROCEDURE — 83735 ASSAY OF MAGNESIUM: CPT | Performed by: HOSPITALIST

## 2023-10-17 RX ADMIN — GUAIFENESIN 1200 MG: 600 TABLET, EXTENDED RELEASE ORAL at 08:10

## 2023-10-17 RX ADMIN — ENOXAPARIN SODIUM 40 MG: 40 INJECTION SUBCUTANEOUS at 04:10

## 2023-10-17 RX ADMIN — VANCOMYCIN HYDROCHLORIDE 1000 MG: 1 INJECTION, POWDER, LYOPHILIZED, FOR SOLUTION INTRAVENOUS at 09:10

## 2023-10-17 RX ADMIN — CEFEPIME 2 G: 2 INJECTION, POWDER, FOR SOLUTION INTRAVENOUS at 04:10

## 2023-10-17 RX ADMIN — CEFEPIME 2 G: 2 INJECTION, POWDER, FOR SOLUTION INTRAVENOUS at 09:10

## 2023-10-17 RX ADMIN — SUCRALFATE 1 G: 1 SUSPENSION ORAL at 08:10

## 2023-10-17 RX ADMIN — NICOTINE 1 PATCH: 14 PATCH TRANSDERMAL at 09:10

## 2023-10-17 RX ADMIN — IPRATROPIUM BROMIDE AND ALBUTEROL SULFATE 3 ML: 2.5; .5 SOLUTION RESPIRATORY (INHALATION) at 07:10

## 2023-10-17 RX ADMIN — IPRATROPIUM BROMIDE AND ALBUTEROL SULFATE 3 ML: 2.5; .5 SOLUTION RESPIRATORY (INHALATION) at 03:10

## 2023-10-17 RX ADMIN — IPRATROPIUM BROMIDE AND ALBUTEROL SULFATE 3 ML: 2.5; .5 SOLUTION RESPIRATORY (INHALATION) at 08:10

## 2023-10-17 RX ADMIN — SUCRALFATE 1 G: 1 SUSPENSION ORAL at 05:10

## 2023-10-17 RX ADMIN — MELATONIN TAB 3 MG 6 MG: 3 TAB at 11:10

## 2023-10-17 RX ADMIN — PREDNISONE 40 MG: 20 TABLET ORAL at 09:10

## 2023-10-17 RX ADMIN — SUCRALFATE 1 G: 1 SUSPENSION ORAL at 04:10

## 2023-10-17 RX ADMIN — PANTOPRAZOLE SODIUM 40 MG: 40 TABLET, DELAYED RELEASE ORAL at 09:10

## 2023-10-17 RX ADMIN — GUAIFENESIN 1200 MG: 600 TABLET, EXTENDED RELEASE ORAL at 09:10

## 2023-10-17 RX ADMIN — SUCRALFATE 1 G: 1 SUSPENSION ORAL at 12:10

## 2023-10-17 RX ADMIN — CEFEPIME 2 G: 2 INJECTION, POWDER, FOR SOLUTION INTRAVENOUS at 11:10

## 2023-10-17 RX ADMIN — ACETAMINOPHEN 650 MG: 325 TABLET ORAL at 11:10

## 2023-10-17 RX ADMIN — MUPIROCIN: 20 OINTMENT TOPICAL at 08:10

## 2023-10-17 RX ADMIN — MUPIROCIN: 20 OINTMENT TOPICAL at 09:10

## 2023-10-17 NOTE — PLAN OF CARE
Problem: Physical Therapy  Goal: Physical Therapy Goal  Description: Goals to be met by:  10/28/2023    Patient will increase functional independence with mobility by performin. Sit to stand transfer with [place]  4. Gait  x 400 feet with [place].    Recommend: Outpatient PT at time of discharge to home with family.         Outcome: Ongoing, Progressing       Pt progressed in ambulation today requiring SBA during ambulation using RW.

## 2023-10-17 NOTE — PLAN OF CARE
Problem: Adult Inpatient Plan of Care  Goal: Optimal Comfort and Wellbeing  Outcome: Ongoing, Progressing  Intervention: Provide Person-Centered Care  Flowsheets (Taken 10/17/2023 1810)  Trust Relationship/Rapport:   care explained   questions answered     Problem: Skin Injury Risk Increased  Goal: Skin Health and Integrity  Outcome: Ongoing, Progressing  Intervention: Optimize Skin Protection  Flowsheets (Taken 10/17/2023 1810)  Pressure Reduction Techniques:   frequent weight shift encouraged   weight shift assistance provided  Head of Bed (HOB) Positioning: HOB elevated

## 2023-10-17 NOTE — PT/OT/SLP PROGRESS
Physical Therapy Treatment    Patient Name:  Manuel Tyler   MRN:  [id number]    Recommendations:     Discharge Recommendations:  Low Level Intenstiy  Discharge Equipment Recommendations: bath bench  Barriers to discharge: None    Assessment:     Manuel Tyler is a 72 y.o. male admitted with a medical diagnosis of Acute hypoxemic respiratory failure.  He presents with the following impairments/functional limitations: impaired endurance, gait instability, decreased safety awareness, impaired cardiopulmonary response to activity.    Pt progressed in ambulation today.    Rehab Prognosis: Good; patient would benefit from acute skilled PT services to address these deficits and reach maximum level of function.    Recent Surgery: * No surgery found *      Plan:     During this hospitalization, patient to be seen 3 x/week to address the identified rehab impairments via therapeutic activities, therapeutic exercises, gait training and progress toward the following goals:    Plan of Care Expires:  10/28/23    Subjective     Chief Complaint: R shoulder pain  Patient/Family Comments/goals: Pt agreed to therapy session  Pain/Comfort:  Pain Rating 1: 0/10      Objective:     Communicated with April prior to session.  Patient found HOB elevated with peripheral IV, telemetry upon PT entry to room.     General Precautions: Standard, fall  Orthopedic Precautions: Full weight bearing  Braces: N/A  Respiratory Status: Nasal cannula, flow 3 L/min     Functional Mobility: Pt O2 monitored during session today. O2 prior to therex at EOB 93-95%. Following ambulation, O2 in [place] 91%, nursing notified. Pt required extended rest breaks to monitor O2 and for pt to rest.  Bed Mobility:     Rolling Right: stand by assistance  Scooting: stand by assistance to scoot anteriorly towards EOB  Supine to Sit: stand by assistance  Transfers: Gait belt donned; x5 trials from EOB using RW    Sit to Stand: stand by assistance with rolling walker  Gait: Pt  ambulated ~25ftx2 trials using RW and SBA with seated rest break. Pt demo decreased navarro, decreased step length and decreased weight shifting during ambulation. Pt required v/c for RW management and safety awareness.  Balance: Seated Good, Standing dynamic/static Fair      AM-PAC 6 CLICK MOBILITY  Turning over in bed (including adjusting bedclothes, sheets and blankets)?: 4  Sitting down on and standing up from a chair with arms (e.g., wheelchair, bedside commode, etc.): 4  Moving from lying on back to sitting on the side of the bed?: 4  Moving to and from a bed to a chair (including a wheelchair)?: 4  Need to walk in hospital room?: 3  Climbing 3-5 steps with a railing?: 3  Basic Mobility Total Score: 22       Treatment & Education:  Pt instructed to perform at EOB, LAQ, hip abd/add, AP, and hip flexion, 2x10 BLE requiring cueing for proper technique of exercises and bring extremities through full ROM.    Pt performed 5 STS from EOB using RW and SBA. Pt required Mod cueing for RW management and hand placement.    Patient left up in chair with all lines intact, call button in reach, and Nurse April notified.    GOALS:   Multidisciplinary Problems       Physical Therapy Goals          Problem: Physical Therapy    Goal Priority Disciplines Outcome Goal Variances Interventions   Physical Therapy Goal     PT, PT/OT Ongoing, Progressing     Description: Goals to be met by:  10/28/2023    Patient will increase functional independence with mobility by performin. Sit to stand transfer with [place]  4. Gait  x 400 feet with [place].    Recommend: Outpatient PT at time of discharge to home with family.                              Time Tracking:     PT Received On: 10/17/23  PT Start Time: 1140     PT Stop Time: 1203  PT Total Time (min): 23 min     Billable Minutes: Gait Training 15 and Therapeutic Exercise 8    Treatment Type: Treatment  PT/PTA: PTA     Number of PTA visits since last PT visit: 2      10/17/2023

## 2023-10-17 NOTE — PLAN OF CARE
Problem: Adult Inpatient Plan of Care  Goal: Plan of Care Review  Flowsheets (Taken 10/17/2023 0125)  Plan of Care Reviewed With: patient     Problem: Infection  Goal: Absence of Infection Signs and Symptoms  Intervention: Prevent or Manage Infection  Flowsheets (Taken 10/17/2023 0125)  Infection Management: (IV antibiotic therapy)   aseptic technique maintained   cultures obtained and sent to lab   other (see comments)

## 2023-10-17 NOTE — NURSING
Per handoff received from April ASHLEY, RN. Patient care assumed. Patients overall condition assessed and patient appears to be in NAD with no complaints of pain. 20g LFA and 22g LW PIV's are clean, dry, and intact. Urinal and call light in reach and patient instructed to inform the nurse if anything is needed. Patient stable and will continue to be monitored.

## 2023-10-17 NOTE — NURSING
[facility] [place]  Nurses Note -- 4 Eyes      10/17/2023       Skin assessed on: Q Shift      [x] No Pressure Injuries Present    []Prevention Measures Documented    [] Yes LDA  for Pressure Injury Previously documented     [] Yes New Pressure Injury Discovered   [] LDA for New Pressure Injury Added      Attending RN:  Chetna Hernandez RN     Second RN:  ORACIO Meeks

## 2023-10-17 NOTE — PROGRESS NOTES
[facility] Medicine  Progress Note    Patient Name: Manuel Tyler  MRN: [id number]  Patient Class: IP- Inpatient   Admission Date: 10/13/2023  Length of Stay: 3 days  Attending Physician: Vini Hayes MD  Primary Care Provider: Fatmata Vera MD        Subjective:     Principal Problem:Acute hypoxemic respiratory failure        HPI:  Manuel Tyler 72 y.o. male with pancreatic cancer on chemo tobacco abuse presents to the hospital with a chief complaint of intractable vomiting.  He reports 4 days of intractable nausea and vomiting with associated burning chest pain.  He attempted treatment home with anti acid medication without improvement.  He was seen in the infusion center yesterday and received IV steroids and IV fluids without improvement.  Reports he has been unable to take his oral chemo due to intractable vomiting.  He smokes 1/2 pack per day.  He denies fever leg swelling melena hematuria hematemesis dizziness syncope shortness breath and cough.  He reports chronic upper abdominal pain that he attributes to his pancreatic cancer.    In the ED, hypoxic to 83% despite changing oxygen probes and proper location.  Chest x-ray without acute process, metabolic alkalosis with pH, creatinine 7.5 on VBG CO2 40 white blood cell count 28 point calcium 12.0.      Overview/Hospital Course:  72 year old man with Stage IV pancreatic cancer, tobacco abuse and HFpEF who presented for evaluation of intractable nausea and vomiting.  He was admitted to inpatient status and diagnosed with acute hypoxic respiratory failure, VINCENT, hypokalemia, hypercalcemia and metabolic alkalosis.  His respiratory failure was initially attributed to COPD exacerbation and was treated with iv steroids and scheduled nebulizers.  However, wheezing has resolved and still quite hypoxic on room air.  CTA shows consolidation in LLL with air broncograms so pneumonia appears more likely.  Checked sputum culture and blood cultures and  added vanc and cefepime today.  Good chance will require home O2 at discharge.  Etiology of his nausea and vomiting unclear.  CTA chest did show a significantly distended stomach but he is tolerating diet, having bowel movements and no further nausea and vomiting.  His VINCENT, alkalosis and hypercalcemia have resolved with IV fluids.  His hypokalemia has required large doses, but is finally starting to improve.       Apparent PNA on CT (see imaging below). WBC increased to 29k, likely steroids, but will increase cefepime to 2g q8h given immunocompromised with good renal fxn. CrCl 81. Unknown organisms, NGTD cx's. Tolerating diet. Improving overall.        Interval History:  NAEON.  No new issues.   Denies complaints.  All questions answered and updates on care given.       ROS:  General: Negative for fevers or chills.  Cardiac: Negative for chest pain or orthopnea   Pulmonary: Negative for dyspnea or wheezing.     Vitals:    10/17/23 0404 10/17/23 0406 10/17/23 0714 10/17/23 0801   BP: (!) 165/81  (!) 159/82    BP Location: Left arm      Patient Position: Lying      Pulse: 83  106 80   Resp: 17  19 16   Temp: 97.7 °F (36.5 °C)  98 °F (36.7 °C)    TempSrc: Oral      SpO2: (!) 93%  (!) 92% (!) 93%   Weight:  68.2 kg (150 lb 5.7 oz)     Height:              Body mass index is 22.86 kg/m².      PHYSICAL EXAM:  GENERAL APPEARANCE: alert and cooperative, frail  HEENT:     HEAD: NC/AT     EYES: PERRL, EOMI.  Vision is grossly intact.  NECK: Neck supple, non-tender without LAD, masses or thyromegaly.  CARDIAC: There is no peripheral edema, cyanosis or pallor.   LUNGS: Clear to auscultation and percussion without rales or wheezing  ABDOMEN: Non-distended. No guarding.  MSK: No joint erythema or tenderness.   EXTREMITIES: No significant deformity or joint abnormality. No edema.   NEUROLOGICAL: CN II-XII grossly intact.   SKIN: Skin normal color, texture and turgor with no lesions or eruptions.  PSYCHIATRIC: Oriented. No  tangential speech. No Hyperactive features.        Recent Results (from the past 24 hour(s))   Potassium    Collection Time: 10/16/23  3:56 PM   Result Value Ref Range    Potassium 3.4 (L) 3.5 - 5.1 mmol/L   CBC Auto Differential    Collection Time: 10/17/23  4:19 AM   Result Value Ref Range    WBC 29.12 (H) 3.90 - 12.70 K/uL    RBC 3.58 (L) 4.60 - 6.20 M/uL    Hemoglobin 10.9 (L) 14.0 - 18.0 g/dL    Hematocrit 32.5 (L) 40.0 - 54.0 %    MCV 91 82 - 98 fL    MCH 30.4 27.0 - 31.0 pg    MCHC 33.5 32.0 - 36.0 g/dL    RDW 15.2 (H) 11.5 - 14.5 %    Platelets 503 (H) 150 - 450 K/uL    MPV 10.0 9.2 - 12.9 fL    Immature Granulocytes 0.7 (H) 0.0 - 0.5 %    Gran # (ANC) 23.4 (H) 1.8 - 7.7 K/uL    Immature Grans (Abs) 0.20 (H) 0.00 - 0.04 K/uL    Lymph # 2.2 1.0 - 4.8 K/uL    Mono # 3.3 (H) 0.3 - 1.0 K/uL    Eos # 0.0 0.0 - 0.5 K/uL    Baso # 0.05 0.00 - 0.20 K/uL    nRBC 0 0 /100 WBC    Gran % 80.2 (H) 38.0 - 73.0 %    Lymph % 7.4 (L) 18.0 - 48.0 %    Mono % 11.4 4.0 - 15.0 %    Eosinophil % 0.1 0.0 - 8.0 %    Basophil % 0.2 0.0 - 1.9 %    Differential Method Automated    Comprehensive Metabolic Panel    Collection Time: 10/17/23  4:19 AM   Result Value Ref Range    Sodium 131 (L) 136 - 145 mmol/L    Potassium 3.7 3.5 - 5.1 mmol/L    Chloride 97 95 - 110 mmol/L    CO2 25 23 - 29 mmol/L    Glucose 124 (H) 70 - 110 mg/dL    BUN 23 8 - 23 mg/dL    Creatinine 0.8 0.5 - 1.4 mg/dL    Calcium 8.1 (L) 8.7 - 10.5 mg/dL    Total Protein 6.4 6.0 - 8.4 g/dL    Albumin 2.9 (L) 3.5 - 5.2 g/dL    Total Bilirubin 0.5 0.1 - 1.0 mg/dL    Alkaline Phosphatase 72 55 - 135 U/L    AST 36 10 - 40 U/L    ALT 55 (H) 10 - 44 U/L    eGFR >60 >60 mL/min/1.73 m^2    Anion Gap 9 8 - 16 mmol/L   Magnesium    Collection Time: 10/17/23  4:19 AM   Result Value Ref Range    Magnesium 1.8 1.6 - 2.6 mg/dL       Microbiology Results (last 7 days)     Procedure Component Value Units Date/Time    Blood culture [[id number]] Collected: 10/16/23 0802    Order Status:  Completed Specimen: Blood from Peripheral, Right Arm Updated: 10/16/23 1512     Blood Culture, Routine No Growth to date    Narrative:      Aerobic and anaerobic    Blood culture [[id number]] Collected: 10/16/23 0803    Order Status: Completed Specimen: Blood from Peripheral, Right Hand Updated: 10/16/23 1512     Blood Culture, Routine No Growth to date    Narrative:      Aerobic and anaerobic    Culture, Respiratory with Gram Stain [[id number]] Collected: 10/16/23 1452    Order Status: Sent Specimen: Respiratory from Sputum, Expectorated Updated: 10/16/23 1500    Influenza A & B by Molecular [[id number]]     Order Status: Canceled Specimen: Nasopharyngeal Swab            Imaging Results          X-Ray Chest AP Portable (Final result)  Result time 10/13/23 13:29:45    Final result by Lucio George MD (10/13/23 13:29:45)                 Impression:      1. Bandlike atelectasis projects over the left lower lung zone, no large focal consolidation.      Electronically signed by: Lucio George MD  Date:    10/13/2023  Time:    13:29             Narrative:    EXAMINATION:  XR CHEST AP PORTABLE    CLINICAL HISTORY:  Vomiting, unspecified    TECHNIQUE:  Single frontal view of the chest was performed.    COMPARISON:  09/03/2023    FINDINGS:  Right chest wall port catheter tip projects over the distal SVC.  The cardiomediastinal silhouette is not enlarged noting calcification of the aorta..  There is no pleural effusion.  The trachea is midline.  The lungs are symmetrically expanded bilaterally with bandlike atelectasis projected over the left lower lung zone..  No large focal consolidation seen.  There is no pneumothorax.  The osseous structures are remarkable for degenerative change..                               US Abdomen Limited (Final result)  Result time 10/13/23 13:30:14    Final result by Sundeep Vasquez Jr., MD (10/13/23 13:30:14)                 Impression:      Pancreatic mass in this patient with  known malignancy.  Pneumobilia related to previous instrumentation.      Electronically signed by: Sundeep oYusif Jr  Date:    10/13/2023  Time:    13:30             Narrative:    EXAMINATION:  US ABDOMEN LIMITED    CLINICAL HISTORY:  emesis;    TECHNIQUE:  Limited ultrasound of the right upper quadrant of the abdomen (including pancreas, liver, gallbladder, common bile duct, and spleen) was performed.    COMPARISON:  01/25/2023 ultrasound and CT abdomen and pelvis 09/19/2023    FINDINGS:  Liver: Normal in size . Homogeneous echotexture. No focal hepatic lesions.    Gallbladder: The gallbladder is surgically absent.    Biliary system: The common duct is normal caliber.  Intrahepatic pneumobilia is present.    Spleen: Normal in size and echotexture.    Pancreas: partially obscured by bowel gas.  Vague hypo echoic mass in the pancreatic head.                                               Assessment/Plan:      * Acute hypoxemic respiratory failure  -Admitted to inpatient status  -Presented for nausea and vomiting and found to be hypoxic to 83% on RA in the ED with significant wheezing  -CXR showed no focal consolidation or pulmonary edema  -BNP only 149 and troponin negative.  Covid and flu swabs negative.  -CTA chest showed no PE, small bilateral pleural effusions with associated compressive atelectasis of the bilateral lower lobes, bandlike atelectasis/consolidation within the left lower lobe, new since the previous exam, with air bronchograms in the region, pulmonary nodules and diffuse distention of the stomach  -This is acute hypoxic respiratory failure.  He is not on home O2.  -Initially suspected due to COPD exacerbation but no wheezing at all on 10/15 and still quite hypoxic on room air.  More likely due to aspiration pneumonia given vomiting.  -Treated with IV steroids and scheduled duo-nebs initially and changed to po steroids 10/15.  -Continue PO prednisone to complete 5 day course  -Continue schduled  duo-nebs.  Add guaifenesin and tessalon  -Check sputum culture and start vancomycin and cefepime - adjust based on cultures.  -Continue supplemental O2.  Suspect he will need home O2 at discharge.    Hypokalemia  -Replace potassium and magnesium more aggressively today  -Repeat K level this afternoon.  -Check BMP and Mag in AM.    Acute exacerbation of chronic obstructive pulmonary disease (COPD)  -Treatment as above.    Nausea & vomiting  -Presenting complaint was intractable N/V   -Lipase normal  -Abdominal US showed Pancreatic mass in with known malignancy  -CTA Chest showed significantly distended stomach.  -Etiology unclear but has resolved.  He is tolerating regular consistency diet, has bowel sounds and is having bowel movement with no abdominal or GI symptoms  -Continue solid diet , carafate prior to meals and protonix  -Anti-emetics available    VINCENT (acute kidney injury)  -Baseline Cr 0.9-1.3  -On admit Cr 1.9 - suspected due to pre-renal azotemia from dehydration due to nausea and vomiting  -Noted metabolic alkalosis on admission  -Avoid nephrotoxic agents and renally dose meds  -Treated with IV fluids and improving - Cr now 0.7.  -Continue diet and stop IV fluids today  -Repeat labs in AM.    Hypercalcemia  -On admit Ca 12.0 - corrected to 12.5  -PTH 20.9 (normal)  -Treated with IV fluids and Calcium has normalized  -This was due to dehydration  -Repeat labs in AM.    Metabolic alkalosis  -On admit noted CO2 of 41 and VBG showing pH 7.5 and HCO3 51  -Due to vomiting at home  -Treating with IV fluids and CO2 has normalized.  -Repeat labs in AM    Tobacco abuse  -Counselled on cessation 3 minutes.  -NRT ordered.    ACP (advance care planning)  Advance Care Planning  Date: 10/13/2023 Code Status In light of the patients advanced and life limiting illness,I engaged the the patient in a voluntary conversation about the patient's preferences for care  at the very end of life. The patient wishes to have a  natural, peaceful death.  Along those lines, the patient does not wish to have CPR or other invasive treatments performed when his heart and/or breathing stops. I communicated to the patient that a DNR order would be placed in his medical record to reflect this preference. -Discussion conducted by admitting provider     (HFpEF) heart failure with preserved ejection fraction  -Echo 1/2023 with EF of 65% and indeterminate diastolic dysfunction.   -BNP on admit only 149 and he was dehydrated  -Strict ins/outs and daily weights  -Appears euvolemic on exam now and tolerating diet.  -Stop Iv fluids today    Liver metastases  -Treatment as above.    Malignant neoplasm of head of pancreas  -Follows with Dr. Carver  -Noted Stage Iv pancreatic cancer with mets to liver - on oral palliative chemotherapy.    -He is DNR.  -Holding chemotherapy  -Follow up with Dr. Carver.    Hyperlipidemia  Continue home statin      VTE Risk Mitigation (From admission, onward)         Ordered     enoxaparin injection 40 mg  Every 24 hours         10/13/23 1601     IP VTE HIGH RISK PATIENT  Once         10/13/23 1515     Place sequential compression device  Until discontinued         10/13/23 1515     Place FRANCES hose  Until discontinued         10/13/23 1515                Discharge Planning   MIKO:      Code Status: DNR   Is the patient medically ready for discharge?:     Reason for patient still in hospital (select all that apply): Patient trending condition and Treatment  Discharge Plan A: Home Health                  Vini Hayes MD  Department of Hospital Medicine   [facility] - Telemetry

## 2023-10-17 NOTE — NURSING
[facility] [place]  Nurses Note -- 4 Eyes      10/16/2023       Skin assessed on: Q Shift      [x] No Pressure Injuries Present    [x]Prevention Measures Documented    [] Yes LDA  for Pressure Injury Previously documented     [] Yes New Pressure Injury Discovered   [] LDA for New Pressure Injury Added      Attending RN:  Jeanna Coombs, RN     Second RN:  Chetna RAMIREZ RN

## 2023-10-17 NOTE — PLAN OF CARE
Patient will resume HH services with [facility].   PCP and Oncology appointment scheduled     10/17/23 7299   Discharge Reassessment   Assessment Type Discharge Planning Reassessment   Communicated MIKO with patient/caregiver Date not available/Unable to determine   Discharge Plan A Home Health;Home with family   Discharge Plan B Home Health;Home with family   DME Needed Upon Discharge  other (see comments)  (TBD)   Transition of Care Barriers None   Why the patient remains in the hospital Requires continued medical care   Post-Acute Status   Discharge Delays None known at this time

## 2023-10-17 NOTE — PT/OT/SLP PROGRESS
Occupational Therapy   Treatment    Name: Manuel Tyler  MRN: [id number]  Admitting Diagnosis:  Acute hypoxemic respiratory failure       Recommendations:     Discharge Recommendations: Low Intensity Therapy  Discharge Equipment Recommendations:  bath bench  Barriers to discharge:   (generalized weakness, new oxygen use, fall risk)    Assessment:     Manuel Tyler is a 72 y.o. male with a medical diagnosis of Acute hypoxemic respiratory failure. Performance deficits affecting function are weakness, impaired endurance, impaired self care skills, impaired functional mobility, gait instability, impaired balance, decreased upper extremity function, decreased coordination, decreased safety awareness, impaired cardiopulmonary response to activity.   The patient was able to perform UE therex/HEP using red theraband with CGA to achieve full shoulder ROM x10 reps. SpO2 was >90% with activity and   pbm after amb to the bathroom and sink>bed. The patient demo good understanding of PLB with SOB.     Rehab Prognosis:  Good and Fair; patient would benefit from acute skilled OT services to address these deficits and reach maximum level of function.       Plan:     Patient to be seen 3 x/week to address the above listed problems via self-care/home management, therapeutic activities, therapeutic exercises  Plan of Care Expires: 10/29/23  Plan of Care Reviewed with: patient, spouse    Subjective     Chief Complaint: missed his breathing tx while sleeping (OT notified RT)  Patient/Family Comments/goals: agreeable to OT and UE therex  Pain/Comfort:  Pain Rating 1: 0/10    Objective:     Communicated with: nurse, April prior to session.  Patient found HOB elevated with oxygen, telemetry upon OT entry to room.    General Precautions: Standard, fall, respiratory    Orthopedic Precautions:N/A  Braces: N/A  Respiratory Status: Nasal cannula, flow 3 L/min     Occupational Performance:     Bed Mobility:    Patient completed  Scooting/Bridging with stand by assistance  Patient completed Supine to Sit with stand by assistance  Patient completed Sit to Supine with stand by assistance     Functional Mobility/Transfers:  Patient completed Sit <> Stand Transfer with stand by assistance and contact guard assistance  with  no assistive device   Patient completed Toilet Transfer Step Transfer technique with stand by assistance with  no AD and hand-held assist  Functional Mobility: The patient amb with SBA from bed to the chair and then chair to toile/sink to bed with SBA and VC to manage the O2 lines.  The patient was given a 3rd O2 extender to be used when amb to the bathroom to have a bm. OT educated the patient re: the need to request nursing assist and always use O2 as ordered to amb to the bathroom. The patient verbalized understanding.       Activities of Daily Living:  Grooming: stand by assistance to wash hands and face while standing at the sink  Upper Body Dressing: contact guard assistance to don back gown      AMPAC 6 Click ADL: 22    Treatment & Education:  Performed self care and functional mobility as noted above  SpO2 sittin % on 3L,HR 90,  with exercise: 90%,    Pt completed  x 10 BUE HEP with red theraband in seated position:  Shoulder horizontal ab/dduction (CGA to achieve full ROM at end range)  External rotation  Shoulder flexion/extension with ab/dduction (CGA to achieve full ROM at end range)  Elbow extension  Elbow flexion   Theraband placed within reach of pt on the bed rail  Pt encouraged to complete daily, x10 reps a day      Patient left HOB elevated with all lines intact, call button in reach, nurse, April notified, and spouse present    GOALS:   Multidisciplinary Problems       Occupational Therapy Goals          Problem: Occupational Therapy    Goal Priority Disciplines Outcome Interventions   Occupational Therapy Goal     OT, PT/OT Ongoing, Progressing    Description: Goals to be met by: 10/29/2023        Patient will increase functional independence with ADLs by performing:    UE Dressing with Modified [place].  LE Dressing with Modified [place].  Grooming while standing at sink with Modified [place] and Assistive Devices as needed.  Toileting from toilet with Modified [place] and Assistive Devices as needed for hygiene and clothing management.   Supine to sit with Modified [place].  Step transfer with Modified [place]  Toilet transfer to toilet with Modified [place].  Upper extremity exercise program x15 reps per handout, with independence.  The patient will demo (I) with pursed lipped breathing                       Time Tracking:     OT Date of Treatment: 10/17/23  OT Start Time: 1449  OT Stop Time: 1516  OT Total Time (min): 27 min    Billable Minutes:Self Care/Home Management 10  Therapeutic Exercise 17  Total Time 27    OT/LYUBOV: OT          10/17/2023

## 2023-10-18 ENCOUNTER — EXTERNAL HOME HEALTH (OUTPATIENT)
Dept: HOME HEALTH SERVICES | Facility: HOSPITAL | Age: 73
End: 2023-10-18
Payer: MEDICARE

## 2023-10-18 VITALS
HEART RATE: 93 BPM | OXYGEN SATURATION: 91 % | DIASTOLIC BLOOD PRESSURE: 78 MMHG | RESPIRATION RATE: 14 BRPM | TEMPERATURE: 98 F | BODY MASS INDEX: 24.76 KG/M2 | HEIGHT: 68 IN | WEIGHT: 163.38 LBS | SYSTOLIC BLOOD PRESSURE: 136 MMHG

## 2023-10-18 LAB
ALBUMIN SERPL BCP-MCNC: 2.7 G/DL (ref 3.5–5.2)
ALP SERPL-CCNC: 74 U/L (ref 55–135)
ALT SERPL W/O P-5'-P-CCNC: 65 U/L (ref 10–44)
ANION GAP SERPL CALC-SCNC: 8 MMOL/L (ref 8–16)
AST SERPL-CCNC: 34 U/L (ref 10–40)
BACTERIA SPEC AEROBE CULT: ABNORMAL
BASOPHILS # BLD AUTO: 0.07 K/UL (ref 0–0.2)
BASOPHILS NFR BLD: 0.2 % (ref 0–1.9)
BILIRUB SERPL-MCNC: 0.4 MG/DL (ref 0.1–1)
BUN SERPL-MCNC: 18 MG/DL (ref 8–23)
CALCIUM SERPL-MCNC: 7.8 MG/DL (ref 8.7–10.5)
CHLORIDE SERPL-SCNC: 99 MMOL/L (ref 95–110)
CO2 SERPL-SCNC: 22 MMOL/L (ref 23–29)
CREAT SERPL-MCNC: 0.8 MG/DL (ref 0.5–1.4)
DIFFERENTIAL METHOD: ABNORMAL
EOSINOPHIL # BLD AUTO: 0.1 K/UL (ref 0–0.5)
EOSINOPHIL NFR BLD: 0.2 % (ref 0–8)
ERYTHROCYTE [DISTWIDTH] IN BLOOD BY AUTOMATED COUNT: 15.1 % (ref 11.5–14.5)
EST. GFR  (NO RACE VARIABLE): >60 ML/MIN/1.73 M^2
GLUCOSE SERPL-MCNC: 145 MG/DL (ref 70–110)
GRAM STN SPEC: ABNORMAL
HCT VFR BLD AUTO: 29.3 % (ref 40–54)
HGB BLD-MCNC: 9.7 G/DL (ref 14–18)
IMM GRANULOCYTES # BLD AUTO: 0.36 K/UL (ref 0–0.04)
IMM GRANULOCYTES NFR BLD AUTO: 1.1 % (ref 0–0.5)
LYMPHOCYTES # BLD AUTO: 1.9 K/UL (ref 1–4.8)
LYMPHOCYTES NFR BLD: 5.8 % (ref 18–48)
MAGNESIUM SERPL-MCNC: 1.6 MG/DL (ref 1.6–2.6)
MCH RBC QN AUTO: 29.8 PG (ref 27–31)
MCHC RBC AUTO-ENTMCNC: 33.1 G/DL (ref 32–36)
MCV RBC AUTO: 90 FL (ref 82–98)
MONOCYTES # BLD AUTO: 3.6 K/UL (ref 0.3–1)
MONOCYTES NFR BLD: 10.9 % (ref 4–15)
NEUTROPHILS # BLD AUTO: 27.1 K/UL (ref 1.8–7.7)
NEUTROPHILS NFR BLD: 81.8 % (ref 38–73)
NRBC BLD-RTO: 0 /100 WBC
PLATELET # BLD AUTO: 505 K/UL (ref 150–450)
PMV BLD AUTO: 9.6 FL (ref 9.2–12.9)
POTASSIUM SERPL-SCNC: 3.4 MMOL/L (ref 3.5–5.1)
PROT SERPL-MCNC: 6.1 G/DL (ref 6–8.4)
RBC # BLD AUTO: 3.26 M/UL (ref 4.6–6.2)
SODIUM SERPL-SCNC: 129 MMOL/L (ref 136–145)
WBC # BLD AUTO: 33.19 K/UL (ref 3.9–12.7)

## 2023-10-18 PROCEDURE — 94640 AIRWAY INHALATION TREATMENT: CPT

## 2023-10-18 PROCEDURE — 25000003 PHARM REV CODE 250: Performed by: HOSPITALIST

## 2023-10-18 PROCEDURE — 25000003 PHARM REV CODE 250: Performed by: STUDENT IN AN ORGANIZED HEALTH CARE EDUCATION/TRAINING PROGRAM

## 2023-10-18 PROCEDURE — 85025 COMPLETE CBC W/AUTO DIFF WBC: CPT | Performed by: HOSPITALIST

## 2023-10-18 PROCEDURE — 36415 COLL VENOUS BLD VENIPUNCTURE: CPT | Performed by: HOSPITALIST

## 2023-10-18 PROCEDURE — 94760 N-INVAS EAR/PLS OXIMETRY 1: CPT

## 2023-10-18 PROCEDURE — 63600175 PHARM REV CODE 636 W HCPCS: Performed by: STUDENT IN AN ORGANIZED HEALTH CARE EDUCATION/TRAINING PROGRAM

## 2023-10-18 PROCEDURE — 25000242 PHARM REV CODE 250 ALT 637 W/ HCPCS: Performed by: HOSPITALIST

## 2023-10-18 PROCEDURE — 25000003 PHARM REV CODE 250: Performed by: NURSE PRACTITIONER

## 2023-10-18 PROCEDURE — 83735 ASSAY OF MAGNESIUM: CPT | Performed by: HOSPITALIST

## 2023-10-18 PROCEDURE — 63600175 PHARM REV CODE 636 W HCPCS: Performed by: HOSPITALIST

## 2023-10-18 PROCEDURE — 25000003 PHARM REV CODE 250

## 2023-10-18 PROCEDURE — 27000221 HC OXYGEN, UP TO 24 HOURS

## 2023-10-18 PROCEDURE — 80053 COMPREHEN METABOLIC PANEL: CPT | Performed by: HOSPITALIST

## 2023-10-18 PROCEDURE — S4991 NICOTINE PATCH NONLEGEND: HCPCS | Performed by: HOSPITALIST

## 2023-10-18 RX ORDER — LEVOFLOXACIN 750 MG/1
750 TABLET ORAL DAILY
Qty: 5 TABLET | Refills: 0 | Status: SHIPPED | OUTPATIENT
Start: 2023-10-19 | End: 2023-10-24

## 2023-10-18 RX ORDER — POTASSIUM CHLORIDE 20 MEQ/1
40 TABLET, EXTENDED RELEASE ORAL ONCE
Status: COMPLETED | OUTPATIENT
Start: 2023-10-18 | End: 2023-10-18

## 2023-10-18 RX ORDER — MAGNESIUM SULFATE HEPTAHYDRATE 40 MG/ML
2 INJECTION, SOLUTION INTRAVENOUS ONCE
Status: COMPLETED | OUTPATIENT
Start: 2023-10-18 | End: 2023-10-18

## 2023-10-18 RX ORDER — MORPHINE SULFATE 15 MG/1
15 TABLET, FILM COATED, EXTENDED RELEASE ORAL EVERY 12 HOURS
Status: DISCONTINUED | OUTPATIENT
Start: 2023-10-18 | End: 2023-10-18 | Stop reason: HOSPADM

## 2023-10-18 RX ADMIN — PREDNISONE 40 MG: 20 TABLET ORAL at 08:10

## 2023-10-18 RX ADMIN — POTASSIUM CHLORIDE 40 MEQ: 1500 TABLET, EXTENDED RELEASE ORAL at 09:10

## 2023-10-18 RX ADMIN — SUCRALFATE 1 G: 1 SUSPENSION ORAL at 06:10

## 2023-10-18 RX ADMIN — VANCOMYCIN HYDROCHLORIDE 1000 MG: 1 INJECTION, POWDER, LYOPHILIZED, FOR SOLUTION INTRAVENOUS at 09:10

## 2023-10-18 RX ADMIN — MUPIROCIN: 20 OINTMENT TOPICAL at 08:10

## 2023-10-18 RX ADMIN — IPRATROPIUM BROMIDE AND ALBUTEROL SULFATE 3 ML: 2.5; .5 SOLUTION RESPIRATORY (INHALATION) at 07:10

## 2023-10-18 RX ADMIN — SUCRALFATE 1 G: 1 SUSPENSION ORAL at 11:10

## 2023-10-18 RX ADMIN — CEFEPIME 2 G: 2 INJECTION, POWDER, FOR SOLUTION INTRAVENOUS at 08:10

## 2023-10-18 RX ADMIN — NICOTINE 1 PATCH: 14 PATCH TRANSDERMAL at 08:10

## 2023-10-18 RX ADMIN — MAGNESIUM SULFATE HEPTAHYDRATE 2 G: 40 INJECTION, SOLUTION INTRAVENOUS at 09:10

## 2023-10-18 RX ADMIN — MORPHINE SULFATE 15 MG: 15 TABLET, EXTENDED RELEASE ORAL at 12:10

## 2023-10-18 RX ADMIN — GUAIFENESIN 1200 MG: 600 TABLET, EXTENDED RELEASE ORAL at 08:10

## 2023-10-18 RX ADMIN — IPRATROPIUM BROMIDE AND ALBUTEROL SULFATE 3 ML: 2.5; .5 SOLUTION RESPIRATORY (INHALATION) at 02:10

## 2023-10-18 RX ADMIN — PANTOPRAZOLE SODIUM 40 MG: 40 TABLET, DELAYED RELEASE ORAL at 08:10

## 2023-10-18 RX ADMIN — MORPHINE SULFATE 15 MG: 15 TABLET, EXTENDED RELEASE ORAL at 08:10

## 2023-10-18 NOTE — NURSING
[facility]  Nurses Note -- 4 Eyes      10/17/2023       Skin assessed on: Q Shift      [x] No Pressure Injuries Present    [x]Prevention Measures Documented    [] Yes LDA  for Pressure Injury Previously documented     [] Yes New Pressure Injury Discovered   [] LDA for New Pressure Injury Added      Attending RN:  Jeanna Coombs, RN     Second RN:  Chetna RAMIREZ RN

## 2023-10-18 NOTE — PLAN OF CARE
Problem: Adult Inpatient Plan of Care  Goal: Plan of Care Review  Outcome: Ongoing, Progressing  Flowsheets (Taken 10/18/2023 1521)  Plan of Care Reviewed With: patient     Problem: Skin Injury Risk Increased  Goal: Skin Health and Integrity  Outcome: Ongoing, Progressing  Intervention: Optimize Skin Protection  Flowsheets (Taken 10/18/2023 1521)  Pressure Reduction Techniques:   frequent weight shift encouraged   weight shift assistance provided  Skin Protection:   incontinence pads utilized   tubing/devices free from skin contact  Head of Bed (HOB) Positioning: HOB elevated     Problem: Fluid and Electrolyte Imbalance (Acute Kidney Injury/Impairment)  Goal: Fluid and Electrolyte Balance  Outcome: Ongoing, Progressing     Problem: Oral Intake Inadequate (Acute Kidney Injury/Impairment)  Goal: Optimal Nutrition Intake  Outcome: Ongoing, Progressing  Intervention: Promote and Optimize Nutrition  Flowsheets (Taken 10/18/2023 1521)  Oral Nutrition Promotion:   nutritional therapy counseling provided   physical activity promoted   safe use of adaptive equipment encouraged

## 2023-10-18 NOTE — NURSING
Patient with complaints of back pain and is requesting home 15mg Morphine 12hr tablet. CHRISTIANO Tabares informed and new orders placed. Will administer and monitor effectiveness.

## 2023-10-18 NOTE — PLAN OF CARE
[facility] - Telemetry      HOME HEALTH ORDERS  FACE TO FACE ENCOUNTER    Patient Name: Manuel Tyler  YOB: 1950    PCP: Fatmata Vera MD   PCP Address: [address] [place] [address] [address] [address]  PCP Phone Number: [phone]  PCP Fax: [phone]    Encounter Date: 10/13/23    Admit to Home Health    Diagnoses:  Active Hospital Problems    Diagnosis  POA    *Acute hypoxemic respiratory failure [J96.01]  Yes    Acute exacerbation of chronic obstructive pulmonary disease (COPD) [J44.1]  Yes    Hypokalemia [E87.6]  No    Nausea & vomiting [R11.2]  Yes    Metabolic alkalosis [E87.3]  Yes    Hypercalcemia [E83.52]  Yes    VINCENT (acute kidney injury) [N17.9]  Yes    Tobacco abuse [Z72.0]  Yes    ACP (advance care planning) [Z71.89]  Not Applicable    (HFpEF) heart failure with preserved ejection fraction [I50.30]  Yes    Liver metastases [C78.7]  Yes     IMO Regualtory Update 4/1/23      Malignant neoplasm of head of pancreas [C25.0]  Yes      Resolved Hospital Problems    Diagnosis Date Resolved POA    Hypoxia [R09.02] 10/13/2023 Unknown       Follow Up Appointments:  Future Appointments   Date Time Provider Department Center   10/23/2023 10:00 AM LAB, [facility] LAB [facility]   10/26/2023  8:40 AM Latesha Carver MD [facility] HEM ONC [facility]   10/30/2023  1:00 PM Fatmata Vera MD [facility] Simon   11/16/2023 10:15 AM CHAIR 01 [facility] CHEMO [facility]   12/14/2023 10:15 AM CHAIR 01 [facility] CHEMO [facility]   1/11/2024 10:15 AM CHAIR 01 [facility] CHEMO [facility]   2/8/2024 10:15 AM CHAIR 01 [facility]       Allergies:  Review of patient's allergies indicates:   Allergen Reactions    Jakafi [ruxolitinib]        Medications: Review discharge medications with patient and family and provide education.    Current Facility-Administered Medications   Medication Dose Route Frequency Provider Last Rate Last Admin    acetaminophen tablet 650 mg  650  mg Oral Q4H PRN Christiano Braxton PA-C   650 mg at 10/17/23 2338    albuterol-ipratropium 2.5 mg-0.5 mg/3 mL nebulizer solution 3 mL  3 mL Nebulization Q6H WAKE Hardy Ervin MD   3 mL at 10/18/23 0745    aluminum-magnesium hydroxide-simethicone 200-200-20 mg/5 mL suspension 30 mL  30 mL Oral Q6H PRN Nurys Walsh, DNP, NP        benzonatate capsule 100 mg  100 mg Oral TID PRN Hardy Ervin MD        ceFEPIme (MAXIPIME) 2 g in dextrose 5 % in water (D5W) 100 mL IVPB (MB+)  2 g Intravenous Q8H Vini Hayes  mL/hr at 10/18/23 0838 2 g at 10/18/23 0838    enoxaparin injection 40 mg  40 mg Subcutaneous Q24H (prophylaxis, 1700) Christiano Braxton PA-C   40 mg at 10/17/23 1636    glucagon (human recombinant) injection 1 mg  1 mg Intramuscular PRN ShowChristiano hameed PA-C        glucose chewable tablet 16 g  16 g Oral PRN Christiano Braxton PA-C        glucose chewable tablet 24 g  24 g Oral PRN ShowChristiano hameed PA-C        guaiFENesin 12 hr tablet 1,200 mg  1,200 mg Oral BID Hardy Ervin MD   1,200 mg at 10/18/23 0833    hydrALAZINE injection 15 mg  15 mg Intravenous Q4H PRN Hardy Ervin MD        magnesium sulfate 2g in water 50mL IVPB (premix)  2 g Intravenous Once Vini Hayes MD        melatonin tablet 6 mg  6 mg Oral Nightly PRN Christiano Braxton PA-C   6 mg at 10/17/23 2338    morphine 12 hr tablet 15 mg  15 mg Oral Q12H Leoncio Martino PA-C   15 mg at 10/18/23 0833    mupirocin 2 % ointment   Nasal BID Hardy Ervin MD   Given at 10/18/23 0834    naloxone 0.4 mg/mL injection 0.02 mg  0.02 mg Intravenous PRN ShowChristiano hameed PA-C        nicotine 14 mg/24 hr 1 patch  1 patch Transdermal Daily Marker, Hardy D., MD   1 patch at 10/18/23 0833    ondansetron injection 4 mg  4 mg Intravenous Q6H PRN Christiano Braxton PA-C        pantoprazole EC tablet 40 mg  40 mg Oral Daily Nurys Walsh, GAYLE, NP   40 mg at 10/18/23 0833    potassium chloride SA CR tablet 40 mEq  40 mEq Oral Once Vini Hayes MD         senna-docusate 8.6-50 mg per tablet 1 tablet  1 tablet Oral Daily PRN Christiano Braxton PA-C        sodium chloride 0.9% flush 10 mL  10 mL Intravenous PRN Christiano Braxton PA-C        sucralfate 100 mg/mL suspension 1 g  1 g Oral QID (AC & HS) Hardy Ervin MD   1 g at 10/18/23 0626    vancomycin (VANCOCIN) 1,000 mg in dextrose 5 % (D5W) 250 mL IVPB (Vial-Mate)  15 mg/kg Intravenous Q12H Hardy Ervin MD   Stopped at 10/17/23 2304    vancomycin - pharmacy to dose   Intravenous pharmacy to manage frequency Hardy Ervin MD         Facility-Administered Medications Ordered in Other Encounters   Medication Dose Route Frequency Provider Last Rate Last Admin    alteplase injection 2 mg  2 mg Intra-Catheter PRN Latesha Carver MD        heparin, porcine (PF) 100 unit/mL injection flush 500 Units  500 Units Intravenous PRN Latesha Carver MD        heparin, porcine (PF) 100 unit/mL injection flush 500 Units  500 Units Intravenous PRN Latesha Carver MD   500 Units at 10/12/23 1423    sodium chloride 0.9% flush 10 mL  10 mL Intravenous PRN Latesha Carver MD        sodium chloride 0.9% flush 10 mL  10 mL Intravenous PRN Latesha Carver MD   10 mL at 10/12/23 1423     Current Discharge Medication List        START taking these medications    Details   levoFLOXacin (LEVAQUIN) 750 MG tablet Take 1 tablet (750 mg total) by mouth once daily. for 5 days  Qty: 5 tablet, Refills: 0           CONTINUE these medications which have NOT CHANGED    Details   buPROPion (WELLBUTRIN XL) 300 MG 24 hr tablet Take 1 tablet (300 mg total) by mouth once daily.  Qty: 90 tablet, Refills: 1    Associated Diagnoses: Tobacco use; Depressed mood      dabrafenib (TAFINLAR) 50 mg Cap TAKE 3 CAPSULES (150 MG TOTAL) TWICE DAILY  Qty: 90 capsule, Refills: 1    Associated Diagnoses: Malignant neoplasm of head of pancreas      DULCOLAX, BISACODYL, ORAL Take by mouth.      hydrOXYzine HCL (ATARAX) 25 MG tablet Take 1 tablet  (25 mg total) by mouth 3 (three) times daily as needed for Itching.  Qty: 90 tablet, Refills: 1    Associated Diagnoses: Itch of skin      morphine (MS CONTIN) 15 MG 12 hr tablet Take 1 tablet (15 mg total) by mouth every 12 (twelve) hours.  Qty: 60 tablet, Refills: 0    Comments: Quantity prescribed more than 7 day supply? Yes, quantity medically necessary  Associated Diagnoses: Malignant neoplasm of head of pancreas      multivit-mins/iron/folic/lycop (CENTRUM MEN ORAL) Take 1 tablet by mouth once daily.      tiotropium bromide (SPIRIVA RESPIMAT) 2.5 mcg/actuation inhaler Inhale 2 puffs into the lungs once daily. Controller  Qty: 4 g, Refills: 3      trametinib (MEKINIST) 2 mg Tab TAKE 1 TABLET ONCE DAILY  Qty: 30 tablet, Refills: 1    Associated Diagnoses: Malignant neoplasm of head of pancreas      triamcinolone acetonide 0.1% (KENALOG) 0.1 % ointment Apply topically 2 (two) times daily as needed (for rash).  Qty: 30 g, Refills: 1    Associated Diagnoses: Drug-induced skin rash      omeprazole (PRILOSEC OTC) 20 MG tablet Take 1 tablet (20 mg total) by mouth once daily.  Qty: 28 tablet, Refills: 1    Associated Diagnoses: Gastroesophageal reflux disease, unspecified whether esophagitis present           STOP taking these medications       ondansetron (ZOFRAN-ODT) 4 MG TbDL Comments:   Reason for Stopping:         predniSONE (DELTASONE) 20 MG tablet Comments:   Reason for Stopping:         prochlorperazine (COMPAZINE) 10 MG tablet Comments:   Reason for Stopping:                 I have seen and examined this patient within the last 30 days. My clinical findings that support the need for the home health skilled services and home bound status are the following:no   Weakness/numbness causing balance and gait disturbance due to Infection, Weakness/Debility, and Malignancy/Cancer making it taxing to leave home.  Requiring assistive device to leave home due to unsteady gait caused by  Infection, Weakness/Debility, and  Malignancy/Cancer.     Diet:   regular diet    Labs:  N/a    Referrals/ Consults  Physical Therapy to evaluate and treat. Evaluate for home safety and equipment needs; Establish/upgrade home exercise program. Perform / instruct on therapeutic exercises, gait training, transfer training, and Range of Motion.  Occupational Therapy to evaluate and treat. Evaluate home environment for safety and equipment needs. Perform/Instruct on transfers, ADL training, ROM, and therapeutic exercises.   to evaluate for community resources/long-range planning.  Aide to provide assistance with personal care, ADLs, and vital signs.    Activities:   activity as tolerated    Nursing:   Agency to admit patient within 24 hours of hospital discharge unless specified on physician order or at patient request    SN to complete comprehensive assessment including routine vital signs. Instruct on disease process and s/s of complications to report to MD. Review/verify medication list sent home with the patient at time of discharge  and instruct patient/caregiver as needed. Frequency may be adjusted depending on start of care date.     Skilled nurse to perform up to 3 visits PRN for symptoms related to diagnosis    Oxygen: 2L NC with activity until PNA has resolved    Notify MD if SBP > 160 or < 90; DBP > 90 or < 50; HR > 120 or < 50; Temp > 101; O2 < 88%;     Ok to schedule additional visits based on staff availability and patient request on consecutive days within the home health episode.    When multiple disciplines ordered:    Start of Care occurs on Sunday - Wednesday schedule remaining discipline evaluations as ordered on separate consecutive days following the start of care.    Thursday SOC -schedule subsequent evaluations Friday and Monday the following week.     Friday - Saturday SOC - schedule subsequent discipline evaluations on consecutive days starting Monday of the following week.    For all post-discharge communication,  lab results, vitals, and subsequent orders- please contact patient's PCP.  If unable to get ahold of PCP, and question is about blood pressure, diuresis, or arrhythmia attempt to contact cardiologist.  If unable to get ahold of PCP, and question is about dialysis, please attempt to contact nephrologist.  If unable to get ahold of PCP, and question is about antibiotics or wound care, please contact infectious disease doctor.  If unable to get ahold of PCP, and question is about oxygen titration, CPAP or BIPAP, please contact pulmonologist.   If unable to get ahold of PCP or the above physicians in a reasonable time, please contact  on-call for clarification.    Home Health Aide:  Nursing Twice weekly  Physical Therapy Three times weekly  Occupational Therapy Three times weekly  Medical Social Work Weekly  Home Health Aide Twice weekly    Wound Care Orders  no     I certify that this patient is confined to her home and needs intermittent skilled nursing care, physical therapy, and occupational therapy.        Vini Hayes MD  Internal Medicine Staff

## 2023-10-18 NOTE — PLAN OF CARE
10/18/23 0934   Medicare Message   Important Message from Medicare regarding Discharge Appeal Rights Given to patient/caregiver;Explained to patient/caregiver;Signed/date by patient/caregiver   Date IMM was signed 10/18/23   Time IMM was signed 0931

## 2023-10-18 NOTE — PLAN OF CARE
[facility] - Telemetry  Discharge Final Note    Primary Care Provider: Fatmata Vera MD    Expected Discharge Date: 10/18/2023    Final Discharge Note (most recent)       Final Note - 10/18/23 1355          Final Note    Assessment Type Final Discharge Note     Anticipated Discharge Disposition Home-Health Care Svc     What phone number can be called within the next 1-3 days to see how you are doing after discharge? [phone]     Hospital Resources/Appts/Education Provided Appointments scheduled and added to AVS        Post-Acute Status    Post-Acute Authorization HME;Home Health     HME Status Pending Delivery     Home Health Status Set-up Complete/Auth obtained     Discharge Delays Home Medical Equipment (Insurance, Delivery)                     Important Message from Medicare  Important Message from Medicare regarding Discharge Appeal Rights: Given to patient/caregiver, Explained to patient/caregiver, Signed/date by patient/caregiver     Date IMM was signed: 10/18/23  Time IMM was signed: 0931    Contact Info       [facility]   Specialty: Home Health Services, Home Therapy Services, Home Living Aide Services    [address]  [facility] [address]   Phone: [phone]       Next Steps: Follow up    Instructions: Home Health    [facility]   Specialty: DME Provider    [address]  [address]  [place] [address]   Phone: [phone]       Next Steps: Follow up    Instructions: DME          Patient PCP and oncology appointment added to AVS.   Kishore GRIMM  accept patient for HH services. Patient home O2 approved by [facility] and pending delivery and teachings.      secure chat nurse Saxena that patient cleared from case management standpoint, pending delivery of home O2 and teaching from respiratory.

## 2023-10-18 NOTE — PROGRESS NOTES
Pharmacokinetic Assessment Follow Up: IV Vancomycin    Vancomycin serum concentration assessment(s):    The trough level was drawn correctly and can be used to guide therapy at this time. The measurement is within the desired definitive target range of 10 to 20 mcg/mL.    Vancomycin Regimen Plan:    Continue regimen to Vancomycin 1000 mg IV every 12 hours with next serum trough concentration measured at 2030 prior to 4th dose on 10/19/23    Drug levels (last 3 results):  Recent Labs   Lab Result Units 10/17/23  2015   Vancomycin-Trough ug/mL 16.2       Pharmacy will continue to follow and monitor vancomycin.    Please contact pharmacy at extension [phone] for questions regarding this assessment.    Thank you for the consult,   Christopher Espinoza       Patient brief summary:  Manuel Tyler is a 72 y.o. male initiated on antimicrobial therapy with IV Vancomycin for treatment of  pneumonia    The patient's current regimen is Vancomycin 1000 mg q12h    Drug Allergies:   Review of patient's allergies indicates:   Allergen Reactions    Jakafi [ruxolitinib]        Actual Body Weight:   68.2 kg    Renal Function:   Estimated Creatinine Clearance: 81.7 mL/min (based on SCr of 0.8 mg/dL).,     Dialysis Method (if applicable):  N/A    CBC (last 72 hours):  Recent Labs   Lab Result Units 10/15/23  1114 10/16/23  0341 10/17/23  0419   WBC K/uL 26.92* 24.30* 29.12*   Hemoglobin g/dL 14.2 10.7* 10.9*   Hematocrit % 46.3 31.8* 32.5*   Platelets K/uL 490* 451* 503*   Gran % % 84.8* 81.5* 80.2*   Lymph % % 3.3* 7.9* 7.4*   Mono % % 5.6 9.6 11.4   Eosinophil % % 5.5 0.0 0.1   Basophil % % 0.2 0.1 0.2   Differential Method  Automated Automated Automated       Metabolic Panel (last 72 hours):  Recent Labs   Lab Result Units 10/15/23  1027 10/16/23  0341 10/16/23  1556 10/17/23  0419   Sodium mmol/L 135* 133*  --  131*   Potassium mmol/L 2.8* 2.7* 3.4* 3.7   Chloride mmol/L 94* 95  --  97   CO2 mmol/L 26 28  --  25   Glucose mg/dL 133*  129*  --  124*   BUN mg/dL 18 24*  --  23   Creatinine mg/dL 0.8 0.7  --  0.8   Albumin g/dL 3.3* 2.9*  --  2.9*   Total Bilirubin mg/dL 0.7 0.5  --  0.5   Alkaline Phosphatase U/L 102 86  --  72   AST U/L 39 36  --  36   ALT U/L 43 49*  --  55*   Magnesium mg/dL 1.8 1.7  --  1.8       Vancomycin Administrations:  vancomycin given in the last 96 hours                     vancomycin (VANCOCIN) 1,000 mg in dextrose 5 % (D5W) 250 mL IVPB (Vial-Mate) (mg) 1,000 mg New Bag 10/17/23 2134     1,000 mg New Bag  0931     1,000 mg New Bag 10/16/23 2106    vancomycin 1,500 mg in dextrose 5 % (D5W) 250 mL IVPB (Vial-Mate) (mg) 1,500 mg New Bag 10/16/23 0847                    Microbiologic Results:  Microbiology Results (last 7 days)       Procedure Component Value Units Date/Time    Culture, Respiratory with Gram Stain [[id number]] Collected: 10/16/23 1452    Order Status: Completed Specimen: Respiratory from Sputum, Expectorated Updated: 10/17/23 1014     Respiratory Culture Normal respiratory richa     Gram Stain (Respiratory) <10 epithelial cells per low power field.     Gram Stain (Respiratory) Few WBC's     Gram Stain (Respiratory) Few budding yeast     Gram Stain (Respiratory) Few Gram positive cocci in clusters     Gram Stain (Respiratory) Rare Gram positive rods     Gram Stain (Respiratory) Rare Gram negative rods    Blood culture [[id number]] Collected: 10/16/23 0803    Order Status: Completed Specimen: Blood from Peripheral, Right Hand Updated: 10/17/23 0903     Blood Culture, Routine No Growth to date      No Growth to date    Narrative:      Aerobic and anaerobic    Blood culture [[id number]] Collected: 10/16/23 0802    Order Status: Completed Specimen: Blood from Peripheral, Right Arm Updated: 10/17/23 0903     Blood Culture, Routine No Growth to date      No Growth to date    Narrative:      Aerobic and anaerobic    Influenza A & B by Molecular [[id number]]     Order Status: Canceled Specimen: Nasopharyngeal Swab

## 2023-10-18 NOTE — PLAN OF CARE
Problem: Adult Inpatient Plan of Care  Goal: Plan of Care Review  Flowsheets (Taken 10/18/2023 0250)  Plan of Care Reviewed With: patient     Problem: Infection  Goal: Absence of Infection Signs and Symptoms  Intervention: Prevent or Manage Infection  Flowsheets (Taken 10/18/2023 0250)  Infection Management: (IV antibiotic therapy)   aseptic technique maintained   cultures obtained and sent to lab   other (see comments)

## 2023-10-18 NOTE — PLAN OF CARE
Problem: Adult Inpatient Plan of Care  Goal: Plan of Care Review  Outcome: Adequate for Care Transition  Goal: Patient-Specific Goal (Individualized)  Outcome: Adequate for Care Transition  Goal: Absence of Hospital-Acquired Illness or Injury  Outcome: Adequate for Care Transition  Goal: Optimal Comfort and Wellbeing  Outcome: Adequate for Care Transition  Goal: Readiness for Transition of Care  Outcome: Adequate for Care Transition     Problem: Skin Injury Risk Increased  Goal: Skin Health and Integrity  Outcome: Adequate for Care Transition     Problem: Infection  Goal: Absence of Infection Signs and Symptoms  Outcome: Adequate for Care Transition     Problem: Fluid and Electrolyte Imbalance (Acute Kidney Injury/Impairment)  Goal: Fluid and Electrolyte Balance  Outcome: Adequate for Care Transition     Problem: Oral Intake Inadequate (Acute Kidney Injury/Impairment)  Goal: Optimal Nutrition Intake  Outcome: Adequate for Care Transition     Problem: Renal Function Impairment (Acute Kidney Injury/Impairment)  Goal: Effective Renal Function  Outcome: Adequate for Care Transition

## 2023-10-18 NOTE — CLINICAL REVIEW
IP Sepsis Screen (most recent)       Sepsis Screen (IP) - 10/18/23 0846       Is the patient's history or complaint suggestive of a possible infection? Yes  -    Are there at least two of the following signs and symptoms present? Yes  -    Sepsis signs/symptoms - Tachycardia Tachycardia     >90  -    Sepsis signs/symptoms - WBC WBC < 4,000 or WBC > 12,000  -    Are any of the following organ dysfunction criteria present and not considered to be due to a chronic condition? Yes  -    Organ Dysfunction Criteria - O2 O2 Saturation < 95% on room air  -    Initiate Sepsis Protocol No  -    Reason sepsis not considered Pt. receiving appropriate management  -              User Key  (r) = Recorded By, (t) = Taken By, (c) = Cosigned By      Initials Name    Fozia Rust RN

## 2023-10-18 NOTE — NURSING
Per handoff received from April ASHLEY RN. Patient care assumed. Patients overall condition assessed and patient appears to be in NAD with no complaints of pain. 20g RFA PIV clean, dry, and intact. Urinal and call light in reach and patient instructed to inform the nurse if anything is needed. Patient stable and will continue to be monitored.

## 2023-10-18 NOTE — DISCHARGE SUMMARY
[facility] Medicine  Discharge Summary      Patient Name: Manuel Tyler  MRN: [id number]  [facility]: [id number]  Patient Class: IP- Inpatient  Admission Date: 10/13/2023  Hospital Length of Stay: 4 days  Discharge Date and Time:  10/18/2023 9:14 AM  Attending Physician: Viin Hayes MD   Discharging Provider: Vini Hayes MD  Primary Care Provider: Fatmata Vera MD    Primary Care Team: Networked reference to record PCT     HPI:   Manuel Tyler 72 y.o. male with pancreatic cancer on chemo tobacco abuse presents to the hospital with a chief complaint of intractable vomiting.  He reports 4 days of intractable nausea and vomiting with associated burning chest pain.  He attempted treatment home with anti acid medication without improvement.  He was seen in the infusion center yesterday and received IV steroids and IV fluids without improvement.  Reports he has been unable to take his oral chemo due to intractable vomiting.  He smokes 1/2 pack per day.  He denies fever leg swelling melena hematuria hematemesis dizziness syncope shortness breath and cough.  He reports chronic upper abdominal pain that he attributes to his pancreatic cancer.    In the ED, hypoxic to 83% despite changing oxygen probes and proper location.  Chest x-ray without acute process, metabolic alkalosis with pH, creatinine 7.5 on VBG CO2 40 white blood cell count 28 point calcium 12.0.      * No surgery found *      Hospital Course:   72 year old man with Stage IV pancreatic cancer, tobacco abuse and HFpEF who presented for evaluation of intractable nausea and vomiting.  He was admitted to inpatient status and diagnosed with acute hypoxic respiratory failure, VINCENT, hypokalemia, hypercalcemia and metabolic alkalosis.  His respiratory failure was initially attributed to COPD exacerbation and was treated with iv steroids and scheduled nebulizers.  However, wheezing has resolved and still quite hypoxic on room air.  CTA shows  consolidation in LLL with air broncograms so pneumonia appears more likely.  Checked sputum culture and blood cultures and added vanc and cefepime today.  Good chance will require home O2 at discharge.  Etiology of his nausea and vomiting unclear.  CTA chest did show a significantly distended stomach but he is tolerating diet, having bowel movements and no further nausea and vomiting.  His VINCENT, alkalosis and hypercalcemia have resolved with IV fluids.  His hypokalemia has required large doses, but is finally starting to improve.       Apparent PNA on CT (see imaging below). WBC increased to 29k, likely steroids, but will increase cefepime to 2g q8h given immunocompromised with good renal fxn. CrCl 81. Unknown organisms, NGTD cx's. Tolerating diet. Improving overall.      Oxygen: 2L NC with activity until PNA has resolved      For pneumonia, take levofloxacin once daily for 5 days, starting tomorrow 10/19  Follow-up with your PCP and oncologist  Will ask [facility] team to come check on you at home this week      Thank you for trusting [facility] and me with your care.  We are honored that you entrusted us with your healthcare needs. Your satisfaction is very important to us and we hope you have been very pleased with your experience at [facility]. After your discharge you may receive a survey asking you to rate your hospital experience. We encourage you to take the time to complete the survey as your feedback allows us to identify areas for improvement as well as recognize our staff for their care. We hope that you have received the very best care possible during your hospitalization at [facility], as your satisfaction is our top priority.    Let me know if there is anything more I can do!!        Vini Hayes MD  Internal Medicine Staff    ROS:  General: Negative for fevers or chills.  Cardiac: Negative for chest pain or orthopnea   Pulmonary: Negative for dyspnea or  wheezing.  GI: Negative for abdominal distention or pain     Vitals:    10/18/23 0338 10/18/23 0729 10/18/23 0745 10/18/23 0833   BP: (!) 163/79 (!) 183/84     BP Location: Right arm Right arm     Patient Position: Lying Lying     Pulse: (!) 113 102 96    Resp: 18 17 18 17   Temp: 97.9 °F (36.6 °C) 97.8 °F (36.6 °C)     TempSrc: Oral Oral     SpO2: (!) 94% 95% 97%    Weight: 74.1 kg (163 lb 5.8 oz)      Height:              Body mass index is 24.84 kg/m².      PHYSICAL EXAM:  GENERAL APPEARANCE: alert and cooperative, and appears to be in no acute distress.  HEENT:     HEAD: NC/AT     EYES: PERRL, EOMI.  Vision is grossly intact.  NECK: Neck supple, non-tender without LAD, masses or thyromegaly.  CARDIAC: There is no peripheral edema, cyanosis or pallor.   LUNGS: Clear to auscultation and percussion without rales or wheezing  ABDOMEN: Non-distended. No guarding.  MSK: No joint erythema or tenderness.   EXTREMITIES: No significant deformity or joint abnormality. No edema.   NEUROLOGICAL: CN II-XII grossly intact.   SKIN: Skin normal color, texture and turgor with no lesions or eruptions.  PSYCHIATRIC: Oriented. No tangential speech. No Hyperactive features.      Goals of Care Treatment Preferences:  Code Status: DNR    Living Will: Yes     What is most important right now is to focus on spending time at home, remaining as independent as possible, quality of life, even if it means sacrificing a little time.  Accordingly, we have decided that the best plan to meet the patient's goals includes continuing with treatment.      Consults:   Consults (From admission, onward)          Status Ordering Provider     Pharmacy to dose Vancomycin consult  Once        Provider:  (Not yet assigned)   See [facility] for full Linked Orders Report.    NYDIA Lund     Case Management/  Once        Provider:  (Not yet assigned)    TITA Coleman     Inpatient consult to Oncology  Once         Provider:  Keisha Tobar MD    Completed [facility]            No new Assessment & Plan notes have been filed under this hospital service since the last note was generated.  Service: Hospital Medicine    Final Active Diagnoses:    Diagnosis Date Noted POA    PRINCIPAL PROBLEM:  Acute hypoxemic respiratory failure [J96.01] 07/14/2023 Yes    Acute exacerbation of chronic obstructive pulmonary disease (COPD) [J44.1] 10/15/2023 Yes    Hypokalemia [E87.6] 10/15/2023 No    Nausea & vomiting [R11.2] 10/14/2023 Yes    Metabolic alkalosis [E87.3] 10/13/2023 Yes    Hypercalcemia [E83.52] 10/13/2023 Yes    VINCENT (acute kidney injury) [N17.9] 10/13/2023 Yes    Tobacco abuse [Z72.0] 07/17/2023 Yes    ACP (advance care planning) [Z71.89] 01/29/2023 Not Applicable    (HFpEF) heart failure with preserved ejection fraction [I50.30] 01/25/2023 Yes    Liver metastases [C78.7] 01/18/2023 Yes    Malignant neoplasm of head of pancreas [C25.0] 01/18/2023 Yes      Problems Resolved During this Admission:    Diagnosis Date Noted Date Resolved POA    Hypoxia [R09.02] 10/13/2023 10/13/2023 Unknown       Discharged Condition: fair    Disposition: home    Follow Up:    Patient Instructions:      Ambulatory referral/consult to Internal Medicine   Standing Status: Future   Referral Priority: Routine Referral Type: Consultation   Referral Reason: Specialty Services Required   Requested Specialty: Internal Medicine   Number of Visits Requested: 1     Ambulatory referral/consult to Oncology   Standing Status: Future   Referral Priority: Routine Referral Type: Consultation   Referral Reason: Specialty Services Required   Requested Specialty: Oncology   Number of Visits Requested: 1     Ambulatory referral/consult to [facility]   Standing Status: Future   Referral Priority: Urgent Referral Type: Consultation   Referral Reason: Specialty Services Required   Number of Visits Requested: 1         Recent  Results (from the past 100 hour(s))   Comprehensive Metabolic Panel    Collection Time: 10/15/23 10:27 AM   Result Value Ref Range    Sodium 135 (L) 136 - 145 mmol/L    Potassium 2.8 (L) 3.5 - 5.1 mmol/L    Chloride 94 (L) 95 - 110 mmol/L    CO2 26 23 - 29 mmol/L    Glucose 133 (H) 70 - 110 mg/dL    BUN 18 8 - 23 mg/dL    Creatinine 0.8 0.5 - 1.4 mg/dL    Calcium 9.2 8.7 - 10.5 mg/dL    Total Protein 7.5 6.0 - 8.4 g/dL    Albumin 3.3 (L) 3.5 - 5.2 g/dL    Total Bilirubin 0.7 0.1 - 1.0 mg/dL    Alkaline Phosphatase 102 55 - 135 U/L    AST 39 10 - 40 U/L    ALT 43 10 - 44 U/L    eGFR >60 >60 mL/min/1.73 m^2    Anion Gap 15 8 - 16 mmol/L   Magnesium    Collection Time: 10/15/23 10:27 AM   Result Value Ref Range    Magnesium 1.8 1.6 - 2.6 mg/dL   CBC Auto Differential    Collection Time: 10/15/23 11:14 AM   Result Value Ref Range    WBC 26.92 (H) 3.90 - 12.70 K/uL    RBC 4.72 4.60 - 6.20 M/uL    Hemoglobin 14.2 14.0 - 18.0 g/dL    Hematocrit 46.3 40.0 - 54.0 %    MCV 98 82 - 98 fL    MCH 30.1 27.0 - 31.0 pg    MCHC 30.7 (L) 32.0 - 36.0 g/dL    RDW 16.0 (H) 11.5 - 14.5 %    Platelets 490 (H) 150 - 450 K/uL    MPV 10.7 9.2 - 12.9 fL    Immature Granulocytes 0.6 (H) 0.0 - 0.5 %    Gran # (ANC) 22.8 (H) 1.8 - 7.7 K/uL    Immature Grans (Abs) 0.17 (H) 0.00 - 0.04 K/uL    Lymph # 0.9 (L) 1.0 - 4.8 K/uL    Mono # 1.5 (H) 0.3 - 1.0 K/uL    Eos # 1.5 (H) 0.0 - 0.5 K/uL    Baso # 0.05 0.00 - 0.20 K/uL    nRBC 0 0 /100 WBC    Gran % 84.8 (H) 38.0 - 73.0 %    Lymph % 3.3 (L) 18.0 - 48.0 %    Mono % 5.6 4.0 - 15.0 %    Eosinophil % 5.5 0.0 - 8.0 %    Basophil % 0.2 0.0 - 1.9 %    Differential Method Automated    D-Dimer, Quantitative    Collection Time: 10/15/23 11:14 AM   Result Value Ref Range    D-Dimer 2.02 (H) <0.50 mg/L FEU   CBC Auto Differential    Collection Time: 10/16/23  3:41 AM   Result Value Ref Range    WBC 24.30 (H) 3.90 - 12.70 K/uL    RBC 3.56 (L) 4.60 - 6.20 M/uL    Hemoglobin 10.7 (L) 14.0 - 18.0 g/dL     Hematocrit 31.8 (L) 40.0 - 54.0 %    MCV 89 82 - 98 fL    MCH 30.1 27.0 - 31.0 pg    MCHC 33.6 32.0 - 36.0 g/dL    RDW 15.3 (H) 11.5 - 14.5 %    Platelets 451 (H) 150 - 450 K/uL    MPV 9.8 9.2 - 12.9 fL    Immature Granulocytes 0.9 (H) 0.0 - 0.5 %    Gran # (ANC) 19.8 (H) 1.8 - 7.7 K/uL    Immature Grans (Abs) 0.23 (H) 0.00 - 0.04 K/uL    Lymph # 1.9 1.0 - 4.8 K/uL    Mono # 2.3 (H) 0.3 - 1.0 K/uL    Eos # 0.0 0.0 - 0.5 K/uL    Baso # 0.03 0.00 - 0.20 K/uL    nRBC 0 0 /100 WBC    Gran % 81.5 (H) 38.0 - 73.0 %    Lymph % 7.9 (L) 18.0 - 48.0 %    Mono % 9.6 4.0 - 15.0 %    Eosinophil % 0.0 0.0 - 8.0 %    Basophil % 0.1 0.0 - 1.9 %    Differential Method Automated    Comprehensive Metabolic Panel    Collection Time: 10/16/23  3:41 AM   Result Value Ref Range    Sodium 133 (L) 136 - 145 mmol/L    Potassium 2.7 (LL) 3.5 - 5.1 mmol/L    Chloride 95 95 - 110 mmol/L    CO2 28 23 - 29 mmol/L    Glucose 129 (H) 70 - 110 mg/dL    BUN 24 (H) 8 - 23 mg/dL    Creatinine 0.7 0.5 - 1.4 mg/dL    Calcium 8.1 (L) 8.7 - 10.5 mg/dL    Total Protein 6.3 6.0 - 8.4 g/dL    Albumin 2.9 (L) 3.5 - 5.2 g/dL    Total Bilirubin 0.5 0.1 - 1.0 mg/dL    Alkaline Phosphatase 86 55 - 135 U/L    AST 36 10 - 40 U/L    ALT 49 (H) 10 - 44 U/L    eGFR >60 >60 mL/min/1.73 m^2    Anion Gap 10 8 - 16 mmol/L   Magnesium    Collection Time: 10/16/23  3:41 AM   Result Value Ref Range    Magnesium 1.7 1.6 - 2.6 mg/dL   Blood culture    Collection Time: 10/16/23  8:02 AM    Specimen: Peripheral, Right Arm; Blood   Result Value Ref Range    Blood Culture, Routine No Growth to date     Blood Culture, Routine No Growth to date     Blood Culture, Routine No Growth to date    Lactic Acid, Plasma    Collection Time: 10/16/23  8:02 AM   Result Value Ref Range    Lactate (Lactic Acid) 1.6 0.5 - 2.2 mmol/L   Procalcitonin    Collection Time: 10/16/23  8:02 AM   Result Value Ref Range    Procalcitonin 0.07 <0.25 ng/mL   Folate    Collection Time: 10/16/23  8:02 AM   Result  Value Ref Range    Folate 10.7 4.0 - 24.0 ng/mL   Vitamin B12    Collection Time: 10/16/23  8:02 AM   Result Value Ref Range    Vitamin B-12 642 210 - 950 pg/mL   Ferritin    Collection Time: 10/16/23  8:02 AM   Result Value Ref Range    Ferritin 105 20.0 - 300.0 ng/mL   Iron and TIBC    Collection Time: 10/16/23  8:02 AM   Result Value Ref Range    Iron 55 45 - 160 ug/dL    Transferrin 177 (L) 200 - 375 mg/dL    TIBC 262 250 - 450 ug/dL    Saturated Iron 21 20 - 50 %   Blood culture    Collection Time: 10/16/23  8:03 AM    Specimen: Peripheral, Right Hand; Blood   Result Value Ref Range    Blood Culture, Routine No Growth to date     Blood Culture, Routine No Growth to date     Blood Culture, Routine No Growth to date    Culture, Respiratory with Gram Stain    Collection Time: 10/16/23  2:52 PM    Specimen: Sputum, Expectorated; Respiratory   Result Value Ref Range    Respiratory Culture KATHERIN ALBICANS  Few   (A)     Gram Stain (Respiratory) <10 epithelial cells per low power field.     Gram Stain (Respiratory) Few WBC's     Gram Stain (Respiratory) Few budding yeast     Gram Stain (Respiratory) Few Gram positive cocci in clusters     Gram Stain (Respiratory) Rare Gram positive rods     Gram Stain (Respiratory) Rare Gram negative rods    Potassium    Collection Time: 10/16/23  3:56 PM   Result Value Ref Range    Potassium 3.4 (L) 3.5 - 5.1 mmol/L   CBC Auto Differential    Collection Time: 10/17/23  4:19 AM   Result Value Ref Range    WBC 29.12 (H) 3.90 - 12.70 K/uL    RBC 3.58 (L) 4.60 - 6.20 M/uL    Hemoglobin 10.9 (L) 14.0 - 18.0 g/dL    Hematocrit 32.5 (L) 40.0 - 54.0 %    MCV 91 82 - 98 fL    MCH 30.4 27.0 - 31.0 pg    MCHC 33.5 32.0 - 36.0 g/dL    RDW 15.2 (H) 11.5 - 14.5 %    Platelets 503 (H) 150 - 450 K/uL    MPV 10.0 9.2 - 12.9 fL    Immature Granulocytes 0.7 (H) 0.0 - 0.5 %    Gran # (ANC) 23.4 (H) 1.8 - 7.7 K/uL    Immature Grans (Abs) 0.20 (H) 0.00 - 0.04 K/uL    Lymph # 2.2 1.0 - 4.8 K/uL    Mono #  3.3 (H) 0.3 - 1.0 K/uL    Eos # 0.0 0.0 - 0.5 K/uL    Baso # 0.05 0.00 - 0.20 K/uL    nRBC 0 0 /100 WBC    Gran % 80.2 (H) 38.0 - 73.0 %    Lymph % 7.4 (L) 18.0 - 48.0 %    Mono % 11.4 4.0 - 15.0 %    Eosinophil % 0.1 0.0 - 8.0 %    Basophil % 0.2 0.0 - 1.9 %    Differential Method Automated    Comprehensive Metabolic Panel    Collection Time: 10/17/23  4:19 AM   Result Value Ref Range    Sodium 131 (L) 136 - 145 mmol/L    Potassium 3.7 3.5 - 5.1 mmol/L    Chloride 97 95 - 110 mmol/L    CO2 25 23 - 29 mmol/L    Glucose 124 (H) 70 - 110 mg/dL    BUN 23 8 - 23 mg/dL    Creatinine 0.8 0.5 - 1.4 mg/dL    Calcium 8.1 (L) 8.7 - 10.5 mg/dL    Total Protein 6.4 6.0 - 8.4 g/dL    Albumin 2.9 (L) 3.5 - 5.2 g/dL    Total Bilirubin 0.5 0.1 - 1.0 mg/dL    Alkaline Phosphatase 72 55 - 135 U/L    AST 36 10 - 40 U/L    ALT 55 (H) 10 - 44 U/L    eGFR >60 >60 mL/min/1.73 m^2    Anion Gap 9 8 - 16 mmol/L   Magnesium    Collection Time: 10/17/23  4:19 AM   Result Value Ref Range    Magnesium 1.8 1.6 - 2.6 mg/dL   VANCOMYCIN, TROUGH    Collection Time: 10/17/23  8:15 PM   Result Value Ref Range    Vancomycin-Trough 16.2 10.0 - 22.0 ug/mL   CBC Auto Differential    Collection Time: 10/18/23  5:51 AM   Result Value Ref Range    WBC 33.19 (H) 3.90 - 12.70 K/uL    RBC 3.26 (L) 4.60 - 6.20 M/uL    Hemoglobin 9.7 (L) 14.0 - 18.0 g/dL    Hematocrit 29.3 (L) 40.0 - 54.0 %    MCV 90 82 - 98 fL    MCH 29.8 27.0 - 31.0 pg    MCHC 33.1 32.0 - 36.0 g/dL    RDW 15.1 (H) 11.5 - 14.5 %    Platelets 505 (H) 150 - 450 K/uL    MPV 9.6 9.2 - 12.9 fL    Immature Granulocytes 1.1 (H) 0.0 - 0.5 %    Gran # (ANC) 27.1 (H) 1.8 - 7.7 K/uL    Immature Grans (Abs) 0.36 (H) 0.00 - 0.04 K/uL    Lymph # 1.9 1.0 - 4.8 K/uL    Mono # 3.6 (H) 0.3 - 1.0 K/uL    Eos # 0.1 0.0 - 0.5 K/uL    Baso # 0.07 0.00 - 0.20 K/uL    nRBC 0 0 /100 WBC    Gran % 81.8 (H) 38.0 - 73.0 %    Lymph % 5.8 (L) 18.0 - 48.0 %    Mono % 10.9 4.0 - 15.0 %    Eosinophil % 0.2 0.0 - 8.0 %     Basophil % 0.2 0.0 - 1.9 %    Differential Method Automated    Comprehensive Metabolic Panel    Collection Time: 10/18/23  5:51 AM   Result Value Ref Range    Sodium 129 (L) 136 - 145 mmol/L    Potassium 3.4 (L) 3.5 - 5.1 mmol/L    Chloride 99 95 - 110 mmol/L    CO2 22 (L) 23 - 29 mmol/L    Glucose 145 (H) 70 - 110 mg/dL    BUN 18 8 - 23 mg/dL    Creatinine 0.8 0.5 - 1.4 mg/dL    Calcium 7.8 (L) 8.7 - 10.5 mg/dL    Total Protein 6.1 6.0 - 8.4 g/dL    Albumin 2.7 (L) 3.5 - 5.2 g/dL    Total Bilirubin 0.4 0.1 - 1.0 mg/dL    Alkaline Phosphatase 74 55 - 135 U/L    AST 34 10 - 40 U/L    ALT 65 (H) 10 - 44 U/L    eGFR >60 >60 mL/min/1.73 m^2    Anion Gap 8 8 - 16 mmol/L   Magnesium    Collection Time: 10/18/23  5:51 AM   Result Value Ref Range    Magnesium 1.6 1.6 - 2.6 mg/dL       Microbiology Results (last 7 days)       Procedure Component Value Units Date/Time    Blood culture [[id number]] Collected: 10/16/23 0803    Order Status: Completed Specimen: Blood from Peripheral, Right Hand Updated: 10/18/23 0903     Blood Culture, Routine No Growth to date      No Growth to date      No Growth to date    Narrative:      Aerobic and anaerobic    Blood culture [[id number]] Collected: 10/16/23 0802    Order Status: Completed Specimen: Blood from Peripheral, Right Arm Updated: 10/18/23 0903     Blood Culture, Routine No Growth to date      No Growth to date      No Growth to date    Narrative:      Aerobic and anaerobic    Culture, Respiratory with Gram Stain [[id number]]  (Abnormal) Collected: 10/16/23 1452    Order Status: Completed Specimen: Respiratory from Sputum, Expectorated Updated: 10/18/23 0816     Respiratory Culture KATHERIN ALBICANS  Few       Gram Stain (Respiratory) <10 epithelial cells per low power field.     Gram Stain (Respiratory) Few WBC's     Gram Stain (Respiratory) Few budding yeast     Gram Stain (Respiratory) Few Gram positive cocci in clusters     Gram Stain (Respiratory) Rare Gram positive rods      Gram Stain (Respiratory) Rare Gram negative rods    Influenza A & B by Molecular [[id number]]     Order Status: Canceled Specimen: Nasopharyngeal Swab             Imaging Results              X-Ray Chest AP Portable (Final result)  Result time 10/13/23 13:29:45      Final result by Lucio George MD (10/13/23 13:29:45)                   Impression:      1. Bandlike atelectasis projects over the left lower lung zone, no large focal consolidation.      Electronically signed by: Lucio George MD  Date:    10/13/2023  Time:    13:29               Narrative:    EXAMINATION:  XR CHEST AP PORTABLE    CLINICAL HISTORY:  Vomiting, unspecified    TECHNIQUE:  Single frontal view of the chest was performed.    COMPARISON:  09/03/2023    FINDINGS:  Right chest wall port catheter tip projects over the distal SVC.  The cardiomediastinal silhouette is not enlarged noting calcification of the aorta..  There is no pleural effusion.  The trachea is midline.  The lungs are symmetrically expanded bilaterally with bandlike atelectasis projected over the left lower lung zone..  No large focal consolidation seen.  There is no pneumothorax.  The osseous structures are remarkable for degenerative change..                                       US Abdomen Limited (Final result)  Result time 10/13/23 13:30:14      Final result by Sundeep Vasquez Jr., MD (10/13/23 13:30:14)                   Impression:      Pancreatic mass in this patient with known malignancy.  Pneumobilia related to previous instrumentation.      Electronically signed by: Sundeep Yousif Jr  Date:    10/13/2023  Time:    13:30               Narrative:    EXAMINATION:  US ABDOMEN LIMITED    CLINICAL HISTORY:  emesis;    TECHNIQUE:  Limited ultrasound of the right upper quadrant of the abdomen (including pancreas, liver, gallbladder, common bile duct, and spleen) was performed.    COMPARISON:  01/25/2023 ultrasound and CT abdomen and pelvis  09/19/2023    FINDINGS:  Liver: Normal in size . Homogeneous echotexture. No focal hepatic lesions.    Gallbladder: The gallbladder is surgically absent.    Biliary system: The common duct is normal caliber.  Intrahepatic pneumobilia is present.    Spleen: Normal in size and echotexture.    Pancreas: partially obscured by bowel gas.  Vague hypo echoic mass in the pancreatic head.                                          Pending Diagnostic Studies:       None           Medications:  Reconciled Home Medications:      Medication List        START taking these medications      levoFLOXacin 750 MG tablet  Commonly known as: LEVAQUIN  Take 1 tablet (750 mg total) by mouth once daily. for 5 days  Start taking on: October 19, 2023            CONTINUE taking these medications      buPROPion 300 MG 24 hr tablet  Commonly known as: WELLBUTRIN XL  Take 1 tablet (300 mg total) by mouth once daily.     CENTRUM MEN ORAL  Take 1 tablet by mouth once daily.     DULCOLAX (BISACODYL) ORAL  Take by mouth.     hydrOXYzine HCL 25 MG tablet  Commonly known as: ATARAX  Take 1 tablet (25 mg total) by mouth 3 (three) times daily as needed for Itching.     morphine 15 MG 12 hr tablet  Commonly known as: MS CONTIN  Take 1 tablet (15 mg total) by mouth every 12 (twelve) hours.     omeprazole 20 MG tablet  Commonly known as: PriLOSEC OTC  Take 1 tablet (20 mg total) by mouth once daily.     TAFINLAR 50 mg Cap  Generic drug: dabrafenib  TAKE 3 CAPSULES (150 MG TOTAL) TWICE DAILY     tiotropium bromide 2.5 mcg/actuation inhaler  Commonly known as: SPIRIVA RESPIMAT  Inhale 2 puffs into the lungs once daily. Controller     trametinib 2 mg Tab  Commonly known as: MEKINIST  TAKE 1 TABLET ONCE DAILY     triamcinolone acetonide 0.1% 0.1 % ointment  Commonly known as: KENALOG  Apply topically 2 (two) times daily as needed (for rash).            STOP taking these medications      ondansetron 4 MG Tbdl  Commonly known as: ZOFRAN-ODT     predniSONE 20 MG  tablet  Commonly known as: DELTASONE     prochlorperazine 10 MG tablet  Commonly known as: COMPAZINE              Indwelling Lines/Drains at time of discharge:   Lines/Drains/Airways       Central Venous Catheter Line  Duration             Port A Cath Single Lumen 01/24/23 right atrial 267 days                    Time spent on the discharge of patient: 35 minutes         Vini Hayes MD  Department of Hospital Medicine  [facility] - Telemetry

## 2023-10-18 NOTE — NURSING
Patient is discharged, on 2L nc no distress noted. Tele and IV removed with tip intact. Instructions handed to patient, reviewed by virtual nurse. Prescriptions and 02 delivered to bedside. Teaching provided by respiratory. Bed in lowest position, wheels locked, call light in reach. Waiting for ride downstairs.

## 2023-10-18 NOTE — DISCHARGE INSTRUCTIONS
For pneumonia, take levofloxacin once daily for 5 days, starting tomorrow 10/19  Follow-up with your PCP and oncologist  Will ask [facility] team to come check on you at home this week      Thank you for trusting [facility] and me with your care.  We are honored that you entrusted us with your healthcare needs. Your satisfaction is very important to us and we hope you have been very pleased with your experience at [facility]. After your discharge you may receive a survey asking you to rate your hospital experience. We encourage you to take the time to complete the survey as your feedback allows us to identify areas for improvement as well as recognize our staff for their care. We hope that you have received the very best care possible during your hospitalization at [facility], as your satisfaction is our top priority.    Let me know if there is anything more I can do!!        Vini Hayes MD  Internal Medicine Staff        PATIENT GENERAL DISCHARGE INFORMATION   Things that YOU are responsible for to Manage Your Care At Home:  1. Getting your prescriptions filled.  2. Taking you medications as directed. (DO NOT MISS ANY DOSES!)  3. Going to your follow-up doctor appointments.                 *This is important because it allows the doctor to monitor your progress and make changes.      If you are unable to make your follow up appointments, please call the number listed and reschedule this appointment.   After discharge, if you need assistance, you can call [facility] for 24/7 assistance at [phone]  If you are experience any signs or symptoms, Call your doctor or Call 911 and come to your nearest Emergency Room.    You should receive a call from [facility] within 48-72 hours to help manage your care after discharge.   Please try to make sure that you answer your phone for this important phone call.

## 2023-10-18 NOTE — NURSING
Testing for Home O2    O2 Sats on RA at rest= 93%    O2 sats on RA with exercise= 87%    O2 sats on 2L O2 exercise = 96%

## 2023-10-20 ENCOUNTER — TELEPHONE (OUTPATIENT)
Dept: HEMATOLOGY/ONCOLOGY | Facility: CLINIC | Age: 73
End: 2023-10-20
Payer: MEDICARE

## 2023-10-20 DIAGNOSIS — C25.0 MALIGNANT NEOPLASM OF HEAD OF PANCREAS: Primary | ICD-10-CM

## 2023-10-20 LAB
BACTERIA BLD CULT: NORMAL
BACTERIA BLD CULT: NORMAL

## 2023-10-20 NOTE — TELEPHONE ENCOUNTER
TC'd from spouse requesting we send hospice or palliative care to home today if possible  Nurse explained difference between Hospice and palliative care. She is requesting either on  informed we would send someone to patient's home today to discuss both types of care with her and patient and they can decide which to choose     TC to [facility]/ [facility] Informed her of above  She requested we place a referral for both and once patient and spouse decides which is appropriate they will proceed with care accordingly

## 2023-11-10 ENCOUNTER — DOCUMENT SCAN (OUTPATIENT)
Dept: HOME HEALTH SERVICES | Facility: HOSPITAL | Age: 73
End: 2023-11-10
Payer: OTHER GOVERNMENT

## 2023-12-07 ENCOUNTER — DOCUMENT SCAN (OUTPATIENT)
Dept: HOME HEALTH SERVICES | Facility: HOSPITAL | Age: 73
End: 2023-12-07
Payer: OTHER GOVERNMENT

## (undated) DEVICE — BLADE SURG STAINLESS STEEL #11

## (undated) DEVICE — SET DECANTER MEDICHOICE

## (undated) DEVICE — SHEET THYROID W/ISO-BAC

## (undated) DEVICE — SOL NACL 0.9% INJ 250ML BG

## (undated) DEVICE — APPLICATOR CHLORAPREP ORN 26ML

## (undated) DEVICE — SYR 3CC LUER LOC

## (undated) DEVICE — COVER OVERHEAD SURG LT BLUE

## (undated) DEVICE — NDL HYPO REG 25G X 1 1/2

## (undated) DEVICE — SYR 10CC LUER LOCK

## (undated) DEVICE — SUT MONOCRYL 4.0 PS2 CP496G

## (undated) DEVICE — SUPPORT ULNA NERVE PROTECTOR

## (undated) DEVICE — Device

## (undated) DEVICE — SEE MEDLINE ITEM 107746

## (undated) DEVICE — BLANKET LOWER BODY 55.9X40.2IN

## (undated) DEVICE — GLOVE SURG BIOGEL LATEX SZ 7.5

## (undated) DEVICE — ELECTRODE REM PLYHSV RETURN 9

## (undated) DEVICE — SUT VICRYL 3-0 27 SH

## (undated) DEVICE — TOWEL OR DISP STRL BLUE 4/PK

## (undated) DEVICE — DRAPE C-ARM FOR MOBILE XRAY

## (undated) DEVICE — ADHESIVE DERMABOND MINI HV

## (undated) DEVICE — SEE MEDLINE ITEM 146292